# Patient Record
Sex: MALE | Race: WHITE | NOT HISPANIC OR LATINO | Employment: OTHER | URBAN - METROPOLITAN AREA
[De-identification: names, ages, dates, MRNs, and addresses within clinical notes are randomized per-mention and may not be internally consistent; named-entity substitution may affect disease eponyms.]

---

## 2017-01-16 DIAGNOSIS — D69.3 IDIOPATHIC THROMBOCYTOPENIC PURPURA (H): Primary | ICD-10-CM

## 2017-01-16 LAB
BASOPHILS # BLD AUTO: 0 10E9/L (ref 0–0.2)
BASOPHILS NFR BLD AUTO: 0.1 %
DIFFERENTIAL METHOD BLD: ABNORMAL
EOSINOPHIL # BLD AUTO: 0 10E9/L (ref 0–0.7)
EOSINOPHIL NFR BLD AUTO: 0.1 %
ERYTHROCYTE [DISTWIDTH] IN BLOOD BY AUTOMATED COUNT: 12.6 % (ref 10–15)
HCT VFR BLD AUTO: 39.2 % (ref 40–53)
HGB BLD-MCNC: 13.1 G/DL (ref 13.3–17.7)
LYMPHOCYTES # BLD AUTO: 0.9 10E9/L (ref 0.8–5.3)
LYMPHOCYTES NFR BLD AUTO: 9.4 %
MCH RBC QN AUTO: 30.4 PG (ref 26.5–33)
MCHC RBC AUTO-ENTMCNC: 33.4 G/DL (ref 31.5–36.5)
MCV RBC AUTO: 91 FL (ref 78–100)
MONOCYTES # BLD AUTO: 0.7 10E9/L (ref 0–1.3)
MONOCYTES NFR BLD AUTO: 7.6 %
NEUTROPHILS # BLD AUTO: 8 10E9/L (ref 1.6–8.3)
NEUTROPHILS NFR BLD AUTO: 82.8 %
PLATELET # BLD AUTO: 66 10E9/L (ref 150–450)
RBC # BLD AUTO: 4.31 10E12/L (ref 4.4–5.9)
WBC # BLD AUTO: 9.7 10E9/L (ref 4–11)

## 2017-01-16 PROCEDURE — 36415 COLL VENOUS BLD VENIPUNCTURE: CPT | Performed by: FAMILY MEDICINE

## 2017-01-16 PROCEDURE — 85025 COMPLETE CBC W/AUTO DIFF WBC: CPT | Performed by: FAMILY MEDICINE

## 2017-01-24 DIAGNOSIS — D69.3 IDIOPATHIC THROMBOCYTOPENIC PURPURA (H): Primary | ICD-10-CM

## 2017-01-24 DIAGNOSIS — D69.3 IDIOPATHIC THROMBOCYTOPENIC PURPURA (H): ICD-10-CM

## 2017-01-24 DIAGNOSIS — I10 BENIGN ESSENTIAL HYPERTENSION: Primary | ICD-10-CM

## 2017-01-24 LAB
BASOPHILS # BLD AUTO: 0 10E9/L (ref 0–0.2)
BASOPHILS NFR BLD AUTO: 0.1 %
DIFFERENTIAL METHOD BLD: ABNORMAL
EOSINOPHIL # BLD AUTO: 0 10E9/L (ref 0–0.7)
EOSINOPHIL NFR BLD AUTO: 0.1 %
ERYTHROCYTE [DISTWIDTH] IN BLOOD BY AUTOMATED COUNT: 13.3 % (ref 10–15)
HCT VFR BLD AUTO: 40 % (ref 40–53)
HGB BLD-MCNC: 13.1 G/DL (ref 13.3–17.7)
IMM GRANULOCYTES # BLD: 0.2 10E9/L (ref 0–0.4)
IMM GRANULOCYTES NFR BLD: 1.7 %
LYMPHOCYTES # BLD AUTO: 1.2 10E9/L (ref 0.8–5.3)
LYMPHOCYTES NFR BLD AUTO: 9 %
MCH RBC QN AUTO: 29.1 PG (ref 26.5–33)
MCHC RBC AUTO-ENTMCNC: 32.8 G/DL (ref 31.5–36.5)
MCV RBC AUTO: 89 FL (ref 78–100)
MONOCYTES # BLD AUTO: 1.1 10E9/L (ref 0–1.3)
MONOCYTES NFR BLD AUTO: 8.3 %
NEUTROPHILS # BLD AUTO: 10.4 10E9/L (ref 1.6–8.3)
NEUTROPHILS NFR BLD AUTO: 80.8 %
PLATELET # BLD AUTO: 87 10E9/L (ref 150–450)
RBC # BLD AUTO: 4.5 10E12/L (ref 4.4–5.9)
WBC # BLD AUTO: 12.9 10E9/L (ref 4–11)

## 2017-01-24 PROCEDURE — 85025 COMPLETE CBC W/AUTO DIFF WBC: CPT | Performed by: INTERNAL MEDICINE

## 2017-01-24 PROCEDURE — 36415 COLL VENOUS BLD VENIPUNCTURE: CPT | Performed by: INTERNAL MEDICINE

## 2017-01-25 RX ORDER — AMLODIPINE BESYLATE 5 MG/1
TABLET ORAL
Qty: 90 TABLET | Refills: 3 | Status: SHIPPED | OUTPATIENT
Start: 2017-01-25 | End: 2018-05-20

## 2017-01-25 RX ORDER — CLONIDINE HYDROCHLORIDE 0.3 MG/1
TABLET ORAL
Qty: 180 TABLET | Refills: 3 | Status: SHIPPED | OUTPATIENT
Start: 2017-01-25 | End: 2018-05-18

## 2017-01-25 NOTE — TELEPHONE ENCOUNTER
CLONIDINE 0.3MG   Last Written Prescription Date: 7/30/15  Last Fill Quantity: 180, # refills: 1  Last Office Visit with [facility], [facility] or [facility] prescribing provider: 12/6/16       POTASSIUM   Date Value Ref Range Status   12/06/2016 3.7 3.4 - 5.3 mmol/L Final     CREATININE   Date Value Ref Range Status   12/06/2016 0.96 0.66 - 1.25 mg/dL Final     BP Readings from Last 3 Encounters:   12/06/16 122/78   11/01/16 122/74   08/04/16 118/80         AMLODIPINE 5MG    Last Written Prescription Date: 5/9/16  Last Fill Quantity: 90, # refills: 1    Last Office Visit with [facility], [facility] or [facility] prescribing provider:  12/6/16   Future Office Visit:        BP Readings from Last 3 Encounters:   12/06/16 122/78   11/01/16 122/74   08/04/16 118/80

## 2017-02-13 DIAGNOSIS — M10.9 GOUT INVOLVING TOE, UNSPECIFIED CAUSE, UNSPECIFIED CHRONICITY, UNSPECIFIED LATERALITY: ICD-10-CM

## 2017-02-14 RX ORDER — ALLOPURINOL 300 MG/1
TABLET ORAL
Qty: 90 TABLET | Refills: 3 | Status: SHIPPED | OUTPATIENT
Start: 2017-02-14 | End: 2018-05-21

## 2017-02-14 NOTE — TELEPHONE ENCOUNTER
allopurinol (ZYLOPRIM) 300 MG        Last Written Prescription Date: 6/2/16  Last Fill Quantity: 90, # refills: 1  Last Office Visit with [facility] or [facility] prescribing provider:  12/6/16        Uric Acid   Date Value Ref Range Status   12/06/2016 3.4 (L) 3.5 - 8.5 mg/dL Final   ]  Creatinine   Date Value Ref Range Status   12/06/2016 0.96 0.66 - 1.25 mg/dL Final   ]  Lab Results   Component Value Date    WBC 12.9 01/24/2017     Lab Results   Component Value Date    RBC 4.50 01/24/2017     Lab Results   Component Value Date    HGB 13.1 01/24/2017     Lab Results   Component Value Date    HCT 40.0 01/24/2017     No components found for: MCT  Lab Results   Component Value Date    MCV 89 01/24/2017     Lab Results   Component Value Date    MCH 29.1 01/24/2017     Lab Results   Component Value Date    MCHC 32.8 01/24/2017     Lab Results   Component Value Date    RDW 13.3 01/24/2017     Lab Results   Component Value Date    PLT 87 01/24/2017     Lab Results   Component Value Date    AST 58 12/06/2016     Lab Results   Component Value Date    ALT 86 12/06/2016

## 2017-02-14 NOTE — TELEPHONE ENCOUNTER
Prescription approved per [facility] Refill Protocol.  Leyla Thompson RN- Triage [employer]

## 2017-03-27 DIAGNOSIS — F10.10 ALCOHOL ABUSE: ICD-10-CM

## 2017-03-28 NOTE — TELEPHONE ENCOUNTER
folic acid (FOLVITE)       Last Written Prescription Date: 2/19/16  Last Fill Quantity: 90,  # refills: 3   Last Office Visit with [facility], [facility] or [facility] prescribing provider: 12/6/16

## 2017-03-29 RX ORDER — FOLIC ACID 1 MG/1
TABLET ORAL
Qty: 90 TABLET | Refills: 2 | Status: SHIPPED | OUTPATIENT
Start: 2017-03-29 | End: 2018-02-24

## 2017-04-13 ENCOUNTER — TRANSFERRED RECORDS (OUTPATIENT)
Dept: HEALTH INFORMATION MANAGEMENT | Facility: CLINIC | Age: 72
End: 2017-04-13

## 2017-04-18 ENCOUNTER — HOSPITAL ENCOUNTER (INPATIENT)
Facility: CLINIC | Age: 72
LOS: 2 days | Discharge: HOME | DRG: 176 | End: 2017-04-20
Attending: PHYSICIAN ASSISTANT | Admitting: FAMILY MEDICINE
Payer: COMMERCIAL

## 2017-04-18 ENCOUNTER — APPOINTMENT (OUTPATIENT)
Dept: CT IMAGING | Facility: CLINIC | Age: 72
DRG: 176 | End: 2017-04-18
Attending: PHYSICIAN ASSISTANT
Payer: COMMERCIAL

## 2017-04-18 DIAGNOSIS — D69.3 IDIOPATHIC THROMBOCYTOPENIC PURPURA (H): Primary | ICD-10-CM

## 2017-04-18 DIAGNOSIS — I26.99 PULMONARY EMBOLISM ON RIGHT (H): ICD-10-CM

## 2017-04-18 LAB
ALBUMIN SERPL-MCNC: 3 G/DL (ref 3.4–5)
ALBUMIN UR-MCNC: NEGATIVE MG/DL
ALP SERPL-CCNC: 59 U/L (ref 40–150)
ALT SERPL W P-5'-P-CCNC: 33 U/L (ref 0–70)
ANION GAP SERPL CALCULATED.3IONS-SCNC: 10 MMOL/L (ref 3–14)
APPEARANCE UR: CLEAR
APTT PPP: 28 SEC (ref 22–37)
AST SERPL W P-5'-P-CCNC: 26 U/L (ref 0–45)
BASOPHILS # BLD AUTO: 0.1 10E9/L (ref 0–0.2)
BASOPHILS NFR BLD AUTO: 0.8 %
BILIRUB SERPL-MCNC: 0.5 MG/DL (ref 0.2–1.3)
BILIRUB UR QL STRIP: NEGATIVE
BUN SERPL-MCNC: 13 MG/DL (ref 7–30)
CALCIUM SERPL-MCNC: 9.5 MG/DL (ref 8.5–10.1)
CHLORIDE SERPL-SCNC: 109 MMOL/L (ref 94–109)
CO2 SERPL-SCNC: 25 MMOL/L (ref 20–32)
COLOR UR AUTO: YELLOW
CREAT SERPL-MCNC: 0.75 MG/DL (ref 0.66–1.25)
DIFFERENTIAL METHOD BLD: ABNORMAL
EOSINOPHIL # BLD AUTO: 0.2 10E9/L (ref 0–0.7)
EOSINOPHIL NFR BLD AUTO: 1.8 %
ERYTHROCYTE [DISTWIDTH] IN BLOOD BY AUTOMATED COUNT: 14.4 % (ref 10–15)
GFR SERPL CREATININE-BSD FRML MDRD: ABNORMAL ML/MIN/1.7M2
GLUCOSE SERPL-MCNC: 105 MG/DL (ref 70–99)
GLUCOSE UR STRIP-MCNC: NEGATIVE MG/DL
HCT VFR BLD AUTO: 40.7 % (ref 40–53)
HGB BLD-MCNC: 13.3 G/DL (ref 13.3–17.7)
HGB UR QL STRIP: NEGATIVE
IMM GRANULOCYTES # BLD: 0.5 10E9/L (ref 0–0.4)
IMM GRANULOCYTES NFR BLD: 5 %
INR PPP: 1.02 (ref 0.86–1.14)
KETONES UR STRIP-MCNC: 10 MG/DL
LEUKOCYTE ESTERASE UR QL STRIP: NEGATIVE
LYMPHOCYTES # BLD AUTO: 1.2 10E9/L (ref 0.8–5.3)
LYMPHOCYTES NFR BLD AUTO: 12.6 %
MCH RBC QN AUTO: 29 PG (ref 26.5–33)
MCHC RBC AUTO-ENTMCNC: 32.7 G/DL (ref 31.5–36.5)
MCV RBC AUTO: 89 FL (ref 78–100)
MONOCYTES # BLD AUTO: 0.7 10E9/L (ref 0–1.3)
MONOCYTES NFR BLD AUTO: 7.8 %
NEUTROPHILS # BLD AUTO: 6.6 10E9/L (ref 1.6–8.3)
NEUTROPHILS NFR BLD AUTO: 72 %
NITRATE UR QL: NEGATIVE
PH UR STRIP: 5.5 PH (ref 5–7)
PLATELET # BLD AUTO: 94 10E9/L (ref 150–450)
POTASSIUM SERPL-SCNC: 4.2 MMOL/L (ref 3.4–5.3)
PROT SERPL-MCNC: 7.3 G/DL (ref 6.8–8.8)
RBC # BLD AUTO: 4.58 10E12/L (ref 4.4–5.9)
SODIUM SERPL-SCNC: 144 MMOL/L (ref 133–144)
SP GR UR STRIP: 1.01 (ref 1–1.03)
URN SPEC COLLECT METH UR: ABNORMAL
UROBILINOGEN UR STRIP-MCNC: NORMAL MG/DL (ref 0–2)
WBC # BLD AUTO: 9.1 10E9/L (ref 4–11)

## 2017-04-18 PROCEDURE — 96374 THER/PROPH/DIAG INJ IV PUSH: CPT

## 2017-04-18 PROCEDURE — 81003 URINALYSIS AUTO W/O SCOPE: CPT | Performed by: PHYSICIAN ASSISTANT

## 2017-04-18 PROCEDURE — 85025 COMPLETE CBC W/AUTO DIFF WBC: CPT | Performed by: PHYSICIAN ASSISTANT

## 2017-04-18 PROCEDURE — 96376 TX/PRO/DX INJ SAME DRUG ADON: CPT

## 2017-04-18 PROCEDURE — 96372 THER/PROPH/DIAG INJ SC/IM: CPT

## 2017-04-18 PROCEDURE — 99285 EMERGENCY DEPT VISIT HI MDM: CPT | Mod: 25

## 2017-04-18 PROCEDURE — 25000125 ZZHC RX 250: Performed by: RADIOLOGY

## 2017-04-18 PROCEDURE — 85730 THROMBOPLASTIN TIME PARTIAL: CPT | Performed by: PHYSICIAN ASSISTANT

## 2017-04-18 PROCEDURE — 25000128 H RX IP 250 OP 636: Performed by: FAMILY MEDICINE

## 2017-04-18 PROCEDURE — 99284 EMERGENCY DEPT VISIT MOD MDM: CPT | Mod: 25 | Performed by: EMERGENCY MEDICINE

## 2017-04-18 PROCEDURE — 80053 COMPREHEN METABOLIC PANEL: CPT | Performed by: PHYSICIAN ASSISTANT

## 2017-04-18 PROCEDURE — 25000132 ZZH RX MED GY IP 250 OP 250 PS 637: Performed by: FAMILY MEDICINE

## 2017-04-18 PROCEDURE — 25500064 ZZH RX 255 OP 636: Performed by: RADIOLOGY

## 2017-04-18 PROCEDURE — 71260 CT THORAX DX C+: CPT

## 2017-04-18 PROCEDURE — 99223 1ST HOSP IP/OBS HIGH 75: CPT | Mod: AI | Performed by: FAMILY MEDICINE

## 2017-04-18 PROCEDURE — 12000007 ZZH R&B INTERMEDIATE

## 2017-04-18 PROCEDURE — 74177 CT ABD & PELVIS W/CONTRAST: CPT

## 2017-04-18 PROCEDURE — 25000125 ZZHC RX 250: Performed by: FAMILY MEDICINE

## 2017-04-18 PROCEDURE — 85610 PROTHROMBIN TIME: CPT | Performed by: PHYSICIAN ASSISTANT

## 2017-04-18 PROCEDURE — 25000128 H RX IP 250 OP 636: Performed by: PHYSICIAN ASSISTANT

## 2017-04-18 RX ORDER — IOPAMIDOL 755 MG/ML
81 INJECTION, SOLUTION INTRAVASCULAR ONCE
Status: COMPLETED | OUTPATIENT
Start: 2017-04-18 | End: 2017-04-18

## 2017-04-18 RX ORDER — PROCHLORPERAZINE 25 MG
12.5 SUPPOSITORY, RECTAL RECTAL EVERY 12 HOURS PRN
Status: DISCONTINUED | OUTPATIENT
Start: 2017-04-18 | End: 2017-04-20 | Stop reason: HOSPADM

## 2017-04-18 RX ORDER — ONDANSETRON 2 MG/ML
4 INJECTION INTRAMUSCULAR; INTRAVENOUS EVERY 6 HOURS PRN
Status: DISCONTINUED | OUTPATIENT
Start: 2017-04-18 | End: 2017-04-20 | Stop reason: HOSPADM

## 2017-04-18 RX ORDER — ONDANSETRON 4 MG/1
4 TABLET, ORALLY DISINTEGRATING ORAL EVERY 6 HOURS PRN
Status: DISCONTINUED | OUTPATIENT
Start: 2017-04-18 | End: 2017-04-18

## 2017-04-18 RX ORDER — NALOXONE HYDROCHLORIDE 0.4 MG/ML
.1-.4 INJECTION, SOLUTION INTRAMUSCULAR; INTRAVENOUS; SUBCUTANEOUS
Status: DISCONTINUED | OUTPATIENT
Start: 2017-04-18 | End: 2017-04-20 | Stop reason: HOSPADM

## 2017-04-18 RX ORDER — ONDANSETRON 4 MG/1
4 TABLET, ORALLY DISINTEGRATING ORAL EVERY 6 HOURS PRN
Status: DISCONTINUED | OUTPATIENT
Start: 2017-04-18 | End: 2017-04-20 | Stop reason: HOSPADM

## 2017-04-18 RX ORDER — LISINOPRIL 40 MG/1
40 TABLET ORAL DAILY
Status: DISCONTINUED | OUTPATIENT
Start: 2017-04-18 | End: 2017-04-20 | Stop reason: HOSPADM

## 2017-04-18 RX ORDER — METOCLOPRAMIDE 5 MG/1
5 TABLET ORAL EVERY 6 HOURS PRN
Status: DISCONTINUED | OUTPATIENT
Start: 2017-04-18 | End: 2017-04-20 | Stop reason: HOSPADM

## 2017-04-18 RX ORDER — HYDROMORPHONE HYDROCHLORIDE 1 MG/ML
.3-.5 INJECTION, SOLUTION INTRAMUSCULAR; INTRAVENOUS; SUBCUTANEOUS
Status: DISCONTINUED | OUTPATIENT
Start: 2017-04-18 | End: 2017-04-20 | Stop reason: HOSPADM

## 2017-04-18 RX ORDER — GABAPENTIN 300 MG/1
300 CAPSULE ORAL 3 TIMES DAILY
Status: DISCONTINUED | OUTPATIENT
Start: 2017-04-18 | End: 2017-04-20 | Stop reason: HOSPADM

## 2017-04-18 RX ORDER — OXYCODONE AND ACETAMINOPHEN 5; 325 MG/1; MG/1
1-2 TABLET ORAL EVERY 4 HOURS PRN
Status: DISCONTINUED | OUTPATIENT
Start: 2017-04-18 | End: 2017-04-20 | Stop reason: HOSPADM

## 2017-04-18 RX ORDER — NALOXONE HYDROCHLORIDE 0.4 MG/ML
.1-.4 INJECTION, SOLUTION INTRAMUSCULAR; INTRAVENOUS; SUBCUTANEOUS
Status: DISCONTINUED | OUTPATIENT
Start: 2017-04-18 | End: 2017-04-18

## 2017-04-18 RX ORDER — PREDNISONE 20 MG/1
20 TABLET ORAL DAILY
Status: DISCONTINUED | OUTPATIENT
Start: 2017-04-18 | End: 2017-04-20 | Stop reason: HOSPADM

## 2017-04-18 RX ORDER — AMLODIPINE BESYLATE 5 MG/1
5 TABLET ORAL DAILY
Status: DISCONTINUED | OUTPATIENT
Start: 2017-04-18 | End: 2017-04-20 | Stop reason: HOSPADM

## 2017-04-18 RX ORDER — PROCHLORPERAZINE MALEATE 5 MG
5 TABLET ORAL EVERY 6 HOURS PRN
Status: DISCONTINUED | OUTPATIENT
Start: 2017-04-18 | End: 2017-04-20 | Stop reason: HOSPADM

## 2017-04-18 RX ORDER — ACETAMINOPHEN 325 MG/1
650 TABLET ORAL EVERY 4 HOURS PRN
Status: DISCONTINUED | OUTPATIENT
Start: 2017-04-18 | End: 2017-04-20 | Stop reason: HOSPADM

## 2017-04-18 RX ORDER — METOCLOPRAMIDE HYDROCHLORIDE 5 MG/ML
5 INJECTION INTRAMUSCULAR; INTRAVENOUS EVERY 6 HOURS PRN
Status: DISCONTINUED | OUTPATIENT
Start: 2017-04-18 | End: 2017-04-20 | Stop reason: HOSPADM

## 2017-04-18 RX ORDER — ONDANSETRON 2 MG/ML
4 INJECTION INTRAMUSCULAR; INTRAVENOUS EVERY 6 HOURS PRN
Status: DISCONTINUED | OUTPATIENT
Start: 2017-04-18 | End: 2017-04-18

## 2017-04-18 RX ORDER — SPIRONOLACTONE 25 MG/1
25 TABLET ORAL DAILY
Status: DISCONTINUED | OUTPATIENT
Start: 2017-04-18 | End: 2017-04-20 | Stop reason: HOSPADM

## 2017-04-18 RX ORDER — ALLOPURINOL 300 MG/1
300 TABLET ORAL DAILY
Status: DISCONTINUED | OUTPATIENT
Start: 2017-04-18 | End: 2017-04-20 | Stop reason: HOSPADM

## 2017-04-18 RX ORDER — HYDROMORPHONE HYDROCHLORIDE 1 MG/ML
0.5 INJECTION, SOLUTION INTRAMUSCULAR; INTRAVENOUS; SUBCUTANEOUS
Status: COMPLETED | OUTPATIENT
Start: 2017-04-18 | End: 2017-04-18

## 2017-04-18 RX ADMIN — ENOXAPARIN SODIUM 50 MG: 60 INJECTION SUBCUTANEOUS at 18:11

## 2017-04-18 RX ADMIN — HYDROMORPHONE HYDROCHLORIDE 0.5 MG: 10 INJECTION, SOLUTION INTRAMUSCULAR; INTRAVENOUS; SUBCUTANEOUS at 11:42

## 2017-04-18 RX ADMIN — GABAPENTIN 300 MG: 300 CAPSULE ORAL at 19:59

## 2017-04-18 RX ADMIN — ENOXAPARIN SODIUM 40 MG: 40 INJECTION SUBCUTANEOUS at 15:32

## 2017-04-18 RX ADMIN — HYDROMORPHONE HYDROCHLORIDE 0.5 MG: 10 INJECTION, SOLUTION INTRAMUSCULAR; INTRAVENOUS; SUBCUTANEOUS at 14:34

## 2017-04-18 RX ADMIN — PREDNISONE 20 MG: 20 TABLET ORAL at 18:08

## 2017-04-18 RX ADMIN — HYDROMORPHONE HYDROCHLORIDE 0.5 MG: 10 INJECTION, SOLUTION INTRAMUSCULAR; INTRAVENOUS; SUBCUTANEOUS at 15:29

## 2017-04-18 RX ADMIN — SODIUM CHLORIDE 104 ML: 9 INJECTION, SOLUTION INTRAVENOUS at 12:45

## 2017-04-18 RX ADMIN — IOPAMIDOL 81 ML: 755 INJECTION, SOLUTION INTRAVENOUS at 12:45

## 2017-04-18 ASSESSMENT — ENCOUNTER SYMPTOMS
APPETITE CHANGE: 0
CHILLS: 0
COUGH: 0
COLOR CHANGE: 0
DIZZINESS: 0
HEMATURIA: 0
UNEXPECTED WEIGHT CHANGE: 0
SHORTNESS OF BREATH: 1
NAUSEA: 0
DIARRHEA: 0
WHEEZING: 0
VOMITING: 0
LIGHT-HEADEDNESS: 0
FEVER: 0
DYSURIA: 0
HEADACHES: 0
FREQUENCY: 0
ABDOMINAL PAIN: 1
ACTIVITY CHANGE: 0
FATIGUE: 1

## 2017-04-18 NOTE — LETTER
Raymon Ace MRN# [id number]   YOB: 1945 Age: 71 year old     Date of Admission:  ***  Date of Discharge:  {DISCHARGE DATE:119492}  Admitting Physician:  Everardo Mena MD  Discharging Physician: { :[id number]} (Contact: ***)  Discharging Service:  { :8821970}  Hospitalization Status: { :8289207}     Primary Care Clinic:  {[facility]:0153540}  Primary Care Provider: Rosanne Cherry     {   Salutation            :[id number]}            You have been identified as the Primary Care Provider for Raymon Ace, who was recently admitted to the [facility]  Thank you for the referral to our hospital.  It is our goal to provide the highest quality of care for our patients, including planning for seamless continuity of care by providing you with timely, accurate and concise information.  After reviewing the following combined discharge summary and final progress note, please contact us if you have any remaining questions.  The Discharging Physician will be the best informed, with their contact information listed above.  If unable to reach them, or if you have received this letter in error, please call [phone] and someone will try to help you.

## 2017-04-18 NOTE — ED NOTES
Pt comes in with spouse for concerns of right sided flank pain, with radiation into RUQ area. Hx of ETOh abuse, thrombocytopenia, and known hx of PE. Pain worse with deep breath. Pain comes and goes in waves. Tylenol taken at home without relief. Numberous blood draws over the past week. Pt is alert, oriented in NAD.

## 2017-04-18 NOTE — IP AVS SNAPSHOT
[facility]    [address] [address]    Phone:  [phone]    Fax:  [phone]                                       After Visit Summary   4/18/2017    Raymon Ace    MRN: [id number]           After Visit Summary Signature Page     I have received my discharge instructions, and my questions have been answered. I have discussed any challenges I see with this plan with the nurse or doctor.    ..........................................................................................................................................  Patient/Patient Representative Signature      ..........................................................................................................................................  Patient Representative Print Name and Relationship to Patient    ..................................................               ................................................  Date                                            Time    ..........................................................................................................................................  Reviewed by Signature/Title    ...................................................              ..............................................  Date                                                            Time

## 2017-04-18 NOTE — PROGRESS NOTES
[facility] [facility] ADMISSION NOTE    Patient admitted to room 2314 at approximately 1600 via wheel chair from emergency room. Patient was accompanied by spouse.     Verbal SBAR report received from Milka DIAZ prior to patient arrival.     Patient ambulated to bed independently. Patient alert and oriented X 3. Pain is controlled with current analgesics.  Medication(s) being used: narcotic analgesics including Dilaudid from the ED. 0-10 Pain Scale: 8. Admission vital signs: Blood pressure 122/71, temperature 98.1  F (36.7  C), temperature source Oral, resp. rate (!) 6, weight 86.2 kg (190 lb), SpO2 95 %. Patient was oriented to plan of care, call light, bed controls, tv, telephone, bathroom and visiting hours.     The following safety risks were identified during admission: falls. Yellow risk band applied: YES.     Mina Vásquez

## 2017-04-18 NOTE — IP AVS SNAPSHOT
MRN:[id number]                      After Visit Summary   4/18/2017    Raymon Ace    MRN: [id number]           Thank you!     Thank you for choosing [facility] for your care. Our goal is always to provide you with excellent care. Hearing back from our patients is one way we can continue to improve our services. Please take a few minutes to complete the written survey that you may receive in the mail after you visit with us. Thank you!        Patient Information     Date Of Birth          1945        Designated Caregiver       Most Recent Value    Caregiver    Will someone help with your care after discharge? (P) no      About your hospital stay     You were admitted on:  April 18, 2017 You last received care in the:  [facility]    You were discharged on:  April 20, 2017       Who to Call     For medical emergencies, please call 911.  For non-urgent questions about your medical care, please call your primary care provider or clinic, [phone]          Attending Provider     Provider Specialty    Yari Medley PA-C Physician Assistant    Everardo Mena MD Family Practice       Primary Care Provider Office Phone # Fax #    Rosanne Kimberly Cherry [phone]       [facility] [address]  [facility] [address]        Further instructions from your care team       For the pulmonary embolus:   -lovenox 90 mg 2 times/day until seen by the Hematology clotting team (Dr Novoa) at [facility].     For the ITP:   -prednisone 20 mg daily until adjusted by Hematology at [facility]     For the pain  -prednisone as above plus Tylenol is safest.      Pending Results     No orders found from 4/16/2017 to 4/19/2017.            Statement of Approval     Ordered          04/20/17 1127  I have reviewed and agree with all the recommendations and orders detailed in this document.  EFFECTIVE NOW     Approved and electronically signed by:   "Everardo Mena MD             Admission Information     Date & Time Provider Department Dept. Phone    4/18/2017 Everardo Mena MD [facility] [phone]      Your Vitals Were     Blood Pressure Pulse Temperature Respirations Height Weight    104/59 56 98.2  F (36.8  C) (Oral) 18 1.702 m (5' 7\") 85.5 kg (188 lb 7.9 oz)    Pulse Oximetry BMI (Body Mass Index)                97% 29.52 kg/m2          [employer] Information     [employer] gives you secure access to your electronic health record. If you see a primary care provider, you can also send messages to your care team and make appointments. If you have questions, please call your primary care clinic.  If you do not have a primary care provider, please call [phone] and they will assist you.        Care EveryWhere ID     This is your Care EveryWhere ID. This could be used by other organizations to access your [facility] medical records  [id number]           Review of your medicines      START taking        Dose / Directions    enoxaparin 100 MG/ML injection   Commonly known as:  LOVENOX   Used for:  Pulmonary embolism on right (H)        Dose:  90 mg   Inject 0.9 mLs (90 mg) Subcutaneous every 12 hours   Quantity:  30 Syringe   Refills:  1       predniSONE 20 MG tablet   Commonly known as:  DELTASONE   Used for:  Idiopathic thrombocytopenic purpura (H)        Dose:  20 mg   Take 1 tablet (20 mg) by mouth daily   Quantity:  30 tablet   Refills:  0         CONTINUE these medicines which have NOT CHANGED        Dose / Directions    allopurinol 300 MG tablet   Commonly known as:  ZYLOPRIM   Used for:  Gout involving toe, unspecified cause, unspecified chronicity, unspecified laterality        TAKE ONE TABLET BY MOUTH ONCE DAILY *NEED  TO  BE  SEEN  FOR  MORE  REFILLS   Quantity:  90 tablet   Refills:  3       amLODIPine 5 MG tablet   Commonly known as:  NORVASC   Used for:  Benign essential hypertension        TAKE ONE TABLET BY MOUTH ONCE " DAILY   Quantity:  90 tablet   Refills:  3       cloNIDine 0.3 MG tablet   Commonly known as:  CATAPRES   Used for:  Benign essential hypertension        TAKE ONE TABLET BY MOUTH TWICE DAILY. NEEDS TO BE SEEN FOR MORE.   Quantity:  180 tablet   Refills:  3       folic acid 1 MG tablet   Commonly known as:  FOLVITE   Used for:  Alcohol abuse        TAKE ONE TABLET BY MOUTH ONCE DAILY   Quantity:  90 tablet   Refills:  2       gabapentin 300 MG capsule   Commonly known as:  NEURONTIN   Used for:  Chronic pain        TAKE ONE CAPSULE BY MOUTH THREE TIMES DAILY   Quantity:  270 capsule   Refills:  0       lisinopril 40 MG tablet   Commonly known as:  PRINIVIL/ZESTRIL   Used for:  Essential hypertension, hypertension with unspecified goal        Dose:  40 mg   Take 1 tablet (40 mg) by mouth daily   Quantity:  90 tablet   Refills:  1       omeprazole 40 MG capsule   Commonly known as:  priLOSEC   Used for:  Esophageal reflux        TAKE ONE CAPSULE BY MOUTH ONCE DAILY   Quantity:  90 capsule   Refills:  3       spironolactone 25 MG tablet   Commonly known as:  ALDACTONE   Used for:  Benign essential hypertension        TAKE ONE TABLET BY MOUTH ONCE DAILY   Quantity:  90 tablet   Refills:  3       VIAGRA 100 MG cap/tab   Used for:  ED (erectile dysfunction)   Generic drug:  sildenafil        TAKE ONE TABLET BY MOUTH ONCE DAILY AS NEEDED FOR ERECTILE DYSFUNCTION   Quantity:  25 tablet   Refills:  3         STOP taking     aspirin 81 MG tablet                Where to get your medicines      Some of these will need a paper prescription and others can be bought over the counter. Ask your nurse if you have questions.     Bring a paper prescription for each of these medications     enoxaparin 100 MG/ML injection    predniSONE 20 MG tablet                Protect others around you: Learn how to safely use, store and throw away your medicines at www.disposemymeds.org.             Medication List: This is a list of all your  medications and when to take them. Check marks below indicate your daily home schedule. Keep this list as a reference.      Medications           Morning Afternoon Evening Bedtime As Needed    allopurinol 300 MG tablet   Commonly known as:  ZYLOPRIM   TAKE ONE TABLET BY MOUTH ONCE DAILY *NEED  TO  BE  SEEN  FOR  MORE  REFILLS   Last time this was given:  300 mg on 4/20/2017  8:45 AM            Took this morning                       amLODIPine 5 MG tablet   Commonly known as:  NORVASC   TAKE ONE TABLET BY MOUTH ONCE DAILY   Last time this was given:  5 mg on 4/20/2017  8:45 AM            Took this morning                       cloNIDine 0.3 MG tablet   Commonly known as:  CATAPRES   TAKE ONE TABLET BY MOUTH TWICE DAILY. NEEDS TO BE SEEN FOR MORE.   Last time this was given:  0.3 mg on 4/19/2017  7:49 PM            resume           resume               enoxaparin 100 MG/ML injection   Commonly known as:  LOVENOX   Inject 0.9 mLs (90 mg) Subcutaneous every 12 hours   Last time this was given:  90 mg on 4/20/2017  6:33 AM                                folic acid 1 MG tablet   Commonly known as:  FOLVITE   TAKE ONE TABLET BY MOUTH ONCE DAILY            Took this morning                       gabapentin 300 MG capsule   Commonly known as:  NEURONTIN   TAKE ONE CAPSULE BY MOUTH THREE TIMES DAILY   Last time this was given:  300 mg on 4/20/2017  1:28 PM            Took this morning       resume           resume           lisinopril 40 MG tablet   Commonly known as:  PRINIVIL/ZESTRIL   Take 1 tablet (40 mg) by mouth daily   Last time this was given:  40 mg on 4/19/2017  8:41 AM            resume                       omeprazole 40 MG capsule   Commonly known as:  priLOSEC   TAKE ONE CAPSULE BY MOUTH ONCE DAILY   Last time this was given:  40 mg on 4/20/2017  8:45 AM            Took this morning                       predniSONE 20 MG tablet   Commonly known as:  DELTASONE   Take 1 tablet (20 mg) by mouth daily   Last time  this was given:  20 mg on 4/20/2017  9:45 AM                                spironolactone 25 MG tablet   Commonly known as:  ALDACTONE   TAKE ONE TABLET BY MOUTH ONCE DAILY   Last time this was given:  25 mg on 4/19/2017  8:41 AM            resume                       VIAGRA 100 MG cap/tab   TAKE ONE TABLET BY MOUTH ONCE DAILY AS NEEDED FOR ERECTILE DYSFUNCTION   Generic drug:  sildenafil                                             More Information        Enoxaparin Sodium Solution for injection  What is this medicine?  ENOXAPARIN (ee nox a PA rin) is used after knee, hip, or abdominal surgeries to prevent blood clotting. It is also used to treat existing blood clots in the lungs or in the veins.  This medicine may be used for other purposes; ask your health care provider or pharmacist if you have questions.  What should I tell my health care provider before I take this medicine?  They need to know if you have any of these conditions:    bleeding disorders, hemorrhage, or hemophilia    infection of the heart or heart valves    kidney or liver disease    previous stroke    prosthetic heart valve    recent surgery or delivery of a baby    ulcer in the stomach or intestine, diverticulitis, or other bowel disease    an unusual or allergic reaction to enoxaparin, heparin, pork or pork products, other medicines, foods, dyes, or preservatives    pregnant or trying to get pregnant    breast-feeding  How should I use this medicine?  This medicine is for injection under the skin. It is usually given by a health-care professional. You or a family member may be trained on how to give the injections. If you are to give yourself injections, make sure you understand how to use the syringe, measure the dose if necessary, and give the injection. To avoid bruising, do not rub the site where this medicine has been injected. Do not take your medicine more often than directed. Do not stop taking except on the advice of your doctor or  health care professional.  Make sure you receive a puncture-resistant container to dispose of the needles and syringes once you have finished with them. Do not reuse these items. Return the container to your doctor or health care professional for proper disposal.  Talk to your pediatrician regarding the use of this medicine in children. Special care may be needed.  Overdosage: If you think you have taken too much of this medicine contact a poison control center or emergency room at once.  NOTE: This medicine is only for you. Do not share this medicine with others.  What if I miss a dose?  If you miss a dose, take it as soon as you can. If it is almost time for your next dose, take only that dose. Do not take double or extra doses.  What may interact with this medicine?    aspirin and aspirin-like medicines    certain medicines that treat or prevent blood clots    dipyridamole    NSAIDs, medicines for pain and inflammation, like ibuprofen or naproxen  This list may not describe all possible interactions. Give your health care provider a list of all the medicines, herbs, non-prescription drugs, or dietary supplements you use. Also tell them if you smoke, drink alcohol, or use illegal drugs. Some items may interact with your medicine.  What should I watch for while using this medicine?  Visit your doctor or health care professional for regular checks on your progress. Your condition will be monitored carefully while you are receiving this medicine.  Notify your doctor or health care professional and seek emergency treatment if you develop breathing problems; changes in vision; chest pain; severe, sudden headache; pain, swelling, warmth in the leg; trouble speaking; sudden numbness or weakness of the face, arm, or leg. These can be signs that your condition has gotten worse.  If you are going to have surgery, tell your doctor or health care professional that you are taking this medicine.  Do not stop taking this  medicine without first talking to your doctor. Be sure to refill your prescription before you run out of medicine.  Avoid sports and activities that might cause injury while you are using this medicine. Severe falls or injuries can cause unseen bleeding. Be careful when using sharp tools or knives. Consider using an electric razor. Take special care brushing or flossing your teeth. Report any injuries, bruising, or red spots on the skin to your doctor or health care professional.  What side effects may I notice from receiving this medicine?  Side effects that you should report to your doctor or health care professional as soon as possible:    allergic reactions like skin rash, itching or hives, swelling of the face, lips, or tongue    feeling faint or lightheaded, falls    signs and symptoms of bleeding such as bloody or black, tarry stools; red or dark-brown urine; spitting up blood or brown material that looks like coffee grounds; red spots on the skin; unusual bruising or bleeding from the eye, gums, or nose  Side effects that usually do not require medical attention (report to your doctor or health care professional if they continue or are bothersome):    pain, redness, or irritation at site where injected  This list may not describe all possible side effects. Call your doctor for medical advice about side effects. You may report side effects to FDA at 2-202-FDA-8979.  Where should I keep my medicine?  Keep out of the reach of children.  Store at room temperature between 15 and 30 degrees C (59 and 86 degrees F). Do not freeze. If your injections have been specially prepared, you may need to store them in the refrigerator. Ask your pharmacist. Throw away any unused medicine after the expiration date.  NOTE:This sheet is a summary. It may not cover all possible information. If you have questions about this medicine, talk to your doctor, pharmacist, or health care provider. Copyright  2016 [employer]  Standard                Pulmonary Embolism (PE)  A pulmonary embolus is most often from a blood clot (thrombus) in a deep vein of the leg. This is called deep vein thrombosis (DVT). Part of the clot may break off and travel to the lungs. This is called a pulmonary embolism. This can cut off the flow of blood in the lungs.  A blood clot in the lungs is a medical emergency and may cause death.   Health care providers use the term venous thromboembolism (VTE) to describe these 2 conditions: deep vein thrombosis and pulmonary embolism. They use the term VTE because the 2 conditions are very closely related. And, because their prevention and treatment are also closely related.      A pulmonary embolism occurs when a blood clot forms in a vein and travels to the lungs.   How is pulmonary embolism diagnosed?  Your health care provider examines you and asks about your symptoms and health history. You may also have one or more of the following:    Blood tests to check for blood clotting or other problems.    Imaging tests to look for clots in the veins or lung.    Electrocardiography (ECG or EKG) to test how well the heart is working.  How is pulmonary embolism treated?    Blood-thinning medicines (anticoagulants). These medicines thin the blood. They may be given as a pill, as an injection, or through a tube into a vein (intravenous or IV). Blood thinners help prevent more blood clots from forming. They also help to prevent an existing clot from getting larger.    Thrombolysis. Thrombolytic medicines are used to quickly dissolve a blood clot. A long, narrow tube (catheter) is used to deliver medicine directly to the clot. Thrombolytic medicines increase the risk of bleeding so they are used very carefully.    Inferior vena cava (IVC) filter surgery. The vena cava is the body s largest vein. It carries blood from the body to the heart. A small filter traps blood clots in the lower body and prevents them from traveling to the  lungs. The filter is inserted into the vein through a catheter. The filter may be used if blood thinners cannot be taken or if they don't work.     Pulmonary embolectomy. This is a procedure to remove a blood clot in the lungs. It may be done with surgery or with a catheter inserted in the body. It may be done when other treatments aren't safe or don't work.  What are the long-term concerns?  With treatment, blood clots are usually dissolved or removed. Some treatments can even help prevent future clots. But having a PE can put you at risk for another life-threatening blood clot. So, you will likely need to take anticoagulants to help keep blood clots from forming again. You may need to take this medicine for months or years.  You may also need to make lifestyle changes. This may include getting more active and eating healthier. You may need to wear elastic (compression) stockings and and take breaks on long trips.  Get emergency help  Call 911 or get emergency help if you have symptoms of a blood clot that has traveled to the lungs. The symptoms include:    Chest pain    Trouble breathing    Coughing (may cough up blood)    Fainting    Fast heartbeat    Sweating  Call your health care provider if you have swelling or pain in your leg, arm, or other area. These are symptoms of a blood clot.  You may have bleeding if you take medicine to help prevent blood clots. Call 911 if you have heavy or uncontrolled bleeding. Call your health care provider if you have signs or symptoms of bleeding. For example, blood in the urine, bleeding with bowel movements, or bleeding from the nose, gums, a cut, or vagina.    8804-3467 [employer]. [address] [place] [address], [place] [address]. All rights reserved. This information is not intended as a substitute for professional medical care. Always follow your healthcare professional's instructions.                Patient Education    Allopurinol Oral tablet    Allopurinol  Sodium Solution for injection  Allopurinol Oral tablet  What is this medicine?  ALLOPURINOL (al oh PURE i nole) reduces the amount of uric acid the body makes. It is used to treat the symptoms of gout. It is also used to treat or prevent high uric acid levels that occur as a result of certain types of chemotherapy. This medicine may also help patients who frequently have kidney stones.  This medicine may be used for other purposes; ask your health care provider or pharmacist if you have questions.  What should I tell my health care provider before I take this medicine?  They need to know if you have any of these conditions:    kidney or liver disease    an unusual or allergic reaction to allopurinol, other medicines, foods, dyes, or preservatives    pregnant or trying to get pregnant    breast feeding  How should I use this medicine?  Take this medicine by mouth with a glass of water. Follow the directions on the prescription label. If this medicine upsets your stomach, take it with food or milk. Take your doses at regular intervals. Do not take your medicine more often than directed.  Talk to your pediatrician regarding the use of this medicine in children. Special care may be needed. While this drug may be prescribed for children as young as 6 years for selected conditions, precautions do apply.  Overdosage: If you think you have taken too much of this medicine contact a poison control center or emergency room at once.  NOTE: This medicine is only for you. Do not share this medicine with others.  What if I miss a dose?  If you miss a dose, take it as soon as you can. If it is almost time for your next dose, take only that dose. Do not take double or extra doses.  What may interact with this medicine?  Do not take this medicine with the following medication:    didanosine, ddI  This medicine may also interact with the following medications:    amoxicillin or ampicillin    azathioprine    certain medicines used to  treat gout    certain types of diuretics    chlorpropamide    cyclosporine    dicumarol    mercaptopurine    tolbutamide    warfarin  This list may not describe all possible interactions. Give your health care provider a list of all the medicines, herbs, non-prescription drugs, or dietary supplements you use. Also tell them if you smoke, drink alcohol, or use illegal drugs. Some items may interact with your medicine.  What should I watch for while using this medicine?  Visit your doctor or health care professional for regular checks on your progress. If you are taking this medicine to treat gout, you may not have less frequent attacks at first. Keep taking your medicine regularly and the attacks should get better within 2 to 6 weeks. Drink plenty of water (10 to 12 full glasses a day) while you are taking this medicine. This will help to reduce stomach upset and reduce the risk of getting gout or kidney stones.  Call your doctor or health care professional at once if you get a skin rash together with chills, fever, sore throat, or nausea and vomiting, if you have blood in your urine, or difficulty passing urine.  Do not take vitamin C without asking your doctor or health care professional. Too much vitamin C can increase the chance of getting kidney stones.  You may get drowsy or dizzy. Do not drive, use machinery, or do anything that needs mental alertness until you know how this drug affects you. Do not stand or sit up quickly, especially if you are an older patient. This reduces the risk of dizzy or fainting spells. Alcohol can make you more drowsy and dizzy. Alcohol can also increase the chance of stomach problems and increase the amount of uric acid in your blood. Avoid alcoholic drinks.  What side effects may I notice from receiving this medicine?  Side effects that you should report to your doctor or health care professional as soon as possible:    allergic reactions like skin rash, itching or hives, swelling  of the face, lips, or tongue    breathing problems    muscle aches or pains    redness, blistering, peeling or loosening of the skin, including inside the mouth  Side effects that usually do not require medical attention (report to your doctor or health care professional if they continue or are bothersome):    changes in taste    diarrhea    indigestion    stomach pain or cramps  This list may not describe all possible side effects. Call your doctor for medical advice about side effects. You may report side effects to FDA at 5-061-ZKV-8946.  Where should I keep my medicine?  Keep out of the reach of children.  Store at room temperature between 15 and 25 degrees C (59 and 77 degrees F). Protect from light and moisture. Throw away any unused medicine after the expiration date.  NOTE:This sheet is a summary. It may not cover all possible information. If you have questions about this medicine, talk to your doctor, pharmacist, or health care provider. Copyright  2016 Gold Standard                Amlodipine Besylate Oral tablet  What is this medicine?  AMLODIPINE (am ANGELINA di peen) is a calcium-channel blocker. It affects the amount of calcium found in your heart and muscle cells. This relaxes your blood vessels, which can reduce the amount of work the heart has to do. This medicine is used to lower high blood pressure. It is also used to prevent chest pain.  This medicine may be used for other purposes; ask your health care provider or pharmacist if you have questions.  What should I tell my health care provider before I take this medicine?  They need to know if you have any of these conditions:    heart problems like heart failure or aortic stenosis    liver disease    an unusual or allergic reaction to amlodipine, other medicines, foods, dyes, or preservatives    pregnant or trying to get pregnant    breast-feeding  How should I use this medicine?  Take this medicine by mouth with a glass of water. Follow the directions  on the prescription label. Take your medicine at regular intervals. Do not take more medicine than directed.  Talk to your pediatrician regarding the use of this medicine in children. Special care may be needed. This medicine has been used in children as young as 6.  Persons over 65 years old may have a stronger reaction to this medicine and need smaller doses.  Overdosage: If you think you have taken too much of this medicine contact a poison control center or emergency room at once.  NOTE: This medicine is only for you. Do not share this medicine with others.  What if I miss a dose?  If you miss a dose, take it as soon as you can. If it is almost time for your next dose, take only that dose. Do not take double or extra doses.  What may interact with this medicine?    herbal or dietary supplements    local or general anesthetics    medicines for high blood pressure    medicines for prostate problems    rifampin  This list may not describe all possible interactions. Give your health care provider a list of all the medicines, herbs, non-prescription drugs, or dietary supplements you use. Also tell them if you smoke, drink alcohol, or use illegal drugs. Some items may interact with your medicine.  What should I watch for while using this medicine?  Visit your doctor or health care professional for regular check ups. Check your blood pressure and pulse rate regularly. Ask your health care professional what your blood pressure and pulse rate should be, and when you should contact him or her.  This medicine may make you feel confused, dizzy or lightheaded. Do not drive, use machinery, or do anything that needs mental alertness until you know how this medicine affects you. To reduce the risk of dizzy or fainting spells, do not sit or stand up quickly, especially if you are an older patient. Avoid alcoholic drinks; they can make you more dizzy.  Do not suddenly stop taking amlodipine. Ask your doctor or health care  professional how you can gradually reduce the dose.  What side effects may I notice from receiving this medicine?  Side effects that you should report to your doctor or health care professional as soon as possible:    allergic reactions like skin rash, itching or hives, swelling of the face, lips, or tongue    breathing problems    changes in vision or hearing    chest pain    fast, irregular heartbeat    swelling of legs or ankles  Side effects that usually do not require medical attention (report to your doctor or health care professional if they continue or are bothersome):    dry mouth    facial flushing    nausea, vomiting    stomach gas, pain    tired, weak    trouble sleeping  This list may not describe all possible side effects. Call your doctor for medical advice about side effects. You may report side effects to FDA at 0-150-FDA-3993.  Where should I keep my medicine?  Keep out of the reach of children.  Store at room temperature between 59 and 86 degrees F (15 and 30 degrees C). Protect from light. Keep container tightly closed. Throw away any unused medicine after the expiration date.  NOTE:This sheet is a summary. It may not cover all possible information. If you have questions about this medicine, talk to your doctor, pharmacist, or health care provider. Copyright  2016 [employer]                Patient Education    Clonidine Hydrochloride Oral tablet    Clonidine Hydrochloride Oral tablet, extended-release [ADHD]    Clonidine Hydrochloride Oral tablet, extended-release [ADHD], Clonidine Hydrochloride Oral tablet, extended-release [ADHD]    Clonidine Hydrochloride Solution for injection    Clonidine Oral tablet, extended-release    Clonidine Transdermal patch - weekly  Clonidine Hydrochloride Oral tablet  What is this medicine?  CLONIDINE (KLOE ni kerrie) is used to treat high blood pressure.  This medicine may be used for other purposes; ask your health care provider or pharmacist if you have  questions.  What should I tell my health care provider before I take this medicine?  They need to know if you have any of these conditions:    kidney disease    an unusual or allergic reaction to clonidine, other medicines, foods, dyes, or preservatives    pregnant or trying to get pregnant    breast-feeding  How should I use this medicine?  Take this medicine by mouth with a glass of water. Follow the directions on the prescription label. Take your doses at regular intervals. Do not take your medicine more often than directed. Do not suddenly stop taking this medicine. You must gradually reduce the dose or you may get a dangerous increase in blood pressure. Ask your doctor or health care professional for advice.  Talk to your pediatrician regarding the use of this medicine in children. Special care may be needed.  Overdosage: If you think you have taken too much of this medicine contact a poison control center or emergency room at once.  NOTE: This medicine is only for you. Do not share this medicine with others.  What if I miss a dose?  If you miss a dose, take it as soon as you can. If it is almost time for your next dose, take only that dose. Do not take double or extra doses.  What may interact with this medicine?  Do not take this medicine with any of the following medications:    MAOIs like Carbex, Eldepryl, Marplan, Nardil, and Parnate  This medicine may also interact with the following medications:    barbiturate medicines for inducing sleep or treating seizures like phenobarbital    certain medicines for blood pressure, heart disease, irregular heart beat    certain medicines for depression, anxiety, or psychotic disturbances    prescription pain medicines  This list may not describe all possible interactions. Give your health care provider a list of all the medicines, herbs, non-prescription drugs, or dietary supplements you use. Also tell them if you smoke, drink alcohol, or use illegal drugs. Some items  may interact with your medicine.  What should I watch for while using this medicine?  Visit your doctor or health care professional for regular checks on your progress. Check your heart rate and blood pressure regularly while you are taking this medicine. Ask your doctor or health care professional what your heart rate should be and when you should contact him or her.  You may get drowsy or dizzy. Do not drive, use machinery, or do anything that needs mental alertness until you know how this medicine affects you. To avoid dizzy or fainting spells, do not stand or sit up quickly, especially if you are an older person. Alcohol can make you more drowsy and dizzy. Avoid alcoholic drinks.  Your mouth may get dry. Chewing sugarless gum or sucking hard candy, and drinking plenty of water will help.  Do not treat yourself for coughs, colds, or pain while you are taking this medicine without asking your doctor or health care professional for advice. Some ingredients may increase your blood pressure.  If you are going to have surgery tell your doctor or health care professional that you are taking this medicine.  What side effects may I notice from receiving this medicine?  Side effects that you should report to your doctor or health care professional as soon as possible:    allergic reactions like skin rash, itching or hives, swelling of the face, lips, or tongue    anxiety, nervousness    chest pain    depression    fast, irregular heartbeat    swelling of feet or legs    unusually weak or tired  Side effects that usually do not require medical attention (report to your doctor or health care professional if they continue or are bothersome):    change in sex drive or performance    constipation    headache  This list may not describe all possible side effects. Call your doctor for medical advice about side effects. You may report side effects to FDA at 2-170-FDA-1545.  Where should I keep my medicine?  Keep out of the reach  of children.  Store at room temperature between 15 and 30 degrees C (59 and 86 degrees F). Protect from light. Keep container tightly closed. Throw away any unused medicine after the expiration date.  NOTE:This sheet is a summary. It may not cover all possible information. If you have questions about this medicine, talk to your doctor, pharmacist, or health care provider. Copyright  2016 [employer]                Patient Education    Gabapentin enacarbil Oral tablet, extended-release    Gabapentin Oral capsule    Gabapentin Oral solution    Gabapentin Oral tablet    Gabapentin Oral tablet, extended-release    Gabapentin Oral tablet, extended-release, Gabapentin Oral tablet, extended-release  Gabapentin Oral tablet  What is this medicine?  GABAPENTIN (GA ba pen tin) is used to control partial seizures in adults with epilepsy. It is also used to treat certain types of nerve pain.  This medicine may be used for other purposes; ask your health care provider or pharmacist if you have questions.  What should I tell my health care provider before I take this medicine?  They need to know if you have any of these conditions:    kidney disease    suicidal thoughts, plans, or attempt; a previous suicide attempt by you or a family member    an unusual or allergic reaction to gabapentin, other medicines, foods, dyes, or preservatives    pregnant or trying to get pregnant    breast-feeding  How should I use this medicine?  Take this medicine by mouth with a glass of water. Follow the directions on the prescription label. You can take it with or without food. If it upsets your stomach, take it with food.Take your medicine at regular intervals. Do not take it more often than directed. Do not stop taking except on your doctor's advice.  If you are directed to break the 600 or 800 mg tablets in half as part of your dose, the extra half tablet should be used for the next dose. If you have not used the extra half tablet within 28  days, it should be thrown away.  A special MedGuide will be given to you by the pharmacist with each prescription and refill. Be sure to read this information carefully each time.  Talk to your pediatrician regarding the use of this medicine in children. Special care may be needed.  Overdosage: If you think you have taken too much of this medicine contact a poison control center or emergency room at once.  NOTE: This medicine is only for you. Do not share this medicine with others.  What if I miss a dose?  If you miss a dose, take it as soon as you can. If it is almost time for your next dose, take only that dose. Do not take double or extra doses.  What may interact with this medicine?  Do not take this medicine with any of the following medications:    other gabapentin products  This medicine may also interact with the following medications:    alcohol    antacids    antihistamines for allergy, cough and cold    certain medicines for anxiety or sleep    certain medicines for depression or psychotic disturbances    homatropine; hydrocodone    naproxen    narcotic medicines (opiates) for pain    phenothiazines like chlorpromazine, mesoridazine, prochlorperazine, thioridazine  This list may not describe all possible interactions. Give your health care provider a list of all the medicines, herbs, non-prescription drugs, or dietary supplements you use. Also tell them if you smoke, drink alcohol, or use illegal drugs. Some items may interact with your medicine.  What should I watch for while using this medicine?  Visit your doctor or health care professional for regular checks on your progress. You may want to keep a record at home of how you feel your condition is responding to treatment. You may want to share this information with your doctor or health care professional at each visit. You should contact your doctor or health care professional if your seizures get worse or if you have any new types of seizures. Do not  stop taking this medicine or any of your seizure medicines unless instructed by your doctor or health care professional. Stopping your medicine suddenly can increase your seizures or their severity.  Wear a medical identification bracelet or chain if you are taking this medicine for seizures, and carry a card that lists all your medications.  You may get drowsy, dizzy, or have blurred vision. Do not drive, use machinery, or do anything that needs mental alertness until you know how this medicine affects you. To reduce dizzy or fainting spells, do not sit or stand up quickly, especially if you are an older patient. Alcohol can increase drowsiness and dizziness. Avoid alcoholic drinks.  Your mouth may get dry. Chewing sugarless gum or sucking hard candy, and drinking plenty of water will help.  The use of this medicine may increase the chance of suicidal thoughts or actions. Pay special attention to how you are responding while on this medicine. Any worsening of mood, or thoughts of suicide or dying should be reported to your health care professional right away.  Women who become pregnant while using this medicine may enroll in the [place] Antiepileptic Drug Pregnancy Registry by calling 1-296.899.1044. This registry collects information about the safety of antiepileptic drug use during pregnancy.  What side effects may I notice from receiving this medicine?  Side effects that you should report to your doctor or health care professional as soon as possible:    allergic reactions like skin rash, itching or hives, swelling of the face, lips, or tongue    worsening of mood, thoughts or actions of suicide or dying  Side effects that usually do not require medical attention (report to your doctor or health care professional if they continue or are bothersome):    constipation    difficulty walking or controlling muscle movements    dizziness    nausea    slurred speech    tiredness    tremors    weight gain  This  list may not describe all possible side effects. Call your doctor for medical advice about side effects. You may report side effects to FDA at 8-735-XVO-9402.  Where should I keep my medicine?  Keep out of reach of children.  Store at room temperature between 15 and 30 degrees C (59 and 86 degrees F). Throw away any unused medicine after the expiration date.  NOTE:This sheet is a summary. It may not cover all possible information. If you have questions about this medicine, talk to your doctor, pharmacist, or health care provider. Copyright  2016 [employer]                Patient Education    Omeprazole Gastro-resistant tablet    Omeprazole Magnesium Oral suspension    Omeprazole Oral capsule, gastro-resistant sprinkles  Omeprazole Gastro-resistant tablet  What is this medicine?  OMEPRAZOLE (oh ME pray zol) prevents the production of acid in the stomach. It is used to treat the symptoms of heartburn. You can buy this medicine without a prescription. This product is not for long-term use, unless otherwise directed by your doctor or health care professional.  This medicine may be used for other purposes; ask your health care provider or pharmacist if you have questions.  What should I tell my health care provider before I take this medicine?  They need to know if you have any of these conditions:    black or bloody stools    chest pain    difficulty swallowing    have had heartburn for over 3 months    have heartburn with dizziness, lightheadedness or sweating    liver disease    stomach pain    unexplained weight loss    vomiting with blood    wheezing    an unusual or allergic reaction to omeprazole, other medicines, foods, dyes, or preservatives    pregnant or trying to get pregnant    breast-feeding  How should I use this medicine?  Take this medicine by mouth. Follow the directions on the product label. If you are taking this medicine without a prescription, take one tablet every day. Do not use for longer than  14 days or repeat a course of treatment more often than every 4 months unless directed by a doctor or healthcare professional. Take your dose at regular intervals every 24 hours. Swallow the tablet whole with a drink of water. Do not crush, break or chew. This medicine works best if taken on an empty stomach 30 minutes before breakfast. If you are using this medicine with the prescription of your doctor or healthcare professional, follow the directions you were given. Do not take your medicine more often than directed.  Talk to your pediatrician regarding the use of this medicine in children. Special care may be needed.  Overdosage: If you think you have taken too much of this medicine contact a poison control center or emergency room at once.  NOTE: This medicine is only for you. Do not share this medicine with others.  What if I miss a dose?  If you miss a dose, take it as soon as you can. If it is almost time for your next dose, take only that dose. Do not take double or extra doses.  What may interact with this medicine?  Do not take this medicine with any of the following medications:    atazanavir    clopidogrel    nelfinavir  This medicine may also interact with the following medications:    ampicillin    certain medicines for anxiety or sleep    certain medicines that treat or prevent blood clots like warfarin    cyclosporine    diazepam    digoxin    disulfiram    iron salts    methotrexate    mycophenolate mofetil    phenytoin    prescription medicine for fungal or yeast infection like itraconazole, ketoconazole, voriconazole    saquinavir    tacrolimus  This list may not describe all possible interactions. Give your health care provider a list of all the medicines, herbs, non-prescription drugs, or dietary supplements you use. Also tell them if you smoke, drink alcohol, or use illegal drugs. Some items may interact with your medicine.  What should I watch for while using this medicine?  It can take  several days before your heartburn gets better. Check with your doctor or health care professional if your condition does not start to get better, or if it gets worse.  Do not treat diarrhea with over the counter products. Contact your doctor if you have diarrhea that lasts more than 2 days or if it is severe and watery.  Do not treat yourself for heartburn with this medicine for more than 14 days in a row. You should only use this medicine for a 2-week treatment period once every 4 months. If your symptoms return shortly after your therapy is complete, or within the 4 month time frame, call your doctor or health care professional.  What side effects may I notice from receiving this medicine?  Side effects that you should report to your doctor or health care professional as soon as possible:    allergic reactions like skin rash, itching or hives, swelling of the face, lips, or tongue    bone, muscle or joint pain    breathing problems    chest pain or chest tightness    dark yellow or brown urine    diarrhea    dizziness    fast, irregular heartbeat    feeling faint or lightheaded    fever or sore throat    muscle spasm    palpitations    redness, blistering, peeling or loosening of the skin, including inside the mouth    seizures    tremors    unusual bleeding or bruising    unusually weak or tired    yellowing of the eyes or skin  Side effects that usually do not require medical attention (Report these to your doctor or health care professional if they continue or are bothersome.):    constipation    dry mouth    headache    loose stools    nausea  This list may not describe all possible side effects. Call your doctor for medical advice about side effects. You may report side effects to FDA at 8-576-FDA-9101.  Where should I keep my medicine?  Keep out of the reach of children.  Store at room temperature between 20 and 25 degrees C (68 and 77 degrees F). Protect from light and moisture. Throw away any unused  medicine after the expiration date.  NOTE:This sheet is a summary. It may not cover all possible information. If you have questions about this medicine, talk to your doctor, pharmacist, or health care provider. Copyright  2016 [employer]                Patient Education    Prednisone Gastro-resistant tablet    Prednisone Oral solution    Prednisone Oral tablet  Prednisone Oral tablet  What is this medicine?  PREDNISONE (PRED ni sone) is a corticosteroid. It is commonly used to treat inflammation of the skin, joints, lungs, and other organs. Common conditions treated include asthma, allergies, and arthritis. It is also used for other conditions, such as blood disorders and diseases of the adrenal glands.  This medicine may be used for other purposes; ask your health care provider or pharmacist if you have questions.  What should I tell my health care provider before I take this medicine?  They need to know if you have any of these conditions:    Cushing's syndrome    diabetes    glaucoma    heart disease    high blood pressure    infection (especially a virus infection such as chickenpox, cold sores, or herpes)    kidney disease    liver disease    mental illness    myasthenia gravis    osteoporosis    seizures    stomach or intestine problems    thyroid disease    an unusual or allergic reaction to lactose, prednisone, other medicines, foods, dyes, or preservatives    pregnant or trying to get pregnant    breast-feeding  How should I use this medicine?  Take this medicine by mouth with a glass of water. Follow the directions on the prescription label. Take this medicine with food. If you are taking this medicine once a day, take it in the morning. Do not take more medicine than you are told to take. Do not suddenly stop taking your medicine because you may develop a severe reaction. Your doctor will tell you how much medicine to take. If your doctor wants you to stop the medicine, the dose may be slowly lowered  over time to avoid any side effects.  Talk to your pediatrician regarding the use of this medicine in children. Special care may be needed.  Overdosage: If you think you have taken too much of this medicine contact a poison control center or emergency room at once.  NOTE: This medicine is only for you. Do not share this medicine with others.  What if I miss a dose?  If you miss a dose, take it as soon as you can. If it is almost time for your next dose, talk to your doctor or health care professional. You may need to miss a dose or take an extra dose. Do not take double or extra doses without advice.  What may interact with this medicine?  Do not take this medicine with any of the following medications:    metyrapone    mifepristone  This medicine may also interact with the following medications:    aminoglutethimide    amphotericin B    aspirin and aspirin-like medicines    barbiturates    certain medicines for diabetes, like glipizide or glyburide    cholestyramine    cholinesterase inhibitors    cyclosporine    digoxin    diuretics    ephedrine    female hormones, like estrogens and birth control pills    isoniazid    ketoconazole    NSAIDS, medicines for pain and inflammation, like ibuprofen or naproxen    phenytoin    rifampin    toxoids    vaccines    warfarin  This list may not describe all possible interactions. Give your health care provider a list of all the medicines, herbs, non-prescription drugs, or dietary supplements you use. Also tell them if you smoke, drink alcohol, or use illegal drugs. Some items may interact with your medicine.  What should I watch for while using this medicine?  Visit your doctor or health care professional for regular checks on your progress. If you are taking this medicine over a prolonged period, carry an identification card with your name and address, the type and dose of your medicine, and your doctor's name and address.  This medicine may increase your risk of getting an  infection. Tell your doctor or health care professional if you are around anyone with measles or chickenpox, or if you develop sores or blisters that do not heal properly.  If you are going to have surgery, tell your doctor or health care professional that you have taken this medicine within the last twelve months.  Ask your doctor or health care professional about your diet. You may need to lower the amount of salt you eat.  This medicine may affect blood sugar levels. If you have diabetes, check with your doctor or health care professional before you change your diet or the dose of your diabetic medicine.  What side effects may I notice from receiving this medicine?  Side effects that you should report to your doctor or health care professional as soon as possible:    allergic reactions like skin rash, itching or hives, swelling of the face, lips, or tongue    changes in emotions or moods    changes in vision    depressed mood    eye pain    fever or chills, cough, sore throat, pain or difficulty passing urine    increased thirst    swelling of ankles, feet  Side effects that usually do not require medical attention (report to your doctor or health care professional if they continue or are bothersome):    confusion, excitement, restlessness    headache    nausea, vomiting    skin problems, acne, thin and shiny skin    trouble sleeping    weight gain  This list may not describe all possible side effects. Call your doctor for medical advice about side effects. You may report side effects to FDA at 8-393-FDA-1763.  Where should I keep my medicine?  Keep out of the reach of children.  Store at room temperature between 15 and 30 degrees C (59 and 86 degrees F). Protect from light. Keep container tightly closed. Throw away any unused medicine after the expiration date.  NOTE:This sheet is a summary. It may not cover all possible information. If you have questions about this medicine, talk to your doctor, pharmacist, or  health care provider. Copyright  2016 Gold Standard                Spironolactone Oral tablet  What is this medicine?  SPIRONOLACTONE (gaetano on oh LAK tone) is a diuretic. It helps you make more urine and to lose excess water from your body. This medicine is used to treat high blood pressure, and edema or swelling from heart, kidney, or liver disease. It is also used to treat patients who make too much aldosterone or have low potassium.  This medicine may be used for other purposes; ask your health care provider or pharmacist if you have questions.  What should I tell my health care provider before I take this medicine?  They need to know if you have any of these conditions:    high blood level of potassium    kidney disease or trouble making urine    liver disease    an unusual or allergic reaction to spironolactone, other medicines, foods, dyes, or preservatives    pregnant or trying to get pregnant    breast-feeding  How should I use this medicine?  Take this medicine by mouth with a drink of water. Follow the directions on your prescription label. You can take it with or without food. If it upsets your stomach, take it with food. Do not take your medicine more often than directed. Remember that you will need to pass more urine after taking this medicine. Do not take your doses at a time of day that will cause you problems. Do not take at bedtime.  Talk to your pediatrician regarding the use of this medicine in children. While this drug may be prescribed for selected conditions, precautions do apply.  Overdosage: If you think you have taken too much of this medicine contact a poison control center or emergency room at once.  NOTE: This medicine is only for you. Do not share this medicine with others.  What if I miss a dose?  If you miss a dose, take it as soon as you can. If it is almost time for your next dose, take only that dose. Do not take double or extra doses.  What may interact with this medicine?  Do not  take this medicine with any of the following medications:    eplerenone  This medicine may also interact with the following medications:    corticosteroids    digoxin    lithium    medicines for high blood pressure like ACE inhibitors    skeletal muscle relaxants like tubocurarine    NSAIDs, medicines for pain and inflammation, like ibuprofen or naproxen    potassium products like salt substitute or supplements    pressor amines like norepinephrine    some diuretics  This list may not describe all possible interactions. Give your health care provider a list of all the medicines, herbs, non-prescription drugs, or dietary supplements you use. Also tell them if you smoke, drink alcohol, or use illegal drugs. Some items may interact with your medicine.  What should I watch for while using this medicine?  Visit your doctor or health care professional for regular checks on your progress. Check your blood pressure as directed. Ask your doctor what your blood pressure should be, and when you should contact them.  You may need to be on a special diet while taking this medicine. Ask your doctor. Also, ask how many glasses of fluid you need to drink a day. You must not get dehydrated.  This medicine may make you feel confused, dizzy or lightheaded. Drinking alcohol and taking some medicines can make this worse. Do not drive, use machinery, or do anything that needs mental alertness until you know how this medicine affects you. Do not sit or stand up quickly.  What side effects may I notice from receiving this medicine?  Side effects that you should report to your doctor or health care professional as soon as possible:    allergic reactions such as skin rash or itching, hives, swelling of the lips, mouth, tongue, or throat    black or tarry stools    fast, irregular heartbeat    fever    muscle pain, cramps    numbness, tingling in hands or feet    trouble breathing    trouble passing urine    unusual bleeding    unusually weak  or tired  Side effects that usually do not require medical attention (report to your doctor or health care professional if they continue or are bothersome):    change in voice or hair growth    confusion    dizzy, drowsy    dry mouth, increased thirst    enlarged or tender breasts    headache    irregular menstrual periods    sexual difficulty, unable to have an erection    stomach upset  This list may not describe all possible side effects. Call your doctor for medical advice about side effects. You may report side effects to FDA at 8-585-YQQ-0144.  Where should I keep my medicine?  Keep out of the reach of children.  Store below 25 degrees C (77 degrees F). Throw away any unused medicine after the expiration date.  NOTE:This sheet is a summary. It may not cover all possible information. If you have questions about this medicine, talk to your doctor, pharmacist, or health care provider. Copyright  2016 Gold Standard

## 2017-04-18 NOTE — ED PROVIDER NOTES
History     Chief Complaint   Patient presents with     Back Pain     left mid back pain x 1 week     HPI     Raymon Ace is a 71 year old with past medical history significant for idiopathic thrombocytic purpura, atherosclerosis, hypertension, hypercholesterolemia, GERD, sleep apnea, RLS, polyneuropathy, fatty liver, lumbar spinal stenosis with prior history of back surgeries in 1992, 1996 and 2013 who presents to the ER with concerns over right-sided thoracic back pain which has been present for the last week. Patient states he developed sudden onset of right-sided back pain for his CVA area last Tuesday the pain then began to radiate forward to his right upper quadrant of the abdomen. He describes the pain as sharp. He states that it is exacerbated by coughing and deep breathing, changes in movement. He also complains of shortness of breath associated with it. He has not had any recent fevers, chills, myalgias, nasal congestion, sore throat, wheezing, vomiting, diarrhea or abdominal pain. He also tonight denies any dysuria, hematuria, increased frequency or urgency. He was evaluated at an outside facility in [place] where he was at onset of pain and admitted for 2 days. He was discharged home with diagnosis of thrombocytopenia, chest pain, chest congestion, PE and elevated lipase. Patient states that he was given 60 mg of prednisone daily during his hospitalization to bring up his platelet counts. He states that while he was on prednisone and his pain had improved however recurred after discharge. He was not started on any other new medications other than an 81 mg aspirin. He has been attempting to treat his pain with tylenol #3 from an old prescription, one every four hours daily without improvement of his pain. Patient brings in his discharge paperwork which states there was placement of IVC filter however patient states that he declined procedure as he was told here he had a clot in his lungs and he was  "concerned over risk of bleeding given his thrombocytopenia. Patient does admit to daily alcohol use however states he has not had anything to eat or drink since 12 am this morning. He is unable to quantify amount of alcohol drank however wife who presented in today states that is a significant amount and patient does admit to \"too much\"    I have reviewed the Medications, Allergies, Past Medical and Surgical History, and Social History in the Epic system.    Review of Systems   Constitutional: Positive for fatigue (chronic). Negative for activity change, appetite change, chills, fever and unexpected weight change.        Night sweats   Respiratory: Positive for shortness of breath. Negative for cough and wheezing.    Gastrointestinal: Positive for abdominal pain (right upper quandrant). Negative for diarrhea, nausea and vomiting.   Genitourinary: Negative for dysuria, frequency, genital sores and hematuria.   Skin: Negative for color change and rash.   Neurological: Negative for dizziness, light-headedness and headaches.     Physical Exam   /77  Temp 98.1  F (36.7  C) (Oral)  Resp 16  Wt 86.2 kg (190 lb)  SpO2 96%  BMI 30.67 kg/m2  Physical Exam   Constitutional: He appears well-developed and well-nourished. Distressed: mild patient appears in pain.   HENT:   Head: Normocephalic and atraumatic.   Eyes: Conjunctivae and EOM are normal. Pupils are equal, round, and reactive to light. Right eye exhibits no discharge. Left eye exhibits no discharge.   Neck: Normal range of motion.   Cardiovascular: Normal rate, regular rhythm and normal heart sounds.  Exam reveals no gallop and no friction rub.    No murmur heard.  Pulmonary/Chest: Breath sounds normal. No respiratory distress. He has no wheezes. He has no rales.   Abdominal: Soft. Bowel sounds are normal. He exhibits no distension. There is no tenderness. There is no rebound and no guarding.   Skin: Skin is warm and dry. No rash noted. No erythema.     ED " Course     ED Course     Procedures          Critical Care time:  none            Labs Ordered and Resulted from Time of ED Arrival Up to the Time of Departure from the ED   COMPREHENSIVE METABOLIC PANEL - Abnormal; Notable for the following:        Result Value    Glucose 105 (*)     Albumin 3.0 (*)     All other components within normal limits   CBC WITH PLATELETS DIFFERENTIAL - Abnormal; Notable for the following:     Platelet Count 94 (*)     Abs Immature Granulocytes 0.5 (*)     All other components within normal limits   UA MACROSCOPIC WITH REFLEX TO MICRO AND CULTURE - Abnormal; Notable for the following:     Ketones Urine 10 (*)     All other components within normal limits   INR   PARTIAL THROMBOPLASTIN TIME       Results for orders placed or performed during the hospital encounter of 04/18/17   CT Chest/Abdomen/Pelvis w Contrast    Narrative    CT CHEST/ABDOMEN/PELVIS WITH CONTRAST April 18, 2017 12:54 PM    HISTORY: Right-sided CVA pain, shortness of breath, prior pulmonary  embolus diagnosed at outside facility in [place], records not available  at this time.    TECHNIQUE: Images from thoracic inlet to pubic symphysis 81 mL Isovue  370 IV contrast. Radiation dose for this scan was reduced using  automated exposure control, adjustment of the mA and/or kV according  to patient size, or iterative reconstruction technique.    COMPARISON: 10/23/2012 CT chest, abdomen and pelvis.    FINDINGS:   Chest: Exam is positive for right lower lobe pulmonary embolus  extending to the distal right main pulmonary artery.     The heart is shifted into the left hemithorax. There is opacity at the  posterior right lower lobe which is concerning for pulmonary infarct.  Pneumonia cannot be excluded. Clinical correlation. Small bilateral  pleural effusions. Left lung is grossly clear. No thoracic aortic  dissection, the thoracic aorta is calcified and tortuous. No  adenopathy in the chest.    Abdomen and Pelvis: Small cyst in the  left lobe of the liver.  Normal-appearing gallbladder, spleen, pancreas, adrenal glands. 1 cm  cyst anterior mid right kidney. There is smaller cyst at the inferior  right kidney and subcentimeter low dense bilateral kidney lesions too  small to characterize.    There is streak artifact from postop changes in the lumbar spine.  Calcified nonaneurysmal abdominal aorta and iliacs. No periaortic or  pelvic adenopathy. No free fluid. Diverticulosis of the descending and  sigmoid colon. No acute bowel abnormality. Patient is status post  appendectomy. There is slightly thickened appearance of the cecum  although this could be due to under distention. Cystic changes right  glenoid fossa probably degenerative change. No aggressive appearing  bone lesions.       Impression    IMPRESSION:   1. Moderate large right lower lobe pulmonary embolus.  2. Right lower lobe pulmonary opacity concerning for pulmonary  infarct. Cannot exclude pneumonia which is considered less likely.  Clinical correlation.    3. No acute abnormality identified in the abdomen and pelvis. Question  mild bowel wall thickening versus underdistention involving the cecum.  Uncomplicated colonic diverticulosis.    Critical Result: [Right lower lobe pulmonary embolus]    Finding was identified on 4/18/2017 12:57 PM.     Yari Medley was contacted by me on 4/18/2017 12:59 PM and verbalized  understanding of the critical result.      ER provider DR. Gautam Grant spoke with oncologist who recommended initiating Lovenox at a dose of 0.5 mg/kg BID due to patient's thrombocytopenia     2:44 PM  Spoke with hospitalist on-call Dr. Mena regarding patient who agreed that patient is candidate for admission and agreed to accept transfer of care.      Assessments & Plan (with Medical Decision Making)     I have reviewed the nursing notes.    I have reviewed the findings, diagnosis, plan and need for follow up with the patient.  New Prescriptions    No medications on file      Final diagnoses:   Pulmonary embolism on right (H)     71-year-old male with past medical history significant for idiopathic thrombocytic purpura, atherosclerosis, hypertension, hypercholesterolemia, GERD, sleep apnea, RLS, polyneuropathy, fatty liver, lumbar spinal stenosis with prior history of back surgeries in 1992, 1996 and 2013 with recent discharge from hospital in [place] with diagnosis of PE presents to the ER with concerns over right-sided thoracic back pain which has been present for the last week.  Patient had stable vital signs upon arrival.  He was not significantly to catheter tachycardic.  Physical exam findings were significant for pain in his lateral right thoracic region.  As part of evaluation he did have CBC which showed platelet count at 94,000 which is  Consistent his baseline. CMP, urinalysis PTT, INR were non-concerning.  As records from patient's recent hospitalization were not initially available I did elect to repeat CT of his chest today which did show moderate large right lower lobe pulmonary embolism with right lower lobe opacity concerning for pulmonary infarct per radiologist report.  I discussed case with ER physician Dr. Gautam Grant who contacted oncology who recommended partially anticoagulating patient by initiating patient on Lovenox 0.5 mg/kg BID due to his platelet levels.  I discussed patient with pulmonology who stated that there was not an indication for patient to be transferred to their service at this time.  I contacted hospitalist Dr. Mena who agreed to admit patient for initiating of anticoagulation and close follow up as patient is at increased risk of bleeding due to his platelet  level.  As stated above patient was discussed with ER Physician Dr. Sanjay Grant who stated agreement with above outlined work up assessment and plan.      4/18/2017   [facility] EMERGENCY DEPARTMENT     Yari Medley PA-C  04/18/17 1525       Yari Medley PA-C  04/18/17  1526       Sanjay Grant,   04/18/17 1815

## 2017-04-19 ENCOUNTER — APPOINTMENT (OUTPATIENT)
Dept: CARDIOLOGY | Facility: CLINIC | Age: 72
DRG: 176 | End: 2017-04-19
Attending: PHYSICIAN ASSISTANT
Payer: COMMERCIAL

## 2017-04-19 LAB
ANION GAP SERPL CALCULATED.3IONS-SCNC: 6 MMOL/L (ref 3–14)
BASOPHILS # BLD AUTO: 0 10E9/L (ref 0–0.2)
BASOPHILS NFR BLD AUTO: 0.3 %
BUN SERPL-MCNC: 11 MG/DL (ref 7–30)
CALCIUM SERPL-MCNC: 9.3 MG/DL (ref 8.5–10.1)
CHLORIDE SERPL-SCNC: 104 MMOL/L (ref 94–109)
CO2 SERPL-SCNC: 28 MMOL/L (ref 20–32)
CREAT SERPL-MCNC: 0.84 MG/DL (ref 0.66–1.25)
DIFFERENTIAL METHOD BLD: ABNORMAL
EOSINOPHIL # BLD AUTO: 0 10E9/L (ref 0–0.7)
EOSINOPHIL NFR BLD AUTO: 0.3 %
ERYTHROCYTE [DISTWIDTH] IN BLOOD BY AUTOMATED COUNT: 13.9 % (ref 10–15)
GFR SERPL CREATININE-BSD FRML MDRD: 89 ML/MIN/1.7M2
GLUCOSE SERPL-MCNC: 135 MG/DL (ref 70–99)
HCT VFR BLD AUTO: 37.4 % (ref 40–53)
HGB BLD-MCNC: 12.4 G/DL (ref 13.3–17.7)
IMM GRANULOCYTES # BLD: 0.1 10E9/L (ref 0–0.4)
IMM GRANULOCYTES NFR BLD: 1.5 %
LYMPHOCYTES # BLD AUTO: 0.6 10E9/L (ref 0.8–5.3)
LYMPHOCYTES NFR BLD AUTO: 8.9 %
MCH RBC QN AUTO: 29.6 PG (ref 26.5–33)
MCHC RBC AUTO-ENTMCNC: 33.2 G/DL (ref 31.5–36.5)
MCV RBC AUTO: 89 FL (ref 78–100)
MONOCYTES # BLD AUTO: 0.4 10E9/L (ref 0–1.3)
MONOCYTES NFR BLD AUTO: 6 %
NEUTROPHILS # BLD AUTO: 5.4 10E9/L (ref 1.6–8.3)
NEUTROPHILS NFR BLD AUTO: 83 %
PLATELET # BLD AUTO: 82 10E9/L (ref 150–450)
POTASSIUM SERPL-SCNC: 4.6 MMOL/L (ref 3.4–5.3)
RBC # BLD AUTO: 4.19 10E12/L (ref 4.4–5.9)
SODIUM SERPL-SCNC: 138 MMOL/L (ref 133–144)
WBC # BLD AUTO: 6.5 10E9/L (ref 4–11)

## 2017-04-19 PROCEDURE — 25000132 ZZH RX MED GY IP 250 OP 250 PS 637: Performed by: FAMILY MEDICINE

## 2017-04-19 PROCEDURE — 12000007 ZZH R&B INTERMEDIATE

## 2017-04-19 PROCEDURE — 25000128 H RX IP 250 OP 636: Performed by: FAMILY MEDICINE

## 2017-04-19 PROCEDURE — 85025 COMPLETE CBC W/AUTO DIFF WBC: CPT | Performed by: PHYSICIAN ASSISTANT

## 2017-04-19 PROCEDURE — 99233 SBSQ HOSP IP/OBS HIGH 50: CPT | Performed by: FAMILY MEDICINE

## 2017-04-19 PROCEDURE — 25500064 ZZH RX 255 OP 636: Performed by: FAMILY MEDICINE

## 2017-04-19 PROCEDURE — 80048 BASIC METABOLIC PNL TOTAL CA: CPT | Performed by: PHYSICIAN ASSISTANT

## 2017-04-19 PROCEDURE — 93306 TTE W/DOPPLER COMPLETE: CPT | Mod: 26 | Performed by: INTERNAL MEDICINE

## 2017-04-19 PROCEDURE — 36415 COLL VENOUS BLD VENIPUNCTURE: CPT | Performed by: PHYSICIAN ASSISTANT

## 2017-04-19 PROCEDURE — 40000264 ECHO COMPLETE WITH LUMASON

## 2017-04-19 PROCEDURE — 25000125 ZZHC RX 250: Performed by: FAMILY MEDICINE

## 2017-04-19 RX ADMIN — PREDNISONE 20 MG: 20 TABLET ORAL at 08:41

## 2017-04-19 RX ADMIN — SPIRONOLACTONE 25 MG: 25 TABLET ORAL at 08:41

## 2017-04-19 RX ADMIN — LISINOPRIL 40 MG: 40 TABLET ORAL at 08:41

## 2017-04-19 RX ADMIN — ALLOPURINOL 300 MG: 300 TABLET ORAL at 08:41

## 2017-04-19 RX ADMIN — CLONIDINE HYDROCHLORIDE 0.3 MG: 0.1 TABLET ORAL at 08:40

## 2017-04-19 RX ADMIN — GABAPENTIN 300 MG: 300 CAPSULE ORAL at 19:49

## 2017-04-19 RX ADMIN — AMLODIPINE BESYLATE 5 MG: 5 TABLET ORAL at 08:41

## 2017-04-19 RX ADMIN — GABAPENTIN 300 MG: 300 CAPSULE ORAL at 13:53

## 2017-04-19 RX ADMIN — CLONIDINE HYDROCHLORIDE 0.3 MG: 0.1 TABLET ORAL at 19:49

## 2017-04-19 RX ADMIN — GABAPENTIN 300 MG: 300 CAPSULE ORAL at 08:41

## 2017-04-19 RX ADMIN — ENOXAPARIN SODIUM 90 MG: 100 INJECTION SUBCUTANEOUS at 18:15

## 2017-04-19 RX ADMIN — OMEPRAZOLE 40 MG: 20 CAPSULE, DELAYED RELEASE ORAL at 08:40

## 2017-04-19 RX ADMIN — ENOXAPARIN SODIUM 90 MG: 100 INJECTION SUBCUTANEOUS at 05:22

## 2017-04-19 RX ADMIN — SULFUR HEXAFLUORIDE 5 ML: KIT at 11:01

## 2017-04-19 NOTE — H&P
CHIEF COMPLAINT:  Right-sided chest pain.      HISTORY OF PRESENT ILLNESS:  Raymon Ace presents with about a 10-day history of right-sided chest pain that has been very pleuritic, severe at times, can be very sharp and appears to be clearly worse when he takes a breath.  Sometimes, he can notice even when he does not.      He was hospitalized for 2 days for this same pain in [place] where they discovered a PE in the right lung.  This was in secondary and subsegmental branches of the right lower lobe artery.  In [place], they consulted Hematology, who recommended no anticoagulation because platelet count was 32 from his underlying ITP.  Instead, they put him on 81 mg of aspirin.  They did give him 60 mg of prednisone for 3 days in a row and then no taper to try to get his platelet count up prior to his car trip back up here.  His platelets were up to 60 when he left and are 94 today.  This is 3 or 4 days after stopping the prednisone.      He presents today with worsening pain, some mild shortness of breath, but more from not being able to take a deep breath.      He gives no triggers for this clot.  He has not been immobilized.  No surgeries.      PAST MEDICAL HISTORY:   1.  ITP.  It has been treated with intermittent doses of steroids.  General platelet count tends to run 35-45.   2.  Sleep apnea.   3.  Restless leg syndrome.   4.  Chronic low back pain, status post laminectomy.   5.  GERD.   6.  Fatty liver.   7.  Alcohol abuse, drinks 6 drinks per day.      MEDICATIONS PRIOR TO ADMISSION:   1.  Allopurinol 300 mg daily.   2.  Catapres 0.3 mg b.i.d.   3.  Amlodipine 5 mg daily.   4.  Prilosec 40 mg daily.   5.  Aldactone 25 mg daily.   6.  Aspirin 81 mg daily for just the last week.   7.  Lisinopril 40 mg daily.   8.  Gabapentin 300 mg t.i.d. for spinal stenosis-related neurogenic pain.   9.  Viagra 100 mg p.r.n.      ALLERGIES:  Benicar caused diarrhea, animal dander, dust mites.      FAMILY HISTORY:  Significant  for coronary artery disease in a sister, hypertension, hyperlipidemia and diabetes.      SOCIAL HISTORY:  Lives with his wife.  He spends his [place] in [place].  Drinks 6 whiskey drinks per night on average.  He is a nonsmoker.  He quit after a [id number] history.      REVIEW OF SYSTEMS:  A 10-point review other than the above is negative.      PHYSICAL EXAMINATION:   GENERAL:  He is awake, alert, oriented x3, no apparent distress.  He is afebrile.   VITAL SIGNS:  Pulse is 92, respirations 16, blood pressure 136/80, O2 sats 98% on room air.   HEENT:  Eyes show pupils equal and reactive, EOMs intact.  TMs normal.  Nasal mucosa is normal.  Throat is normal.   NECK:  Supple, without masses, nodes or thyromegaly.   CHEST:  Reveals some crackles and actually pleural friction rub heard in the right side of the lower lung.  Nothing on the left.   CARDIOVASCULAR:  Regular rate and rhythm without murmur that I can hear.  No JVD, HJR and no edema.  Pulses are brisk and equal.   ABDOMEN:  Soft, nontender, without HSM or masses.   GENITOURINARY:  Normal male.   RECTAL:  Deferred.   EXTREMITIES:  Grossly normal.   SKIN:  Without any evidence of petechiae or hematomas.   NEUROLOGIC:  Shows good strength, sensation, rapid alternating movements in the upper extremities.  Good strength, sensation and DTRs in lower extremities.      LABORATORY DATA:  White count is 9.1, hemoglobin 13.3, platelet count right now is 94.  Chemistries within normal limits.  UA is negative.  INR normal, PTT normal.      CT scan of the chest shows moderate right lower lobe pulmonary embolism extending to the distal right main pulmonary artery.  There is also right lower lobe pulmonary opacity concerning for pulmonary infarct.      ASSESSMENT:    Pulmonary embolism with infarct.  Initial discussions with Hematology recommended half-dose Lovenox.  [facility], however, [facility] and [facility].  That  consultant recommended full-dose anticoagulation.  Lovenox for now since we can reverse it faster than anything else if his platelets start to drop or if he starts to bleed.  Will follow up with Dr. Waylon Novoa within a week of discharge.  We will try to send an in-box message to Dr. Novoa to make sure that happens.      Idiopathic thrombocytopenic purpura.  Recommendation from Dr. Novoa was to try to keep the platelets up with low-dose prednisone initially.  Will start with 20 mg.  We do not want the platelets to run high, but would like to keep them above 50 if we can.  Even 30-50 is relatively safe with full anticoagulation, but would need to reconsider if he starts dropping below 30.      Pulmonary infarct.  He is having significant pain with this.  Will use oxycodone and Tylenol.  Also, the 20 mg of prednisone should help with the inflammatory pain.      Spinal stenosis with neuropathy.  He is on Neurontin.  Will continue.      Hypertension.  Will continue his current antihypertensives, amlodipine, spironolactone, clonidine, lisinopril.     Alcohol abuse:   Drinks 6 drinks daily.  Was off for 3 days last week and no withdrawal symptoms. Follow for now.  No CIWA at this time. Advised he needs to quit.  Alcohol also knocks down his platelets.       CODE STATUS:  FULL.      PROPHYLAXIS:  He is on full-dose anticoagulation.      DISPOSITION:  He needs inpatient care.  I expect 2-3 days.         CHRISSIE DIAZ MD             D: 2017 17:09   T: 2017 19:08   MT: [id number]      Name:     OSMIN ROBERTSON   MRN:      [id number]        Account:      [id number]   :      1945           Admitted:     [id number]      Document: [id number]

## 2017-04-19 NOTE — PROGRESS NOTES
"SUBJECTIVE:   Pleuritic pain much better  No shortness of breath   No cough       ROS:4 point ROS including Respiratory, CV, GI and , other than that noted in the HPI,  is negative     OBJECTIVE:   /72 (BP Location: Left arm)  Pulse 66  Temp 98.4  F (36.9  C) (Oral)  Resp 18  Ht 1.702 m (5' 7\")  Wt 85.5 kg (188 lb 7.9 oz)  SpO2 97%  BMI 29.52 kg/m2    GENERAL APPEARANCE:  Alert, NAD      RESP:pleural friction rub right base.      CV: regular rate and rhythm,  No  murmur , edema: none       Abdomen: soft, nontender, no liver or spleen enlargement, no masses, BSs normal   Skin: no cyanosis, pallor, or jaundice     CMP  Recent Labs  Lab 04/19/17  0615 04/18/17  1125    144   POTASSIUM 4.6 4.2   CHLORIDE 104 109   CO2 28 25   ANIONGAP 6 10   * 105*   BUN 11 13   CR 0.84 0.75   GFRESTIMATED 89 >90Non  GFR Calc   GFRESTBLACK >90African American GFR Calc >90African American GFR Calc   PHILIPPE 9.3 9.5   PROTTOTAL  --  7.3   ALBUMIN  --  3.0*   BILITOTAL  --  0.5   ALKPHOS  --  59   AST  --  26   ALT  --  33     CBC  Recent Labs  Lab 04/19/17  0615 04/18/17  1125   WBC 6.5 9.1   RBC 4.19* 4.58   HGB 12.4* 13.3   HCT 37.4* 40.7   MCV 89 89   MCH 29.6 29.0   MCHC 33.2 32.7   RDW 13.9 14.4   PLT 82* 94*     INR  Recent Labs  Lab 04/18/17  1125   INR 1.02     Arterial BloodGas  No lab results found in last 7 days.   Venous Blood Gas  No lab results found in last 7 days.    Medications     allopurinol  300 mg Oral Daily     amLODIPine  5 mg Oral Daily     cloNIDine  0.3 mg Oral BID     gabapentin  300 mg Oral TID     lisinopril  40 mg Oral Daily     omeprazole  40 mg Oral QAM     spironolactone  25 mg Oral Daily     predniSONE  20 mg Oral Daily     enoxaparin  90 mg Subcutaneous Q12H     No intake or output data in the 24 hours ending 04/19/17 2199      ASSESSMENT: PLAN:   Pulmonary embolism with infarct. Initial discussions with Hematology recommended half-dose Lovenox. I, however, went on " and called Hematology at the [place], the venous thromboembolism division. That consultant recommended full-dose anticoagulation. Lovenox for now since we can reverse it faster than anything else if his platelets start to drop or if he starts to bleed. Will follow up with Dr. Waylon Novoa within a week of discharge. We will try to send an in-box message to Dr. Novoa to make sure that happens.   -on lovenox full dose.       Idiopathic thrombocytopenic purpura. Recommendation from Dr. Novoa was to try to keep the platelets up with low-dose prednisone initially. Will start with 20 mg. We do not want the platelets to run high, but would like to keep them above 50 if we can. Even 30-50 is relatively safe with full anticoagulation, but would need to reconsider if he starts dropping below 30.   -platelets good at 82.  Will continue the 20 mg prednisone.        Pulmonary infarct. He is having significant pain with this. Will use oxycodone and Tylenol. Also, the 20 mg of prednisone should help with the inflammatory pain.   -improvement in pain with steroids.        Spinal stenosis with neuropathy. He is on Neurontin. Will continue.       Hypertension. Will continue his current antihypertensives, amlodipine, spironolactone, clonidine, lisinopril.      Alcohol abuse:   Drinks 6 drinks daily. Was off for 3 days last week and no withdrawal symptoms. Follow for now. No CIWA at this time. Advised he needs to quit. Alcohol also knocks down his platelets.       CODE STATUS: FULL.       PROPHYLAXIS: He is on full-dose anticoagulation.       DISPOSITION: He needs inpatient care.likely home in AM if platelets stable

## 2017-04-19 NOTE — PLAN OF CARE
Problem: Acute Respiratory Distress Syndrome (Adult)  Goal: Signs and Symptoms of Listed Potential Problems Will be Absent or Manageable (Acute Respiratory Distress Syndrome)  Signs and symptoms of listed potential problems will be absent or manageable by discharge/transition of care (reference Acute Respiratory Distress Syndrome (Adult) CPG).   Outcome: No Change  Patient remains the same respiratory   Continues with numbness & tingling bilat. LE (from prev. Surgeries)  Denies pain, cane from Physical [place] dept now in patient's room  CIWA protocol started per nursing D/T ETOH abuse

## 2017-04-19 NOTE — PLAN OF CARE
Problem: Goal Outcome Summary  Goal: Goal Outcome Summary  Outcome: Improving  Pain has improved today, pt declines need for prn percocet.  Also reports SOB is better, O2 sat has been high 90's on room air.  Pt is receiving lovenox for a PE.  Also had an ECHO completed today.  Ambulating in room with SBA.  Pt is alert and oriented and makes needs known, will continue to monitor.

## 2017-04-20 VITALS
DIASTOLIC BLOOD PRESSURE: 59 MMHG | OXYGEN SATURATION: 97 % | SYSTOLIC BLOOD PRESSURE: 104 MMHG | BODY MASS INDEX: 29.58 KG/M2 | HEIGHT: 67 IN | HEART RATE: 56 BPM | RESPIRATION RATE: 18 BRPM | WEIGHT: 188.49 LBS | TEMPERATURE: 98.2 F

## 2017-04-20 LAB
ALBUMIN SERPL-MCNC: 2.7 G/DL (ref 3.4–5)
ALP SERPL-CCNC: 52 U/L (ref 40–150)
ALT SERPL W P-5'-P-CCNC: 26 U/L (ref 0–70)
ANION GAP SERPL CALCULATED.3IONS-SCNC: 10 MMOL/L (ref 3–14)
AST SERPL W P-5'-P-CCNC: 16 U/L (ref 0–45)
BILIRUB SERPL-MCNC: 0.3 MG/DL (ref 0.2–1.3)
BUN SERPL-MCNC: 15 MG/DL (ref 7–30)
CALCIUM SERPL-MCNC: 9.5 MG/DL (ref 8.5–10.1)
CHLORIDE SERPL-SCNC: 103 MMOL/L (ref 94–109)
CO2 SERPL-SCNC: 27 MMOL/L (ref 20–32)
CREAT SERPL-MCNC: 0.78 MG/DL (ref 0.66–1.25)
ERYTHROCYTE [DISTWIDTH] IN BLOOD BY AUTOMATED COUNT: 13.7 % (ref 10–15)
GFR SERPL CREATININE-BSD FRML MDRD: ABNORMAL ML/MIN/1.7M2
GLUCOSE SERPL-MCNC: 115 MG/DL (ref 70–99)
HCT VFR BLD AUTO: 36.8 % (ref 40–53)
HGB BLD-MCNC: 12.1 G/DL (ref 13.3–17.7)
MCH RBC QN AUTO: 29 PG (ref 26.5–33)
MCHC RBC AUTO-ENTMCNC: 32.9 G/DL (ref 31.5–36.5)
MCV RBC AUTO: 88 FL (ref 78–100)
PLATELET # BLD AUTO: 80 10E9/L (ref 150–450)
POTASSIUM SERPL-SCNC: 3.9 MMOL/L (ref 3.4–5.3)
PROT SERPL-MCNC: 6.6 G/DL (ref 6.8–8.8)
RBC # BLD AUTO: 4.17 10E12/L (ref 4.4–5.9)
SODIUM SERPL-SCNC: 140 MMOL/L (ref 133–144)
WBC # BLD AUTO: 7.9 10E9/L (ref 4–11)

## 2017-04-20 PROCEDURE — 99239 HOSP IP/OBS DSCHRG MGMT >30: CPT | Performed by: FAMILY MEDICINE

## 2017-04-20 PROCEDURE — 25000132 ZZH RX MED GY IP 250 OP 250 PS 637: Performed by: FAMILY MEDICINE

## 2017-04-20 PROCEDURE — 25000128 H RX IP 250 OP 636: Performed by: FAMILY MEDICINE

## 2017-04-20 PROCEDURE — 36415 COLL VENOUS BLD VENIPUNCTURE: CPT | Performed by: FAMILY MEDICINE

## 2017-04-20 PROCEDURE — 85027 COMPLETE CBC AUTOMATED: CPT | Performed by: FAMILY MEDICINE

## 2017-04-20 PROCEDURE — 25000125 ZZHC RX 250: Performed by: FAMILY MEDICINE

## 2017-04-20 PROCEDURE — 80053 COMPREHEN METABOLIC PANEL: CPT | Performed by: FAMILY MEDICINE

## 2017-04-20 RX ORDER — PREDNISONE 20 MG/1
20 TABLET ORAL DAILY
Qty: 30 TABLET | Refills: 0 | Status: SHIPPED | OUTPATIENT
Start: 2017-04-20 | End: 2017-05-10

## 2017-04-20 RX ADMIN — GABAPENTIN 300 MG: 300 CAPSULE ORAL at 08:45

## 2017-04-20 RX ADMIN — ALLOPURINOL 300 MG: 300 TABLET ORAL at 08:45

## 2017-04-20 RX ADMIN — PREDNISONE 20 MG: 20 TABLET ORAL at 09:45

## 2017-04-20 RX ADMIN — GABAPENTIN 300 MG: 300 CAPSULE ORAL at 13:28

## 2017-04-20 RX ADMIN — ENOXAPARIN SODIUM 90 MG: 100 INJECTION SUBCUTANEOUS at 06:33

## 2017-04-20 RX ADMIN — OMEPRAZOLE 40 MG: 20 CAPSULE, DELAYED RELEASE ORAL at 08:45

## 2017-04-20 RX ADMIN — AMLODIPINE BESYLATE 5 MG: 5 TABLET ORAL at 08:45

## 2017-04-20 NOTE — PLAN OF CARE
"Problem: Goal Outcome Summary  Goal: Goal Outcome Summary  Outcome: Improving  Pt has been up independently in room with cane. Denies dizziness or lightheaded. C/o minimal pain on right side where embolism is located. Denies chest pain. Right lower pleural rub noted on auscultation. Pt was given Lovenox teaching and demonstration tonight- pt was encouraged to do it on his own as well as in the morning. Plan is for discharge home tomorrow if PLTS are stable and will use Lovenox twice daily. /58 (BP Location: Left arm)  Pulse 66  Temp 98.5  F (36.9  C) (Oral)  Resp 18  Ht 1.702 m (5' 7\")  Wt 85.5 kg (188 lb 7.9 oz)  SpO2 98%  BMI 29.52 kg/m2  Cristin Mcneil RN BSN          "

## 2017-04-20 NOTE — PROGRESS NOTES
[facility] DISCHARGE NOTE    Patient discharged to home at 2:28 PM via wheel chair. Accompanied by spouse and staff. Discharge instructions reviewed with patient and spouse, opportunity offered to ask questions. Prescriptions sent to patients preferred pharmacy. All belongings sent with patient.    Elsa Dubois

## 2017-04-20 NOTE — DISCHARGE SUMMARY
[facility] Hospitalist Discharge Summary    Raymon Ace MRN# [id number]   Age: 71 year old YOB: 1945     Date of Admission:  4/18/2017  Date of Discharge::  4/20/2017  2:28 PM  Admitting Physician:  Everardo Mena MD  Discharge Physician:  Everardo Mena MD  Primary Physician: Rosanne Cherry       Home clinic:                Discharge Diagnosis:   Principle diagnosis: pulmonary embolism with infarct.     Secondary diagnoses:  ITP with severe thrombocytopenia  RLS  GERD  Alcohol abuse   Fatty liver.         Discharge Instructions:   For the pulmonary embolus:   -lovenox 90 mg 2 times/day until seen by the Hematology clotting team (Dr Novoa) at [facility].     For the ITP:   -prednisone 20 mg daily until adjusted by Hematology at [facility]     For the pain  -prednisone as above plus Tylenol is safest.      Follow up with primary care provider in 7 days        Procedures:       Results for orders placed or performed during the hospital encounter of 04/18/17   CT Chest/Abdomen/Pelvis w Contrast    Narrative    CT CHEST/ABDOMEN/PELVIS WITH CONTRAST April 18, 2017 12:54 PM    HISTORY: Right-sided CVA pain, shortness of breath, prior pulmonary  embolus diagnosed at outside facility in [place], records not available  at this time.    TECHNIQUE: Images from thoracic inlet to pubic symphysis 81 mL Isovue  370 IV contrast. Radiation dose for this scan was reduced using  automated exposure control, adjustment of the mA and/or kV according  to patient size, or iterative reconstruction technique.    COMPARISON: 10/23/2012 CT chest, abdomen and pelvis.    FINDINGS:   Chest: Exam is positive for right lower lobe pulmonary embolus  extending to the distal right main pulmonary artery.     The heart is shifted into the left hemithorax. There is opacity at the  posterior right lower lobe which is concerning for pulmonary infarct.  Pneumonia cannot be excluded. Clinical correlation. Small  bilateral  pleural effusions. Left lung is grossly clear. No thoracic aortic  dissection, the thoracic aorta is calcified and tortuous. No  adenopathy in the chest.    Abdomen and Pelvis: Small cyst in the left lobe of the liver.  Normal-appearing gallbladder, spleen, pancreas, adrenal glands. 1 cm  cyst anterior mid right kidney. There is smaller cyst at the inferior  right kidney and subcentimeter low dense bilateral kidney lesions too  small to characterize.    There is streak artifact from postop changes in the lumbar spine.  Calcified nonaneurysmal abdominal aorta and iliacs. No periaortic or  pelvic adenopathy. No free fluid. Diverticulosis of the descending and  sigmoid colon. No acute bowel abnormality. Patient is status post  appendectomy. There is slightly thickened appearance of the cecum  although this could be due to under distention. Cystic changes right  glenoid fossa probably degenerative change. No aggressive appearing  bone lesions.       Impression    IMPRESSION:   1. Moderate large right lower lobe pulmonary embolus.  2. Right lower lobe pulmonary opacity concerning for pulmonary  infarct. Cannot exclude pneumonia which is considered less likely.  Clinical correlation.    3. No acute abnormality identified in the abdomen and pelvis. Question  mild bowel wall thickening versus underdistention involving the cecum.  Uncomplicated colonic diverticulosis.    Critical Result: [Right lower lobe pulmonary embolus]    Finding was identified on 4/18/2017 12:57 PM.     Yari Medley was contacted by me on 4/18/2017 12:59 PM and verbalized  understanding of the critical result.     ROBIN KLINE MD                    Allergies:      Allergies   Allergen Reactions     Benicar [Olmesartan] Diarrhea     Onset 1-14   On benicar since 2012     Dust Mites      Peanuts [Nuts] Other (See Comments)     Patient hasn't had reaction and eats peanuts all the time - but blood test said he's allergic                   Discharge Medications:     Current Discharge Medication List      START taking these medications    Details   enoxaparin (LOVENOX) 100 MG/ML injection Inject 0.9 mLs (90 mg) Subcutaneous every 12 hours  Qty: 30 Syringe, Refills: 1    Associated Diagnoses: Pulmonary embolism on right (H)      predniSONE (DELTASONE) 20 MG tablet Take 1 tablet (20 mg) by mouth daily  Qty: 30 tablet, Refills: 0    Associated Diagnoses: Idiopathic thrombocytopenic purpura (H)         CONTINUE these medications which have NOT CHANGED    Details   folic acid (FOLVITE) 1 MG tablet TAKE ONE TABLET BY MOUTH ONCE DAILY  Qty: 90 tablet, Refills: 2    Associated Diagnoses: Alcohol abuse      allopurinol (ZYLOPRIM) 300 MG tablet TAKE ONE TABLET BY MOUTH ONCE DAILY *NEED  TO  BE  SEEN  FOR  MORE  REFILLS  Qty: 90 tablet, Refills: 3    Associated Diagnoses: Gout involving toe, unspecified cause, unspecified chronicity, unspecified laterality      cloNIDine (CATAPRES) 0.3 MG tablet TAKE ONE TABLET BY MOUTH TWICE DAILY. NEEDS TO BE SEEN FOR MORE.  Qty: 180 tablet, Refills: 3    Associated Diagnoses: Benign essential hypertension      amLODIPine (NORVASC) 5 MG tablet TAKE ONE TABLET BY MOUTH ONCE DAILY  Qty: 90 tablet, Refills: 3    Associated Diagnoses: Benign essential hypertension      omeprazole (PRILOSEC) 40 MG capsule TAKE ONE CAPSULE BY MOUTH ONCE DAILY  Qty: 90 capsule, Refills: 3    Associated Diagnoses: Esophageal reflux      spironolactone (ALDACTONE) 25 MG tablet TAKE ONE TABLET BY MOUTH ONCE DAILY  Qty: 90 tablet, Refills: 3    Associated Diagnoses: Benign essential hypertension      lisinopril (PRINIVIL,ZESTRIL) 40 MG tablet Take 1 tablet (40 mg) by mouth daily  Qty: 90 tablet, Refills: 1    Associated Diagnoses: Essential hypertension, hypertension with unspecified goal      gabapentin (NEURONTIN) 300 MG capsule TAKE ONE CAPSULE BY MOUTH THREE TIMES DAILY  Qty: 270 capsule, Refills: 0    Associated Diagnoses: Chronic pain      VIAGRA  100 MG tablet TAKE ONE TABLET BY MOUTH ONCE DAILY AS NEEDED FOR ERECTILE DYSFUNCTION  Qty: 25 tablet, Refills: 3    Associated Diagnoses: ED (erectile dysfunction)         STOP taking these medications       aspirin 81 MG tablet Comments:   Reason for Stopping:                     Consultations:   Telephone with Hematology           Brief History of Presenting Illness:   Raymon Ace presents with about a 10-day history of right-sided chest pain that has been very pleuritic, severe at times, can be very sharp and appears to be clearly worse when he takes a breath. Sometimes, he can notice even when he does not.       He was hospitalized for 2 days for this same pain in [place] where they discovered a PE in the right lung. This was in secondary and subsegmental branches of the right lower lobe artery. In [place], they consulted Hematology, who recommended no anticoagulation because platelet count was 32 from his underlying ITP. Instead, they put him on 81 mg of aspirin. They did give him 60 mg of prednisone for 3 days in a row and then no taper to try to get his platelet count up prior to his car trip back up here. His platelets were up to 60 when he left and are 94 today. This is 3 or 4 days after stopping the prednisone.       He presents today with worsening pain, some mild shortness of breath, but more from not being able to take a deep breath.       He gives no triggers for this clot. He has not been immobilized. No surgeries.               Hospital Course:   Pulmonary embolism with infarct. Initial discussions with Hematology recommended half-dose Lovenox. I, however, went on and called Hematology at the [place], the venous thromboembolism division. That consultant recommended full-dose anticoagulation. Lovenox for now since we can reverse it faster than anything else if his platelets start to drop or if he starts to bleed. Will follow up with Dr. Waylon Novoa within a week of discharge. We will try to send an  "in-box message to Dr. Novoa to make sure that happens.   -on lovenox full dose.       Idiopathic thrombocytopenic purpura. Recommendation from Dr. Novoa was to try to keep the platelets up with low-dose prednisone initially. Will start with 20 mg. We do not want the platelets to run high, but would like to keep them above 50 if we can. Even 30-50 is relatively safe with full anticoagulation, but would need to reconsider if he starts dropping below 30.   -platelets good at 82. Will continue the 20 mg prednisone.       Pulmonary infarct. He is having significant pain with this. Will use oxycodone and Tylenol. Also, the 20 mg of prednisone should help with the inflammatory pain.   -improvement in pain with steroids.       Spinal stenosis with neuropathy. He is on Neurontin. Will continue.       Hypertension. Will continue his current antihypertensives, amlodipine, spironolactone, clonidine, lisinopril.       Alcohol abuse:   Drinks 6 drinks daily. Was off for 3 days last week and no withdrawal symptoms. Follow for now. No CIWA at this time. Advised he needs to quit. Alcohol also knocks down his platelets.       CODE STATUS: FULL.       PROPHYLAXIS: He is on full-dose anticoagulation.               Discharge Exam:   OBJECTIVE:   /59  Pulse 56  Temp 98.2  F (36.8  C) (Oral)  Resp 18  Ht 1.702 m (5' 7\")  Wt 85.5 kg (188 lb 7.9 oz)  SpO2 97%  BMI 29.52 kg/m2    GENERAL APPEARANCE:  Alert, NAD, Ox3     RESP:clear      CV: regular rates and rhythm,no murmur, no click or rub - no edema     Abdomen: soft, nontender, no liver or spleen enlargement, no masses, BSs normal   Skin: no cyanosis, pallor, or jaundice            Pending Tests at Discharge:     Unresulted Labs Ordered in the Past 30 Days of this Admission     No orders found from 2/17/2017 to 4/19/2017.                   Discharge Disposition:   Discharged to home      Attestation:  Amount of time performed on this discharge : 45  minutes.    Everardo WALTON" Rajesh PUENTE

## 2017-04-20 NOTE — DISCHARGE INSTRUCTIONS
For the pulmonary embolus:   -lovenox 90 mg 2 times/day until seen by the Hematology clotting team (Dr Novoa) at [facility].     For the ITP:   -prednisone 20 mg daily until adjusted by Hematology at [facility]     For the pain  -prednisone as above plus Tylenol is safest.      Hematology [facility]  Dr Novoa # [phone]

## 2017-04-20 NOTE — PLAN OF CARE
Problem: Goal Outcome Summary  Goal: Goal Outcome Summary  Outcome: Improving  Pt denies an shortness of air or chest discomfort.  Pt remains steady on feet as tolerated in room.  Pt reports he gave his Lovenox with RN yesterday and felt he did well with it.  Lovenox is ordered this am, will have pt administer this dose to assess.

## 2017-04-20 NOTE — PROGRESS NOTES
Pt able to demostrate own injection this am.  Pt asked questions and were answered by this writer.

## 2017-04-21 ENCOUNTER — TELEPHONE (OUTPATIENT)
Dept: FAMILY MEDICINE | Facility: CLINIC | Age: 72
End: 2017-04-21

## 2017-04-21 NOTE — TELEPHONE ENCOUNTER
Patient returned call and states that he is seeing a Dr at the  and will schedule an appointment with his primary care provider afterwards doesn't feel the need at this time.

## 2017-04-23 DIAGNOSIS — I10 ESSENTIAL HYPERTENSION: ICD-10-CM

## 2017-04-24 NOTE — TELEPHONE ENCOUNTER
lisinopril (PRINIVIL,ZESTRIL) 40 MG tablet      Last Written Prescription Date: 6/13/16  Last Fill Quantity: 90, # refills: 1  Last Office Visit with G, P or [facility] prescribing provider: 12/6/16       Potassium   Date Value Ref Range Status   04/20/2017 3.9 3.4 - 5.3 mmol/L Final     Creatinine   Date Value Ref Range Status   04/20/2017 0.78 0.66 - 1.25 mg/dL Final     BP Readings from Last 3 Encounters:   04/20/17 104/59   12/06/16 122/78   11/01/16 122/74

## 2017-04-25 ENCOUNTER — OFFICE VISIT (OUTPATIENT)
Dept: HEMATOLOGY | Facility: CLINIC | Age: 72
End: 2017-04-25
Attending: INTERNAL MEDICINE
Payer: COMMERCIAL

## 2017-04-25 VITALS
SYSTOLIC BLOOD PRESSURE: 146 MMHG | HEART RATE: 68 BPM | BODY MASS INDEX: 29.66 KG/M2 | TEMPERATURE: 98.3 F | DIASTOLIC BLOOD PRESSURE: 84 MMHG | WEIGHT: 189 LBS | HEIGHT: 67 IN

## 2017-04-25 DIAGNOSIS — D64.9 ANEMIA, UNSPECIFIED TYPE: ICD-10-CM

## 2017-04-25 DIAGNOSIS — D69.3 IDIOPATHIC THROMBOCYTOPENIC PURPURA (H): ICD-10-CM

## 2017-04-25 DIAGNOSIS — D69.3 IDIOPATHIC THROMBOCYTOPENIC PURPURA (H): Primary | ICD-10-CM

## 2017-04-25 LAB
BASOPHILS # BLD AUTO: 0.1 10E9/L (ref 0–0.2)
BASOPHILS NFR BLD AUTO: 0.7 %
DIFFERENTIAL METHOD BLD: ABNORMAL
EOSINOPHIL # BLD AUTO: 0 10E9/L (ref 0–0.7)
EOSINOPHIL NFR BLD AUTO: 0.4 %
ERYTHROCYTE [DISTWIDTH] IN BLOOD BY AUTOMATED COUNT: 14.7 % (ref 10–15)
FERRITIN SERPL-MCNC: 186 NG/ML (ref 26–388)
HCT VFR BLD AUTO: 40.8 % (ref 40–53)
HGB BLD-MCNC: 13.3 G/DL (ref 13.3–17.7)
IMM GRANULOCYTES # BLD: 0.2 10E9/L (ref 0–0.4)
IMM GRANULOCYTES NFR BLD: 1.8 %
IRON SATN MFR SERPL: 18 % (ref 15–46)
IRON SERPL-MCNC: 70 UG/DL (ref 35–180)
LYMPHOCYTES # BLD AUTO: 1.9 10E9/L (ref 0.8–5.3)
LYMPHOCYTES NFR BLD AUTO: 22.2 %
MCH RBC QN AUTO: 30.1 PG (ref 26.5–33)
MCHC RBC AUTO-ENTMCNC: 32.6 G/DL (ref 31.5–36.5)
MCV RBC AUTO: 92 FL (ref 78–100)
MONOCYTES # BLD AUTO: 0.8 10E9/L (ref 0–1.3)
MONOCYTES NFR BLD AUTO: 9.4 %
NEUTROPHILS # BLD AUTO: 5.6 10E9/L (ref 1.6–8.3)
NEUTROPHILS NFR BLD AUTO: 65.5 %
NRBC # BLD AUTO: 0 10*3/UL
NRBC BLD AUTO-RTO: 0 /100
PLATELET # BLD AUTO: 89 10E9/L (ref 150–450)
RBC # BLD AUTO: 4.42 10E12/L (ref 4.4–5.9)
TIBC SERPL-MCNC: 401 UG/DL (ref 240–430)
TSH SERPL DL<=0.005 MIU/L-ACNC: 1.76 MU/L (ref 0.4–4)
VIT B12 SERPL-MCNC: 358 PG/ML (ref 193–986)
WBC # BLD AUTO: 8.6 10E9/L (ref 4–11)

## 2017-04-25 PROCEDURE — 83550 IRON BINDING TEST: CPT | Performed by: INTERNAL MEDICINE

## 2017-04-25 PROCEDURE — 85025 COMPLETE CBC W/AUTO DIFF WBC: CPT | Performed by: INTERNAL MEDICINE

## 2017-04-25 PROCEDURE — 36415 COLL VENOUS BLD VENIPUNCTURE: CPT | Performed by: INTERNAL MEDICINE

## 2017-04-25 PROCEDURE — 82728 ASSAY OF FERRITIN: CPT | Performed by: INTERNAL MEDICINE

## 2017-04-25 PROCEDURE — 99205 OFFICE O/P NEW HI 60 MIN: CPT | Mod: GC | Performed by: INTERNAL MEDICINE

## 2017-04-25 PROCEDURE — 82607 VITAMIN B-12: CPT | Performed by: INTERNAL MEDICINE

## 2017-04-25 PROCEDURE — 84443 ASSAY THYROID STIM HORMONE: CPT | Performed by: INTERNAL MEDICINE

## 2017-04-25 PROCEDURE — 83540 ASSAY OF IRON: CPT | Performed by: INTERNAL MEDICINE

## 2017-04-25 PROCEDURE — 99212 OFFICE O/P EST SF 10 MIN: CPT

## 2017-04-25 ASSESSMENT — PAIN SCALES - GENERAL: PAINLEVEL: NO PAIN (0)

## 2017-04-25 NOTE — LETTER
4/25/2017       RE: Raymon Ace  [address]  \[facility] [place] [facility]\"" [id number]     Dear Colleague,    Thank you for referring your patient, Raymon Ace, to the [facility] at [facility]. Please see a copy of my visit note below.    [facility] Initial Consultation    Raymon Ace MRN# [id number]   Age: 71 year old YOB: 1945     Date of Service: April 25, 2017  Referring provider: Dr Mena, PCP Dr. Cherry, [facility]            Reason for Consult:   PE, ITP         History of Present Illness:   History obtained from chart review and confirmed with patient.    Raymon Ace is a 67-year-old gentleman from [place] with PMH significant for KARL, RLS, gout, HTN, HLD, chronic back pain with spinal surgeries, alcohol abuse, fatty liver, previously followed by Hematology Dr. Ariza for ITP (last seen 2013), now referred for acute pulmonary embolism.    Raymon was admitted to hospital here 4/18-20 for worsening chest pain with recent 10-day history of right-sided chest pain, pleuritic. He was hospitalized for 2 days for this same pain in [place] 4/11 where they discovered a PE in the right lung. This was in secondary and subsegmental branches of the right lower lobe arteries. Reports that US of bilateral legs were negative. In [place], they consulted Hematology, who recommended no anticoagulation because platelet count was 32 from his underlying ITP. He reports they took him off of his 81 mg of aspirin given low platelets. They did give him 60 mg of prednisone for 3 days without taper to try to get his platelet count up prior to his car trip back up here. His platelets were up to 60 when he left and are plts 94 on admission here (3 or 4 days after stopping the prednisone). He had no triggers for this clot, has not been immobilized. No surgeries. No recent travel prior to PE as had been in [place] for winter. Regarding age-dependent  malignancy screening with colonoscopy last 9/2015 with polyp, PSA normal 2014. He has no family history of bleeding or clotting disorders.  CT CAP here 4/18/17 showed moderate large right lower lobe pulmonary embolus, right lower lobe pulmonary opacity concerning for pulmonary infarct. He was started on full-dose anticoagulation with Lovenox (90 BID) given plts improved on steroids (goal keep them above 50k on anticoagulation). Plts 80k on discharge 4/20/17. H/o heavy ETOH use, 6 drinks daily with evidence of fatty liver and mild splenomegaly on CT in 2012. Today he denies any further chest pain since hospital discharge. He is taking naproxen daily for pain which he takes chronically for back pain. ROS chronic low back pain and neuropathy down both legs.     Regarding the ITP, he was diagnosed with ITP in 2012 after her presented with plts as low at 32,000 from CBC of 09/26/2012. White count 4.2, hemoglobin 14.6, MCV 98 (higher end of normal) and the differential was reviewed and normal. He had extesnive evaluation at the time to investigate the cause of his thrombocytopenia including blood smear, SPEP/MIESHA, TSH, H. pylori profile, B12, folate, hep B, hep C and HIV negative. He does have a significant alcohol history with CT 2012 showing mild splenomegaly. He also had BMBx in 2012 which was unremarkable except for age-related loss of Y-chromosome per cytogenetics. He reportedly responded to Decadron and platelets went from 48 to 116 which goes in favor of diagnosis of ITP. However for his spinal surgeries he did not respond as well with 2nd course of Decadron. He also received IVIG pre-op but developed chest pain with IVIG and needed to be admitted to the hospital. Cardiac workup was negative. Lastly, he had Promacta 50 daily in 2013 with good response and no side effects per patient. He stopped taking Promacta about 3 weeks after spinal surgery. He last saw a hematologist in jan 2017 and was put on prednisone for  spinal injection. His plts live between 30-50k in last couple years. Today, he denies any bleeding but does have easy bruising.              Review of Systems:   10-point ROS negative except as per HPI.         Past Medical History:     Past Medical History:   Diagnosis Date     Heart murmur     heart is positioned farther on left side then typical     Hypertension      ITP (idiopathic thrombocytopenic purpura)      Obstructive sleep apnea     does not use CPAP  machine          Past Surgical History:     Past Surgical History:   Procedure Laterality Date     APPENDECTOMY  1956     BACK SURGERY  1992, 1996    trimmed L4-L5, Fusion L4-L5     BONE MARROW BIOPSY, BONE SPECIMEN, NEEDLE/TROCAR  10/24/2012    Procedure: BIOPSY BONE MARROW;  BONE MARROW BIOPSY WITH ASPIRATE (AREVALO ORDERING);  Surgeon: Terri Hickey MD;  Location:  GI     FACIAL RECONSTRUCTION SURGERY  1965    jaw fracture     HC TOOTH EXTRACTION W/FORCEP  1990s          Social History:     Social History     Social History     Marital status:      Spouse name: N/A     Number of children: N/A     Years of education: N/A     Occupational History     Not on file.     Social History Main Topics     Smoking status: Former Smoker     Years: 28.00     Quit date: 9/26/1982     Smokeless tobacco: Never Used     Alcohol use 0.0 oz/week     0 Standard drinks or equivalent per week      Comment: 4-5 drinks/day     Drug use: No     Sexual activity: No     Other Topics Concern     Parent/Sibling W/ Cabg, Mi Or Angioplasty Before 65f 55m? Yes     Social History Narrative          Family History:   Father with pancreatic cancer in 60s. Mother and sister with CHF.   No FH of bleeding or clotting disorders.  Family History   Problem Relation Age of Onset     Unknown/Adopted Mother      Unknown/Adopted Father      HEART DISEASE Sister      DIABETES No family hx of      Coronary Artery Disease No family hx of      Hypertension No family hx of       Hyperlipidemia No family hx of      CEREBROVASCULAR DISEASE No family hx of      Breast Cancer No family hx of      Colon Cancer No family hx of      Prostate Cancer No family hx of      Other Cancer No family hx of      Depression No family hx of      Anxiety Disorder No family hx of      MENTAL ILLNESS No family hx of      Substance Abuse No family hx of      Anesthesia Reaction No family hx of      Asthma No family hx of      OSTEOPOROSIS No family hx of      Genetic Disorder No family hx of      Thyroid Disease No family hx of      Obesity No family hx of           Allergies:     Allergies   Allergen Reactions     Benicar [Olmesartan] Diarrhea     Onset 1-14   On benicar since 2012     Dust Mites      Peanuts [Nuts] Other (See Comments)     Patient hasn't had reaction and eats peanuts all the time - but blood test said he's allergic          Medications:       Current Outpatient Prescriptions:      enoxaparin (LOVENOX) 100 MG/ML injection, Inject 0.9 mLs (90 mg) Subcutaneous every 12 hours, Disp: 30 Syringe, Rfl: 1     predniSONE (DELTASONE) 20 MG tablet, Take 1 tablet (20 mg) by mouth daily, Disp: 30 tablet, Rfl: 0     folic acid (FOLVITE) 1 MG tablet, TAKE ONE TABLET BY MOUTH ONCE DAILY, Disp: 90 tablet, Rfl: 2     allopurinol (ZYLOPRIM) 300 MG tablet, TAKE ONE TABLET BY MOUTH ONCE DAILY *NEED  TO  BE  SEEN  FOR  MORE  REFILLS, Disp: 90 tablet, Rfl: 3     cloNIDine (CATAPRES) 0.3 MG tablet, TAKE ONE TABLET BY MOUTH TWICE DAILY. NEEDS TO BE SEEN FOR MORE., Disp: 180 tablet, Rfl: 3     amLODIPine (NORVASC) 5 MG tablet, TAKE ONE TABLET BY MOUTH ONCE DAILY, Disp: 90 tablet, Rfl: 3     omeprazole (PRILOSEC) 40 MG capsule, TAKE ONE CAPSULE BY MOUTH ONCE DAILY, Disp: 90 capsule, Rfl: 3     spironolactone (ALDACTONE) 25 MG tablet, TAKE ONE TABLET BY MOUTH ONCE DAILY, Disp: 90 tablet, Rfl: 3     lisinopril (PRINIVIL,ZESTRIL) 40 MG tablet, Take 1 tablet (40 mg) by mouth daily, Disp: 90 tablet, Rfl: 1     gabapentin  "(NEURONTIN) 300 MG capsule, TAKE ONE CAPSULE BY MOUTH THREE TIMES DAILY, Disp: 270 capsule, Rfl: 0     VIAGRA 100 MG tablet, TAKE ONE TABLET BY MOUTH ONCE DAILY AS NEEDED FOR ERECTILE DYSFUNCTION, Disp: 25 tablet, Rfl: 3         Physical Exam:   /84  Pulse 68  Temp 98.3  F (36.8  C) (Oral)  Ht 1.689 m (5' 6.5\")  Wt 85.7 kg (189 lb)  BMI 30.05 kg/m2  General: Pleasant. Appears well, in no acute distress.  Heme/Lymph: No overt bleeding. No cervical or clavicular adenopathy.  Skin: No concerning lesions, rash, jaundice, cyanosis, erythema, or ecchymoses on exposed surfaces.  HEENT: NCAT. PERRL, anicteric sclera. Oral mucosa pink and moist with no lesions or thrush.  Neck: Neck supple. No jugular venous distention.  Respiratory: Non-labored breathing, good air exchange, lungs clear to auscultation bilaterally.  Cardiovascular: Regular rate and rhythm. No murmur or rub.   Gastrointestinal: Normoactive bowel sounds. Abdomen soft, non-distended, and non-tender. No palpable masses or organomegaly. Ecchymoses on abdomen from Lovenox injections.   Musculoskeletal: 2+ bilateral radial and pedal pulses. No extremity edema. Extremities normal, no gross deformities noted, non-tender to palpation. 5/5 proximal and distal strength.  Neurologic: A&O x 3, CNs 2-12 grossly intact, speech normal, gait normal, sensation to light touch grossly WNL. Grossly non-focal.   Psychiatric: Mentation and affect appear normal.           Data:     Lab Results   Component Value Date    WBC 7.9 04/20/2017     Lab Results   Component Value Date    RBC 4.17 04/20/2017     Lab Results   Component Value Date    HGB 12.1 04/20/2017     Lab Results   Component Value Date    HCT 36.8 04/20/2017     No components found for: MCT  Lab Results   Component Value Date    MCV 88 04/20/2017     Lab Results   Component Value Date    MCH 29.0 04/20/2017     Lab Results   Component Value Date    MCHC 32.9 04/20/2017     Lab Results   Component Value Date    " RDW 13.7 04/20/2017     Lab Results   Component Value Date    PLT 80 04/20/2017 4/18/17: INR 1.02,  PTT 28    10/22/12: Hep B, HIV negative , SPEP negative  9/11/2014: PSA normal  12/2016: Hep C negative      Bone Marrow Biopsy 10/24/12  FINAL DIAGNOSIS:   Peripheral blood demonstrating thrombocytopenia without morphological features of myelodysplasia or malignancy. Focally hypercellular bone marrow with megakaryocyte clustering (see comment); iron stores reduced.   10/24 cytogenetics-INTERPRETATION: Six (30%) of the metaphase cells analyzed had loss of the Y chromosome as the sole abnormality; no abnormality was detected among the remaining 14 (70%) metaphases. Loss of the Y chromosome, as seen in this case, has been reported as an acquired clonal abnormality associated with primarily myeloid disorders, and also as a clinically insignificant finding associated with the normal aging process in adult males. When presenting as a clonal abnormality, loss of Y is typically seen in addition to other cytogenetic abnormalities. Given this patient's age, the reported absence of morphologic evidence of a malignant disorder and the absence of other chromosomal abnormalities, it is most likely that the loss of Y represents an age-related finding in this case.   Flow Cytometry-Normal     Colonoscopy 5/2010- mild inflammation, polyp removal,external hemorrhoids,diverticulosis.    Colonoscopy 9/2015- sessile polyp, removed.  EGD 12/2016 - gastric polyp    10/23/12 CT CAP  IMPRESSION:   1. Mild splenomegaly. 2. Mild fatty infiltration of the liver.3. Low dense lesions too small to characterize in the liver and the kidneys. 4. Coronary and aortic atherosclerosis. 5. Extensive diverticulosis without evidence of diverticulitis.     04/18/17 CT CAP  IMPRESSION:   1. Moderate large right lower lobe pulmonary embolus.  2. Right lower lobe pulmonary opacity concerning for pulmonary  infarct. Cannot exclude pneumonia which is  considered less likely.  Clinical correlation.   3. No acute abnormality identified in the abdomen and pelvis. Question  mild bowel wall thickening versus underdistention involving the cecum.  Uncomplicated colonic diverticulosis.     4/19/17   Interpretation Summary     Technically difficult study, but limited views suggest normal RV size and  function.  PA pressures could not be calculated from this study.           Assessment and Plan:   #Uprovoked PE 4/11/2017  #ITP diagnosed 2012  #ETOH abuse  #Normocytic anemia  Raymon Ace is a 71 year old man KARL, RLS, gout, HTN, HLD, chronic back pain with spinal surgeries, alcohol abuse, fatty liver, previously followed by Hematology Dr. Ariza for ITP (last seen 2013), now referred for acute pulmonary embolism.  PE appears unprovoked which would require lifelong anticoagulation. Initially not treated for PE in [place] given plt 30k with subsequently worsening chest symptoms. He now feels completely better after starting therapeutic Lovenox. Anticoagulation is complicated by his ITP, baseline platelet count 30-50s while off therapy. His ITP has responded to steroids and Promacta in past. Platelets now 80-90k after prednisone burst and now 20 mg daily. Will restart Promacta as steroid sparing agent. Plan to do full-dose anticoagulation as long as platelet count >50,000. We reviewed options of Lovenox, warfarin or NOACs (Xarelto). He would prefer Xarelto.     Plan:  -CBC, ferritin, B12, TSH today  -continue daily folic acid  -monitor CBC weekly ([facility])  -continue prednisone 20 mg daily; plan rapid taper (10 mg x 3 days, 5 mg x 3 days, then stop) when on promacta x 1 week  -start Promacta 50 daily   -advise continued ETOH cessation  -send copy of note to PCP Dr. Cherry and spine surgery Dr. Didier Singh ([facility])   -ideally avoid elective spinal surgery for at least 6 weeks; RTC after appointment with Dr. Singh to discuss anticoagulation plan for surgery    Patient  seen and discussed with Dr. Novoa.  Maria R Mccoy MD  Heme Onc fellow        [facility] Initial Consultation    Raymon Ace MRN# [id number]   Age: 71 year old YOB: 1945     Date of Service: April 25, 2017  Referring provider: Dr Mena, PCP Dr. Cherry, [facility]            Reason for Consult:   PE, ITP         History of Present Illness:   History obtained from chart review and confirmed with patient.    Raymon Ace is a 67-year-old gentleman from [place] with PMH significant for KARL, RLS, gout, HTN, HLD, chronic back pain with spinal surgeries, alcohol abuse, fatty liver, previously followed by Hematology Dr. Ariza for ITP (last seen 2013), now referred for acute pulmonary embolism.    Raymon was admitted to hospital here 4/18-20 for worsening chest pain with recent 10-day history of right-sided chest pain, pleuritic. He was hospitalized for 2 days for this same pain in [place] 4/11 where they discovered a PE in the right lung. This was in secondary and subsegmental branches of the right lower lobe arteries. Reports that US of bilateral legs were negative. In [place], they consulted Hematology, who recommended no anticoagulation because platelet count was 32 from his underlying ITP. He reports they took him off of his 81 mg of aspirin given low platelets. They did give him 60 mg of prednisone for 3 days without taper to try to get his platelet count up prior to his car trip back up here. His platelets were up to 60 when he left and are plts 94 on admission here (3 or 4 days after stopping the prednisone). He had no triggers for this clot, has not been immobilized. No surgeries. No recent travel prior to PE as had been in [place] for winter. Regarding age-dependent malignancy screening with colonoscopy last 9/2015 with polyp, PSA normal 2014. He has no family history of bleeding or clotting disorders.  CT CAP here 4/18/17 showed moderate large right lower lobe pulmonary  embolus, right lower lobe pulmonary opacity concerning for pulmonary infarct. He was started on full-dose anticoagulation with Lovenox (90 BID) given plts improved on steroids (goal keep them above 50k on anticoagulation). Plts 80k on discharge 4/20/17. H/o heavy ETOH use, 6 drinks daily with evidence of fatty liver and mild splenomegaly on CT in 2012. Today he denies any further chest pain since hospital discharge. He is taking naproxen daily for pain which he takes chronically for back pain. ROS chronic low back pain and neuropathy down both legs.     Regarding the ITP, he was diagnosed with ITP in 2012 after her presented with plts as low at 32,000 from CBC of 09/26/2012. White count 4.2, hemoglobin 14.6, MCV 98 (higher end of normal) and the differential was reviewed and normal. He had extesnive evaluation at the time to investigate the cause of his thrombocytopenia including blood smear, SPEP/MIESHA, TSH, H. pylori profile, B12, folate, hep B, hep C and HIV negative. He does have a significant alcohol history with CT 2012 showing mild splenomegaly. He also had BMBx in 2012 which was unremarkable except for age-related loss of Y-chromosome per cytogenetics. He reportedly responded to Decadron and platelets went from 48 to 116 which goes in favor of diagnosis of ITP. However for his spinal surgeries he did not respond as well with 2nd course of Decadron. He also received IVIG pre-op but developed chest pain with IVIG and needed to be admitted to the hospital. Cardiac workup was negative. Lastly, he had Promacta 50 daily in 2013 with good response and no side effects per patient. He stopped taking Promacta about 3 weeks after spinal surgery. He last saw a hematologist in jan 2017 and was put on prednisone for spinal injection. His plts live between 30-50k in last couple years. Today, he denies any bleeding but does have easy bruising.              Review of Systems:   10-point ROS negative except as per HPI.          Past Medical History:     Past Medical History:   Diagnosis Date     Heart murmur     heart is positioned farther on left side then typical     Hypertension      ITP (idiopathic thrombocytopenic purpura)      Obstructive sleep apnea     does not use CPAP  machine          Past Surgical History:     Past Surgical History:   Procedure Laterality Date     APPENDECTOMY  1956     BACK SURGERY  1992, 1996    trimmed L4-L5, Fusion L4-L5     BONE MARROW BIOPSY, BONE SPECIMEN, NEEDLE/TROCAR  10/24/2012    Procedure: BIOPSY BONE MARROW;  BONE MARROW BIOPSY WITH ASPIRATE (AREVALO ORDERING);  Surgeon: Terri Hickey MD;  Location:  GI     FACIAL RECONSTRUCTION SURGERY  1965    jaw fracture     HC TOOTH EXTRACTION W/FORCEP  1990s          Social History:     Social History     Social History     Marital status:      Spouse name: N/A     Number of children: N/A     Years of education: N/A     Occupational History     Not on file.     Social History Main Topics     Smoking status: Former Smoker     Years: 28.00     Quit date: 9/26/1982     Smokeless tobacco: Never Used     Alcohol use 0.0 oz/week     0 Standard drinks or equivalent per week      Comment: 4-5 drinks/day     Drug use: No     Sexual activity: No     Other Topics Concern     Parent/Sibling W/ Cabg, Mi Or Angioplasty Before 65f 55m? Yes     Social History Narrative          Family History:   Father with pancreatic cancer in 60s. Mother and sister with CHF.   No FH of bleeding or clotting disorders.  Family History   Problem Relation Age of Onset     Unknown/Adopted Mother      Unknown/Adopted Father      HEART DISEASE Sister      DIABETES No family hx of      Coronary Artery Disease No family hx of      Hypertension No family hx of      Hyperlipidemia No family hx of      CEREBROVASCULAR DISEASE No family hx of      Breast Cancer No family hx of      Colon Cancer No family hx of      Prostate Cancer No family hx of      Other Cancer No family hx of       Depression No family hx of      Anxiety Disorder No family hx of      MENTAL ILLNESS No family hx of      Substance Abuse No family hx of      Anesthesia Reaction No family hx of      Asthma No family hx of      OSTEOPOROSIS No family hx of      Genetic Disorder No family hx of      Thyroid Disease No family hx of      Obesity No family hx of           Allergies:     Allergies   Allergen Reactions     Benicar [Olmesartan] Diarrhea     Onset 1-14   On benicar since 2012     Dust Mites      Peanuts [Nuts] Other (See Comments)     Patient hasn't had reaction and eats peanuts all the time - but blood test said he's allergic          Medications:       Current Outpatient Prescriptions:      enoxaparin (LOVENOX) 100 MG/ML injection, Inject 0.9 mLs (90 mg) Subcutaneous every 12 hours, Disp: 30 Syringe, Rfl: 1     predniSONE (DELTASONE) 20 MG tablet, Take 1 tablet (20 mg) by mouth daily, Disp: 30 tablet, Rfl: 0     folic acid (FOLVITE) 1 MG tablet, TAKE ONE TABLET BY MOUTH ONCE DAILY, Disp: 90 tablet, Rfl: 2     allopurinol (ZYLOPRIM) 300 MG tablet, TAKE ONE TABLET BY MOUTH ONCE DAILY *NEED  TO  BE  SEEN  FOR  MORE  REFILLS, Disp: 90 tablet, Rfl: 3     cloNIDine (CATAPRES) 0.3 MG tablet, TAKE ONE TABLET BY MOUTH TWICE DAILY. NEEDS TO BE SEEN FOR MORE., Disp: 180 tablet, Rfl: 3     amLODIPine (NORVASC) 5 MG tablet, TAKE ONE TABLET BY MOUTH ONCE DAILY, Disp: 90 tablet, Rfl: 3     omeprazole (PRILOSEC) 40 MG capsule, TAKE ONE CAPSULE BY MOUTH ONCE DAILY, Disp: 90 capsule, Rfl: 3     spironolactone (ALDACTONE) 25 MG tablet, TAKE ONE TABLET BY MOUTH ONCE DAILY, Disp: 90 tablet, Rfl: 3     lisinopril (PRINIVIL,ZESTRIL) 40 MG tablet, Take 1 tablet (40 mg) by mouth daily, Disp: 90 tablet, Rfl: 1     gabapentin (NEURONTIN) 300 MG capsule, TAKE ONE CAPSULE BY MOUTH THREE TIMES DAILY, Disp: 270 capsule, Rfl: 0     VIAGRA 100 MG tablet, TAKE ONE TABLET BY MOUTH ONCE DAILY AS NEEDED FOR ERECTILE DYSFUNCTION, Disp: 25 tablet, Rfl: 3       "   Physical Exam:   /84  Pulse 68  Temp 98.3  F (36.8  C) (Oral)  Ht 1.689 m (5' 6.5\")  Wt 85.7 kg (189 lb)  BMI 30.05 kg/m2  General: Pleasant. Appears well, in no acute distress.  Heme/Lymph: No overt bleeding. No cervical or clavicular adenopathy.  Skin: No concerning lesions, rash, jaundice, cyanosis, erythema, or ecchymoses on exposed surfaces.  HEENT: NCAT. PERRL, anicteric sclera. Oral mucosa pink and moist with no lesions or thrush.  Neck: Neck supple. No jugular venous distention.  Respiratory: Non-labored breathing, good air exchange, lungs clear to auscultation bilaterally.  Cardiovascular: Regular rate and rhythm. No murmur or rub.   Gastrointestinal: Normoactive bowel sounds. Abdomen soft, non-distended, and non-tender. No palpable masses or organomegaly. Ecchymoses on abdomen from Lovenox injections.   Musculoskeletal: 2+ bilateral radial and pedal pulses. No extremity edema. Extremities normal, no gross deformities noted, non-tender to palpation. 5/5 proximal and distal strength.  Neurologic: A&O x 3, CNs 2-12 grossly intact, speech normal, gait normal, sensation to light touch grossly WNL. Grossly non-focal.   Psychiatric: Mentation and affect appear normal.           Data:     Lab Results   Component Value Date    WBC 7.9 04/20/2017     Lab Results   Component Value Date    RBC 4.17 04/20/2017     Lab Results   Component Value Date    HGB 12.1 04/20/2017     Lab Results   Component Value Date    HCT 36.8 04/20/2017     No components found for: MCT  Lab Results   Component Value Date    MCV 88 04/20/2017     Lab Results   Component Value Date    MCH 29.0 04/20/2017     Lab Results   Component Value Date    MCHC 32.9 04/20/2017     Lab Results   Component Value Date    RDW 13.7 04/20/2017     Lab Results   Component Value Date    PLT 80 04/20/2017 4/18/17: INR 1.02,  PTT 28    10/22/12: Hep B, HIV negative , SPEP negative  9/11/2014: PSA normal  12/2016: Hep C negative      Bone Marrow " Biopsy 10/24/12  FINAL DIAGNOSIS:   Peripheral blood demonstrating thrombocytopenia without morphological features of myelodysplasia or malignancy. Focally hypercellular bone marrow with megakaryocyte clustering (see comment); iron stores reduced.   10/24 cytogenetics-INTERPRETATION: Six (30%) of the metaphase cells analyzed had loss of the Y chromosome as the sole abnormality; no abnormality was detected among the remaining 14 (70%) metaphases. Loss of the Y chromosome, as seen in this case, has been reported as an acquired clonal abnormality associated with primarily myeloid disorders, and also as a clinically insignificant finding associated with the normal aging process in adult males. When presenting as a clonal abnormality, loss of Y is typically seen in addition to other cytogenetic abnormalities. Given this patient's age, the reported absence of morphologic evidence of a malignant disorder and the absence of other chromosomal abnormalities, it is most likely that the loss of Y represents an age-related finding in this case.   Flow Cytometry-Normal     Colonoscopy 5/2010- mild inflammation, polyp removal,external hemorrhoids,diverticulosis.    Colonoscopy 9/2015- sessile polyp, removed.  EGD 12/2016 - gastric polyp    10/23/12 CT CAP  IMPRESSION:   1. Mild splenomegaly. 2. Mild fatty infiltration of the liver.3. Low dense lesions too small to characterize in the liver and the kidneys. 4. Coronary and aortic atherosclerosis. 5. Extensive diverticulosis without evidence of diverticulitis.     04/18/17 CT CAP  IMPRESSION:   1. Moderate large right lower lobe pulmonary embolus.  2. Right lower lobe pulmonary opacity concerning for pulmonary  infarct. Cannot exclude pneumonia which is considered less likely.  Clinical correlation.   3. No acute abnormality identified in the abdomen and pelvis. Question  mild bowel wall thickening versus underdistention involving the cecum.  Uncomplicated colonic  diverticulosis.     4/19/17   Interpretation Summary     Technically difficult study, but limited views suggest normal RV size and  function.  PA pressures could not be calculated from this study.           Assessment and Plan:   #Uprovoked PE 4/2017  #ITP diagnosed 2012  #ETOH abuse  #Normocytic anemia     Raymon Ace is a 71 year old    Plan:  -CBC, ferritin, B12, TSH today  -continue daily folic acid  -monitor CBC weekly   -continue prednisone 20 mg daily; plan taper ------  -start Promacta 50 daily   -advise ETOH cessation  -cc his PCP Dr. Cherry on our note      Patient seen and discussed with Dr. Novoa.  Maria R Mccoy MD  Heme Onc fellow        Again, thank you for allowing me to participate in the care of your patient.      Sincerely,    Waylon Novoa MD

## 2017-04-25 NOTE — PROGRESS NOTES
[facility]  Outpatient Clinic Visit      Raymon Ace MRN# [id number]   Age: 71 year old YOB: 1945     Date of Service: April 25, 2017  Referring provider: Dr Mena, PCP Dr. Cherry, [facility]            Reason for Consult:   PE, ITP         History of Present Illness:   History obtained from chart review and confirmed with patient.    Raymon Ace is a 67-year-old gentleman from [place], [place] with PMH significant for KARL, RLS, gout, HTN, HLD, chronic back pain with spinal surgeries, h/o alcohol abuse, fatty liver, previously followed by Hematology Dr. Ariza for ITP (last seen 2013), now referred for acute pulmonary embolism.    Raymon was admitted to hospital at [facility] 4/18-20 for worsening chest pain with recent 10-day history of right-sided chest pain, pleuritic. He was hospitalized for 2 days for this same pain in [place] 4/11 where they discovered a PE in the right lung. This was in secondary and subsegmental branches of the right lower lobe arteries. Reports that US of bilateral legs were negative. In [place], they consulted Hematology, who recommended no anticoagulation because platelet count was 32 from his underlying ITP. He reports they took him off of his 81 mg of aspirin given low platelets. They did give him 60 mg of prednisone for 3 days without taper to try to get his platelet count up prior to his car trip back up here. His platelets were up to 60 when he left and are plts 94 on admission here (3 or 4 days after stopping the prednisone). He had no triggers for this clot, has not been immobilized. No surgeries. No recent travel prior to PE as had been in [place] for winter. Regarding age-dependent malignancy screening with colonoscopy last 9/2015 with polyp, PSA normal 2014. He has no family history of bleeding or clotting disorders.  CT CAP here 4/18/17 showed moderate large right lower lobe pulmonary embolus, right lower lobe pulmonary opacity  concerning for pulmonary infarct. He was started on full-dose anticoagulation with Lovenox (90 BID) given plts improved on steroids (goal keep them above 50k on anticoagulation). Plts 80k on discharge 4/20/17. H/o heavy ETOH use, 6 drinks daily with evidence of fatty liver and mild splenomegaly on CT in 2012. Today he denies any further chest pain since hospital discharge. He is taking naproxen daily for pain which he takes chronically for back pain. ROS chronic low back pain and neuropathy down both legs.     Regarding the ITP, he was diagnosed with ITP in 2012 after her presented with plts as low at 32,000 from CBC of 09/26/2012. White count 4.2, hemoglobin 14.6, MCV 98 (higher end of normal) and the differential was reviewed and normal. He had extesnive evaluation at the time to investigate the cause of his thrombocytopenia including blood smear, SPEP/MIESHA, TSH, H. pylori profile, B12, folate, hep B, hep C and HIV negative. He does have a significant alcohol history with CT 2012 showing mild splenomegaly. He also had BMBx in 2012 which was unremarkable except for age-related loss of Y-chromosome per cytogenetics. He reportedly responded to Decadron and platelets went from 48 to 116 which goes in favor of diagnosis of ITP. However for his spinal surgeries he did not respond as well with 2nd course of Decadron. He also received IVIG pre-op but developed chest pain with IVIG and needed to be admitted to the hospital. Cardiac workup was negative. Lastly, he had Promacta 50 daily in 2013 with good response and no side effects per patient. He stopped taking Promacta about 3 weeks after spinal surgery.  His plts live between 30-50k in last couple years. Today, he denies any bleeding but does have easy bruising.              Review of Systems:   10-point ROS negative except as per HPI.         Past Medical History:     Past Medical History:   Diagnosis Date     Heart murmur     heart is positioned farther on left side  then typical     Hypertension      ITP (idiopathic thrombocytopenic purpura)      Obstructive sleep apnea     does not use CPAP  machine          Past Surgical History:     Past Surgical History:   Procedure Laterality Date     APPENDECTOMY  1956     BACK SURGERY  1992, 1996    trimmed L4-L5, Fusion L4-L5     BONE MARROW BIOPSY, BONE SPECIMEN, NEEDLE/TROCAR  10/24/2012    Procedure: BIOPSY BONE MARROW;  BONE MARROW BIOPSY WITH ASPIRATE (AREVALO ORDERING);  Surgeon: Terri Hickey MD;  Location:  GI     FACIAL RECONSTRUCTION SURGERY  1965    jaw fracture     HC TOOTH EXTRACTION W/FORCEP  1990s          Social History:     Social History     Social History     Marital status:      Spouse name: N/A     Number of children: N/A     Years of education: N/A     Occupational History     Not on file.     Social History Main Topics     Smoking status: Former Smoker     Years: 28.00     Quit date: 9/26/1982     Smokeless tobacco: Never Used     Alcohol use 0.0 oz/week     0 Standard drinks or equivalent per week      Comment: 4-5 drinks/day     Drug use: No     Sexual activity: No     Other Topics Concern     Parent/Sibling W/ Cabg, Mi Or Angioplasty Before 65f 55m? Yes     Social History Narrative          Family History:   Father with pancreatic cancer in 60s. Mother and sister with CHF.   No FH of bleeding or clotting disorders.  Family History   Problem Relation Age of Onset     Unknown/Adopted Mother      Unknown/Adopted Father      HEART DISEASE Sister      DIABETES No family hx of      Coronary Artery Disease No family hx of      Hypertension No family hx of      Hyperlipidemia No family hx of      CEREBROVASCULAR DISEASE No family hx of      Breast Cancer No family hx of      Colon Cancer No family hx of      Prostate Cancer No family hx of      Other Cancer No family hx of      Depression No family hx of      Anxiety Disorder No family hx of      MENTAL ILLNESS No family hx of      Substance Abuse No  "family hx of      Anesthesia Reaction No family hx of      Asthma No family hx of      OSTEOPOROSIS No family hx of      Genetic Disorder No family hx of      Thyroid Disease No family hx of      Obesity No family hx of           Allergies:     Allergies   Allergen Reactions     Benicar [Olmesartan] Diarrhea     Onset 1-14   On benicar since 2012     Dust Mites      Peanuts [Nuts] Other (See Comments)     Patient hasn't had reaction and eats peanuts all the time - but blood test said he's allergic          Medications:       Current Outpatient Prescriptions:      enoxaparin (LOVENOX) 100 MG/ML injection, Inject 0.9 mLs (90 mg) Subcutaneous every 12 hours, Disp: 30 Syringe, Rfl: 1     predniSONE (DELTASONE) 20 MG tablet, Take 1 tablet (20 mg) by mouth daily, Disp: 30 tablet, Rfl: 0     folic acid (FOLVITE) 1 MG tablet, TAKE ONE TABLET BY MOUTH ONCE DAILY, Disp: 90 tablet, Rfl: 2     allopurinol (ZYLOPRIM) 300 MG tablet, TAKE ONE TABLET BY MOUTH ONCE DAILY *NEED  TO  BE  SEEN  FOR  MORE  REFILLS, Disp: 90 tablet, Rfl: 3     cloNIDine (CATAPRES) 0.3 MG tablet, TAKE ONE TABLET BY MOUTH TWICE DAILY. NEEDS TO BE SEEN FOR MORE., Disp: 180 tablet, Rfl: 3     amLODIPine (NORVASC) 5 MG tablet, TAKE ONE TABLET BY MOUTH ONCE DAILY, Disp: 90 tablet, Rfl: 3     omeprazole (PRILOSEC) 40 MG capsule, TAKE ONE CAPSULE BY MOUTH ONCE DAILY, Disp: 90 capsule, Rfl: 3     spironolactone (ALDACTONE) 25 MG tablet, TAKE ONE TABLET BY MOUTH ONCE DAILY, Disp: 90 tablet, Rfl: 3     lisinopril (PRINIVIL,ZESTRIL) 40 MG tablet, Take 1 tablet (40 mg) by mouth daily, Disp: 90 tablet, Rfl: 1     gabapentin (NEURONTIN) 300 MG capsule, TAKE ONE CAPSULE BY MOUTH THREE TIMES DAILY, Disp: 270 capsule, Rfl: 0     VIAGRA 100 MG tablet, TAKE ONE TABLET BY MOUTH ONCE DAILY AS NEEDED FOR ERECTILE DYSFUNCTION, Disp: 25 tablet, Rfl: 3         Physical Exam:   /84  Pulse 68  Temp 98.3  F (36.8  C) (Oral)  Ht 1.689 m (5' 6.5\")  Wt 85.7 kg (189 lb)  BMI " 30.05 kg/m2  General: Pleasant. Appears well, in no acute distress.  Heme/Lymph: No overt bleeding. No cervical or clavicular adenopathy.  Skin: No concerning lesions, rash, jaundice, cyanosis, erythema, or ecchymoses on exposed surfaces.  HEENT: NCAT. PERRL, anicteric sclera. Oral mucosa pink and moist with no lesions or thrush.  Neck: Neck supple. No jugular venous distention.  Respiratory: Non-labored breathing, good air exchange, lungs clear to auscultation bilaterally.  Cardiovascular: Regular rate and rhythm. No murmur or rub.   Gastrointestinal: Normoactive bowel sounds. Abdomen soft, non-distended, and non-tender. No palpable masses or organomegaly. Ecchymoses on abdomen from Lovenox injections.   Musculoskeletal: 2+ bilateral radial and pedal pulses. No extremity edema. Extremities normal, no gross deformities noted, non-tender to palpation. 5/5 proximal and distal strength.  Neurologic: A&O x 3, CNs 2-12 grossly intact, speech normal, gait normal, sensation to light touch grossly WNL. Grossly non-focal.   Psychiatric: Mentation and affect appear normal.           Data:     Lab Results   Component Value Date    WBC 7.9 04/20/2017     Lab Results   Component Value Date    RBC 4.17 04/20/2017     Lab Results   Component Value Date    HGB 12.1 04/20/2017     Lab Results   Component Value Date    HCT 36.8 04/20/2017     No components found for: MCT  Lab Results   Component Value Date    MCV 88 04/20/2017     Lab Results   Component Value Date    MCH 29.0 04/20/2017     Lab Results   Component Value Date    MCHC 32.9 04/20/2017     Lab Results   Component Value Date    RDW 13.7 04/20/2017     Lab Results   Component Value Date    PLT 80 04/20/2017 4/18/17: INR 1.02,  PTT 28    10/22/12: Hep B, HIV negative , SPEP negative  9/11/2014: PSA normal  12/2016: Hep C negative      Bone Marrow Biopsy 10/24/12  FINAL DIAGNOSIS:   Peripheral blood demonstrating thrombocytopenia without morphological features of  myelodysplasia or malignancy. Focally hypercellular bone marrow with megakaryocyte clustering (see comment); iron stores reduced.   10/24 cytogenetics-INTERPRETATION: Six (30%) of the metaphase cells analyzed had loss of the Y chromosome as the sole abnormality; no abnormality was detected among the remaining 14 (70%) metaphases. Loss of the Y chromosome, as seen in this case, has been reported as an acquired clonal abnormality associated with primarily myeloid disorders, and also as a clinically insignificant finding associated with the normal aging process in adult males. When presenting as a clonal abnormality, loss of Y is typically seen in addition to other cytogenetic abnormalities. Given this patient's age, the reported absence of morphologic evidence of a malignant disorder and the absence of other chromosomal abnormalities, it is most likely that the loss of Y represents an age-related finding in this case.   Flow Cytometry-Normal     Colonoscopy 5/2010- mild inflammation, polyp removal,external hemorrhoids,diverticulosis.    Colonoscopy 9/2015- sessile polyp, removed.  EGD 12/2016 - gastric polyp    10/23/12 CT CAP  IMPRESSION:   1. Mild splenomegaly. 2. Mild fatty infiltration of the liver.3. Low dense lesions too small to characterize in the liver and the kidneys. 4. Coronary and aortic atherosclerosis. 5. Extensive diverticulosis without evidence of diverticulitis.     04/18/17 CT CAP  IMPRESSION:   1. Moderate large right lower lobe pulmonary embolus.  2. Right lower lobe pulmonary opacity concerning for pulmonary  infarct. Cannot exclude pneumonia which is considered less likely.  Clinical correlation.   3. No acute abnormality identified in the abdomen and pelvis. Question  mild bowel wall thickening versus underdistention involving the cecum.  Uncomplicated colonic diverticulosis.     4/19/17   Interpretation Summary     Technically difficult study, but limited views suggest normal RV size  and  function.  PA pressures could not be calculated from this study.           Assessment and Plan:   1.  Uprovoked PE 4/11/2017  2.  ITP diagnosed 2012    Raymon Ace is a 71 year old man KARL, RLS, gout, HTN, HLD, chronic back pain with spinal surgeries, h/o alcohol abuse, fatty liver, previously followed by Hematology Dr. Ariza for ITP (last seen 2013), now referred for acute pulmonary embolism.  PE appears unprovoked which would require lifelong anticoagulation. Initially not treated for PE in [place] given plt 30k with subsequently worsening chest symptoms. He now feels completely better after starting therapeutic Lovenox. Anticoagulation is complicated by his ITP, baseline platelet count 30-50s while off therapy. His ITP has responded to steroids and Promacta in past. Platelets now 80-90k on pred 20 mg daily. Will restart Promacta as steroid sparing agent and taper off pred once Promacta on for 1 week. Plan to do full-dose anticoagulation as long as platelet count >50,000k. We reviewed options of Lovenox, warfarin or NOACs (Xarelto). He would prefer Xarelto given ease of dosing, understands lack of monitoring or reversal agent.   Asked about elective spinal procedure with Dr. Singh. Ideally would defer for at least 6 weeks from PE. Patient is quite symptomatic from back and does not feel he could wait the preferred 3 months.      Plan:  -CBC, ferritin, B12, TSH today  -continue daily folic acid  -monitor CBC weekly ([facility])  -continue prednisone 20 mg daily; plan rapid taper (10 mg x 3 days, 5 mg x 3 days, then stop) when on promacta x 1 week  -start Promacta 50 daily   -advise continued ETOH cessation  -send copy of note to PCP Dr. Cherry and spine surgery Dr. Didier Singh ([facility])   -ideally avoid elective spinal surgery for at least 6 weeks; RTC after appointment with Dr. Singh to discuss anticoagulation plan for surgery    Patient seen and discussed with Dr. Novoa.    Maria R Mccoy MD  Heme Onc  fellow        HEMATOLOGY STAFF:  Seen with fellow, whose note reflects our joint evaluation, assessment, and plan.  H/o chronic ITP, with stable platelet count in the 30-50,000 range on no therapy.  Steroid responsive.  New unprovoked PE in early April 2017, for which he should be treated with long term anticoagulation (assuming we can maintain a safe platelet count).  Previously responded well to Promacta, which we will resume now.  Once on Promacta for 1 week, will taper off steroids.  Currently on enoxaparin for PE, will transition to rivaroxaban in 1 week.  We will arrange for weekly CBCs at [facility], and see him back in Hematology Clinic in 1 month (sooner if needed).      Waylon Novoa MD  Associate Professor of Medicine  Division of Hematology, Oncology, and Transplantation  Director, [facility]

## 2017-04-25 NOTE — MR AVS SNAPSHOT
After Visit Summary   4/25/2017    Raymon Ace    MRN: [id number]           Patient Information     Date Of Birth          1945        Visit Information        Provider Department      4/25/2017 3:30 PM Waylon Nevarez MD [facility]        Today's Diagnoses     Idiopathic thrombocytopenic purpura (H)    -  1    Anemia, unspecified type          Care Instructions    Labs today to check your platelets.  Orders have been placed.  The lab located on first floor of this building.     For your ITP:  *STay on your prednisone for now.  *Prescription is being placed for the promacta.  *One the Promacta is approved and in hand, Ashely Prado RN will work with you to get off the prednisone and go on the promacta.  *WE will want weekly platelet counts for now.  Dr. Nevarez has placed those orders and you can go to your local [facility].     For you Blood Clot:  *STay on the Lovenox 100 mg twice daily.  *Once your platelets are > 50 and stable, we will switch you over to the Xarelto.    For your back:  *get an appt with Dr. Singh and let him know you are working with our team.  *We will send a copy of this dictation to him.  *Call Rosie to get on Dr. Nevarez's schedule 1-2 weeks after your visit with Samantha.     Call the [facility]  at [phone]   -If surgeries or procedures are planned.    -If off anticoagulation, please call during high risk times (long-distance travel, broken      bones or trauma, immobilization, surgery, pregnancy, or taking estrogen).   -Any new symptoms of DVT (deep vein thrombosis) or PE (pulmonary embolism)    -pain     -swelling     -redness    -warmth    -shortness of breath    -chest pain    -coughing up blood      Your nurse clinician is Rosie Jalloh RN at phone number  [phone].  If she is unavailable and you have immediate concerns, please call [phone] and ask for a nurse.     Rosie Jalloh RN -  "Nurse Clinician - [facility] - [phone]              Follow-ups after your visit        Future tests that were ordered for you today     Open Future Orders        Priority Expected Expires Ordered    TSH with free T4 reflex Routine  4/25/2018 4/25/2017    Ferritin Routine  4/25/2018 4/25/2017    Iron and iron binding capacity Routine  4/25/2018 4/25/2017    *CBC with platelets differential Routine  4/25/2018 4/25/2017            Who to contact     If you have questions or need follow up information about today's clinic visit or your schedule please contact [facility] directly at [phone].  Normal or non-critical lab and imaging results will be communicated to you by [employer], letter or phone within 4 business days after the clinic has received the results. If you do not hear from us within 7 days, please contact the clinic through [email or web address] or phone. If you have a critical or abnormal lab result, we will notify you by phone as soon as possible.  Submit refill requests through [employer] or call your pharmacy and they will forward the refill request to us. Please allow 3 business days for your refill to be completed.          Additional Information About Your Visit        [employer] Information     [employer] gives you secure access to your electronic health record. If you see a primary care provider, you can also send messages to your care team and make appointments. If you have questions, please call your primary care clinic.  If you do not have a primary care provider, please call [phone] and they will assist you.        Care EveryWhere ID     This is your Care EveryWhere ID. This could be used by other organizations to access your [facility] medical records  [id number]        Your Vitals Were     Pulse Temperature Height BMI (Body Mass Index)          68 98.3  F (36.8  C) (Oral) 1.689 m (5' 6.5\") 30.05 kg/m2         Blood Pressure from Last 3 Encounters: "   04/25/17 146/84   04/20/17 104/59   12/06/16 122/78    Weight from Last 3 Encounters:   04/25/17 85.7 kg (189 lb)   04/18/17 85.5 kg (188 lb 7.9 oz)   12/06/16 87.1 kg (192 lb)              We Performed the Following     Vitamin B12        Primary Care Provider Office Phone # Fax #    Rosanne Cherry [phone]       [facility] [address]  [facility] [address]        Thank you!     Thank you for choosing [facility]  for your care. Our goal is always to provide you with excellent care. Hearing back from our patients is one way we can continue to improve our services. Please take a few minutes to complete the written survey that you may receive in the mail after your visit with us. Thank you!             Your Updated Medication List - Protect others around you: Learn how to safely use, store and throw away your medicines at www.disposemymeds.org.          This list is accurate as of: 4/25/17  4:46 PM.  Always use your most recent med list.                   Brand Name Dispense Instructions for use    allopurinol 300 MG tablet    ZYLOPRIM    90 tablet    TAKE ONE TABLET BY MOUTH ONCE DAILY *NEED  TO  BE  SEEN  FOR  MORE  REFILLS       amLODIPine 5 MG tablet    NORVASC    90 tablet    TAKE ONE TABLET BY MOUTH ONCE DAILY       cloNIDine 0.3 MG tablet    CATAPRES    180 tablet    TAKE ONE TABLET BY MOUTH TWICE DAILY. NEEDS TO BE SEEN FOR MORE.       enoxaparin 100 MG/ML injection    LOVENOX    30 Syringe    Inject 0.9 mLs (90 mg) Subcutaneous every 12 hours       folic acid 1 MG tablet    FOLVITE    90 tablet    TAKE ONE TABLET BY MOUTH ONCE DAILY       gabapentin 300 MG capsule    NEURONTIN    270 capsule    TAKE ONE CAPSULE BY MOUTH THREE TIMES DAILY       lisinopril 40 MG tablet    PRINIVIL/ZESTRIL    90 tablet    Take 1 tablet (40 mg) by mouth daily       omeprazole 40 MG capsule    priLOSEC    90 capsule    TAKE ONE CAPSULE BY MOUTH ONCE DAILY        predniSONE 20 MG tablet    DELTASONE    30 tablet    Take 1 tablet (20 mg) by mouth daily       spironolactone 25 MG tablet    ALDACTONE    90 tablet    TAKE ONE TABLET BY MOUTH ONCE DAILY       VIAGRA 100 MG cap/tab   Generic drug:  sildenafil     25 tablet    TAKE ONE TABLET BY MOUTH ONCE DAILY AS NEEDED FOR ERECTILE DYSFUNCTION

## 2017-04-25 NOTE — NURSING NOTE
"Chief Complaint   Patient presents with     New Patient     HX of PE       Initial /84  Pulse 68  Temp 98.3  F (36.8  C) (Oral)  Ht 1.689 m (5' 6.5\")  Wt 85.7 kg (189 lb)  BMI 30.05 kg/m2 Estimated body mass index is 30.05 kg/(m^2) as calculated from the following:    Height as of this encounter: 1.689 m (5' 6.5\").    Weight as of this encounter: 85.7 kg (189 lb).  Medication Reconciliation: complete  "

## 2017-04-25 NOTE — PATIENT INSTRUCTIONS
Labs today to check your platelets.  Orders have been placed.  The lab located on first floor of this building.     For your ITP:  *STay on your prednisone for now.  *Prescription is being placed for the promacta.  *One the Promacta is approved and in hand, Ashely Prado RN will work with you to get off the prednisone and go on the promacta.  *WE will want weekly platelet counts for now.  Dr. Nevarez has placed those orders and you can go to your local [place] clinic.     For you Blood Clot:  *STay on the Lovenox 100 mg twice daily.  *Once your platelets are > 50 and stable, we will switch you over to the Xarelto.    For your back:  *get an appt with Dr. Singh and let him know you are working with our team.  *We will send a copy of this dictation to him.  *Call Rosie to get on Dr. Nevarez's schedule 1-2 weeks after your visit with Samantha.     Call the [facility]  at [phone]   -If surgeries or procedures are planned.    -If off anticoagulation, please call during high risk times (long-distance travel, broken      bones or trauma, immobilization, surgery, pregnancy, or taking estrogen).   -Any new symptoms of DVT (deep vein thrombosis) or PE (pulmonary embolism)    -pain     -swelling     -redness    -warmth    -shortness of breath    -chest pain    -coughing up blood      Your nurse clinician is Rosie Jalloh RN at phone number  [phone].  If she is unavailable and you have immediate concerns, please call [phone] and ask for a nurse.     Rosie Jalloh RN - Nurse Clinician - [facility] - [phone]

## 2017-04-25 NOTE — LETTER
4/25/2017      RE: Raymon Ace  [address]  [facility] [address]       [facility] Initial Consultation    Raymon Ace MRN# [id number]   Age: 71 year old YOB: 1945     Date of Service: April 25, 2017  Referring provider: Dr Mena, PCP Dr. Cherry, [facility]            Reason for Consult:   PE, ITP         History of Present Illness:   History obtained from chart review and confirmed with patient.    Raymon Ace is a 67-year-old gentleman from [place] with PMH significant for KARL, RLS, gout, HTN, HLD, chronic back pain with spinal surgeries, alcohol abuse, fatty liver, previously followed by Hematology Dr. Ariza for ITP (last seen 2013), now referred for acute pulmonary embolism.    Raymon was admitted to hospital here 4/18-20 for worsening chest pain with recent 10-day history of right-sided chest pain, pleuritic. He was hospitalized for 2 days for this same pain in [place] 4/11 where they discovered a PE in the right lung. This was in secondary and subsegmental branches of the right lower lobe arteries. Reports that US of bilateral legs were negative. In [place], they consulted Hematology, who recommended no anticoagulation because platelet count was 32 from his underlying ITP. He reports they took him off of his 81 mg of aspirin given low platelets. They did give him 60 mg of prednisone for 3 days without taper to try to get his platelet count up prior to his car trip back up here. His platelets were up to 60 when he left and are plts 94 on admission here (3 or 4 days after stopping the prednisone). He had no triggers for this clot, has not been immobilized. No surgeries. No recent travel prior to PE as had been in [place] for winter. Regarding age-dependent malignancy screening with colonoscopy last 9/2015 with polyp, PSA normal 2014. He has no family history of bleeding or clotting disorders.  CT CAP here 4/18/17 showed moderate large right lower lobe pulmonary  embolus, right lower lobe pulmonary opacity concerning for pulmonary infarct. He was started on full-dose anticoagulation with Lovenox (90 BID) given plts improved on steroids (goal keep them above 50k on anticoagulation). Plts 80k on discharge 4/20/17. H/o heavy ETOH use, 6 drinks daily with evidence of fatty liver and mild splenomegaly on CT in 2012. Today he denies any further chest pain since hospital discharge. He is taking naproxen daily for pain which he takes chronically for back pain. ROS chronic low back pain and neuropathy down both legs.     Regarding the ITP, he was diagnosed with ITP in 2012 after her presented with plts as low at 32,000 from CBC of 09/26/2012. White count 4.2, hemoglobin 14.6, MCV 98 (higher end of normal) and the differential was reviewed and normal. He had extesnive evaluation at the time to investigate the cause of his thrombocytopenia including blood smear, SPEP/MIESHA, TSH, H. pylori profile, B12, folate, hep B, hep C and HIV negative. He does have a significant alcohol history with CT 2012 showing mild splenomegaly. He also had BMBx in 2012 which was unremarkable except for age-related loss of Y-chromosome per cytogenetics. He reportedly responded to Decadron and platelets went from 48 to 116 which goes in favor of diagnosis of ITP. However for his spinal surgeries he did not respond as well with 2nd course of Decadron. He also received IVIG pre-op but developed chest pain with IVIG and needed to be admitted to the hospital. Cardiac workup was negative. Lastly, he had Promacta 50 daily in 2013 with good response and no side effects per patient. He stopped taking Promacta about 3 weeks after spinal surgery. He last saw a hematologist in jan 2017 and was put on prednisone for spinal injection. His plts live between 30-50k in last couple years. Today, he denies any bleeding but does have easy bruising.              Review of Systems:   10-point ROS negative except as per HPI.          Past Medical History:     Past Medical History:   Diagnosis Date     Heart murmur     heart is positioned farther on left side then typical     Hypertension      ITP (idiopathic thrombocytopenic purpura)      Obstructive sleep apnea     does not use CPAP  machine          Past Surgical History:     Past Surgical History:   Procedure Laterality Date     APPENDECTOMY  1956     BACK SURGERY  1992, 1996    trimmed L4-L5, Fusion L4-L5     BONE MARROW BIOPSY, BONE SPECIMEN, NEEDLE/TROCAR  10/24/2012    Procedure: BIOPSY BONE MARROW;  BONE MARROW BIOPSY WITH ASPIRATE (AREVALO ORDERING);  Surgeon: Terri Hickey MD;  Location:  GI     FACIAL RECONSTRUCTION SURGERY  1965    jaw fracture     HC TOOTH EXTRACTION W/FORCEP  1990s          Social History:     Social History     Social History     Marital status:      Spouse name: N/A     Number of children: N/A     Years of education: N/A     Occupational History     Not on file.     Social History Main Topics     Smoking status: Former Smoker     Years: 28.00     Quit date: 9/26/1982     Smokeless tobacco: Never Used     Alcohol use 0.0 oz/week     0 Standard drinks or equivalent per week      Comment: 4-5 drinks/day     Drug use: No     Sexual activity: No     Other Topics Concern     Parent/Sibling W/ Cabg, Mi Or Angioplasty Before 65f 55m? Yes     Social History Narrative          Family History:   Father with pancreatic cancer in 60s. Mother and sister with CHF.   No FH of bleeding or clotting disorders.  Family History   Problem Relation Age of Onset     Unknown/Adopted Mother      Unknown/Adopted Father      HEART DISEASE Sister      DIABETES No family hx of      Coronary Artery Disease No family hx of      Hypertension No family hx of      Hyperlipidemia No family hx of      CEREBROVASCULAR DISEASE No family hx of      Breast Cancer No family hx of      Colon Cancer No family hx of      Prostate Cancer No family hx of      Other Cancer No family hx of       Depression No family hx of      Anxiety Disorder No family hx of      MENTAL ILLNESS No family hx of      Substance Abuse No family hx of      Anesthesia Reaction No family hx of      Asthma No family hx of      OSTEOPOROSIS No family hx of      Genetic Disorder No family hx of      Thyroid Disease No family hx of      Obesity No family hx of           Allergies:     Allergies   Allergen Reactions     Benicar [Olmesartan] Diarrhea     Onset 1-14   On benicar since 2012     Dust Mites      Peanuts [Nuts] Other (See Comments)     Patient hasn't had reaction and eats peanuts all the time - but blood test said he's allergic          Medications:       Current Outpatient Prescriptions:      enoxaparin (LOVENOX) 100 MG/ML injection, Inject 0.9 mLs (90 mg) Subcutaneous every 12 hours, Disp: 30 Syringe, Rfl: 1     predniSONE (DELTASONE) 20 MG tablet, Take 1 tablet (20 mg) by mouth daily, Disp: 30 tablet, Rfl: 0     folic acid (FOLVITE) 1 MG tablet, TAKE ONE TABLET BY MOUTH ONCE DAILY, Disp: 90 tablet, Rfl: 2     allopurinol (ZYLOPRIM) 300 MG tablet, TAKE ONE TABLET BY MOUTH ONCE DAILY *NEED  TO  BE  SEEN  FOR  MORE  REFILLS, Disp: 90 tablet, Rfl: 3     cloNIDine (CATAPRES) 0.3 MG tablet, TAKE ONE TABLET BY MOUTH TWICE DAILY. NEEDS TO BE SEEN FOR MORE., Disp: 180 tablet, Rfl: 3     amLODIPine (NORVASC) 5 MG tablet, TAKE ONE TABLET BY MOUTH ONCE DAILY, Disp: 90 tablet, Rfl: 3     omeprazole (PRILOSEC) 40 MG capsule, TAKE ONE CAPSULE BY MOUTH ONCE DAILY, Disp: 90 capsule, Rfl: 3     spironolactone (ALDACTONE) 25 MG tablet, TAKE ONE TABLET BY MOUTH ONCE DAILY, Disp: 90 tablet, Rfl: 3     lisinopril (PRINIVIL,ZESTRIL) 40 MG tablet, Take 1 tablet (40 mg) by mouth daily, Disp: 90 tablet, Rfl: 1     gabapentin (NEURONTIN) 300 MG capsule, TAKE ONE CAPSULE BY MOUTH THREE TIMES DAILY, Disp: 270 capsule, Rfl: 0     VIAGRA 100 MG tablet, TAKE ONE TABLET BY MOUTH ONCE DAILY AS NEEDED FOR ERECTILE DYSFUNCTION, Disp: 25 tablet, Rfl: 3       "   Physical Exam:   /84  Pulse 68  Temp 98.3  F (36.8  C) (Oral)  Ht 1.689 m (5' 6.5\")  Wt 85.7 kg (189 lb)  BMI 30.05 kg/m2  General: Pleasant. Appears well, in no acute distress.  Heme/Lymph: No overt bleeding. No cervical or clavicular adenopathy.  Skin: No concerning lesions, rash, jaundice, cyanosis, erythema, or ecchymoses on exposed surfaces.  HEENT: NCAT. PERRL, anicteric sclera. Oral mucosa pink and moist with no lesions or thrush.  Neck: Neck supple. No jugular venous distention.  Respiratory: Non-labored breathing, good air exchange, lungs clear to auscultation bilaterally.  Cardiovascular: Regular rate and rhythm. No murmur or rub.   Gastrointestinal: Normoactive bowel sounds. Abdomen soft, non-distended, and non-tender. No palpable masses or organomegaly. Ecchymoses on abdomen from Lovenox injections.   Musculoskeletal: 2+ bilateral radial and pedal pulses. No extremity edema. Extremities normal, no gross deformities noted, non-tender to palpation. 5/5 proximal and distal strength.  Neurologic: A&O x 3, CNs 2-12 grossly intact, speech normal, gait normal, sensation to light touch grossly WNL. Grossly non-focal.   Psychiatric: Mentation and affect appear normal.           Data:     Lab Results   Component Value Date    WBC 7.9 04/20/2017     Lab Results   Component Value Date    RBC 4.17 04/20/2017     Lab Results   Component Value Date    HGB 12.1 04/20/2017     Lab Results   Component Value Date    HCT 36.8 04/20/2017     No components found for: MCT  Lab Results   Component Value Date    MCV 88 04/20/2017     Lab Results   Component Value Date    MCH 29.0 04/20/2017     Lab Results   Component Value Date    MCHC 32.9 04/20/2017     Lab Results   Component Value Date    RDW 13.7 04/20/2017     Lab Results   Component Value Date    PLT 80 04/20/2017 4/18/17: INR 1.02,  PTT 28    10/22/12: Hep B, HIV negative , SPEP negative  9/11/2014: PSA normal  12/2016: Hep C negative      Bone Marrow " Biopsy 10/24/12  FINAL DIAGNOSIS:   Peripheral blood demonstrating thrombocytopenia without morphological features of myelodysplasia or malignancy. Focally hypercellular bone marrow with megakaryocyte clustering (see comment); iron stores reduced.   10/24 cytogenetics-INTERPRETATION: Six (30%) of the metaphase cells analyzed had loss of the Y chromosome as the sole abnormality; no abnormality was detected among the remaining 14 (70%) metaphases. Loss of the Y chromosome, as seen in this case, has been reported as an acquired clonal abnormality associated with primarily myeloid disorders, and also as a clinically insignificant finding associated with the normal aging process in adult males. When presenting as a clonal abnormality, loss of Y is typically seen in addition to other cytogenetic abnormalities. Given this patient's age, the reported absence of morphologic evidence of a malignant disorder and the absence of other chromosomal abnormalities, it is most likely that the loss of Y represents an age-related finding in this case.   Flow Cytometry-Normal     Colonoscopy 5/2010- mild inflammation, polyp removal,external hemorrhoids,diverticulosis.    Colonoscopy 9/2015- sessile polyp, removed.  EGD 12/2016 - gastric polyp    10/23/12 CT CAP  IMPRESSION:   1. Mild splenomegaly. 2. Mild fatty infiltration of the liver.3. Low dense lesions too small to characterize in the liver and the kidneys. 4. Coronary and aortic atherosclerosis. 5. Extensive diverticulosis without evidence of diverticulitis.     04/18/17 CT CAP  IMPRESSION:   1. Moderate large right lower lobe pulmonary embolus.  2. Right lower lobe pulmonary opacity concerning for pulmonary  infarct. Cannot exclude pneumonia which is considered less likely.  Clinical correlation.   3. No acute abnormality identified in the abdomen and pelvis. Question  mild bowel wall thickening versus underdistention involving the cecum.  Uncomplicated colonic  diverticulosis.     4/19/17   Interpretation Summary     Technically difficult study, but limited views suggest normal RV size and  function.  PA pressures could not be calculated from this study.           Assessment and Plan:   #Uprovoked PE 4/11/2017  #ITP diagnosed 2012  #ETOH abuse  #Normocytic anemia  Raymon Ace is a 71 year old man KARL, RLS, gout, HTN, HLD, chronic back pain with spinal surgeries, alcohol abuse, fatty liver, previously followed by Hematology Dr. Ariza for ITP (last seen 2013), now referred for acute pulmonary embolism.  PE appears unprovoked which would require lifelong anticoagulation. Initially not treated for PE in [place] given plt 30k with subsequently worsening chest symptoms. He now feels completely better after starting therapeutic Lovenox. Anticoagulation is complicated by his ITP, baseline platelet count 30-50s while off therapy. His ITP has responded to steroids and Promacta in past. Platelets now 80-90k after prednisone burst and now 20 mg daily. Will restart Promacta as steroid sparing agent. Plan to do full-dose anticoagulation as long as platelet count >50,000. We reviewed options of Lovenox, warfarin or NOACs (Xarelto). He would prefer Xarelto.     Plan:  -CBC, ferritin, B12, TSH today  -continue daily folic acid  -monitor CBC weekly ([facility])  -continue prednisone 20 mg daily; plan rapid taper (10 mg x 3 days, 5 mg x 3 days, then stop) when on promacta x 1 week  -start Promacta 50 daily   -advise continued ETOH cessation  -send copy of note to PCP Dr. Cherry and spine surgery Dr. Didier Singh ([facility])   -ideally avoid elective spinal surgery for at least 6 weeks; RTC after appointment with Dr. Singh to discuss anticoagulation plan for surgery    Patient seen and discussed with Dr. Novoa.  Maria R Mccoy MD  Heme Onc fellow        [facility] Initial Consultation    Raymon Ace MRN# [id number]   Age: 71 year old YOB: 1945      Date of Service: April 25, 2017  Referring provider: Dr Mena, PCP Dr. Cherry, [facility]            Reason for Consult:   PE, ITP         History of Present Illness:   History obtained from chart review and confirmed with patient.    Raymon Ace is a 67-year-old gentleman from [place] with PMH significant for KARL, RLS, gout, HTN, HLD, chronic back pain with spinal surgeries, alcohol abuse, fatty liver, previously followed by Hematology Dr. Ariza for ITP (last seen 2013), now referred for acute pulmonary embolism.    Raymon was admitted to hospital here 4/18-20 for worsening chest pain with recent 10-day history of right-sided chest pain, pleuritic. He was hospitalized for 2 days for this same pain in [place] 4/11 where they discovered a PE in the right lung. This was in secondary and subsegmental branches of the right lower lobe arteries. Reports that US of bilateral legs were negative. In [place], they consulted Hematology, who recommended no anticoagulation because platelet count was 32 from his underlying ITP. He reports they took him off of his 81 mg of aspirin given low platelets. They did give him 60 mg of prednisone for 3 days without taper to try to get his platelet count up prior to his car trip back up here. His platelets were up to 60 when he left and are plts 94 on admission here (3 or 4 days after stopping the prednisone). He had no triggers for this clot, has not been immobilized. No surgeries. No recent travel prior to PE as had been in [place] for winter. Regarding age-dependent malignancy screening with colonoscopy last 9/2015 with polyp, PSA normal 2014. He has no family history of bleeding or clotting disorders.  CT CAP here 4/18/17 showed moderate large right lower lobe pulmonary embolus, right lower lobe pulmonary opacity concerning for pulmonary infarct. He was started on full-dose anticoagulation with Lovenox (90 BID) given plts improved on steroids (goal keep them above 50k on  anticoagulation). Plts 80k on discharge 4/20/17. H/o heavy ETOH use, 6 drinks daily with evidence of fatty liver and mild splenomegaly on CT in 2012. Today he denies any further chest pain since hospital discharge. He is taking naproxen daily for pain which he takes chronically for back pain. ROS chronic low back pain and neuropathy down both legs.     Regarding the ITP, he was diagnosed with ITP in 2012 after her presented with plts as low at 32,000 from CBC of 09/26/2012. White count 4.2, hemoglobin 14.6, MCV 98 (higher end of normal) and the differential was reviewed and normal. He had extesnive evaluation at the time to investigate the cause of his thrombocytopenia including blood smear, SPEP/MIESHA, TSH, H. pylori profile, B12, folate, hep B, hep C and HIV negative. He does have a significant alcohol history with CT 2012 showing mild splenomegaly. He also had BMBx in 2012 which was unremarkable except for age-related loss of Y-chromosome per cytogenetics. He reportedly responded to Decadron and platelets went from 48 to 116 which goes in favor of diagnosis of ITP. However for his spinal surgeries he did not respond as well with 2nd course of Decadron. He also received IVIG pre-op but developed chest pain with IVIG and needed to be admitted to the hospital. Cardiac workup was negative. Lastly, he had Promacta 50 daily in 2013 with good response and no side effects per patient. He stopped taking Promacta about 3 weeks after spinal surgery. He last saw a hematologist in jan 2017 and was put on prednisone for spinal injection. His plts live between 30-50k in last couple years. Today, he denies any bleeding but does have easy bruising.              Review of Systems:   10-point ROS negative except as per HPI.         Past Medical History:     Past Medical History:   Diagnosis Date     Heart murmur     heart is positioned farther on left side then typical     Hypertension      ITP (idiopathic thrombocytopenic purpura)       Obstructive sleep apnea     does not use CPAP  machine          Past Surgical History:     Past Surgical History:   Procedure Laterality Date     APPENDECTOMY  1956     BACK SURGERY  1992, 1996    trimmed L4-L5, Fusion L4-L5     BONE MARROW BIOPSY, BONE SPECIMEN, NEEDLE/TROCAR  10/24/2012    Procedure: BIOPSY BONE MARROW;  BONE MARROW BIOPSY WITH ASPIRATE (AREVALO ORDERING);  Surgeon: Terri Hickey MD;  Location:  GI     FACIAL RECONSTRUCTION SURGERY  1965    jaw fracture     HC TOOTH EXTRACTION W/FORCEP  1990s          Social History:     Social History     Social History     Marital status:      Spouse name: N/A     Number of children: N/A     Years of education: N/A     Occupational History     Not on file.     Social History Main Topics     Smoking status: Former Smoker     Years: 28.00     Quit date: 9/26/1982     Smokeless tobacco: Never Used     Alcohol use 0.0 oz/week     0 Standard drinks or equivalent per week      Comment: 4-5 drinks/day     Drug use: No     Sexual activity: No     Other Topics Concern     Parent/Sibling W/ Cabg, Mi Or Angioplasty Before 65f 55m? Yes     Social History Narrative          Family History:   Father with pancreatic cancer in 60s. Mother and sister with CHF.   No FH of bleeding or clotting disorders.  Family History   Problem Relation Age of Onset     Unknown/Adopted Mother      Unknown/Adopted Father      HEART DISEASE Sister      DIABETES No family hx of      Coronary Artery Disease No family hx of      Hypertension No family hx of      Hyperlipidemia No family hx of      CEREBROVASCULAR DISEASE No family hx of      Breast Cancer No family hx of      Colon Cancer No family hx of      Prostate Cancer No family hx of      Other Cancer No family hx of      Depression No family hx of      Anxiety Disorder No family hx of      MENTAL ILLNESS No family hx of      Substance Abuse No family hx of      Anesthesia Reaction No family hx of      Asthma No family hx of   "    OSTEOPOROSIS No family hx of      Genetic Disorder No family hx of      Thyroid Disease No family hx of      Obesity No family hx of           Allergies:     Allergies   Allergen Reactions     Benicar [Olmesartan] Diarrhea     Onset 1-14   On benicar since 2012     Dust Mites      Peanuts [Nuts] Other (See Comments)     Patient hasn't had reaction and eats peanuts all the time - but blood test said he's allergic          Medications:       Current Outpatient Prescriptions:      enoxaparin (LOVENOX) 100 MG/ML injection, Inject 0.9 mLs (90 mg) Subcutaneous every 12 hours, Disp: 30 Syringe, Rfl: 1     predniSONE (DELTASONE) 20 MG tablet, Take 1 tablet (20 mg) by mouth daily, Disp: 30 tablet, Rfl: 0     folic acid (FOLVITE) 1 MG tablet, TAKE ONE TABLET BY MOUTH ONCE DAILY, Disp: 90 tablet, Rfl: 2     allopurinol (ZYLOPRIM) 300 MG tablet, TAKE ONE TABLET BY MOUTH ONCE DAILY *NEED  TO  BE  SEEN  FOR  MORE  REFILLS, Disp: 90 tablet, Rfl: 3     cloNIDine (CATAPRES) 0.3 MG tablet, TAKE ONE TABLET BY MOUTH TWICE DAILY. NEEDS TO BE SEEN FOR MORE., Disp: 180 tablet, Rfl: 3     amLODIPine (NORVASC) 5 MG tablet, TAKE ONE TABLET BY MOUTH ONCE DAILY, Disp: 90 tablet, Rfl: 3     omeprazole (PRILOSEC) 40 MG capsule, TAKE ONE CAPSULE BY MOUTH ONCE DAILY, Disp: 90 capsule, Rfl: 3     spironolactone (ALDACTONE) 25 MG tablet, TAKE ONE TABLET BY MOUTH ONCE DAILY, Disp: 90 tablet, Rfl: 3     lisinopril (PRINIVIL,ZESTRIL) 40 MG tablet, Take 1 tablet (40 mg) by mouth daily, Disp: 90 tablet, Rfl: 1     gabapentin (NEURONTIN) 300 MG capsule, TAKE ONE CAPSULE BY MOUTH THREE TIMES DAILY, Disp: 270 capsule, Rfl: 0     VIAGRA 100 MG tablet, TAKE ONE TABLET BY MOUTH ONCE DAILY AS NEEDED FOR ERECTILE DYSFUNCTION, Disp: 25 tablet, Rfl: 3         Physical Exam:   /84  Pulse 68  Temp 98.3  F (36.8  C) (Oral)  Ht 1.689 m (5' 6.5\")  Wt 85.7 kg (189 lb)  BMI 30.05 kg/m2  General: Pleasant. Appears well, in no acute distress.  Heme/Lymph: No " overt bleeding. No cervical or clavicular adenopathy.  Skin: No concerning lesions, rash, jaundice, cyanosis, erythema, or ecchymoses on exposed surfaces.  HEENT: NCAT. PERRL, anicteric sclera. Oral mucosa pink and moist with no lesions or thrush.  Neck: Neck supple. No jugular venous distention.  Respiratory: Non-labored breathing, good air exchange, lungs clear to auscultation bilaterally.  Cardiovascular: Regular rate and rhythm. No murmur or rub.   Gastrointestinal: Normoactive bowel sounds. Abdomen soft, non-distended, and non-tender. No palpable masses or organomegaly. Ecchymoses on abdomen from Lovenox injections.   Musculoskeletal: 2+ bilateral radial and pedal pulses. No extremity edema. Extremities normal, no gross deformities noted, non-tender to palpation. 5/5 proximal and distal strength.  Neurologic: A&O x 3, CNs 2-12 grossly intact, speech normal, gait normal, sensation to light touch grossly WNL. Grossly non-focal.   Psychiatric: Mentation and affect appear normal.           Data:     Lab Results   Component Value Date    WBC 7.9 04/20/2017     Lab Results   Component Value Date    RBC 4.17 04/20/2017     Lab Results   Component Value Date    HGB 12.1 04/20/2017     Lab Results   Component Value Date    HCT 36.8 04/20/2017     No components found for: MCT  Lab Results   Component Value Date    MCV 88 04/20/2017     Lab Results   Component Value Date    MCH 29.0 04/20/2017     Lab Results   Component Value Date    MCHC 32.9 04/20/2017     Lab Results   Component Value Date    RDW 13.7 04/20/2017     Lab Results   Component Value Date    PLT 80 04/20/2017 4/18/17: INR 1.02,  PTT 28    10/22/12: Hep B, HIV negative , SPEP negative  9/11/2014: PSA normal  12/2016: Hep C negative      Bone Marrow Biopsy 10/24/12  FINAL DIAGNOSIS:   Peripheral blood demonstrating thrombocytopenia without morphological features of myelodysplasia or malignancy. Focally hypercellular bone marrow with megakaryocyte  clustering (see comment); iron stores reduced.   10/24 cytogenetics-INTERPRETATION: Six (30%) of the metaphase cells analyzed had loss of the Y chromosome as the sole abnormality; no abnormality was detected among the remaining 14 (70%) metaphases. Loss of the Y chromosome, as seen in this case, has been reported as an acquired clonal abnormality associated with primarily myeloid disorders, and also as a clinically insignificant finding associated with the normal aging process in adult males. When presenting as a clonal abnormality, loss of Y is typically seen in addition to other cytogenetic abnormalities. Given this patient's age, the reported absence of morphologic evidence of a malignant disorder and the absence of other chromosomal abnormalities, it is most likely that the loss of Y represents an age-related finding in this case.   Flow Cytometry-Normal     Colonoscopy 5/2010- mild inflammation, polyp removal,external hemorrhoids,diverticulosis.    Colonoscopy 9/2015- sessile polyp, removed.  EGD 12/2016 - gastric polyp    10/23/12 CT CAP  IMPRESSION:   1. Mild splenomegaly. 2. Mild fatty infiltration of the liver.3. Low dense lesions too small to characterize in the liver and the kidneys. 4. Coronary and aortic atherosclerosis. 5. Extensive diverticulosis without evidence of diverticulitis.     04/18/17 CT CAP  IMPRESSION:   1. Moderate large right lower lobe pulmonary embolus.  2. Right lower lobe pulmonary opacity concerning for pulmonary  infarct. Cannot exclude pneumonia which is considered less likely.  Clinical correlation.   3. No acute abnormality identified in the abdomen and pelvis. Question  mild bowel wall thickening versus underdistention involving the cecum.  Uncomplicated colonic diverticulosis.     4/19/17   Interpretation Summary     Technically difficult study, but limited views suggest normal RV size and  function.  PA pressures could not be calculated from this study.           Assessment  and Plan:   #Uprovoked PE 4/2017  #ITP diagnosed 2012  #ETOH abuse  #Normocytic anemia     Raymon Ace is a 71 year old    Plan:  -CBC, ferritin, B12, TSH today  -continue daily folic acid  -monitor CBC weekly   -continue prednisone 20 mg daily; plan taper ------  -start Promacta 50 daily   -advise ETOH cessation  -cc his PCP Dr. Cherry on our note      Patient seen and discussed with Dr. Novoa.  Maria R Mccoy MD  [facility]eme Onc fellow      Waylon Novoa MD

## 2017-04-26 RX ORDER — LISINOPRIL 40 MG/1
TABLET ORAL
Qty: 90 TABLET | Refills: 2 | Status: SHIPPED | OUTPATIENT
Start: 2017-04-26 | End: 2018-03-28

## 2017-04-26 NOTE — NURSING NOTE
Teaching Flowsheet   Relevant Diagnosis: ITP and PE  Teaching Topic: intro to the [facility]     Person(s) involved in teaching:   Patient and Wife     Motivation Level:  Asks Questions: Yes    Eager to Learn: Yes  Cooperative: Yes  Receptive (willing/able to accept information): Yes     Patient and Family demonstrates understanding of the following:  Reason for the appointment, diagnosis and treatment plan: Yes  Knowledge of proper use of medications and conditions for which they are ordered (with special attention to potential side effects or drug interactions): Yes  Which situations necessitate calling provider and whom to contact: Yes      Proper use and care of   (medical equip, care aids, etc.): NA  Nutritional needs and diet plan: NA  Pain management techniques: NA  Wound Care: NA  How and/when to access community resources: Yes    Educated patient about the signs, symptoms of and risk factors for venous thrombosis (VTE) and provided an educational  book emerson, which reviews these facts. And includes the following web links  for further information:  www.stoptheclot.org, www.clotconnect.org.    Patient educated about benefits and potential complications of anticoagulation.       Discussed the differences between arterial and venous thrombosis with the patient and the divergent medications used for each.    Explained that Ashely Talley RN from the [facility] team josell be working with them to handle the ITP issues.  I provided the nurse triage number for that clinic.    Patient verbalized understanding of the above information.  Reviewed and discussed the patient instructions point by point and patient verbalized understanding.They deny further questions at this time.    Copy of the After Visit Summary (AVS) given to patient for future reference. Patient given the contact card for the [facility] and has the appropriate numbers to call with any  questions.    Rosie Jalloh RN - Nurse Clinician - [facility] - [phone]

## 2017-04-27 DIAGNOSIS — D69.3 IDIOPATHIC THROMBOCYTOPENIC PURPURA (H): Primary | ICD-10-CM

## 2017-04-28 ENCOUNTER — CARE COORDINATION (OUTPATIENT)
Dept: ONCOLOGY | Facility: CLINIC | Age: 72
End: 2017-04-28

## 2017-04-28 NOTE — PROGRESS NOTES
The patient was called.  I introduced myself as the RNCC working with Dr. Novoa.  I told him that Dr. Novoa had asked me to call him to review his upcoming plan of care.    - We discussed that Dr. Novoa had sent a prescription for Promacta to treat his ITP to our clinic pharmacy.  The pharmacy said that the Promacta needs a PA, and they are processing this.  The pharmacy will call the patient and arrange delivery of the medication when the PA has been completed.  - We discussed that he should continue to take Prednisone 20mg daily.  - We discussed that he should continue to take Lovenox as prescribed.  He asked when he can stop the Lovenox.  He was told that Dr. Novoa will direct that decision.  - We discussed that Dr. Novoa would like him to have a CBC checked weekly.  The patient said that he has an appointment at [facility] for the lab appointment on May 2.  - He was told that we will schedule a follow-up with Dr. Novoa in a month.    The patient was given the clinic nurse line phone number to call for any further questions or concerns.

## 2017-05-02 ENCOUNTER — TELEPHONE (OUTPATIENT)
Dept: ONCOLOGY | Facility: CLINIC | Age: 72
End: 2017-05-02

## 2017-05-02 DIAGNOSIS — D69.3 IDIOPATHIC THROMBOCYTOPENIC PURPURA (H): ICD-10-CM

## 2017-05-02 LAB
ALP SERPL-CCNC: 44 U/L (ref 40–150)
ALT SERPL W P-5'-P-CCNC: 51 U/L (ref 0–70)
AST SERPL W P-5'-P-CCNC: 23 U/L (ref 0–45)
BASOPHILS # BLD AUTO: 0 10E9/L (ref 0–0.2)
BASOPHILS NFR BLD AUTO: 0.2 %
BILIRUB SERPL-MCNC: 0.6 MG/DL (ref 0.2–1.3)
DIFFERENTIAL METHOD BLD: ABNORMAL
EOSINOPHIL # BLD AUTO: 0 10E9/L (ref 0–0.7)
EOSINOPHIL NFR BLD AUTO: 0.5 %
ERYTHROCYTE [DISTWIDTH] IN BLOOD BY AUTOMATED COUNT: 14.3 % (ref 10–15)
HCT VFR BLD AUTO: 39.6 % (ref 40–53)
HGB BLD-MCNC: 13.2 G/DL (ref 13.3–17.7)
LYMPHOCYTES # BLD AUTO: 2.4 10E9/L (ref 0.8–5.3)
LYMPHOCYTES NFR BLD AUTO: 28.2 %
MCH RBC QN AUTO: 30 PG (ref 26.5–33)
MCHC RBC AUTO-ENTMCNC: 33.3 G/DL (ref 31.5–36.5)
MCV RBC AUTO: 90 FL (ref 78–100)
MONOCYTES # BLD AUTO: 0.6 10E9/L (ref 0–1.3)
MONOCYTES NFR BLD AUTO: 7.4 %
NEUTROPHILS # BLD AUTO: 5.5 10E9/L (ref 1.6–8.3)
NEUTROPHILS NFR BLD AUTO: 63.7 %
PLATELET # BLD AUTO: 104 10E9/L (ref 150–450)
RBC # BLD AUTO: 4.4 10E12/L (ref 4.4–5.9)
WBC # BLD AUTO: 8.7 10E9/L (ref 4–11)

## 2017-05-02 PROCEDURE — 84075 ASSAY ALKALINE PHOSPHATASE: CPT | Performed by: INTERNAL MEDICINE

## 2017-05-02 PROCEDURE — 84450 TRANSFERASE (AST) (SGOT): CPT | Performed by: INTERNAL MEDICINE

## 2017-05-02 PROCEDURE — 82247 BILIRUBIN TOTAL: CPT | Performed by: INTERNAL MEDICINE

## 2017-05-02 PROCEDURE — 36415 COLL VENOUS BLD VENIPUNCTURE: CPT | Performed by: INTERNAL MEDICINE

## 2017-05-02 PROCEDURE — 84460 ALANINE AMINO (ALT) (SGPT): CPT | Performed by: INTERNAL MEDICINE

## 2017-05-02 PROCEDURE — 85025 COMPLETE CBC W/AUTO DIFF WBC: CPT | Performed by: INTERNAL MEDICINE

## 2017-05-03 ENCOUNTER — CARE COORDINATION (OUTPATIENT)
Dept: ONCOLOGY | Facility: CLINIC | Age: 72
End: 2017-05-03

## 2017-05-03 NOTE — PROGRESS NOTES
PA approved for Promacta 50mg through 12/31/17 per [facility].  The pharmacy was notified and will contact the patient to arrange pick-up or delivery.    The patient had a CBC on 5/2/17, platelets 104.  The patient was called directly.  He was told that Dr. Novoa would like him to start the Promacta 50mg daily as soon as it arrives from the pharmacy, continue Prednisone 20mg daily, and to repeat a CBC on 5/9/17.

## 2017-05-09 DIAGNOSIS — D69.3 IDIOPATHIC THROMBOCYTOPENIC PURPURA (H): ICD-10-CM

## 2017-05-09 LAB
ALP SERPL-CCNC: 41 U/L (ref 40–150)
ALT SERPL W P-5'-P-CCNC: 34 U/L (ref 0–70)
AST SERPL W P-5'-P-CCNC: 21 U/L (ref 0–45)
BASOPHILS # BLD AUTO: 0 10E9/L (ref 0–0.2)
BASOPHILS NFR BLD AUTO: 0.2 %
BILIRUB SERPL-MCNC: 0.5 MG/DL (ref 0.2–1.3)
DIFFERENTIAL METHOD BLD: ABNORMAL
EOSINOPHIL # BLD AUTO: 0 10E9/L (ref 0–0.7)
EOSINOPHIL NFR BLD AUTO: 0.5 %
ERYTHROCYTE [DISTWIDTH] IN BLOOD BY AUTOMATED COUNT: 13.9 % (ref 10–15)
HCT VFR BLD AUTO: 39.2 % (ref 40–53)
HGB BLD-MCNC: 12.8 G/DL (ref 13.3–17.7)
IMM GRANULOCYTES # BLD: 0.1 10E9/L (ref 0–0.4)
IMM GRANULOCYTES NFR BLD: 1.2 %
LYMPHOCYTES # BLD AUTO: 1.9 10E9/L (ref 0.8–5.3)
LYMPHOCYTES NFR BLD AUTO: 23.2 %
MCH RBC QN AUTO: 29.5 PG (ref 26.5–33)
MCHC RBC AUTO-ENTMCNC: 32.7 G/DL (ref 31.5–36.5)
MCV RBC AUTO: 90 FL (ref 78–100)
MONOCYTES # BLD AUTO: 0.7 10E9/L (ref 0–1.3)
MONOCYTES NFR BLD AUTO: 8.4 %
NEUTROPHILS # BLD AUTO: 5.5 10E9/L (ref 1.6–8.3)
NEUTROPHILS NFR BLD AUTO: 66.5 %
PLATELET # BLD AUTO: 80 10E9/L (ref 150–450)
RBC # BLD AUTO: 4.34 10E12/L (ref 4.4–5.9)
WBC # BLD AUTO: 8.2 10E9/L (ref 4–11)

## 2017-05-09 PROCEDURE — 84075 ASSAY ALKALINE PHOSPHATASE: CPT | Performed by: INTERNAL MEDICINE

## 2017-05-09 PROCEDURE — 36415 COLL VENOUS BLD VENIPUNCTURE: CPT | Performed by: INTERNAL MEDICINE

## 2017-05-09 PROCEDURE — 82247 BILIRUBIN TOTAL: CPT | Performed by: INTERNAL MEDICINE

## 2017-05-09 PROCEDURE — 84450 TRANSFERASE (AST) (SGOT): CPT | Performed by: INTERNAL MEDICINE

## 2017-05-09 PROCEDURE — 84460 ALANINE AMINO (ALT) (SGPT): CPT | Performed by: INTERNAL MEDICINE

## 2017-05-09 PROCEDURE — 85025 COMPLETE CBC W/AUTO DIFF WBC: CPT | Performed by: INTERNAL MEDICINE

## 2017-05-10 ENCOUNTER — CARE COORDINATION (OUTPATIENT)
Dept: ONCOLOGY | Facility: CLINIC | Age: 72
End: 2017-05-10

## 2017-05-10 DIAGNOSIS — D69.3 IDIOPATHIC THROMBOCYTOPENIC PURPURA (H): Primary | ICD-10-CM

## 2017-05-10 DIAGNOSIS — I82.409 DEEP VEIN THROMBOSIS (DVT) OF LOWER EXTREMITY, UNSPECIFIED CHRONICITY, UNSPECIFIED LATERALITY, UNSPECIFIED VEIN (H): Primary | ICD-10-CM

## 2017-05-10 RX ORDER — PREDNISONE 5 MG/1
15 TABLET ORAL DAILY
Qty: 60 TABLET | Refills: 1 | Status: SHIPPED | OUTPATIENT
Start: 2017-05-10 | End: 2017-05-17

## 2017-05-10 NOTE — PROGRESS NOTES
The patient was called.  The CBC results from 5/9/17 were reviewed including platelets of 80.  The patient did confirm that he has been on Promacta 50mg daily for 1 week. The patient was told that Dr. Novoa recommends that he:    - taper prednisone to 15mg daily.  - continue on Promacta 50mg daily  - stop Lovenox and switch to Xarelto  - have a CBC rechecked in one week, on 5/16/17.    The patient was told that we will send in a prescription for Prednisone and Xarelto to his local pharmacy - [facility] in [place].

## 2017-05-12 NOTE — TELEPHONE ENCOUNTER
Prior Authorization initiated via [facility] ([place]: [id number]) -   Promacta 50MG tablets  Sent: May 2nd, 2017    Approved

## 2017-05-16 DIAGNOSIS — D69.3 IDIOPATHIC THROMBOCYTOPENIC PURPURA (H): ICD-10-CM

## 2017-05-16 LAB
ALP SERPL-CCNC: 42 U/L (ref 40–150)
ALT SERPL W P-5'-P-CCNC: 37 U/L (ref 0–70)
AST SERPL W P-5'-P-CCNC: 19 U/L (ref 0–45)
BASOPHILS # BLD AUTO: 0 10E9/L (ref 0–0.2)
BASOPHILS NFR BLD AUTO: 0.1 %
BILIRUB SERPL-MCNC: 0.5 MG/DL (ref 0.2–1.3)
DIFFERENTIAL METHOD BLD: ABNORMAL
EOSINOPHIL # BLD AUTO: 0 10E9/L (ref 0–0.7)
EOSINOPHIL NFR BLD AUTO: 0.5 %
ERYTHROCYTE [DISTWIDTH] IN BLOOD BY AUTOMATED COUNT: 13.4 % (ref 10–15)
HCT VFR BLD AUTO: 38.1 % (ref 40–53)
HGB BLD-MCNC: 12.7 G/DL (ref 13.3–17.7)
LYMPHOCYTES # BLD AUTO: 1.6 10E9/L (ref 0.8–5.3)
LYMPHOCYTES NFR BLD AUTO: 20.8 %
MCH RBC QN AUTO: 29.8 PG (ref 26.5–33)
MCHC RBC AUTO-ENTMCNC: 33.3 G/DL (ref 31.5–36.5)
MCV RBC AUTO: 89 FL (ref 78–100)
MONOCYTES # BLD AUTO: 0.7 10E9/L (ref 0–1.3)
MONOCYTES NFR BLD AUTO: 9.5 %
NEUTROPHILS # BLD AUTO: 5.3 10E9/L (ref 1.6–8.3)
NEUTROPHILS NFR BLD AUTO: 69.1 %
PLATELET # BLD AUTO: 65 10E9/L (ref 150–450)
RBC # BLD AUTO: 4.26 10E12/L (ref 4.4–5.9)
WBC # BLD AUTO: 7.7 10E9/L (ref 4–11)

## 2017-05-16 PROCEDURE — 85025 COMPLETE CBC W/AUTO DIFF WBC: CPT | Performed by: INTERNAL MEDICINE

## 2017-05-16 PROCEDURE — 84460 ALANINE AMINO (ALT) (SGPT): CPT | Performed by: INTERNAL MEDICINE

## 2017-05-16 PROCEDURE — 36415 COLL VENOUS BLD VENIPUNCTURE: CPT | Performed by: INTERNAL MEDICINE

## 2017-05-16 PROCEDURE — 84075 ASSAY ALKALINE PHOSPHATASE: CPT | Performed by: INTERNAL MEDICINE

## 2017-05-16 PROCEDURE — 84450 TRANSFERASE (AST) (SGOT): CPT | Performed by: INTERNAL MEDICINE

## 2017-05-16 PROCEDURE — 82247 BILIRUBIN TOTAL: CPT | Performed by: INTERNAL MEDICINE

## 2017-05-17 ENCOUNTER — CARE COORDINATION (OUTPATIENT)
Dept: ONCOLOGY | Facility: CLINIC | Age: 72
End: 2017-05-17

## 2017-05-17 DIAGNOSIS — D69.3 IDIOPATHIC THROMBOCYTOPENIC PURPURA (H): ICD-10-CM

## 2017-05-17 RX ORDER — PREDNISONE 5 MG/1
15 TABLET ORAL DAILY
Qty: 60 TABLET | Refills: 1 | Status: SHIPPED | OUTPATIENT
Start: 2017-05-17 | End: 2017-06-29

## 2017-05-17 NOTE — PROGRESS NOTES
The patient and his wife were called and his CBC results were reviewed including platelets of 65.  Per Dr. Novoa's instructions, he was asked to increase his Promacta dose to 75mg daily, by taking 1.5 pills of the 50mg dose.  They were told that a new prescription for Promacta 75mg pills will be sent into [facility].  The patient was asked to continue to take Prednisone 15mg tablets.   He was asked to have a CBC checked on Monday, 5/22/17.

## 2017-05-22 DIAGNOSIS — D69.3 IDIOPATHIC THROMBOCYTOPENIC PURPURA (H): ICD-10-CM

## 2017-05-22 PROCEDURE — 84075 ASSAY ALKALINE PHOSPHATASE: CPT | Performed by: INTERNAL MEDICINE

## 2017-05-22 PROCEDURE — 85025 COMPLETE CBC W/AUTO DIFF WBC: CPT | Performed by: INTERNAL MEDICINE

## 2017-05-22 PROCEDURE — 82247 BILIRUBIN TOTAL: CPT | Performed by: INTERNAL MEDICINE

## 2017-05-22 PROCEDURE — 84450 TRANSFERASE (AST) (SGOT): CPT | Performed by: INTERNAL MEDICINE

## 2017-05-22 PROCEDURE — 36415 COLL VENOUS BLD VENIPUNCTURE: CPT | Performed by: INTERNAL MEDICINE

## 2017-05-22 PROCEDURE — 84460 ALANINE AMINO (ALT) (SGPT): CPT | Performed by: INTERNAL MEDICINE

## 2017-05-23 ENCOUNTER — CARE COORDINATION (OUTPATIENT)
Dept: ONCOLOGY | Facility: CLINIC | Age: 72
End: 2017-05-23

## 2017-05-23 LAB
ALP SERPL-CCNC: 43 U/L (ref 40–150)
ALT SERPL W P-5'-P-CCNC: 33 U/L (ref 0–70)
AST SERPL W P-5'-P-CCNC: 23 U/L (ref 0–45)
BASOPHILS # BLD AUTO: 0 10E9/L (ref 0–0.2)
BASOPHILS NFR BLD AUTO: 0.5 %
BILIRUB SERPL-MCNC: 0.6 MG/DL (ref 0.2–1.3)
DIFFERENTIAL METHOD BLD: ABNORMAL
EOSINOPHIL # BLD AUTO: 0.1 10E9/L (ref 0–0.7)
EOSINOPHIL NFR BLD AUTO: 1 %
ERYTHROCYTE [DISTWIDTH] IN BLOOD BY AUTOMATED COUNT: 13.7 % (ref 10–15)
HCT VFR BLD AUTO: 40.5 % (ref 40–53)
HGB BLD-MCNC: 13 G/DL (ref 13.3–17.7)
IMM GRANULOCYTES # BLD: 0.1 10E9/L (ref 0–0.4)
IMM GRANULOCYTES NFR BLD: 1.6 %
LYMPHOCYTES # BLD AUTO: 1.6 10E9/L (ref 0.8–5.3)
LYMPHOCYTES NFR BLD AUTO: 20.1 %
MCH RBC QN AUTO: 29.4 PG (ref 26.5–33)
MCHC RBC AUTO-ENTMCNC: 32.1 G/DL (ref 31.5–36.5)
MCV RBC AUTO: 92 FL (ref 78–100)
MONOCYTES # BLD AUTO: 0.8 10E9/L (ref 0–1.3)
MONOCYTES NFR BLD AUTO: 9.7 %
NEUTROPHILS # BLD AUTO: 5.5 10E9/L (ref 1.6–8.3)
NEUTROPHILS NFR BLD AUTO: 67.1 %
PLATELET # BLD AUTO: 54 10E9/L (ref 150–450)
RBC # BLD AUTO: 4.42 10E12/L (ref 4.4–5.9)
WBC # BLD AUTO: 8.1 10E9/L (ref 4–11)

## 2017-05-23 NOTE — PROGRESS NOTES
The patient was called and his lab results from 5/22/17 were reviewed including WBC 8.1, hgb 13.0 and platelets of 54.  The patient was told that Dr. Novoa recommends that he continue on Promacta 75mg daily and Prednisone 15mg daily and check a CBC again in one week, on 5/30/17.  The patient said that he is tolerating Prednisone well.  He denies insomnia or irritability from the Prednisone.  The patient was instructed to go to ER over the weekend if he notes increased bruising or bleeding symptoms.

## 2017-05-30 DIAGNOSIS — D69.3 IDIOPATHIC THROMBOCYTOPENIC PURPURA (H): ICD-10-CM

## 2017-05-30 LAB
BASOPHILS # BLD AUTO: 0 10E9/L (ref 0–0.2)
BASOPHILS NFR BLD AUTO: 0.1 %
DIFFERENTIAL METHOD BLD: ABNORMAL
EOSINOPHIL # BLD AUTO: 0 10E9/L (ref 0–0.7)
EOSINOPHIL NFR BLD AUTO: 0.1 %
ERYTHROCYTE [DISTWIDTH] IN BLOOD BY AUTOMATED COUNT: 13.3 % (ref 10–15)
HCT VFR BLD AUTO: 38.5 % (ref 40–53)
HGB BLD-MCNC: 13 G/DL (ref 13.3–17.7)
LYMPHOCYTES # BLD AUTO: 1.2 10E9/L (ref 0.8–5.3)
LYMPHOCYTES NFR BLD AUTO: 13.6 %
MCH RBC QN AUTO: 29.7 PG (ref 26.5–33)
MCHC RBC AUTO-ENTMCNC: 33.8 G/DL (ref 31.5–36.5)
MCV RBC AUTO: 88 FL (ref 78–100)
MONOCYTES # BLD AUTO: 0.7 10E9/L (ref 0–1.3)
MONOCYTES NFR BLD AUTO: 7.6 %
NEUTROPHILS # BLD AUTO: 6.7 10E9/L (ref 1.6–8.3)
NEUTROPHILS NFR BLD AUTO: 78.6 %
PLATELET # BLD AUTO: 112 10E9/L (ref 150–450)
RBC # BLD AUTO: 4.38 10E12/L (ref 4.4–5.9)
WBC # BLD AUTO: 8.5 10E9/L (ref 4–11)

## 2017-05-30 PROCEDURE — 84075 ASSAY ALKALINE PHOSPHATASE: CPT | Performed by: INTERNAL MEDICINE

## 2017-05-30 PROCEDURE — 84450 TRANSFERASE (AST) (SGOT): CPT | Performed by: INTERNAL MEDICINE

## 2017-05-30 PROCEDURE — 36415 COLL VENOUS BLD VENIPUNCTURE: CPT | Performed by: INTERNAL MEDICINE

## 2017-05-30 PROCEDURE — 85025 COMPLETE CBC W/AUTO DIFF WBC: CPT | Performed by: INTERNAL MEDICINE

## 2017-05-30 PROCEDURE — 82247 BILIRUBIN TOTAL: CPT | Performed by: INTERNAL MEDICINE

## 2017-05-30 PROCEDURE — 84460 ALANINE AMINO (ALT) (SGPT): CPT | Performed by: INTERNAL MEDICINE

## 2017-05-31 ENCOUNTER — CARE COORDINATION (OUTPATIENT)
Dept: ONCOLOGY | Facility: CLINIC | Age: 72
End: 2017-05-31

## 2017-05-31 LAB
ALP SERPL-CCNC: 53 U/L (ref 40–150)
ALT SERPL W P-5'-P-CCNC: 24 U/L (ref 0–70)
AST SERPL W P-5'-P-CCNC: 17 U/L (ref 0–45)
BILIRUB SERPL-MCNC: 0.6 MG/DL (ref 0.2–1.3)

## 2017-05-31 NOTE — PROGRESS NOTES
The patient was called and his CBC result from 5/30/17 was reviewed including platelets of 112.  Per Dr. Novoa's instruction, the patient was asked to continue taking Promacta 75mg daily, to decrease his Prednisone to 10mg daily, and to have labs checked again in 1 week.  The patient repeated the prednisone dose change correctly.

## 2017-06-05 DIAGNOSIS — D69.3 IDIOPATHIC THROMBOCYTOPENIC PURPURA (H): ICD-10-CM

## 2017-06-05 LAB
ALP SERPL-CCNC: 59 U/L (ref 40–150)
ALT SERPL W P-5'-P-CCNC: 26 U/L (ref 0–70)
AST SERPL W P-5'-P-CCNC: 22 U/L (ref 0–45)
BASOPHILS # BLD AUTO: 0 10E9/L (ref 0–0.2)
BASOPHILS NFR BLD AUTO: 0.4 %
BILIRUB SERPL-MCNC: 0.5 MG/DL (ref 0.2–1.3)
DIFFERENTIAL METHOD BLD: ABNORMAL
EOSINOPHIL # BLD AUTO: 0 10E9/L (ref 0–0.7)
EOSINOPHIL NFR BLD AUTO: 0.4 %
ERYTHROCYTE [DISTWIDTH] IN BLOOD BY AUTOMATED COUNT: 13.5 % (ref 10–15)
HCT VFR BLD AUTO: 39.8 % (ref 40–53)
HGB BLD-MCNC: 12.9 G/DL (ref 13.3–17.7)
IMM GRANULOCYTES # BLD: 0.2 10E9/L (ref 0–0.4)
IMM GRANULOCYTES NFR BLD: 1.8 %
LYMPHOCYTES # BLD AUTO: 0.9 10E9/L (ref 0.8–5.3)
LYMPHOCYTES NFR BLD AUTO: 9.3 %
MCH RBC QN AUTO: 28.9 PG (ref 26.5–33)
MCHC RBC AUTO-ENTMCNC: 32.4 G/DL (ref 31.5–36.5)
MCV RBC AUTO: 89 FL (ref 78–100)
MONOCYTES # BLD AUTO: 0.7 10E9/L (ref 0–1.3)
MONOCYTES NFR BLD AUTO: 7.7 %
NEUTROPHILS # BLD AUTO: 7.7 10E9/L (ref 1.6–8.3)
NEUTROPHILS NFR BLD AUTO: 80.4 %
PLATELET # BLD AUTO: 117 10E9/L (ref 150–450)
RBC # BLD AUTO: 4.47 10E12/L (ref 4.4–5.9)
WBC # BLD AUTO: 9.6 10E9/L (ref 4–11)

## 2017-06-05 PROCEDURE — 85025 COMPLETE CBC W/AUTO DIFF WBC: CPT | Performed by: INTERNAL MEDICINE

## 2017-06-05 PROCEDURE — 82247 BILIRUBIN TOTAL: CPT | Performed by: INTERNAL MEDICINE

## 2017-06-05 PROCEDURE — 36415 COLL VENOUS BLD VENIPUNCTURE: CPT | Performed by: INTERNAL MEDICINE

## 2017-06-05 PROCEDURE — 84450 TRANSFERASE (AST) (SGOT): CPT | Performed by: INTERNAL MEDICINE

## 2017-06-05 PROCEDURE — 84460 ALANINE AMINO (ALT) (SGPT): CPT | Performed by: INTERNAL MEDICINE

## 2017-06-05 PROCEDURE — 84075 ASSAY ALKALINE PHOSPHATASE: CPT | Performed by: INTERNAL MEDICINE

## 2017-06-06 ENCOUNTER — CARE COORDINATION (OUTPATIENT)
Dept: ONCOLOGY | Facility: CLINIC | Age: 72
End: 2017-06-06

## 2017-06-06 NOTE — PROGRESS NOTES
Message  Received: Yesterday       Waylon Novoa MD Suzukida, Ashely TRIVEDI, RN                     Raymon Holder's platelets are stable in the low 100,000 range.   OK to decrease prednisone to 5 mg daily.   Continue same Promacta dose.   Recheck labs in 1 week -- if platelets still >100,000, will stop prednisone then.     Waylon.       The patient was called and a VM was left with Dr. Novoa's instructions.  He was asked to call for any further questions.

## 2017-06-12 DIAGNOSIS — D69.3 IDIOPATHIC THROMBOCYTOPENIC PURPURA (H): ICD-10-CM

## 2017-06-12 LAB
ALP SERPL-CCNC: 69 U/L (ref 40–150)
ALT SERPL W P-5'-P-CCNC: 21 U/L (ref 0–70)
AST SERPL W P-5'-P-CCNC: 22 U/L (ref 0–45)
BASOPHILS # BLD AUTO: 0 10E9/L (ref 0–0.2)
BASOPHILS NFR BLD AUTO: 0.2 %
BILIRUB SERPL-MCNC: 0.7 MG/DL (ref 0.2–1.3)
DIFFERENTIAL METHOD BLD: ABNORMAL
EOSINOPHIL # BLD AUTO: 0 10E9/L (ref 0–0.7)
EOSINOPHIL NFR BLD AUTO: 0.3 %
ERYTHROCYTE [DISTWIDTH] IN BLOOD BY AUTOMATED COUNT: 13.8 % (ref 10–15)
HCT VFR BLD AUTO: 38.7 % (ref 40–53)
HGB BLD-MCNC: 12.8 G/DL (ref 13.3–17.7)
LYMPHOCYTES # BLD AUTO: 1.1 10E9/L (ref 0.8–5.3)
LYMPHOCYTES NFR BLD AUTO: 12 %
MCH RBC QN AUTO: 29.5 PG (ref 26.5–33)
MCHC RBC AUTO-ENTMCNC: 33.1 G/DL (ref 31.5–36.5)
MCV RBC AUTO: 89 FL (ref 78–100)
MONOCYTES # BLD AUTO: 0.8 10E9/L (ref 0–1.3)
MONOCYTES NFR BLD AUTO: 8.8 %
NEUTROPHILS # BLD AUTO: 7.3 10E9/L (ref 1.6–8.3)
NEUTROPHILS NFR BLD AUTO: 78.7 %
PLATELET # BLD AUTO: 113 10E9/L (ref 150–450)
RBC # BLD AUTO: 4.34 10E12/L (ref 4.4–5.9)
WBC # BLD AUTO: 9.3 10E9/L (ref 4–11)

## 2017-06-12 PROCEDURE — 85025 COMPLETE CBC W/AUTO DIFF WBC: CPT | Performed by: INTERNAL MEDICINE

## 2017-06-12 PROCEDURE — 84460 ALANINE AMINO (ALT) (SGPT): CPT | Performed by: INTERNAL MEDICINE

## 2017-06-12 PROCEDURE — 84450 TRANSFERASE (AST) (SGOT): CPT | Performed by: INTERNAL MEDICINE

## 2017-06-12 PROCEDURE — 82247 BILIRUBIN TOTAL: CPT | Performed by: INTERNAL MEDICINE

## 2017-06-12 PROCEDURE — 36415 COLL VENOUS BLD VENIPUNCTURE: CPT | Performed by: INTERNAL MEDICINE

## 2017-06-12 PROCEDURE — 84075 ASSAY ALKALINE PHOSPHATASE: CPT | Performed by: INTERNAL MEDICINE

## 2017-06-13 ENCOUNTER — CARE COORDINATION (OUTPATIENT)
Dept: ONCOLOGY | Facility: CLINIC | Age: 72
End: 2017-06-13

## 2017-06-13 DIAGNOSIS — D69.3 IDIOPATHIC THROMBOCYTOPENIC PURPURA (H): Primary | ICD-10-CM

## 2017-06-13 NOTE — PROGRESS NOTES
The patient was called, and was told that his platelets remain stable at 113.  Per Dr. Novoa's recommendations, he was asked to stop his prednisone, continue his Promacta 75mg daily and to have a CBC checked in one week.

## 2017-06-19 DIAGNOSIS — D69.3 IDIOPATHIC THROMBOCYTOPENIC PURPURA (H): ICD-10-CM

## 2017-06-19 LAB
BASOPHILS # BLD AUTO: 0 10E9/L (ref 0–0.2)
BASOPHILS NFR BLD AUTO: 0.4 %
DIFFERENTIAL METHOD BLD: ABNORMAL
EOSINOPHIL # BLD AUTO: 0.1 10E9/L (ref 0–0.7)
EOSINOPHIL NFR BLD AUTO: 1.1 %
ERYTHROCYTE [DISTWIDTH] IN BLOOD BY AUTOMATED COUNT: 13.4 % (ref 10–15)
HCT VFR BLD AUTO: 39.2 % (ref 40–53)
HGB BLD-MCNC: 12.7 G/DL (ref 13.3–17.7)
IMM GRANULOCYTES # BLD: 0 10E9/L (ref 0–0.4)
IMM GRANULOCYTES NFR BLD: 0.7 %
LYMPHOCYTES # BLD AUTO: 0.9 10E9/L (ref 0.8–5.3)
LYMPHOCYTES NFR BLD AUTO: 16.8 %
MCH RBC QN AUTO: 28.8 PG (ref 26.5–33)
MCHC RBC AUTO-ENTMCNC: 32.4 G/DL (ref 31.5–36.5)
MCV RBC AUTO: 89 FL (ref 78–100)
MONOCYTES # BLD AUTO: 0.5 10E9/L (ref 0–1.3)
MONOCYTES NFR BLD AUTO: 8.2 %
NEUTROPHILS # BLD AUTO: 4 10E9/L (ref 1.6–8.3)
NEUTROPHILS NFR BLD AUTO: 72.8 %
PLATELET # BLD AUTO: 77 10E9/L (ref 150–450)
RBC # BLD AUTO: 4.41 10E12/L (ref 4.4–5.9)
WBC # BLD AUTO: 5.5 10E9/L (ref 4–11)

## 2017-06-19 PROCEDURE — 84075 ASSAY ALKALINE PHOSPHATASE: CPT | Performed by: INTERNAL MEDICINE

## 2017-06-19 PROCEDURE — 36415 COLL VENOUS BLD VENIPUNCTURE: CPT | Performed by: INTERNAL MEDICINE

## 2017-06-19 PROCEDURE — 85025 COMPLETE CBC W/AUTO DIFF WBC: CPT | Performed by: INTERNAL MEDICINE

## 2017-06-19 PROCEDURE — 84450 TRANSFERASE (AST) (SGOT): CPT | Performed by: INTERNAL MEDICINE

## 2017-06-19 PROCEDURE — 84460 ALANINE AMINO (ALT) (SGPT): CPT | Performed by: INTERNAL MEDICINE

## 2017-06-19 PROCEDURE — 82247 BILIRUBIN TOTAL: CPT | Performed by: INTERNAL MEDICINE

## 2017-06-20 ENCOUNTER — CARE COORDINATION (OUTPATIENT)
Dept: ONCOLOGY | Facility: CLINIC | Age: 72
End: 2017-06-20

## 2017-06-20 LAB
ALP SERPL-CCNC: 73 U/L (ref 40–150)
ALT SERPL W P-5'-P-CCNC: 22 U/L (ref 0–70)
AST SERPL W P-5'-P-CCNC: 18 U/L (ref 0–45)
BILIRUB SERPL-MCNC: 0.7 MG/DL (ref 0.2–1.3)

## 2017-06-20 NOTE — PROGRESS NOTES
The patient was called and his CBC result from 6/19/17 was reviewed including platelets of 77.  He was told that Dr. Novoa would like him to continue taking Promacta 75mg daily, and to recheck a CBC in 1 week.  The patient asked when he would see Dr. Novoa for follow-up, and if any further testing would be done to evaluate if the PE is gone. He was told that I will check with Dr. Novoa for his follow-up plans.

## 2017-06-26 DIAGNOSIS — D69.3 IDIOPATHIC THROMBOCYTOPENIC PURPURA (H): ICD-10-CM

## 2017-06-26 LAB
BASOPHILS # BLD AUTO: 0 10E9/L (ref 0–0.2)
BASOPHILS NFR BLD AUTO: 0.5 %
DIFFERENTIAL METHOD BLD: ABNORMAL
EOSINOPHIL # BLD AUTO: 0.1 10E9/L (ref 0–0.7)
EOSINOPHIL NFR BLD AUTO: 1.4 %
ERYTHROCYTE [DISTWIDTH] IN BLOOD BY AUTOMATED COUNT: 13.8 % (ref 10–15)
HCT VFR BLD AUTO: 36.7 % (ref 40–53)
HGB BLD-MCNC: 12.2 G/DL (ref 13.3–17.7)
LYMPHOCYTES # BLD AUTO: 1.1 10E9/L (ref 0.8–5.3)
LYMPHOCYTES NFR BLD AUTO: 18.6 %
MCH RBC QN AUTO: 29.1 PG (ref 26.5–33)
MCHC RBC AUTO-ENTMCNC: 33.2 G/DL (ref 31.5–36.5)
MCV RBC AUTO: 88 FL (ref 78–100)
MONOCYTES # BLD AUTO: 0.7 10E9/L (ref 0–1.3)
MONOCYTES NFR BLD AUTO: 11.5 %
NEUTROPHILS # BLD AUTO: 4 10E9/L (ref 1.6–8.3)
NEUTROPHILS NFR BLD AUTO: 68 %
PLATELET # BLD AUTO: 67 10E9/L (ref 150–450)
RBC # BLD AUTO: 4.19 10E12/L (ref 4.4–5.9)
WBC # BLD AUTO: 5.9 10E9/L (ref 4–11)

## 2017-06-26 PROCEDURE — 85025 COMPLETE CBC W/AUTO DIFF WBC: CPT | Performed by: INTERNAL MEDICINE

## 2017-06-26 PROCEDURE — 36415 COLL VENOUS BLD VENIPUNCTURE: CPT | Performed by: INTERNAL MEDICINE

## 2017-06-27 ENCOUNTER — CARE COORDINATION (OUTPATIENT)
Dept: ONCOLOGY | Facility: CLINIC | Age: 72
End: 2017-06-27

## 2017-06-27 NOTE — PROGRESS NOTES
The patient was called and his CBC results from 6/26/17 were reviewed including platelets of 67.  The patient was asked to continue taking Promacta 75mg daily.  He was told that Dr. Novoa would plan on seeing him as scheduled on 6/29/17, but we would not need to draw labs again on 6/29/17.

## 2017-06-29 ENCOUNTER — ONCOLOGY VISIT (OUTPATIENT)
Dept: ONCOLOGY | Facility: CLINIC | Age: 72
End: 2017-06-29
Attending: INTERNAL MEDICINE
Payer: COMMERCIAL

## 2017-06-29 VITALS
BODY MASS INDEX: 30.83 KG/M2 | RESPIRATION RATE: 18 BRPM | WEIGHT: 196.4 LBS | TEMPERATURE: 98 F | DIASTOLIC BLOOD PRESSURE: 61 MMHG | HEIGHT: 67 IN | SYSTOLIC BLOOD PRESSURE: 90 MMHG | OXYGEN SATURATION: 100 % | HEART RATE: 69 BPM

## 2017-06-29 DIAGNOSIS — Z86.711 HISTORY OF PULMONARY EMBOLISM: ICD-10-CM

## 2017-06-29 DIAGNOSIS — D69.3 CHRONIC ITP (IDIOPATHIC THROMBOCYTOPENIC PURPURA) (H): Primary | ICD-10-CM

## 2017-06-29 DIAGNOSIS — Z79.01 CURRENT USE OF LONG TERM ANTICOAGULATION: ICD-10-CM

## 2017-06-29 PROCEDURE — 99212 OFFICE O/P EST SF 10 MIN: CPT | Mod: ZF

## 2017-06-29 PROCEDURE — 99215 OFFICE O/P EST HI 40 MIN: CPT | Mod: ZP | Performed by: INTERNAL MEDICINE

## 2017-06-29 RX ORDER — VALACYCLOVIR HYDROCHLORIDE 1 G/1
TABLET, FILM COATED ORAL
COMMUNITY
Start: 2016-07-10 | End: 2017-08-04

## 2017-06-29 RX ORDER — PREDNISONE 5 MG/1
TABLET ORAL
COMMUNITY
Start: 2017-05-17 | End: 2017-08-04

## 2017-06-29 RX ORDER — PREDNISONE 20 MG/1
TABLET ORAL
COMMUNITY
Start: 2017-04-20 | End: 2017-06-29

## 2017-06-29 ASSESSMENT — PAIN SCALES - GENERAL: PAINLEVEL: WORST PAIN (10)

## 2017-06-29 NOTE — LETTER
6/29/2017       RE: Raymon Ace  [address] [place] [address]     Dear Colleague,    Thank you for referring your patient, Raymon Ace, to the [facility]. Please see a copy of my visit note below.    HEMATOLOGY CLINIC VISIT  Raymon is a 70 yo male who returns for follow up of chronic ITP, and his more recently diagnosed unprovoked pulmonary embolism that occurred in April 2017.  He is in need of repeat spine surgery -- has an appointment with Dr. Didier Singh on July 25, and hopes to have surgery as soon as possible thereafter.  His understanding is that this will be an outpatient procedure.    He was diagnosed with ITP in 2012.  He is known to be steroid responsive, but had a stable platelet count in the 30-50,000 range on no therapy for the last 3-4 years.  He was treated with Promacta in 2013 to increase his platelet count prior to spine surgery, which was effective.  He stopped Promacta about 3 weeks after that surgery, and had not been on any regular treatment for ITP until he presented in April 2017 with an unprovoked PE, for which he should be treated with long term anticoagulation (assuming we can maintain a safe platelet count).       He had already been placed back on steroids, and when we first saw him in late April we restarted Promacta which he is tolerating well.  The dose was increased from 50 to 75 mg in mid May, and his platelet count increased to approximately 115,000.  He was tapered off steroids by mid June.  Subsequently, his platelet count decreased a bit, but has remained stable at approximately 70,000.      He has been anticoagulated with rivaroxaban which he is also tolerating well.  No bleeding issues, aside from occasional mild bruising (not unexpected).  His breathing has returned to baseline.  Physically he is limited only by pain in his back and legs related to the spine issue for which he hopes to have surgery soon.      EXAM:  He looks well.  No  unusual bruises.  No petechiae.  A more deatailed exam was not performed.    LABS:  Most recent CBC is from 2 days ago.  WBC 5.9 with normal differential, Hgb 12.2 (stable), platelets 67,000.  LFTs have been normal.    ASSESSMENT / PLAN:  1.  Chronic ITP -- currently doing well on Promacta, with platelet count stable at ~70,000.  2.  Unprovoked PE April 2017 -- needs long term anticoagulation, currently doing well on rivaroxaban.  3.  Chronic back pain -- in need of surgery    From an ITP perspective, Raymon is doing well.  He is on the max dose of Promacta.  If Dr. Singh needs a higher platelet count for surgery, options will be:  A)  Steroids:  Would suggest pulse dexamethasone (40 mg po daily x 4), starting 1 week prior to surgery.  This generally results in a prompt response that should last for at least several days (sometimes up to 3-4 weeks).  Downsides are the use of steroids which may affect wound healing.  Advantages are high likelihood of response, and avoids prolonged course of steroids.  B)  Platelet transfusion:  Would need to be done immediately prior to surgery.  Cannot predict degree of response in the setting of ITP, and would expect any response to be brief (1-3 days at most).  C)  IVIG:  Would require 2 IV infusions and would need to be done a few days prior to surgery.  Likelihood of response and duration of response would be no better than steroids, and could be less.  Advantage is avoiding steroids.  Disadvantages are very high cost, less predictable response.  Considering these options, I would recommend pulse dexamethasone (assuming Dr. Singh doesn't object to the use of steroids).    From an anticoagulation perspective, Raymon is also doing well.  Given the unprovoked nature of his PE, he needs long term anticoagulation.  For the upcoming surgery, he simply needs to hold rivaroxaban 1 or 2 days prior to surgery and resume taking it the day following surgery.    Will communicate these  recommendations to Dr. Singh prior to Raymon's upcoming appointment.  For additional input, I may be reached by calling the [facility] at [phone].    We will see Raymon back in late Sept / early October.  He will continue to have his platelet count checked weekly until after the surgery.    Total time 40 minutes, all in counseling and coordination of care.    Again, thank you for allowing me to participate in the care of your patient.      Sincerely,    Waylon Novoa MD

## 2017-06-29 NOTE — MR AVS SNAPSHOT
After Visit Summary   6/29/2017    Raymon Ace    MRN: [id number]           Patient Information     Date Of Birth          1945        Visit Information        Provider Department      6/29/2017 6:30 PM Waylon Novoa MD [facility]        Today's Diagnoses     Chronic ITP (idiopathic thrombocytopenic purpura) (H)    -  1    History of pulmonary embolism        Current use of long term anticoagulation           Follow-ups after your visit        Your next 10 appointments already scheduled     Jul 10, 2017 11:15 AM CDT   [facility]    [address] [address]   [phone]           Patient must bring picture ID.  Patient should be prepared to give a urine specimen  Please do not eat 10-12 hours before your appointment if you are coming in fasting for labs on lipids, cholesterol, or glucose (sugar).  Pregnant women should follow their Care Team instructions. Water with medications is okay. Do not drink coffee or other fluids.   If you have concerns about taking  your medications, please ask at office or if scheduling via Eyeview, send a message by clicking on Secure Messaging, Message Your Care Team.            Jul 17, 2017 11:00 AM CDT   [facility]   [facility]    [address] [address]   [phone]           Patient must bring picture ID.  Patient should be prepared to give a urine specimen  Please do not eat 10-12 hours before your appointment if you are coming in fasting for labs on lipids, cholesterol, or glucose (sugar).  Pregnant women should follow their Care Team instructions. Water with medications is okay. Do not drink coffee or other fluids.   If you have concerns about taking  your medications, please ask at office or if scheduling via Eyeview, send a message by clicking on Secure Messaging, Message  Your Care Team.            Jul 24, 2017 11:00 AM CDT   [facility]   [facility]    [address] [address]   [phone]           Patient must bring picture ID.  Patient should be prepared to give a urine specimen  Please do not eat 10-12 hours before your appointment if you are coming in fasting for labs on lipids, cholesterol, or glucose (sugar).  Pregnant women should follow their Care Team instructions. Water with medications is okay. Do not drink coffee or other fluids.   If you have concerns about taking  your medications, please ask at office or if scheduling via Augmenix, send a message by clicking on Secure Messaging, Message Your Care Team.            Jul 31, 2017 11:15 AM CDT   [facility]   [facility]    [address] [address]   [phone]           Patient must bring picture ID.  Patient should be prepared to give a urine specimen  Please do not eat 10-12 hours before your appointment if you are coming in fasting for labs on lipids, cholesterol, or glucose (sugar).  Pregnant women should follow their Care Team instructions. Water with medications is okay. Do not drink coffee or other fluids.   If you have concerns about taking  your medications, please ask at office or if scheduling via Augmenix, send a message by clicking on Secure Messaging, Message Your Care Team.            Sep 28, 2017 12:00 PM CDT   (Arrive by 11:45 AM)   Return Visit with Waylon Novoa MD   [facility] ([facility])    [address] [place] [address] [phone]              Who to contact     If you have questions or need follow up information about today's clinic visit or your schedule please contact [facility] directly at [phone].  Normal or non-critical lab and imaging results will  "be communicated to you by [employer]al [employer], letter or phone within 4 business days after the clinic has received the results. If you do not hear from us within 7 days, please contact the clinic through Fanplayr or phone. If you have a critical or abnormal lab result, we will notify you by phone as soon as possible.  Submit refill requests through Fanplayr or call your pharmacy and they will forward the refill request to us. Please allow 3 business days for your refill to be completed.          Additional Information About Your Visit        Fanplayr Information     Fanplayr gives you secure access to your electronic health record. If you see a primary care provider, you can also send messages to your care team and make appointments. If you have questions, please call your primary care clinic.  If you do not have a primary care provider, please call [phone] and they will assist you.        Care EveryWhere ID     This is your Care EveryWhere ID. This could be used by other organizations to access your [facility] medical records  [id number]        Your Vitals Were     Pulse Temperature Respirations Height Pulse Oximetry BMI (Body Mass Index)    69 98  F (36.7  C) (Oral) 18 1.689 m (5' 6.5\") 100% 31.22 kg/m2       Blood Pressure from Last 3 Encounters:   06/29/17 90/61   04/25/17 146/84   04/20/17 104/59    Weight from Last 3 Encounters:   06/29/17 89.1 kg (196 lb 6.4 oz)   04/25/17 85.7 kg (189 lb)   04/18/17 85.5 kg (188 lb 7.9 oz)              Today, you had the following     No orders found for display         Today's Medication Changes          These changes are accurate as of: 6/29/17 11:59 PM.  If you have any questions, ask your nurse or doctor.               These medicines have changed or have updated prescriptions.        Dose/Directions    predniSONE 5 MG tablet   Commonly known as:  DELTASONE   This may have changed:  Another medication with the same name was removed. Continue taking this medication, and follow the " directions you see here.   Changed by:  Waylon Novoa MD        Refills:  0                Primary Care Provider Office Phone # Fax #    Didier Hari Singh [phone]       [facility] [address]  [place] [facility] [address]        Equal Access to Services     [facility] : Hadii aad ku hadasho Soomaali, waaxda luqadaha, qaybta kaalmada adeegyada, waxay sarabjitin hayaan adeeg lakeishaduglas laellenn . So [facility] [phone].    ATENCIÓN: Si habla español, tiene a wang disposición servicios gratuitos de asistencia lingüística. Betsy al [phone].    We comply with applicable federal civil rights laws and [place] laws. We do not discriminate on the basis of race, color, national origin, age, disability sex, sexual orientation or gender identity.            Thank you!     Thank you for choosing [facility]  for your care. Our goal is always to provide you with excellent care. Hearing back from our patients is one way we can continue to improve our services. Please take a few minutes to complete the written survey that you may receive in the mail after your visit with us. Thank you!             Your Updated Medication List - Protect others around you: Learn how to safely use, store and throw away your medicines at www.disposemymeds.org.          This list is accurate as of: 6/29/17 11:59 PM.  Always use your most recent med list.                   Brand Name Dispense Instructions for use Diagnosis    allopurinol 300 MG tablet    ZYLOPRIM    90 tablet    TAKE ONE TABLET BY MOUTH ONCE DAILY *NEED  TO  BE  SEEN  FOR  MORE  REFILLS    Gout involving toe, unspecified cause, unspecified chronicity, unspecified laterality       amLODIPine 5 MG tablet    NORVASC    90 tablet    TAKE ONE TABLET BY MOUTH ONCE DAILY    Benign essential hypertension       cloNIDine 0.3 MG tablet    CATAPRES    180 tablet    TAKE ONE TABLET BY MOUTH TWICE DAILY. NEEDS TO BE SEEN FOR MORE.     Benign essential hypertension       * eltrombopag 50 MG tablet    PROMACTA    30 tablet    Take 1 tablet (50 mg) by mouth daily Administer on an empty stomach, 1 hour before or 2 hours after a meal.    Idiopathic thrombocytopenic purpura (H)       * eltrombopag 75 MG tablet    PROMACTA    30 tablet    Take 1 tablet (75 mg) by mouth daily Administer on an empty stomach, 1 hour before or 2 hours after a meal.    Idiopathic thrombocytopenic purpura (H)       enoxaparin 100 MG/ML injection    LOVENOX          folic acid 1 MG tablet    FOLVITE    90 tablet    TAKE ONE TABLET BY MOUTH ONCE DAILY    Alcohol abuse       gabapentin 300 MG capsule    NEURONTIN    270 capsule    TAKE ONE CAPSULE BY MOUTH THREE TIMES DAILY    Chronic pain       lisinopril 40 MG tablet    PRINIVIL/ZESTRIL    90 tablet    TAKE ONE TABLET BY MOUTH DAILY    Essential hypertension       omeprazole 40 MG capsule    priLOSEC    90 capsule    TAKE ONE CAPSULE BY MOUTH ONCE DAILY    Esophageal reflux       predniSONE 5 MG tablet    DELTASONE          rivaroxaban ANTICOAGULANT 20 MG Tabs tablet    XARELTO    30 tablet    Take 1 tablet (20 mg) by mouth daily (with dinner)    Deep vein thrombosis (DVT) of lower extremity, unspecified chronicity, unspecified laterality, unspecified vein (H)       spironolactone 25 MG tablet    ALDACTONE    90 tablet    TAKE ONE TABLET BY MOUTH ONCE DAILY    Benign essential hypertension       valACYclovir 1000 mg tablet    VALTREX          VIAGRA 100 MG cap/tab   Generic drug:  sildenafil     25 tablet    TAKE ONE TABLET BY MOUTH ONCE DAILY AS NEEDED FOR ERECTILE DYSFUNCTION    ED (erectile dysfunction)       * Notice:  This list has 2 medication(s) that are the same as other medications prescribed for you. Read the directions carefully, and ask your doctor or other care provider to review them with you.

## 2017-06-29 NOTE — NURSING NOTE
"Oncology Rooming Note    June 29, 2017 5:49 PM   Raymon Ace is a 71 year old male who presents for:    Chief Complaint   Patient presents with     Oncology Clinic Visit     return patien visit for hx of PE     Initial Vitals: BP 90/61  Pulse 69  Temp 98  F (36.7  C) (Oral)  Resp 18  Ht 1.689 m (5' 6.5\")  Wt 89.1 kg (196 lb 6.4 oz)  SpO2 100%  BMI 31.22 kg/m2 Estimated body mass index is 31.22 kg/(m^2) as calculated from the following:    Height as of this encounter: 1.689 m (5' 6.5\").    Weight as of this encounter: 89.1 kg (196 lb 6.4 oz). Body surface area is 2.04 meters squared.  Worst Pain (10) Comment: pelvic down to legs    No LMP for male patient.  Allergies reviewed: Yes  Medications reviewed: Yes    Medications: Medication refills not needed today.  Pharmacy name entered into [employer]:    [facility] - [place], [place] - [address] [facility] - [place], [place] - [address]   [facility] - [place], [place] - [address] [place] [address]    Clinical concerns: pelvic to leg pain - 10/10 dr.r godwin was notified.    5 minutes for nursing intake (face to face time)     Tino Avila CMA              "

## 2017-07-03 DIAGNOSIS — D69.3 IDIOPATHIC THROMBOCYTOPENIC PURPURA (H): ICD-10-CM

## 2017-07-03 LAB
BASOPHILS # BLD AUTO: 0 10E9/L (ref 0–0.2)
BASOPHILS NFR BLD AUTO: 0.5 %
DIFFERENTIAL METHOD BLD: ABNORMAL
EOSINOPHIL # BLD AUTO: 0.1 10E9/L (ref 0–0.7)
EOSINOPHIL NFR BLD AUTO: 1.8 %
ERYTHROCYTE [DISTWIDTH] IN BLOOD BY AUTOMATED COUNT: 13.9 % (ref 10–15)
HCT VFR BLD AUTO: 37.4 % (ref 40–53)
HGB BLD-MCNC: 12.5 G/DL (ref 13.3–17.7)
LYMPHOCYTES # BLD AUTO: 1.4 10E9/L (ref 0.8–5.3)
LYMPHOCYTES NFR BLD AUTO: 18.8 %
MCH RBC QN AUTO: 28.9 PG (ref 26.5–33)
MCHC RBC AUTO-ENTMCNC: 33.4 G/DL (ref 31.5–36.5)
MCV RBC AUTO: 87 FL (ref 78–100)
MONOCYTES # BLD AUTO: 0.7 10E9/L (ref 0–1.3)
MONOCYTES NFR BLD AUTO: 9.6 %
NEUTROPHILS # BLD AUTO: 5.1 10E9/L (ref 1.6–8.3)
NEUTROPHILS NFR BLD AUTO: 69.3 %
PLATELET # BLD AUTO: 69 10E9/L (ref 150–450)
RBC # BLD AUTO: 4.32 10E12/L (ref 4.4–5.9)
WBC # BLD AUTO: 7.4 10E9/L (ref 4–11)

## 2017-07-03 PROCEDURE — 36415 COLL VENOUS BLD VENIPUNCTURE: CPT | Performed by: INTERNAL MEDICINE

## 2017-07-03 PROCEDURE — 85025 COMPLETE CBC W/AUTO DIFF WBC: CPT | Performed by: INTERNAL MEDICINE

## 2017-07-10 DIAGNOSIS — D69.3 IDIOPATHIC THROMBOCYTOPENIC PURPURA (H): ICD-10-CM

## 2017-07-10 LAB
BASOPHILS # BLD AUTO: 0 10E9/L (ref 0–0.2)
BASOPHILS NFR BLD AUTO: 0.4 %
DIFFERENTIAL METHOD BLD: ABNORMAL
EOSINOPHIL # BLD AUTO: 0.1 10E9/L (ref 0–0.7)
EOSINOPHIL NFR BLD AUTO: 1.5 %
ERYTHROCYTE [DISTWIDTH] IN BLOOD BY AUTOMATED COUNT: 13.8 % (ref 10–15)
HCT VFR BLD AUTO: 37.8 % (ref 40–53)
HGB BLD-MCNC: 12.4 G/DL (ref 13.3–17.7)
IMM GRANULOCYTES # BLD: 0.1 10E9/L (ref 0–0.4)
IMM GRANULOCYTES NFR BLD: 0.9 %
LYMPHOCYTES # BLD AUTO: 1.3 10E9/L (ref 0.8–5.3)
LYMPHOCYTES NFR BLD AUTO: 19.3 %
MCH RBC QN AUTO: 28.4 PG (ref 26.5–33)
MCHC RBC AUTO-ENTMCNC: 32.8 G/DL (ref 31.5–36.5)
MCV RBC AUTO: 87 FL (ref 78–100)
MONOCYTES # BLD AUTO: 0.8 10E9/L (ref 0–1.3)
MONOCYTES NFR BLD AUTO: 11.2 %
NEUTROPHILS # BLD AUTO: 4.5 10E9/L (ref 1.6–8.3)
NEUTROPHILS NFR BLD AUTO: 66.7 %
PLATELET # BLD AUTO: 90 10E9/L (ref 150–450)
RBC # BLD AUTO: 4.36 10E12/L (ref 4.4–5.9)
WBC # BLD AUTO: 6.8 10E9/L (ref 4–11)

## 2017-07-10 PROCEDURE — 36415 COLL VENOUS BLD VENIPUNCTURE: CPT | Performed by: INTERNAL MEDICINE

## 2017-07-10 PROCEDURE — 85025 COMPLETE CBC W/AUTO DIFF WBC: CPT | Performed by: INTERNAL MEDICINE

## 2017-07-10 NOTE — PROGRESS NOTES
HEMATOLOGY CLINIC VISIT    Raymon is a 72 yo male who returns for follow up of chronic ITP, and his more recently diagnosed unprovoked pulmonary embolism that occurred in April 2017.  He is in need of repeat spine surgery -- has an appointment with Dr. Didier Singh on July 25, and hopes to have surgery as soon as possible thereafter.  His understanding is that this will be an outpatient procedure.    He was diagnosed with ITP in 2012.  He is known to be steroid responsive, but had a stable platelet count in the 30-50,000 range on no therapy for the last 3-4 years.  He was treated with Promacta in 2013 to increase his platelet count prior to spine surgery, which was effective.  He stopped Promacta about 3 weeks after that surgery, and had not been on any regular treatment for ITP until he presented in April 2017 with an unprovoked PE, for which he should be treated with long term anticoagulation (assuming we can maintain a safe platelet count).       He had already been placed back on steroids, and when we first saw him in late April we restarted Promacta which he is tolerating well.  The dose was increased from 50 to 75 mg in mid May, and his platelet count increased to approximately 115,000.  He was tapered off steroids by mid June.  Subsequently, his platelet count decreased a bit, but has remained stable at approximately 70,000.      He has been anticoagulated with rivaroxaban which he is also tolerating well.  No bleeding issues, aside from occasional mild bruising (not unexpected).  His breathing has returned to baseline.  Physically he is limited only by pain in his back and legs related to the spine issue for which he hopes to have surgery soon.      EXAM:  He looks well.  No unusual bruises.  No petechiae.  A more deatailed exam was not performed.    LABS:  Most recent CBC is from 2 days ago.  WBC 5.9 with normal differential, Hgb 12.2 (stable), platelets 67,000.  LFTs have been normal.      ASSESSMENT /  PLAN:  1.  Chronic ITP -- currently doing well on Promacta, with platelet count stable at ~70,000.  2.  Unprovoked PE April 2017 -- needs long term anticoagulation, currently doing well on rivaroxaban.  3.  Chronic back pain -- in need of surgery    From an ITP perspective, Raymon is doing well.  He is on the max dose of Promacta.  If Dr. Singh needs a higher platelet count for surgery, options will be:  A)  Steroids:  Would suggest pulse dexamethasone (40 mg po daily x 4), starting 1 week prior to surgery.  This generally results in a prompt response that should last for at least several days (sometimes up to 3-4 weeks).  Downsides are the use of steroids which may affect wound healing.  Advantages are high likelihood of response, and avoids prolonged course of steroids.  B)  Platelet transfusion:  Would need to be done immediately prior to surgery.  Cannot predict degree of response in the setting of ITP, and would expect any response to be brief (1-3 days at most).  C)  IVIG:  Would require 2 IV infusions and would need to be done a few days prior to surgery.  Likelihood of response and duration of response would be no better than steroids, and could be less.  Advantage is avoiding steroids.  Disadvantages are very high cost, less predictable response.  Considering these options, I would recommend pulse dexamethasone (assuming Dr. Singh doesn't object to the use of steroids).    From an anticoagulation perspective, Raymon is also doing well.  Given the unprovoked nature of his PE, he needs long term anticoagulation.  For the upcoming surgery, he simply needs to hold rivaroxaban 1 or 2 days prior to surgery and resume taking it the day following surgery.    Will communicate these recommendations to Dr. Singh prior to Raymon's upcoming appointment.  For additional input, I may be reached by calling the [facility] at [phone].    We will see Raymon back in late Sept / early October.   He will continue to have his platelet count checked weekly until after the surgery.      Total time 40 minutes, all in counseling and coordination of care.      Waylon Novoa MD  Associate Professor of Medicine  Division of Hematology, Oncology, and Transplantation  Director, [facility]

## 2017-07-17 ENCOUNTER — OFFICE VISIT (OUTPATIENT)
Dept: FAMILY MEDICINE | Facility: CLINIC | Age: 72
End: 2017-07-17
Payer: COMMERCIAL

## 2017-07-17 VITALS
SYSTOLIC BLOOD PRESSURE: 98 MMHG | DIASTOLIC BLOOD PRESSURE: 60 MMHG | RESPIRATION RATE: 18 BRPM | TEMPERATURE: 98.4 F | HEIGHT: 67 IN | HEART RATE: 68 BPM | BODY MASS INDEX: 30.29 KG/M2 | WEIGHT: 193 LBS

## 2017-07-17 DIAGNOSIS — D69.3 IDIOPATHIC THROMBOCYTOPENIC PURPURA (H): ICD-10-CM

## 2017-07-17 DIAGNOSIS — M19.011 PRIMARY OSTEOARTHRITIS OF RIGHT SHOULDER: Primary | ICD-10-CM

## 2017-07-17 DIAGNOSIS — Z79.01 LONG TERM CURRENT USE OF ANTICOAGULANT THERAPY: ICD-10-CM

## 2017-07-17 DIAGNOSIS — K70.9 ALCOHOLIC LIVER DAMAGE (H): ICD-10-CM

## 2017-07-17 LAB
BASOPHILS # BLD AUTO: 0 10E9/L (ref 0–0.2)
BASOPHILS NFR BLD AUTO: 0.3 %
DIFFERENTIAL METHOD BLD: ABNORMAL
EOSINOPHIL # BLD AUTO: 0.1 10E9/L (ref 0–0.7)
EOSINOPHIL NFR BLD AUTO: 1.2 %
ERYTHROCYTE [DISTWIDTH] IN BLOOD BY AUTOMATED COUNT: 14.4 % (ref 10–15)
HCT VFR BLD AUTO: 36.6 % (ref 40–53)
HGB BLD-MCNC: 12.2 G/DL (ref 13.3–17.7)
IMM GRANULOCYTES # BLD: 0 10E9/L (ref 0–0.4)
IMM GRANULOCYTES NFR BLD: 0.4 %
LYMPHOCYTES # BLD AUTO: 1.3 10E9/L (ref 0.8–5.3)
LYMPHOCYTES NFR BLD AUTO: 19.2 %
MCH RBC QN AUTO: 28.7 PG (ref 26.5–33)
MCHC RBC AUTO-ENTMCNC: 33.3 G/DL (ref 31.5–36.5)
MCV RBC AUTO: 86 FL (ref 78–100)
MONOCYTES # BLD AUTO: 0.6 10E9/L (ref 0–1.3)
MONOCYTES NFR BLD AUTO: 9.5 %
NEUTROPHILS # BLD AUTO: 4.7 10E9/L (ref 1.6–8.3)
NEUTROPHILS NFR BLD AUTO: 69.4 %
PLATELET # BLD AUTO: 66 10E9/L (ref 150–450)
RBC # BLD AUTO: 4.25 10E12/L (ref 4.4–5.9)
WBC # BLD AUTO: 6.8 10E9/L (ref 4–11)

## 2017-07-17 PROCEDURE — 85025 COMPLETE CBC W/AUTO DIFF WBC: CPT | Performed by: INTERNAL MEDICINE

## 2017-07-17 PROCEDURE — 36415 COLL VENOUS BLD VENIPUNCTURE: CPT | Performed by: INTERNAL MEDICINE

## 2017-07-17 PROCEDURE — 99214 OFFICE O/P EST MOD 30 MIN: CPT | Performed by: FAMILY MEDICINE

## 2017-07-17 NOTE — MR AVS SNAPSHOT
After Visit Summary   7/17/2017    Raymon Ace    MRN: [id number]           Patient Information     Date Of Birth          1945        Visit Information        Provider Department      7/17/2017 1:20 PM Andreas Lombardo MD [facility]        Today's Diagnoses     Primary osteoarthritis of right shoulder    -  1    Idiopathic thrombocytopenic purpura (H)          Care Instructions      Understanding Glenohumeral Osteoarthritis    A joint is a place where two bones meet. The glenohumeral joint is the main joint in the shoulder. It is a ball-and-socket joint. It is formed where the head or ball of the upper arm bone fits into the shallow socket on the shoulder blade. The upper arm bone is called the humerus. The socket is called the glenoid. This is how the joint gets its name. When the glenohumeral joint is healthy, it helps you rotate and move your arm freely without any pain.  With glenohumeral osteoarthritis, the parts of the joint wear down. This can cause pain, stiffness, and limited movement. Glenohumeral osteoarthritis is a long-term condition. But treatment can help manage symptoms, increase movement, and improve function.  How glenohumeral osteoarthritis occurs  All joints contain a smooth tissue called cartilage. Cartilage cushions the ends of bones. This helps them glide smoothly against each other. Glenohumeral osteoarthritis occurs when cartilage in the glenohumeral joint begins to break down. The ball and socket bones may then become exposed and rub together. The bones may become rough and pitted and may begin to wear away. This prevents smooth movement of the joint.  Causes of glenohumeral osteoarthritis  In most cases, the condition develops because of damage from a shoulder injury, such as a dislocated or broken shoulder. Normal wear and tear of the joint from aging can also cause osteoarthritis.  Symptoms of glenohumeral osteoarthritis  Symptoms tend to develop  slowly over months to years. Shoulder pain is common. The pain is often worse with activity, and may get better with rest. Over time, the pain may worsen, and may even occur at night.  Stiffness in the shoulder is also common. It may be hard to move or use the arm and shoulder as you normally would. This can make even simple tasks difficult, such as reaching for an item on a shelf or getting dressed.  Treating glenohumeral osteoarthritis  Treatment may include:    Resting the shoulder. This involves limiting certain movements, such as reaching, lifting, pushing, or pulling. These can cause further wear and tear of the joint and make symptoms worse.    Cold or heat packs. Cold packs can help reduce pain and swelling. Heat packs do not help with swelling. But they may help relieve pain and stiffness, especially before physical therapy or activity.    Pain medicines. These help relieve pain and swelling. Medicines may be prescribed or bought over the counter.    Injections of medicine into the joint. These help relieve symptoms for a time.    Physical therapy and exercises.  These help improve strength and range of motion in the joint. This may reduce shoulder stiffness and improve function.    Certain assistive devices. These are tools that can be used to make activities of daily life easier. For instance, a  grasper  can help you reach and grab items.    Alternative treatments. These include options, such as acupuncture or massage. They may help relieve pain and stiffness in some cases.  If other treatments don t do enough to relieve symptoms, you may need surgery. During surgery, the damaged joint is removed. It is then replaced with an artificial joint. This can help relieve symptoms and improve function to near normal.     When to call your healthcare provider  Call your healthcare provider right away if you have any of these:    Fever of 100.4 F (38 C) or higher, or as directed    Symptoms that don t get better  with treatment, or get worse    New symptoms   Date Last Reviewed: 3/10/2016    5629-9144 [employer]. [address] [place] [address], [place] [address]. All rights reserved. This information is not intended as a substitute for professional medical care. Always follow your healthcare professional's instructions.                Follow-ups after your visit        Additional Services     RADIOLOGY REFERRAL       Your provider has referred you to: PREFERRED PROVIDERS: none     Please be aware that coverage of these services is subject to the terms and limitations of your health insurance plan.  Call member services at your health plan with any benefit or coverage questions.      Please bring the following to your appointment:    >>   Any x-rays, CTs or MRIs which have been performed.  Contact the facility where they were done to arrange for  prior to your scheduled appointment.    >>   List of current medications   >>   This referral request   >>   Any documents/labs given to you for this referral    Prior authorization is required for MRI/MRA, CT, Dexa Scans and Worker's Compensation cases.                  Your next 10 appointments already scheduled     Jul 24, 2017 11:00 AM CDT   LAB with PI LAB   [facility] ([address] [address]   [phone]           Patient must bring picture ID.  Patient should be prepared to give a urine specimen  Please do not eat 10-12 hours before your appointment if you are coming in fasting for labs on lipids, cholesterol, or glucose (sugar).  Pregnant women should follow their Care Team instructions. Water with medications is okay. Do not drink coffee or other fluids.   If you have concerns about taking  your medications, please ask at office or if scheduling via [facility], send a message by clicking on Secure Messaging, Message Your Care Team.            Jul 31, 2017 11:15 AM CDT   [facility]    [address] [facility] [address]   [phone]           Patient must bring picture ID.  Patient should be prepared to give a urine specimen  Please do not eat 10-12 hours before your appointment if you are coming in fasting for labs on lipids, cholesterol, or glucose (sugar).  Pregnant women should follow their Care Team instructions. Water with medications is okay. Do not drink coffee or other fluids.   If you have concerns about taking  your medications, please ask at office or if scheduling via [employer], send a message by clicking on Secure Messaging, Message Your Care Team.            Sep 28, 2017 12:00 PM CDT   (Arrive by 11:45 AM)   Return Visit with Waylon Novoa MD   [facility] ([facility])    [address] [address] [phone]              Who to contact     If you have questions or need follow up information about today's clinic visit or your schedule please contact [facility] directly at [phone].  Normal or non-critical lab and imaging results will be communicated to you by [employer], letter or phone within 4 business days after the clinic has received the results. If you do not hear from us within 7 days, please contact the clinic through [employer] or phone. If you have a critical or abnormal lab result, we will notify you by phone as soon as possible.  Submit refill requests through [employer] or call your pharmacy and they will forward the refill request to us. Please allow 3 business days for your refill to be completed.          Additional Information About Your Visit        [employer] Information     [employer] gives you secure access to your electronic health record. If you see a primary care provider, you can also send messages to your care team and make appointments. If you have questions, please call your primary care clinic.  " If you do not have a primary care provider, please call [phone] and they will assist you.        Care EveryWhere ID     This is your Care EveryWhere ID. This could be used by other organizations to access your [facility] medical records  [id number]        Your Vitals Were     Pulse Temperature Respirations Height BMI (Body Mass Index)       68 98.4  F (36.9  C) (Tympanic) 18 5' 6.5\" (1.689 m) 30.68 kg/m2        Blood Pressure from Last 3 Encounters:   07/17/17 98/60   06/29/17 90/61   04/25/17 146/84    Weight from Last 3 Encounters:   07/17/17 193 lb (87.5 kg)   06/29/17 196 lb 6.4 oz (89.1 kg)   04/25/17 189 lb (85.7 kg)              We Performed the Following     RADIOLOGY REFERRAL        Primary Care Provider Office Phone # Fax #    Didier Singh [phone]       [facility] [address]  [facility] [address]        Equal Access to Services     [facility]: Hadii aad ku hadasho Soandria, waaxda luqadaha, qaybta kaalmada puneet, gonzales holguin . So [facility] [phone].    ATENCIÓN: Si habla español, tiene a wang disposición servicios gratuitos de asistencia lingüística. Betsy al [phone].    We comply with applicable federal civil rights laws and [place] laws. We do not discriminate on the basis of race, color, national origin, age, disability sex, sexual orientation or gender identity.            Thank you!     Thank you for choosing [facility]  for your care. Our goal is always to provide you with excellent care. Hearing back from our patients is one way we can continue to improve our services. Please take a few minutes to complete the written survey that you may receive in the mail after your visit with us. Thank you!             Your Updated Medication List - Protect others around you: Learn how to safely use, store and throw away your medicines at www.disposemymeds.org.          This list is accurate as " of: 7/17/17  1:53 PM.  Always use your most recent med list.                   Brand Name Dispense Instructions for use Diagnosis    allopurinol 300 MG tablet    ZYLOPRIM    90 tablet    TAKE ONE TABLET BY MOUTH ONCE DAILY *NEED  TO  BE  SEEN  FOR  MORE  REFILLS    Gout involving toe, unspecified cause, unspecified chronicity, unspecified laterality       amLODIPine 5 MG tablet    NORVASC    90 tablet    TAKE ONE TABLET BY MOUTH ONCE DAILY    Benign essential hypertension       cloNIDine 0.3 MG tablet    CATAPRES    180 tablet    TAKE ONE TABLET BY MOUTH TWICE DAILY. NEEDS TO BE SEEN FOR MORE.    Benign essential hypertension       * eltrombopag 50 MG tablet    PROMACTA    30 tablet    Take 1 tablet (50 mg) by mouth daily Administer on an empty stomach, 1 hour before or 2 hours after a meal.    Idiopathic thrombocytopenic purpura (H)       * eltrombopag 75 MG tablet    PROMACTA    30 tablet    Take 1 tablet (75 mg) by mouth daily Administer on an empty stomach, 1 hour before or 2 hours after a meal.    Idiopathic thrombocytopenic purpura (H)       enoxaparin 100 MG/ML injection    LOVENOX          folic acid 1 MG tablet    FOLVITE    90 tablet    TAKE ONE TABLET BY MOUTH ONCE DAILY    Alcohol abuse       gabapentin 300 MG capsule    NEURONTIN    270 capsule    TAKE ONE CAPSULE BY MOUTH THREE TIMES DAILY    Chronic pain       lisinopril 40 MG tablet    PRINIVIL/ZESTRIL    90 tablet    TAKE ONE TABLET BY MOUTH DAILY    Essential hypertension       omeprazole 40 MG capsule    priLOSEC    90 capsule    TAKE ONE CAPSULE BY MOUTH ONCE DAILY    Esophageal reflux       predniSONE 5 MG tablet    DELTASONE          rivaroxaban ANTICOAGULANT 20 MG Tabs tablet    XARELTO    30 tablet    Take 1 tablet (20 mg) by mouth daily (with dinner)    Deep vein thrombosis (DVT) of lower extremity, unspecified chronicity, unspecified laterality, unspecified vein (H)       spironolactone 25 MG tablet    ALDACTONE    90 tablet    TAKE ONE  TABLET BY MOUTH ONCE DAILY    Benign essential hypertension       valACYclovir 1000 mg tablet    VALTREX          VIAGRA 100 MG cap/tab   Generic drug:  sildenafil     25 tablet    TAKE ONE TABLET BY MOUTH ONCE DAILY AS NEEDED FOR ERECTILE DYSFUNCTION    ED (erectile dysfunction)       * Notice:  This list has 2 medication(s) that are the same as other medications prescribed for you. Read the directions carefully, and ask your doctor or other care provider to review them with you.

## 2017-07-17 NOTE — PATIENT INSTRUCTIONS
Understanding Glenohumeral Osteoarthritis    A joint is a place where two bones meet. The glenohumeral joint is the main joint in the shoulder. It is a ball-and-socket joint. It is formed where the head or ball of the upper arm bone fits into the shallow socket on the shoulder blade. The upper arm bone is called the humerus. The socket is called the glenoid. This is how the joint gets its name. When the glenohumeral joint is healthy, it helps you rotate and move your arm freely without any pain.  With glenohumeral osteoarthritis, the parts of the joint wear down. This can cause pain, stiffness, and limited movement. Glenohumeral osteoarthritis is a long-term condition. But treatment can help manage symptoms, increase movement, and improve function.  How glenohumeral osteoarthritis occurs  All joints contain a smooth tissue called cartilage. Cartilage cushions the ends of bones. This helps them glide smoothly against each other. Glenohumeral osteoarthritis occurs when cartilage in the glenohumeral joint begins to break down. The ball and socket bones may then become exposed and rub together. The bones may become rough and pitted and may begin to wear away. This prevents smooth movement of the joint.  Causes of glenohumeral osteoarthritis  In most cases, the condition develops because of damage from a shoulder injury, such as a dislocated or broken shoulder. Normal wear and tear of the joint from aging can also cause osteoarthritis.  Symptoms of glenohumeral osteoarthritis  Symptoms tend to develop slowly over months to years. Shoulder pain is common. The pain is often worse with activity, and may get better with rest. Over time, the pain may worsen, and may even occur at night.  Stiffness in the shoulder is also common. It may be hard to move or use the arm and shoulder as you normally would. This can make even simple tasks difficult, such as reaching for an item on a shelf or getting dressed.  Treating  glenohumeral osteoarthritis  Treatment may include:    Resting the shoulder. This involves limiting certain movements, such as reaching, lifting, pushing, or pulling. These can cause further wear and tear of the joint and make symptoms worse.    Cold or heat packs. Cold packs can help reduce pain and swelling. Heat packs do not help with swelling. But they may help relieve pain and stiffness, especially before physical therapy or activity.    Pain medicines. These help relieve pain and swelling. Medicines may be prescribed or bought over the counter.    Injections of medicine into the joint. These help relieve symptoms for a time.    Physical therapy and exercises.  These help improve strength and range of motion in the joint. This may reduce shoulder stiffness and improve function.    Certain assistive devices. These are tools that can be used to make activities of daily life easier. For instance, a  grasper  can help you reach and grab items.    Alternative treatments. These include options, such as acupuncture or massage. They may help relieve pain and stiffness in some cases.  If other treatments don t do enough to relieve symptoms, you may need surgery. During surgery, the damaged joint is removed. It is then replaced with an artificial joint. This can help relieve symptoms and improve function to near normal.     When to call your healthcare provider  Call your healthcare provider right away if you have any of these:    Fever of 100.4 F (38 C) or higher, or as directed    Symptoms that don t get better with treatment, or get worse    New symptoms   Date Last Reviewed: 3/10/2016    2543-5226 [employer]. [address] [place] [address], [place] [address]. All rights reserved. This information is not intended as a substitute for professional medical care. Always follow your healthcare professional's instructions.

## 2017-07-17 NOTE — NURSING NOTE
"Chief Complaint   Patient presents with     Musculoskeletal Problem       Initial BP 98/60 (Cuff Size: Adult Regular)  Pulse 68  Temp 98.4  F (36.9  C) (Tympanic)  Resp 18  Ht 5' 6.5\" (1.689 m)  Wt 193 lb (87.5 kg)  BMI 30.68 kg/m2 Estimated body mass index is 30.68 kg/(m^2) as calculated from the following:    Height as of this encounter: 5' 6.5\" (1.689 m).    Weight as of this encounter: 193 lb (87.5 kg).  Medication Reconciliation: incomplete- patient don't know the names of his meds    Health Maintenance that is potentially due pending provider review:  NONE    n/a    "

## 2017-07-17 NOTE — PROGRESS NOTES
SUBJECTIVE:                                                    Raymon Ace is a 71 year old male who presents to clinic today for the following health issues:      Musculoskeletal problem/pain      Duration: quite awhile    Description  Location: Right shoulder    Intensity:  moderate, severe    Accompanying signs and symptoms: radiation of pain to shoulder and weakness of arm    History  Previous similar problem: YES- had injection a few months ago that helped     Previous evaluation:  x-ray- had fluoroscopy guided shoulder injection in October last year, worked well for a few months    Precipitating or alleviating factors:  Trauma or overuse: no   Aggravating factors include: lifting, exercise and overuse    Therapies tried and outcome: rest/inactivity      Had PE in mid-April this year, taking rivaroxaban.     SHOULDER RIGHT THREE OR MORE VIEWS   9/27/2016 11:42 AM      HISTORY:  Pain in right shoulder.         IMPRESSION: Moderate AC arthrosis. Moderate glenohumeral  osteoarthritis. There is a 1.7 cm calcified density at the  anteroinferior aspect of the shoulder. This could represent a loose  body (possibly in the biceps tendon sheath).      Problem list and histories reviewed & adjusted, as indicated.  Additional history: as documented    Patient Active Problem List   Diagnosis     Obstructive sleep apnea     Restless legs syndrome (RLS)     Polyneuropathy in other diseases classified elsewhere (H)     Esophageal reflux     Lumbar spinal stenosis     Steroid-induced hyperglycemia     Fatty liver/ 1-14      Atherosclerosis     ITP (idiopathic thrombocytopenic purpura) since 3-13 back surgery      Alcohol consuption of more than two drinks per day     Preventive measure     Glucose intolerance (impaired glucose tolerance)  HgbA!C = 5.4     Back pain since 1988 s/po surgery 1992,1996 and 3-13/ Dr Singh      Alcoholic liver damage (H)     Diverticulosis of large intestine     Elevated liver enzymes AST       Family history of ischemic heart disease     Family history of diabetes mellitus     Risk for falls     History of colonic polyps     Thrombocytopenia-chronic     Change in bowel habits since 1-14: diarrhea-thinks better off benicar     ED (erectile dysfunction)     Alcohol abuse since teens      Seborrheic dermatitis Lt temple      Colon polyp in 2010 and 9-15 -->  rept in 2020     Benign essential hypertension     Hypercholesterolemia     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, unspecified laterality, sequela     ACP (advance care planning)     Heel spur, left     Personal history of tobacco use, presenting hazards to health: [age] y/.o@1ppd=23 pk yr hx      Non morbid obesity due to excess calories w comorbid HTN      Gout involving toe, unspecified cause, unspecified chronicity, unspecified laterality     Need for prophylactic vaccination against Streptococcus pneumoniae (pneumococcus)     Chronic right dominant  shoulder pain w ltd ROM  since 3-16     Gastroesophageal reflux disease with esophagitis     Pulmonary embolism (H)     Pulmonary emboli (H)     Past Surgical History:   Procedure Laterality Date     APPENDECTOMY  1956     BACK SURGERY  1992, 1996    trimmed L4-L5, Fusion L4-L5     BONE MARROW BIOPSY, BONE SPECIMEN, NEEDLE/TROCAR  10/24/2012    Procedure: BIOPSY BONE MARROW;  BONE MARROW BIOPSY WITH ASPIRATE (AREVALO ORDERING);  Surgeon: Terri Hickey MD;  Location:  GI     FACIAL RECONSTRUCTION SURGERY  1965    jaw fracture     HC TOOTH EXTRACTION W/FORCEP  1990s       Social History   Substance Use Topics     Smoking status: Former Smoker     Years: 28.00     Quit date: 9/26/1982     Smokeless tobacco: Never Used     Alcohol use 0.0 oz/week     0 Standard drinks or equivalent per week      Comment: 4-5 drinks/day     Family History   Problem Relation Age of Onset     Unknown/Adopted Mother      Unknown/Adopted Father      HEART DISEASE Sister      DIABETES No family hx of       Coronary Artery Disease No family hx of      Hypertension No family hx of      Hyperlipidemia No family hx of      CEREBROVASCULAR DISEASE No family hx of      Breast Cancer No family hx of      Colon Cancer No family hx of      Prostate Cancer No family hx of      Other Cancer No family hx of      Depression No family hx of      Anxiety Disorder No family hx of      MENTAL ILLNESS No family hx of      Substance Abuse No family hx of      Anesthesia Reaction No family hx of      Asthma No family hx of      OSTEOPOROSIS No family hx of      Genetic Disorder No family hx of      Thyroid Disease No family hx of      Obesity No family hx of          Current Outpatient Prescriptions   Medication Sig Dispense Refill     enoxaparin (LOVENOX) 100 MG/ML injection        valACYclovir (VALTREX) 1000 mg tablet        predniSONE (DELTASONE) 5 MG tablet        eltrombopag (PROMACTA) 75 MG tablet Take 1 tablet (75 mg) by mouth daily Administer on an empty stomach, 1 hour before or 2 hours after a meal. 30 tablet 5     rivaroxaban ANTICOAGULANT (XARELTO) 20 MG TABS tablet Take 1 tablet (20 mg) by mouth daily (with dinner) 30 tablet 3     eltrombopag (PROMACTA) 50 MG tablet Take 1 tablet (50 mg) by mouth daily Administer on an empty stomach, 1 hour before or 2 hours after a meal. 30 tablet 3     lisinopril (PRINIVIL/ZESTRIL) 40 MG tablet TAKE ONE TABLET BY MOUTH DAILY 90 tablet 2     folic acid (FOLVITE) 1 MG tablet TAKE ONE TABLET BY MOUTH ONCE DAILY 90 tablet 2     allopurinol (ZYLOPRIM) 300 MG tablet TAKE ONE TABLET BY MOUTH ONCE DAILY *NEED  TO  BE  SEEN  FOR  MORE  REFILLS 90 tablet 3     cloNIDine (CATAPRES) 0.3 MG tablet TAKE ONE TABLET BY MOUTH TWICE DAILY. NEEDS TO BE SEEN FOR MORE. 180 tablet 3     amLODIPine (NORVASC) 5 MG tablet TAKE ONE TABLET BY MOUTH ONCE DAILY 90 tablet 3     omeprazole (PRILOSEC) 40 MG capsule TAKE ONE CAPSULE BY MOUTH ONCE DAILY 90 capsule 3     spironolactone (ALDACTONE) 25 MG tablet TAKE ONE  TABLET BY MOUTH ONCE DAILY 90 tablet 3     gabapentin (NEURONTIN) 300 MG capsule TAKE ONE CAPSULE BY MOUTH THREE TIMES DAILY 270 capsule 0     VIAGRA 100 MG tablet TAKE ONE TABLET BY MOUTH ONCE DAILY AS NEEDED FOR ERECTILE DYSFUNCTION 25 tablet 3     Allergies   Allergen Reactions     Benicar [Olmesartan] Diarrhea     Onset 1-14   On benicar since 2012     Recent Labs   Lab Test  06/19/17   1105  06/12/17   1115  06/05/17   1110   04/25/17   1718  04/20/17   0618  04/19/17   0615   05/16/16   1220   01/10/14   1349  10/09/13   1332   12/22/12   0537   12/14/12   1600   10/22/12   1458  09/26/12   1237   A1C   --    --    --    --    --    --    --    --   5.4   --   5.4   --    --    --    --   5.6   --    --    --    LDL   --    --    --    --    --    --    --    --    --    --    --   71   --   140*   --    --    --    --   140*   HDL   --    --    --    --    --    --    --    --    --    --    --   83   --   71   --    --    --    --   55   TRIG   --    --    --    --    --    --    --    --    --    --    --   83   --   54   --    --    --    --   135   ALT  22  21  26   < >   --   26   --    < >  65   < >  70  51   < >   --    < >   --    < >  160*  69   CR   --    --    --    --    --   0.78  0.84   < >   --    --   1.00  0.90   < >   --    < >   --    < >  0.67  1.10   GFRESTIMATED   --    --    --    --    --   >90  Non  GFR Calc    89   < >   --    --   74  84   < >   --    < >   --    < >  >90  67   GFRESTBLACK   --    --    --    --    --   >90   GFR Calc    >90   GFR Calc     < >   --    --   90  >90   < >   --    < >   --    < >  >90  81   POTASSIUM   --    --    --    --    --   3.9  4.6   < >   --    < >  4.2  4.3   < >   --    < >   --    < >  3.8  3.9   TSH   --    --    --    --   1.76   --    --    --    --    --    --    --    --    --    --    --    --   0.83   --     < > = values in this interval not displayed.      BP Readings from Last 3  "Encounters:   07/17/17 98/60   06/29/17 90/61   04/25/17 146/84    Wt Readings from Last 3 Encounters:   07/17/17 193 lb (87.5 kg)   06/29/17 196 lb 6.4 oz (89.1 kg)   04/25/17 189 lb (85.7 kg)                  Labs reviewed in EPIC    Reviewed and updated as needed this visit by clinical staff       Reviewed and updated as needed this visit by Provider         ROS:  Constitutional, HEENT, cardiovascular, pulmonary, gi and gu systems are negative, except as otherwise noted.    OBJECTIVE:     BP 98/60 (Cuff Size: Adult Regular)  Pulse 68  Temp 98.4  F (36.9  C) (Tympanic)  Resp 18  Ht 5' 6.5\" (1.689 m)  Wt 193 lb (87.5 kg)  BMI 30.68 kg/m2  Body mass index is 30.68 kg/(m^2).  GENERAL: alert, no distress and obese  NECK: no adenopathy, no asymmetry, masses, or scars and thyroid normal to palpation  RESP: lungs clear to auscultation - no rales, rhonchi or wheezes  CV: regular rates and rhythm, normal S1 S2, no S3 or S4 and no murmur, click or rub  MS: right shoulder: ROM limited in all direction due to pain, no skin discoloration, swelling or warmth noted, pulses 2+, sensation to touch and pressure intact  NEURO: grossly intact      ASSESSMENT/PLAN:           ICD-10-CM    1. Primary osteoarthritis of right shoulder M19.011 RADIOLOGY REFERRAL     XR Joint Injection Major Right   2. Idiopathic thrombocytopenic purpura (H) D69.3    3. Alcoholic liver damage (H) K70.9    4. Long term current use of anticoagulant therapy Z79.01      Symptoms likely secondary to right shoulder osteoarthritis. Shoulder injection has worked well in the past. Past medical history significant for idiopathic thrombocytopenic purpura, alcohol liver disease, long-term anticoagulation. Would like to have repeat shoulder injection now. Physical therapy was tried without much success in the past. Medications reviewed no changes made. Radiology referral placed. Written information provided as below. All questions answered.        Patient " Instructions       Understanding Glenohumeral Osteoarthritis    A joint is a place where two bones meet. The glenohumeral joint is the main joint in the shoulder. It is a ball-and-socket joint. It is formed where the head or ball of the upper arm bone fits into the shallow socket on the shoulder blade. The upper arm bone is called the humerus. The socket is called the glenoid. This is how the joint gets its name. When the glenohumeral joint is healthy, it helps you rotate and move your arm freely without any pain.  With glenohumeral osteoarthritis, the parts of the joint wear down. This can cause pain, stiffness, and limited movement. Glenohumeral osteoarthritis is a long-term condition. But treatment can help manage symptoms, increase movement, and improve function.  How glenohumeral osteoarthritis occurs  All joints contain a smooth tissue called cartilage. Cartilage cushions the ends of bones. This helps them glide smoothly against each other. Glenohumeral osteoarthritis occurs when cartilage in the glenohumeral joint begins to break down. The ball and socket bones may then become exposed and rub together. The bones may become rough and pitted and may begin to wear away. This prevents smooth movement of the joint.  Causes of glenohumeral osteoarthritis  In most cases, the condition develops because of damage from a shoulder injury, such as a dislocated or broken shoulder. Normal wear and tear of the joint from aging can also cause osteoarthritis.  Symptoms of glenohumeral osteoarthritis  Symptoms tend to develop slowly over months to years. Shoulder pain is common. The pain is often worse with activity, and may get better with rest. Over time, the pain may worsen, and may even occur at night.  Stiffness in the shoulder is also common. It may be hard to move or use the arm and shoulder as you normally would. This can make even simple tasks difficult, such as reaching for an item on a shelf or getting  dressed.  Treating glenohumeral osteoarthritis  Treatment may include:    Resting the shoulder. This involves limiting certain movements, such as reaching, lifting, pushing, or pulling. These can cause further wear and tear of the joint and make symptoms worse.    Cold or heat packs. Cold packs can help reduce pain and swelling. Heat packs do not help with swelling. But they may help relieve pain and stiffness, especially before physical therapy or activity.    Pain medicines. These help relieve pain and swelling. Medicines may be prescribed or bought over the counter.    Injections of medicine into the joint. These help relieve symptoms for a time.    Physical therapy and exercises.  These help improve strength and range of motion in the joint. This may reduce shoulder stiffness and improve function.    Certain assistive devices. These are tools that can be used to make activities of daily life easier. For instance, a  grasper  can help you reach and grab items.    Alternative treatments. These include options, such as acupuncture or massage. They may help relieve pain and stiffness in some cases.  If other treatments don t do enough to relieve symptoms, you may need surgery. During surgery, the damaged joint is removed. It is then replaced with an artificial joint. This can help relieve symptoms and improve function to near normal.     When to call your healthcare provider  Call your healthcare provider right away if you have any of these:    Fever of 100.4 F (38 C) or higher, or as directed    Symptoms that don t get better with treatment, or get worse    New symptoms   Date Last Reviewed: 3/10/2016    [id number] [employer]. [address] [place] [address], [place] [address]. All rights reserved. This information is not intended as a substitute for professional medical care. Always follow your healthcare professional's instructions.            Andreas Lombardo MD  [facility]

## 2017-07-19 ENCOUNTER — CARE COORDINATION (OUTPATIENT)
Dept: ONCOLOGY | Facility: CLINIC | Age: 72
End: 2017-07-19

## 2017-07-19 NOTE — PROGRESS NOTES
The patient was called and his CBC results were reviewed for the past 3 weeks, including the platelet count of 67 on 7/17/17.  He was instructed to continue to take the Promacta 75mg daily and to continue to have a CBC checked weekly.  He said that he is going to see Dr. Singh, his back surgeon, this week and he will bring Dr. Novoa's clinic note with him to the appointment.

## 2017-07-24 DIAGNOSIS — D69.3 IDIOPATHIC THROMBOCYTOPENIC PURPURA (H): ICD-10-CM

## 2017-07-24 PROCEDURE — 36415 COLL VENOUS BLD VENIPUNCTURE: CPT | Performed by: INTERNAL MEDICINE

## 2017-07-24 PROCEDURE — 85025 COMPLETE CBC W/AUTO DIFF WBC: CPT | Performed by: INTERNAL MEDICINE

## 2017-07-25 ENCOUNTER — TRANSFERRED RECORDS (OUTPATIENT)
Dept: HEALTH INFORMATION MANAGEMENT | Facility: CLINIC | Age: 72
End: 2017-07-25

## 2017-07-25 ENCOUNTER — CARE COORDINATION (OUTPATIENT)
Dept: ONCOLOGY | Facility: CLINIC | Age: 72
End: 2017-07-25

## 2017-07-25 LAB
BASOPHILS # BLD AUTO: 0 10E9/L (ref 0–0.2)
BASOPHILS NFR BLD AUTO: 0.6 %
DIFFERENTIAL METHOD BLD: ABNORMAL
EOSINOPHIL # BLD AUTO: 0.1 10E9/L (ref 0–0.7)
EOSINOPHIL NFR BLD AUTO: 1.8 %
ERYTHROCYTE [DISTWIDTH] IN BLOOD BY AUTOMATED COUNT: 14.9 % (ref 10–15)
HCT VFR BLD AUTO: 38.8 % (ref 40–53)
HGB BLD-MCNC: 12.5 G/DL (ref 13.3–17.7)
IMM GRANULOCYTES # BLD: 0 10E9/L (ref 0–0.4)
IMM GRANULOCYTES NFR BLD: 0.6 %
LYMPHOCYTES # BLD AUTO: 1.5 10E9/L (ref 0.8–5.3)
LYMPHOCYTES NFR BLD AUTO: 22.9 %
MCH RBC QN AUTO: 28.2 PG (ref 26.5–33)
MCHC RBC AUTO-ENTMCNC: 32.2 G/DL (ref 31.5–36.5)
MCV RBC AUTO: 87 FL (ref 78–100)
MONOCYTES # BLD AUTO: 0.6 10E9/L (ref 0–1.3)
MONOCYTES NFR BLD AUTO: 8.8 %
NEUTROPHILS # BLD AUTO: 4.4 10E9/L (ref 1.6–8.3)
NEUTROPHILS NFR BLD AUTO: 65.3 %
PLATELET # BLD AUTO: 97 10E9/L (ref 150–450)
RBC # BLD AUTO: 4.44 10E12/L (ref 4.4–5.9)
WBC # BLD AUTO: 6.7 10E9/L (ref 4–11)

## 2017-07-25 NOTE — PROGRESS NOTES
The patient was called and his CBC results from 7/24/17 were reviewed including platelet count of 97.   He was told that Dr. Novoa would like him to continue the Promacta 75mg daily, and have a CBC checked in 2 weeks.    Raymon said that he saw his back surgeon today and is planning on having surgery in 4-5 weeks.  He is having a shoulder injection on 7/27/17 and he said that he was told to hold his Xarelto for 24 hours prior to the injections.  He asked if this was okay and he was told it was okay.

## 2017-07-27 ENCOUNTER — HOSPITAL ENCOUNTER (OUTPATIENT)
Dept: GENERAL RADIOLOGY | Facility: CLINIC | Age: 72
Discharge: HOME | End: 2017-07-27
Attending: FAMILY MEDICINE | Admitting: FAMILY MEDICINE
Payer: COMMERCIAL

## 2017-07-27 DIAGNOSIS — M19.011 PRIMARY OSTEOARTHRITIS OF RIGHT SHOULDER: ICD-10-CM

## 2017-07-27 PROCEDURE — 20610 DRAIN/INJ JOINT/BURSA W/O US: CPT | Mod: RT

## 2017-07-27 PROCEDURE — 27211110 XR JOINT INJECTION MAJOR RIGHT

## 2017-07-27 PROCEDURE — 25500064 ZZH RX 255 OP 636: Performed by: RADIOLOGY

## 2017-07-27 PROCEDURE — S0020 INJECTION, BUPIVICAINE HYDRO: HCPCS | Performed by: RADIOLOGY

## 2017-07-27 PROCEDURE — 25000125 ZZHC RX 250: Performed by: RADIOLOGY

## 2017-07-27 PROCEDURE — 25000128 H RX IP 250 OP 636: Performed by: RADIOLOGY

## 2017-07-27 RX ORDER — IOPAMIDOL 408 MG/ML
10 INJECTION, SOLUTION INTRATHECAL ONCE
Status: COMPLETED | OUTPATIENT
Start: 2017-07-27 | End: 2017-07-27

## 2017-07-27 RX ORDER — LIDOCAINE HYDROCHLORIDE 10 MG/ML
5 INJECTION, SOLUTION EPIDURAL; INFILTRATION; INTRACAUDAL; PERINEURAL ONCE
Status: COMPLETED | OUTPATIENT
Start: 2017-07-27 | End: 2017-07-27

## 2017-07-27 RX ORDER — BUPIVACAINE HYDROCHLORIDE 5 MG/ML
5 INJECTION, SOLUTION PERINEURAL ONCE
Status: COMPLETED | OUTPATIENT
Start: 2017-07-27 | End: 2017-07-27

## 2017-07-27 RX ORDER — TRIAMCINOLONE ACETONIDE 40 MG/ML
40 INJECTION, SUSPENSION INTRA-ARTICULAR; INTRAMUSCULAR ONCE
Status: COMPLETED | OUTPATIENT
Start: 2017-07-27 | End: 2017-07-27

## 2017-07-27 RX ADMIN — LIDOCAINE HYDROCHLORIDE 50 MG: 10 INJECTION, SOLUTION EPIDURAL; INFILTRATION; INTRACAUDAL; PERINEURAL at 09:37

## 2017-07-27 RX ADMIN — BUPIVACAINE HYDROCHLORIDE 4 ML: 5 INJECTION, SOLUTION EPIDURAL; INTRACAUDAL; PERINEURAL at 09:39

## 2017-07-27 RX ADMIN — IOPAMIDOL 1 ML: 408 INJECTION, SOLUTION INTRATHECAL at 09:37

## 2017-07-27 RX ADMIN — TRIAMCINOLONE ACETONIDE 40 MG: 40 INJECTION, SUSPENSION INTRA-ARTICULAR; INTRAMUSCULAR at 09:36

## 2017-07-27 NOTE — PROGRESS NOTES
RADIOLOGY PROCEDURE NOTE  Patient name: Raymon Ace  MRN: [id number]  : 1945    Pre-procedure diagnosis: Pain.  Post-procedure diagnosis: Same    Procedure Date/Time: 2017  9:51 AM  Procedure: Right shoulder intraarticular steroid injection.  Estimated blood loss: None  Specimen(s) collected:  None.  The patient tolerated the procedure well with no immediate complications.    See imaging dictation for procedural details.    Provider name: Faisal Angulo  Assistant(s):None

## 2017-07-31 DIAGNOSIS — D69.3 IDIOPATHIC THROMBOCYTOPENIC PURPURA (H): ICD-10-CM

## 2017-07-31 LAB
BASOPHILS # BLD AUTO: 0 10E9/L (ref 0–0.2)
BASOPHILS NFR BLD AUTO: 0.5 %
DIFFERENTIAL METHOD BLD: ABNORMAL
EOSINOPHIL # BLD AUTO: 0.1 10E9/L (ref 0–0.7)
EOSINOPHIL NFR BLD AUTO: 1 %
ERYTHROCYTE [DISTWIDTH] IN BLOOD BY AUTOMATED COUNT: 15.2 % (ref 10–15)
HCT VFR BLD AUTO: 39.3 % (ref 40–53)
HGB BLD-MCNC: 13.3 G/DL (ref 13.3–17.7)
LYMPHOCYTES # BLD AUTO: 1.4 10E9/L (ref 0.8–5.3)
LYMPHOCYTES NFR BLD AUTO: 17.1 %
MCH RBC QN AUTO: 29 PG (ref 26.5–33)
MCHC RBC AUTO-ENTMCNC: 33.8 G/DL (ref 31.5–36.5)
MCV RBC AUTO: 86 FL (ref 78–100)
MONOCYTES # BLD AUTO: 0.7 10E9/L (ref 0–1.3)
MONOCYTES NFR BLD AUTO: 8.1 %
NEUTROPHILS # BLD AUTO: 6 10E9/L (ref 1.6–8.3)
NEUTROPHILS NFR BLD AUTO: 73.3 %
PLATELET # BLD AUTO: 109 10E9/L (ref 150–450)
RBC # BLD AUTO: 4.59 10E12/L (ref 4.4–5.9)
WBC # BLD AUTO: 8.1 10E9/L (ref 4–11)

## 2017-07-31 PROCEDURE — 85025 COMPLETE CBC W/AUTO DIFF WBC: CPT | Performed by: INTERNAL MEDICINE

## 2017-07-31 PROCEDURE — 36415 COLL VENOUS BLD VENIPUNCTURE: CPT | Performed by: INTERNAL MEDICINE

## 2017-08-04 ENCOUNTER — OFFICE VISIT (OUTPATIENT)
Dept: FAMILY MEDICINE | Facility: CLINIC | Age: 72
End: 2017-08-04
Payer: COMMERCIAL

## 2017-08-04 ENCOUNTER — RADIANT APPOINTMENT (OUTPATIENT)
Dept: GENERAL RADIOLOGY | Facility: CLINIC | Age: 72
End: 2017-08-04
Attending: FAMILY MEDICINE
Payer: COMMERCIAL

## 2017-08-04 VITALS
DIASTOLIC BLOOD PRESSURE: 60 MMHG | TEMPERATURE: 96.9 F | RESPIRATION RATE: 18 BRPM | SYSTOLIC BLOOD PRESSURE: 120 MMHG | HEIGHT: 67 IN | WEIGHT: 190 LBS | BODY MASS INDEX: 29.82 KG/M2 | OXYGEN SATURATION: 98 % | HEART RATE: 64 BPM

## 2017-08-04 DIAGNOSIS — K76.0 FATTY LIVER: ICD-10-CM

## 2017-08-04 DIAGNOSIS — I10 BENIGN ESSENTIAL HYPERTENSION: ICD-10-CM

## 2017-08-04 DIAGNOSIS — Z01.818 PREOP GENERAL PHYSICAL EXAM: Primary | ICD-10-CM

## 2017-08-04 DIAGNOSIS — G89.29 CHRONIC BILATERAL LOW BACK PAIN WITHOUT SCIATICA: ICD-10-CM

## 2017-08-04 DIAGNOSIS — E66.09 NON MORBID OBESITY DUE TO EXCESS CALORIES: ICD-10-CM

## 2017-08-04 DIAGNOSIS — M54.50 CHRONIC BILATERAL LOW BACK PAIN WITHOUT SCIATICA: ICD-10-CM

## 2017-08-04 DIAGNOSIS — M10.9 GOUT INVOLVING TOE, UNSPECIFIED CAUSE, UNSPECIFIED CHRONICITY, UNSPECIFIED LATERALITY: ICD-10-CM

## 2017-08-04 DIAGNOSIS — D69.3 IDIOPATHIC THROMBOCYTOPENIC PURPURA (H): ICD-10-CM

## 2017-08-04 DIAGNOSIS — T38.0X5A STEROID-INDUCED HYPERGLYCEMIA: ICD-10-CM

## 2017-08-04 DIAGNOSIS — R73.02 GLUCOSE INTOLERANCE (IMPAIRED GLUCOSE TOLERANCE): ICD-10-CM

## 2017-08-04 DIAGNOSIS — Z01.818 PREOP GENERAL PHYSICAL EXAM: ICD-10-CM

## 2017-08-04 DIAGNOSIS — R73.9 STEROID-INDUCED HYPERGLYCEMIA: ICD-10-CM

## 2017-08-04 DIAGNOSIS — Z78.9 ALCOHOL CONSUPTION OF MORE THAN TWO DRINKS PER DAY: ICD-10-CM

## 2017-08-04 DIAGNOSIS — G47.33 OBSTRUCTIVE SLEEP APNEA SYNDROME: ICD-10-CM

## 2017-08-04 DIAGNOSIS — I26.99 OTHER ACUTE PULMONARY EMBOLISM WITHOUT ACUTE COR PULMONALE (H): ICD-10-CM

## 2017-08-04 LAB
ANION GAP SERPL CALCULATED.3IONS-SCNC: 6 MMOL/L (ref 3–14)
BASOPHILS # BLD AUTO: 0 10E9/L (ref 0–0.2)
BASOPHILS NFR BLD AUTO: 0.3 %
BUN SERPL-MCNC: 12 MG/DL (ref 7–30)
CALCIUM SERPL-MCNC: 9.2 MG/DL (ref 8.5–10.1)
CHLORIDE SERPL-SCNC: 110 MMOL/L (ref 94–109)
CO2 SERPL-SCNC: 27 MMOL/L (ref 20–32)
CREAT SERPL-MCNC: 1.15 MG/DL (ref 0.66–1.25)
DIFFERENTIAL METHOD BLD: ABNORMAL
EOSINOPHIL # BLD AUTO: 0.1 10E9/L (ref 0–0.7)
EOSINOPHIL NFR BLD AUTO: 0.9 %
ERYTHROCYTE [DISTWIDTH] IN BLOOD BY AUTOMATED COUNT: 15 % (ref 10–15)
GFR SERPL CREATININE-BSD FRML MDRD: 62 ML/MIN/1.7M2
GLUCOSE SERPL-MCNC: 107 MG/DL (ref 70–99)
HCT VFR BLD AUTO: 37.9 % (ref 40–53)
HGB BLD-MCNC: 12.6 G/DL (ref 13.3–17.7)
LYMPHOCYTES # BLD AUTO: 1.1 10E9/L (ref 0.8–5.3)
LYMPHOCYTES NFR BLD AUTO: 15.4 %
MCH RBC QN AUTO: 29.2 PG (ref 26.5–33)
MCHC RBC AUTO-ENTMCNC: 33.2 G/DL (ref 31.5–36.5)
MCV RBC AUTO: 88 FL (ref 78–100)
MONOCYTES # BLD AUTO: 0.6 10E9/L (ref 0–1.3)
MONOCYTES NFR BLD AUTO: 8.3 %
NEUTROPHILS # BLD AUTO: 5.3 10E9/L (ref 1.6–8.3)
NEUTROPHILS NFR BLD AUTO: 75.1 %
PLATELET # BLD AUTO: 91 10E9/L (ref 150–450)
POTASSIUM SERPL-SCNC: 4.6 MMOL/L (ref 3.4–5.3)
RBC # BLD AUTO: 4.31 10E12/L (ref 4.4–5.9)
SODIUM SERPL-SCNC: 143 MMOL/L (ref 133–144)
URATE SERPL-MCNC: 2.7 MG/DL (ref 3.5–7.2)
WBC # BLD AUTO: 7 10E9/L (ref 4–11)

## 2017-08-04 PROCEDURE — 84550 ASSAY OF BLOOD/URIC ACID: CPT | Performed by: FAMILY MEDICINE

## 2017-08-04 PROCEDURE — 36415 COLL VENOUS BLD VENIPUNCTURE: CPT | Performed by: FAMILY MEDICINE

## 2017-08-04 PROCEDURE — 80048 BASIC METABOLIC PNL TOTAL CA: CPT | Performed by: FAMILY MEDICINE

## 2017-08-04 PROCEDURE — 85025 COMPLETE CBC W/AUTO DIFF WBC: CPT | Performed by: FAMILY MEDICINE

## 2017-08-04 PROCEDURE — 71020 XR CHEST 2 VW: CPT

## 2017-08-04 PROCEDURE — 93000 ELECTROCARDIOGRAM COMPLETE: CPT | Performed by: FAMILY MEDICINE

## 2017-08-04 PROCEDURE — 99215 OFFICE O/P EST HI 40 MIN: CPT | Mod: 25 | Performed by: FAMILY MEDICINE

## 2017-08-04 ASSESSMENT — ANXIETY QUESTIONNAIRES
2. NOT BEING ABLE TO STOP OR CONTROL WORRYING: NOT AT ALL
7. FEELING AFRAID AS IF SOMETHING AWFUL MIGHT HAPPEN: NOT AT ALL
IF YOU CHECKED OFF ANY PROBLEMS ON THIS QUESTIONNAIRE, HOW DIFFICULT HAVE THESE PROBLEMS MADE IT FOR YOU TO DO YOUR WORK, TAKE CARE OF THINGS AT HOME, OR GET ALONG WITH OTHER PEOPLE: NOT DIFFICULT AT ALL
1. FEELING NERVOUS, ANXIOUS, OR ON EDGE: NOT AT ALL
6. BECOMING EASILY ANNOYED OR IRRITABLE: NOT AT ALL
GAD7 TOTAL SCORE: 0
5. BEING SO RESTLESS THAT IT IS HARD TO SIT STILL: NOT AT ALL
3. WORRYING TOO MUCH ABOUT DIFFERENT THINGS: NOT AT ALL

## 2017-08-04 ASSESSMENT — PATIENT HEALTH QUESTIONNAIRE - PHQ9: 5. POOR APPETITE OR OVEREATING: NOT AT ALL

## 2017-08-04 NOTE — PATIENT INSTRUCTIONS
Before Your Surgery      Call your surgeon if there is any change in your health. This includes signs of a cold or flu (such as a sore throat, runny nose, cough, rash or fever).    Do not smoke, drink alcohol or take over the counter medicine (unless your surgeon or primary care doctor tells you to) for the 24 hours before and after surgery.    If you take prescribed drugs: Follow your doctor s orders about which medicines to take and which to stop until after surgery.    Eating and drinking prior to surgery: follow the instructions from your surgeon    Take a shower or bath the night before surgery. Use the soap your surgeon gave you to gently clean your skin. If you do not have soap from your surgeon, use your regular soap. Do not shave or scrub the surgery site.  Wear clean pajamas and have clean sheets on your bed.     1. Please stop fish oil,  aspirin, any NSAIDs ( incuding aleve advil, ibuprofen, etc--use tylenol= acetaminophen instead)  vitamin D, and multivitamins for 7 days prior to and 7 days after surgery     2.  Weight Loss Tips  1. Do not eat after 6 hrs before your expected bedtime  2. Have your heaviest meal for breakfast, a slightly lighter meal at lunch and a snack 6 hrs before bed  3. No sugar/calorie drinks except milk ie no fruit juice, pop, alcohol.  4. Drink milk 30min before meals to decrease your hunger. Also it is excellent as part of your last meal of the day snack  5. Drink lots of water  6. Increase fiber in diet: all bran cereal, salads, popcorn etc  7. Have only one small serving of fruit a day about 1/2 cup (as this is high in sugar)  8. EXERCISE is the bottom line. Without it, you will gain weight even on a low calorie diet. Best if done 2-3X a day as can    Being overweight contributes to high blood pressure and high cholesterol, both of which cause heart attacks, strokes and kidney failure, prediabetes and diabetes, arthritis, and liver disease

## 2017-08-04 NOTE — PROGRESS NOTES
[facility]  [address]  [place] [address]  [phone]  Dept: [phone]    PRE-OP EVALUATION:  Today's date: 2017    Raymon Ace (: 1945) presents for pre-operative evaluation assessment as requested by Dr. Colón.  He requires evaluation and anesthesia risk assessment prior to undergoing surgery/procedure for treatment of Back .  Proposed procedure: L3 Repair    Date of Surgery/ Procedure: 17  Time of Surgery/ Procedure: 8am  Hospital/Surgical Facility: [facility]  Fax number for surgical facility: [phone]  Primary Physician: Rosanne Cherry  Type of Anesthesia Anticipated: to be determined    Patient has a Health Care Directive or Living Will:  YES     1. NO - Do you have a history of heart attack, stroke, stent, bypass or surgery on an artery in the head, neck, heart or legs?  2. NO - Do you ever have any pain or discomfort in your chest?  3. NO - Do you have a history of  Heart Failure?  4. YES - Are you troubled by shortness of breath when: walking on the level, up a slight hill or at night?  5. NO - Do you currently have a cold, bronchitis or other respiratory infection?  6. NO - Do you have a cough, shortness of breath or wheezing?  7. YES - Do you sometimes get pains in the calves of your legs when you walk?  8. YES - Do you or anyone in your family have previous history of blood clots?  9. NO - Do you or does anyone in your family have a serious bleeding problem such as prolonged bleeding following surgeries or cuts?  10. NO - Have you ever had problems with anemia or been told to take iron pills?  11. NO - Have you had any abnormal blood loss such as black, tarry or bloody stools, or abnormal vaginal bleeding?  12. NO - Have you ever had a blood transfusion?  13. YES - Have you or any of your relatives ever had problems with anesthesia?  14. NO - Do you have sleep apnea, excessive snoring or  daytime drowsiness?  15. NO - Do you have any prosthetic heart valves?  16. NO - Do you have prosthetic joints?  17. NO - Is there any chance that you may be pregnant?        HPI:                                                      Brief HPI related to upcoming procedure:     Bilat Low Back Pain with Bilat Sciatica      Duration: 1988    Surgery per Dr Singh in 1992,1993,and 3-13     Now with recurrent pain, increasing, for several mos         Specific cause: none    Description:   Location of pain: low back bilateral  Character of pain: sharp, dull ache, stabbing and gnawing  Pain radiation:none  New numbness or weakness in legs, not attributed to pain:  YES    Intensity: Currently 8/10    History:   Pain interferes with job: Not applicable  History of back problems: no prior back problems  Any previous MRI or X-rays: yes  Sees a specialist for back pain:  No  Therapies tried without relief: acetaminophen (Tylenol), chiropractor, cold, heat, muscle relaxants, NSAIDs, opioids, Physical Therapy and surgery    Alleviating factors:   Improved by: 0      Precipitating factors:  Worsened by: Lifting, Bending, Standing, Sitting, Walking and Coughing    Functional and Psychosocial Screen (Reggie STarT Back):      Not performed today          Accompanying Signs & Symptoms:  Risk of Fracture:  None  Risk of Cauda Equina:  None  Risk of Infection:  None  Risk of Cancer:  None  Risk of Ankylosing Spondylitis:  Onset at age <35, male, AND morning back stiffness. no       PULMONARY EMBOLUS     -4-17-17   -on xarelto for anticoagulation for at least 6-12 mo      ALCOHOL ABUSE     -long term-since teens  0-states none since 4-17   -has steatohepatitis  -last 2 mo -normal LFTs       ITP    -treated by Dr Novoa  [phone]  -has been  Treating to raise his platelet count for 1-2 mo   - plat count usually 90-100e9/l---up to 109 on 7-31-17  -note on xarelto for PE since 4-17            See problem list for active medical problems.   Problems all longstanding and stable, except as noted/documented.  See ROS for pertinent symptoms related to these conditions.                                                                                                  .  HYPERTENSION - Patient has longstanding history of mod-severe HTN , currently denies any symptoms referable to elevated blood pressure. Specifically denies chest pain, palpitations, dyspnea, orthopnea, PND or peripheral edema. Blood pressure readings have been in normal range. Current medication regimen is as listed below. Patient denies any side effects of medication.                                                                                                                                                                                          .    MEDICAL HISTORY:                                                    Patient Active Problem List    Diagnosis Date Noted     ITP (idiopathic thrombocytopenic purpura) since 3-13 back surgery       Priority: High     Pulmonary embolism (H) large RLL w infarctio 4-17 04/18/2017     Priority: Medium     Gastroesophageal reflux disease with esophagitis 12/06/2016     Priority: Medium     Chronic right dominant  shoulder pain w ltd ROM  since 3-16 08/09/2016     Priority: Medium     Need for prophylactic vaccination against Streptococcus pneumoniae (pneumococcus) 08/04/2016     Priority: Medium     Gout involving toe, unspecified cause, unspecified chronicity, unspecified laterality 06/02/2016     Priority: Medium     ACP (advance care planning) 05/16/2016     Priority: Medium     Advance Care Planning 5/16/2016: ACP Review of Chart / Resources Provided:  Reviewed chart for advance care plan.  Raymon Ace has no plan or code status on file however states presence of ACP document. Copy requested. Confirmed code status reflects current choices pending receipt of document/advance care plan review.  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 Provider to review       Thrombocytopenia-chronic 07/10/2014     Priority: Medium     Change in bowel habits since 1-14: diarrhea-thinks better off benicar 07/10/2014     Priority: Medium     Family history of ischemic heart disease 01/17/2014     Priority: Medium     Family history of diabetes mellitus 01/17/2014     Priority: Medium     Glucose intolerance (impaired glucose tolerance)  HgbA!C = 5.4 01/10/2014     Priority: Medium     Back pain since 1988 s/po surgery 1992,1996 and 3-13/ Dr Singh  01/10/2014     Priority: Medium     Alcoholic liver damage (H) 01/10/2014     Priority: Medium     Diverticulosis of large intestine 01/10/2014     Priority: Medium     Problem list name updated by automated process. Provider to review       Elevated liver enzymes AST  01/10/2014     Priority: Medium     Hypotension 03/16/2013     Priority: Medium     Lumbar spinal stenosis 12/14/2012     Priority: Medium     Steroid-induced hyperglycemia 12/14/2012     Priority: Medium     Although glucose normal on 1/30/13  atr 86       Fatty liver/ 1-14 US 12/14/2012     Priority: Medium     Atherosclerosis 12/14/2012     Priority: Medium     Alcohol consuption of more than two drinks per day 12/14/2012     Priority: Medium     Obstructive sleep apnea syndrome 09/21/2012     Priority: Medium     Does not tolerate CPAP  Problem list name updated by automated process. Provider to review       Restless legs syndrome (RLS) 09/21/2012     Priority: Medium     Polyneuropathy in other diseases classified elsewhere (H) 09/21/2012     Priority: Medium     Due to back issues       Esophageal reflux 09/21/2012     Priority: Medium     Preventive measure 01/31/2013     Priority: Low     APRIMA DATA BASE UNDER THE 12/14/12 NOTE  Colonoscopy neg in 5/10; PSA 0.31 in 9/12        Past Medical History:   Diagnosis Date     Heart murmur     heart is positioned farther on left side then typical     Hypertension      ITP (idiopathic thrombocytopenic purpura)      Obstructive sleep apnea     does not use CPAP  machine     Past Surgical History:   Procedure Laterality Date     APPENDECTOMY  1956     BACK SURGERY  1992, 1996    trimmed L4-L5, Fusion L4-L5     BONE MARROW BIOPSY, BONE SPECIMEN, NEEDLE/TROCAR  10/24/2012    Procedure: BIOPSY BONE MARROW;  BONE MARROW BIOPSY WITH ASPIRATE (AREVALO ORDERING);  Surgeon: Terri Hickey MD;  Location:  GI     FACIAL RECONSTRUCTION SURGERY  1965    jaw fracture     HC TOOTH EXTRACTION W/FORCEP  1990s     Current Outpatient Prescriptions   Medication Sig Dispense Refill     enoxaparin (LOVENOX) 100 MG/ML injection        eltrombopag (PROMACTA) 75 MG tablet Take 1 tablet (75 mg) by mouth daily Administer on an empty stomach, 1 hour before or 2 hours after a meal. 30 tablet 5     rivaroxaban ANTICOAGULANT (XARELTO) 20 MG TABS tablet Take 1 tablet (20 mg) by mouth daily (with dinner) 30 tablet 3     lisinopril (PRINIVIL/ZESTRIL) 40 MG tablet TAKE ONE TABLET BY MOUTH DAILY 90 tablet 2     folic acid (FOLVITE) 1 MG  tablet TAKE ONE TABLET BY MOUTH ONCE DAILY 90 tablet 2     allopurinol (ZYLOPRIM) 300 MG tablet TAKE ONE TABLET BY MOUTH ONCE DAILY *NEED  TO  BE  SEEN  FOR  MORE  REFILLS 90 tablet 3     cloNIDine (CATAPRES) 0.3 MG tablet TAKE ONE TABLET BY MOUTH TWICE DAILY. NEEDS TO BE SEEN FOR MORE. 180 tablet 3     amLODIPine (NORVASC) 5 MG tablet TAKE ONE TABLET BY MOUTH ONCE DAILY 90 tablet 3     omeprazole (PRILOSEC) 40 MG capsule TAKE ONE CAPSULE BY MOUTH ONCE DAILY 90 capsule 3     spironolactone (ALDACTONE) 25 MG tablet TAKE ONE TABLET BY MOUTH ONCE DAILY 90 tablet 3     gabapentin (NEURONTIN) 300 MG capsule TAKE ONE CAPSULE BY MOUTH THREE TIMES DAILY 270 capsule 0     OTC products: None, except as noted above    Allergies   Allergen Reactions     Benicar [Olmesartan] Diarrhea     Onset 1-14   On benicar since 2012      Latex Allergy: NO    Social History   Substance Use Topics     Smoking status: Former Smoker     Years: 28.00     Quit date: 9/26/1982     Smokeless tobacco: Never Used     Alcohol use 0.0 oz/week     0 Standard drinks or equivalent per week      Comment: 4-5 drinks/day     History   Drug Use No       REVIEW OF SYSTEMS:                                                    C: NEGATIVE for fever, chills, change in weight  I: NEGATIVE for worrisome rashes, moles or lesions  E: NEGATIVE for vision changes or irritation  E/M: NEGATIVE for ear, mouth and throat problems  R: NEGATIVE for significant cough or SOB  B: NEGATIVE for masses, tenderness or discharge  CV: NEGATIVE for chest pain, palpitations or peripheral edema  GI: NEGATIVE for nausea, abdominal pain, heartburn, or change in bowel habits  : NEGATIVE for frequency, dysuria, or hematuria  MUSCULOSKELETAL:POSITIVE  for , back pain and radicular pain bilat  N: NEGATIVE for weakness, dizziness or paresthesias  E: NEGATIVE for temperature intolerance, skin/hair changes  H: NEGATIVE for bleeding problems  P: NEGATIVE for changes in mood or affect    EXAM:     "                                                /60  Pulse 64  Temp 96.9  F (36.1  C) (Tympanic)  Resp 18  Ht 5' 6.5\" (1.689 m)  Wt 190 lb (86.2 kg)  SpO2 98%  BMI 30.21 kg/m2    GENERAL APPEARANCE: healthy, alert and no distress     EYES: EOMI,  PERRL     NECK: no adenopathy, no asymmetry, masses, or scars and thyroid normal to palpation     RESP: lungs clear to auscultation - no rales, rhonchi or wheezes     CV: regular rates and rhythm, normal S1 S2, no S3 or S4 and no murmur, click or rub     ABDOMEN:  soft, nontender, no HSM or masses and bowel sounds normal     MS: extremities normal- no gross deformities noted, no evidence of inflammation in joints, FROM in all extremities.--back is straight and pt is stiff on initially arising and wnl gait with sheron slight initial stiffness      SKIN: no suspicious lesions or rashes     NEURO: Normal strength and tone, sensory exam grossly normal, mentation intact and speech normal     PSYCH: mentation appears normal. and affect normal/bright     LYMPHATICS: No axillary, cervical, or supraclavicular nodes    DIAGNOSTICS:                                                      EKG: appears normal, NSR, normal axis, normal intervals, no acute ST/T changes c/w ischemia, no LVH by voltage criteria, nonspecific ST-T changes, unchanged from previous tracings, Lt axis-ant fascicular block   Chest XRay  Labs Resulted Today:   Results for orders placed or performed in visit on 08/04/17   CBC with platelets differential   Result Value Ref Range    WBC 7.0 4.0 - 11.0 10e9/L    RBC Count 4.31 (L) 4.4 - 5.9 10e12/L    Hemoglobin 12.6 (L) 13.3 - 17.7 g/dL    Hematocrit 37.9 (L) 40.0 - 53.0 %    MCV 88 78 - 100 fl    MCH 29.2 26.5 - 33.0 pg    MCHC 33.2 31.5 - 36.5 g/dL    RDW 15.0 10.0 - 15.0 %    Platelet Count 91 (L) 150 - 450 10e9/L    Diff Method Automated Method     % Neutrophils 75.1 %    % Lymphocytes 15.4 %    % Monocytes 8.3 %    % Eosinophils 0.9 %    % Basophils 0.3 % "    Absolute Neutrophil 5.3 1.6 - 8.3 10e9/L    Absolute Lymphocytes 1.1 0.8 - 5.3 10e9/L    Absolute Monocytes 0.6 0.0 - 1.3 10e9/L    Absolute Eosinophils 0.1 0.0 - 0.7 10e9/L    Absolute Basophils 0.0 0.0 - 0.2 10e9/L   Uric acid   Result Value Ref Range    Uric Acid 2.7 (L) 3.5 - 7.2 mg/dL   Basic metabolic panel   Result Value Ref Range    Sodium 143 133 - 144 mmol/L    Potassium 4.6 3.4 - 5.3 mmol/L    Chloride 110 (H) 94 - 109 mmol/L    Carbon Dioxide 27 20 - 32 mmol/L    Anion Gap 6 3 - 14 mmol/L    Glucose 107 (H) 70 - 99 mg/dL    Urea Nitrogen 12 7 - 30 mg/dL    Creatinine 1.15 0.66 - 1.25 mg/dL    GFR Estimate 62 >60 mL/min/1.7m2    GFR Estimate If Black 75 >60 mL/min/1.7m2    Calcium 9.2 8.5 - 10.1 mg/dL       Recent Labs   Lab Test  07/31/17   1110  07/24/17   1105   04/20/17   0618  04/19/17   0615  04/18/17   1125   05/16/16   1220   01/10/14   1349   12/20/12   1615   HGB  13.3  12.5*   < >  12.1*  12.4*  13.3   < >   --    < >  15.0   < >  15.5   PLT  109*  97*   < >  80*  82*  94*   < >   --    < >  34*   < >  25*   INR   --    --    --    --    --   1.02   --    --    --    --    --   1.12   NA   --    --    --   140  138  144   < >   --    --   142   < >  136   POTASSIUM   --    --    --   3.9  4.6  4.2   < >   --    < >  4.2   < >  3.9   CR   --    --    --   0.78  0.84  0.75   < >   --    --   1.00   < >  0.95   A1C   --    --    --    --    --    --    --   5.4   --   5.4   --    --     < > = values in this interval not displayed.        IMPRESSION:                                                    Reason for surgery/procedure: recurrent LBP with sciatica  Diagnosis/reason for consult: medical review       ICD-10-CM    1. Preop general physical exam Z01.818 CBC with platelets differential     EKG 12-lead complete w/read - Clinics     XR Chest 2 Views   2. Chronic bilateral low back pain without sciatica since 1988 s/po surg 1992, 1996 and 3-13- now w L-3 pain/symptoms  M54.5     G89.29    3.  Other acute pulmonary embolism without acute cor pulmonale (H) 4-17 large RLL on xarelto  I26.99 XR Chest 2 Views   4. ITP (idiopathic thrombocytopenic purpura) since 3-13 back surgery  D69.3    5. Obstructive sleep apnea syndrome G47.33    6. Steroid-induced hyperglycemia R73.9 Basic metabolic panel   7. Fatty liver/ 1-14 US K76.0    8. Alcohol consuption of more than two drinks per day--none since 4-17  Z78.9    9. Glucose intolerance (impaired glucose tolerance)  HgbA!C = 5.4 R73.02 Basic metabolic panel   10. Benign essential hypertension I10 Basic metabolic panel   11. Gout involving toe, unspecified cause, unspecified chronicity, unspecified laterality M10.9 Uric acid   12. Non morbid obesity due to excess calories w comorbid HTN  E66.09            The proposed surgical procedure is considered INTERMEDIATE risk.    REVISED CARDIAC RISK INDEX  The patient has the following serious cardiovascular risks for perioperative complications such as (MI, PE, VFib and 3  AV Block):  No serious cardiac risks  INTERPRETATION: 0 risks: Class I (very low risk - 0.4% complication rate)    The patient has the following additional risks for perioperative complications:  No identified additional risks        RECOMMENDATIONS:                                                      --Consult hospital rounder / IM to assist post-op medical management     --spoke with Dr Novoa, hematologist: has been working on increasing the pt's platelets with his ITP  Pt can stop the xarelto the am prior to surgery and restart when Dr Singh feels bleeding risk is low enough     --Dr Singh-surgeon who has done his prior 3 backsurgeries and is to do this one on L-3 he feels 2 days off the xarelto will be adequate for such a minimal procedure       --Patient is to take all scheduled medications on the day of surgery EXCEPT for modifications listed below.    8-10-17  Spoke with pt / phone:  Dr singh wants him off it for 2 d post op      Pt will take  the xarelto at 9-10 pm on 8-12-17     He will not restart it until ythe pm of 8-15-17  ie 1 Spent>15min with pt , and more than 50% of the time was spent in education and counseling days after the surgery  Of am 8-14-17     No pills in am of surgery  Except the BP meds: clonidine , lisinopril , aldactone, norvasc  & the promacta for the platelets     APPROVAL GIVEN to proceed with proposed procedure, without further diagnostic evaluation       Weight management plan: Discussed healthy diet and exercise guidelines and patient will follow up in 3 months in clinic to re-evaluate.    Patient Instructions     Before Your Surgery      Call your surgeon if there is any change in your health. This includes signs of a cold or flu (such as a sore throat, runny nose, cough, rash or fever).    Do not smoke, drink alcohol or take over the counter medicine (unless your surgeon or primary care doctor tells you to) for the 24 hours before and after surgery.    If you take prescribed drugs: Follow your doctor s orders about which medicines to take and which to stop until after surgery.    Eating and drinking prior to surgery: follow the instructions from your surgeon    Take a shower or bath the night before surgery. Use the soap your surgeon gave you to gently clean your skin. If you do not have soap from your surgeon, use your regular soap. Do not shave or scrub the surgery site.  Wear clean pajamas and have clean sheets on your bed.     1. Please stop fish oil,  aspirin, any NSAIDs ( incuding aleve advil, ibuprofen, etc--use tylenol= acetaminophen instead)  vitamin D, and multivitamins for 7 days prior to and 7 days after surgery     2.  Weight Loss Tips  1. Do not eat after 6 hrs before your expected bedtime  2. Have your heaviest meal for breakfast, a slightly lighter meal at lunch and a snack 6 hrs before bed  3. No sugar/calorie drinks except milk ie no fruit juice, pop, alcohol.  4. Drink milk 30min before meals to decrease  your hunger. Also it is excellent as part of your last meal of the day snack  5. Drink lots of water  6. Increase fiber in diet: all bran cereal, salads, popcorn etc  7. Have only one small serving of fruit a day about 1/2 cup (as this is high in sugar)  8. EXERCISE is the bottom line. Without it, you will gain weight even on a low calorie diet. Best if done 2-3X a day as can    Being overweight contributes to high blood pressure and high cholesterol, both of which cause heart attacks, strokes and kidney failure, prediabetes and diabetes, arthritis, and liver disease           Signed Electronically by: Rosanne Cherry MD    Copy of this evaluation report is provided to requesting physician.    [facility]

## 2017-08-04 NOTE — MR AVS SNAPSHOT
After Visit Summary   8/4/2017    Raymon Ace    MRN: [id number]           Patient Information     Date Of Birth          1945        Visit Information        Provider Department      8/4/2017 10:40 AM Rosanne Cherry MD [facility]        Today's Diagnoses     Preop general physical exam    -  1    Chronic bilateral low back pain without sciatica since 1988 s/po surg 1992, 1996 and 3-13         Other acute pulmonary embolism without acute cor pulmonale (H) 4-17 large RLL on xarelto         ITP (idiopathic thrombocytopenic purpura) since 3-13 back surgery         Obstructive sleep apnea syndrome        Steroid-induced hyperglycemia        Fatty liver/ 1-14         Alcohol consuption of more than two drinks per day        Glucose intolerance (impaired glucose tolerance)  HgbA!C = 5.4        Benign essential hypertension        Gout involving toe, unspecified cause, unspecified chronicity, unspecified laterality        Non morbid obesity due to excess calories w comorbid HTN           Care Instructions      Before Your Surgery      Call your surgeon if there is any change in your health. This includes signs of a cold or flu (such as a sore throat, runny nose, cough, rash or fever).    Do not smoke, drink alcohol or take over the counter medicine (unless your surgeon or primary care doctor tells you to) for the 24 hours before and after surgery.    If you take prescribed drugs: Follow your doctor s orders about which medicines to take and which to stop until after surgery.    Eating and drinking prior to surgery: follow the instructions from your surgeon    Take a shower or bath the night before surgery. Use the soap your surgeon gave you to gently clean your skin. If you do not have soap from your surgeon, use your regular soap. Do not shave or scrub the surgery site.  Wear clean pajamas and have clean sheets on your bed.     1. Please stop fish oil,   aspirin, any NSAIDs ( incuding aleve advil, ibuprofen, etc--use tylenol= acetaminophen instead)  vitamin D, and multivitamins for 7 days prior to and 7 days after surgery     2.  Weight Loss Tips  1. Do not eat after 6 hrs before your expected bedtime  2. Have your heaviest meal for breakfast, a slightly lighter meal at lunch and a snack 6 hrs before bed  3. No sugar/calorie drinks except milk ie no fruit juice, pop, alcohol.  4. Drink milk 30min before meals to decrease your hunger. Also it is excellent as part of your last meal of the day snack  5. Drink lots of water  6. Increase fiber in diet: all bran cereal, salads, popcorn etc  7. Have only one small serving of fruit a day about 1/2 cup (as this is high in sugar)  8. EXERCISE is the bottom line. Without it, you will gain weight even on a low calorie diet. Best if done 2-3X a day as can    Being overweight contributes to high blood pressure and high cholesterol, both of which cause heart attacks, strokes and kidney failure, prediabetes and diabetes, arthritis, and liver disease             Follow-ups after your visit        Follow-up notes from your care team     Return in about 3 months (around 11/4/2017) for Physical Exam.      Your next 10 appointments already scheduled     Aug 14, 2017 11:00 AM CDT   LAB with PI LAB   [facility] ([facility])    [address] [place] [id number]   [phone]           Patient must bring picture ID. Patient should be prepared to give a urine specimen  Please do not eat 10-12 hours before your appointment if you are coming in fasting for labs on lipids, cholesterol, or glucose (sugar). Pregnant women should follow their Care Team instructions. Water with medications is okay. Do not drink coffee or other fluids. If you have concerns about taking  your medications, please ask at office or if scheduling via [employer], send a message by clicking on Secure Messaging, Message Your Care  Team.            Aug 21, 2017 11:00 AM CDT   LAB with PI LAB   [facility]    [address] [place] [id number] [phone]           Patient must bring picture ID. Patient should be prepared to give a urine specimen  Please do not eat 10-12 hours before your appointment if you are coming in fasting for labs on lipids, cholesterol, or glucose (sugar). Pregnant women should follow their Care Team instructions. Water with medications is okay. Do not drink coffee or other fluids. If you have concerns about taking  your medications, please ask at office or if scheduling via [employer], send a message by clicking on Secure Messaging, Message Your Care Team.            Aug 28, 2017 11:00 AM CDT   LAB with PI LAB   [facility]    [address] [place] [id number] [phone]           Patient must bring picture ID. Patient should be prepared to give a urine specimen  Please do not eat 10-12 hours before your appointment if you are coming in fasting for labs on lipids, cholesterol, or glucose (sugar). Pregnant women should follow their Care Team instructions. Water with medications is okay. Do not drink coffee or other fluids. If you have concerns about taking  your medications, please ask at office or if scheduling via [employer], send a message by clicking on Secure Messaging, Message Your Care Team.            Sep 28, 2017 12:00 PM CDT   (Arrive by 11:45 AM)   Return Visit with Waylon Novoa MD   [facility] ([facility])    [address]  [address] [facility] [address] [phone]              Who to contact     If you have questions or need follow up information about today's clinic visit or your schedule please contact [facility] directly at [phone].  Normal or non-critical lab and imaging results will be  "communicated to you by [employer], letter or phone within 4 business days after the clinic has received the results. If you do not hear from us within 7 days, please contact the clinic through sourceasy or phone. If you have a critical or abnormal lab result, we will notify you by phone as soon as possible.  Submit refill requests through [employer] or call your pharmacy and they will forward the refill request to us. Please allow 3 business days for your refill to be completed.          Additional Information About Your Visit        sourceasy Information     sourceasy gives you secure access to your electronic health record. If you see a primary care provider, you can also send messages to your care team and make appointments. If you have questions, please call your primary care clinic.  If you do not have a primary care provider, please call [phone] and they will assist you.        Care EveryWhere ID     This is your Care EveryWhere ID. This could be used by other organizations to access your [facility] medical records  [id number]        Your Vitals Were     Pulse Temperature Respirations Height Pulse Oximetry BMI (Body Mass Index)    64 96.9  F (36.1  C) (Tympanic) 18 5' 6.5\" (1.689 m) 98% 30.21 kg/m2       Blood Pressure from Last 3 Encounters:   08/04/17 120/60   07/17/17 98/60   06/29/17 90/61    Weight from Last 3 Encounters:   08/04/17 190 lb (86.2 kg)   07/17/17 193 lb (87.5 kg)   06/29/17 196 lb 6.4 oz (89.1 kg)              We Performed the Following     Basic metabolic panel     CBC with platelets differential     EKG 12-lead complete w/read - Clinics     Uric acid        Primary Care Provider Office Phone # Fax #    Rosanne Cherry [phone]       [facility] [address]  [facility] [address]        Equal Access to Services     NENA JAVIER AH: Hadii chato ku hadasho Soomaali, waaxda luqadaha, qaybta kaalmada adeegyaale, gonzales go. " So [facility] [phone].    ATENCIÓN: Si elham manjarrez, tiene a wang disposición servicios gratuitos de asistencia lingüística. Betsy frost [phone].    We comply with applicable federal civil rights laws and [place] laws. We do not discriminate on the basis of race, color, national origin, age, disability sex, sexual orientation or gender identity.            Thank you!     Thank you for choosing [facility]  for your care. Our goal is always to provide you with excellent care. Hearing back from our patients is one way we can continue to improve our services. Please take a few minutes to complete the written survey that you may receive in the mail after your visit with us. Thank you!             Your Updated Medication List - Protect others around you: Learn how to safely use, store and throw away your medicines at www.disposemymeds.org.          This list is accurate as of: 8/4/17  2:09 PM.  Always use your most recent med list.                   Brand Name Dispense Instructions for use Diagnosis    allopurinol 300 MG tablet    ZYLOPRIM    90 tablet    TAKE ONE TABLET BY MOUTH ONCE DAILY *NEED  TO  BE  SEEN  FOR  MORE  REFILLS    Gout involving toe, unspecified cause, unspecified chronicity, unspecified laterality       amLODIPine 5 MG tablet    NORVASC    90 tablet    TAKE ONE TABLET BY MOUTH ONCE DAILY    Benign essential hypertension       cloNIDine 0.3 MG tablet    CATAPRES    180 tablet    TAKE ONE TABLET BY MOUTH TWICE DAILY. NEEDS TO BE SEEN FOR MORE.    Benign essential hypertension       eltrombopag 75 MG tablet    PROMACTA    30 tablet    Take 1 tablet (75 mg) by mouth daily Administer on an empty stomach, 1 hour before or 2 hours after a meal.    Idiopathic thrombocytopenic purpura (H)       enoxaparin 100 MG/ML injection    LOVENOX          folic acid 1 MG tablet    FOLVITE    90 tablet    TAKE ONE TABLET BY MOUTH ONCE DAILY    Alcohol abuse       gabapentin 300 MG capsule     NEURONTIN    270 capsule    TAKE ONE CAPSULE BY MOUTH THREE TIMES DAILY    Chronic pain       lisinopril 40 MG tablet    PRINIVIL/ZESTRIL    90 tablet    TAKE ONE TABLET BY MOUTH DAILY    Essential hypertension       omeprazole 40 MG capsule    priLOSEC    90 capsule    TAKE ONE CAPSULE BY MOUTH ONCE DAILY    Esophageal reflux       rivaroxaban ANTICOAGULANT 20 MG Tabs tablet    XARELTO    30 tablet    Take 1 tablet (20 mg) by mouth daily (with dinner)    Deep vein thrombosis (DVT) of lower extremity, unspecified chronicity, unspecified laterality, unspecified vein (H)       spironolactone 25 MG tablet    ALDACTONE    90 tablet    TAKE ONE TABLET BY MOUTH ONCE DAILY    Benign essential hypertension

## 2017-08-04 NOTE — LETTER
Raymon Ace  [address] [facility] [facility] [address]        August 10, 2017          Dear ,    We are writing to inform you of your test results.    All of your lab results are normal, except as noted below.     The following are explanations of some of our lab tests     THIS DOES NOT MEAN THAT YOU HAD ALL OF THESE DONE     Please continue on the same medications unless a change is noted above     These are some general explanations for tests:     Hgb is the blood iron level   WBC means White Blood Cells   Platelets are small blood cells that help with forming the blood clots along with other blood factors.   Electrolytes are Sodium, Potassium, Calcium, Magnesium, Phosphorus.   Liver tests are: AST, ALT, Bilirubin, Alkaline Phosphatase.   Kidney tests are Creatinine, GFR.   HDL Cholesterol - is the good cholesterol and it is good to have it high.   LDL cholesterol is the bad cholesterol and it is good to have it low.   It is recommended to have LDL less than 130 for people with hypertension and to have it less than 100 for people with heart disease, diabetes and chronic kidney disease.   Triglycerides are another type of lipid and can also cause heart disease so should be kept low.   Thyroid tests are TSH, T4, T3   Glucose is sugar###cobntinues a little high  Advise : stay off alcohol which is high sugar   The only way known to prevent diabetes or keep it from getting worse is exercise, 20-40 minutes 3 times a day around the time of meals as your insulin is wearing out  You need to get rid of the sugar using your muscles     Gout : uric acid  Continues to be low   A1c is a test that gives us an idea about how well was controlled the diabetes for the last 3 months.   PSA stands for Prostate Specific Antigen and it can be elevated with prostate cancer or prostate inflammation.     Resulted Orders   CBC with platelets differential   Result Value Ref Range    WBC 7.0 4.0 - 11.0 10e9/L    RBC Count 4.31  (L) 4.4 - 5.9 10e12/L    Hemoglobin 12.6 (L) 13.3 - 17.7 g/dL      Comment:      Results confirmed by repeat test    Hematocrit 37.9 (L) 40.0 - 53.0 %    MCV 88 78 - 100 fl    MCH 29.2 26.5 - 33.0 pg    MCHC 33.2 31.5 - 36.5 g/dL    RDW 15.0 10.0 - 15.0 %    Platelet Count 91 (L) 150 - 450 10e9/L      Comment:      Results confirmed by repeat test   Verified by smear review      Diff Method Automated Method     % Neutrophils 75.1 %    % Lymphocytes 15.4 %    % Monocytes 8.3 %    % Eosinophils 0.9 %    % Basophils 0.3 %    Absolute Neutrophil 5.3 1.6 - 8.3 10e9/L    Absolute Lymphocytes 1.1 0.8 - 5.3 10e9/L    Absolute Monocytes 0.6 0.0 - 1.3 10e9/L    Absolute Eosinophils 0.1 0.0 - 0.7 10e9/L    Absolute Basophils 0.0 0.0 - 0.2 10e9/L   Uric acid   Result Value Ref Range    Uric Acid 2.7 (L) 3.5 - 7.2 mg/dL   Basic metabolic panel   Result Value Ref Range    Sodium 143 133 - 144 mmol/L    Potassium 4.6 3.4 - 5.3 mmol/L    Chloride 110 (H) 94 - 109 mmol/L    Carbon Dioxide 27 20 - 32 mmol/L    Anion Gap 6 3 - 14 mmol/L    Glucose 107 (H) 70 - 99 mg/dL      Comment:      Fasting specimen    Urea Nitrogen 12 7 - 30 mg/dL    Creatinine 1.15 0.66 - 1.25 mg/dL    GFR Estimate 62 >60 mL/min/1.7m2      Comment:      Non  GFR Calc    GFR Estimate If Black 75 >60 mL/min/1.7m2      Comment:       GFR Calc    Calcium 9.2 8.5 - 10.1 mg/dL       If you have any questions or concerns, please call the clinic at the number listed above.       Sincerely,        Rosanne Cherry MD

## 2017-08-04 NOTE — NURSING NOTE
"Chief Complaint   Patient presents with     Pre-Op Exam     /60  Pulse 64  Temp 96.9  F (36.1  C) (Tympanic)  Resp 18  Ht 5' 6.5\" (1.689 m)  Wt 190 lb (86.2 kg)  SpO2 98%  BMI 30.21 kg/m2 Estimated body mass index is 30.21 kg/(m^2) as calculated from the following:    Height as of this encounter: 5' 6.5\" (1.689 m).    Weight as of this encounter: 190 lb (86.2 kg).  BP completed using cuff size: regular   Laura Kelly CMA    Health Maintenance Due   Topic Date Due     MEDICARE ANNUAL WELLNESS VISIT  08/19/1963     VERITO QUESTIONNAIRE 1 YEAR  05/16/2017     Health Maintenance reviewed at today's visit patient asked to schedule/complete:   Routine Health Visit: Patient agrees to schedule    "

## 2017-08-05 ASSESSMENT — ANXIETY QUESTIONNAIRES: GAD7 TOTAL SCORE: 0

## 2017-08-08 ENCOUNTER — TELEPHONE (OUTPATIENT)
Dept: FAMILY MEDICINE | Facility: CLINIC | Age: 72
End: 2017-08-08

## 2017-08-08 NOTE — TELEPHONE ENCOUNTER
DR Singh states:    1) 105, m platelet count is good for him for this surgery     2) states that the pt only needs to be off xarelto for 2 days as this is a minimalist procedure on L-3

## 2017-08-10 ENCOUNTER — CARE COORDINATION (OUTPATIENT)
Dept: ONCOLOGY | Facility: CLINIC | Age: 72
End: 2017-08-10

## 2017-08-10 NOTE — PROGRESS NOTES
The patient was called and his CBC result from 8/4/17 was reviewed including platelets of 91.  He was instructed to continue his Promacta 75mg daily.  He was asked to have a CBC rechecked on 8/21/17.  He is scheduled for back surgery on 8/14/17.  He wanted to double-check with Dr. Novoa that it was okay to hold his anti-coagulant prior to the surgery.  Dr. Novoa was updated, and did confirm that the patient should hold his Xarelto the AM prior to the surgery, and resume it when the surgeon lets him know that it can be restarted.    The patient was called and given the instructions to hold his Xarelto the AM and PM dose on 8/13/17, and hold the AM dose on 8/14/17.  He was told to resume the Xarelto when the surgeon tells him to.  The patient was instructed to continue his Promacta daily through his surgery, taking his normal dose on Sunday, 8/13/17, and taking a dose on 8/14/17 when he is able to take oral intake after his surgery.

## 2017-08-10 NOTE — PROGRESS NOTES
Please see attached lab results  All of your lab results are normal, except as noted below.    The following are explanations of some of our lab tests    THIS DOES NOT MEAN THAT YOU HAD ALL OF THESE DONE    Please continue on the same medications unless a change is noted above    These are some general explanations for tests:    Hgb is the blood iron level  WBC means White Blood Cells  Platelets are small blood cells that help with forming the blood clots along with other blood factors.  Electrolytes are Sodium, Potassium, Calcium, Magnesium, Phosphorus.  Liver tests are: AST, ALT, Bilirubin, Alkaline Phosphatase.  Kidney tests are Creatinine, GFR.  HDL Cholesterol - is the good cholesterol and it is good to have it high.  LDL cholesterol is the bad cholesterol and it is good to have it low.  It is recommended to have LDL less than 130 for people with hypertension and to have it less than 100 for people with heart disease, diabetes and chronic kidney disease.  Triglycerides are another type of lipid and can also cause heart disease so should be kept low.   Thyroid tests are TSH, T4, T3  Glucose is sugar###cobntinues a little high  Advise : stay off alcohol which is high sugar   The only way known to prevent diabetes or keep it from getting worse is exercise, 20-40 minutes 3 times a day around the time of meals as your insulin is wearing out  You need to get rid of the sugar using your muscles     Gout : uric acid  Continues to be low  A1c is a test that gives us an idea about how well was controlled the diabetes for the last 3 months.   PSA stands for Prostate Specific Antigen and it can be elevated with prostate cancer or prostate inflammation.

## 2017-08-14 ENCOUNTER — TRANSFERRED RECORDS (OUTPATIENT)
Dept: HEALTH INFORMATION MANAGEMENT | Facility: CLINIC | Age: 72
End: 2017-08-14

## 2017-08-21 DIAGNOSIS — D69.3 IDIOPATHIC THROMBOCYTOPENIC PURPURA (H): ICD-10-CM

## 2017-08-21 LAB
BASOPHILS # BLD AUTO: 0 10E9/L (ref 0–0.2)
BASOPHILS NFR BLD AUTO: 0.3 %
DIFFERENTIAL METHOD BLD: ABNORMAL
EOSINOPHIL # BLD AUTO: 0.1 10E9/L (ref 0–0.7)
EOSINOPHIL NFR BLD AUTO: 1.2 %
ERYTHROCYTE [DISTWIDTH] IN BLOOD BY AUTOMATED COUNT: 15 % (ref 10–15)
HCT VFR BLD AUTO: 35.7 % (ref 40–53)
HGB BLD-MCNC: 11.5 G/DL (ref 13.3–17.7)
IMM GRANULOCYTES # BLD: 0.2 10E9/L (ref 0–0.4)
IMM GRANULOCYTES NFR BLD: 1.9 %
LYMPHOCYTES # BLD AUTO: 1.6 10E9/L (ref 0.8–5.3)
LYMPHOCYTES NFR BLD AUTO: 18.3 %
MCH RBC QN AUTO: 27.8 PG (ref 26.5–33)
MCHC RBC AUTO-ENTMCNC: 32.2 G/DL (ref 31.5–36.5)
MCV RBC AUTO: 86 FL (ref 78–100)
MONOCYTES # BLD AUTO: 0.7 10E9/L (ref 0–1.3)
MONOCYTES NFR BLD AUTO: 7.8 %
NEUTROPHILS # BLD AUTO: 6.1 10E9/L (ref 1.6–8.3)
NEUTROPHILS NFR BLD AUTO: 70.5 %
PLATELET # BLD AUTO: 173 10E9/L (ref 150–450)
RBC # BLD AUTO: 4.13 10E12/L (ref 4.4–5.9)
WBC # BLD AUTO: 8.6 10E9/L (ref 4–11)

## 2017-08-21 PROCEDURE — 36415 COLL VENOUS BLD VENIPUNCTURE: CPT | Performed by: INTERNAL MEDICINE

## 2017-08-21 PROCEDURE — 85025 COMPLETE CBC W/AUTO DIFF WBC: CPT | Performed by: INTERNAL MEDICINE

## 2017-08-22 ENCOUNTER — CARE COORDINATION (OUTPATIENT)
Dept: ONCOLOGY | Facility: CLINIC | Age: 72
End: 2017-08-22

## 2017-08-22 ENCOUNTER — TELEPHONE (OUTPATIENT)
Dept: FAMILY MEDICINE | Facility: CLINIC | Age: 72
End: 2017-08-22

## 2017-08-22 DIAGNOSIS — M54.50 CHRONIC BILATERAL LOW BACK PAIN WITHOUT SCIATICA: ICD-10-CM

## 2017-08-22 DIAGNOSIS — G89.29 CHRONIC BILATERAL LOW BACK PAIN WITHOUT SCIATICA: ICD-10-CM

## 2017-08-22 RX ORDER — OXYCODONE HYDROCHLORIDE 5 MG/1
5 TABLET ORAL EVERY 4 HOURS PRN
Qty: 18 TABLET | Refills: 0 | Status: CANCELLED | OUTPATIENT
Start: 2017-08-22

## 2017-08-22 NOTE — TELEPHONE ENCOUNTER
Cannot fax or send oxycodone      Would he like norco???    Otherwise Needs to be seen for more

## 2017-08-22 NOTE — TELEPHONE ENCOUNTER
"Back surgery with Dr Singh at [facility], released 8-14-17.  Given #40 at time of discharge, has #8 left.  Asking for refill. \"'pt lives in [place] and because of distance he did not plan to f/u for post op visit here at [facility].\"    Controlled Substance Refill Request for Oxycodone  Problem List Complete:  No     PROVIDER TO CONSIDER COMPLETION OF PROBLEM LIST AND OVERVIEW/CONTROLLED SUBSTANCE AGREEMENT    Last Written Prescription Date:  8-14-17 per patient from Dr Singh  Last Fill Quantity: 40 per patient ,   # refills: 0    [facility] [place] [facility]: 8-4-17 for pre op exam    Future Office visit:     Controlled substance agreement on file: No.     Processing:  Patient will  in clinic     checked in past 6 months?  Yes 8-22-17 with only Dr Singh Oxytam listed  "

## 2017-08-22 NOTE — PROGRESS NOTES
The patient was called and the results of his CBC from 8/21/17 were reviewed including platelets of 173.  He was asked to have a CBC rechecked on 9/6/17 and to continue on Promacta 75mg daily.  We also reviewed his follow-up appointment with Dr. Novoa on 9/28/17.

## 2017-08-23 NOTE — TELEPHONE ENCOUNTER
If this patient is literally 1 week out from back surgery, his pain management still falls well within the realm and responsibility of his spine surgery team. He should be advised to discuss with them if his pain is not appropriately controlled. He will need to notify them of his pain as if his pain is not adequately controlled, this information is important to them in their post operative care.   Please inform him of this message and notify him that he will need to pickup either oxy or norco from his surgeon's office. Neither can be faxed.   Nicole Joy Siegler, PA-C

## 2017-08-23 NOTE — TELEPHONE ENCOUNTER
Called patient to let him know that he needs to contact the surgeon's office about more pain medication.

## 2017-08-23 NOTE — TELEPHONE ENCOUNTER
We cannot fax norco either. The patient will have to  either a script for oxycodone or norco.  Please advise if this patient can get refill for oxycodone.

## 2017-08-29 DIAGNOSIS — D69.3 IDIOPATHIC THROMBOCYTOPENIC PURPURA (H): ICD-10-CM

## 2017-08-29 LAB
BASOPHILS # BLD AUTO: 0 10E9/L (ref 0–0.2)
BASOPHILS NFR BLD AUTO: 0.4 %
DIFFERENTIAL METHOD BLD: ABNORMAL
EOSINOPHIL # BLD AUTO: 0.1 10E9/L (ref 0–0.7)
EOSINOPHIL NFR BLD AUTO: 0.7 %
ERYTHROCYTE [DISTWIDTH] IN BLOOD BY AUTOMATED COUNT: 15 % (ref 10–15)
HCT VFR BLD AUTO: 37.7 % (ref 40–53)
HGB BLD-MCNC: 12.3 G/DL (ref 13.3–17.7)
IMM GRANULOCYTES # BLD: 0.2 10E9/L (ref 0–0.4)
IMM GRANULOCYTES NFR BLD: 1.7 %
LYMPHOCYTES # BLD AUTO: 1.8 10E9/L (ref 0.8–5.3)
LYMPHOCYTES NFR BLD AUTO: 18.2 %
MCH RBC QN AUTO: 28.2 PG (ref 26.5–33)
MCHC RBC AUTO-ENTMCNC: 32.6 G/DL (ref 31.5–36.5)
MCV RBC AUTO: 87 FL (ref 78–100)
MONOCYTES # BLD AUTO: 0.8 10E9/L (ref 0–1.3)
MONOCYTES NFR BLD AUTO: 7.4 %
NEUTROPHILS # BLD AUTO: 7.2 10E9/L (ref 1.6–8.3)
NEUTROPHILS NFR BLD AUTO: 71.6 %
PLATELET # BLD AUTO: 208 10E9/L (ref 150–450)
RBC # BLD AUTO: 4.36 10E12/L (ref 4.4–5.9)
WBC # BLD AUTO: 10.1 10E9/L (ref 4–11)

## 2017-08-29 PROCEDURE — 36415 COLL VENOUS BLD VENIPUNCTURE: CPT | Performed by: INTERNAL MEDICINE

## 2017-08-29 PROCEDURE — 85025 COMPLETE CBC W/AUTO DIFF WBC: CPT | Performed by: INTERNAL MEDICINE

## 2017-09-05 DIAGNOSIS — D69.3 IDIOPATHIC THROMBOCYTOPENIC PURPURA (H): ICD-10-CM

## 2017-09-05 PROCEDURE — 85025 COMPLETE CBC W/AUTO DIFF WBC: CPT | Performed by: INTERNAL MEDICINE

## 2017-09-05 PROCEDURE — 36415 COLL VENOUS BLD VENIPUNCTURE: CPT | Performed by: INTERNAL MEDICINE

## 2017-09-06 ENCOUNTER — CARE COORDINATION (OUTPATIENT)
Dept: ONCOLOGY | Facility: CLINIC | Age: 72
End: 2017-09-06

## 2017-09-06 LAB
BASOPHILS # BLD AUTO: 0 10E9/L (ref 0–0.2)
BASOPHILS NFR BLD AUTO: 0.5 %
DIFFERENTIAL METHOD BLD: ABNORMAL
EOSINOPHIL # BLD AUTO: 0.1 10E9/L (ref 0–0.7)
EOSINOPHIL NFR BLD AUTO: 0.8 %
ERYTHROCYTE [DISTWIDTH] IN BLOOD BY AUTOMATED COUNT: 15.1 % (ref 10–15)
HCT VFR BLD AUTO: 38.1 % (ref 40–53)
HGB BLD-MCNC: 12.6 G/DL (ref 13.3–17.7)
IMM GRANULOCYTES # BLD: 0.1 10E9/L (ref 0–0.4)
IMM GRANULOCYTES NFR BLD: 1.3 %
LYMPHOCYTES # BLD AUTO: 1.6 10E9/L (ref 0.8–5.3)
LYMPHOCYTES NFR BLD AUTO: 18.9 %
MCH RBC QN AUTO: 28.4 PG (ref 26.5–33)
MCHC RBC AUTO-ENTMCNC: 33.1 G/DL (ref 31.5–36.5)
MCV RBC AUTO: 86 FL (ref 78–100)
MONOCYTES # BLD AUTO: 0.7 10E9/L (ref 0–1.3)
MONOCYTES NFR BLD AUTO: 7.9 %
NEUTROPHILS # BLD AUTO: 5.9 10E9/L (ref 1.6–8.3)
NEUTROPHILS NFR BLD AUTO: 70.6 %
PLATELET # BLD AUTO: 135 10E9/L (ref 150–450)
RBC # BLD AUTO: 4.44 10E12/L (ref 4.4–5.9)
WBC # BLD AUTO: 8.3 10E9/L (ref 4–11)

## 2017-09-06 NOTE — PROGRESS NOTES
The patient was called and his CBC results from 9/5/17 were reviewed including platelets of 135.  He was asked to continue taking the Promacta 75mg daily and have a CBC checked again in 2 weeks.  His follow-up appointment with Dr. Novoa on 9/28/17 was reviewed.

## 2017-09-12 ENCOUNTER — TRANSFERRED RECORDS (OUTPATIENT)
Dept: HEALTH INFORMATION MANAGEMENT | Facility: CLINIC | Age: 72
End: 2017-09-12

## 2017-09-12 DIAGNOSIS — D69.3 IDIOPATHIC THROMBOCYTOPENIC PURPURA (H): ICD-10-CM

## 2017-09-12 LAB
BASOPHILS # BLD AUTO: 0 10E9/L (ref 0–0.2)
BASOPHILS NFR BLD AUTO: 0.4 %
DIFFERENTIAL METHOD BLD: ABNORMAL
EOSINOPHIL # BLD AUTO: 0.1 10E9/L (ref 0–0.7)
EOSINOPHIL NFR BLD AUTO: 0.7 %
ERYTHROCYTE [DISTWIDTH] IN BLOOD BY AUTOMATED COUNT: 15.6 % (ref 10–15)
HCT VFR BLD AUTO: 36.5 % (ref 40–53)
HGB BLD-MCNC: 12.4 G/DL (ref 13.3–17.7)
LYMPHOCYTES # BLD AUTO: 1.3 10E9/L (ref 0.8–5.3)
LYMPHOCYTES NFR BLD AUTO: 15.4 %
MCH RBC QN AUTO: 29 PG (ref 26.5–33)
MCHC RBC AUTO-ENTMCNC: 34 G/DL (ref 31.5–36.5)
MCV RBC AUTO: 85 FL (ref 78–100)
MONOCYTES # BLD AUTO: 0.6 10E9/L (ref 0–1.3)
MONOCYTES NFR BLD AUTO: 7.7 %
NEUTROPHILS # BLD AUTO: 6.3 10E9/L (ref 1.6–8.3)
NEUTROPHILS NFR BLD AUTO: 75.8 %
PLATELET # BLD AUTO: 100 10E9/L (ref 150–450)
RBC # BLD AUTO: 4.28 10E12/L (ref 4.4–5.9)
WBC # BLD AUTO: 8.3 10E9/L (ref 4–11)

## 2017-09-12 PROCEDURE — 85025 COMPLETE CBC W/AUTO DIFF WBC: CPT | Performed by: INTERNAL MEDICINE

## 2017-09-12 PROCEDURE — 36415 COLL VENOUS BLD VENIPUNCTURE: CPT | Performed by: INTERNAL MEDICINE

## 2017-09-18 DIAGNOSIS — D69.3 IDIOPATHIC THROMBOCYTOPENIC PURPURA (H): ICD-10-CM

## 2017-09-18 LAB
BASOPHILS # BLD AUTO: 0 10E9/L (ref 0–0.2)
BASOPHILS NFR BLD AUTO: 0.3 %
DIFFERENTIAL METHOD BLD: ABNORMAL
EOSINOPHIL # BLD AUTO: 0.1 10E9/L (ref 0–0.7)
EOSINOPHIL NFR BLD AUTO: 0.7 %
ERYTHROCYTE [DISTWIDTH] IN BLOOD BY AUTOMATED COUNT: 15.7 % (ref 10–15)
HCT VFR BLD AUTO: 37 % (ref 40–53)
HGB BLD-MCNC: 12.4 G/DL (ref 13.3–17.7)
LYMPHOCYTES # BLD AUTO: 1.4 10E9/L (ref 0.8–5.3)
LYMPHOCYTES NFR BLD AUTO: 18.3 %
MCH RBC QN AUTO: 28.7 PG (ref 26.5–33)
MCHC RBC AUTO-ENTMCNC: 33.5 G/DL (ref 31.5–36.5)
MCV RBC AUTO: 86 FL (ref 78–100)
MONOCYTES # BLD AUTO: 0.5 10E9/L (ref 0–1.3)
MONOCYTES NFR BLD AUTO: 7.1 %
NEUTROPHILS # BLD AUTO: 5.5 10E9/L (ref 1.6–8.3)
NEUTROPHILS NFR BLD AUTO: 73.6 %
PLATELET # BLD AUTO: 106 10E9/L (ref 150–450)
RBC # BLD AUTO: 4.32 10E12/L (ref 4.4–5.9)
WBC # BLD AUTO: 7.4 10E9/L (ref 4–11)

## 2017-09-18 PROCEDURE — 36415 COLL VENOUS BLD VENIPUNCTURE: CPT | Performed by: INTERNAL MEDICINE

## 2017-09-18 PROCEDURE — 85025 COMPLETE CBC W/AUTO DIFF WBC: CPT | Performed by: INTERNAL MEDICINE

## 2017-09-19 ENCOUNTER — CARE COORDINATION (OUTPATIENT)
Dept: ONCOLOGY | Facility: CLINIC | Age: 72
End: 2017-09-19

## 2017-09-19 DIAGNOSIS — D69.3 IDIOPATHIC THROMBOCYTOPENIC PURPURA (H): Primary | ICD-10-CM

## 2017-09-19 NOTE — PROGRESS NOTES
The patient was called and his CBC results were reviewed including platelets of 106. The patient was asked to follow-up as scheduled with Dr. Novoa on 9/298/17 with a lab appointment prior to the appointment.

## 2017-09-25 DIAGNOSIS — I82.409 DEEP VEIN THROMBOSIS (DVT) OF LOWER EXTREMITY, UNSPECIFIED CHRONICITY, UNSPECIFIED LATERALITY, UNSPECIFIED VEIN (H): ICD-10-CM

## 2017-09-25 DIAGNOSIS — I10 BENIGN ESSENTIAL HYPERTENSION: ICD-10-CM

## 2017-09-25 RX ORDER — RIVAROXABAN 20 MG/1
TABLET, FILM COATED ORAL
Qty: 30 TABLET | Refills: 3 | Status: SHIPPED | OUTPATIENT
Start: 2017-09-25 | End: 2018-02-25

## 2017-09-25 RX ORDER — SPIRONOLACTONE 25 MG/1
TABLET ORAL
Qty: 90 TABLET | Refills: 2 | Status: SHIPPED | OUTPATIENT
Start: 2017-09-25 | End: 2018-09-02

## 2017-09-25 NOTE — TELEPHONE ENCOUNTER
Spironolactone 25 mg  Last Written Prescription Date: 8/23/16  Last Fill Quantity: 90, # refills: 3  Last Office Visit with [email or web address] or [facility] prescribing provider: 8/4/17       Potassium   Date Value Ref Range Status   08/04/2017 4.6 3.4 - 5.3 mmol/L Final     Creatinine   Date Value Ref Range Status   08/04/2017 1.15 0.66 - 1.25 mg/dL Final     BP Readings from Last 3 Encounters:   08/04/17 120/60   07/17/17 98/60   06/29/17 90/61

## 2017-09-28 ENCOUNTER — ONCOLOGY VISIT (OUTPATIENT)
Dept: ONCOLOGY | Facility: CLINIC | Age: 72
End: 2017-09-28
Attending: INTERNAL MEDICINE
Payer: COMMERCIAL

## 2017-09-28 ENCOUNTER — APPOINTMENT (OUTPATIENT)
Dept: LAB | Facility: CLINIC | Age: 72
End: 2017-09-28
Attending: INTERNAL MEDICINE
Payer: COMMERCIAL

## 2017-09-28 VITALS
DIASTOLIC BLOOD PRESSURE: 70 MMHG | SYSTOLIC BLOOD PRESSURE: 116 MMHG | WEIGHT: 189.4 LBS | TEMPERATURE: 98 F | RESPIRATION RATE: 18 BRPM | OXYGEN SATURATION: 98 % | BODY MASS INDEX: 30.11 KG/M2 | HEART RATE: 59 BPM

## 2017-09-28 DIAGNOSIS — Z79.01 LONG TERM CURRENT USE OF ANTICOAGULANT THERAPY: ICD-10-CM

## 2017-09-28 DIAGNOSIS — Z86.711 HISTORY OF PULMONARY EMBOLISM: Primary | ICD-10-CM

## 2017-09-28 DIAGNOSIS — D69.3 IDIOPATHIC THROMBOCYTOPENIC PURPURA (H): ICD-10-CM

## 2017-09-28 LAB
ALBUMIN SERPL-MCNC: 3.6 G/DL (ref 3.4–5)
ALP SERPL-CCNC: 82 U/L (ref 40–150)
ALT SERPL W P-5'-P-CCNC: 25 U/L (ref 0–70)
ANION GAP SERPL CALCULATED.3IONS-SCNC: 6 MMOL/L (ref 3–14)
AST SERPL W P-5'-P-CCNC: 20 U/L (ref 0–45)
BASOPHILS # BLD AUTO: 0.1 10E9/L (ref 0–0.2)
BASOPHILS NFR BLD AUTO: 0.8 %
BILIRUB SERPL-MCNC: 1.2 MG/DL (ref 0.2–1.3)
BUN SERPL-MCNC: 10 MG/DL (ref 7–30)
CALCIUM SERPL-MCNC: 9.1 MG/DL (ref 8.5–10.1)
CHLORIDE SERPL-SCNC: 106 MMOL/L (ref 94–109)
CO2 SERPL-SCNC: 26 MMOL/L (ref 20–32)
CREAT SERPL-MCNC: 0.99 MG/DL (ref 0.66–1.25)
DIFFERENTIAL METHOD BLD: ABNORMAL
EOSINOPHIL # BLD AUTO: 0 10E9/L (ref 0–0.7)
EOSINOPHIL NFR BLD AUTO: 0.7 %
ERYTHROCYTE [DISTWIDTH] IN BLOOD BY AUTOMATED COUNT: 15.7 % (ref 10–15)
GFR SERPL CREATININE-BSD FRML MDRD: 74 ML/MIN/1.7M2
GLUCOSE SERPL-MCNC: 105 MG/DL (ref 70–99)
HCT VFR BLD AUTO: 35 % (ref 40–53)
HGB BLD-MCNC: 11.5 G/DL (ref 13.3–17.7)
IMM GRANULOCYTES # BLD: 0.1 10E9/L (ref 0–0.4)
IMM GRANULOCYTES NFR BLD: 1.1 %
LYMPHOCYTES # BLD AUTO: 1 10E9/L (ref 0.8–5.3)
LYMPHOCYTES NFR BLD AUTO: 15.8 %
MCH RBC QN AUTO: 28.6 PG (ref 26.5–33)
MCHC RBC AUTO-ENTMCNC: 32.9 G/DL (ref 31.5–36.5)
MCV RBC AUTO: 87 FL (ref 78–100)
MONOCYTES # BLD AUTO: 0.5 10E9/L (ref 0–1.3)
MONOCYTES NFR BLD AUTO: 7.5 %
NEUTROPHILS # BLD AUTO: 4.5 10E9/L (ref 1.6–8.3)
NEUTROPHILS NFR BLD AUTO: 74.1 %
NRBC # BLD AUTO: 0 10*3/UL
NRBC BLD AUTO-RTO: 0 /100
PLATELET # BLD AUTO: 91 10E9/L (ref 150–450)
POTASSIUM SERPL-SCNC: 4 MMOL/L (ref 3.4–5.3)
PROT SERPL-MCNC: 7.2 G/DL (ref 6.8–8.8)
RBC # BLD AUTO: 4.02 10E12/L (ref 4.4–5.9)
SODIUM SERPL-SCNC: 138 MMOL/L (ref 133–144)
WBC # BLD AUTO: 6.1 10E9/L (ref 4–11)

## 2017-09-28 PROCEDURE — 99212 OFFICE O/P EST SF 10 MIN: CPT | Mod: ZF

## 2017-09-28 PROCEDURE — 99215 OFFICE O/P EST HI 40 MIN: CPT | Mod: ZP | Performed by: INTERNAL MEDICINE

## 2017-09-28 PROCEDURE — 85025 COMPLETE CBC W/AUTO DIFF WBC: CPT | Performed by: INTERNAL MEDICINE

## 2017-09-28 PROCEDURE — 36415 COLL VENOUS BLD VENIPUNCTURE: CPT

## 2017-09-28 PROCEDURE — 80053 COMPREHEN METABOLIC PANEL: CPT | Performed by: INTERNAL MEDICINE

## 2017-09-28 ASSESSMENT — PAIN SCALES - GENERAL: PAINLEVEL: MODERATE PAIN (5)

## 2017-09-28 NOTE — PROGRESS NOTES
HEMATOLOGY CLINIC VISIT    Raymon Ace is a 72-year-old male who returns today for followup of chronic ITP and a history of unprovoked pulmonary embolism.  He was diagnosed with ITP in 2012.  He is known to be steroid-responsive but had a stable platelet count in the 30,000 to 50,000 range on no therapy for several years.  He was treated with Promacta back in 2013 to increase his platelet count prior to spine surgery which was effective.  He subsequently stopped Promacta and returned to his previous baseline platelet count, where things remained stable without therapy.  He presented in 04/2017 with an unprovoked pulmonary embolism and we started following him thereafter.  Because of the need for anticoagulation, we reinitiated therapy with Promacta.  He is currently on 75 mg daily and has been taking this since 05/2017.  He tolerates it well.  His platelet count has been stable in the 70,000 to 100,000 range over the last several months.       He is anticoagulated with rivaroxaban, which he is also tolerating well.  He has had no bleeding issues aside from some occasional mild bruising.  His breathing has returned to baseline, but he still has episodes of pleuritic chest pain in the right lower lobe area which he finds alarming.  He is asking today if we can repeat a CT scan to see if his blood clot is gone.       In mid August he underwent spine surgery.  That was uncomplicated from a bleeding and clotting perspective.  He and his wife plan to travel to [place] again from late October to early December.  They will return to [place] in late January and return again in the spring.       PHYSICAL EXAMINATION:  He looks well.  He are no obvious bruises or petechiae.  I did not perform a more detailed exam today.       LABORATORY DATA:  Serum electrolytes are normal.  Creatinine is 0.99.  Liver function tests are normal.  CBC shows a white count of 6.1 with a hemoglobin of 11.5 and a platelet count of 91,000.  Leukocyte  differential is normal.         ASSESSMENT AND PLAN:   1.  Chronic ITP.  Raymon is doing quite well on Promacta with a stable platelet count in the 100,000 range.  We will make no change in that part of his care plan today.  Given his need for long-term anticoagulation, we are aiming to keep his platelet count consistently above 50,000.  He would like to continue checking this frequently.  He will make arrangements to have his platelets checked when he is in [place] with the results faxed back to us.   2.  Unprovoked pulmonary embolism in 04/2017.  He is tolerating anticoagulation with rivaroxaban without difficulty.  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  We will repeat a CT scan, but I explained to him that we expect the clot will be gone.  More likely, we may see some scarring related to the previous pulmonary infarct which is likely the cause of his episodes of pleurisy.  We will set that up at his convenience in the next week or so, and will contact him with those results.  Because of the unprovoked nature of his pulmonary embolism, he will require long-term anticoagulation.       Total time spent today was 40 minutes, all of which was in counseling and coordination of care.       ADDENDUM:    CT scan shows resolution of the pulmonary embolism.      Waylon Novoa MD  Associate Professor of Medicine  Division of Hematology, Oncology, and Transplantation  Director, [facility]

## 2017-09-28 NOTE — LETTER
9/28/2017       RE: Raymon Ace  [address] [facility] [address]     Dear Colleague,    Thank you for referring your patient, Raymon Ace, to the [facility]. Please see a copy of my visit note below.      HEMATOLOGY CLINIC VISIT    Raymon Ace is a 72-year-old male who returns today for followup of chronic ITP and a history of unprovoked pulmonary embolism.  He was diagnosed with ITP in 2012.  He is known to be steroid-responsive but had a stable platelet count in the 30,000 to 50,000 range on no therapy for several years.  He was treated with Promacta back in 2013 to increase his platelet count prior to spine surgery which was effective.  He subsequently stopped Promacta and returned to his previous baseline platelet count, where things remained stable without therapy.  He presented in 04/2017 with an unprovoked pulmonary embolism and we started following him thereafter.  Because of the need for anticoagulation, we reinitiated therapy with Promacta.  He is currently on 75 mg daily and has been taking this since 05/2017.  He tolerates it well.  His platelet count has been stable in the 70,000 to 100,000 range over the last several months.       He is anticoagulated with rivaroxaban, which he is also tolerating well.  He has had no bleeding issues aside from some occasional mild bruising.  His breathing has returned to baseline, but he still has episodes of pleuritic chest pain in the right lower lobe area which he finds alarming.  He is asking today if we can repeat a CT scan to see if his blood clot is gone.       In mid August he underwent spine surgery.  That was uncomplicated from a bleeding and clotting perspective.  He and his wife plan to travel to [place] again from late October to early December.  They will return to [place] in late January and return again in the spring.       PHYSICAL EXAMINATION:  He looks well.  He are no obvious bruises or petechiae.  I did not perform a  more detailed exam today.       LABORATORY DATA:  Serum electrolytes are normal.  Creatinine is 0.99.  Liver function tests are normal.  CBC shows a white count of 6.1 with a hemoglobin of 11.5 and a platelet count of 91,000.  Leukocyte differential is normal.         ASSESSMENT AND PLAN:   1.  Chronic ITP.  Raymon is doing quite well on Promacta with a stable platelet count in the 100,000 range.  We will make no change in that part of his care plan today.  Given his need for long-term anticoagulation, we are aiming to keep his platelet count consistently above 50,000.  He would like to continue checking this frequently.  He will make arrangements to have his platelets checked when he is in [place] with the results faxed back to us.   2.  Unprovoked pulmonary embolism in 04/2017.  He is tolerating anticoagulation with rivaroxaban without difficulty.  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  We will repeat a CT scan, but I explained to him that we expect the clot will be gone.  More likely, we may see some scarring related to the previous pulmonary infarct which is likely the cause of his episodes of pleurisy.  We will set that up at his convenience in the next week or so, and will contact him with those results.  Because of the unprovoked nature of his pulmonary embolism, he will require long-term anticoagulation.       Total time spent today was 40 minutes, all of which was in counseling and coordination of care.       ADDENDUM:    CT scan shows resolution of the pulmonary embolism.      Waylon Novoa MD  Associate Professor of Medicine  Division of Hematology, Oncology, and Transplantation  Director, [facility]

## 2017-09-28 NOTE — MR AVS SNAPSHOT
After Visit Summary   9/28/2017    Raymon Ace    MRN: [id number]           Patient Information     Date Of Birth          1945        Visit Information        Provider Department      9/28/2017 12:00 PM Waylon Novoa MD [facility]        Today's Diagnoses     History of pulmonary embolism    -  1    ITP (idiopathic thrombocytopenic purpura) since 3-13 back surgery         Long term current use of anticoagulant therapy           Follow-ups after your visit        Your next 10 appointments already scheduled     Sep 29, 2017 10:30 AM CDT   XR JOINT INJECTION ASPIRATION MAJOR RT with WYXR3, WY RAD   [facility] ([facility])    [address] [facility] [address]   [phone]           Stop drinking 1 hour before the exam.  You may take your medicines as usual, except for blood thinners (Coumadin, Plavix, Ticlid, Persantine, Aggrenox, Pletal, Effient, Brilliant). Talk to your doctor if you take these.  Tell your doctor if:   You have ever had an allergic reaction to X-ray dye (contrast fluid).   There is a chance you may be pregnant.  Please bring a list of your current medicines to your exam. Include vitamins, minerals and over-the-counter medicines.  Please call the Imaging Department at your exam site with any questions.            Oct 03, 2017 11:00 AM CDT   CT CHEST PULMONARY EMBOLISM W CONTRAST with WYCT1   [facility] ([facility])    [address] [facility] [address]   [phone]           Please allow two hours for this test.  Follow the instructions below:   The day before your exam, drink extra fluids at least six 8-ounce glasses (unless your doctor wants you to restrict your fluids).   No caffeine and no smoking the day of the test.   Do not eat or drink for 3 hours before your exam. You may take your morning medicines with small sips of water.   You may have or need a blood test  (creatinine test) within 30 days of your exam. Go to your clinic or Diagnostic Imaging Department for this test.   If you take Viagra, Levitra or Cialis, stop taking it for 48 hours before your test.   If you are diabetic and take oral hypoglycemics, do not take them on the day of your test. Also, wait 48 hours before re-starting metformin (Avandamet, Glucophage, Glucovance, Metaglip).   Do not take diuretics on day of the test. This includes Furosemide (Lasix), Torsemide, Bumetanide (Bumex), Metolazone (Zaroxolyn) and Hydrochlorothiazide.   Do not take NSAIDS on the day of the test. This includes ibuprofen (Advil or Motrin), Naproxen and Indomethacin.  Bring any scans or X-rays taken at other hospitals, if similar tests were done. Also bring a list of your medicines, including vitamins, minerals and over-the-counter drugs. It is safest to leave personal items at home.  Be sure to tell your doctor:   If you have any allergies, including any reaction to contrast.   If there s any chance you are pregnant.   If you are breastfeeding.   If you have any special needs.  Please wear loose clothing, such as a sweat suit or jogging clothes. Avoid snaps, zippers and other metal. We may ask you to undress and put on a hospital gown.  If you have any questions, please call the Imaging Department where you will have your exam.            Oct 09, 2017 11:15 AM CDT   LAB with PI LAB   [facility] ([facility])    [address] [place] [address]   [phone]           Patient must bring picture ID. Patient should be prepared to give a urine specimen  Please do not eat 10-12 hours before your appointment if you are coming in fasting for labs on lipids, cholesterol, or glucose (sugar). Pregnant women should follow their Care Team instructions. Water with medications is okay. Do not drink coffee or other fluids. If you have concerns about taking  your medications, please ask at office or  if scheduling via UrGift, send a message by clicking on Secure Messaging, Message Your Care Team.            Oct 16, 2017 11:15 AM CDT   LAB with PI LAB   [facility] ([facility])    [address] [facility] [id number] [phone]           Patient must bring picture ID. Patient should be prepared to give a urine specimen  Please do not eat 10-12 hours before your appointment if you are coming in fasting for labs on lipids, cholesterol, or glucose (sugar). Pregnant women should follow their Care Team instructions. Water with medications is okay. Do not drink coffee or other fluids. If you have concerns about taking  your medications, please ask at office or if scheduling via UrGift, send a message by clicking on Secure Messaging, Message Your Care Team.            Oct 23, 2017 11:00 AM CDT   LAB with PI LAB   [facility] ([facility]    [address] [facility] [id number] [phone]           Patient must bring picture ID. Patient should be prepared to give a urine specimen  Please do not eat 10-12 hours before your appointment if you are coming in fasting for labs on lipids, cholesterol, or glucose (sugar). Pregnant women should follow their Care Team instructions. Water with medications is okay. Do not drink coffee or other fluids. If you have concerns about taking  your medications, please ask at office or if scheduling via UrGift, send a message by clicking on Secure Messaging, Message Your Care Team.            Oct 30, 2017 11:00 AM CDT   LAB with PI LAB   [facility] ([facility])    [address] [facility] [id number] [phone]           Patient must bring picture ID. Patient should be prepared to give a urine specimen  Please do not eat 10-12 hours before your appointment if you are coming in fasting for labs on lipids, cholesterol, or glucose (sugar). Pregnant women should  follow their Care Team instructions. Water with medications is okay. Do not drink coffee or other fluids. If you have concerns about taking  your medications, please ask at office or if scheduling via Screenie, send a message by clicking on Secure Messaging, Message Your Care Team.            Jan 18, 2018 11:30 AM CST   [facility] Lab Draw with  MASONIC LAB DRAW   [facility] ([facility])    [address] [id number] [phone]            Jan 18, 2018 12:00 PM CST   (Arrive by 11:45 AM)   Return Visit with Waylon Novoa MD   [facility] ([facility])    [address] [place] [id number] [phone]              Future tests that were ordered for you today     Open Future Orders        Priority Expected Expires Ordered    CT Chest Pulmonary Embolism w Contrast Routine  4/26/2018 9/28/2017    Comprehensive metabolic panel Routine 1/28/2018 2/28/2018 9/28/2017    *CBC with platelets differential Routine 1/28/2018 2/28/2018 9/28/2017            Who to contact     If you have questions or need follow up information about today's clinic visit or your schedule please contact [facility] directly at [phone].  Normal or non-critical lab and imaging results will be communicated to you by [facility], letter or phone within 4 business days after the clinic has received the results. If you do not hear from us within 7 days, please contact the clinic through MyChart or phone. If you have a critical or abnormal lab result, we will notify you by phone as soon as possible.  Submit refill requests through Screenie or call your pharmacy and they will forward the refill request to us. Please allow 3 business days for your refill to be completed.          Additional Information About Your Visit        Screenie Information     Screenie gives you secure access to your  electronic health record. If you see a primary care provider, you can also send messages to your care team and make appointments. If you have questions, please call your primary care clinic.  If you do not have a primary care provider, please call [phone] and they will assist you.        Care EveryWhere ID     This is your Care EveryWhere ID. This could be used by other organizations to access your [place] medical records  [id number]        Your Vitals Were     Pulse Temperature Respirations Pulse Oximetry BMI (Body Mass Index)       59 98  F (36.7  C) (Oral) 18 98% 30.11 kg/m2        Blood Pressure from Last 3 Encounters:   09/28/17 116/70   08/04/17 120/60   07/17/17 98/60    Weight from Last 3 Encounters:   09/28/17 85.9 kg (189 lb 6.4 oz)   08/04/17 86.2 kg (190 lb)   07/17/17 87.5 kg (193 lb)              We Performed the Following     *CBC with platelets differential     Comprehensive metabolic panel        Primary Care Provider Office Phone # Fax #    Rosanne Kimberly Cherry [phone]       [address] [facility] [address]        Equal Access to Services     WHIT JAVIER : Hadii chato foleyo Soandria, waaxda luqadaha, qaybta kaalmada adeegyada, gonzales go. So [facility] [phone].    ATENCIÓN: Si habla español, tiene a wang disposición servicios gratuitos de asistencia lingüística. [facility] [phone].    We comply with applicable federal civil rights laws and [place] laws. We do not discriminate on the basis of race, color, national origin, age, disability sex, sexual orientation or gender identity.            Thank you!     Thank you for choosing [facility]  for your care. Our goal is always to provide you with excellent care. Hearing back from our patients is one way we can continue to improve our services. Please take a few minutes to complete the written survey that you may receive in the mail after your visit with  us. Thank you!             Your Updated Medication List - Protect others around you: Learn how to safely use, store and throw away your medicines at www.disposemymeds.org.          This list is accurate as of: 9/28/17  1:13 PM.  Always use your most recent med list.                   Brand Name Dispense Instructions for use Diagnosis    allopurinol 300 MG tablet    ZYLOPRIM    90 tablet    TAKE ONE TABLET BY MOUTH ONCE DAILY *NEED  TO  BE  SEEN  FOR  MORE  REFILLS    Gout involving toe, unspecified cause, unspecified chronicity, unspecified laterality       amLODIPine 5 MG tablet    NORVASC    90 tablet    TAKE ONE TABLET BY MOUTH ONCE DAILY    Benign essential hypertension       cloNIDine 0.3 MG tablet    CATAPRES    180 tablet    TAKE ONE TABLET BY MOUTH TWICE DAILY. NEEDS TO BE SEEN FOR MORE.    Benign essential hypertension       eltrombopag 75 MG tablet    PROMACTA    30 tablet    Take 1 tablet (75 mg) by mouth daily Administer on an empty stomach, 1 hour before or 2 hours after a meal.    Idiopathic thrombocytopenic purpura (H)       folic acid 1 MG tablet    FOLVITE    90 tablet    TAKE ONE TABLET BY MOUTH ONCE DAILY    Alcohol abuse       gabapentin 300 MG capsule    NEURONTIN    270 capsule    TAKE ONE CAPSULE BY MOUTH THREE TIMES DAILY    Chronic pain       lisinopril 40 MG tablet    PRINIVIL/ZESTRIL    90 tablet    TAKE ONE TABLET BY MOUTH DAILY    Essential hypertension       omeprazole 40 MG capsule    priLOSEC    90 capsule    TAKE ONE CAPSULE BY MOUTH ONCE DAILY    Esophageal reflux       spironolactone 25 MG tablet    ALDACTONE    90 tablet    TAKE ONE TABLET BY MOUTH ONCE DAILY    Benign essential hypertension       XARELTO 20 MG Tabs tablet   Generic drug:  rivaroxaban ANTICOAGULANT     30 tablet    TAKE ONE TABLET BY MOUTH ONCE DAILY WITH DINNER    Deep vein thrombosis (DVT) of lower extremity, unspecified chronicity, unspecified laterality, unspecified vein (H)

## 2017-09-28 NOTE — NURSING NOTE
"Oncology Rooming Note    September 28, 2017 11:47 AM   Raymon Ace is a 72 year old male who presents for:    Chief Complaint   Patient presents with     Blood Draw     Oncology Clinic Visit     Hx of PE, F/U Visit.     Initial Vitals: /70  Pulse 59  Temp 98  F (36.7  C) (Oral)  Resp 18  Wt 85.9 kg (189 lb 6.4 oz)  SpO2 98%  BMI 30.11 kg/m2 Estimated body mass index is 30.11 kg/(m^2) as calculated from the following:    Height as of 8/4/17: 1.689 m (5' 6.5\").    Weight as of this encounter: 85.9 kg (189 lb 6.4 oz). Body surface area is 2.01 meters squared.  Moderate Pain (5) Comment: back and legs   No LMP for male patient.  Allergies reviewed: Yes  Medications reviewed: Yes    Medications: Medication refills not needed today.  Pharmacy name entered into Sedicii:    [facility] - [place], [place] - [address] [facility] - [place], [place] - [address]  [facility] - [place], [place] - [address] [facility] - [place], [place] - [address]     Clinical concerns: Pt unable to confirm medications. He stated he does not know exactly what he is taking. Dr Novoa. was NOT notified.    7 minutes for nursing intake (face to face time)     Cammie Toribio LPN              "

## 2017-09-28 NOTE — NURSING NOTE
Chief Complaint   Patient presents with     Blood Draw       Neris Hansen CMA September 28, 2017 11:33 AM  Labs and vitals done see flow sheets.

## 2017-09-29 ENCOUNTER — HOSPITAL ENCOUNTER (OUTPATIENT)
Dept: GENERAL RADIOLOGY | Facility: CLINIC | Age: 72
Discharge: HOME | End: 2017-09-29
Attending: SPECIALIST | Admitting: SPECIALIST
Payer: COMMERCIAL

## 2017-09-29 DIAGNOSIS — M70.61 TROCHANTERIC BURSITIS OF RIGHT HIP: ICD-10-CM

## 2017-09-29 PROCEDURE — S0020 INJECTION, BUPIVICAINE HYDRO: HCPCS | Performed by: RADIOLOGY

## 2017-09-29 PROCEDURE — 25000128 H RX IP 250 OP 636: Performed by: RADIOLOGY

## 2017-09-29 PROCEDURE — 20610 DRAIN/INJ JOINT/BURSA W/O US: CPT | Mod: RT

## 2017-09-29 PROCEDURE — 25000125 ZZHC RX 250: Performed by: RADIOLOGY

## 2017-09-29 RX ORDER — TRIAMCINOLONE ACETONIDE 40 MG/ML
40 INJECTION, SUSPENSION INTRA-ARTICULAR; INTRAMUSCULAR ONCE
Status: COMPLETED | OUTPATIENT
Start: 2017-09-29 | End: 2017-09-29

## 2017-09-29 RX ORDER — LIDOCAINE HYDROCHLORIDE 10 MG/ML
5 INJECTION, SOLUTION EPIDURAL; INFILTRATION; INTRACAUDAL; PERINEURAL ONCE
Status: COMPLETED | OUTPATIENT
Start: 2017-09-29 | End: 2017-09-29

## 2017-09-29 RX ORDER — BUPIVACAINE HYDROCHLORIDE 5 MG/ML
4 INJECTION, SOLUTION PERINEURAL ONCE
Status: COMPLETED | OUTPATIENT
Start: 2017-09-29 | End: 2017-09-29

## 2017-09-29 RX ORDER — IOPAMIDOL 408 MG/ML
1 INJECTION, SOLUTION INTRATHECAL ONCE
Status: DISCONTINUED | OUTPATIENT
Start: 2017-09-29 | End: 2017-09-29 | Stop reason: CLARIF

## 2017-09-29 RX ADMIN — TRIAMCINOLONE ACETONIDE 40 MG: 40 INJECTION, SUSPENSION INTRA-ARTICULAR; INTRAMUSCULAR at 11:08

## 2017-09-29 RX ADMIN — BUPIVACAINE HYDROCHLORIDE 1 ML: 5 INJECTION, SOLUTION EPIDURAL; INTRACAUDAL at 11:08

## 2017-09-29 RX ADMIN — LIDOCAINE HYDROCHLORIDE 50 MG: 10 INJECTION, SOLUTION EPIDURAL; INFILTRATION; INTRACAUDAL; PERINEURAL at 11:08

## 2017-09-29 NOTE — PROGRESS NOTES
RADIOLOGY PROCEDURE NOTE  Patient name: Raymon Ace  MRN: [id number]  : 1945    Pre-procedure diagnosis: Pain.  Post-procedure diagnosis: Same    Procedure Date/Time: 2017  11:17 AM  Procedure: Right greater trochanteric bursa steroid injection.  Estimated blood loss: None  Specimen(s) collected:  None.  The patient tolerated the procedure well with no immediate complications.    See imaging dictation for procedural details.    Provider name: Faisal Angulo  Assistant(s):None

## 2017-10-02 RX ORDER — IOPAMIDOL 755 MG/ML
81 INJECTION, SOLUTION INTRAVASCULAR ONCE
Status: COMPLETED | OUTPATIENT
Start: 2017-10-03 | End: 2017-10-03

## 2017-10-03 ENCOUNTER — HOSPITAL ENCOUNTER (OUTPATIENT)
Dept: CT IMAGING | Facility: CLINIC | Age: 72
Discharge: HOME | End: 2017-10-03
Attending: INTERNAL MEDICINE | Admitting: INTERNAL MEDICINE
Payer: COMMERCIAL

## 2017-10-03 DIAGNOSIS — Z79.01 LONG TERM CURRENT USE OF ANTICOAGULANT THERAPY: ICD-10-CM

## 2017-10-03 DIAGNOSIS — D69.3 IDIOPATHIC THROMBOCYTOPENIC PURPURA (H): ICD-10-CM

## 2017-10-03 DIAGNOSIS — Z86.711 HISTORY OF PULMONARY EMBOLISM: ICD-10-CM

## 2017-10-03 PROCEDURE — 71260 CT THORAX DX C+: CPT

## 2017-10-03 PROCEDURE — 25000125 ZZHC RX 250: Performed by: RADIOLOGY

## 2017-10-03 PROCEDURE — 25000128 H RX IP 250 OP 636: Performed by: RADIOLOGY

## 2017-10-03 RX ADMIN — IOPAMIDOL 81 ML: 755 INJECTION, SOLUTION INTRAVENOUS at 11:16

## 2017-10-03 RX ADMIN — SODIUM CHLORIDE 104 ML: 9 INJECTION, SOLUTION INTRAVENOUS at 11:16

## 2017-10-09 DIAGNOSIS — D69.3 IDIOPATHIC THROMBOCYTOPENIC PURPURA (H): ICD-10-CM

## 2017-10-09 LAB
BASOPHILS # BLD AUTO: 0 10E9/L (ref 0–0.2)
BASOPHILS NFR BLD AUTO: 0.3 %
DIFFERENTIAL METHOD BLD: ABNORMAL
EOSINOPHIL # BLD AUTO: 0 10E9/L (ref 0–0.7)
EOSINOPHIL NFR BLD AUTO: 0.3 %
ERYTHROCYTE [DISTWIDTH] IN BLOOD BY AUTOMATED COUNT: 15.7 % (ref 10–15)
HCT VFR BLD AUTO: 36.3 % (ref 40–53)
HGB BLD-MCNC: 12 G/DL (ref 13.3–17.7)
LYMPHOCYTES # BLD AUTO: 1.4 10E9/L (ref 0.8–5.3)
LYMPHOCYTES NFR BLD AUTO: 16.2 %
MCH RBC QN AUTO: 28.2 PG (ref 26.5–33)
MCHC RBC AUTO-ENTMCNC: 33.1 G/DL (ref 31.5–36.5)
MCV RBC AUTO: 85 FL (ref 78–100)
MONOCYTES # BLD AUTO: 0.8 10E9/L (ref 0–1.3)
MONOCYTES NFR BLD AUTO: 8.8 %
NEUTROPHILS # BLD AUTO: 6.6 10E9/L (ref 1.6–8.3)
NEUTROPHILS NFR BLD AUTO: 74.4 %
PLATELET # BLD AUTO: 101 10E9/L (ref 150–450)
RBC # BLD AUTO: 4.26 10E12/L (ref 4.4–5.9)
WBC # BLD AUTO: 8.9 10E9/L (ref 4–11)

## 2017-10-09 PROCEDURE — 85025 COMPLETE CBC W/AUTO DIFF WBC: CPT | Performed by: INTERNAL MEDICINE

## 2017-10-09 PROCEDURE — 36415 COLL VENOUS BLD VENIPUNCTURE: CPT | Performed by: INTERNAL MEDICINE

## 2017-10-11 ENCOUNTER — CARE COORDINATION (OUTPATIENT)
Dept: ONCOLOGY | Facility: CLINIC | Age: 72
End: 2017-10-11

## 2017-10-11 NOTE — PROGRESS NOTES
The patient was called.  He was told that Dr. Novoa had reviewed his CT scan.  He was told that Dr. Novoa said that his pulmonary embolism has resolved.  He was told that Dr. Novoa recommends that he continues on his anticoagulant because his PE was unprovoked, and the anticoagulant will help to prevent another clot from forming.

## 2017-11-13 DIAGNOSIS — K21.9 ESOPHAGEAL REFLUX: ICD-10-CM

## 2017-11-14 RX ORDER — OMEPRAZOLE 40 MG/1
CAPSULE, DELAYED RELEASE ORAL
Qty: 90 CAPSULE | Refills: 2 | Status: SHIPPED | OUTPATIENT
Start: 2017-11-14 | End: 2018-05-18

## 2017-11-14 NOTE — TELEPHONE ENCOUNTER
LOV 8/4/17    Requested Prescriptions   Pending Prescriptions Disp Refills     omeprazole (PRILOSEC) 40 MG capsule [Pharmacy Med Name: OMEPRAZOLE DR 40MG  CAP] 90 capsule 3     Sig: TAKE ONE CAPSULE BY MOUTH ONCE DAILY    PPI Protocol Passed    11/13/2017 12:51 PM       Passed - Not on Clopidogrel (unless Pantoprazole ordered)       Passed - No diagnosis of osteoporosis on record       Passed - Recent or future visit with authorizing provider's specialty    Patient had office visit in the last year or has a visit in the next 30 days with authorizing provider.  See chart review.              Passed - Patient is age 18 or older

## 2017-11-22 ENCOUNTER — TELEPHONE (OUTPATIENT)
Dept: ONCOLOGY | Facility: CLINIC | Age: 72
End: 2017-11-22

## 2017-11-22 DIAGNOSIS — D69.3 IDIOPATHIC THROMBOCYTOPENIC PURPURA (H): ICD-10-CM

## 2017-11-22 NOTE — TELEPHONE ENCOUNTER
I spoke to rep at [facility] about pt's Promacta refill. They stated that they did not need anything from us at this time as their pharm team is processing refill request. I provided them with Dr Novoa' contact at [facility] for this patient.

## 2017-11-22 NOTE — TELEPHONE ENCOUNTER
----- Message from Rosie Jalloh RN sent at 11/22/2017  2:19 PM CST -----  Regarding: he needs a refill on his promacta.   Our office does not refill the PRomacta.    Rosie Jalloh RN - Nurse Clinician - [place] [facility] - [phone]    ----- Message -----     From: Fe Fernando     Sent: 11/22/2017  11:41 AM       To: Rosie Jalloh RN    Please call Elias at [facility], pharm regarding Raymon, at [phone]. They need a refill.    Thanks!

## 2017-12-11 DIAGNOSIS — D69.3 IDIOPATHIC THROMBOCYTOPENIC PURPURA (H): ICD-10-CM

## 2017-12-11 PROCEDURE — 36415 COLL VENOUS BLD VENIPUNCTURE: CPT | Performed by: INTERNAL MEDICINE

## 2017-12-11 PROCEDURE — 85025 COMPLETE CBC W/AUTO DIFF WBC: CPT | Performed by: INTERNAL MEDICINE

## 2017-12-12 ENCOUNTER — CARE COORDINATION (OUTPATIENT)
Dept: ONCOLOGY | Facility: CLINIC | Age: 72
End: 2017-12-12

## 2017-12-12 LAB
BASOPHILS # BLD AUTO: 0 10E9/L (ref 0–0.2)
BASOPHILS NFR BLD AUTO: 0.3 %
DIFFERENTIAL METHOD BLD: ABNORMAL
EOSINOPHIL # BLD AUTO: 0.1 10E9/L (ref 0–0.7)
EOSINOPHIL NFR BLD AUTO: 1.6 %
ERYTHROCYTE [DISTWIDTH] IN BLOOD BY AUTOMATED COUNT: 16.6 % (ref 10–15)
HCT VFR BLD AUTO: 39.1 % (ref 40–53)
HGB BLD-MCNC: 12.3 G/DL (ref 13.3–17.7)
IMM GRANULOCYTES # BLD: 0.1 10E9/L (ref 0–0.4)
IMM GRANULOCYTES NFR BLD: 1.2 %
LYMPHOCYTES # BLD AUTO: 0.9 10E9/L (ref 0.8–5.3)
LYMPHOCYTES NFR BLD AUTO: 16.1 %
MCH RBC QN AUTO: 25.9 PG (ref 26.5–33)
MCHC RBC AUTO-ENTMCNC: 31.5 G/DL (ref 31.5–36.5)
MCV RBC AUTO: 83 FL (ref 78–100)
MONOCYTES # BLD AUTO: 0.6 10E9/L (ref 0–1.3)
MONOCYTES NFR BLD AUTO: 10.4 %
NEUTROPHILS # BLD AUTO: 4.1 10E9/L (ref 1.6–8.3)
NEUTROPHILS NFR BLD AUTO: 70.4 %
PLATELET # BLD AUTO: 94 10E9/L (ref 150–450)
RBC # BLD AUTO: 4.74 10E12/L (ref 4.4–5.9)
WBC # BLD AUTO: 5.8 10E9/L (ref 4–11)

## 2017-12-12 NOTE — PROGRESS NOTES
The patient was called and his CBC results were reviewed including platelets of 94.  He was told that the results were stable.  He would like to get a CBC again in one week.  He will continue on Promacta 75mg daily.

## 2017-12-15 ENCOUNTER — OFFICE VISIT (OUTPATIENT)
Dept: FAMILY MEDICINE | Facility: CLINIC | Age: 72
End: 2017-12-15
Payer: COMMERCIAL

## 2017-12-15 VITALS
BODY MASS INDEX: 29.03 KG/M2 | OXYGEN SATURATION: 96 % | DIASTOLIC BLOOD PRESSURE: 80 MMHG | HEART RATE: 105 BPM | RESPIRATION RATE: 18 BRPM | SYSTOLIC BLOOD PRESSURE: 130 MMHG | TEMPERATURE: 97.4 F | HEIGHT: 67 IN | WEIGHT: 185 LBS

## 2017-12-15 DIAGNOSIS — Z53.9 ERRONEOUS ENCOUNTER--DISREGARD: Primary | ICD-10-CM

## 2017-12-15 ASSESSMENT — PATIENT HEALTH QUESTIONNAIRE - PHQ9: SUM OF ALL RESPONSES TO PHQ QUESTIONS 1-9: 0

## 2017-12-15 NOTE — PATIENT INSTRUCTIONS
1. You need a physical!!    2.  Weight Loss Tips  1. Do not eat after 6 hrs before your expected bedtime  2. Have your heaviest meal for breakfast, a slightly lighter meal at lunch and a snack 6 hrs before bed  3. No sugar/calorie drinks except milk ie no fruit juice, pop, alcohol.  4. Drink milk 30min before meals to decrease your hunger. Also it is excellent as part of your last meal of the day snack  5. Drink lots of water  6. Increase fiber in diet: all bran cereal, salads, popcorn etc  7. Have only one small serving of fruit a day about 1/2 cup (as this is high in sugar)  8. EXERCISE is the bottom line. Without it, you will gain weight even on a low calorie diet. Best if done 2-3X a day as can    Being overweight contributes to high blood pressure and high cholesterol, both of which cause heart attacks, strokes and kidney failure, prediabetes and diabetes, arthritis, and liver disease

## 2017-12-15 NOTE — NURSING NOTE
"Chief Complaint   Patient presents with     Recheck Medication     /80  Pulse 105  Temp 97.4  F (36.3  C) (Tympanic)  Resp 18  Ht 5' 6.5\" (1.689 m)  Wt 185 lb (83.9 kg)  SpO2 96%  BMI 29.41 kg/m2 Estimated body mass index is 29.41 kg/(m^2) as calculated from the following:    Height as of this encounter: 5' 6.5\" (1.689 m).    Weight as of this encounter: 185 lb (83.9 kg).  BP completed using cuff size: regular   Laura Kelly CMA    Health Maintenance Due   Topic Date Due     MEDICARE ANNUAL WELLNESS VISIT  08/19/1963     INFLUENZA VACCINE (SYSTEM ASSIGNED)  09/01/2017     PHQ-9 Q1YR  12/06/2017     Health Maintenance reviewed at today's visit patient asked to schedule/complete:   Routine Health Visit: Patient agrees to schedule  Depression:  Patient agrees to schedule  Immunizations:  Patient agrees to schedule    "

## 2017-12-15 NOTE — MR AVS SNAPSHOT
After Visit Summary   12/15/2017    Raymon Ace    MRN: [id number]           Patient Information     Date Of Birth          1945        Visit Information        Provider Department      12/15/2017 2:00 PM Rosanne Cherry MD [facility]        Today's Diagnoses     ERRONEOUS ENCOUNTER--DISREGARD    -  1      Care Instructions    1. You need a physical!!    2.  Weight Loss Tips  1. Do not eat after 6 hrs before your expected bedtime  2. Have your heaviest meal for breakfast, a slightly lighter meal at lunch and a snack 6 hrs before bed  3. No sugar/calorie drinks except milk ie no fruit juice, pop, alcohol.  4. Drink milk 30min before meals to decrease your hunger. Also it is excellent as part of your last meal of the day snack  5. Drink lots of water  6. Increase fiber in diet: all bran cereal, salads, popcorn etc  7. Have only one small serving of fruit a day about 1/2 cup (as this is high in sugar)  8. EXERCISE is the bottom line. Without it, you will gain weight even on a low calorie diet. Best if done 2-3X a day as can    Being overweight contributes to high blood pressure and high cholesterol, both of which cause heart attacks, strokes and kidney failure, prediabetes and diabetes, arthritis, and liver disease               Follow-ups after your visit        Follow-up notes from your care team     Return in about 3 months (around 3/15/2018) for Physical Exam, Routine Visit.      Your next 10 appointments already scheduled     Jan 18, 2018 11:30 AM CST   [facility] Draw with  [facility]   [facility] ([facility])    [address] [place] [address]  [address] [place] [id number]   [phone]            Jan 18, 2018 12:00 PM CST   (Arrive by 11:45 AM)   Return Visit with Waylon Novoa MD   [facility] ([facility])    [address] [place] [address]  [address]  [facility] [address]   [phone]            Jan 22, 2018 11:00 AM CST   LAB with [facility]   [facility]    [address] [facility] [id number] [phone]           Please do not eat 10-12 hours before your appointment if you are coming in fasting for labs on lipids, cholesterol, or glucose (sugar). This does not apply to pregnant women. Water, hot tea and black coffee (with nothing added) are okay. Do not drink other fluids, diet soda or chew gum.            Jan 29, 2018 11:00 AM CST   LAB with [facility] LAB   [facility]    [address] [facility] [address] [facility] [id number] [phone]           Please do not eat 10-12 hours before your appointment if you are coming in fasting for labs on lipids, cholesterol, or glucose (sugar). This does not apply to pregnant women. Water, hot tea and black coffee (with nothing added) are okay. Do not drink other fluids, diet soda or chew gum.              Who to contact     If you have questions or need follow up information about today's clinic visit or your schedule please contact [facility] directly at [phone].  Normal or non-critical lab and imaging results will be communicated to you by [employer], letter or phone within 4 business days after the clinic has received the results. If you do not hear from us within 7 days, please contact the clinic through [employer] or phone. If you have a critical or abnormal lab result, we will notify you by phone as soon as possible.  Submit refill requests through [employer] or call your pharmacy and they will forward the refill request to us. Please allow 3 business days for your refill to be completed.          Additional Information About Your Visit        [employer] Information     [employer] gives you secure access to your electronic health record. If you see a primary care provider, you can also send messages to  "your care team and make appointments. If you have questions, please call your primary care clinic.  If you do not have a primary care provider, please call [phone] and they will assist you.        Care EveryWhere ID     This is your Care EveryWhere ID. This could be used by other organizations to access your [facility] medical records  [id number]        Your Vitals Were     Pulse Temperature Respirations Height Pulse Oximetry BMI (Body Mass Index)    105 97.4  F (36.3  C) (Tympanic) 18 5' 6.5\" (1.689 m) 96% 29.41 kg/m2       Blood Pressure from Last 3 Encounters:   01/04/18 132/86   12/15/17 130/80   09/28/17 116/70    Weight from Last 3 Encounters:   01/04/18 190 lb (86.2 kg)   12/15/17 185 lb (83.9 kg)   09/28/17 189 lb 6.4 oz (85.9 kg)              Today, you had the following     No orders found for display       Primary Care Provider Office Phone # Fax #    Rosanne Cherry [phone]       [address] [facility] [address]        Equal Access to Services     WHIT JAVIER : Hadii chato anderson hadasho Soomaali, waaxda luqadaha, qaybta kaalmada adeegyada, gonzales go. So [facility] [phone].    ATENCIÓN: Si habla español, tiene a wang disposición servicios gratuitos de asistencia lingüística. Llame al [phone].    We comply with applicable federal civil rights laws and [place] laws. We do not discriminate on the basis of race, color, national origin, age, disability, sex, sexual orientation, or gender identity.            Thank you!     Thank you for choosing [facility]  for your care. Our goal is always to provide you with excellent care. Hearing back from our patients is one way we can continue to improve our services. Please take a few minutes to complete the written survey that you may receive in the mail after your visit with us. Thank you!             Your Updated Medication List - Protect others around you: " Learn how to safely use, store and throw away your medicines at www.disposemymeds.org.          This list is accurate as of: 12/15/17 11:59 PM.  Always use your most recent med list.                   Brand Name Dispense Instructions for use Diagnosis    allopurinol 300 MG tablet    ZYLOPRIM    90 tablet    TAKE ONE TABLET BY MOUTH ONCE DAILY *NEED  TO  BE  SEEN  FOR  MORE  REFILLS    Gout involving toe, unspecified cause, unspecified chronicity, unspecified laterality       amLODIPine 5 MG tablet    NORVASC    90 tablet    TAKE ONE TABLET BY MOUTH ONCE DAILY    Benign essential hypertension       cloNIDine 0.3 MG tablet    CATAPRES    180 tablet    TAKE ONE TABLET BY MOUTH TWICE DAILY. NEEDS TO BE SEEN FOR MORE.    Benign essential hypertension       eltrombopag 75 MG tablet    PROMACTA    30 tablet    Take 1 tablet (75 mg) by mouth daily Administer on an empty stomach, 1 hour before or 2 hours after a meal.    Idiopathic thrombocytopenic purpura (H)       folic acid 1 MG tablet    FOLVITE    90 tablet    TAKE ONE TABLET BY MOUTH ONCE DAILY    Alcohol abuse       gabapentin 300 MG capsule    NEURONTIN    270 capsule    TAKE ONE CAPSULE BY MOUTH THREE TIMES DAILY    Chronic pain       lisinopril 40 MG tablet    PRINIVIL/ZESTRIL    90 tablet    TAKE ONE TABLET BY MOUTH DAILY    Essential hypertension       omeprazole 40 MG capsule    priLOSEC    90 capsule    TAKE ONE CAPSULE BY MOUTH ONCE DAILY    Esophageal reflux       spironolactone 25 MG tablet    ALDACTONE    90 tablet    TAKE ONE TABLET BY MOUTH ONCE DAILY    Benign essential hypertension       XARELTO 20 MG Tabs tablet   Generic drug:  rivaroxaban ANTICOAGULANT     30 tablet    TAKE ONE TABLET BY MOUTH ONCE DAILY WITH DINNER    Deep vein thrombosis (DVT) of lower extremity, unspecified chronicity, unspecified laterality, unspecified vein (H)

## 2017-12-15 NOTE — PROGRESS NOTES
SUBJECTIVE:   Raymon Ace is a 72 year old male who presents to clinic today for the following health issues:    Hypertension Follow-up      Outpatient blood pressures are being checked at home and store.  Results are within normal range according to patient. Here < 140/80    Low Salt Diet: no added salt    Meds: norvasc, aldactone, lisinopril      ELEVATED LFTS with HX OF ONGOING ALCOHOL ABUSE AND FATTY LIVER FROM OBESITY       Duration: yrs --stable     Description (location/character/radiation): above     Intensity:  moderate    Accompanying signs and symptoms: 0    History (similar episodes/previous evaluation): None    Precipitating or alleviating factors: alcohol    Therapies tried and outcome: None     NONMORBID OBESITY    -little exercise   -ongoing alcohol intake q d   -has HTN and glu intol    Glucose Intolerance Follow-up      Patient is checking blood sugars: not at all    HgbA1C=  5.4 last yr     Diabetic concerns: None     Symptoms of hypoglycemia (low blood sugar): none     Paresthesias (numbness or burning in feet) or sores: No     Date of last diabetic eye exam: 2015    conts to drink alcohol q d      Hypertension Follow-up      Outpatient blood pressures are not being checked.   Here < 140/80    Low Salt Diet: not monitoring salt     Rash      Duration: x 2-3 months    Description  Location: Both Lower Arms  Itching: no    Intensity:  severe    Accompanying signs and symptoms: Inflammation    History (similar episodes/previous evaluation): None    Precipitating or alleviating factors:  New exposures:  None  Recent travel: no      Therapies tried and outcome: none      Amount of exercise or physical activity: 6-7 days/week for an average of 15-30 minutes    Problems taking medications regularly: No    Medication side effects: none    Diet: low salt    {additional problems for provider to add:725422}    Problem list and histories reviewed & adjusted, as indicated.  Additional history: as  documented    {HIST REVIEW/ LINKS 2:241433}    Reviewed and updated as needed this [facility]  Meds  Problems       Reviewed and updated as needed this visit by Provider         {PROVIDER CHARTING PREFERENCE:082158}

## 2017-12-18 DIAGNOSIS — D69.3 IDIOPATHIC THROMBOCYTOPENIC PURPURA (H): ICD-10-CM

## 2017-12-18 LAB
BASOPHILS # BLD AUTO: 0 10E9/L (ref 0–0.2)
BASOPHILS NFR BLD AUTO: 0.4 %
DIFFERENTIAL METHOD BLD: ABNORMAL
EOSINOPHIL # BLD AUTO: 0 10E9/L (ref 0–0.7)
EOSINOPHIL NFR BLD AUTO: 0.4 %
ERYTHROCYTE [DISTWIDTH] IN BLOOD BY AUTOMATED COUNT: 16.8 % (ref 10–15)
HCT VFR BLD AUTO: 38.5 % (ref 40–53)
HGB BLD-MCNC: 12.3 G/DL (ref 13.3–17.7)
IMM GRANULOCYTES # BLD: 0.2 10E9/L (ref 0–0.4)
IMM GRANULOCYTES NFR BLD: 2.1 %
LYMPHOCYTES # BLD AUTO: 1 10E9/L (ref 0.8–5.3)
LYMPHOCYTES NFR BLD AUTO: 9.9 %
MCH RBC QN AUTO: 25.7 PG (ref 26.5–33)
MCHC RBC AUTO-ENTMCNC: 31.9 G/DL (ref 31.5–36.5)
MCV RBC AUTO: 80 FL (ref 78–100)
MONOCYTES # BLD AUTO: 1 10E9/L (ref 0–1.3)
MONOCYTES NFR BLD AUTO: 9.4 %
NEUTROPHILS # BLD AUTO: 7.9 10E9/L (ref 1.6–8.3)
NEUTROPHILS NFR BLD AUTO: 77.8 %
PLATELET # BLD AUTO: 143 10E9/L (ref 150–450)
RBC # BLD AUTO: 4.79 10E12/L (ref 4.4–5.9)
WBC # BLD AUTO: 10.1 10E9/L (ref 4–11)

## 2017-12-18 PROCEDURE — 85025 COMPLETE CBC W/AUTO DIFF WBC: CPT | Performed by: INTERNAL MEDICINE

## 2017-12-18 PROCEDURE — 36415 COLL VENOUS BLD VENIPUNCTURE: CPT | Performed by: INTERNAL MEDICINE

## 2017-12-19 ENCOUNTER — CARE COORDINATION (OUTPATIENT)
Dept: ONCOLOGY | Facility: CLINIC | Age: 72
End: 2017-12-19

## 2017-12-19 NOTE — PROGRESS NOTES
The patient was called and his CBC result from 12/18/17 was reviewed including platelets of 143.  He was told that Dr. Novoa would like him to continue on Promacta 75mg daily, and have a CBC rechecked in 2 weeks.  The patient's follow-up appointment with Dr. Novoa on 1/18/18 was also reviewed.

## 2017-12-26 DIAGNOSIS — D69.3 IDIOPATHIC THROMBOCYTOPENIC PURPURA (H): ICD-10-CM

## 2017-12-26 LAB
BASOPHILS # BLD AUTO: 0.1 10E9/L (ref 0–0.2)
BASOPHILS NFR BLD AUTO: 0.6 %
DIFFERENTIAL METHOD BLD: ABNORMAL
EOSINOPHIL # BLD AUTO: 0.1 10E9/L (ref 0–0.7)
EOSINOPHIL NFR BLD AUTO: 1 %
ERYTHROCYTE [DISTWIDTH] IN BLOOD BY AUTOMATED COUNT: 18 % (ref 10–15)
HCT VFR BLD AUTO: 37.3 % (ref 40–53)
HGB BLD-MCNC: 11.6 G/DL (ref 13.3–17.7)
IMM GRANULOCYTES # BLD: 0.1 10E9/L (ref 0–0.4)
IMM GRANULOCYTES NFR BLD: 1.6 %
LYMPHOCYTES # BLD AUTO: 1.5 10E9/L (ref 0.8–5.3)
LYMPHOCYTES NFR BLD AUTO: 16.6 %
MCH RBC QN AUTO: 25.5 PG (ref 26.5–33)
MCHC RBC AUTO-ENTMCNC: 31.1 G/DL (ref 31.5–36.5)
MCV RBC AUTO: 82 FL (ref 78–100)
MONOCYTES # BLD AUTO: 0.9 10E9/L (ref 0–1.3)
MONOCYTES NFR BLD AUTO: 9.7 %
NEUTROPHILS # BLD AUTO: 6.2 10E9/L (ref 1.6–8.3)
NEUTROPHILS NFR BLD AUTO: 70.5 %
PLATELET # BLD AUTO: 150 10E9/L (ref 150–450)
RBC # BLD AUTO: 4.55 10E12/L (ref 4.4–5.9)
WBC # BLD AUTO: 8.8 10E9/L (ref 4–11)

## 2017-12-26 PROCEDURE — 85025 COMPLETE CBC W/AUTO DIFF WBC: CPT | Performed by: INTERNAL MEDICINE

## 2017-12-26 PROCEDURE — 36415 COLL VENOUS BLD VENIPUNCTURE: CPT | Performed by: INTERNAL MEDICINE

## 2018-01-03 ENCOUNTER — CARE COORDINATION (OUTPATIENT)
Dept: ONCOLOGY | Facility: CLINIC | Age: 73
End: 2018-01-03

## 2018-01-03 NOTE — PROGRESS NOTES
The patient was called and a VM message was left.  He was told that his platelets were stable at 150, and that Dr. Novoa recommends that he continue taking Promacta 75mg daily, and check the CBC at his next appointment with Dr. Novoa on 1/18/18.  He was asked to call for any questions.

## 2018-01-04 ENCOUNTER — OFFICE VISIT (OUTPATIENT)
Dept: FAMILY MEDICINE | Facility: CLINIC | Age: 73
End: 2018-01-04
Payer: COMMERCIAL

## 2018-01-04 VITALS
RESPIRATION RATE: 14 BRPM | TEMPERATURE: 97.1 F | DIASTOLIC BLOOD PRESSURE: 86 MMHG | BODY MASS INDEX: 29.82 KG/M2 | HEIGHT: 67 IN | HEART RATE: 115 BPM | WEIGHT: 190 LBS | SYSTOLIC BLOOD PRESSURE: 132 MMHG | OXYGEN SATURATION: 97 %

## 2018-01-04 DIAGNOSIS — E78.01 FAMILIAL HYPERCHOLESTEROLEMIA: ICD-10-CM

## 2018-01-04 DIAGNOSIS — F10.10 ALCOHOL ABUSE: ICD-10-CM

## 2018-01-04 DIAGNOSIS — R73.02 GLUCOSE INTOLERANCE (IMPAIRED GLUCOSE TOLERANCE): ICD-10-CM

## 2018-01-04 DIAGNOSIS — I10 BENIGN ESSENTIAL HYPERTENSION: ICD-10-CM

## 2018-01-04 DIAGNOSIS — D69.3 IDIOPATHIC THROMBOCYTOPENIC PURPURA (H): Primary | ICD-10-CM

## 2018-01-04 DIAGNOSIS — K70.9 ALCOHOLIC LIVER DAMAGE (H): ICD-10-CM

## 2018-01-04 DIAGNOSIS — Z78.9 ALCOHOL CONSUPTION OF MORE THAN TWO DRINKS PER DAY: ICD-10-CM

## 2018-01-04 DIAGNOSIS — R74.8 ELEVATED LIVER ENZYMES: ICD-10-CM

## 2018-01-04 DIAGNOSIS — K76.0 FATTY LIVER: ICD-10-CM

## 2018-01-04 LAB
BASOPHILS # BLD AUTO: 0 10E9/L (ref 0–0.2)
BASOPHILS NFR BLD AUTO: 0.3 %
DIFFERENTIAL METHOD BLD: ABNORMAL
EOSINOPHIL # BLD AUTO: 0.1 10E9/L (ref 0–0.7)
EOSINOPHIL NFR BLD AUTO: 0.6 %
ERYTHROCYTE [DISTWIDTH] IN BLOOD BY AUTOMATED COUNT: 18 % (ref 10–15)
HCT VFR BLD AUTO: 37.6 % (ref 40–53)
HGB BLD-MCNC: 11.9 G/DL (ref 13.3–17.7)
LYMPHOCYTES # BLD AUTO: 1 10E9/L (ref 0.8–5.3)
LYMPHOCYTES NFR BLD AUTO: 12.6 %
MCH RBC QN AUTO: 26 PG (ref 26.5–33)
MCHC RBC AUTO-ENTMCNC: 31.6 G/DL (ref 31.5–36.5)
MCV RBC AUTO: 82 FL (ref 78–100)
MONOCYTES # BLD AUTO: 0.7 10E9/L (ref 0–1.3)
MONOCYTES NFR BLD AUTO: 9.4 %
NEUTROPHILS # BLD AUTO: 6 10E9/L (ref 1.6–8.3)
NEUTROPHILS NFR BLD AUTO: 77.1 %
PLATELET # BLD AUTO: 134 10E9/L (ref 150–450)
RBC # BLD AUTO: 4.58 10E12/L (ref 4.4–5.9)
WBC # BLD AUTO: 7.8 10E9/L (ref 4–11)

## 2018-01-04 PROCEDURE — 36415 COLL VENOUS BLD VENIPUNCTURE: CPT | Performed by: FAMILY MEDICINE

## 2018-01-04 PROCEDURE — 84460 ALANINE AMINO (ALT) (SGPT): CPT | Performed by: FAMILY MEDICINE

## 2018-01-04 PROCEDURE — 80048 BASIC METABOLIC PNL TOTAL CA: CPT | Performed by: FAMILY MEDICINE

## 2018-01-04 PROCEDURE — 85025 COMPLETE CBC W/AUTO DIFF WBC: CPT | Performed by: FAMILY MEDICINE

## 2018-01-04 PROCEDURE — 99214 OFFICE O/P EST MOD 30 MIN: CPT | Performed by: FAMILY MEDICINE

## 2018-01-04 PROCEDURE — 80061 LIPID PANEL: CPT | Performed by: FAMILY MEDICINE

## 2018-01-04 PROCEDURE — 84450 TRANSFERASE (AST) (SGOT): CPT | Performed by: FAMILY MEDICINE

## 2018-01-04 NOTE — PROGRESS NOTES
SUBJECTIVE:   Raymon Ace is a 72 year old male who presents to clinic today for the following health issues:    Hypertension Follow-up      Outpatient blood pressures are being checked at home and store.  Results are within normal range according to patient. Here < 140/80    Low Salt Diet: no added salt    Meds: norvasc, aldactone, lisinopril      ELEVATED LFTS with HX OF ONGOING ALCOHOL ABUSE AND FATTY LIVER FROM OBESITY       Duration: yrs --stable  & wnl    Was off alcohol for 6 mo till 12-17    Description (location/character/radiation): above     Intensity:  moderate    Accompanying signs and symptoms: 0    History (similar episodes/previous evaluation): None    Precipitating or alleviating factors: alcohol    Therapies tried and outcome: None     NONMORBID OBESITY      Little exercise     Ongoing alcohol intake q d     Has HTN and glu intol    Glucose Intolerance Follow-up      Patient is checking blood sugars: not at all    HgbA1C=  5.4 last yr     Diabetic concerns: None     Symptoms of hypoglycemia (low blood sugar): none     Paresthesias (numbness or burning in feet) or sores: No     Date of last diabetic eye exam: 2015    conts to drink alcohol q d           ITTP    - chronic idiopathic   -needed plat boost for surg and to get anticoag for PE in 4-17      NONMORBID OBESITY    -BMI= 30.3  -inactive   -q d alcohol         Amount of exercise or physical activity: 6-7 days/week for an average of 15-30 minutes    Problems taking medications regularly: No    Medication side effects: none    Diet: low salt        Problem list and histories reviewed & adjusted, as indicated.  Additional history: as documented    Labs reviewed in EPIC    Reviewed and updated as needed this visit by clinical staffAllergies  Meds  Problems       Reviewed and updated as needed this visit by Provider         ROS:  C: NEGATIVE for fever, chills, change in weight  I: NEGATIVE for worrisome rashes, moles or lesions  E: NEGATIVE  "for vision changes or irritation  E/M: NEGATIVE for ear, mouth and throat problems  R: NEGATIVE for significant cough or SOB  B: NEGATIVE for masses, tenderness or discharge  CV: NEGATIVE for chest pain, palpitations or peripheral edema  GI: NEGATIVE for nausea, abdominal pain, heartburn, or change in bowel habits  : NEGATIVE for frequency, dysuria, or hematuria  M: NEGATIVE for significant arthralgias or myalgia  N: NEGATIVE for weakness, dizziness or paresthesias  E: NEGATIVE for temperature intolerance, skin/hair changes  H: NEGATIVE for bleeding problems  P: NEGATIVE for changes in mood or affect    OBJECTIVE:     /86  Pulse 115  Temp 97.1  F (36.2  C) (Tympanic)  Resp 14  Ht 5' 6.5\" (1.689 m)  Wt 190 lb (86.2 kg)  SpO2 97%  BMI 30.21 kg/m2  Body mass index is 30.21 kg/(m^2).  GENERAL: healthy, alert, no distress and obese  EYES: Eyes grossly normal to inspection, PERRL and conjunctivae and sclerae normal  RESP: lungs clear to auscultation - no rales, rhonchi or wheezes  CV: regular rate and rhythm, normal S1 S2, no S3 or S4, no murmur, click or rub, no peripheral edema and peripheral pulses strong  MS: no gross musculoskeletal defects noted, no edema  SKIN: no suspicious lesions or rashes  NEURO: Normal strength and tone, mentation intact and speech normal  PSYCH: mentation appears normal, affect normal/bright    Diagnostic Test Results:  Results for orders placed or performed in visit on 01/04/18   CBC with platelets differential   Result Value Ref Range    WBC 7.8 4.0 - 11.0 10e9/L    RBC Count 4.58 4.4 - 5.9 10e12/L    Hemoglobin 11.9 (L) 13.3 - 17.7 g/dL    Hematocrit 37.6 (L) 40.0 - 53.0 %    MCV 82 78 - 100 fl    MCH 26.0 (L) 26.5 - 33.0 pg    MCHC 31.6 31.5 - 36.5 g/dL    RDW 18.0 (H) 10.0 - 15.0 %    Platelet Count 134 (L) 150 - 450 10e9/L    Diff Method Automated Method     % Neutrophils 77.1 %    % Lymphocytes 12.6 %    % Monocytes 9.4 %    % Eosinophils 0.6 %    % Basophils 0.3 %    " Absolute Neutrophil 6.0 1.6 - 8.3 10e9/L    Absolute Lymphocytes 1.0 0.8 - 5.3 10e9/L    Absolute Monocytes 0.7 0.0 - 1.3 10e9/L    Absolute Eosinophils 0.1 0.0 - 0.7 10e9/L    Absolute Basophils 0.0 0.0 - 0.2 10e9/L       ASSESSMENT/PLAN:               ICD-10-CM    1. ITP (idiopathic thrombocytopenic purpura) since 3-13 back surgery  D69.3 CBC with platelets differential   2. Fatty liver/ 1-14 US K76.0 ALT     AST   3. Alcohol consuption of more than two drinks per day Z78.9 ALT     AST   4. Alcohol abuse since teens  F10.10    5. Elevated liver enzymes AST  R74.8 ALT     AST   6. Alcoholic liver damage (H) K70.9 ALT     AST   7. Glucose intolerance (impaired glucose tolerance)  HgbA!C = 5.4 R73.02 Basic metabolic panel   8. Benign essential hypertension I10 Basic metabolic panel   9. Familial hypercholesterolemia E78.01 Lipid panel reflex to direct LDL Fasting       Patient Instructions   1. You need a medicare physical     2.  Weight Loss Tips  1. Do not eat after 6 hrs before your expected bedtime  2. Have your heaviest meal for breakfast, a slightly lighter meal at lunch and a snack 6 hrs before bed  3. No sugar/calorie drinks except milk ie no fruit juice, pop, alcohol.  4. Drink milk 30min before meals to decrease your hunger. Also it is excellent as part of your last meal of the day snack  5. Drink lots of water  6. Increase fiber in diet: all bran cereal, salads, popcorn etc  7. Have only one small serving of fruit a day about 1/2 cup (as this is high in sugar)  8. EXERCISE is the bottom line. Without it, you will gain weight even on a low calorie diet. Best if done 2-3X a day as can    Being overweight contributes to high blood pressure and high cholesterol, both of which cause heart attacks, strokes and kidney failure, prediabetes and diabetes, arthritis, and liver disease       conselled to quit the alcohol , but he states he likes it : whiskey sours   Told pt it contributes to the glu intol and other  risks     Rosanne Cherry MD  [facility]    Weight management plan: Discussed healthy diet and exercise guidelines and patient will follow up in 1 month in clinic to re-evaluate.    Rosanne Cherry MD  \

## 2018-01-04 NOTE — NURSING NOTE
"Chief Complaint   Patient presents with     RECHECK     /86  Pulse 115  Temp 97.1  F (36.2  C) (Tympanic)  Resp 14  Ht 5' 6.5\" (1.689 m)  Wt 190 lb (86.2 kg)  SpO2 97%  BMI 30.21 kg/m2 Estimated body mass index is 30.21 kg/(m^2) as calculated from the following:    Height as of this encounter: 5' 6.5\" (1.689 m).    Weight as of this encounter: 190 lb (86.2 kg).  BP completed using cuff size: regular   Laura Kelly CMA    Health Maintenance Due   Topic Date Due     MEDICARE ANNUAL WELLNESS VISIT  08/19/1963     LIPID MONITORING Q1 YEAR  10/09/2014     INFLUENZA VACCINE (SYSTEM ASSIGNED)  09/01/2017     Health Maintenance reviewed at today's visit patient asked to schedule/complete:   Routine Health Visit: Patient agrees to schedule  Immunizations:  Patient agrees to schedule    "

## 2018-01-04 NOTE — PATIENT INSTRUCTIONS
1. You need a medicare physical     2.  Weight Loss Tips  1. Do not eat after 6 hrs before your expected bedtime  2. Have your heaviest meal for breakfast, a slightly lighter meal at lunch and a snack 6 hrs before bed  3. No sugar/calorie drinks except milk ie no fruit juice, pop, alcohol.  4. Drink milk 30min before meals to decrease your hunger. Also it is excellent as part of your last meal of the day snack  5. Drink lots of water  6. Increase fiber in diet: all bran cereal, salads, popcorn etc  7. Have only one small serving of fruit a day about 1/2 cup (as this is high in sugar)  8. EXERCISE is the bottom line. Without it, you will gain weight even on a low calorie diet. Best if done 2-3X a day as can    Being overweight contributes to high blood pressure and high cholesterol, both of which cause heart attacks, strokes and kidney failure, prediabetes and diabetes, arthritis, and liver disease

## 2018-01-04 NOTE — MR AVS SNAPSHOT
After Visit Summary   1/4/2018    Raymon Ace    MRN: [id number]           Patient Information     Date Of Birth          1945        Visit Information        Provider Department      1/4/2018 11:40 AM Rosanne Cherry MD [facility]        Today's Diagnoses     ITP (idiopathic thrombocytopenic purpura) since 3-13 back surgery     -  1    Fatty liver/ 1-14 [place]        Alcohol consuption of more than two drinks per day        Glucose intolerance (impaired glucose tolerance)  HgbA!C = 5.4        Alcoholic liver damage (H)        Elevated liver enzymes AST         Alcohol abuse since teens         Benign essential hypertension        Familial hypercholesterolemia          Care Instructions    1. You need a medicare physical     2.  Weight Loss Tips  1. Do not eat after 6 hrs before your expected bedtime  2. Have your heaviest meal for breakfast, a slightly lighter meal at lunch and a snack 6 hrs before bed  3. No sugar/calorie drinks except milk ie no fruit juice, pop, alcohol.  4. Drink milk 30min before meals to decrease your hunger. Also it is excellent as part of your last meal of the day snack  5. Drink lots of water  6. Increase fiber in diet: all bran cereal, salads, popcorn etc  7. Have only one small serving of fruit a day about 1/2 cup (as this is high in sugar)  8. EXERCISE is the bottom line. Without it, you will gain weight even on a low calorie diet. Best if done 2-3X a day as can    Being overweight contributes to high blood pressure and high cholesterol, both of which cause heart attacks, strokes and kidney failure, prediabetes and diabetes, arthritis, and liver disease               Follow-ups after your visit        Follow-up notes from your care team     Return in about 6 months (around 7/4/2018) for Physical Exam.      Your next 10 appointments already scheduled     Jan 08, 2018 11:00 AM CST   LAB with PI LAB   [facility]  ([facility])    [address] [address]   [phone]           Please do not eat 10-12 hours before your appointment if you are coming in fasting for labs on lipids, cholesterol, or glucose (sugar). This does not apply to pregnant women. Water, hot tea and black coffee (with nothing added) are okay. Do not drink other fluids, diet soda or chew gum.            Jani 15, 2018 11:00 AM CST   LAB with PI LAB   [facility]    [address] [address]   [phone]           Please do not eat 10-12 hours before your appointment if you are coming in fasting for labs on lipids, cholesterol, or glucose (sugar). This does not apply to pregnant women. Water, hot tea and black coffee (with nothing added) are okay. Do not drink other fluids, diet soda or chew gum.            Jan 18, 2018 11:30 AM CST   [facility] Lab Draw with  M[facility] LAB DRAW   [facility] ([facility])    [address] [place] [address]  [address] [place] [address]   [phone]            Jan 18, 2018 12:00 PM CST   (Arrive by 11:45 AM)   Return Visit with Waylon Novoa MD   [facility] ([facility])    [address] [place] [address]  [address] [place] [address]   [phone]            Jan 22, 2018 11:00 AM CST   LAB with PI LAB   [facility]    [address] [address]   [phone]           Please do not eat 10-12 hours before your appointment if you are coming in fasting for labs on lipids, cholesterol, or glucose (sugar). This does not apply to pregnant women. Water, hot tea and black coffee (with nothing added) are okay. Do not drink other fluids, diet soda or chew gum.            Jan 29, 2018 11:00 AM CST   LAB with PI LAB   [facility]    [address]  "[facility] [address]   [phone]           Please do not eat 10-12 hours before your appointment if you are coming in fasting for labs on lipids, cholesterol, or glucose (sugar). This does not apply to pregnant women. Water, hot tea and black coffee (with nothing added) are okay. Do not drink other fluids, diet soda or chew gum.              Who to contact     If you have questions or need follow up information about today's clinic visit or your schedule please contact [facility] directly at [phone].  Normal or non-critical lab and imaging results will be communicated to you by [employer], letter or phone within 4 business days after the clinic has received the results. If you do not hear from us within 7 days, please contact the clinic through [employer] or phone. If you have a critical or abnormal lab result, we will notify you by phone as soon as possible.  Submit refill requests through [employer] or call your pharmacy and they will forward the refill request to us. Please allow 3 business days for your refill to be completed.          Additional Information About Your Visit        MyChart Information     [employer] gives you secure access to your electronic health record. If you see a primary care provider, you can also send messages to your care team and make appointments. If you have questions, please call your primary care clinic.  If you do not have a primary care provider, please call [phone] and they will assist you.        Care EveryWhere ID     This is your Care EveryWhere ID. This could be used by other organizations to access your [facility] medical records  [id number]        Your Vitals Were     Pulse Temperature Respirations Height Pulse Oximetry BMI (Body Mass Index)    115 97.1  F (36.2  C) (Tympanic) 14 5' 6.5\" (1.689 m) 97% 30.21 kg/m2       Blood Pressure from Last 3 Encounters:   01/04/18 132/86   12/15/17 130/80   09/28/17 116/70    Weight from Last 3 " Encounters:   01/04/18 190 lb (86.2 kg)   12/15/17 185 lb (83.9 kg)   09/28/17 189 lb 6.4 oz (85.9 kg)              We Performed the Following     ALT     AST     Basic metabolic panel     CBC with platelets differential     Lipid panel reflex to direct LDL Fasting        Primary Care Provider Office Phone # Fax #    Rosanne Cherry [phone]       [address] [facility]JIMMIE HERNANDEZ [facility] [address]        Equal Access to Services     NENA JAVIER : Hadii aad ku hadasho Soomaali, waaxda luqadaha, qaybta kaalmada adeegyada, waxay idiin hayaan adeeg kharash lahany . So [facility] [phone].    ATENCIÓN: Si habla español, tiene a wang disposición servicios gratuitos de asistencia lingüística. Llame al [phone].    We comply with applicable federal civil rights laws and [place] laws. We do not discriminate on the basis of race, color, national origin, age, disability, sex, sexual orientation, or gender identity.            Thank you!     Thank you for choosing [facility]  for your care. Our goal is always to provide you with excellent care. Hearing back from our patients is one way we can continue to improve our services. Please take a few minutes to complete the written survey that you may receive in the mail after your visit with us. Thank you!             Your Updated Medication List - Protect others around you: Learn how to safely use, store and throw away your medicines at www.disposemymeds.org.          This list is accurate as of: 1/4/18 12:22 PM.  Always use your most recent med list.                   Brand Name Dispense Instructions for use Diagnosis    allopurinol 300 MG tablet    ZYLOPRIM    90 tablet    TAKE ONE TABLET BY MOUTH ONCE DAILY *NEED  TO  BE  SEEN  FOR  MORE  REFILLS    Gout involving toe, unspecified cause, unspecified chronicity, unspecified laterality       amLODIPine 5 MG tablet    NORVASC    90 tablet    TAKE ONE TABLET BY MOUTH ONCE DAILY     Benign essential hypertension       cloNIDine 0.3 MG tablet    CATAPRES    180 tablet    TAKE ONE TABLET BY MOUTH TWICE DAILY. NEEDS TO BE SEEN FOR MORE.    Benign essential hypertension       eltrombopag 75 MG tablet    PROMACTA    30 tablet    Take 1 tablet (75 mg) by mouth daily Administer on an empty stomach, 1 hour before or 2 hours after a meal.    Idiopathic thrombocytopenic purpura (H)       folic acid 1 MG tablet    FOLVITE    90 tablet    TAKE ONE TABLET BY MOUTH ONCE DAILY    Alcohol abuse       gabapentin 300 MG capsule    NEURONTIN    270 capsule    TAKE ONE CAPSULE BY MOUTH THREE TIMES DAILY    Chronic pain       lisinopril 40 MG tablet    PRINIVIL/ZESTRIL    90 tablet    TAKE ONE TABLET BY MOUTH DAILY    Essential hypertension       omeprazole 40 MG capsule    priLOSEC    90 capsule    TAKE ONE CAPSULE BY MOUTH ONCE DAILY    Esophageal reflux       spironolactone 25 MG tablet    ALDACTONE    90 tablet    TAKE ONE TABLET BY MOUTH ONCE DAILY    Benign essential hypertension       XARELTO 20 MG Tabs tablet   Generic drug:  rivaroxaban ANTICOAGULANT     30 tablet    TAKE ONE TABLET BY MOUTH ONCE DAILY WITH DINNER    Deep vein thrombosis (DVT) of lower extremity, unspecified chronicity, unspecified laterality, unspecified vein (H)

## 2018-01-05 LAB
ALT SERPL W P-5'-P-CCNC: 37 U/L (ref 0–70)
ANION GAP SERPL CALCULATED.3IONS-SCNC: 11 MMOL/L (ref 3–14)
AST SERPL W P-5'-P-CCNC: 26 U/L (ref 0–45)
BUN SERPL-MCNC: 12 MG/DL (ref 7–30)
CALCIUM SERPL-MCNC: 8.9 MG/DL (ref 8.5–10.1)
CHLORIDE SERPL-SCNC: 108 MMOL/L (ref 94–109)
CHOLEST SERPL-MCNC: 209 MG/DL
CO2 SERPL-SCNC: 24 MMOL/L (ref 20–32)
CREAT SERPL-MCNC: 0.96 MG/DL (ref 0.66–1.25)
GFR SERPL CREATININE-BSD FRML MDRD: 77 ML/MIN/1.7M2
GLUCOSE SERPL-MCNC: 97 MG/DL (ref 70–99)
HDLC SERPL-MCNC: 76 MG/DL
LDLC SERPL CALC-MCNC: 119 MG/DL
NONHDLC SERPL-MCNC: 133 MG/DL
POTASSIUM SERPL-SCNC: 4.1 MMOL/L (ref 3.4–5.3)
SODIUM SERPL-SCNC: 143 MMOL/L (ref 133–144)
TRIGL SERPL-MCNC: 71 MG/DL

## 2018-01-12 ENCOUNTER — TELEPHONE (OUTPATIENT)
Dept: FAMILY MEDICINE | Facility: CLINIC | Age: 73
End: 2018-01-12

## 2018-01-12 NOTE — TELEPHONE ENCOUNTER
Reason for Call: Request for an order or referral:  Order or referral being requested: referral for a shot on right shoulder  Date needed: as soon as possible  Has the patient been seen by the PCP for this problem? YES  Additional comments: please call patient  Phone number Patient can be reached at:  Home number on file [phone] (home  Best Time:  any  Can we leave a detailed message on this number?  YES  Call taken on 1/12/2018 at 9:36 AM by AMAURI CUADRA

## 2018-01-12 NOTE — TELEPHONE ENCOUNTER
I do not do cortisone shots   He must have had one from ortho   I cannot order one from them  He can :   1. Return to the ortho who did the injection to get another   2. Go to [facility]  Open several locations 8am-8pm 7  Days a wk

## 2018-01-12 NOTE — PROGRESS NOTES
Please see attached lab results  All of your lab results are normal, except as noted below.    The following are explanations of some of our lab tests    THIS DOES NOT MEAN THAT YOU HAD ALL OF THESE DONE    Please continue on the same medications unless a change is noted above    These are some general explanations for tests:    Hgb is the blood iron level- just a little low  ### Please eat iron in diet : beets, molasses ,  red meat and greens eg spinach     WBC means White Blood Cells  Platelets are small blood cells that help with forming the blood clots along with other blood factors.  Electrolytes are Sodium, Potassium, Calcium, Magnesium, Phosphorus.  Liver tests are: AST, ALT, Bilirubin, Alkaline Phosphatase.  Kidney tests are Creatinine, GFR.  HDL Cholesterol - is the good cholesterol and it is good to have it high.  LDL cholesterol is the bad cholesterol and it is good to have it low.-- a little high   It is recommended to have LDL less than 130 for people with hypertension and to have it less than 100 for people with heart disease, diabetes and chronic kidney disease.  Triglycerides are another type of lipid and can also cause heart disease so should be kept low.   Thyroid tests are TSH, T4, T3  Glucose is sugar###a little high   The only way known to prevent diabetes or keep it from getting worse is exercise, 20-40 minutes 3 times a day around the time of meals as your insulin is wearing out  You need to get rid of the sugar using your muscles     A1c is a test that gives us an idea about how well was controlled the diabetes for the last 3 months.   PSA stands for Prostate Specific Antigen and it can be elevated with prostate cancer or prostate inflammation.    Labs are just a little off, but not bad!!!

## 2018-01-12 NOTE — TELEPHONE ENCOUNTER
Patient reports ongoing right shoulder pain. He has had Cortizone shot at a different state for this symptom. Would like PCP to place referral or order for Cortizone shot. Denies radiating pain but admits weakness- ongoing. Pt had OV 01/04/2018. Please advice ortho referral appropriate or OV at clinic preferred.

## 2018-01-18 ENCOUNTER — ONCOLOGY VISIT (OUTPATIENT)
Dept: ONCOLOGY | Facility: CLINIC | Age: 73
End: 2018-01-18
Attending: INTERNAL MEDICINE
Payer: COMMERCIAL

## 2018-01-18 ENCOUNTER — APPOINTMENT (OUTPATIENT)
Dept: LAB | Facility: CLINIC | Age: 73
End: 2018-01-18
Attending: INTERNAL MEDICINE
Payer: COMMERCIAL

## 2018-01-18 VITALS
WEIGHT: 199 LBS | OXYGEN SATURATION: 97 % | SYSTOLIC BLOOD PRESSURE: 116 MMHG | DIASTOLIC BLOOD PRESSURE: 56 MMHG | RESPIRATION RATE: 16 BRPM | BODY MASS INDEX: 31.64 KG/M2 | TEMPERATURE: 97.9 F | HEART RATE: 70 BPM

## 2018-01-18 DIAGNOSIS — Z86.711 HISTORY OF PULMONARY EMBOLISM: ICD-10-CM

## 2018-01-18 DIAGNOSIS — D64.9 ANEMIA, UNSPECIFIED TYPE: Primary | ICD-10-CM

## 2018-01-18 DIAGNOSIS — Z79.01 LONG TERM CURRENT USE OF ANTICOAGULANT THERAPY: ICD-10-CM

## 2018-01-18 DIAGNOSIS — D69.3 IDIOPATHIC THROMBOCYTOPENIC PURPURA (H): ICD-10-CM

## 2018-01-18 LAB
ALBUMIN SERPL-MCNC: 3.5 G/DL (ref 3.4–5)
ALP SERPL-CCNC: 62 U/L (ref 40–150)
ALT SERPL W P-5'-P-CCNC: 30 U/L (ref 0–70)
ANION GAP SERPL CALCULATED.3IONS-SCNC: 9 MMOL/L (ref 3–14)
AST SERPL W P-5'-P-CCNC: 28 U/L (ref 0–45)
BASOPHILS # BLD AUTO: 0.1 10E9/L (ref 0–0.2)
BASOPHILS NFR BLD AUTO: 0.8 %
BILIRUB SERPL-MCNC: 1 MG/DL (ref 0.2–1.3)
BUN SERPL-MCNC: 18 MG/DL (ref 7–30)
CALCIUM SERPL-MCNC: 8.6 MG/DL (ref 8.5–10.1)
CHLORIDE SERPL-SCNC: 104 MMOL/L (ref 94–109)
CO2 SERPL-SCNC: 24 MMOL/L (ref 20–32)
CREAT SERPL-MCNC: 1.45 MG/DL (ref 0.66–1.25)
DIFFERENTIAL METHOD BLD: ABNORMAL
EOSINOPHIL # BLD AUTO: 0.1 10E9/L (ref 0–0.7)
EOSINOPHIL NFR BLD AUTO: 1.4 %
ERYTHROCYTE [DISTWIDTH] IN BLOOD BY AUTOMATED COUNT: 19.1 % (ref 10–15)
GFR SERPL CREATININE-BSD FRML MDRD: 48 ML/MIN/1.7M2
GLUCOSE SERPL-MCNC: 120 MG/DL (ref 70–99)
HCT VFR BLD AUTO: 34.1 % (ref 40–53)
HGB BLD-MCNC: 10.6 G/DL (ref 13.3–17.7)
IMM GRANULOCYTES # BLD: 0.1 10E9/L (ref 0–0.4)
IMM GRANULOCYTES NFR BLD: 1.3 %
LYMPHOCYTES # BLD AUTO: 0.9 10E9/L (ref 0.8–5.3)
LYMPHOCYTES NFR BLD AUTO: 13.5 %
MCH RBC QN AUTO: 25.8 PG (ref 26.5–33)
MCHC RBC AUTO-ENTMCNC: 31.1 G/DL (ref 31.5–36.5)
MCV RBC AUTO: 83 FL (ref 78–100)
MONOCYTES # BLD AUTO: 0.6 10E9/L (ref 0–1.3)
MONOCYTES NFR BLD AUTO: 9.4 %
NEUTROPHILS # BLD AUTO: 4.7 10E9/L (ref 1.6–8.3)
NEUTROPHILS NFR BLD AUTO: 73.6 %
NRBC # BLD AUTO: 0 10*3/UL
NRBC BLD AUTO-RTO: 0 /100
PLATELET # BLD AUTO: 104 10E9/L (ref 150–450)
POTASSIUM SERPL-SCNC: 4.6 MMOL/L (ref 3.4–5.3)
PROT SERPL-MCNC: 7.3 G/DL (ref 6.8–8.8)
RBC # BLD AUTO: 4.11 10E12/L (ref 4.4–5.9)
SODIUM SERPL-SCNC: 137 MMOL/L (ref 133–144)
WBC # BLD AUTO: 6.3 10E9/L (ref 4–11)

## 2018-01-18 PROCEDURE — 36415 COLL VENOUS BLD VENIPUNCTURE: CPT

## 2018-01-18 PROCEDURE — 99215 OFFICE O/P EST HI 40 MIN: CPT | Mod: ZP | Performed by: INTERNAL MEDICINE

## 2018-01-18 PROCEDURE — 85025 COMPLETE CBC W/AUTO DIFF WBC: CPT | Performed by: INTERNAL MEDICINE

## 2018-01-18 PROCEDURE — 80053 COMPREHEN METABOLIC PANEL: CPT | Performed by: INTERNAL MEDICINE

## 2018-01-18 PROCEDURE — G0463 HOSPITAL OUTPT CLINIC VISIT: HCPCS | Mod: ZF

## 2018-01-18 ASSESSMENT — PAIN SCALES - GENERAL: PAINLEVEL: NO PAIN (0)

## 2018-01-18 NOTE — NURSING NOTE
Chief Complaint   Patient presents with     Blood Draw     Labs drawn from vpt by RN. Vs taken and pt checked in for appt     Labs collected from venipuncture by RN. Vitals taken. Checked in for appointment(s).    Beverly Gayle RN

## 2018-01-18 NOTE — MR AVS SNAPSHOT
After Visit Summary   1/18/2018    Raymon Ace    MRN: [id number]           Patient Information     Date Of Birth          1945        Visit Information        Provider Department      1/18/2018 12:00 PM Waylon Novoa MD [facility]        Today's Diagnoses     Anemia, unspecified type    -  1    ITP (idiopathic thrombocytopenic purpura) since 3-13 back surgery         History of pulmonary embolism        Long term current use of anticoagulant therapy           Follow-ups after your visit        Additional Services     GASTROENTEROLOGY ADULT REF PROCEDURE ONLY       71 yo with Hgb drifting down over several months.  Had gastric polyp removed 12/2016.    On rivaroxaban for h/o unprovoked PE 04/2017.  Needs EGD within next 2 weeks.  Knows to hold rivaroxaban prior to procedure.  Please page me with ?s -- [phone]                  Your next 10 appointments already scheduled     Feb 08, 2018   Procedure with Terri Crouch MD   [facility] ([facility] [place] [facility], [facility])    [address] [address] [phone]           The [facility] is located on the corner of [address] and [address] on the [address] [facility]. It is easily accessible from virtually any point in the [place] [place] [facility] area, via [address] and [address].            May 17, 2018 11:15 AM CDT   [facility] Lab Draw with  [facility] LAB DRAW   [facility] Draw ([facility])    [address] [place] [address] [address]  [address]  [facility] [id number] [phone]            May 17, 2018 11:45 AM CDT   (Arrive by 11:30 AM)   Return Visit with Waylon Novoa MD   [facility] ([facility])    [address]  [address]  [facility] [address] [phone]              Who to contact     If you have questions or need follow up information about today's  clinic visit or your schedule please contact [facility] directly at [phone].  Normal or non-critical lab and imaging results will be communicated to you by MyChart, letter or phone within 4 business days after the clinic has received the results. If you do not hear from us within 7 days, please contact the clinic through [employer] or phone. If you have a critical or abnormal lab result, we will notify you by phone as soon as possible.  Submit refill requests through [employer] or call your pharmacy and they will forward the refill request to us. Please allow 3 business days for your refill to be completed.          Additional Information About Your Visit        [employer] Information     [employer] gives you secure access to your electronic health record. If you see a primary care provider, you can also send messages to your care team and make appointments. If you have questions, please call your primary care clinic.  If you do not have a primary care provider, please call [phone] and they will assist you.        Care EveryWhere ID     This is your Care EveryWhere ID. This could be used by other organizations to access your [facility] medical records  [id number]        Your Vitals Were     Pulse Temperature Respirations Pulse Oximetry BMI (Body Mass Index)       70 97.9  F (36.6  C) (Oral) 16 97% 31.64 kg/m2        Blood Pressure from Last 3 Encounters:   01/23/18 (!) 160/93   01/18/18 116/56   01/04/18 132/86    Weight from Last 3 Encounters:   01/23/18 83.7 kg (184 lb 9.6 oz)   01/18/18 90.3 kg (199 lb)   01/04/18 86.2 kg (190 lb)              We Performed the Following     *CBC with platelets differential     Comprehensive metabolic panel     GASTROENTEROLOGY ADULT REF PROCEDURE ONLY        Primary Care Provider Office Phone # Fax #    Rosanne Cherry [phone] [address]  [facility] [address]        Equal Access to Services     NENA YEH: Herrera  chato Castellano, washoaibda luqadaha, qaybta kaalalvin teixeira, gonzales sarabjitin hayaachris kendrickcristy mirianmarlin laellenchris donavan. So [facility] [phone].    ATENCIÓN: Si habla loki, tiene a wang disposición servicios gratuitos de asistencia lingüística. Betsy al [phone].    We comply with applicable federal civil rights laws and [place] laws. We do not discriminate on the basis of race, color, national origin, age, disability, sex, sexual orientation, or gender identity.            Thank you!     Thank you for choosing [facility]  for your care. Our goal is always to provide you with excellent care. Hearing back from our patients is one way we can continue to improve our services. Please take a few minutes to complete the written survey that you may receive in the mail after your visit with us. Thank you!             Your Updated Medication List - Protect others around you: Learn how to safely use, store and throw away your medicines at www.disposemymeds.org.          This list is accurate as of 1/18/18 11:59 PM.  Always use your most recent med list.                   Brand Name Dispense Instructions for use Diagnosis    allopurinol 300 MG tablet    ZYLOPRIM    90 tablet    TAKE ONE TABLET BY MOUTH ONCE DAILY *NEED  TO  BE  SEEN  FOR  MORE  REFILLS    Gout involving toe, unspecified cause, unspecified chronicity, unspecified laterality       amLODIPine 5 MG tablet    NORVASC    90 tablet    TAKE ONE TABLET BY MOUTH ONCE DAILY    Benign essential hypertension       cloNIDine 0.3 MG tablet    CATAPRES    180 tablet    TAKE ONE TABLET BY MOUTH TWICE DAILY. NEEDS TO BE SEEN FOR MORE.    Benign essential hypertension       eltrombopag 75 MG tablet    PROMACTA    30 tablet    Take 1 tablet (75 mg) by mouth daily Administer on an empty stomach, 1 hour before or 2 hours after a meal.    Idiopathic thrombocytopenic purpura (H)       folic acid 1 MG tablet    FOLVITE    90 tablet    TAKE ONE TABLET BY MOUTH ONCE  DAILY    Alcohol abuse       gabapentin 300 MG capsule    NEURONTIN    270 capsule    TAKE ONE CAPSULE BY MOUTH THREE TIMES DAILY    Chronic pain       lisinopril 40 MG tablet    PRINIVIL/ZESTRIL    90 tablet    TAKE ONE TABLET BY MOUTH DAILY    Essential hypertension       omeprazole 40 MG capsule    priLOSEC    90 capsule    TAKE ONE CAPSULE BY MOUTH ONCE DAILY    Esophageal reflux       spironolactone 25 MG tablet    ALDACTONE    90 tablet    TAKE ONE TABLET BY MOUTH ONCE DAILY    Benign essential hypertension       XARELTO 20 MG Tabs tablet   Generic drug:  rivaroxaban ANTICOAGULANT     30 tablet    TAKE ONE TABLET BY MOUTH ONCE DAILY WITH DINNER    Deep vein thrombosis (DVT) of lower extremity, unspecified chronicity, unspecified laterality, unspecified vein (H)

## 2018-01-18 NOTE — LETTER
1/18/2018       RE: Raymon Ace  [address] [facility] [address]     Dear Colleague,    Thank you for referring your patient, Raymon Ace, to the [facility]. Please see a copy of my visit note below.      HEMATOLOGY CLINIC VISIT    Raymon Ace is a 72-year-old male who returns today for followup of chronic ITP and history of unprovoked pulmonary embolism for which he is on long-term anticoagulation.  Details of his history can be found in my previous note from 09/28/2017.      Overall, he is feeling well.  He is tolerating Promacta without any difficulty.  He is also doing well on the rivaroxaban without any nuisance bleeding symptoms.       His main complaint today is of his right shoulder in which he is known to have osteoarthritis.  He has had injections in the past and it has been bothering him again.  He has been using nonsteroidal anti-inflammatory drugs to treat this discomfort.  He is planning to seek another injection before he and his wife return to [place] for the remainder of the winter.      The remainder of his complete review of systems is negative.  He specifically denies any dysphagia, nausea, vomiting, abdominal pain, change in bowel habits, or blood in his stools.     PHYSICAL EXAMINATION:  He looks well.  He is not pale or jaundiced.  There are no obvious bruises or petechiae.  A more detailed exam was not performed today.       LABORATORY DATA:  Comprehensive metabolic panel is normal aside from a mild elevation in creatinine at 1.45.  CBC shows a white count of 6.3 with a normal differential.  His hemoglobin is 10.6.  Platelet count is stable at 104,000.  Reviewing his CBC trends over the last year, there has been a clear trend down in his hemoglobin over the last several months.        ASSESSMENT AND PLAN:   1.  Chronic immune thrombocytopenic purpura.  Overall, Raymon is doing well on Promacta with a platelet count that is stable in the 100,000 range over the  last year.  There is quite a bit of fluctuation in his platelet count, but overall, again, things are stable.  We will make no change in this part of his care plan.   2.  History of unprovoked pulmonary embolism in 04/2017.  The clot has completely resolved as outlined in my previous note.  He has no residual symptoms.  He needs to remain on long-term anticoagulation given the unprovoked nature of this episode of venous thromboembolism.  He is doing well on rivaroxaban.   3.  Anemia.  His hemoglobin has been drifting down noticeably over the last several months.  He has no new bleeding symptoms but given his previous history of upper GI bleeding related to a gastric polyp and his increased use of NSAIDs for his shoulder pain, I think he needs an upper endoscopy.  He did have a colonoscopy in 2015 which showed only one polyp which was removed.  We will try to get the upper GI endoscopy scheduled as soon as possible.  He knows that he will need to hold his rivaroxaban for 2 doses prior to the endoscopy.      We will plan to see him back when he returns from [place] in the spring.  He will call in the meantime if he has any new questions or concerns.       Total time 40 minutes, all in counseling and coordination of care.      Waylon Novoa MD  Associate Professor of Medicine  Division of Hematology, Oncology, and Transplantation  Director, [facility]

## 2018-01-18 NOTE — NURSING NOTE
"Oncology Rooming Note    January 18, 2018 11:51 AM   Raymon Ace is a 72 year old male who presents for:    Chief Complaint   Patient presents with     Blood Draw     Labs drawn from t by RN. Vs taken and pt checked in for appt     Oncology Clinic Visit     f/u regarding PE     Initial Vitals: /56 (BP Location: Right arm, Cuff Size: Adult Regular)  Pulse 70  Temp 97.9  F (36.6  C) (Oral)  Resp 16  Wt 90.3 kg (199 lb)  SpO2 97%  BMI 31.64 kg/m2 Estimated body mass index is 31.64 kg/(m^2) as calculated from the following:    Height as of 1/4/18: 1.689 m (5' 6.5\").    Weight as of this encounter: 90.3 kg (199 lb). Body surface area is 2.06 meters squared.  No Pain (0) Comment: Data Unavailable   No LMP for male patient.  Allergies reviewed: Yes  Medications reviewed: Yes    Medications: Medication refills not needed today.  Pharmacy name entered into [employer]:    [facility] - [place], [place] - [address] [facility] - [place], [place] - [address]  [facility] - [place], [place] - [address] [facility] - [place], [place] - [address] [facility] - [place], [place] [address]    Clinical concerns: Would like order for shoulder injection.  Dr Novoa was notified.    6 minutes for nursing intake (face to face time)     Terri Melton CMA              "

## 2018-01-18 NOTE — PROGRESS NOTES
HEMATOLOGY CLINIC VISIT    Raymon Ace is a 72-year-old male who returns today for followup of chronic ITP and history of unprovoked pulmonary embolism for which he is on long-term anticoagulation.  Details of his history can be found in my previous note from 09/28/2017.      Overall, he is feeling well.  He is tolerating Promacta without any difficulty.  He is also doing well on the rivaroxaban without any nuisance bleeding symptoms.       His main complaint today is of his right shoulder in which he is known to have osteoarthritis.  He has had injections in the past and it has been bothering him again.  He has been using nonsteroidal anti-inflammatory drugs to treat this discomfort.  He is planning to seek another injection before he and his wife return to [place] for the remainder of the winter.      The remainder of his complete review of systems is negative.  He specifically denies any dysphagia, nausea, vomiting, abdominal pain, change in bowel habits, or blood in his stools.     PHYSICAL EXAMINATION:  He looks well.  He is not pale or jaundiced.  There are no obvious bruises or petechiae.  A more detailed exam was not performed today.       LABORATORY DATA:  Comprehensive metabolic panel is normal aside from a mild elevation in creatinine at 1.45.  CBC shows a white count of 6.3 with a normal differential.  His hemoglobin is 10.6.  Platelet count is stable at 104,000.  Reviewing his CBC trends over the last year, there has been a clear trend down in his hemoglobin over the last several months.        ASSESSMENT AND PLAN:   1.  Chronic immune thrombocytopenic purpura.  Overall, Raymon is doing well on Promacta with a platelet count that is stable in the 100,000 range over the last year.  There is quite a bit of fluctuation in his platelet count, but overall, again, things are stable.  We will make no change in this part of his care plan.   2.  History of unprovoked pulmonary embolism in 04/2017.  The clot has  completely resolved as outlined in my previous note.  He has no residual symptoms.  He needs to remain on long-term anticoagulation given the unprovoked nature of this episode of venous thromboembolism.  He is doing well on rivaroxaban.   3.  Anemia.  His hemoglobin has been drifting down noticeably over the last several months.  He has no new bleeding symptoms but given his previous history of upper GI bleeding related to a gastric polyp and his increased use of NSAIDs for his shoulder pain, I think he needs an upper endoscopy.  He did have a colonoscopy in 2015 which showed only one polyp which was removed.  We will try to get the upper GI endoscopy scheduled as soon as possible.  He knows that he will need to hold his rivaroxaban for 2 doses prior to the endoscopy.      We will plan to see him back when he returns from [place] in the spring.  He will call in the meantime if he has any new questions or concerns.       Total time 40 minutes, all in counseling and coordination of care.      Waylon Novoa MD  Associate Professor of Medicine  Division of Hematology, Oncology, and Transplantation  Director, [facility]

## 2018-01-19 ENCOUNTER — TELEPHONE (OUTPATIENT)
Dept: GASTROENTEROLOGY | Facility: CLINIC | Age: 73
End: 2018-01-19

## 2018-01-19 ENCOUNTER — HOSPITAL ENCOUNTER (OUTPATIENT)
Facility: CLINIC | Age: 73
End: 2018-01-19
Attending: INTERNAL MEDICINE | Admitting: INTERNAL MEDICINE

## 2018-01-19 NOTE — TELEPHONE ENCOUNTER
Patient scheduled for EGD    Indication for procedure. anemia    Referring Provider. Dr. Novoa    ? no    Arrival time verified? 6:30 am    Facility location verified? [address] [facility], room 1-301    Instructions given regarding prep and procedure    Prep Type NPO for 6 hours prior to procedure    Are you taking any anticoagulants or blood thinners? Xarelto    Instructions given? Yes,     Electronic implanted devices? no    Pre procedure teaching completed? Yes    Transportation from procedure? Yes, wife    H&P / Pre op physical completed? To be completed by the PAC clinic. Number provided to patient and wife    Ileana Ortega RN

## 2018-01-23 ENCOUNTER — OFFICE VISIT (OUTPATIENT)
Dept: SURGERY | Facility: CLINIC | Age: 73
End: 2018-01-23
Payer: COMMERCIAL

## 2018-01-23 ENCOUNTER — APPOINTMENT (OUTPATIENT)
Dept: SURGERY | Facility: CLINIC | Age: 73
End: 2018-01-23
Payer: COMMERCIAL

## 2018-01-23 ENCOUNTER — ANESTHESIA EVENT (OUTPATIENT)
Dept: GASTROENTEROLOGY | Facility: CLINIC | Age: 73
End: 2018-01-23
Payer: COMMERCIAL

## 2018-01-23 VITALS
WEIGHT: 184.6 LBS | SYSTOLIC BLOOD PRESSURE: 160 MMHG | HEIGHT: 67 IN | OXYGEN SATURATION: 98 % | TEMPERATURE: 98.5 F | DIASTOLIC BLOOD PRESSURE: 93 MMHG | BODY MASS INDEX: 28.97 KG/M2 | HEART RATE: 108 BPM | RESPIRATION RATE: 16 BRPM

## 2018-01-23 DIAGNOSIS — Z01.818 PREOP EXAMINATION: Primary | ICD-10-CM

## 2018-01-23 ASSESSMENT — LIFESTYLE VARIABLES: TOBACCO_USE: 1

## 2018-01-23 NOTE — ANESTHESIA PREPROCEDURE EVALUATION
Anesthesia Evaluation     . Pt has had prior anesthetic. Type: General and MAC    No history of anesthetic complications          ROS/MED HX    ENT/Pulmonary:     (+)sleep apnea, tobacco use, Past use 23 pack years, quit 1982 packs/day  doesn't use CPAP , . .    Neurologic:     (+)other neuro lumbar radiculopathy    Cardiovascular:     (+) Dyslipidemia, hypertension----. : . . . :. . Previous cardiac testing Echodate:4/9/17results:: The left ventricle is normal in size. There is normal left ventricular wall thickness. The transmitral spectral Doppler flow pattern is normal for age. The visual ejection fraction is estimated at 55-60%. The right ventricle is normal in structure, function and size. No valvular disease.Stress Testdate:2012 results:Negative ECG for ischemia during stress.ECG reviewed date:8/4/17 results:Marked sinus  Bradycardia (HR 48), -Left axis -anterior fascicular block.    -Anterior infarct -age undetermined.  -  Negative precordial T-waves  -May be normal -consider anteroseptal ischemia.   date: results:          METS/Exercise Tolerance:  >4 METS   Hematologic: Comments: Unprovoked PE 4/2017 patient taking Xarelto, patient started holding 1/21/18    (+) History of blood clots pt is anticoagulated, Anemia, Other Hematologic Disorder-ITP on promacta      Musculoskeletal:   (+) , , other musculoskeletal- Chronic back pain, right shoulder pain-can't lift arm above head      GI/Hepatic: Comment: Alcoholic liver disease, normal liver synthetic function    (+) GERD Asymptomatic on medication, liver disease,       Renal/Genitourinary: Comment: Acute kidney injury, Cr elevated to 1.45 (1/18/18), normal prior on 1/4/18    (+) chronic renal disease,       Endo:     (+) Obesity, .      Psychiatric:     (+) psychiatric history other (comment) (-Alcohol use disorder, still drinks 4-5 glasses of hard liquor daily)      Infectious Disease:  - neg infectious disease ROS       Malignancy:      - no malignancy    Other:    (+) H/O Chronic Pain,                   Physical Exam  Normal systems: cardiovascular and pulmonary    Airway   Mallampati: II  TM distance: >3 FB  Neck ROM: full    Dental   (+) missing    Cardiovascular   Rhythm and rate: regular and normal      Pulmonary    breath sounds clear to auscultation               PAC Discussion and Assessment    ASA Classification: 3  Case is suitable for: [place]  Anesthetic techniques and relevant risks discussed: MAC with GA as backup  Invasive monitoring and risk discussed:   Types:   Possibility and Risk of blood transfusion discussed:   NPO instructions given:   Additional anesthetic preparation and risks discussed:   Needs early admission to pre-op area:   Other:     PAC Resident/NP Anesthesia Assessment:  Raymon Ace is a 72 year old male who presents for pre-operative H & P in preparation for an EGD on 1/25/18 with Dr. Crouch for anemia at the [facility], GI endoscopy center.   PAC referral for risk assessment and optimization for anesthesia with comorbid conditions of:    Pre-operative considerations:  1.  Cardiac:  Functional status METS>4   Risk of Major Adverse Cardiac event: 0.4%  -HTN controlled with spironolactone (hold DOS), Lisinopril (hold DOS), clonidine(take DOS), amlodipine(take DOS); HLD not on med   *ECHO 4/19/17:  The left ventricle is normal in size. There is normal left ventricular wall thickness. The transmitral spectral Doppler flow pattern is normal for age. The visual ejection fraction is estimated at 55-60%.  The right ventricle is normal in structure, function and size.  No valvular disease.   *Lexiscan Stress Test 2012:  Negative ECG for ischemia during stress.  *EKG 8/4/17:  Marked sinus  Bradycardia (HR 48), -Left axis -anterior fascicular block.    -Anterior infarct -age undetermined.  -  Negative precordial T-waves  -May be normal -consider anteroseptal ischemia.  2.  Pulm:     -KARL,  doesn't wear CPAP   -Former smoker, 23 pack years, quit 1982  3.  Heme:  - Unprovoked pulmonary embolism in 04/2017, Long term Xarelto, plan by hematology:  hold his rivaroxaban for 2 doses prior to the endoscopy, patient started holding 1/21/18  -ITP on Promacta, stable PLT count around 100,000  -Anemia, Hgb 10.6 (1/18/18)  4.  Renal:  -Elevated Cr to 1.45 1/18/18, normal prior to this time  5.  GI:  Risk of PONV score =1 .  If > 2, anti-emetic intervention recommended.  6.  Psych:  -Alcohol use disorder, still drinks 4-5 glasses of hard liquor daily    Patient is optimized and is an acceptable candidate for the proposed procedure.  No further diagnostic evaluation is needed.    Elsa Grajeda MS PA-C        Mid-Level Provider/Resident:   Date:   Time:     Attending Anesthesiologist Anesthesia Assessment:  72 year old for EGD in workup of anemia. Known PE and ITP, followed by Dr. Novoa. Stable except for hgb which is slowly decreasing - hence workup for GI bleed. Anticoag plan per Dr. Novoa.  Patient/case discussed with SCOTTY. No need to see patient. Patient is appropriate for the planned procedure without further work-up or medical management.      Reviewed and Signed by PAC Anesthesiologist  Anesthesiologist: johann  Date: 1/23 2018  Time:   Pass/Fail: Pass  Disposition:     PAC Pharmacist Assessment:        Pharmacist:   Date:   Time:      Anesthesia Plan      History & Physical Review  History and physical reviewed and following examination; no interval change.    ASA Status:  3 .    NPO Status:  > 6 hours    Plan for MAC with Intravenous induction. Maintenance will be TIVA.  Reason for MAC:  Difficulty with conscious sedation (QS)         Postoperative Care      Consents  Anesthetic plan, risks, benefits and alternatives discussed with:  Patient..            I have examined the patient and reviewed the record with the above resident or APN and agree with above assessment and recommendations.     Mracelo  Norman NICHOLS  Staff Anesthesiologist  February 8, 2018, 10:35 AM                .

## 2018-01-23 NOTE — PHARMACY - PREOPERATIVE ASSESSMENT CENTER
Anticoagulation Note - Preoperative Assessment Center (PAC) Pharmacist     Patient seen by SCOTTY and MA during time of PAC Clinic appointment January 23, 2018. Patient was not seen by PAC pharmacist due to a scheduling conflict. The purpose of this note is to document the perioperative anticoagulation plan outlined by the providers caring for Raymon Ace.     Current Regimen  Anticoagulation Regimen as of January 23, 2018: rivaroxaban 20 mg PO every evening with dinner  Indication: unprovoked PE 4/2017  Prescriber:  Dr. Harden  Expected Duration of therapy: indefinite    Perioperative plan  Raymon Ace is scheduled for EGD with Dr Crouch on 1/25/18 and the perioperative anticoagulation plan outlined by Dr. harden is hold rivaroxaban x2 doses prior to procedure.     Resumption of anticoagulation will be based on surgery team assessment of bleeding risks and complications.  This plan may require re-assessment and modification by his primary team in the perioperative setting depending on patients clinical situation.      Silvano Christensen RPH  January 23, 2018  4:01 PM

## 2018-01-23 NOTE — H&P
Pre-Operative H & P     CC:  Preoperative exam to assess for increased cardiopulmonary risk while undergoing surgery and anesthesia.    Date of Encounter: January 23, 2018   Primary Care Physician:  Rosanne Cherry  Raymon Ace is a 72 year old male who presents for pre-operative H & P in preparation for an EGD on 1/25/18 with Dr. Crouch for anemia at the [facility], GI endoscopy center.    He has a significant history for chronic ITP and unprovoked pulmonary embolism 4/2017 for which he is on long-term anticoagulation and followed by Dr. Loza in hematology.  He has no known cardiac or pulmonary disease       Raymon Ace   has a past medical history of Alcohol abuse since teens (01/15/2015); Alcoholic liver damage (H) (1/10/2014); Gastroesophageal reflux disease with esophagitis (12/6/2016); Gout involving toe, unspecified cause, unspecified chronicity, unspecified laterality (6/2/2016); Heart murmur; Hyperlipidemia (12/17/2015); Hypertension; ITP (idiopathic thrombocytopenic purpura); Obstructive sleep apnea; and Pulmonary embolism (H) large RLL w infarctio 4-17 (4/18/2017).      History is obtained from the patient and medical record including Care Everywhere.        Past Surgical History  Past Surgical History:   Procedure Laterality Date     APPENDECTOMY  1956     BACK SURGERY  1992, 1996    trimmed L4-L5, Fusion L4-L5     BONE MARROW BIOPSY, BONE SPECIMEN, NEEDLE/TROCAR  10/24/2012    Procedure: BIOPSY BONE MARROW;  BONE MARROW BIOPSY WITH ASPIRATE (AREVALO ORDERING);  Surgeon: Terri Hickey MD;  Location:  GI     FACIAL RECONSTRUCTION SURGERY  1965    jaw fracture     HC TOOTH EXTRACTION W/FORCEP  1990s       Personal or FH with difficulty with Anesthesia:  None    Prior to Admission Medications  Current Outpatient Prescriptions   Medication Sig Dispense Refill     eltrombopag (PROMACTA) 75 MG tablet Take 1 tablet (75 mg) by  mouth daily Administer on an empty stomach, 1 hour before or 2 hours after a meal. (Patient taking differently: Take 75 mg by mouth every morning Administer on an empty stomach, 1 hour before or 2 hours after a meal.) 30 tablet 5     omeprazole (PRILOSEC) 40 MG capsule TAKE ONE CAPSULE BY MOUTH ONCE DAILY (Patient taking differently: TAKE ONE CAPSULE BY MOUTH ONCE EVERY EVENING) 90 capsule 2     spironolactone (ALDACTONE) 25 MG tablet TAKE ONE TABLET BY MOUTH ONCE DAILY (Patient taking differently: TAKE ONE TABLET BY MOUTH ONCE EVERY EVENING) 90 tablet 2     lisinopril (PRINIVIL/ZESTRIL) 40 MG tablet TAKE ONE TABLET BY MOUTH DAILY (Patient taking differently: TAKE ONE TABLET BY MOUTH ONCE EVERY EVENING) 90 tablet 2     folic acid (FOLVITE) 1 MG tablet TAKE ONE TABLET BY MOUTH ONCE DAILY (Patient taking differently: TAKE ONE TABLET BY MOUTH ONCE EVERY EVENING) 90 tablet 2     allopurinol (ZYLOPRIM) 300 MG tablet TAKE ONE TABLET BY MOUTH ONCE DAILY *NEED  TO  BE  SEEN  FOR  MORE  REFILLS (Patient taking differently: TAKE ONE TABLET BY MOUTH ONCE EVERY EVENING *NEED  TO  BE  SEEN  FOR  MORE  REFILLS) 90 tablet 3     cloNIDine (CATAPRES) 0.3 MG tablet TAKE ONE TABLET BY MOUTH TWICE DAILY. NEEDS TO BE SEEN FOR MORE. (Patient taking differently: TAKE ONE TABLET BY MOUTH ONCE EVERY EVENING. NEEDS TO BE SEEN FOR MORE.) 180 tablet 3     amLODIPine (NORVASC) 5 MG tablet TAKE ONE TABLET BY MOUTH ONCE DAILY (Patient taking differently: TAKE ONE TABLET BY MOUTH ONCE EVERY EVENING) 90 tablet 3     gabapentin (NEURONTIN) 300 MG capsule TAKE ONE CAPSULE BY MOUTH THREE TIMES DAILY (Patient taking differently: TAKE ONE CAPSULE BY MOUTH EVERY EVENING) 270 capsule 0     XARELTO 20 MG TABS tablet TAKE ONE TABLET BY MOUTH ONCE DAILY WITH DINNER 30 tablet 3         Allergies  Benicar [olmesartan]     Social History  Social History     Social History     Marital status:      Spouse name: N/A     Number of children: N/A     Years of  education: N/A     Occupational History     Not on file.     Social History Main Topics     Smoking status: Former Smoker     Years: 28.00     Quit date: 9/26/1982     Smokeless tobacco: Never Used     Alcohol use 3.0 oz/week     0 Standard drinks or equivalent, 5 Shots of liquor per week      Comment: 4-5 drinks/day, alcohol use disorder     Drug use: No     Sexual activity: No     Other Topics Concern     Parent/Sibling W/ Cabg, Mi Or Angioplasty Before 65f 55m? Yes     Social History Narrative          Family History  Family History   Problem Relation Age of Onset     Unknown/Adopted Mother      Unknown/Adopted Father      HEART DISEASE Sister      DIABETES No family hx of      Coronary Artery Disease No family hx of      Hypertension No family hx of      Hyperlipidemia No family hx of      CEREBROVASCULAR DISEASE No family hx of      Breast Cancer No family hx of      Colon Cancer No family hx of      Prostate Cancer No family hx of      Other Cancer No family hx of      Depression No family hx of      Anxiety Disorder No family hx of      MENTAL ILLNESS No family hx of      Substance Abuse No family hx of      Anesthesia Reaction No family hx of      Asthma No family hx of      OSTEOPOROSIS No family hx of      Genetic Disorder No family hx of      Thyroid Disease No family hx of      Obesity No family hx of         ROS   The complete review of systems is negative other than noted in the HPI or here.   Constitutional: Denies  fevers/chills.    EENT: Denies difficulty swallowing.  Cardiovascular: Denies chest pain, TSE or orthopnea, palpitations or syncope.  Respiratory: Denies shortness of breath or significant cough.    GI: Denies nausea/vomiting     : Denies dysuria or urinary frequency  Musculoskeletal: Chronic back and right shoulder pain-can't lift right arm above head.  Skin: Denies rashes or wounds.    Hematologic: See above in HPI  Neurologic: Denies history of stroke, TIA, migraines, seizures,  "dizziness  Psychiatric: Denies changes in mood or affect.      Cardiology Tests: (personally reviewed):   Review Results Below in A/P    Labs: (personally reviewed):  Lab Results   Component Value Date    WBC 6.3 01/18/2018    HGB 10.6 (L) 01/18/2018    IRON 70 04/25/2017    IRONSAT 18 04/25/2017    HCT 34.1 (L) 01/18/2018     (L) 01/18/2018    INR 1.02 04/18/2017    PTT 28 04/18/2017     01/18/2018    POTASSIUM 4.6 01/18/2018    PHILIPPE 8.6 01/18/2018     (H) 01/18/2018    CR 1.45 (H) 01/18/2018    BUN 18 01/18/2018    CO2 24 01/18/2018    ALT 30 01/18/2018    AST 28 01/18/2018    BILITOTAL 1.0 01/18/2018    ALKPHOS 62 01/18/2018     Lab Results   Component Value Date    A1C 5.4 05/16/2016    A1C 5.4 01/10/2014    A1C 5.6 12/14/2012           Physical Exam:  No LMP for male patient.   Vital signs:  BP (!) 160/93  Pulse 108  Temp 98.5  F (36.9  C) (Oral)  Resp 16  Ht 1.689 m (5' 6.5\")  Wt 83.7 kg (184 lb 9.6 oz)  SpO2 98%  BMI 29.35 kg/m2    Constitutional: Awake, alert, cooperative, no apparent distress, and appears stated age.  Eyes: Pupils equal, round and reactive to light, sclera clear, conjunctiva normal.  HENT: Normocephalic, oral pharynx with moist mucus membranes. No goiter appreciated.   Respiratory: Clear to auscultation bilaterally, no crackles or wheezing.  Cardiovascular: Regular rate and rhythm,  no overt murmur noted.  No carotid bruits auscultated.  No edema. Palpable pulses to radial  DP and PT arteries.   GI: Normal bowel sounds, soft, non-distended, non-tender, no masses palpated  Skin: Warm and dry.  No visible rashes.  Musculoskeletal: Full ROM of neck. There is no redness, warmth, or swelling of the exposed joints. Gross motor strength is normal.    Neurologic: Awake, alert, oriented to name, place and time.  Gait is normal.   Neuropsychiatric: Calm, cooperative. Normal affect.     Assessment/Plan  Raymon Ace is a 72 year old male who presents for pre-operative H & P " in preparation for an EGD on 1/25/18 with Dr. Crouch for anemia at the [facility], GI endoscopy center.   PAC referral for risk assessment and optimization for anesthesia with comorbid conditions of:    Pre-operative considerations:  1.  Cardiac:  Functional status METS>4   Risk of Major Adverse Cardiac event: 0.4%  -HTN controlled with spironolactone (hold DOS), Lisinopril (hold DOS), clonidine(take DOS), amlodipine(take DOS); HLD not on med   *ECHO 4/19/17:  The left ventricle is normal in size. There is normal left ventricular wall thickness. The transmitral spectral Doppler flow pattern is normal for age. The visual ejection fraction is estimated at 55-60%.  The right ventricle is normal in structure, function and size.  No valvular disease.   *Lexiscan Stress Test 2012:  Negative ECG for ischemia during stress.  *EKG 8/4/17:  Marked sinus  Bradycardia (HR 48), -Left axis -anterior fascicular block.    -Anterior infarct -age undetermined.  -  Negative precordial T-waves  -May be normal -consider anteroseptal ischemia.  2.  Pulm:     -KARL, doesn t wear CPAP   -Former smoker, 23 pack years, quit 1982  3.  Heme:  - Unprovoked pulmonary embolism in 04/2017, Long term Xarelto, plan by hematology:  hold his rivaroxaban for 2 doses prior to the endoscopy, patient started holding 1/21/18  -ITP on Promacta, stable PLT count around 100,000  -Anemia, Hgb 10.6 (1/18/18)  4.  Renal:  -Elevated Cr to 1.45 1/18/18, normal prior to this time  5.  GI:  Risk of PONV score =1 .  If > 2, anti-emetic intervention recommended.  6.  Psych:  -Alcohol use disorder, still drinks 4-5 glasses of hard liquor daily    Patient is optimized and is an acceptable candidate for the proposed procedure.  No further diagnostic evaluation is needed.      AVS given to patient regarding medication instructions,  surgery time/arrival time and NPO status.  Elsa CH-C   Preoperative Assessment  [facility]  Clinic and Surgery Center  Phone: [phone]  Fax: [phone]

## 2018-02-08 ENCOUNTER — ANESTHESIA (OUTPATIENT)
Dept: GASTROENTEROLOGY | Facility: CLINIC | Age: 73
End: 2018-02-08
Payer: COMMERCIAL

## 2018-02-08 ENCOUNTER — HOSPITAL ENCOUNTER (OUTPATIENT)
Facility: CLINIC | Age: 73
Discharge: HOME | End: 2018-02-08
Attending: INTERNAL MEDICINE | Admitting: INTERNAL MEDICINE
Payer: COMMERCIAL

## 2018-02-08 ENCOUNTER — SURGERY (OUTPATIENT)
Age: 73
End: 2018-02-08

## 2018-02-08 VITALS
HEIGHT: 67 IN | WEIGHT: 184 LBS | DIASTOLIC BLOOD PRESSURE: 69 MMHG | RESPIRATION RATE: 20 BRPM | SYSTOLIC BLOOD PRESSURE: 127 MMHG | TEMPERATURE: 98.4 F | BODY MASS INDEX: 28.88 KG/M2 | OXYGEN SATURATION: 98 %

## 2018-02-08 DIAGNOSIS — K76.0 FATTY LIVER: ICD-10-CM

## 2018-02-08 DIAGNOSIS — K76.6 PORTAL HYPERTENSION (H): ICD-10-CM

## 2018-02-08 DIAGNOSIS — K70.9 ALCOHOLIC LIVER DAMAGE (H): ICD-10-CM

## 2018-02-08 DIAGNOSIS — K70.9 ALCOHOLIC LIVER DAMAGE (H): Primary | ICD-10-CM

## 2018-02-08 DIAGNOSIS — Z78.9 ALCOHOL CONSUPTION OF MORE THAN TWO DRINKS PER DAY: ICD-10-CM

## 2018-02-08 LAB — UPPER GI ENDOSCOPY: NORMAL

## 2018-02-08 PROCEDURE — 43235 EGD DIAGNOSTIC BRUSH WASH: CPT | Performed by: INTERNAL MEDICINE

## 2018-02-08 PROCEDURE — 37000008 ZZH ANESTHESIA TECHNICAL FEE, 1ST 30 MIN: Performed by: INTERNAL MEDICINE

## 2018-02-08 PROCEDURE — 25000132 ZZH RX MED GY IP 250 OP 250 PS 637: Performed by: INTERNAL MEDICINE

## 2018-02-08 PROCEDURE — 25000125 ZZHC RX 250: Performed by: NURSE ANESTHETIST, CERTIFIED REGISTERED

## 2018-02-08 PROCEDURE — 25000128 H RX IP 250 OP 636: Performed by: NURSE ANESTHETIST, CERTIFIED REGISTERED

## 2018-02-08 PROCEDURE — 91200 LIVER ELASTOGRAPHY: CPT | Mod: ZF

## 2018-02-08 RX ORDER — FENTANYL CITRATE 50 UG/ML
INJECTION, SOLUTION INTRAMUSCULAR; INTRAVENOUS PRN
Status: DISCONTINUED | OUTPATIENT
Start: 2018-02-08 | End: 2018-02-08

## 2018-02-08 RX ORDER — GLYCOPYRROLATE 0.2 MG/ML
INJECTION, SOLUTION INTRAMUSCULAR; INTRAVENOUS PRN
Status: DISCONTINUED | OUTPATIENT
Start: 2018-02-08 | End: 2018-02-08

## 2018-02-08 RX ORDER — SODIUM CHLORIDE, SODIUM LACTATE, POTASSIUM CHLORIDE, CALCIUM CHLORIDE 600; 310; 30; 20 MG/100ML; MG/100ML; MG/100ML; MG/100ML
INJECTION, SOLUTION INTRAVENOUS CONTINUOUS PRN
Status: DISCONTINUED | OUTPATIENT
Start: 2018-02-08 | End: 2018-02-08

## 2018-02-08 RX ORDER — LIDOCAINE HYDROCHLORIDE 20 MG/ML
INJECTION, SOLUTION INFILTRATION; PERINEURAL PRN
Status: DISCONTINUED | OUTPATIENT
Start: 2018-02-08 | End: 2018-02-08

## 2018-02-08 RX ORDER — PROPOFOL 10 MG/ML
INJECTION, EMULSION INTRAVENOUS CONTINUOUS PRN
Status: DISCONTINUED | OUTPATIENT
Start: 2018-02-08 | End: 2018-02-08

## 2018-02-08 RX ORDER — PROPOFOL 10 MG/ML
INJECTION, EMULSION INTRAVENOUS PRN
Status: DISCONTINUED | OUTPATIENT
Start: 2018-02-08 | End: 2018-02-08

## 2018-02-08 RX ORDER — SODIUM CHLORIDE, SODIUM LACTATE, POTASSIUM CHLORIDE, CALCIUM CHLORIDE 600; 310; 30; 20 MG/100ML; MG/100ML; MG/100ML; MG/100ML
INJECTION, SOLUTION INTRAVENOUS CONTINUOUS
Status: CANCELLED | OUTPATIENT
Start: 2018-02-08

## 2018-02-08 RX ORDER — SIMETHICONE
LIQUID (ML) MISCELLANEOUS PRN
Status: DISCONTINUED | OUTPATIENT
Start: 2018-02-08 | End: 2018-02-08 | Stop reason: HOSPADM

## 2018-02-08 RX ORDER — MEPERIDINE HYDROCHLORIDE 25 MG/ML
12.5 INJECTION INTRAMUSCULAR; INTRAVENOUS; SUBCUTANEOUS
Status: CANCELLED | OUTPATIENT
Start: 2018-02-08

## 2018-02-08 RX ORDER — NALOXONE HYDROCHLORIDE 0.4 MG/ML
.1-.4 INJECTION, SOLUTION INTRAMUSCULAR; INTRAVENOUS; SUBCUTANEOUS
Status: CANCELLED | OUTPATIENT
Start: 2018-02-08 | End: 2018-02-09

## 2018-02-08 RX ORDER — ONDANSETRON 2 MG/ML
4 INJECTION INTRAMUSCULAR; INTRAVENOUS EVERY 30 MIN PRN
Status: CANCELLED | OUTPATIENT
Start: 2018-02-08

## 2018-02-08 RX ORDER — ONDANSETRON 4 MG/1
4 TABLET, ORALLY DISINTEGRATING ORAL EVERY 30 MIN PRN
Status: CANCELLED | OUTPATIENT
Start: 2018-02-08

## 2018-02-08 RX ADMIN — Medication 2 ML: at 10:45

## 2018-02-08 RX ADMIN — PROPOFOL 20 MG: 10 INJECTION, EMULSION INTRAVENOUS at 10:44

## 2018-02-08 RX ADMIN — PROPOFOL 75 MCG/KG/MIN: 10 INJECTION, EMULSION INTRAVENOUS at 10:42

## 2018-02-08 RX ADMIN — GLYCOPYRROLATE 0.1 MG: 0.2 INJECTION, SOLUTION INTRAMUSCULAR; INTRAVENOUS at 10:40

## 2018-02-08 RX ADMIN — LIDOCAINE HYDROCHLORIDE 40 MG: 20 INJECTION, SOLUTION INFILTRATION; PERINEURAL at 10:40

## 2018-02-08 RX ADMIN — SODIUM CHLORIDE, POTASSIUM CHLORIDE, SODIUM LACTATE AND CALCIUM CHLORIDE: 600; 310; 30; 20 INJECTION, SOLUTION INTRAVENOUS at 10:37

## 2018-02-08 RX ADMIN — FENTANYL CITRATE 50 MCG: 50 INJECTION, SOLUTION INTRAMUSCULAR; INTRAVENOUS at 10:40

## 2018-02-08 RX ADMIN — LIDOCAINE HYDROCHLORIDE 15 ML: 20 SOLUTION ORAL; TOPICAL at 10:37

## 2018-02-08 RX ADMIN — MIDAZOLAM 1 MG: 1 INJECTION INTRAMUSCULAR; INTRAVENOUS at 10:37

## 2018-02-08 NOTE — ANESTHESIA CARE TRANSFER NOTE
Patient: Raymon Ace    Procedure(s):  EGD - Wound Class: II-Clean Contaminated    Diagnosis: low hgb, history of gastric polyp  Diagnosis Additional Information: No value filed.    Anesthesia Type:   MAC     Note:  Airway :Face Mask  Patient transferred to:Phase II  Comments: Awake, alert, responding appropriately. Stable on RA, sats 97%. Report to RN at bedside. Handoff Report: Identifed the Patient, Identified the Reponsible Provider, Reviewed the pertinent medical history, Discussed the surgical course, Reviewed Intra-OP anesthesia mangement and issues during anesthesia, Set expectations for post-procedure period and Allowed opportunity for questions and acknowledgement of understanding      Vitals: (Last set prior to Anesthesia Care Transfer)    CRNA VITALS  2/8/2018 1024 - 2/8/2018 1059      2/8/2018             Pulse: 61    Ht Rate: 66    SpO2: 98 %    Resp Rate (set): 10                Electronically Signed By: SCOTTIE Barton CRNA  February 8, 2018  10:59 AM

## 2018-02-08 NOTE — DISCHARGE INSTRUCTIONS
[facility] Upper Endoscopy (EGD) with Monitored Anesthesia Care  For 24 hours after your procedure  Sedation:  1. Get plenty of rest. A responsible adult must stay with you for at least 24 hours after you leave the hospital.   2. Do not drive or use heavy equipment. If you have weakness or tingling, don't drive or use heavy equipment until this feeling goes away.  3. Do not drink alcohol.  4. Avoid strenuous or risky activities. Ask for help when climbing stairs.   5. You may feel lightheaded. IF so, sit for a few minutes before standing. Have someone help you get up.   6. If you have nausea (feel sick to your stomach): Drink only clear liquids such as apple juice, ginger ale, broth or 7-Up. Rest may also help. Be sure to drink enough fluids. Move to a regular diet as you feel able.  7. You may have a slight fever. Call the doctor if your fever is over 100 F (37.7 C) (taken under the tongue) or lasts longer than 24 hours.  8. You may have a dry mouth, a sore throat, muscle aches or trouble sleeping. These should go away after 24 hours.  9. Do not make important or legal decisions.   Procedural:  1. Wait one hour before eating or drinking. Start with sips of water. When your gag reflex has returned, you may return to your normal diet, medicines, and light exercise.  2. Some bloating is normal. You may have large burps or pass air.  3. You may have a sore throat for 2 to 3 days. If so, it may help to:    Avoid hot liquids for 24 hours.    Use sore throat lozenges.    Gargle as need with salt water up to 4 times a day. Mix 1 cup of warm water with 1 teaspoon of salt. Do not swallow.  4. You may take Tylenol (acetaminophen) for pain unless your doctor has told you not to.   Do not take aspirin or ibuprofen (Advil, Motrin, or other anti-inflammatory  drugs) for 1 days.  Call right away if you have:  1. Unusual pain in belly or chest pain not relieved with passing air.  2. Severe throat pain or trouble swallowing.  3. Black  stools (tar-like looking bowel movement).  4. Signs of infection (fever, growing tenderness at the surgery site, a large amount of drainage or bleeding, severe pain, foul-smelling drainage, redness, swelling).  5. It has been over 8 to 10 hours since surgery and you are still not able to urinate (pass water).  6. Headache for over 24 hours.  7. Numbness, tingling or weakness the day after surgery (if you had spinal anesthesia).    Follow-up:  Per Dr. Crouch     If you have severe pain, bleeding, vomit blood, or shortness of breath, go to an emergency room.  If you have questions, call:  Endoscopy: Monday to Friday, 7 a.m. to 4:30 p.m. [phone] (We may have to call you back)  After hours: Hospital  [phone] (Ask for the GI fellow on call)

## 2018-02-08 NOTE — ANESTHESIA POSTPROCEDURE EVALUATION
Patient: Raymon Ace    Procedure(s):  EGD - Wound Class: II-Clean Contaminated    Diagnosis:low hgb, history of gastric polyp  Diagnosis Additional Information: No value filed.    Anesthesia Type:  MAC    Note:  Anesthesia Post Evaluation    Patient location during evaluation: PACU  Patient participation: Able to fully participate in evaluation  Level of consciousness: awake  Pain management: adequate  Airway patency: patent  Cardiovascular status: acceptable  Respiratory status: acceptable  Hydration status: acceptable  PONV: none     Anesthetic complications: None          Last vitals:  Vitals:    02/08/18 1100 02/08/18 1101 02/08/18 1102   BP: 119/76     Resp: 18 17 17   Temp:      SpO2: 98% 98% 98%         Electronically Signed By: Marcelo Austin MD  February 8, 2018  11:15 AM

## 2018-02-08 NOTE — OR NURSING
Procedure: EGD without intervention  Sedation:monitored anesthesia care  Specimens: none.   O2: per CRNA  Tolerated procedure: well  Pt to recovery area in stable condition accompanied by CRNA.   Other:  none    Yari Sanderson RN

## 2018-02-08 NOTE — IP AVS SNAPSHOT
[facility], [place], Endoscopy    [address] [address]    Phone:  [phone]                                       After Visit Summary   2/8/2018    Raymon Ace    MRN: [id number]           After Visit Summary Signature Page     I have received my discharge instructions, and my questions have been answered. I have discussed any challenges I see with this plan with the nurse or doctor.    ..........................................................................................................................................  Patient/Patient Representative Signature      ..........................................................................................................................................  Patient Representative Print Name and Relationship to Patient    ..................................................               ................................................  Date                                            Time    ..........................................................................................................................................  Reviewed by Signature/Title    ...................................................              ..............................................  Date                                                            Time

## 2018-02-09 ENCOUNTER — TELEPHONE (OUTPATIENT)
Dept: GASTROENTEROLOGY | Facility: CLINIC | Age: 73
End: 2018-02-09

## 2018-02-12 NOTE — TELEPHONE ENCOUNTER
Pt returned my call. Questioned when he plans to have the US of abdomen. Pt states he will contact our clinic once he returns from vacation.

## 2018-02-25 DIAGNOSIS — I82.409 DEEP VEIN THROMBOSIS (DVT) OF LOWER EXTREMITY, UNSPECIFIED CHRONICITY, UNSPECIFIED LATERALITY, UNSPECIFIED VEIN (H): ICD-10-CM

## 2018-02-26 RX ORDER — RIVAROXABAN 20 MG/1
TABLET, FILM COATED ORAL
Qty: 30 TABLET | Refills: 3 | Status: SHIPPED | OUTPATIENT
Start: 2018-02-26 | End: 2018-05-17 | Stop reason: DRUGHIGH

## 2018-03-28 DIAGNOSIS — I10 ESSENTIAL HYPERTENSION: ICD-10-CM

## 2018-03-29 RX ORDER — LISINOPRIL 40 MG/1
TABLET ORAL
Qty: 90 TABLET | Refills: 1 | Status: SHIPPED | OUTPATIENT
Start: 2018-03-29 | End: 2018-05-18

## 2018-03-29 NOTE — TELEPHONE ENCOUNTER
"Requested Prescriptions   Pending Prescriptions Disp Refills     lisinopril (PRINIVIL/ZESTRIL) 40 MG tablet [Pharmacy Med Name: LISINOPRIL 40MG     TAB] 90 tablet 2    Last Written Prescription Date:  04/26/2017  Last Fill Quantity: 90,  # refills: 2   Last office visit: 1/4/2018 with prescribing provider:  Dr. Cherry   Future Office Visit:   Sig: TAKE ONE TABLET BY MOUTH ONCE DAILY    ACE Inhibitors (Including Combos) Protocol Failed    3/28/2018  3:53 PM       Failed - Normal serum creatinine on file in past 12 months    Recent Labs   Lab Test  01/18/18   1140   CR  1.45*            Passed - Blood pressure under 140/90 in past 12 months    BP Readings from Last 3 Encounters:   02/08/18 127/69   01/23/18 (!) 160/93   01/18/18 116/56                Passed - Recent (12 mo) or future (30 days) visit within the authorizing provider's specialty    Patient had office visit in the last 12 months or has a visit in the next 30 days with authorizing provider or within the authorizing provider's specialty.  See \"Patient Info\" tab in inbasket, or \"Choose Columns\" in Meds & Orders section of the refill encounter.           Passed - Patient is age 18 or older       Passed - Normal serum potassium on file in past 12 months    Recent Labs   Lab Test  01/18/18   1140   POTASSIUM  4.6             "

## 2018-05-15 DIAGNOSIS — K70.9 ALCOHOLIC LIVER DAMAGE (H): Primary | ICD-10-CM

## 2018-05-16 DIAGNOSIS — I10 BENIGN ESSENTIAL HYPERTENSION: ICD-10-CM

## 2018-05-17 ENCOUNTER — ONCOLOGY VISIT (OUTPATIENT)
Dept: ONCOLOGY | Facility: CLINIC | Age: 73
End: 2018-05-17
Attending: INTERNAL MEDICINE
Payer: COMMERCIAL

## 2018-05-17 VITALS
WEIGHT: 196 LBS | TEMPERATURE: 97.6 F | OXYGEN SATURATION: 97 % | BODY MASS INDEX: 31.5 KG/M2 | HEART RATE: 68 BPM | DIASTOLIC BLOOD PRESSURE: 58 MMHG | SYSTOLIC BLOOD PRESSURE: 98 MMHG | HEIGHT: 66 IN | RESPIRATION RATE: 18 BRPM

## 2018-05-17 DIAGNOSIS — Z86.711 HISTORY OF PULMONARY EMBOLISM: ICD-10-CM

## 2018-05-17 DIAGNOSIS — K70.9 ALCOHOLIC LIVER DAMAGE (H): ICD-10-CM

## 2018-05-17 DIAGNOSIS — D64.9 ANEMIA, UNSPECIFIED TYPE: ICD-10-CM

## 2018-05-17 DIAGNOSIS — Z79.01 LONG TERM CURRENT USE OF ANTICOAGULANT THERAPY: ICD-10-CM

## 2018-05-17 DIAGNOSIS — I82.409 DEEP VEIN THROMBOSIS (DVT) OF LOWER EXTREMITY, UNSPECIFIED CHRONICITY, UNSPECIFIED LATERALITY, UNSPECIFIED VEIN (H): ICD-10-CM

## 2018-05-17 DIAGNOSIS — D69.3 IDIOPATHIC THROMBOCYTOPENIC PURPURA (H): ICD-10-CM

## 2018-05-17 DIAGNOSIS — Z86.718 PERSONAL HISTORY OF VENOUS THROMBOSIS AND EMBOLISM: Primary | ICD-10-CM

## 2018-05-17 LAB
ALBUMIN SERPL-MCNC: 3.3 G/DL (ref 3.4–5)
ALP SERPL-CCNC: 50 U/L (ref 40–150)
ALT SERPL W P-5'-P-CCNC: 27 U/L (ref 0–70)
ANION GAP SERPL CALCULATED.3IONS-SCNC: 9 MMOL/L (ref 3–14)
AST SERPL W P-5'-P-CCNC: 30 U/L (ref 0–45)
BASOPHILS # BLD AUTO: 0.1 10E9/L (ref 0–0.2)
BASOPHILS NFR BLD AUTO: 0.7 %
BILIRUB DIRECT SERPL-MCNC: 0.2 MG/DL (ref 0–0.2)
BILIRUB SERPL-MCNC: 1.1 MG/DL (ref 0.2–1.3)
BUN SERPL-MCNC: 26 MG/DL (ref 7–30)
CALCIUM SERPL-MCNC: 8.6 MG/DL (ref 8.5–10.1)
CHLORIDE SERPL-SCNC: 107 MMOL/L (ref 94–109)
CO2 SERPL-SCNC: 22 MMOL/L (ref 20–32)
CREAT SERPL-MCNC: 1.87 MG/DL (ref 0.66–1.25)
DIFFERENTIAL METHOD BLD: ABNORMAL
EOSINOPHIL # BLD AUTO: 0.2 10E9/L (ref 0–0.7)
EOSINOPHIL NFR BLD AUTO: 2.6 %
ERYTHROCYTE [DISTWIDTH] IN BLOOD BY AUTOMATED COUNT: 18.8 % (ref 10–15)
FERRITIN SERPL-MCNC: 7 NG/ML (ref 26–388)
GFR SERPL CREATININE-BSD FRML MDRD: 36 ML/MIN/1.7M2
GLUCOSE SERPL-MCNC: 90 MG/DL (ref 70–99)
HCT VFR BLD AUTO: 30.2 % (ref 40–53)
HGB BLD-MCNC: 9.7 G/DL (ref 13.3–17.7)
IMM GRANULOCYTES # BLD: 0.1 10E9/L (ref 0–0.4)
IMM GRANULOCYTES NFR BLD: 1.6 %
INR PPP: 2.78 (ref 0.86–1.14)
IRON SATN MFR SERPL: 23 % (ref 15–46)
IRON SERPL-MCNC: 101 UG/DL (ref 35–180)
LYMPHOCYTES # BLD AUTO: 1.4 10E9/L (ref 0.8–5.3)
LYMPHOCYTES NFR BLD AUTO: 19.5 %
MCH RBC QN AUTO: 26.3 PG (ref 26.5–33)
MCHC RBC AUTO-ENTMCNC: 32.1 G/DL (ref 31.5–36.5)
MCV RBC AUTO: 82 FL (ref 78–100)
MONOCYTES # BLD AUTO: 0.6 10E9/L (ref 0–1.3)
MONOCYTES NFR BLD AUTO: 8.7 %
NEUTROPHILS # BLD AUTO: 4.7 10E9/L (ref 1.6–8.3)
NEUTROPHILS NFR BLD AUTO: 66.9 %
NRBC # BLD AUTO: 0 10*3/UL
NRBC BLD AUTO-RTO: 0 /100
PLATELET # BLD AUTO: 106 10E9/L (ref 150–450)
POTASSIUM SERPL-SCNC: 4 MMOL/L (ref 3.4–5.3)
PROT SERPL-MCNC: 6.7 G/DL (ref 6.8–8.8)
RBC # BLD AUTO: 3.69 10E12/L (ref 4.4–5.9)
SODIUM SERPL-SCNC: 138 MMOL/L (ref 133–144)
TIBC SERPL-MCNC: 442 UG/DL (ref 240–430)
WBC # BLD AUTO: 7 10E9/L (ref 4–11)

## 2018-05-17 PROCEDURE — 83540 ASSAY OF IRON: CPT | Performed by: INTERNAL MEDICINE

## 2018-05-17 PROCEDURE — 99214 OFFICE O/P EST MOD 30 MIN: CPT | Mod: ZP | Performed by: INTERNAL MEDICINE

## 2018-05-17 PROCEDURE — 82728 ASSAY OF FERRITIN: CPT | Performed by: INTERNAL MEDICINE

## 2018-05-17 PROCEDURE — 80053 COMPREHEN METABOLIC PANEL: CPT | Performed by: INTERNAL MEDICINE

## 2018-05-17 PROCEDURE — 85610 PROTHROMBIN TIME: CPT | Performed by: INTERNAL MEDICINE

## 2018-05-17 PROCEDURE — 85025 COMPLETE CBC W/AUTO DIFF WBC: CPT | Performed by: INTERNAL MEDICINE

## 2018-05-17 PROCEDURE — G0463 HOSPITAL OUTPT CLINIC VISIT: HCPCS | Mod: ZF

## 2018-05-17 PROCEDURE — 83550 IRON BINDING TEST: CPT | Performed by: INTERNAL MEDICINE

## 2018-05-17 PROCEDURE — 82248 BILIRUBIN DIRECT: CPT | Performed by: INTERNAL MEDICINE

## 2018-05-17 PROCEDURE — 36415 COLL VENOUS BLD VENIPUNCTURE: CPT

## 2018-05-17 RX ORDER — ELTROMBOPAG OLAMINE 50 MG/1
TABLET, FILM COATED ORAL
COMMUNITY
Start: 2018-01-24 | End: 2018-05-18

## 2018-05-17 RX ORDER — CLONIDINE HYDROCHLORIDE 0.3 MG/1
TABLET ORAL
Qty: 180 TABLET | Refills: 3 | OUTPATIENT
Start: 2018-05-17

## 2018-05-17 ASSESSMENT — PAIN SCALES - GENERAL: PAINLEVEL: WORST PAIN (10)

## 2018-05-17 NOTE — TELEPHONE ENCOUNTER
"CLONIDINE 0.3MG TAB  Last Written Prescription Date:  01/25/17  Last Fill Quantity: 180,  # refills: 3   Last office visit: 1/4/2018 with prescribing provider:     Future Office Visit:    Requested Prescriptions   Pending Prescriptions Disp Refills     cloNIDine (CATAPRES) 0.3 MG tablet [Pharmacy Med Name: CLONIDINE 0.3MG     TAB] 180 tablet 3     Sig: TAKE ONE TABLET BY MOUTH TWICE DAILY .  NEEDS  TO  BE  SEEN  FOR  MORE    Central Acting Antiadrenergic Agents Failed    5/16/2018  9:54 PM       Failed - Normal serum creatinine on file within past 12 months    Recent Labs   Lab Test  01/18/18   1140   CR  1.45*            Passed - Blood pressure under 140/90 in past 12 months    BP Readings from Last 3 Encounters:   02/08/18 127/69   01/23/18 (!) 160/93   01/18/18 116/56                Passed - Patient is 6 years of age or older       Passed - Recent (12 mo) or future (30 days) visit within the authorizing provider's specialty    Patient had office visit in the last 12 months or has a visit in the next 30 days with authorizing provider or within the authorizing provider's specialty.  See \"Patient Info\" tab in inbasket, or \"Choose Columns\" in Meds & Orders section of the refill encounter.              "

## 2018-05-17 NOTE — NURSING NOTE
"Oncology Rooming Note    May 17, 2018 11:44 AM   Raymon Ace is a 72 year old male who presents for:    Chief Complaint   Patient presents with     Oncology Clinic Visit     Return: Pulmonary embolism      Initial Vitals: BP 98/58 (BP Location: Right arm, Patient Position: Sitting, Cuff Size: Adult Regular)  Pulse 68  Temp 97.6  F (36.4  C) (Tympanic)  Resp 18  Ht 1.689 m (5' 6.5\")  Wt 88.9 kg (196 lb)  SpO2 97%  BMI 31.17 kg/m2 Estimated body mass index is 31.17 kg/(m^2) as calculated from the following:    Height as of this encounter: 1.689 m (5' 6.5\").    Weight as of this encounter: 88.9 kg (196 lb). Body surface area is 2.04 meters squared.  Worst Pain (10) Comment: Data Unavailable   No LMP for male patient.  Allergies reviewed: YES  Medications reviewed: YES    Medications: Medication refills not needed today.  Pharmacy name entered into [employer]:    [facility] - [place], [place] - [address] [facility] - [place], [place] - [address]  [facility] - [place], [place] - [address] [facility] - [place], [place] - [address] [facility] - [place], [place] [address]    Clinical concerns: no new concerns.  Pt received flu shot elsewhere. See Immunizations     6 minutes for nursing intake (face to face time)     Maricel Wright CMA                "

## 2018-05-17 NOTE — TELEPHONE ENCOUNTER
Routing refill request to provider for review/approval because:  Labs out of range  Leyla Thompson RN- Triage [employer]

## 2018-05-17 NOTE — PROGRESS NOTES
HEMATOLOGY CLINIC VISIT    Raymon Ace is a 72-year-old male who returns today for followup of chronic ITP and a history of unprovoked pulmonary embolism for which he is on long-term anticoagulation.  Details of his history can be found in my previous note from 09/28/2017.      Raymon and his wife recently returned from [place] where they spend the winter.  Since we last saw him in January, he has continued to have increasing back pain.  This has been a longstanding issue and he has had previous surgeries.  He has an appointment with his surgeon again in June.  It sounds as though the pain is really limiting him.  He also has known osteoarthritis in the right shoulder which is problematic.  In addition, he has persistent fatigue which has been an issue over at least a few months.      When I last saw him in January, we noticed that his hemoglobin had been drifting down noticeably over the previous several months.  Given a previous history of upper GI bleeding related to gastric polyp and increased use of NSAIDs for shoulder pain, we urged him to have an upper endoscopy before traveling to [place] for the winter.  This was performed in early 02/2018.  There was no sign of active or recent bleeding, but he was found to have evidence of portal hypertensive gastropathy.  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  With these findings and the suspicion of underlying liver disease, he was referred to Hepatology and he does have an appointment there tomorrow.      He has not had any labs checked over the last 4 months.  He denies any new bleeding symptoms.  He does have some bruising on his extensor forearms which is not new or worsened.  This has been present since he started anticoagulation for the unprovoked venous thromboembolism.  He remains on rivaroxaban 20 mg daily without any other issues.  He also remains on Promacta 75 mg daily for his chronic ITP.  This has been holding his platelet count stable at around  100,000.        PHYSICAL EXAMINATION:  He looks okay.  He is in no acute distress, although he does appear a bit tired.  His blood pressure today is 98/58 which is lower than his usual baseline.  Pulse is 68 and regular.  Respirations are unlabored.  O2 sat is 97% on room air.  He was 196 pounds, which is within his baseline.  He is not pale or jaundiced.  There is no scleral icterus.  He has a few scattered bruises on his upper extremities.  No other obvious bruises and no petechiae were noted.      LABORATORY:  Serum electrolytes are normal.  Creatinine is 1.87.  This is increased from 1.45 back in January and increased from normal in the previous year.  LFTs are normal.      CBC shows a white count of 7.0 with a normal differential.  Hemoglobin is 9.7, which is decreased about 1 gram over the last 4 months.  Platelet count remains around his baseline at 106,000.  His MCV is 82.  This has been slowly decreasing over the last 18 months.      INR is 2.78.  Note that he is on rivaroxaban, which does elevate the INR to a degree that is not directly correlated with anticoagulant effect.  I assume this lab was ordered as part of his Hepatology Clinic appointment tomorrow.  Note that this degree of elevation in the INR is higher than we typically expect with rivaroxaban therapy.        ASSESSMENT AND PLAN:     1.  Chronic immune thrombocytopenic purpura.  Overall, Raymon is doing well on Promacta with a platelet count that remains stable in the 100,000 range over the last couple of years.  We will make no change in that part of his care plan.     2.  History of unprovoked pulmonary embolism in 04/2017.  He needs to remain on long-term anticoagulation given the unprovoked nature of his episode of venous thromboembolism.  He has done well on rivaroxaban, although now that his kidney function has changed, we are going to decrease the dose from 20 mg to 10 mg.  I explained to him that this is a prophylactic dose and recent  data and ongoing studies would suggest that this is likely sufficient to protect him from ongoing clotting.  In addition, we have to be mindful of his kidney function as rivaroxaban is cleared in part through the kidneys.  Please see further discussion below.  I did remind him to be on the lookout for any new or increased bleeding symptoms and report them promptly.     3.  Anemia.  His hemoglobin is down about a gram over the last 4 months.  He denies any obvious bleeding symptoms.  Although his MCV is normal, it is clear that it is drifting down over the last year and a half.  Thus, we added on an iron panel to today's labs.  This may warrant further investigation depending on those results.  Erythropoietin deficiency is another consideration given the change in his renal function over the last several months.     4.  Renal insufficiency.  This appears to be a new and worsening finding over the last few months.  Because of this and because of his relative hypotension, I told him to decrease his lisinopril from 40 mg to 20 mg.  I also urged him to contact his primary physician, Dr. Rosanne Cherry, and request an appointment in the next 7-10 days to further evaluate this issue.  I will also send her an in-basket message through Epic to alert her to these issues.      We will plan to see Raymon back in about 2 months.  He will call in the meantime with any new questions or concerns.  At the present time, his liver and kidney issues are needing further evaluation, and those appointments are scheduled with the appropriate providers.       Waylon Novoa MD  Associate Professor of Medicine  Division of Hematology, Oncology, and Transplantation  Director, [facility]

## 2018-05-17 NOTE — LETTER
5/17/2018       RE: Raymon Ace  [address] [facility] [address]     Dear Colleague,    Thank you for referring your patient, Raymon Ace, to the [facility]. Please see a copy of my visit note below.      HEMATOLOGY CLINIC VISIT    Raymon Ace is a 72-year-old male who returns today for followup of chronic ITP and a history of unprovoked pulmonary embolism for which he is on long-term anticoagulation.  Details of his history can be found in my previous note from 09/28/2017.      Raymon and his wife recently returned from [place] where they spend the winter.  Since we last saw him in January, he has continued to have increasing back pain.  This has been a longstanding issue and he has had previous surgeries.  He has an appointment with his surgeon again in June.  It sounds as though the pain is really limiting him.  He also has known osteoarthritis in the right shoulder which is problematic.  In addition, he has persistent fatigue which has been an issue over at least a few months.      When I last saw him in January, we noticed that his hemoglobin had been drifting down noticeably over the previous several months.  Given a previous history of upper GI bleeding related to gastric polyp and increased use of NSAIDs for shoulder pain, we urged him to have an upper endoscopy before traveling to [place] for the winter.  This was performed in early 02/2018.  There was no sign of active or recent bleeding, but he was found to have evidence of portal hypertensive gastropathy.  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  With these findings and the suspicion of underlying liver disease, he was referred to Hepatology and he does have an appointment there tomorrow.      He has not had any labs checked over the last 4 months.  He denies any new bleeding symptoms.  He does have some bruising on his extensor forearms which is not new or worsened.  This has been present since he started  anticoagulation for the unprovoked venous thromboembolism.  He remains on rivaroxaban 20 mg daily without any other issues.  He also remains on Promacta 75 mg daily for his chronic ITP.  This has been holding his platelet count stable at around 100,000.        PHYSICAL EXAMINATION:  He looks okay.  He is in no acute distress, although he does appear a bit tired.  His blood pressure today is 98/58 which is lower than his usual baseline.  Pulse is 68 and regular.  Respirations are unlabored.  O2 sat is 97% on room air.  He was 196 pounds, which is within his baseline.  He is not pale or jaundiced.  There is no scleral icterus.  He has a few scattered bruises on his upper extremities.  No other obvious bruises and no petechiae were noted.      LABORATORY:  Serum electrolytes are normal.  Creatinine is 1.87.  This is increased from 1.45 back in January and increased from normal in the previous year.  LFTs are normal.      CBC shows a white count of 7.0 with a normal differential.  Hemoglobin is 9.7, which is decreased about 1 gram over the last 4 months.  Platelet count remains around his baseline at 106,000.  His MCV is 82.  This has been slowly decreasing over the last 18 months.      INR is 2.78.  Note that he is on rivaroxaban, which does elevate the INR to a degree that is not directly correlated with anticoagulant effect.  I assume this lab was ordered as part of his Hepatology Clinic appointment tomorrow.  Note that this degree of elevation in the INR is higher than we typically expect with rivaroxaban therapy.        ASSESSMENT AND PLAN:     1.  Chronic immune thrombocytopenic purpura.  Overall, Raymon is doing well on Promacta with a platelet count that remains stable in the 100,000 range over the last couple of years.  We will make no change in that part of his care plan.     2.  History of unprovoked pulmonary embolism in 04/2017.  He needs to remain on long-term anticoagulation given the unprovoked nature of  his episode of venous thromboembolism.  He has done well on rivaroxaban, although now that his kidney function has changed, we are going to decrease the dose from 20 mg to 10 mg.  I explained to him that this is a prophylactic dose and recent data and ongoing studies would suggest that this is likely sufficient to protect him from ongoing clotting.  In addition, we have to be mindful of his kidney function as rivaroxaban is cleared in part through the kidneys.  Please see further discussion below.  I did remind him to be on the lookout for any new or increased bleeding symptoms and report them promptly.     3.  Anemia.  His hemoglobin is down about a gram over the last 4 months.  He denies any obvious bleeding symptoms.  Although his MCV is normal, it is clear that it is drifting down over the last year and a half.  Thus, we added on an iron panel to today's labs.  This may warrant further investigation depending on those results.  Erythropoietin deficiency is another consideration given the change in his renal function over the last several months.     4.  Renal insufficiency.  This appears to be a new and worsening finding over the last few months.  Because of this and because of his relative hypotension, I told him to decrease his lisinopril from 40 mg to 20 mg.  I also urged him to contact his primary physician, Dr. Rosanne Cherry, and request an appointment in the next 7-10 days to further evaluate this issue.  I will also send her an in-basket message through Epic to alert her to these issues.      We will plan to see Raymon back in about 2 months.  He will call in the meantime with any new questions or concerns.  At the present time, his liver and kidney issues are needing further evaluation, and those appointments are scheduled with the appropriate providers.       Waylon Novoa MD  Associate Professor of Medicine  Division of Hematology, Oncology, and Transplantation  Director, [facility]  [facility]ot[facility]              Again, thank you for allowing me to participate in the care of your patient.      Sincerely,    Waylon Novoa MD

## 2018-05-17 NOTE — LETTER
5/17/2018      RE: Raymon Ace  [address] [facility] [address]         HEMATOLOGY CLINIC VISIT    Raymon Ace is a 72-year-old male who returns today for followup of chronic ITP and a history of unprovoked pulmonary embolism for which he is on long-term anticoagulation.  Details of his history can be found in my previous note from 09/28/2017.      Raymon and his wife recently returned from [place] where they spend the winter.  Since we last saw him in January, he has continued to have increasing back pain.  This has been a longstanding issue and he has had previous surgeries.  He has an appointment with his surgeon again in June.  It sounds as though the pain is really limiting him.  He also has known osteoarthritis in the right shoulder which is problematic.  In addition, he has persistent fatigue which has been an issue over at least a few months.      When I last saw him in January, we noticed that his hemoglobin had been drifting down noticeably over the previous several months.  Given a previous history of upper GI bleeding related to gastric polyp and increased use of NSAIDs for shoulder pain, we urged him to have an upper endoscopy before traveling to [place] for the winter.  This was performed in early 02/2018.  There was no sign of active or recent bleeding, but he was found to have evidence of portal hypertensive gastropathy.  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  With these findings and the suspicion of underlying liver disease, he was referred to Hepatology and he does have an appointment there tomorrow.      He has not had any labs checked over the last 4 months.  He denies any new bleeding symptoms.  He does have some bruising on his extensor forearms which is not new or worsened.  This has been present since he started anticoagulation for the unprovoked venous thromboembolism.  He remains on rivaroxaban 20 mg daily without any other issues.  He also remains on Promacta 75 mg  daily for his chronic ITP.  This has been holding his platelet count stable at around 100,000.        PHYSICAL EXAMINATION:  He looks okay.  He is in no acute distress, although he does appear a bit tired.  His blood pressure today is 98/58 which is lower than his usual baseline.  Pulse is 68 and regular.  Respirations are unlabored.  O2 sat is 97% on room air.  He was 196 pounds, which is within his baseline.  He is not pale or jaundiced.  There is no scleral icterus.  He has a few scattered bruises on his upper extremities.  No other obvious bruises and no petechiae were noted.      LABORATORY:  Serum electrolytes are normal.  Creatinine is 1.87.  This is increased from 1.45 back in January and increased from normal in the previous year.  LFTs are normal.      CBC shows a white count of 7.0 with a normal differential.  Hemoglobin is 9.7, which is decreased about 1 gram over the last 4 months.  Platelet count remains around his baseline at 106,000.  His MCV is 82.  This has been slowly decreasing over the last 18 months.      INR is 2.78.  Note that he is on rivaroxaban, which does elevate the INR to a degree that is not directly correlated with anticoagulant effect.  I assume this lab was ordered as part of his Hepatology Clinic appointment tomorrow.  Note that this degree of elevation in the INR is higher than we typically expect with rivaroxaban therapy.        ASSESSMENT AND PLAN:     1.  Chronic immune thrombocytopenic purpura.  Overall, Raymon is doing well on Promacta with a platelet count that remains stable in the 100,000 range over the last couple of years.  We will make no change in that part of his care plan.     2.  History of unprovoked pulmonary embolism in 04/2017.  He needs to remain on long-term anticoagulation given the unprovoked nature of his episode of venous thromboembolism.  He has done well on rivaroxaban, although now that his kidney function has changed, we are going to decrease the dose  from 20 mg to 10 mg.  I explained to him that this is a prophylactic dose and recent data and ongoing studies would suggest that this is likely sufficient to protect him from ongoing clotting.  In addition, we have to be mindful of his kidney function as rivaroxaban is cleared in part through the kidneys.  Please see further discussion below.  I did remind him to be on the lookout for any new or increased bleeding symptoms and report them promptly.     3.  Anemia.  His hemoglobin is down about a gram over the last 4 months.  He denies any obvious bleeding symptoms.  Although his MCV is normal, it is clear that it is drifting down over the last year and a half.  Thus, we added on an iron panel to today's labs.  This may warrant further investigation depending on those results.  Erythropoietin deficiency is another consideration given the change in his renal function over the last several months.     4.  Renal insufficiency.  This appears to be a new and worsening finding over the last few months.  Because of this and because of his relative hypotension, I told him to decrease his lisinopril from 40 mg to 20 mg.  I also urged him to contact his primary physician, Dr. Rosanne Cherry, and request an appointment in the next 7-10 days to further evaluate this issue.  I will also send her an in-basket message through Epic to alert her to these issues.      We will plan to see Raymon back in about 2 months.  He will call in the meantime with any new questions or concerns.  At the present time, his liver and kidney issues are needing further evaluation, and those appointments are scheduled with the appropriate providers.       Waylon Novoa MD  Associate Professor of Medicine  Division of Hematology, Oncology, and Transplantation  Director, [facility]

## 2018-05-17 NOTE — MR AVS SNAPSHOT
After Visit Summary   5/17/2018    Raymon Ace    MRN: [id number]           Patient Information     Date Of Birth          1945        Visit Information        Provider Department      5/17/2018 11:45 AM Waylon Novoa MD [facility]        Today's Diagnoses     Personal history of venous thrombosis and embolism    -  1    Deep vein thrombosis (DVT) of lower extremity, unspecified chronicity, unspecified laterality, unspecified vein (H)           Follow-ups after your visit        Your next 10 appointments already scheduled     May 18, 2018  9:30 AM CDT   Lab with  LAB   [facility] ([facility])    [address]  1st Floor  [facility] [address]   [phone]            May 18, 2018 10:30 AM CDT   (Arrive by 10:15 AM)   New General Liver with Terri Crouch MD   [facility] ([facility])    [address]  [address]  [facility] [address] [phone]            Jun 28, 2018 10:45 AM CDT   [facility] Draw with  [facility] LAB DRAW   [facility] Draw ([facility])    [address]  [address]  [facility] [address] [phone]            Jun 28, 2018 11:15 AM CDT   (Arrive by 11:00 AM)   Return Visit with Waylon Novoa MD   [facility] ([facility])    [address]  [address]  [facility] [id number] [phone]              Who to contact     If you have questions or need follow up information about today's clinic visit or your schedule please contact [facility] directly at [phone].  Normal or non-critical lab and imaging results will be communicated to you by MyChart, letter or phone within 4 business days after the clinic has received the results. If you do not hear from us within 7 days, please contact the clinic through MyChart or phone. If  "you have a critical or abnormal lab result, we will notify you by phone as soon as possible.  Submit refill requests through [employer] or call your pharmacy and they will forward the refill request to us. Please allow 3 business days for your refill to be completed.          Additional Information About Your Visit        [employer] Information     [employer] gives you secure access to your electronic health record. If you see a primary care provider, you can also send messages to your care team and make appointments. If you have questions, please call your primary care clinic.  If you do not have a primary care provider, please call [phone] and they will assist you.        Care EveryWhere ID     This is your Care EveryWhere ID. This could be used by other organizations to access your [facility] medical records  [id number]        Your Vitals Were     Pulse Temperature Respirations Height Pulse Oximetry BMI (Body Mass Index)    68 97.6  F (36.4  C) (Tympanic) 18 1.689 m (5' 6.5\") 97% 31.17 kg/m2       Blood Pressure from Last 3 Encounters:   05/17/18 98/58   02/08/18 127/69   01/23/18 (!) 160/93    Weight from Last 3 Encounters:   05/17/18 88.9 kg (196 lb)   02/08/18 83.5 kg (184 lb)   01/23/18 83.7 kg (184 lb 9.6 oz)              Today, you had the following     No orders found for display         Today's Medication Changes          These changes are accurate as of 5/17/18 12:41 PM.  If you have any questions, ask your nurse or doctor.               These medicines have changed or have updated prescriptions.        Dose/Directions    allopurinol 300 MG tablet   Commonly known as:  ZYLOPRIM   This may have changed:  See the new instructions.   Used for:  Gout involving toe, unspecified cause, unspecified chronicity, unspecified laterality        TAKE ONE TABLET BY MOUTH ONCE DAILY *NEED  TO  BE  SEEN  FOR  MORE  REFILLS   Quantity:  90 tablet   Refills:  3       amLODIPine 5 MG tablet   Commonly known as:  NORVASC   This " may have changed:  See the new instructions.   Used for:  Benign essential hypertension        TAKE ONE TABLET BY MOUTH ONCE DAILY   Quantity:  90 tablet   Refills:  3       cloNIDine 0.3 MG tablet   Commonly known as:  CATAPRES   This may have changed:  See the new instructions.   Used for:  Benign essential hypertension        TAKE ONE TABLET BY MOUTH TWICE DAILY. NEEDS TO BE SEEN FOR MORE.   Quantity:  180 tablet   Refills:  3       * eltrombopag 75 MG tablet   Commonly known as:  PROMACTA   This may have changed:    - when to take this  - additional instructions   Used for:  Idiopathic thrombocytopenic purpura (H)        Dose:  75 mg   Take 1 tablet (75 mg) by mouth daily Administer on an empty stomach, 1 hour before or 2 hours after a meal.   Quantity:  30 tablet   Refills:  5       * PROMACTA 50 MG tablet   This may have changed:  Another medication with the same name was changed. Make sure you understand how and when to take each.   Generic drug:  eltrombopag        Refills:  0       gabapentin 300 MG capsule   Commonly known as:  NEURONTIN   This may have changed:  See the new instructions.   Used for:  Chronic pain        TAKE ONE CAPSULE BY MOUTH THREE TIMES DAILY   Quantity:  270 capsule   Refills:  0       omeprazole 40 MG capsule   Commonly known as:  priLOSEC   This may have changed:  See the new instructions.   Used for:  Esophageal reflux        TAKE ONE CAPSULE BY MOUTH ONCE DAILY   Quantity:  90 capsule   Refills:  2       rivaroxaban ANTICOAGULANT 10 MG Tabs tablet   Commonly known as:  XARELTO   This may have changed:  See the new instructions.   Used for:  Personal history of venous thrombosis and embolism   Changed by:  Waylon Novoa MD        Dose:  10 mg   Take 1 tablet (10 mg) by mouth daily (with dinner)   Quantity:  30 tablet   Refills:  3       spironolactone 25 MG tablet   Commonly known as:  ALDACTONE   This may have changed:  See the new instructions.   Used for:  Benign  essential hypertension        TAKE ONE TABLET BY MOUTH ONCE DAILY   Quantity:  90 tablet   Refills:  2       * Notice:  This list has 2 medication(s) that are the same as other medications prescribed for you. Read the directions carefully, and ask your doctor or other care provider to review them with you.         Where to get your medicines      These medications were sent to [id number] - [place], [place] - [address]  [address] , [facility] [address]     Phone:  [phone]     rivaroxaban ANTICOAGULANT 10 MG Tabs tablet                Primary Care Provider Office Phone # Fax #    Rosanne Kimberly Cherry [phone]       [address]  [facility] [address]        Equal Access to Services     NENA JAVIER : Hadii chato foleyo Soandria, waaxda luqadaha, qaybta kaalmada adeegyada, gonzales go. So [facility] [phone].    ATENCIÓN: Si habla español, tiene a wang disposición servicios gratuitos de asistencia lingüística. [facility] [phone].    We comply with applicable federal civil rights laws and [place] laws. We do not discriminate on the basis of race, color, national origin, age, disability, sex, sexual orientation, or gender identity.            Thank you!     Thank you for choosing [facility]  for your care. Our goal is always to provide you with excellent care. Hearing back from our patients is one way we can continue to improve our services. Please take a few minutes to complete the written survey that you may receive in the mail after your visit with us. Thank you!             Your Updated Medication List - Protect others around you: Learn how to safely use, store and throw away your medicines at www.disposemymeds.org.          This list is accurate as of 5/17/18 12:41 PM.  Always use your most recent med list.                   Brand Name Dispense Instructions for use Diagnosis    allopurinol 300 MG tablet     ZYLOPRIM    90 tablet    TAKE ONE TABLET BY MOUTH ONCE DAILY *NEED  TO  BE  SEEN  FOR  MORE  REFILLS    Gout involving toe, unspecified cause, unspecified chronicity, unspecified laterality       amLODIPine 5 MG tablet    NORVASC    90 tablet    TAKE ONE TABLET BY MOUTH ONCE DAILY    Benign essential hypertension       cloNIDine 0.3 MG tablet    CATAPRES    180 tablet    TAKE ONE TABLET BY MOUTH TWICE DAILY. NEEDS TO BE SEEN FOR MORE.    Benign essential hypertension       * eltrombopag 75 MG tablet    PROMACTA    30 tablet    Take 1 tablet (75 mg) by mouth daily Administer on an empty stomach, 1 hour before or 2 hours after a meal.    Idiopathic thrombocytopenic purpura (H)       * PROMACTA 50 MG tablet   Generic drug:  eltrombopag           folic acid 1 MG tablet    FOLVITE    90 tablet    TAKE ONE TABLET BY MOUTH ONCE DAILY    Alcohol abuse       gabapentin 300 MG capsule    NEURONTIN    270 capsule    TAKE ONE CAPSULE BY MOUTH THREE TIMES DAILY    Chronic pain       lisinopril 40 MG tablet    PRINIVIL/ZESTRIL    90 tablet    TAKE ONE TABLET BY MOUTH ONCE DAILY    Essential hypertension       omeprazole 40 MG capsule    priLOSEC    90 capsule    TAKE ONE CAPSULE BY MOUTH ONCE DAILY    Esophageal reflux       rivaroxaban ANTICOAGULANT 10 MG Tabs tablet    XARELTO    30 tablet    Take 1 tablet (10 mg) by mouth daily (with dinner)    Personal history of venous thrombosis and embolism       spironolactone 25 MG tablet    ALDACTONE    90 tablet    TAKE ONE TABLET BY MOUTH ONCE DAILY    Benign essential hypertension       * Notice:  This list has 2 medication(s) that are the same as other medications prescribed for you. Read the directions carefully, and ask your doctor or other care provider to review them with you.

## 2018-05-17 NOTE — NURSING NOTE
Chief Complaint   Patient presents with     Blood Draw     Labs only     Vitals done, labs drawn by venipuncture.  See doc flow sheets for details.  Minoo Remy CMA

## 2018-05-18 ENCOUNTER — APPOINTMENT (OUTPATIENT)
Dept: LAB | Facility: CLINIC | Age: 73
End: 2018-05-18
Payer: COMMERCIAL

## 2018-05-18 ENCOUNTER — OFFICE VISIT (OUTPATIENT)
Dept: GASTROENTEROLOGY | Facility: CLINIC | Age: 73
End: 2018-05-18
Attending: INTERNAL MEDICINE
Payer: COMMERCIAL

## 2018-05-18 VITALS
BODY MASS INDEX: 30.42 KG/M2 | HEIGHT: 67 IN | RESPIRATION RATE: 18 BRPM | WEIGHT: 193.8 LBS | HEART RATE: 64 BPM | TEMPERATURE: 97.9 F | SYSTOLIC BLOOD PRESSURE: 102 MMHG | DIASTOLIC BLOOD PRESSURE: 60 MMHG | OXYGEN SATURATION: 96 %

## 2018-05-18 DIAGNOSIS — K70.30 ALCOHOLIC CIRRHOSIS OF LIVER WITHOUT ASCITES (H): Primary | ICD-10-CM

## 2018-05-18 PROCEDURE — G0463 HOSPITAL OUTPT CLINIC VISIT: HCPCS | Mod: ZF

## 2018-05-18 ASSESSMENT — PAIN SCALES - GENERAL: PAINLEVEL: NO PAIN (0)

## 2018-05-18 NOTE — MR AVS SNAPSHOT
After Visit Summary   5/18/2018    Raymon Ace    MRN: [id number]           Patient Information     Date Of Birth          1945        Visit Information        Provider Department      5/18/2018 10:30 AM Terri Crouch MD [facility] Hepat[facility]         Follow-ups after your visit        Your next 10 appointments already scheduled     May 25, 2018 10:30 AM CDT   US ABDOMEN/PELVIS DUPLEX COMPLETE with UCUS2   [facility] [place] [facility] ([facility])    [address] [place] [id number] [phone]           Please bring a list of your medicines (including vitamins, minerals and over-the-counter drugs). Also, tell your doctor about any allergies you may have. Wear comfortable clothes and leave your valuables at home.  Adults: No eating or drinking for 8 hours before the exam. You may take medicine with a small sip of water.  Children: - Children 6+ years: No food or drink for 6 hours before exam. - Children 1-5 years: No food or drink for 4 hours before exam. - Infants, breast-fed: may have breast milk up to 2 hours before exam. - Infants, formula: may have bottle until 4 hours before exam.  Please call the Imaging Department at your exam site with any questions.            Jun 28, 2018 10:45 AM CDT   [facility] Lab Draw with  [employer] LAB DRAW   [facility] ([facility])    [address] [place] [address]  [address] [place] [id number]   [phone]            Jun 28, 2018 11:15 AM CDT   (Arrive by 11:00 AM)   Return Visit with Waylon Novoa MD   [facility] ([facility]    [address] [place] [address]  [address] [place] [id number]   [phone]            Oct 19, 2018  9:30 AM CDT   Lab with  LAB   [facility] ([facility])    [address] [place] [id number]   [phone]            Oct 19, 2018  10:30 AM CDT   (Arrive by 10:15 AM)   Return General Liver with Terri Crouch MD   [facility]at[facility] ([facility])    [address]  [address]  [facility] [address] [phone]              Future tests that were ordered for you today     Open Future Orders        Priority Expected Expires Ordered    *CBC with platelets differential Routine 6/1/2018 7/17/2018 5/17/2018    Reticulocyte count Routine 6/1/2018 7/17/2018 5/17/2018    Comprehensive metabolic panel Routine 6/1/2018 7/17/2018 5/17/2018    Iron and iron binding capacity Routine 6/1/2018 7/17/2018 5/17/2018    Ferritin Routine 6/1/2018 7/17/2018 5/17/2018            Who to contact     If you have questions or need follow up information about today's clinic visit or your schedule please contact [facility]PAT[facility] directly at [phone].  Normal or non-critical lab and imaging results will be communicated to you by "[employer]hart, letter or phone within 4 business days after the clinic has received the results. If you do not hear from us within 7 days, please contact the clinic through [employer] or phone. If you have a critical or abnormal lab result, we will notify you by phone as soon as possible.  Submit refill requests through [employer] or call your pharmacy and they will forward the refill request to us. Please allow 3 business days for your refill to be completed.          Additional Information About Your Visit        "[employer][email or web address] Information     [employer] gives you secure access to your electronic health record. If you see a primary care provider, you can also send messages to your care team and make appointments. If you have questions, please call your primary care clinic.  If you do not have a primary care provider, please call [phone] and they will assist you.        Care EveryWhere ID     This is your Care EveryWhere ID. This could be used by other organizations to access your [facility]  "records  [id number]        Your Vitals Were     Pulse Temperature Respirations Height Pulse Oximetry BMI (Body Mass Index)    64 97.9  F (36.6  C) (Oral) 18 1.689 m (5' 6.5\") 96% 30.81 kg/m2       Blood Pressure from Last 3 Encounters:   05/18/18 102/60   05/17/18 98/58   02/08/18 127/69    Weight from Last 3 Encounters:   05/18/18 87.9 kg (193 lb 12.8 oz)   05/17/18 88.9 kg (196 lb)   02/08/18 83.5 kg (184 lb)              Today, you had the following     No orders found for display         Today's Medication Changes          These changes are accurate as of 5/18/18 11:13 AM.  If you have any questions, ask your nurse or doctor.               These medicines have changed or have updated prescriptions.        Dose/Directions    allopurinol 300 MG tablet   Commonly known as:  ZYLOPRIM   This may have changed:  See the new instructions.   Used for:  Gout involving toe, unspecified cause, unspecified chronicity, unspecified laterality        TAKE ONE TABLET BY MOUTH ONCE DAILY *NEED  TO  BE  SEEN  FOR  MORE  REFILLS   Quantity:  90 tablet   Refills:  3       amLODIPine 5 MG tablet   Commonly known as:  NORVASC   This may have changed:  See the new instructions.   Used for:  Benign essential hypertension        TAKE ONE TABLET BY MOUTH ONCE DAILY   Quantity:  90 tablet   Refills:  3       eltrombopag 75 MG tablet   Commonly known as:  PROMACTA   This may have changed:    - when to take this  - additional instructions  - Another medication with the same name was removed. Continue taking this medication, and follow the directions you see here.   Used for:  Idiopathic thrombocytopenic purpura (H)        Dose:  75 mg   Take 1 tablet (75 mg) by mouth daily Administer on an empty stomach, 1 hour before or 2 hours after a meal.   Quantity:  30 tablet   Refills:  5       gabapentin 300 MG capsule   Commonly known as:  NEURONTIN   This may have changed:  See the new instructions.   Used for:  Chronic pain        TAKE ONE " CAPSULE BY MOUTH THREE TIMES DAILY   Quantity:  270 capsule   Refills:  0       LISINOPRIL PO   This may have changed:  Another medication with the same name was removed. Continue taking this medication, and follow the directions you see here.   Changed by:  Terri Crouch MD        Dose:  20 mg   Take 20 mg by mouth daily   Refills:  0       OMEPRAZOLE PO   This may have changed:  Another medication with the same name was removed. Continue taking this medication, and follow the directions you see here.   Changed by:  Terri Crouch MD        Dose:  40 mg   Take 40 mg by mouth every morning   Refills:  0       spironolactone 25 MG tablet   Commonly known as:  ALDACTONE   This may have changed:  See the new instructions.   Used for:  Benign essential hypertension        TAKE ONE TABLET BY MOUTH ONCE DAILY   Quantity:  90 tablet   Refills:  2         Stop taking these medicines if you haven't already. Please contact your care team if you have questions.     cloNIDine 0.3 MG tablet   Commonly known as:  CATAPRES   Stopped by:  Terri Crouch MD                    Primary Care Provider Office Phone # Fax #    Rosanne Kimberly Cherry [phone] [address] [facility] [address]        Equal Access to Services     [facility]: Hadii chato ku hadasho Soomaali, waaxda luqadaha, qaybta kaalmada adeegyada, gonzales go. So [facility] [phone].    ATENCIÓN: Si habla español, tiene a wang disposición servicios gratuitos de asistencia lingüística. [facility] [phone].    We comply with applicable federal civil rights laws and [place] laws. We do not discriminate on the basis of race, color, national origin, age, disability, sex, sexual orientation, or gender identity.            Thank you!     Thank you for choosing [facility]PAT[facility]  for your care. Our goal is always to provide you with excellent care. Hearing back from  our patients is one way we can continue to improve our services. Please take a few minutes to complete the written survey that you may receive in the mail after your visit with us. Thank you!             Your Updated Medication List - Protect others around you: Learn how to safely use, store and throw away your medicines at www.disposemymeds.org.          This list is accurate as of 5/18/18 11:13 AM.  Always use your most recent med list.                   Brand Name Dispense Instructions for use Diagnosis    allopurinol 300 MG tablet    ZYLOPRIM    90 tablet    TAKE ONE TABLET BY MOUTH ONCE DAILY *NEED  TO  BE  SEEN  FOR  MORE  REFILLS    Gout involving toe, unspecified cause, unspecified chronicity, unspecified laterality       amLODIPine 5 MG tablet    NORVASC    90 tablet    TAKE ONE TABLET BY MOUTH ONCE DAILY    Benign essential hypertension       eltrombopag 75 MG tablet    PROMACTA    30 tablet    Take 1 tablet (75 mg) by mouth daily Administer on an empty stomach, 1 hour before or 2 hours after a meal.    Idiopathic thrombocytopenic purpura (H)       folic acid 1 MG tablet    FOLVITE    90 tablet    TAKE ONE TABLET BY MOUTH ONCE DAILY    Alcohol abuse       gabapentin 300 MG capsule    NEURONTIN    270 capsule    TAKE ONE CAPSULE BY MOUTH THREE TIMES DAILY    Chronic pain       LISINOPRIL PO      Take 20 mg by mouth daily        OMEPRAZOLE PO      Take 40 mg by mouth every morning        rivaroxaban ANTICOAGULANT 10 MG Tabs tablet    XARELTO    30 tablet    Take 1 tablet (10 mg) by mouth daily (with dinner)    Personal history of venous thrombosis and embolism, Deep vein thrombosis (DVT) of lower extremity, unspecified chronicity, unspecified laterality, unspecified vein (H), Anemia, unspecified type, Idiopathic thrombocytopenic purpura (H)       spironolactone 25 MG tablet    ALDACTONE    90 tablet    TAKE ONE TABLET BY MOUTH ONCE DAILY    Benign essential hypertension

## 2018-05-18 NOTE — PROGRESS NOTES
"REASON FOR CONSULTATION: alcoholic liver disease  REFERRING PROVIDER: Dr. Waylon Novoa    A/P  Raymon Ace is a 72 year old male with alcoholic liver disease. He has portal hypertension with varices and  Low platelets. INR elevated in part due to anticoagulation, although not entirely.    Discussed cirrhosis and its sequelae. Discussed that he needs to quite alcohol and get help to do so. He did not appear to have much (if any) intention to seek sobriety. Discussed the health effects on his liver and cirrhosis. He had no questions other than \"would it work if I just cut down\". We discussed the impact of any alcohol on the liver as detrimental. Strongly recommended he seek counseling.    HCC screening: needs US. Ordered    Labs in 3-4 months  RTC 6 months.    This was a 40 minute visit, over 50% counseling and coordination of care.     Terri Crouch MD  Hepatology/Liver Transplant  Medical Director, Liver Transplantation  [facility]  Raymon Ace is a 72 year old male referred for alcoholic liver disease. Diagnosis was made when he had an EGD for SHANELLE and he had gastric varices and PHG. His wife is here with him today.    When I asked him how is he doing he said he said he is having shoulder and back pain and is seeing surgeons for it.    He still is drinking. He drinks every day at least 3-4 drinks per day. He drinks whiskey. He drives to the [facility] daily, has 3-4 drinks, drives home and has 1-3 drinks at home. He has given no thought to stopping. He doesn't have any idea why he should stop. He has been told in the past to stop drinking due to concerns for liver disease but he has not altered his pattern. He does not identify his drinking as a problem. He denies DUI. Never had CD eval/treatment. He has been a daily drinker for many many years. His wife tells me that there are friends at the [facility] who don't think he should be served alcohol. He was sober for about 9  months at one " point. He quit drinking because he had to have surgery.    Assessment of fibrosis. US fibrosis scan 2/12/18 showed F3-F4  Liver Function Studies -   Recent Labs   Lab Test  05/17/18   1118   PROTTOTAL  6.7*   ALBUMIN  3.3*   BILITOTAL  1.1   ALKPHOS  50   AST  30   ALT  27     INR 2.78 (AC noted)  Plt 106  Hgb 9.7    HCV neg  HBV neg    Iron panel ferritin 7       Past Medical History:   Diagnosis Date     Alcohol abuse since teens 01/15/2015    Still actively drinking     Alcoholic liver damage (H) 1/10/2014     Gastroesophageal reflux disease with esophagitis 12/6/2016     Gout involving toe, unspecified cause, unspecified chronicity, unspecified laterality 6/2/2016     Heart murmur     heart is positioned farther on left side then typical     Hyperlipidemia 12/17/2015     Hypertension      ITP (idiopathic thrombocytopenic purpura)      Obstructive sleep apnea     does not use CPAP  machine     Pulmonary embolism (H) large RLL w infarctio 4-17 4/18/2017     Past Medical History    Social History     Social History     Marital status:      Spouse name: N/A     Number of children: N/A     Years of education: N/A     Occupational History     Not on file.     Social History Main Topics     Smoking status: Former Smoker     Years: 28.00     Quit date: 9/26/1982     Smokeless tobacco: Never Used     Alcohol use 3.0 oz/week     0 Standard drinks or equivalent, 5 Shots of liquor per week      Comment: 4-5 drinks/day, alcohol use disorder     Drug use: No     Sexual activity: No     Other Topics Concern     Parent/Sibling W/ Cabg, Mi Or Angioplasty Before 65f 55m? Yes     Social History Narrative     Social History    Family History   Problem Relation Age of Onset     Unknown/Adopted Mother      Unknown/Adopted Father      HEART DISEASE Sister      DIABETES No family hx of      Coronary Artery Disease No family hx of      Hypertension No family hx of      Hyperlipidemia No family hx of      CEREBROVASCULAR DISEASE  "No family hx of      Breast Cancer No family hx of      Colon Cancer No family hx of      Prostate Cancer No family hx of      Other Cancer No family hx of      Depression No family hx of      Anxiety Disorder No family hx of      MENTAL ILLNESS No family hx of      Substance Abuse No family hx of      Anesthesia Reaction No family hx of      Asthma No family hx of      OSTEOPOROSIS No family hx of      Genetic Disorder No family hx of      Thyroid Disease No family hx of      Obesity No family hx of      Family History     ROS: 10 point ROS neg other than the symptoms noted above in the HPI.    /60 (Patient Position: Sitting)  Pulse 64  Temp 97.9  F (36.6  C) (Oral)  Resp 18  Ht 1.689 m (5' 6.5\")  Wt 87.9 kg (193 lb 12.8 oz)  SpO2 96%  BMI 30.81 kg/m2  .   Constitutional: alert and no distress.   Neck: Neck supple. No adenopathy. Thyroid symmetric, normal size  HEENT:Normocephalic. No masses, lesions, tenderness or abnormalities. No temporal muscle wasting ENT exam normal, no neck nodes or sinus tenderness. No oral lesions  Cardiovascular: negative, No lifts, heaves, or thrills. RRR. No murmurs, clicks gallops or rub  Respiratory: negative, Good diaphragmatic excursion. Lungs clear. No wheezes or rales  Gastrointestinal: Abdomen soft, non-tender. BS normal.  No masses, organomegaly  Skin: no suspicious lesions or rashes. No spider angiomata or palmar erythema. Nails normal.  Neurologic: Alert and oriented x3. No asterixis or tremor. Gait normal.   Psychiatric:  Appropriate, well groomed.  Hematologic/Lymphatic/Immunologic: Normal cervical and supraclavicular  lymph nodes      "

## 2018-05-18 NOTE — LETTER
"5/18/2018      RE: Raymon Ace  [address] [facility] [address]       REASON FOR CONSULTATION: alcoholic liver disease  REFERRING PROVIDER: Dr. Waylon Novoa    A/P  Raymon Ace is a 72 year old male with alcoholic liver disease. He has portal hypertension with varices and  Low platelets. INR elevated in part due to anticoagulation, although not entirely.    Discussed cirrhosis and its sequelae. Discussed that he needs to quite alcohol and get help to do so. He did not appear to have much (if any) intention to seek sobriety. Discussed the health effects on his liver and cirrhosis. He had no questions other than \"would it work if I just cut down\". We discussed the impact of any alcohol on the liver as detrimental. Strongly recommended he seek counseling.    HCC screening: needs US. Ordered    Labs in 3-4 months  RTC 6 months.    This was a 40 minute visit, over 50% counseling and coordination of care.     Terri Crouch MD  Hepatology/Liver Transplant  Medical Director, Liver Transplantation  [facility]  Subjective  Raymon Ace is a 72 year old male referred for alcoholic liver disease. Diagnosis was made when he had an EGD for SHANELLE and he had gastric varices and PHG. His wife is here with him today.    When I asked him how is he doing he said he said he is having shoulder and back pain and is seeing surgeons for it.    He still is drinking. He drinks every day at least 3-4 drinks per day. He drinks whiskey. He drives to the [facility] daily, has 3-4 drinks, drives home and has 1-3 drinks at home. He has given no thought to stopping. He doesn't have any idea why he should stop. He has been told in the past to stop drinking due to concerns for liver disease but he has not altered his pattern. He does not identify his drinking as a problem. He denies DUI. Never had CD eval/treatment. He has been a daily drinker for many many years. His wife tells me that there are friends at the [facility] who " don't think he should be served alcohol. He was sober for about 9  months at one point. He quit drinking because he had to have surgery.    Assessment of fibrosis. US fibrosis scan 2/12/18 showed F3-F4  Liver Function Studies -   Recent Labs   Lab Test  05/17/18   1118   PROTTOTAL  6.7*   ALBUMIN  3.3*   BILITOTAL  1.1   ALKPHOS  50   AST  30   ALT  27     INR 2.78 (AC noted)  Plt 106  Hgb 9.7    HCV neg  HBV neg    Iron panel ferritin 7       Past Medical History:   Diagnosis Date     Alcohol abuse since teens 01/15/2015    Still actively drinking     Alcoholic liver damage (H) 1/10/2014     Gastroesophageal reflux disease with esophagitis 12/6/2016     Gout involving toe, unspecified cause, unspecified chronicity, unspecified laterality 6/2/2016     Heart murmur     heart is positioned farther on left side then typical     Hyperlipidemia 12/17/2015     Hypertension      ITP (idiopathic thrombocytopenic purpura)      Obstructive sleep apnea     does not use CPAP  machine     Pulmonary embolism (H) large RLL w infarctio 4-17 4/18/2017     Past Medical History    Social History     Social History     Marital status:      Spouse name: N/A     Number of children: N/A     Years of education: N/A     Occupational History     Not on file.     Social History Main Topics     Smoking status: Former Smoker     Years: 28.00     Quit date: 9/26/1982     Smokeless tobacco: Never Used     Alcohol use 3.0 oz/week     0 Standard drinks or equivalent, 5 Shots of liquor per week      Comment: 4-5 drinks/day, alcohol use disorder     Drug use: No     Sexual activity: No     Other Topics Concern     Parent/Sibling W/ Cabg, Mi Or Angioplasty Before 65f 55m? Yes     Social History Narrative     Social History    Family History   Problem Relation Age of Onset     Unknown/Adopted Mother      Unknown/Adopted Father      HEART DISEASE Sister      DIABETES No family hx of      Coronary Artery Disease No family hx of      Hypertension  "No family hx of      Hyperlipidemia No family hx of      CEREBROVASCULAR DISEASE No family hx of      Breast Cancer No family hx of      Colon Cancer No family hx of      Prostate Cancer No family hx of      Other Cancer No family hx of      Depression No family hx of      Anxiety Disorder No family hx of      MENTAL ILLNESS No family hx of      Substance Abuse No family hx of      Anesthesia Reaction No family hx of      Asthma No family hx of      OSTEOPOROSIS No family hx of      Genetic Disorder No family hx of      Thyroid Disease No family hx of      Obesity No family hx of      Family History     ROS: 10 point ROS neg other than the symptoms noted above in the HPI.    /60 (Patient Position: Sitting)  Pulse 64  Temp 97.9  F (36.6  C) (Oral)  Resp 18  Ht 1.689 m (5' 6.5\")  Wt 87.9 kg (193 lb 12.8 oz)  SpO2 96%  BMI 30.81 kg/m2  .   Constitutional: alert and no distress.   Neck: Neck supple. No adenopathy. Thyroid symmetric, normal size  HEENT:Normocephalic. No masses, lesions, tenderness or abnormalities. No temporal muscle wasting ENT exam normal, no neck nodes or sinus tenderness. No oral lesions  Cardiovascular: negative, No lifts, heaves, or thrills. RRR. No murmurs, clicks gallops or rub  Respiratory: negative, Good diaphragmatic excursion. Lungs clear. No wheezes or rales  Gastrointestinal: Abdomen soft, non-tender. BS normal.  No masses, organomegaly  Skin: no suspicious lesions or rashes. No spider angiomata or palmar erythema. Nails normal.  Neurologic: Alert and oriented x3. No asterixis or tremor. Gait normal.   Psychiatric:  Appropriate, well groomed.  Hematologic/Lymphatic/Immunologic: Normal cervical and supraclavicular  lymph nodes        Terri Crouch MD      "

## 2018-05-20 DIAGNOSIS — I10 BENIGN ESSENTIAL HYPERTENSION: ICD-10-CM

## 2018-05-20 NOTE — TELEPHONE ENCOUNTER
"Requested Prescriptions   Pending Prescriptions Disp Refills     amLODIPine (NORVASC) 5 MG tablet [Pharmacy Med Name: AMLODIPINE 5MG TAB]  Last Written Prescription Date:  01/25/2017  Last Fill Quantity: 90,  # refills: 3   Last office visit: 1/4/2018 with prescribing provider:  GUANAKO KEBEDE    Future Office Visit:     90 tablet 3     Sig: TAKE ONE TABLET BY MOUTH ONCE DAILY    Calcium Channel Blockers Protocol  Failed    5/20/2018  6:30 AM       Failed - Normal serum creatinine on file in past 12 months    Recent Labs   Lab Test  05/17/18   1118   CR  1.87*            Passed - Blood pressure under 140/90 in past 12 months    BP Readings from Last 3 Encounters:   05/18/18 102/60   05/17/18 98/58   02/08/18 127/69                Passed - Recent (12 mo) or future (30 days) visit within the authorizing provider's specialty    Patient had office visit in the last 12 months or has a visit in the next 30 days with authorizing provider or within the authorizing provider's specialty.  See \"Patient Info\" tab in inbasket, or \"Choose Columns\" in Meds & Orders section of the refill encounter.           Passed - Patient is age 18 or older        cloNIDine (CATAPRES) 0.3 MG tablet [Pharmacy Med Name: CLONIDINE 0.3MG     TAB]  Last Written Prescription Date: 05/19/2014  Last Fill Quantity: 180,  # refills: 0   Last office visit: 1/4/2018 with prescribing provider:  GUANAKO KEBEDE    Future Office Visit:     180 tablet 3     Sig: TAKE ONE TABLET BY MOUTH TWICE DAILY .  NEEDS  TO  BE  SEEN  FOR  MORE    Central Acting Antiadrenergic Agents Failed    5/20/2018  6:30 AM       Failed - Normal serum creatinine on file within past 12 months    Recent Labs   Lab Test  05/17/18   1118   CR  1.87*            Passed - Blood pressure under 140/90 in past 12 months    BP Readings from Last 3 Encounters:   05/18/18 102/60   05/17/18 98/58   02/08/18 127/69                Passed - Patient is 6 years of age or older       Passed - Recent (12 mo) or " "future (30 days) visit within the authorizing provider's specialty    Patient had office visit in the last 12 months or has a visit in the next 30 days with authorizing provider or within the authorizing provider's specialty.  See \"Patient Info\" tab in inbasket, or \"Choose Columns\" in Meds & Orders section of the refill encounter.              "

## 2018-05-21 DIAGNOSIS — M10.9 GOUT INVOLVING TOE, UNSPECIFIED CAUSE, UNSPECIFIED CHRONICITY, UNSPECIFIED LATERALITY: ICD-10-CM

## 2018-05-21 NOTE — TELEPHONE ENCOUNTER
Routing refill request to provider for review/approval because:  1.  Labs out of range:  creatinine  2.  Catapres last filled in 2014

## 2018-05-22 RX ORDER — CLONIDINE HYDROCHLORIDE 0.3 MG/1
TABLET ORAL
Qty: 180 TABLET | Refills: 0 | Status: SHIPPED | OUTPATIENT
Start: 2018-05-22 | End: 2018-11-26

## 2018-05-22 RX ORDER — AMLODIPINE BESYLATE 5 MG/1
TABLET ORAL
Qty: 90 TABLET | Refills: 0 | Status: SHIPPED | OUTPATIENT
Start: 2018-05-22 | End: 2018-08-30

## 2018-05-22 NOTE — TELEPHONE ENCOUNTER
"Requested Prescriptions   Pending Prescriptions Disp Refills     allopurinol (ZYLOPRIM) 300 MG tablet [Pharmacy Med Name: ALLOPURINOL 300MG   TAB]  Last Written Prescription Date:  2/14/17  Last Fill Quantity: 90,  # refills: 3   Last office visit: 1/4/2018 with prescribing provider:  Dilip   Future Office Visit:     90 tablet 3     Sig: TAKE ONE TABLET BY MOUTH ONCE DAILY    Gout Agents Protocol Failed    5/21/2018  7:27 PM       Failed - Uric acid greater than or equal to 6 on file in past 12 months    Recent Labs   Lab Test  08/04/17   1134   URIC  2.7*     If level is 6mg/dL or greater, ok to refill one time and refer to provider.          Failed - Normal serum creatinine on file in the past 12 months    Recent Labs   Lab Test  05/17/18   1118   CR  1.87*            Passed - CBC on file in past 12 months    Recent Labs   Lab Test  05/17/18   1118   WBC  7.0   RBC  3.69*   HGB  9.7*   HCT  30.2*   PLT  106*            Passed - ALT on file in past 12 months    Recent Labs   Lab Test  05/17/18   1118   ALT  27            Passed - Recent (12 mo) or future (30 days) visit within the authorizing provider's specialty    Patient had office visit in the last 12 months or has a visit in the next 30 days with authorizing provider or within the authorizing provider's specialty.  See \"Patient Info\" tab in inbasket, or \"Choose Columns\" in Meds & Orders section of the refill encounter.           Passed - Patient is age 18 or older          "

## 2018-05-24 RX ORDER — ALLOPURINOL 300 MG/1
TABLET ORAL
Qty: 30 TABLET | Refills: 0 | Status: SHIPPED | OUTPATIENT
Start: 2018-05-24 | End: 2018-06-28

## 2018-05-24 NOTE — TELEPHONE ENCOUNTER
Routing refill request to provider for review/approval because:  Labs not current:  URIC ACID and Creatinine out of range

## 2018-05-25 ENCOUNTER — RADIANT APPOINTMENT (OUTPATIENT)
Dept: ULTRASOUND IMAGING | Facility: CLINIC | Age: 73
End: 2018-05-25
Attending: INTERNAL MEDICINE
Payer: COMMERCIAL

## 2018-05-25 DIAGNOSIS — K76.6 PORTAL HYPERTENSION (H): ICD-10-CM

## 2018-05-25 DIAGNOSIS — K76.0 FATTY LIVER: ICD-10-CM

## 2018-05-25 DIAGNOSIS — Z78.9 ALCOHOL CONSUPTION OF MORE THAN TWO DRINKS PER DAY: ICD-10-CM

## 2018-05-25 DIAGNOSIS — K70.9 ALCOHOLIC LIVER DAMAGE (H): ICD-10-CM

## 2018-06-12 ENCOUNTER — TRANSFERRED RECORDS (OUTPATIENT)
Dept: HEALTH INFORMATION MANAGEMENT | Facility: CLINIC | Age: 73
End: 2018-06-12

## 2018-06-13 DIAGNOSIS — D69.3 IDIOPATHIC THROMBOCYTOPENIC PURPURA (H): ICD-10-CM

## 2018-06-13 RX ORDER — ELTROMBOPAG OLAMINE 75 MG/1
TABLET, FILM COATED ORAL
Qty: 30 TABLET | Refills: 5 | Status: SHIPPED | OUTPATIENT
Start: 2018-06-13 | End: 2018-12-14

## 2018-06-15 ENCOUNTER — THERAPY VISIT (OUTPATIENT)
Dept: PHYSICAL THERAPY | Facility: CLINIC | Age: 73
End: 2018-06-15
Payer: COMMERCIAL

## 2018-06-15 DIAGNOSIS — G89.29 CHRONIC RIGHT SHOULDER PAIN: Primary | ICD-10-CM

## 2018-06-15 DIAGNOSIS — M25.511 CHRONIC RIGHT SHOULDER PAIN: Primary | ICD-10-CM

## 2018-06-15 PROCEDURE — G8987 SELF CARE CURRENT STATUS: HCPCS | Mod: GP | Performed by: PHYSICAL THERAPIST

## 2018-06-15 PROCEDURE — 97161 PT EVAL LOW COMPLEX 20 MIN: CPT | Mod: GP | Performed by: PHYSICAL THERAPIST

## 2018-06-15 PROCEDURE — 97110 THERAPEUTIC EXERCISES: CPT | Mod: GP | Performed by: PHYSICAL THERAPIST

## 2018-06-15 PROCEDURE — G8988 SELF CARE GOAL STATUS: HCPCS | Mod: GP | Performed by: PHYSICAL THERAPIST

## 2018-06-15 NOTE — PROGRESS NOTES
[facility] Initial Evaluation  Subjective:  HPI Comments: SPADI 80/100     Patient is a 72 year old male presenting with rehab right shoulder hpi. The history is provided by the patient. No  was used.   Raymon Ace is a 72 year old male with a right shoulder condition.  Condition occurred with:  Degenerative joint disease.  Condition occurred: for unknown reasons.  This is a recurrent condition  5/23/18.    Patient reports pain:  In the joint, anterior, lateral and upper arm.  Radiates to:  Upper arm and shoulder.  Pain is described as burning and aching and is constant and reported as 9/10.  Associated symptoms:  Loss of strength, painful arc and loss of motion/stiffness. Pain is the same all the time.  Symptoms are exacerbated by using arm behind back, using arm at shoulder level, using arm overhead, lifting, carrying and lying on extremity and relieved by analgesics, heat, ice and rest.  Since onset symptoms are gradually worsening.  Special tests:  X-ray.  Previous treatment: injection   There was moderate improvement following previous treatment.    Pertinent medical history includes:  High blood pressure.  Medical allergies: no.  Other surgeries include:  Orthopedic surgery (back x 4 ).  Current medications:  None as reported by the patient and high blood pressure medication.  Current occupation is Retired .        Barriers include:  None as reported by the patient.    Red flags:  None as reported by the patient.                        Objective:  Standing Alignment:    Cervical/Thoracic:  Forward head  Shoulder/UE:  Rounded shoulders, scapular winging R and depressed scapula R              Gait:    Gait Type:  Normal   Weight Bearing Status:  WBAT   Assistive Devices:  None      Flexibility/Screens:   Negative screens: Cervical           Neurological: He is alert. He has normal strength and normal reflexes. No cranial nerve deficit or sensory deficit.                       Shoulder Evaluation:  ROM:    PROM:    Flexion:  Left:  150    Right: 80          Internal Rotation:  Right:  60  External Rotation:  Right:  50                    Strength:  : 2+/5 strength all direction shoulder resisted tests with pain                       Stability Testing:  not assessed      Special Tests:  not assessed      Palpation:  not assessed                                         General     ROS    Assessment/Plan:    Patient is a 72 year old male with right side shoulder complaints.    Patient has the following significant findings with corresponding treatment plan.                Diagnosis 1:   R shoulder pain/ advanced OA       Therapy Evaluation Codes:   1) History comprised of:   Personal factors that impact the plan of care:      Age, Past/current experiences and Time since onset of symptoms.    Comorbidity factors that impact the plan of care are:      High blood pressure.     Medications impacting care: High blood pressure.  2) Examination of Body Systems comprised of:   Body structures and functions that impact the plan of care:      Shoulder.   Activity limitations that impact the plan of care are:      Cooking, Driving, Dressing, Lifting and Sleeping.  3) Clinical presentation characteristics are:   Stable/Uncomplicated.  4) Decision-Making    Low complexity using standardized patient assessment instrument and/or measureable assessment of functional outcome.  Cumulative Therapy Evaluation is: Low complexity.    Previous and current functional limitations:  (See Goal Flow Sheet for this information)    Short term and Long term goals: (See Goal Flow Sheet for this information)     Communication ability:  Patient appears to be able to clearly communicate and understand verbal and written communication and follow directions correctly.  Treatment Explanation - The following has been discussed with the patient:   RX ordered/plan of care  Anticipated outcomes  Possible risks and side  effects  This patient would benefit from PT intervention to resume normal activities.   Rehab potential is good.    Frequency:  1 X week, once daily  Duration:  for 5 weeks  Discharge Plan:  Achieve all LTG.  Independent in home treatment program.  Reach maximal therapeutic benefit.    Please refer to the daily flowsheet for treatment today, total treatment time and time spent performing 1:1 timed codes.

## 2018-06-15 NOTE — LETTER
[facility]  [address]  [place] [address]  [phone]    Traci 15, 2018    Re: Raymon Ace   :   1945  MRN:  [id number]   REFERRING PHYSICIAN:   [facility]    Date of Initial Evaluation:  6/15/18  Visits:  Rxs Used: 1  Reason for Referral:  Chronic right shoulder pain    [facility] Initial Evaluation    Subjective:  HPI Comments: SPADI 80/100   Patient is a 72 year old male presenting with rehab right shoulder hpi. The history is provided by the patient. No  was used.   Raymon Ace is a 72 year old male with a right shoulder condition.  Condition occurred with:  Degenerative joint disease.  Condition occurred: for unknown reasons.  This is a recurrent condition  18.    Patient reports pain:  In the joint, anterior, lateral and upper arm.  Radiates to:  Upper arm and shoulder.  Pain is described as burning and aching and is constant and reported as 9/10.  Associated symptoms:  Loss of strength, painful arc and loss of motion/stiffness. Pain is the same all the time.  Symptoms are exacerbated by using arm behind back, using arm at shoulder level, using arm overhead, lifting, carrying and lying on extremity and relieved by analgesics, heat, ice and rest.  Since onset symptoms are gradually worsening.  Special tests:  X-ray.  Previous treatment: injection   There was moderate improvement following previous treatment.    Pertinent medical history includes:  High blood pressure.  Medical allergies: no.  Other surgeries include:  Orthopedic surgery (back x 4 ).  Current medications:  None as reported by the patient and high blood pressure medication.  Current occupation is Retired .      Barriers include:  None as reported by the patient.  Red flags:  None as reported by the patient.    Objective:  Standing Alignment:    Cervical/Thoracic:  Forward head  Shoulder/UE:  Rounded shoulders, scapular winging  R and depressed scapula R    Gait:    Gait Type:  Normal   Weight Bearing Status:  WBAT   Assistive Devices:  None  Flexibility/Screens:   Negative screens: Cervical   Neurological: He is alert. He has normal strength and normal reflexes. No cranial nerve deficit or sensory deficit.     Shoulder Evaluation:  ROM:    PROM:    Flexion:  Left:  150    Right: 80    Internal Rotation:  Right:  60  External Rotation:  Right:  50    Strength:  : 2+/5 strength all direction shoulder resisted tests with pain     Stability Testing:  not assessed    Special Tests:  not assessed  Palpation:  not assessed    Assessment/Plan:    Patient is a 72 year old male with right side shoulder complaints.    Patient has the following significant findings with corresponding treatment plan.                Diagnosis 1:   R shoulder pain/ advanced OA       Therapy Evaluation Codes:   1) History comprised of:   Personal factors that impact the plan of care:      Age, Past/current experiences and Time since onset of symptoms.    Comorbidity factors that impact the plan of care are:      High blood pressure.     Medications impacting care: High blood pressure.  2) Examination of Body Systems comprised of:   Body structures and functions that impact the plan of care:      Shoulder.   Activity limitations that impact the plan of care are:      Cooking, Driving, Dressing, Lifting and Sleeping.  3) Clinical presentation characteristics are:   Stable/Uncomplicated.  4) Decision-Making    Low complexity using standardized patient assessment instrument and/or measureable assessment of functional outcome.    Cumulative Therapy Evaluation is: Low complexity.  Previous and current functional limitations:  (See Goal Flow Sheet for this information)    Short term and Long term goals: (See Goal Flow Sheet for this information)   Communication ability:  Patient appears to be able to clearly communicate and understand verbal and written communication and follow  directions correctly.  Treatment Explanation - The following has been discussed with the patient:   RX ordered/plan of care  Anticipated outcomes  Possible risks and side effects  This patient would benefit from PT intervention to resume normal activities.   Rehab potential is good.    Frequency:  1 X week, once daily  Duration:  for 5 weeks  Discharge Plan:  Achieve all LTG.  Independent in home treatment program.  Reach maximal therapeutic benefit.    Thank you for your referral.    INQUIRIES  Therapist: [facility]  [address] [place] [address]  Phone: [phone]  Fax: [phone]

## 2018-06-21 ENCOUNTER — THERAPY VISIT (OUTPATIENT)
Dept: PHYSICAL THERAPY | Facility: CLINIC | Age: 73
End: 2018-06-21
Payer: COMMERCIAL

## 2018-06-21 DIAGNOSIS — G89.29 CHRONIC RIGHT SHOULDER PAIN: Primary | ICD-10-CM

## 2018-06-21 DIAGNOSIS — M25.511 CHRONIC RIGHT SHOULDER PAIN: Primary | ICD-10-CM

## 2018-06-21 PROCEDURE — 97110 THERAPEUTIC EXERCISES: CPT | Mod: GP | Performed by: PHYSICAL THERAPIST

## 2018-06-21 PROCEDURE — G8987 SELF CARE CURRENT STATUS: HCPCS | Mod: GP | Performed by: PHYSICAL THERAPIST

## 2018-06-21 PROCEDURE — 97112 NEUROMUSCULAR REEDUCATION: CPT | Mod: GP | Performed by: PHYSICAL THERAPIST

## 2018-06-21 PROCEDURE — G8988 SELF CARE GOAL STATUS: HCPCS | Mod: GP | Performed by: PHYSICAL THERAPIST

## 2018-06-28 ENCOUNTER — ONCOLOGY VISIT (OUTPATIENT)
Dept: ONCOLOGY | Facility: CLINIC | Age: 73
End: 2018-06-28
Attending: INTERNAL MEDICINE
Payer: COMMERCIAL

## 2018-06-28 VITALS
DIASTOLIC BLOOD PRESSURE: 63 MMHG | HEIGHT: 66 IN | OXYGEN SATURATION: 100 % | HEART RATE: 67 BPM | RESPIRATION RATE: 18 BRPM | BODY MASS INDEX: 32.02 KG/M2 | WEIGHT: 199.2 LBS | SYSTOLIC BLOOD PRESSURE: 104 MMHG | TEMPERATURE: 98.4 F

## 2018-06-28 DIAGNOSIS — D69.3 IDIOPATHIC THROMBOCYTOPENIC PURPURA (H): Primary | ICD-10-CM

## 2018-06-28 DIAGNOSIS — D69.3 IDIOPATHIC THROMBOCYTOPENIC PURPURA (H): ICD-10-CM

## 2018-06-28 DIAGNOSIS — Z86.718 PERSONAL HISTORY OF VENOUS THROMBOSIS AND EMBOLISM: ICD-10-CM

## 2018-06-28 DIAGNOSIS — I82.409 DEEP VEIN THROMBOSIS (DVT) OF LOWER EXTREMITY, UNSPECIFIED CHRONICITY, UNSPECIFIED LATERALITY, UNSPECIFIED VEIN (H): ICD-10-CM

## 2018-06-28 DIAGNOSIS — D50.0 IRON DEFICIENCY ANEMIA DUE TO CHRONIC BLOOD LOSS: ICD-10-CM

## 2018-06-28 DIAGNOSIS — D64.9 ANEMIA, UNSPECIFIED TYPE: ICD-10-CM

## 2018-06-28 LAB
ALBUMIN SERPL-MCNC: 3.4 G/DL (ref 3.4–5)
ALP SERPL-CCNC: 56 U/L (ref 40–150)
ALT SERPL W P-5'-P-CCNC: 21 U/L (ref 0–70)
ANION GAP SERPL CALCULATED.3IONS-SCNC: 6 MMOL/L (ref 3–14)
AST SERPL W P-5'-P-CCNC: 21 U/L (ref 0–45)
BASOPHILS # BLD AUTO: 0.1 10E9/L (ref 0–0.2)
BASOPHILS NFR BLD AUTO: 0.7 %
BILIRUB SERPL-MCNC: 0.8 MG/DL (ref 0.2–1.3)
BUN SERPL-MCNC: 12 MG/DL (ref 7–30)
CALCIUM SERPL-MCNC: 8.6 MG/DL (ref 8.5–10.1)
CHLORIDE SERPL-SCNC: 109 MMOL/L (ref 94–109)
CO2 SERPL-SCNC: 26 MMOL/L (ref 20–32)
CREAT SERPL-MCNC: 0.99 MG/DL (ref 0.66–1.25)
DIFFERENTIAL METHOD BLD: ABNORMAL
EOSINOPHIL # BLD AUTO: 0.2 10E9/L (ref 0–0.7)
EOSINOPHIL NFR BLD AUTO: 2.8 %
ERYTHROCYTE [DISTWIDTH] IN BLOOD BY AUTOMATED COUNT: 17.4 % (ref 10–15)
FERRITIN SERPL-MCNC: 6 NG/ML (ref 26–388)
GFR SERPL CREATININE-BSD FRML MDRD: 74 ML/MIN/1.7M2
GLUCOSE SERPL-MCNC: 104 MG/DL (ref 70–99)
HCT VFR BLD AUTO: 30.9 % (ref 40–53)
HGB BLD-MCNC: 9.5 G/DL (ref 13.3–17.7)
IMM GRANULOCYTES # BLD: 0.1 10E9/L (ref 0–0.4)
IMM GRANULOCYTES NFR BLD: 1.1 %
IRON SATN MFR SERPL: 16 % (ref 15–46)
IRON SERPL-MCNC: 70 UG/DL (ref 35–180)
LYMPHOCYTES # BLD AUTO: 1.4 10E9/L (ref 0.8–5.3)
LYMPHOCYTES NFR BLD AUTO: 19.8 %
MCH RBC QN AUTO: 25.3 PG (ref 26.5–33)
MCHC RBC AUTO-ENTMCNC: 30.7 G/DL (ref 31.5–36.5)
MCV RBC AUTO: 82 FL (ref 78–100)
MONOCYTES # BLD AUTO: 0.7 10E9/L (ref 0–1.3)
MONOCYTES NFR BLD AUTO: 9.3 %
NEUTROPHILS # BLD AUTO: 4.7 10E9/L (ref 1.6–8.3)
NEUTROPHILS NFR BLD AUTO: 66.3 %
NRBC # BLD AUTO: 0 10*3/UL
NRBC BLD AUTO-RTO: 0 /100
PLATELET # BLD AUTO: 95 10E9/L (ref 150–450)
POTASSIUM SERPL-SCNC: 4 MMOL/L (ref 3.4–5.3)
PROT SERPL-MCNC: 6.8 G/DL (ref 6.8–8.8)
RBC # BLD AUTO: 3.76 10E12/L (ref 4.4–5.9)
RETICS # AUTO: 53 10E9/L (ref 25–95)
RETICS/RBC NFR AUTO: 1.4 % (ref 0.5–2)
SODIUM SERPL-SCNC: 141 MMOL/L (ref 133–144)
TIBC SERPL-MCNC: 441 UG/DL (ref 240–430)
WBC # BLD AUTO: 7.1 10E9/L (ref 4–11)

## 2018-06-28 PROCEDURE — 85025 COMPLETE CBC W/AUTO DIFF WBC: CPT | Performed by: INTERNAL MEDICINE

## 2018-06-28 PROCEDURE — G0463 HOSPITAL OUTPT CLINIC VISIT: HCPCS | Mod: ZF

## 2018-06-28 PROCEDURE — 99215 OFFICE O/P EST HI 40 MIN: CPT | Mod: ZP | Performed by: INTERNAL MEDICINE

## 2018-06-28 PROCEDURE — 85045 AUTOMATED RETICULOCYTE COUNT: CPT | Performed by: INTERNAL MEDICINE

## 2018-06-28 PROCEDURE — 83540 ASSAY OF IRON: CPT | Performed by: INTERNAL MEDICINE

## 2018-06-28 PROCEDURE — 82728 ASSAY OF FERRITIN: CPT | Performed by: INTERNAL MEDICINE

## 2018-06-28 PROCEDURE — 83550 IRON BINDING TEST: CPT | Performed by: INTERNAL MEDICINE

## 2018-06-28 PROCEDURE — 80053 COMPREHEN METABOLIC PANEL: CPT | Performed by: INTERNAL MEDICINE

## 2018-06-28 PROCEDURE — 36415 COLL VENOUS BLD VENIPUNCTURE: CPT

## 2018-06-28 ASSESSMENT — PAIN SCALES - GENERAL: PAINLEVEL: MODERATE PAIN (4)

## 2018-06-28 NOTE — NURSING NOTE
"Oncology Rooming Note    June 28, 2018 11:19 AM   Raymon Ace is a 72 year old male who presents for:    Chief Complaint   Patient presents with     Oncology Clinic Visit     One month f/u; Hx of PE     Initial Vitals: /63 (BP Location: Right arm, Patient Position: Sitting, Cuff Size: Adult Regular)  Pulse 67  Temp 98.4  F (36.9  C) (Oral)  Resp 18  Ht 1.689 m (5' 6.5\")  Wt 90.4 kg (199 lb 3.2 oz)  SpO2 100%  BMI 31.67 kg/m2 Estimated body mass index is 31.67 kg/(m^2) as calculated from the following:    Height as of this encounter: 1.689 m (5' 6.5\").    Weight as of this encounter: 90.4 kg (199 lb 3.2 oz). Body surface area is 2.06 meters squared.  Moderate Pain (4) Comment: Data Unavailable   No LMP for male patient.  Allergies reviewed: Yes  Medications reviewed: Yes    Medications: Medication refills not needed today.  Pharmacy name entered into [employer]:    [facility] - [place], [place] - [address] [facility] - [place], [place] - [address]  [facility] - [place], [place] - [address] [facility] - [place], [place] - [address] [facility] - [place], [place] - [address] [facility] [place], [place] - [address] [facility] [address]    Clinical concerns:  None    8 minutes for nursing intake (face to face time)     Terri Melton CMA              "

## 2018-06-28 NOTE — PROGRESS NOTES
HEMATOLOGY CLINIC VISIT    Raymon Ace is a 72-year-old male who returns today for followup of chronic ITP, history of unprovoked pulmonary embolism for which he is on longterm anticoagulation, and anemia.      Overall, he is feeling okay.  He continues to have problems with shoulder pain which is due to advanced osteoarthritis.  He recently saw Dr. Rivera and is undergoing physical therapy for that.  He also has severe back pain.  He was seen by an outside spine specialist.  He was scheduled for an epidural injection, but apparently this was canceled because he had not held his anticoagulation for 5 days.  We discussed that this is unnecessary, given that he is on rivaroxaban, which only needs to be held for 24-48 hours before procedures.  This has been rescheduled for early next week.      He remains on Promacta with a stable platelet count.  He is tolerating the medication without any side effects.  He has had no bleeding symptoms.      There seems to have been some sort of mixup with regard to his rivaroxaban dosing.  As outlined in my last note, we switched him to the prophylactic (10 mg) dose in part because of a change in his renal function and also because of recent data suggesting that longterm secondary prophylaxis can be achieved with prophylactic-intensity rather than full-intensity anticoagulation.  He took this dose for the first month, but then when he had it refilled he was given 20 mg tablets.  The reason for this is not entirely clear, as he does not have an active prescription that I can see for that dose.  Again, he has had no bleeding issues and is tolerating rivaroxaban without any difficulty.      As outlined in my last note, he has had a steady decrease in his hemoglobin over the last year.  Iron studies obtained at his last visit showed that he is clearly iron deficient.  He denies all bleeding symptoms.  He specifically denies any hematemesis or bloody stools.  He has not had anything that  sounds like melena.  He had an upper endoscopy in 02/2018 which demonstrated evidence of portal hypertensive gastropathy and gastric varices.  No evidence of active bleeding was noted.  He did have a colonoscopy in 2015, at which time 1 polyp was removed.      PHYSICAL EXAMINATION:  He looks well.  He is not pale or jaundiced.  There is no scleral icterus.  A more detailed exam not performed today.      LABORATORY DATA:  CBC today shows a white count of 7.1 with a normal differential.  Hemoglobin is 9.5, which is stable from 1 month ago but continues to slowly decrease over the last 6-12 months.  His MCV is 82, platelet count today is 95,000, which is within his baseline of approximately 100-130,000.  Retic count is not elevated.  Comprehensive metabolic panel today shows normal serum electrolytes.  His creatinine has returned to his usual baseline of 0.99.  LFTs are normal.  Iron panel continues to be consistent with iron deficiency with a serum ferritin of only 6.        ASSESSMENT AND PLAN:   1.  Chronic ITP.  Overall, Raymon is doing well on Promacta with a platelet count that remains stable at approximately 100,000 over the last 2-3 years.  We will make no change in that part of his care plan today.      2.  History of unprovoked pulmonary embolism in 04/2017.  Due to the unprovoked nature of his pulmonary embolism, he is an appropriate candidate for longterm anticoagulation.  He is doing well on rivaroxaban.  Although his kidney function has returned to baseline, I would prefer to keep him on the prophylactic-intensity dose longterm.  I re-sent the prescription to his local pharmacy due to the apparent mixup in the dose as described above.      For the upcoming epidural injection for his chronic low back pain, he only needs to hold the rivaroxaban for 24-48 hours before the procedure.  He can resume it the following day.  I did give him some telephone numbers to call should he run into issues with the procedure  being canceled again next week because he had not been off anticoagulation for 5 days.  Again, this is unnecessary for the anticoagulant drug that he is currently using.      3.  Iron deficiency anemia.  He is clearly severely iron deficient, and this likely explains his continued progressive anemia over the last year.  Presumably the source of his iron deficiency is GI bleeding, although there has been nothing to suggest active bleeding recently.  Our plan is to treat him with parenteral iron and follow his iron panel closely.  Should he develop any signs or symptoms consistent with GI bleeding, we will need to reconsider endoscopies, which have been performed in the recent past as outlined above.      We will plan to see Raymon back in early October [place].  In the meantime, we will be following his iron panel after he receives the parenteral iron, which will be set up at his convenience in the next couple of weeks.      Total time spent today was 40 minutes, all of which was in counseling and coordination of care.       Waylon Novoa MD  Associate Professor of Medicine  Division of Hematology, Oncology, and Transplantation  Director, [facility]

## 2018-06-28 NOTE — MR AVS SNAPSHOT
After Visit Summary   6/28/2018    Raymon Ace    MRN: [id number]           Patient Information     Date Of Birth          1945        Visit Information        Provider Department      6/28/2018 11:15 AM Waylon Novoa MD [facility]        Today's Diagnoses     Idiopathic thrombocytopenic purpura (H)    -  1    Iron deficiency anemia due to chronic blood loss        Personal history of venous thrombosis and embolism           Follow-ups after your visit        Your next 10 appointments already scheduled     Jul 03, 2018  5:20 PM CDT   VY Extremity with Clem Mortensen, PT   [facility] [facility] ([facility] [place])    [address]  [place] [address]   [phone]            Jul 17, 2018 10:00 AM CDT   Level 1 with ROOM 4 [facility] ([facility])    [facility]  [address]  [facility] [address]   [phone]            Jul 24, 2018 10:00 AM CDT   Level 1 with ROOM 4 [facility] ([facility])    [facility]  [address]  [facility] [place] [address]   [phone]            Aug 07, 2018 10:00 AM CDT   [facility]   [facility]    [address] [facility] [address]   [phone]           Please do not eat 10-12 hours before your appointment if you are coming in fasting for labs on lipids, cholesterol, or glucose (sugar). This does not apply to pregnant women. Water, hot tea and black coffee (with nothing added) are okay. Do not drink other fluids, diet soda or chew gum.            Oct 11, 2018 10:45 AM CDT   [facility] Draw with  [facility]RAW   [facility] ([facility] [place])    [address] [facility]  [address] [place] [id number] [phone]            Oct 11, 2018 11:15 AM CDT   (Arrive by 11:00 AM)   Return Visit with Waylon Castellanos  MD [place]   [facility] ([facility])    [address]  [address]  [facility] [id number] [phone]            Oct 19, 2018  9:30 AM CDT   Lab with UC LAB   [facility] ([facility])    [address]  1st Floor  [facility] [address]   [phone]            Oct 19, 2018 10:30 AM CDT   (Arrive by 10:15 AM)   Return General Liver with Terri Crouch MD   [facility] ([facility])    [address]  [address]  [facility] [address] [phone]              Future tests that were ordered for you today     Open Future Orders        Priority Expected Expires Ordered    *CBC with platelets differential Routine 10/1/2018 11/28/2018 6/28/2018    Comprehensive metabolic panel Routine 10/1/2018 11/28/2018 6/28/2018    Iron and iron binding capacity Routine 10/1/2018 11/28/2018 6/28/2018    Ferritin Routine 10/1/2018 11/28/2018 6/28/2018    *CBC with platelets differential Routine 6/28/2018 8/28/2018 6/28/2018    Iron and iron binding capacity Routine 6/28/2018 8/28/2018 6/28/2018    Ferritin Routine 6/28/2018 8/28/2018 6/28/2018            Who to contact     If you have questions or need follow up information about today's clinic visit or your schedule please contact [facility] directly at [phone].  Normal or non-critical lab and imaging results will be communicated to you by MyChart, letter or phone within 4 business days after the clinic has received the results. If you do not hear from us within 7 days, please contact the clinic through MyChart or phone. If you have a critical or abnormal lab result, we will notify you by phone as soon as possible.  Submit refill requests through [employer] or call your pharmacy and they will forward the refill request to us. Please allow 3 business days for your refill to be completed.          Additional  "Information About Your Visit        MyChart Information     [employer] gives you secure access to your electronic health record. If you see a primary care provider, you can also send messages to your care team and make appointments. If you have questions, please call your primary care clinic.  If you do not have a primary care provider, please call [phone] and they will assist you.        Care EveryWhere ID     This is your Care EveryWhere ID. This could be used by other organizations to access your [facility] medical records  [id number]        Your Vitals Were     Pulse Temperature Respirations Height Pulse Oximetry BMI (Body Mass Index)    67 98.4  F (36.9  C) (Oral) 18 1.689 m (5' 6.5\") 100% 31.67 kg/m2       Blood Pressure from Last 3 Encounters:   06/28/18 104/63   05/18/18 102/60   05/17/18 98/58    Weight from Last 3 Encounters:   06/28/18 90.4 kg (199 lb 3.2 oz)   05/18/18 87.9 kg (193 lb 12.8 oz)   05/17/18 88.9 kg (196 lb)                 Today's Medication Changes          These changes are accurate as of 6/28/18  4:02 PM.  If you have any questions, ask your nurse or doctor.               These medicines have changed or have updated prescriptions.        Dose/Directions    gabapentin 300 MG capsule   Commonly known as:  NEURONTIN   This may have changed:  See the new instructions.   Used for:  Chronic pain        TAKE ONE CAPSULE BY MOUTH THREE TIMES DAILY   Quantity:  270 capsule   Refills:  0       spironolactone 25 MG tablet   Commonly known as:  ALDACTONE   This may have changed:  See the new instructions.   Used for:  Benign essential hypertension        TAKE ONE TABLET BY MOUTH ONCE DAILY   Quantity:  90 tablet   Refills:  2            Where to get your medicines      These medications were sent to [facility] [address] [place] [address] - [address]   [address] [facility] [address]     Phone:  [phone]     rivaroxaban ANTICOAGULANT 10 MG Tabs tablet                Primary Care " Provider Office Phone # Fax #    Rosanne Cherry [phone] [address]  [facility] [address]        Equal Access to Services     NENA JAVIER : Hadjagjit chato anderson alainao Soomaali, waaxda luqadaha, qaybta kaalmada adeegyada, gonzales vega laToddchana go. So [facility] [phone].    ATENCIÓN: Si habla español, tiene a wang disposición servicios gratuitos de asistencia lingüística. Llame al [phone].    We comply with applicable federal civil rights laws and [place] laws. We do not discriminate on the basis of race, color, national origin, age, disability, sex, sexual orientation, or gender identity.            Thank you!     Thank you for choosing [facility]  for your care. Our goal is always to provide you with excellent care. Hearing back from our patients is one way we can continue to improve our services. Please take a few minutes to complete the written survey that you may receive in the mail after your visit with us. Thank you!             Your Updated Medication List - Protect others around you: Learn how to safely use, store and throw away your medicines at www.disposemymeds.org.          This list is accurate as of 6/28/18  4:02 PM.  Always use your most recent med list.                   Brand Name Dispense Instructions for use Diagnosis    allopurinol 300 MG tablet    ZYLOPRIM    30 tablet    TAKE ONE TABLET BY MOUTH ONCE DAILY    Gout involving toe, unspecified cause, unspecified chronicity, unspecified laterality       amLODIPine 5 MG tablet    NORVASC    90 tablet    TAKE ONE TABLET BY MOUTH ONCE DAILY    Benign essential hypertension       cloNIDine 0.3 MG tablet    CATAPRES    180 tablet    TAKE ONE TABLET BY MOUTH TWICE DAILY .  NEEDS  TO  BE  SEEN  FOR  MORE    Benign essential hypertension       folic acid 1 MG tablet    FOLVITE    90 tablet    TAKE ONE TABLET BY MOUTH ONCE DAILY    Alcohol abuse       gabapentin 300 MG capsule     NEURONTIN    270 capsule    TAKE ONE CAPSULE BY MOUTH THREE TIMES DAILY    Chronic pain       LISINOPRIL PO      Take 20 mg by mouth daily        OMEPRAZOLE PO      Take 40 mg by mouth every morning        PROMACTA 75 MG tablet   Generic drug:  eltrombopag     30 tablet    TAKE ONE TABLET BY MOUTH EVERY DAY ON AN EMPTY STOMACH 1 HOUR BEFORE OR 2 HOURS AFTER A MEAL    Idiopathic thrombocytopenic purpura (H)       rivaroxaban ANTICOAGULANT 10 MG Tabs tablet    XARELTO    30 tablet    Take 1 tablet (10 mg) by mouth daily (with dinner)    Iron deficiency anemia due to chronic blood loss, Idiopathic thrombocytopenic purpura (H), Personal history of venous thrombosis and embolism       spironolactone 25 MG tablet    ALDACTONE    90 tablet    TAKE ONE TABLET BY MOUTH ONCE DAILY    Benign essential hypertension

## 2018-06-28 NOTE — LETTER
6/28/2018       RE: Raymon Ace  [address] [facility] [address]     Dear Colleague,    Thank you for referring your patient, Raymon Ace, to the [facility]. Please see a copy of my visit note below.      HEMATOLOGY CLINIC VISIT    Raymon Ace is a 72-year-old male who returns today for followup of chronic ITP, history of unprovoked pulmonary embolism for which he is on longterm anticoagulation, and anemia.      Overall, he is feeling okay.  He continues to have problems with shoulder pain which is due to advanced osteoarthritis.  He recently saw Dr. Rivera and is undergoing physical therapy for that.  He also has severe back pain.  He was seen by an outside spine specialist.  He was scheduled for an epidural injection, but apparently this was canceled because he had not held his anticoagulation for 5 days.  We discussed that this is unnecessary, given that he is on rivaroxaban, which only needs to be held for 24-48 hours before procedures.  This has been rescheduled for early next week.      He remains on Promacta with a stable platelet count.  He is tolerating the medication without any side effects.  He has had no bleeding symptoms.      There seems to have been some sort of mixup with regard to his rivaroxaban dosing.  As outlined in my last note, we switched him to the prophylactic (10 mg) dose in part because of a change in his renal function and also because of recent data suggesting that longterm secondary prophylaxis can be achieved with prophylactic-intensity rather than full-intensity anticoagulation.  He took this dose for the first month, but then when he had it refilled he was given 20 mg tablets.  The reason for this is not entirely clear, as he does not have an active prescription that I can see for that dose.  Again, he has had no bleeding issues and is tolerating rivaroxaban without any difficulty.      As outlined in my last note, he has had a steady decrease in  his hemoglobin over the last year.  Iron studies obtained at his last visit showed that he is clearly iron deficient.  He denies all bleeding symptoms.  He specifically denies any hematemesis or bloody stools.  He has not had anything that sounds like melena.  He had an upper endoscopy in 02/2018 which demonstrated evidence of portal hypertensive gastropathy and gastric varices.  No evidence of active bleeding was noted.  He did have a colonoscopy in 2015, at which time 1 polyp was removed.      PHYSICAL EXAMINATION:  He looks well.  He is not pale or jaundiced.  There is no scleral icterus.  A more detailed exam not performed today.      LABORATORY DATA:  CBC today shows a white count of 7.1 with a normal differential.  Hemoglobin is 9.5, which is stable from 1 month ago but continues to slowly decrease over the last 6-12 months.  His MCV is 82, platelet count today is 95,000, which is within his baseline of approximately 100-130,000.  Retic count is not elevated.  Comprehensive metabolic panel today shows normal serum electrolytes.  His creatinine has returned to his usual baseline of 0.99.  LFTs are normal.  Iron panel continues to be consistent with iron deficiency with a serum ferritin of only 6.        ASSESSMENT AND PLAN:   1.  Chronic ITP.  Overall, Raymon is doing well on Promacta with a platelet count that remains stable at approximately 100,000 over the last 2-3 years.  We will make no change in that part of his care plan today.      2.  History of unprovoked pulmonary embolism in 04/2017.  Due to the unprovoked nature of his pulmonary embolism, he is an appropriate candidate for longterm anticoagulation.  He is doing well on rivaroxaban.  Although his kidney function has returned to baseline, I would prefer to keep him on the prophylactic-intensity dose longterm.  I re-sent the prescription to his local pharmacy due to the apparent mixup in the dose as described above.      For the upcoming epidural  injection for his chronic low back pain, he only needs to hold the rivaroxaban for 24-48 hours before the procedure.  He can resume it the following day.  I did give him some telephone numbers to call should he run into issues with the procedure being canceled again next week because he had not been off anticoagulation for 5 days.  Again, this is unnecessary for the anticoagulant drug that he is currently using.      3.  Iron deficiency anemia.  He is clearly severely iron deficient, and this likely explains his continued progressive anemia over the last year.  Presumably the source of his iron deficiency is GI bleeding, although there has been nothing to suggest active bleeding recently.  Our plan is to treat him with parenteral iron and follow his iron panel closely.  Should he develop any signs or symptoms consistent with GI bleeding, we will need to reconsider endoscopies, which have been performed in the recent past as outlined above.      We will plan to see Raymon back in early October [place].  In the meantime, we will be following his iron panel after he receives the parenteral iron, which will be set up at his convenience in the next couple of weeks.      Total time spent today was 40 minutes, all of which was in counseling and coordination of care.       Waylon Novoa MD  Associate Professor of Medicine  Division of Hematology, Oncology, and Transplantation  Director, [facility]

## 2018-07-03 ENCOUNTER — THERAPY VISIT (OUTPATIENT)
Dept: PHYSICAL THERAPY | Facility: CLINIC | Age: 73
End: 2018-07-03
Payer: COMMERCIAL

## 2018-07-03 DIAGNOSIS — M25.511 CHRONIC RIGHT SHOULDER PAIN: ICD-10-CM

## 2018-07-03 DIAGNOSIS — G89.29 CHRONIC RIGHT SHOULDER PAIN: ICD-10-CM

## 2018-07-03 PROCEDURE — G8987 SELF CARE CURRENT STATUS: HCPCS | Mod: GP | Performed by: PHYSICAL THERAPIST

## 2018-07-03 PROCEDURE — 97110 THERAPEUTIC EXERCISES: CPT | Mod: GP | Performed by: PHYSICAL THERAPIST

## 2018-07-03 PROCEDURE — 97112 NEUROMUSCULAR REEDUCATION: CPT | Mod: GP | Performed by: PHYSICAL THERAPIST

## 2018-07-03 PROCEDURE — G8988 SELF CARE GOAL STATUS: HCPCS | Mod: GP | Performed by: PHYSICAL THERAPIST

## 2018-07-12 ENCOUNTER — OFFICE VISIT (OUTPATIENT)
Dept: FAMILY MEDICINE | Facility: CLINIC | Age: 73
End: 2018-07-12
Payer: COMMERCIAL

## 2018-07-12 VITALS
TEMPERATURE: 97.4 F | DIASTOLIC BLOOD PRESSURE: 70 MMHG | HEIGHT: 67 IN | WEIGHT: 193 LBS | BODY MASS INDEX: 30.29 KG/M2 | SYSTOLIC BLOOD PRESSURE: 124 MMHG | OXYGEN SATURATION: 98 % | HEART RATE: 97 BPM | RESPIRATION RATE: 18 BRPM

## 2018-07-12 DIAGNOSIS — M54.40 CHRONIC BILATERAL LOW BACK PAIN WITH SCIATICA, SCIATICA LATERALITY UNSPECIFIED: ICD-10-CM

## 2018-07-12 DIAGNOSIS — Z78.9 ALCOHOL CONSUPTION OF MORE THAN TWO DRINKS PER DAY: ICD-10-CM

## 2018-07-12 DIAGNOSIS — I26.99 OTHER ACUTE PULMONARY EMBOLISM WITHOUT ACUTE COR PULMONALE (H): ICD-10-CM

## 2018-07-12 DIAGNOSIS — G89.29 CHRONIC BILATERAL LOW BACK PAIN WITH SCIATICA, SCIATICA LATERALITY UNSPECIFIED: ICD-10-CM

## 2018-07-12 DIAGNOSIS — G63 POLYNEUROPATHY IN OTHER DISEASES CLASSIFIED ELSEWHERE (H): ICD-10-CM

## 2018-07-12 DIAGNOSIS — F10.10 ALCOHOL ABUSE: ICD-10-CM

## 2018-07-12 DIAGNOSIS — R20.8 DYSESTHESIA OF FACE: ICD-10-CM

## 2018-07-12 DIAGNOSIS — K70.9 ALCOHOLIC LIVER DAMAGE (H): ICD-10-CM

## 2018-07-12 DIAGNOSIS — I10 BENIGN ESSENTIAL HYPERTENSION: ICD-10-CM

## 2018-07-12 DIAGNOSIS — R73.02 GLUCOSE INTOLERANCE (IMPAIRED GLUCOSE TOLERANCE): ICD-10-CM

## 2018-07-12 DIAGNOSIS — K76.0 FATTY LIVER: ICD-10-CM

## 2018-07-12 DIAGNOSIS — Z00.00 ROUTINE GENERAL MEDICAL EXAMINATION AT A HEALTH CARE FACILITY: Primary | ICD-10-CM

## 2018-07-12 PROCEDURE — 99213 OFFICE O/P EST LOW 20 MIN: CPT | Mod: 25 | Performed by: FAMILY MEDICINE

## 2018-07-12 PROCEDURE — G0439 PPPS, SUBSEQ VISIT: HCPCS | Performed by: FAMILY MEDICINE

## 2018-07-12 RX ORDER — GABAPENTIN 300 MG/1
CAPSULE ORAL
Qty: 270 CAPSULE | Refills: 1 | Status: SHIPPED | OUTPATIENT
Start: 2018-07-12 | End: 2020-08-04

## 2018-07-12 ASSESSMENT — ACTIVITIES OF DAILY LIVING (ADL): CURRENT_FUNCTION: NO ASSISTANCE NEEDED

## 2018-07-12 NOTE — NURSING NOTE
"Chief Complaint   Patient presents with     Recheck Medication     /70  Pulse 97  Temp 97.4  F (36.3  C) (Tympanic)  Resp 18  Ht 5' 6.5\" (1.689 m)  Wt 193 lb (87.5 kg)  SpO2 98%  BMI 30.68 kg/m2 Estimated body mass index is 30.68 kg/(m^2) as calculated from the following:    Height as of this encounter: 5' 6.5\" (1.689 m).    Weight as of this encounter: 193 lb (87.5 kg).  BP completed using cuff size: regular   Laura Kelly CMA    Health Maintenance Due   Topic Date Due     MEDICARE ANNUAL WELLNESS VISIT  08/19/1963     Health Maintenance reviewed at today's visit patient asked to schedule/complete:   Routine Health Visit: Patient agrees to schedule    "

## 2018-07-12 NOTE — PROGRESS NOTES
SUBJECTIVE:   Raymon Ace is a 72 year old male who presents for Preventive Visit.    Are you in the first 12 months of your Medicare coverage?  No    Physical   Annual:     Getting at least 3 servings of Calcium per day:  Yes    Bi-annual eye exam:  Yes    Dental care twice a year:  Yes    Sleep apnea or symptoms of sleep apnea:  Sleep apnea and Excessive snoring    Diet:  Regular (no restrictions)    Frequency of exercise:  None    Taking medications regularly:  Yes    Medication side effects:  None    Additional concerns today:  YES    Ability to successfully perform activities of daily living: no assistance needed    Home Safety:  No safety concerns identified    Hearing Impairment: no hearing concerns      Ability to successfully perform activities of daily living: Yes, no assistance needed  Home safety:  none identified   Hearing impairment: Yes, na    Fall risk:  Fallen 2 or more times in the past year?: No  Any fall with injury in the past year?: No  COGNITIVE SCREEN  1) Repeat 3 items (Leader, Season, Table)    2) Clock draw: NORMAL  3) 3 item recall: Recalls 3 objects  Results: 3 items recalled: COGNITIVE IMPAIRMENT LESS LIKELY    Mini-CogTM Copyright CARMEN[employer]A Cancino. Licensed by the author for use in [facility]; reprinted with permission (walter@[email or web address]). All rights reserved.      Reviewed and updated as needed this visit by clinical staff  Tobacco  Allergies  Meds  Problems         Reviewed and updated as needed this visit by Provider  Allergies  Meds  Problems        Social History   Substance Use Topics     Smoking status: Former Smoker     Years: 28.00     Quit date: 9/26/1982     Smokeless tobacco: Never Used     Alcohol use 3.0 oz/week     0 Standard drinks or equivalent, 5 Shots of liquor per week      Comment: 4-5 drinks/day, alcohol use disorder     No flowsheet data found.No flowsheet data found.    Numbness of entire Rt side of face       Duration: x 3 weeks    Description  (location/character/radiation): Right Side of Face feeling Numb, Itching and Tingling-began on Rt upper lip     Intensity:  Mild- always there but does ebb & increase     Accompanying signs and symptoms: None    History (similar episodes/previous evaluation): None    Precipitating or alleviating factors: None    Therapies tried and outcome: None     Hypertension Follow-up      Outpatient blood pressures are being checked at home and store.  Results are within normal range according to patient. Here < 140/80    Low Salt Diet: no added salt    Meds: norvasc, aldactone, lisinopril      ELEVATED LFTS with HX OF ONGOING ALCOHOL ABUSE AND FATTY LIVER FROM OBESITY       Duration: yrs --stable  & wnl    Was off alcohol for 6 mo till 12-17    Description (location/character/radiation): above     Intensity:  moderate    Accompanying signs and symptoms: 0    History (similar episodes/previous evaluation): None    Precipitating or alleviating factors: alcohol    Therapies tried and outcome: None     NONMORBID OBESITY      Little exercise     Ongoing alcohol intake q d     Has HTN and glu intol    Glucose Intolerance Follow-up      Patient is checking blood sugars: not at all    HgbA1C=  5.4 last yr     Diabetic concerns: None     Symptoms of hypoglycemia (low blood sugar): none     Paresthesias (numbness or burning in feet) or sores: No     Date of last diabetic eye exam: 2015    conts to drink alcohol q d      ITTP      - chronic idiopathic     -needed plat boost for surg and to get anticoag for PE in 4-17    NONMORBID OBESITY      -BMI= 30.3    -inactive     -q d alcohol       Amount of exercise or physical activity: 6-7 days/week for an average of 15-30 minutes    Problems taking medications regularly: No    Medication side effects: none    Diet: low salt    Today's PHQ-2 Score:   PHQ-2 ( 1999 [employer]) 7/12/2018   Q1: Little interest or pleasure in doing things 0   Q2: Feeling down, depressed or hopeless 0   PHQ-2 Score 0      Do you feel safe in your environment - Yes    Do you have a Health Care Directive?: Yes: Advance Directive has been received and scanned.    Current providers sharing in care for this patient include:   Patient Care Team:  Rosanne Cherry MD as PCP - General (Family Practice)  Waylon Novoa MD as MD (Hematology)  Ashely Fiore, RN as Nurse Coordinator (Hematology & Oncology)    The following health maintenance items are reviewed in Epic and correct as of today:  Health Maintenance   Topic Date Due     MEDICARE ANNUAL WELLNESS VISIT  08/19/1963     VERITO QUESTIONNAIRE 1 YEAR  08/04/2018     INFLUENZA VACCINE (1) 09/01/2018     PHQ-9 Q1YR  12/15/2018     LIPID MONITORING Q1 YEAR  01/04/2019     FALL RISK ASSESSMENT  01/18/2019     TETANUS IMMUNIZATION (SYSTEM ASSIGNED)  02/08/2019     ADVANCE DIRECTIVE PLANNING Q5 YRS  05/16/2021     COLON CANCER SCREEN (SYSTEM ASSIGNED)  09/08/2025     PNEUMOCOCCAL  Completed     AORTIC ANEURYSM SCREENING (SYSTEM ASSIGNED)  Completed     Labs reviewed in EPIC        Review of Systems  CONSTITUTIONAL: NEGATIVE for fever, chills, change in weight  INTEGUMENTARY/SKIN: NEGATIVE for worrisome rashes, moles or lesions  EYES: NEGATIVE for vision changes or irritation  ENT/MOUTH: NEGATIVE for ear, mouth and throat problems  RESP: NEGATIVE for significant cough or SOB  BREAST: NEGATIVE for masses, tenderness or discharge  CV: NEGATIVE for chest pain, palpitations or peripheral edema  GI: NEGATIVE for nausea, abdominal pain, heartburn, or change in bowel habits  : NEGATIVE for frequency, dysuria, or hematuria  MUSCULOSKELETAL: NEGATIVE for significant arthralgias or myalgia  NEURO: NEGATIVE for weakness, dizziness ; paresthesias of Rt side of face   ENDOCRINE: NEGATIVE for temperature intolerance, skin/hair changes  HEME: NEGATIVE for bleeding problems  PSYCHIATRIC: NEGATIVE for changes in mood or affect    OBJECTIVE:   /70  Pulse 97  Temp 97.4  F (36.3  C)  "(Tympanic)  Resp 18  Ht 5' 6.5\" (1.689 m)  Wt 193 lb (87.5 kg)  SpO2 98%  BMI 30.68 kg/m2 Estimated body mass index is 30.68 kg/(m^2) as calculated from the following:    Height as of this encounter: 5' 6.5\" (1.689 m).    Weight as of this encounter: 193 lb (87.5 kg).  Physical Exam  GENERAL: healthy, alert and no distress  EYES: Eyes grossly normal to inspection, PERRL and conjunctivae and sclerae normal  NECK: no adenopathy, no asymmetry, masses, or scars and thyroid normal to palpation  RESP: lungs clear to auscultation - no rales, rhonchi or wheezes  CV: regular rate and rhythm, normal S1 S2, no S3 or S4, no murmur, click or rub, no peripheral edema and peripheral pulses strong  ABDOMEN: soft, nontender, no hepatosplenomegaly, no masses and bowel sounds normal  MS: no gross musculoskeletal defects noted, no edema  SKIN: no suspicious lesions or rashes  NEURO: Normal strength and tone, mentation intact and speech normal; no change in sensation of Rt face and no motor tone loss   PSYCH: mentation appears normal, affect normal/bright  LYMPH: no cervical, supraclavicular, axillary, or inguinal adenopathy    Diagnostic Test Results:  Results for orders placed or performed in visit on 06/28/18   *CBC with platelets differential   Result Value Ref Range    WBC 7.1 4.0 - 11.0 10e9/L    RBC Count 3.76 (L) 4.4 - 5.9 10e12/L    Hemoglobin 9.5 (L) 13.3 - 17.7 g/dL    Hematocrit 30.9 (L) 40.0 - 53.0 %    MCV 82 78 - 100 fl    MCH 25.3 (L) 26.5 - 33.0 pg    MCHC 30.7 (L) 31.5 - 36.5 g/dL    RDW 17.4 (H) 10.0 - 15.0 %    Platelet Count 95 (L) 150 - 450 10e9/L    Diff Method Automated Method     % Neutrophils 66.3 %    % Lymphocytes 19.8 %    % Monocytes 9.3 %    % Eosinophils 2.8 %    % Basophils 0.7 %    % Immature Granulocytes 1.1 %    Nucleated RBCs 0 0 /100    Absolute Neutrophil 4.7 1.6 - 8.3 10e9/L    Absolute Lymphocytes 1.4 0.8 - 5.3 10e9/L    Absolute Monocytes 0.7 0.0 - 1.3 10e9/L    Absolute Eosinophils 0.2 0.0 " - 0.7 10e9/L    Absolute Basophils 0.1 0.0 - 0.2 10e9/L    Abs Immature Granulocytes 0.1 0 - 0.4 10e9/L    Absolute Nucleated RBC 0.0    Reticulocyte count   Result Value Ref Range    % Retic 1.4 0.5 - 2.0 %    Absolute Retic 53.0 25 - 95 10e9/L   Comprehensive metabolic panel   Result Value Ref Range    Sodium 141 133 - 144 mmol/L    Potassium 4.0 3.4 - 5.3 mmol/L    Chloride 109 94 - 109 mmol/L    Carbon Dioxide 26 20 - 32 mmol/L    Anion Gap 6 3 - 14 mmol/L    Glucose 104 (H) 70 - 99 mg/dL    Urea Nitrogen 12 7 - 30 mg/dL    Creatinine 0.99 0.66 - 1.25 mg/dL    GFR Estimate 74 >60 mL/min/1.7m2    GFR Estimate If Black 90 >60 mL/min/1.7m2    Calcium 8.6 8.5 - 10.1 mg/dL    Bilirubin Total 0.8 0.2 - 1.3 mg/dL    Albumin 3.4 3.4 - 5.0 g/dL    Protein Total 6.8 6.8 - 8.8 g/dL    Alkaline Phosphatase 56 40 - 150 U/L    ALT 21 0 - 70 U/L    AST 21 0 - 45 U/L   Iron and iron binding capacity   Result Value Ref Range    Iron 70 35 - 180 ug/dL    Iron Binding Cap 441 (H) 240 - 430 ug/dL    Iron Saturation Index 16 15 - 46 %   Ferritin   Result Value Ref Range    Ferritin 6 (L) 26 - 388 ng/mL       ASSESSMENT / PLAN:       ICD-10-CM    1. Routine general medical examination at a health care facility Z00.00    2. Dysesthesia of face-Rt side since early 6-18  R20.8 NEUROLOGY ADULT REFERRAL     OFFICE/OUTPT VISIT,EST,LEVL III   3. Polyneuropathy in other diseases classified elsewhere (H) G63 gabapentin (NEURONTIN) 300 MG capsule     ASPIRIN NOT PRESCRIBED (INTENTIONAL)   4. Chronic pain G89.29    5. Other acute pulmonary embolism without acute cor pulmonale (H) I26.99    6. Alcohol consuption of more than two drinks per day Z78.9    7. Chronic bilateral low back pain with sciatica, sciatica laterality unspecified M54.40 gabapentin (NEURONTIN) 300 MG capsule    G89.29    8. Fatty liver/ 1-14 US K76.0    9. Alcoholic liver damage (H) K70.9    10. Glucose intolerance (impaired glucose tolerance)  HgbA!C = 5.4 R73.02    11.  "Alcohol abuse since teens  F10.10    12. Benign essential hypertension I10        End of Life Planning:  Patient currently has an advanced directive: Yes.  Practitioner is supportive of decision.    COUNSELING:  Reviewed preventive health counseling, as reflected in patient instructions       Regular exercise       Healthy diet/nutrition       Vision screening    BP Readings from Last 1 Encounters:   07/12/18 124/70     Estimated body mass index is 30.68 kg/(m^2) as calculated from the following:    Height as of this encounter: 5' 6.5\" (1.689 m).    Weight as of this encounter: 193 lb (87.5 kg).      Weight management plan: Discussed healthy diet and exercise guidelines and patient will follow up in 3 months in clinic to re-evaluate.     reports that he quit smoking about 35 years ago. He quit after 28.00 years of use. He has never used smokeless tobacco.      Appropriate preventive services were discussed with this patient, including applicable screening as appropriate for cardiovascular disease, diabetes, osteopenia/osteoporosis, and glaucoma.  As appropriate for age/gender, discussed screening for colorectal cancer, prostate cancer, breast cancer, and cervical cancer. Checklist reviewing preventive services available has been given to the patient.    Reviewed patients plan of care and provided an AVS. The Basic Care Plan (routine screening as documented in Health Maintenance) for Raymon meets the Care Plan requirement. This Care Plan has been established and reviewed with the Patient    Patient Instructions   1. Shingrex is a 2 shot series that prevents shingles 97% of the time, as opposed to the old shingles shot that only prevented it at 40-50%  It costs less for medicare at a pharmacy  You should get it starting at 50 yrs old     2. Weight Loss Tips  1. Do not eat after 6 hrs before your expected bedtime  2. Have your heaviest meal for breakfast, a slightly lighter meal at lunch and a snack 6 hrs before bed  3. " No sugar/calorie drinks except milk ie no fruit juice, pop, alcohol.  4. Drink milk 30min before meals to decrease your hunger. Also it is excellent as part of your last meal of the day snack  5. Drink lots of water  6. Increase fiber in diet: all bran cereal, salads, popcorn etc  7. Have only one small serving of fruit a day about 1/2 cup (as this is high in sugar)  8. EXERCISE is the bottom line. Without it, you will gain weight even on a low calorie diet. Best if done 2-3X a day as can    Being overweight contributes to high blood pressure and high cholesterol, both of which cause heart attacks, strokes and kidney failure, prediabetes and diabetes, arthritis, and liver disease     4. Please do your breast exam every mo, when you  Change the  calendar page or set an alarm on your cell phone Do a  visual check for dimples, inversion or indentation or any different position of the nipple Feel manually  for any 1cm or larger  size mass ie about the size of an almond Be sure to cover the entire area of both breasts : this extends back to the back on either side and from the collar bone to the bottom of the breasts where you can begin to feel ribs.    5. Glucosamine chondroitin sulfate   1500mgm a day ++++  To help the joints     Fish oil also helps at 1000-3000mgm a day     6. Please do your breast exam every mo, when you  Change the  calendar page or set an alarm on your cell phone Do a  visual check for dimples, inversion or indentation or any different position of the nipple Feel manually  for any 1cm or larger  size mass ie about the size of an almond Be sure to cover the entire area of both breasts : this extends back to the back on either side and from the collar bone to the bottom of the breasts where you can begin to feel ribs.        Time spent with the patient 23 mins, more than 50% in counseling and coordinating care, Re above medical problems.--RT facial dysesthesia     .    Counseling Resources:  ATP IV  Guidelines  Pooled Cohorts Equation Calculator  Breast Cancer Risk Calculator  FRAX Risk Assessment  ICSI Preventive Guidelines  Dietary Guidelines for Americans, 2010  [employer]'s MyPlate  ASA Prophylaxis  Lung CA Screening    Rosanne Cherry MD  [facility]

## 2018-07-12 NOTE — PATIENT INSTRUCTIONS
1. Shingrex is a 2 shot series that prevents shingles 97% of the time, as opposed to the old shingles shot that only prevented it at 40-50%  It costs less for medicare at a pharmacy  You should get it starting at 50 yrs old     2. Weight Loss Tips  1. Do not eat after 6 hrs before your expected bedtime  2. Have your heaviest meal for breakfast, a slightly lighter meal at lunch and a snack 6 hrs before bed  3. No sugar/calorie drinks except milk ie no fruit juice, pop, alcohol.  4. Drink milk 30min before meals to decrease your hunger. Also it is excellent as part of your last meal of the day snack  5. Drink lots of water  6. Increase fiber in diet: all bran cereal, salads, popcorn etc  7. Have only one small serving of fruit a day about 1/2 cup (as this is high in sugar)  8. EXERCISE is the bottom line. Without it, you will gain weight even on a low calorie diet. Best if done 2-3X a day as can    Being overweight contributes to high blood pressure and high cholesterol, both of which cause heart attacks, strokes and kidney failure, prediabetes and diabetes, arthritis, and liver disease     4. Please do your breast exam every mo, when you  Change the  calendar page or set an alarm on your cell phone Do a  visual check for dimples, inversion or indentation or any different position of the nipple Feel manually  for any 1cm or larger  size mass ie about the size of an almond Be sure to cover the entire area of both breasts : this extends back to the back on either side and from the collar bone to the bottom of the breasts where you can begin to feel ribs.    5. Glucosamine chondroitin sulfate   1500mgm a day ++++  To help the joints     Fish oil also helps at 1000-3000mgm a day     6. Please do your breast exam every mo, when you  Change the  calendar page or set an alarm on your cell phone Do a  visual check for dimples, inversion or indentation or any different position of the nipple Feel manually  for any 1cm or  larger  size mass ie about the size of an almond Be sure to cover the entire area of both breasts : this extends back to the back on either side and from the collar bone to the bottom of the breasts where you can begin to feel ribs.

## 2018-07-12 NOTE — PROGRESS NOTES
SUBJECTIVE:   Raymon Ace is a 72 year old male who presents to clinic today for the following health issues:    Numbness      Duration: x 3 weeks    Description (location/character/radiation): Right Side of Face feeling Numb, Itching and Tingling    Intensity:  mild    Accompanying signs and symptoms: None    History (similar episodes/previous evaluation): None    Precipitating or alleviating factors: None    Therapies tried and outcome: None     Hypertension Follow-up      Outpatient blood pressures are being checked at home and store.  Results are within normal range according to patient. Here < 140/80    Low Salt Diet: no added salt    Meds: norvasc, aldactone, lisinopril      ELEVATED LFTS with HX OF ONGOING ALCOHOL ABUSE AND FATTY LIVER FROM OBESITY       Duration: yrs --stable  & wnl    Was off alcohol for 6 mo till 12-17    Description (location/character/radiation): above     Intensity:  moderate    Accompanying signs and symptoms: 0    History (similar episodes/previous evaluation): None    Precipitating or alleviating factors: alcohol    Therapies tried and outcome: None     NONMORBID OBESITY      Little exercise     Ongoing alcohol intake q d     Has HTN and glu intol    Glucose Intolerance Follow-up      Patient is checking blood sugars: not at all    HgbA1C=  5.4 last yr     Diabetic concerns: None     Symptoms of hypoglycemia (low blood sugar): none     Paresthesias (numbness or burning in feet) or sores: No     Date of last diabetic eye exam: 2015    conts to drink alcohol q d      ITTP      - chronic idiopathic     -needed plat boost for surg and to get anticoag for PE in 4-17    NONMORBID OBESITY      -BMI= 30.3    -inactive     -q d alcohol       Amount of exercise or physical activity: 6-7 days/week for an average of 15-30 minutes    Problems taking medications regularly: No    Medication side effects: none    Diet: low salt    {additional problems for provider to add:[id number]}    Problem list  and histories reviewed & adjusted, as indicated.  Additional history: as documented    {HIST REVIEW/ LINKS 2:169337}    Reviewed and updated as needed this visit by clinical staff       Reviewed and updated as needed this visit by Provider         {PROVIDER CHARTING PREFERENCE:[id number]}

## 2018-07-12 NOTE — MR AVS SNAPSHOT
After Visit Summary   7/12/2018    Raymon Ace    MRN: [id number]           Patient Information     Date Of Birth          1945        Visit Information        Provider Department      7/12/2018 11:20 AM Rosanne Cherry MD [facility]        Today's Diagnoses     Routine general medical examination at a health care facility    -  1    Dysesthesia of face-Rt side since early 6-18         Chronic pain        Polyneuropathy in other diseases classified elsewhere (H)        Other acute pulmonary embolism without acute cor pulmonale (H)        Chronic bilateral low back pain with sciatica, sciatica laterality unspecified          Care Instructions    1. Shingrex is a 2 shot series that prevents shingles 97% of the time, as opposed to the old shingles shot that only prevented it at 40-50%  It costs less for medicare at a pharmacy  You should get it starting at 50 yrs old     2. Weight Loss Tips  1. Do not eat after 6 hrs before your expected bedtime  2. Have your heaviest meal for breakfast, a slightly lighter meal at lunch and a snack 6 hrs before bed  3. No sugar/calorie drinks except milk ie no fruit juice, pop, alcohol.  4. Drink milk 30min before meals to decrease your hunger. Also it is excellent as part of your last meal of the day snack  5. Drink lots of water  6. Increase fiber in diet: all bran cereal, salads, popcorn etc  7. Have only one small serving of fruit a day about 1/2 cup (as this is high in sugar)  8. EXERCISE is the bottom line. Without it, you will gain weight even on a low calorie diet. Best if done 2-3X a day as can    Being overweight contributes to high blood pressure and high cholesterol, both of which cause heart attacks, strokes and kidney failure, prediabetes and diabetes, arthritis, and liver disease     4. Please do your breast exam every mo, when you  Change the  calendar page or set an alarm on your cell phone Do a  visual  check for dimples, inversion or indentation or any different position of the nipple Feel manually  for any 1cm or larger  size mass ie about the size of an almond Be sure to cover the entire area of both breasts : this extends back to the back on either side and from the collar bone to the bottom of the breasts where you can begin to feel ribs.    5. Glucosamine chondroitin sulfate   1500mgm a day ++++  To help the joints     Fish oil also helps at 1000-3000mgm a day     6. Please do your breast exam every mo, when you  Change the  calendar page or set an alarm on your cell phone Do a  visual check for dimples, inversion or indentation or any different position of the nipple Feel manually  for any 1cm or larger  size mass ie about the size of an almond Be sure to cover the entire area of both breasts : this extends back to the back on either side and from the collar bone to the bottom of the breasts where you can begin to feel ribs.              Follow-ups after your visit        Additional Services     NEUROLOGY ADULT REFERRAL       Your provider has referred you for the following:   EEG at  [facility]: [facility]  Mary Gee [phone]   [email or web address]    Please be aware that coverage of these services is subject to the terms and limitations of your health insurance plan.  Call member services at your health plan with any benefit or coverage questions.      Please bring the following with you to your appointment:    (1) Any X-Rays, CTs or MRIs which have been performed.  Contact the facility where they were done to arrange for  prior to your scheduled appointment.    (2) List of current medications  (3) This referral request   (4) Any documents/labs given to you for this referral                  Follow-up notes from your care team     Return in about 6 months (around 1/12/2019) for Routine Visit.      Your next 10 appointments already scheduled     Jul  17, 2018 10:00 AM CDT   Level 1 with ROOM 4 [facility] ([facility])    [facility]  [address]  [facility] [address]   [phone]            Jul 24, 2018 10:00 AM CDT   Level 1 with ROOM 4 [facility] ([facility])    [facility]  [address]  [facility] [address]   [phone]            Aug 01, 2018  1:55 PM CDT   VY Extremity with Clem Mortensen, PT   [facility] [place] [facility] ([facility] [place])    [address]  [place] [address] [phone]            Aug 07, 2018 10:00 AM CDT   LAB with  LAB   [facility] ([facility])    [address] [facility] [address]   [phone]           Please do not eat 10-12 hours before your appointment if you are coming in fasting for labs on lipids, cholesterol, or glucose (sugar). This does not apply to pregnant women. Water, hot tea and black coffee (with nothing added) are okay. Do not drink other fluids, diet soda or chew gum.            Oct 11, 2018 10:45 AM CDT   [facility] Lab Draw with [email or web address] LAB DRAW   [facility] ([facility]    [address] [place] [address]  [address] [place] [address]   [phone]            Oct 11, 2018 11:15 AM CDT   (Arrive by 11:00 AM)   Return Visit with Waylon Novoa MD   [facility] ([facility])    [address] [place] [address]  [address]  [facility] [address] [phone]            Oct 19, 2018  9:30 AM CDT   Lab with  LAB   [facility] Lab ([facility])    [address] [place] [address]  1st Floor  [facility] [address]   [phone]            Oct 19, 2018 10:30 AM CDT   (Arrive by 10:15 AM)   Return General Liver with Terri Crouch MD   [facility] Hepatology ([facility])    [address] [place] [address]  [address]  [facility] [address] [phone]              Who to contact     If you have questions or need follow up information about today's clinic visit or your schedule please contact [facility] directly at [phone].  Normal or non-critical lab and imaging results will be communicated to you by MyChart, letter or phone within 4 business days after the clinic has received the results. If you do not hear from us within 7 days, please contact the clinic through [employer] or phone. If you have a critical or abnormal lab result, we will notify you by phone as soon as possible.  Submit refill requests through [employer] or call your pharmacy and they will forward the refill request to us. Please allow 3 business days for your refill to be completed.          Additional Information About Your Visit        [employer] Information     [employer] gives you secure access to your electronic health record. If you see a primary care provider, you can also send messages to your care team and make appointments. If you have questions, please call your primary care clinic.  If you do not have a primary care provider, please call [phone] and they will assist you.        Care EveryWhere ID     This is your Care EveryWhere ID. This could be used by other organizations to access your [place] medical records  [id number]        Your Vitals Were     Pulse Temperature Respirations Height Pulse Oximetry BMI (Body Mass Index)    97 97.4  F (36.3  C) (Tympanic) 18 5' 6.5\" (1.689 m) 98% 30.68 kg/m2       Blood Pressure from Last 3 Encounters:   07/12/18 124/70   06/28/18 104/63   05/18/18 102/60    Weight from Last 3 Encounters:   07/12/18 193 lb (87.5 kg)   06/28/18 199 lb 3.2 oz (90.4 kg)   05/18/18 193 lb 12.8 oz (87.9 kg)              We Performed the Following     NEUROLOGY ADULT REFERRAL          Today's Medication Changes          These changes are accurate as of 7/12/18 12:25 PM.  If you have any " questions, ask your nurse or doctor.               These medicines have changed or have updated prescriptions.        Dose/Directions    gabapentin 300 MG capsule   Commonly known as:  NEURONTIN   This may have changed:  See the new instructions.   Used for:  Polyneuropathy in other diseases classified elsewhere (H), Chronic bilateral low back pain with sciatica, sciatica laterality unspecified        TAKE ONE CAPSULE BY MOUTH THREE TIMES DAILY   Quantity:  270 capsule   Refills:  1       spironolactone 25 MG tablet   Commonly known as:  ALDACTONE   This may have changed:  See the new instructions.   Used for:  Benign essential hypertension        TAKE ONE TABLET BY MOUTH ONCE DAILY   Quantity:  90 tablet   Refills:  2            Where to get your medicines      These medications were sent to [facility] - [place], [place] - [address]   [address] [facility] [address]     Phone:  [phone]     gabapentin 300 MG capsule                Primary Care Provider Office Phone # Fax #    Rosanne Kimberly Cherry [phone]       [address] [facility] [address]        Equal Access to Services     [facility]: Hadii chato ku hadasho Soomaali, waaxda luqadaha, qaybta kaalmada adeegyada, waxay idiin farida holguin . So [facility] [phone].    ATENCIÓN: Si habla español, tiene a wang disposición servicios gratuitos de asistencia lingüística. Llame al [phone].    We comply with applicable federal civil rights laws and [place] laws. We do not discriminate on the basis of race, color, national origin, age, disability, sex, sexual orientation, or gender identity.            Thank you!     Thank you for choosing [facility]  for your care. Our goal is always to provide you with excellent care. Hearing back from our patients is one way we can continue to improve our services. Please take a few minutes to complete the written survey that  you may receive in the mail after your visit with us. Thank you!             Your Updated Medication List - Protect others around you: Learn how to safely use, store and throw away your medicines at www.disposemymeds.org.          This list is accurate as of 7/12/18 12:25 PM.  Always use your most recent med list.                   Brand Name Dispense Instructions for use Diagnosis    allopurinol 300 MG tablet    ZYLOPRIM    90 tablet    TAKE 1 TABLET BY MOUTH ONCE DAILY    Gout involving toe, unspecified cause, unspecified chronicity, unspecified laterality       amLODIPine 5 MG tablet    NORVASC    90 tablet    TAKE ONE TABLET BY MOUTH ONCE DAILY    Benign essential hypertension       cloNIDine 0.3 MG tablet    CATAPRES    180 tablet    TAKE ONE TABLET BY MOUTH TWICE DAILY .  NEEDS  TO  BE  SEEN  FOR  MORE    Benign essential hypertension       folic acid 1 MG tablet    FOLVITE    90 tablet    TAKE ONE TABLET BY MOUTH ONCE DAILY    Alcohol abuse       gabapentin 300 MG capsule    NEURONTIN    270 capsule    TAKE ONE CAPSULE BY MOUTH THREE TIMES DAILY    Polyneuropathy in other diseases classified elsewhere (H), Chronic bilateral low back pain with sciatica, sciatica laterality unspecified       LISINOPRIL PO      Take 20 mg by mouth daily        OMEPRAZOLE PO      Take 40 mg by mouth every morning        PROMACTA 75 MG tablet   Generic drug:  eltrombopag     30 tablet    TAKE ONE TABLET BY MOUTH EVERY DAY ON AN EMPTY STOMACH 1 HOUR BEFORE OR 2 HOURS AFTER A MEAL    Idiopathic thrombocytopenic purpura (H)       rivaroxaban ANTICOAGULANT 10 MG Tabs tablet    XARELTO    30 tablet    Take 1 tablet (10 mg) by mouth daily (with dinner)    Iron deficiency anemia due to chronic blood loss, Idiopathic thrombocytopenic purpura (H), Personal history of venous thrombosis and embolism       spironolactone 25 MG tablet    ALDACTONE    90 tablet    TAKE ONE TABLET BY MOUTH ONCE DAILY    Benign essential hypertension

## 2018-07-13 ASSESSMENT — ANXIETY QUESTIONNAIRES
7. FEELING AFRAID AS IF SOMETHING AWFUL MIGHT HAPPEN: NOT AT ALL
GAD7 TOTAL SCORE: 0
2. NOT BEING ABLE TO STOP OR CONTROL WORRYING: NOT AT ALL
3. WORRYING TOO MUCH ABOUT DIFFERENT THINGS: NOT AT ALL
5. BEING SO RESTLESS THAT IT IS HARD TO SIT STILL: NOT AT ALL
1. FEELING NERVOUS, ANXIOUS, OR ON EDGE: NOT AT ALL
6. BECOMING EASILY ANNOYED OR IRRITABLE: NOT AT ALL
IF YOU CHECKED OFF ANY PROBLEMS ON THIS QUESTIONNAIRE, HOW DIFFICULT HAVE THESE PROBLEMS MADE IT FOR YOU TO DO YOUR WORK, TAKE CARE OF THINGS AT HOME, OR GET ALONG WITH OTHER PEOPLE: NOT DIFFICULT AT ALL

## 2018-07-13 ASSESSMENT — PATIENT HEALTH QUESTIONNAIRE - PHQ9: 5. POOR APPETITE OR OVEREATING: NOT AT ALL

## 2018-07-14 ASSESSMENT — PATIENT HEALTH QUESTIONNAIRE - PHQ9: SUM OF ALL RESPONSES TO PHQ QUESTIONS 1-9: 0

## 2018-07-14 ASSESSMENT — ANXIETY QUESTIONNAIRES: GAD7 TOTAL SCORE: 0

## 2018-07-17 ENCOUNTER — INFUSION THERAPY VISIT (OUTPATIENT)
Dept: INFUSION THERAPY | Facility: CLINIC | Age: 73
End: 2018-07-17
Attending: INTERNAL MEDICINE
Payer: COMMERCIAL

## 2018-07-17 VITALS — DIASTOLIC BLOOD PRESSURE: 49 MMHG | TEMPERATURE: 97.9 F | HEART RATE: 57 BPM | SYSTOLIC BLOOD PRESSURE: 93 MMHG

## 2018-07-17 DIAGNOSIS — D50.0 IRON DEFICIENCY ANEMIA DUE TO CHRONIC BLOOD LOSS: Primary | ICD-10-CM

## 2018-07-17 PROCEDURE — 96365 THER/PROPH/DIAG IV INF INIT: CPT

## 2018-07-17 PROCEDURE — 25000128 H RX IP 250 OP 636: Performed by: INTERNAL MEDICINE

## 2018-07-17 RX ADMIN — FERRIC CARBOXYMALTOSE INJECTION 750 MG: 50 INJECTION, SOLUTION INTRAVENOUS at 10:13

## 2018-07-17 RX ADMIN — SODIUM CHLORIDE 250 ML: 9 INJECTION, SOLUTION INTRAVENOUS at 10:11

## 2018-07-17 NOTE — MR AVS SNAPSHOT
After Visit Summary   7/17/2018    Raymon Ace    MRN: [id number]           Patient Information     Date Of Birth          1945        Visit Information        Provider Department      7/17/2018 10:00 AM ROOM 4 [facility]        Today's Diagnoses     Iron deficiency anemia due to chronic blood loss    -  1       Follow-ups after your visit        Your next 10 appointments already scheduled     Jul 24, 2018 10:00 AM CDT   Level 1 with ROOM 4 [facility] ([facility])    [facility]  [address]  [facility] [address]   [phone]            Aug 01, 2018  1:55 PM CDT   VY Extremity with Clem Mortensen, PT   [facility] [facility] ([facility] [place]    [address]  [place] [address] [phone]            Aug 07, 2018 10:00 AM CDT   LAB with  LAB   [facility] ([facility])    [address] [facility] [address]   [phone]           Please do not eat 10-12 hours before your appointment if you are coming in fasting for labs on lipids, cholesterol, or glucose (sugar). This does not apply to pregnant women. Water, hot tea and black coffee (with nothing added) are okay. Do not drink other fluids, diet soda or chew gum.            Oct 11, 2018 10:45 AM CDT   [facility] Lab Draw with  [facility] LAB DRAW   [facility] Lab Draw ([facility])    [address] [place] [address]  [address]  [facility] [address] [phone]            Oct 11, 2018 11:15 AM CDT   (Arrive by 11:00 AM)   Return Visit with Waylon Novoa MD   [facility] ([facility])    [address]  [address]  [facility] [address] [phone]            Oct 19, 2018  9:30 AM CDT   Lab with  LAB   [facility] ([facility])    [address] [place] [address]  1st Floor  [facility] [address]    [phone]            Oct 19, 2018 10:30 AM CDT   (Arrive by 10:15 AM)   Return General Liver with Terri Crouch MD   [facility]atology ([facility])    [address] [facility]  [address]  [facility] [address] [phone]              Who to contact     If you have questions or need follow up information about today's clinic visit or your schedule please contact [facility] directly at [phone].  Normal or non-critical lab and imaging results will be communicated to you by [employer], letter or phone within 4 business days after the clinic has received the results. If you do not hear from us within 7 days, please contact the clinic through [employer] or phone. If you have a critical or abnormal lab result, we will notify you by phone as soon as possible.  Submit refill requests through [employer] or call your pharmacy and they will forward the refill request to us. Please allow 3 business days for your refill to be completed.          Additional Information About Your Visit        [employer] Information     [employer] gives you secure access to your electronic health record. If you see a primary care provider, you can also send messages to your care team and make appointments. If you have questions, please call your primary care clinic.  If you do not have a primary care provider, please call [phone] and they will assist you.        Care EveryWhere ID     This is your Care EveryWhere ID. This could be used by other organizations to access your [place] medical records  [id number]        Your Vitals Were     Pulse Temperature                57 97.9  F (36.6  C) (Oral)           Blood Pressure from Last 3 Encounters:   07/17/18 93/49   07/12/18 124/70   06/28/18 104/63    Weight from Last 3 Encounters:   07/12/18 87.5 kg (193 lb)   06/28/18 90.4 kg (199 lb 3.2 oz)   05/18/18 87.9 kg (193 lb 12.8 oz)              Today, you had the following     No orders found for  display         Today's Medication Changes          These changes are accurate as of 7/17/18 12:23 PM.  If you have any questions, ask your nurse or doctor.               These medicines have changed or have updated prescriptions.        Dose/Directions    spironolactone 25 MG tablet   Commonly known as:  ALDACTONE   This may have changed:  See the new instructions.   Used for:  Benign essential hypertension        TAKE ONE TABLET BY MOUTH ONCE DAILY   Quantity:  90 tablet   Refills:  2                Primary Care Provider Office Phone # Fax #    Rosanne Kimberly Cherry [phone] [address] [facility] [address]        Equal Access to Services     [facility]: Hadii aad ku hadasho Soomaali, waaxda luqadaha, qaybta kaalmada adeegyada, gonzales holguin . So [facility] [phone].    ATENCIÓN: Si habla español, tiene a wang disposición servicios gratuitos de asistencia lingüística. LlUniversity [facility]als [facility] [phone].    We comply with applicable federal civil rights laws and [place] laws. We do not discriminate on the basis of race, color, national origin, age, disability, sex, sexual orientation, or gender identity.            Thank you!     Thank you for choosing [facility]  for your care. Our goal is always to provide you with excellent care. Hearing back from our patients is one way we can continue to improve our services. Please take a few minutes to complete the written survey that you may receive in the mail after your visit with us. Thank you!             Your Updated Medication List - Protect others around you: Learn how to safely use, store and throw away your medicines at www.disposemymeds.org.          This list is accurate as of 7/17/18 12:23 PM.  Always use your most recent med list.                   Brand Name Dispense Instructions for use Diagnosis    allopurinol 300 MG tablet    ZYLOPRIM    90 tablet    TAKE 1 TABLET BY MOUTH ONCE DAILY    Gout  involving toe, unspecified cause, unspecified chronicity, unspecified laterality       amLODIPine 5 MG tablet    NORVASC    90 tablet    TAKE ONE TABLET BY MOUTH ONCE DAILY    Benign essential hypertension       ASPIRIN NOT PRESCRIBED    INTENTIONAL    1 each    Please choose reason not prescribed, below    Polyneuropathy in other diseases classified elsewhere (H)       cloNIDine 0.3 MG tablet    CATAPRES    180 tablet    TAKE ONE TABLET BY MOUTH TWICE DAILY .  NEEDS  TO  BE  SEEN  FOR  MORE    Benign essential hypertension       folic acid 1 MG tablet    FOLVITE    90 tablet    TAKE ONE TABLET BY MOUTH ONCE DAILY    Alcohol abuse       gabapentin 300 MG capsule    NEURONTIN    270 capsule    TAKE ONE CAPSULE BY MOUTH THREE TIMES DAILY    Polyneuropathy in other diseases classified elsewhere (H), Chronic bilateral low back pain with sciatica, sciatica laterality unspecified       LISINOPRIL PO      Take 20 mg by mouth daily        OMEPRAZOLE PO      Take 40 mg by mouth every morning        PROMACTA 75 MG tablet   Generic drug:  eltrombopag     30 tablet    TAKE ONE TABLET BY MOUTH EVERY DAY ON AN EMPTY STOMACH 1 HOUR BEFORE OR 2 HOURS AFTER A MEAL    Idiopathic thrombocytopenic purpura (H)       rivaroxaban ANTICOAGULANT 10 MG Tabs tablet    XARELTO    30 tablet    Take 1 tablet (10 mg) by mouth daily (with dinner)    Iron deficiency anemia due to chronic blood loss, Idiopathic thrombocytopenic purpura (H), Personal history of venous thrombosis and embolism       spironolactone 25 MG tablet    ALDACTONE    90 tablet    TAKE ONE TABLET BY MOUTH ONCE DAILY    Benign essential hypertension

## 2018-07-17 NOTE — PROGRESS NOTES
Infusion Nursing Note:  Raymon Ace presents today for IV Injectafer.    Patient seen by provider today: No   present during visit today: Not Applicable.    Note: IV Injectafer infused over 15 minutes via a peripheral IV without complications. Pt. Observed for 30 minutes without complications. Pt. To return next week for another dose.    Intravenous Access:  No Intravenous access/labs at this visit.    Treatment Conditions:  Not Applicable.      Post Infusion Assessment:  Patient tolerated infusion without incident.  Blood return noted pre and post infusion.  Site patent and intact, free from redness, edema or discomfort.  No evidence of extravasations.  Access discontinued per protocol.    Discharge Plan:   Patient and/or family verbalized understanding of discharge instructions and all questions answered.  Patient discharged in stable condition accompanied by: wife.  Departure Mode: Ambulatory.    Rolanda Bell RN

## 2018-07-24 ENCOUNTER — INFUSION THERAPY VISIT (OUTPATIENT)
Dept: INFUSION THERAPY | Facility: CLINIC | Age: 73
End: 2018-07-24
Attending: INTERNAL MEDICINE
Payer: COMMERCIAL

## 2018-07-24 VITALS — DIASTOLIC BLOOD PRESSURE: 53 MMHG | TEMPERATURE: 97.2 F | SYSTOLIC BLOOD PRESSURE: 99 MMHG | HEART RATE: 58 BPM

## 2018-07-24 DIAGNOSIS — D50.0 IRON DEFICIENCY ANEMIA DUE TO CHRONIC BLOOD LOSS: Primary | ICD-10-CM

## 2018-07-24 PROCEDURE — 25000128 H RX IP 250 OP 636: Performed by: INTERNAL MEDICINE

## 2018-07-24 PROCEDURE — 96365 THER/PROPH/DIAG IV INF INIT: CPT

## 2018-07-24 RX ADMIN — SODIUM CHLORIDE 250 ML: 9 INJECTION, SOLUTION INTRAVENOUS at 10:18

## 2018-07-24 RX ADMIN — FERRIC CARBOXYMALTOSE INJECTION 750 MG: 50 INJECTION, SOLUTION INTRAVENOUS at 10:13

## 2018-07-24 NOTE — PROGRESS NOTES
Infusion Nursing Note:  Raymon Ace presents today for Injectafer.    Patient seen by provider today: No   present during visit today: Not Applicable.    Note: N/A.    Intravenous Access:  Peripheral IV placed.    Treatment Conditions:  Not Applicable.      Post Infusion Assessment:  Patient tolerated infusion without incident.  Patient observed for 30 minutes post Injectafer per protocol.  Blood return noted pre and post infusion.  Site patent and intact, free from redness, edema or discomfort.  No evidence of extravasations.  Access discontinued per protocol.    Discharge Plan:   Patient discharged in stable condition accompanied by: self.    Charlotte Quinn RN

## 2018-07-24 NOTE — MR AVS SNAPSHOT
After Visit Summary   7/24/2018    Raymon Ace    MRN: [id number]           Patient Information     Date Of Birth          1945        Visit Information        Provider Department      7/24/2018 10:00 AM ROOM 4 [facility]        Today's Diagnoses     Iron deficiency anemia due to chronic blood loss    -  1       Follow-ups after your visit        Your next 10 appointments already scheduled     Aug 01, 2018  1:55 PM CDT   VY Extremity with Clem Mortensen, PT   [facility] [facility] ([facility] [place])    [address]  [place] [address] [phone]            Aug 07, 2018 10:00 AM CDT   LAB with  LAB   [facility]    [address] [address]   [phone]           Please do not eat 10-12 hours before your appointment if you are coming in fasting for labs on lipids, cholesterol, or glucose (sugar). This does not apply to pregnant women. Water, hot tea and black coffee (with nothing added) are okay. Do not drink other fluids, diet soda or chew gum.            Oct 11, 2018 10:45 AM CDT   [facility] with  [place] LAB DRAW   [facility]    [address]  [address]  [facility] [id number] [phone]            Oct 11, 2018 11:15 AM CDT   (Arrive by 11:00 AM)   Return Visit with Waylon Novoa MD   [facility] ([facility])    [address]  [address]  [place] [id number] [phone]            Oct 19, 2018  9:30 AM CDT   Lab with  LAB   [facility] Lab ([facility])    [address] [place] [address] Se  1st Floor  [facility] [id number] [phone]            Oct 19, 2018 10:30 AM CDT   (Arrive by 10:15 AM)   Return General Liver with Terri Crouch MD   [facility]atology ([facility])    [address] [place] [address]  [address]  [facility] [address] [phone]              Who to contact     If you have questions or need follow up information about today's clinic visit or your schedule please contact [facility] directly at [phone].  Normal or non-critical lab and imaging results will be communicated to you by MakeMeReachhart, letter or phone within 4 business days after the clinic has received the results. If you do not hear from us within 7 days, please contact the clinic through AppliLogt or phone. If you have a critical or abnormal lab result, we will notify you by phone as soon as possible.  Submit refill requests through PixelPlay or call your pharmacy and they will forward the refill request to us. Please allow 3 business days for your refill to be completed.          Additional Information About Your Visit        MakeMeReachharDobango Information     PixelPlay gives you secure access to your electronic health record. If you see a primary care provider, you can also send messages to your care team and make appointments. If you have questions, please call your primary care clinic.  If you do not have a primary care provider, please call [phone] and they will assist you.        Care EveryWhere ID     This is your Care EveryWhere ID. This could be used by other organizations to access your [place] medical records  [id number]        Your Vitals Were     Pulse Temperature                58 97.2  F (36.2  C) (Oral)           Blood Pressure from Last 3 Encounters:   07/24/18 99/53   07/17/18 93/49   07/12/18 124/70    Weight from Last 3 Encounters:   07/12/18 87.5 kg (193 lb)   06/28/18 90.4 kg (199 lb 3.2 oz)   05/18/18 87.9 kg (193 lb 12.8 oz)              Today, you had the following     No orders found for display         Today's Medication Changes          These changes are accurate as of 7/24/18 11:19 AM.  If you have any questions, ask your nurse or doctor.               These medicines have changed or have updated  prescriptions.        Dose/Directions    spironolactone 25 MG tablet   Commonly known as:  ALDACTONE   This may have changed:  See the new instructions.   Used for:  Benign essential hypertension        TAKE ONE TABLET BY MOUTH ONCE DAILY   Quantity:  90 tablet   Refills:  2                Primary Care Provider Office Phone # Fax #    Rosanne Kimberly Cherry [phone] [address] [facility] [address]        Equal Access to Services     NENA JAVIER : Hadii aad ku hadasho Soomaali, waaxda luqadaha, qaybta kaalmada adeegyada, waxay idiin hayaan adeeg kharash lahany . So [facility] [phone].    ATENCIÓN: Si elham manjarrez, tiene a wang disposición servicios gratuitos de asistencia lingüística. [facility] [phone].    We comply with applicable federal civil rights laws and [place] laws. We do not discriminate on the basis of race, color, national origin, age, disability, sex, sexual orientation, or gender identity.            Thank you!     Thank you for choosing [facility]  for your care. Our goal is always to provide you with excellent care. Hearing back from our patients is one way we can continue to improve our services. Please take a few minutes to complete the written survey that you may receive in the mail after your visit with us. Thank you!             Your Updated Medication List - Protect others around you: Learn how to safely use, store and throw away your medicines at www.disposemymeds.org.          This list is accurate as of 7/24/18 11:19 AM.  Always use your most recent med list.                   Brand Name Dispense Instructions for use Diagnosis    allopurinol 300 MG tablet    ZYLOPRIM    90 tablet    TAKE 1 TABLET BY MOUTH ONCE DAILY    Gout involving toe, unspecified cause, unspecified chronicity, unspecified laterality       amLODIPine 5 MG tablet    NORVASC    90 tablet    TAKE ONE TABLET BY MOUTH ONCE DAILY    Benign essential hypertension        ASPIRIN NOT PRESCRIBED    INTENTIONAL    1 each    Please choose reason not prescribed, below    Polyneuropathy in other diseases classified elsewhere (H)       cloNIDine 0.3 MG tablet    CATAPRES    180 tablet    TAKE ONE TABLET BY MOUTH TWICE DAILY .  NEEDS  TO  BE  SEEN  FOR  MORE    Benign essential hypertension       folic acid 1 MG tablet    FOLVITE    90 tablet    TAKE ONE TABLET BY MOUTH ONCE DAILY    Alcohol abuse       gabapentin 300 MG capsule    NEURONTIN    270 capsule    TAKE ONE CAPSULE BY MOUTH THREE TIMES DAILY    Polyneuropathy in other diseases classified elsewhere (H), Chronic bilateral low back pain with sciatica, sciatica laterality unspecified       LISINOPRIL PO      Take 20 mg by mouth daily        OMEPRAZOLE PO      Take 40 mg by mouth every morning        PROMACTA 75 MG tablet   Generic drug:  eltrombopag     30 tablet    TAKE ONE TABLET BY MOUTH EVERY DAY ON AN EMPTY STOMACH 1 HOUR BEFORE OR 2 HOURS AFTER A MEAL    Idiopathic thrombocytopenic purpura (H)       rivaroxaban ANTICOAGULANT 10 MG Tabs tablet    XARELTO    30 tablet    Take 1 tablet (10 mg) by mouth daily (with dinner)    Iron deficiency anemia due to chronic blood loss, Idiopathic thrombocytopenic purpura (H), Personal history of venous thrombosis and embolism       spironolactone 25 MG tablet    ALDACTONE    90 tablet    TAKE ONE TABLET BY MOUTH ONCE DAILY    Benign essential hypertension

## 2018-08-01 ENCOUNTER — THERAPY VISIT (OUTPATIENT)
Dept: PHYSICAL THERAPY | Facility: CLINIC | Age: 73
End: 2018-08-01
Payer: COMMERCIAL

## 2018-08-01 DIAGNOSIS — M25.511 CHRONIC RIGHT SHOULDER PAIN: ICD-10-CM

## 2018-08-01 DIAGNOSIS — G89.29 CHRONIC RIGHT SHOULDER PAIN: ICD-10-CM

## 2018-08-01 PROCEDURE — G8988 SELF CARE GOAL STATUS: HCPCS | Mod: GP | Performed by: PHYSICAL THERAPIST

## 2018-08-01 PROCEDURE — 97110 THERAPEUTIC EXERCISES: CPT | Mod: GP | Performed by: PHYSICAL THERAPIST

## 2018-08-01 PROCEDURE — G8987 SELF CARE CURRENT STATUS: HCPCS | Mod: GP | Performed by: PHYSICAL THERAPIST

## 2018-08-01 PROCEDURE — 97112 NEUROMUSCULAR REEDUCATION: CPT | Mod: GP | Performed by: PHYSICAL THERAPIST

## 2018-08-01 NOTE — LETTER
[facility]  [address]  [place] [address]  [phone]    2018    Re: Raymon Ace   :   1945  MRN:  [id number]   REFERRING PHYSICIAN:   [facility]    Date of Initial Evaluation:  6/15/18  Visits:  Rxs Used: 4  Reason for Referral:  Chronic right shoulder pain    PROGRESS  REPORT  Progress reporting period is from Traci 15, 2018 to 2018. 4 visits.     SUBJECTIVE  Minimal to no change of R shoulder pain and function for the past 3-4 weeks. Temporary relief with heat, cold and self applicant of  Analgesics  At home. Patient function is fairly limited to use of his arms primarily by his side, unable to reach away from the body, overhead or behind, that is slightly more limited than 2 weeks ago.  Lifting is restricted to a couple pounds as a maximum and sleep is greatly disturbed at night. Patient has attempted self management exercises withgentle 4 corner ROM and supiine RTC program without success to this point over the past 6 weeks.    Current Pain level: 6/10   Initial Pain level: 9/10   Changes in function: No changes noted in function since last SOAP note   Adverse reactions: None    OBJECTIVE  Objective: Overall forward reach is70-80 degrees, laterally to roughly 45-50 degrees, posterior  reach to T10 , ER 50, IR 70.   Constant low grade, frustrated, pain.   Patient has been directed to hold off existing exercises to standing pendulum exercises only for pain relief  and ceiling punch only exercise. He is directed to follow up in 2-3 weeks for consult and directive rehab care at that point. In between he will consult by phone or visit. MD consult iin October.       ASSESSMENT/PLAN  Updated problem list and treatment plan: Diagnosis 1:   R shoulder pain, OA   Pain -  hot/cold therapy, self management, education, directional preference exercise and home program  Decreased ROM/flexibility - manual therapy and therapeutic  exercise  Decreased strength - therapeutic exercise and therapeutic activities  STG/LTGs have been met or progress has been made towards goals:  Yes (See Goal flow sheet completed today.)  Assessment of Progress: The patient's condition is unchanged.  Self Management Plans:  Patient has been instructed in a home treatment program.    Recommendations:  This patient would benefit from continued therapy.     Frequency:  1 X week, every 2-3 week once daily  Duration:  for 6 weeks    This patient would benefit from further evaluation.    Thank you for your referral.    INQUIRIES  Therapist: Abad Mortensen, PT, ATC, Cert. MDT  [facility]  [address]  [place] [address]  Phone: [phone]  Fax: [phone]

## 2018-08-02 NOTE — PROGRESS NOTES
Subjective:  HPI                    Objective:  System    Physical Exam    General     ROS    Assessment/Plan:    PROGRESS  REPORT    Progress reporting period is from Traci 15, 2018 to August 1, 2018. 4 visits.     SUBJECTIVE  Minimal to no change of R shoulder pain and function for the past 3-4 weeks. Temporary relief with heat, cold and self applicant of  Analgesics  At home. Patient function is fairly limited to use of his arms primarily by his side, unable to reach away from the body, overhead or behind, that is slightly more limited than 2 weeks ago.  Lifting is restricted to a couple pounds as a maximum and sleep is greatly disturbed at night. Patient has attempted self management exercises withgentle 4 corner ROM and supiine RTC program without success to this point over the past 6 weeks.    Current Pain level: 6/10   Initial Pain level: 9/10   Changes in function: No changes noted in function since last SOAP note   Adverse reactions: None;   ,         OBJECTIVE  Objective: Overall forward reach is70-80 degrees, laterally to roughly 45-50 degrees, posterior  reach to T10 , ER 50, IR 70.   Constant low grade, frustrated, pain.   Patient has been directed to hold off existing exercises to standing pendulum exercises only for pain relief  and ceiling punch only exercise. He is directed to follow up in 2-3 weeks for consult and directive rehab care at that point. In between he will consult by phone or visit. MD consult iichris October.       ASSESSMENT/PLAN  Updated problem list and treatment plan: Diagnosis 1:   R shoulder pain, OA   Pain -  hot/cold therapy, self management, education, directional preference exercise and home program  Decreased ROM/flexibility - manual therapy and therapeutic exercise  Decreased strength - therapeutic exercise and therapeutic activities  STG/LTGs have been met or progress has been made towards goals:  Yes (See Goal flow sheet completed today.)  Assessment of Progress: The patient's  condition is unchanged.  Self Management Plans:  Patient has been instructed in a home treatment program.        Recommendations:  This patient would benefit from continued therapy.     Frequency:  1 X week, every 2-3 week once daily  Duration:  for 6 weeks      This patient would benefit from further evaluation.    Please refer to the daily flowsheet for treatment today, total treatment time and time spent performing 1:1 timed codes.

## 2018-08-07 DIAGNOSIS — D50.0 IRON DEFICIENCY ANEMIA DUE TO CHRONIC BLOOD LOSS: ICD-10-CM

## 2018-08-07 DIAGNOSIS — D69.3 IDIOPATHIC THROMBOCYTOPENIC PURPURA (H): ICD-10-CM

## 2018-08-07 DIAGNOSIS — Z86.718 PERSONAL HISTORY OF VENOUS THROMBOSIS AND EMBOLISM: ICD-10-CM

## 2018-08-07 LAB
ALBUMIN SERPL-MCNC: 3.5 G/DL (ref 3.4–5)
ALP SERPL-CCNC: 65 U/L (ref 40–150)
ALT SERPL W P-5'-P-CCNC: 31 U/L (ref 0–70)
ANION GAP SERPL CALCULATED.3IONS-SCNC: 8 MMOL/L (ref 3–14)
AST SERPL W P-5'-P-CCNC: 22 U/L (ref 0–45)
BASOPHILS # BLD AUTO: 0.1 10E9/L (ref 0–0.2)
BASOPHILS NFR BLD AUTO: 0.9 %
BILIRUB SERPL-MCNC: 1.1 MG/DL (ref 0.2–1.3)
BUN SERPL-MCNC: 13 MG/DL (ref 7–30)
CALCIUM SERPL-MCNC: 8.8 MG/DL (ref 8.5–10.1)
CHLORIDE SERPL-SCNC: 109 MMOL/L (ref 94–109)
CO2 SERPL-SCNC: 24 MMOL/L (ref 20–32)
CREAT SERPL-MCNC: 0.95 MG/DL (ref 0.66–1.25)
DIFFERENTIAL METHOD BLD: ABNORMAL
EOSINOPHIL # BLD AUTO: 0.2 10E9/L (ref 0–0.7)
EOSINOPHIL NFR BLD AUTO: 2 %
ERYTHROCYTE [DISTWIDTH] IN BLOOD BY AUTOMATED COUNT: 24.5 % (ref 10–15)
FERRITIN SERPL-MCNC: 173 NG/ML (ref 26–388)
GFR SERPL CREATININE-BSD FRML MDRD: 78 ML/MIN/1.7M2
GLUCOSE SERPL-MCNC: 107 MG/DL (ref 70–99)
HCT VFR BLD AUTO: 41.2 % (ref 40–53)
HGB BLD-MCNC: 12.6 G/DL (ref 13.3–17.7)
IMM GRANULOCYTES # BLD: 0.1 10E9/L (ref 0–0.4)
IMM GRANULOCYTES NFR BLD: 1.8 %
IRON SATN MFR SERPL: 40 % (ref 15–46)
IRON SERPL-MCNC: 152 UG/DL (ref 35–180)
LYMPHOCYTES # BLD AUTO: 1.7 10E9/L (ref 0.8–5.3)
LYMPHOCYTES NFR BLD AUTO: 21 %
MCH RBC QN AUTO: 27.7 PG (ref 26.5–33)
MCHC RBC AUTO-ENTMCNC: 30.6 G/DL (ref 31.5–36.5)
MCV RBC AUTO: 91 FL (ref 78–100)
MONOCYTES # BLD AUTO: 0.7 10E9/L (ref 0–1.3)
MONOCYTES NFR BLD AUTO: 8.6 %
NEUTROPHILS # BLD AUTO: 5.2 10E9/L (ref 1.6–8.3)
NEUTROPHILS NFR BLD AUTO: 65.7 %
NRBC # BLD AUTO: 0 10*3/UL
NRBC BLD AUTO-RTO: 0 /100
PLATELET # BLD AUTO: 111 10E9/L (ref 150–450)
POTASSIUM SERPL-SCNC: 4.1 MMOL/L (ref 3.4–5.3)
PROT SERPL-MCNC: 7.1 G/DL (ref 6.8–8.8)
RBC # BLD AUTO: 4.55 10E12/L (ref 4.4–5.9)
SODIUM SERPL-SCNC: 141 MMOL/L (ref 133–144)
TIBC SERPL-MCNC: 378 UG/DL (ref 240–430)
WBC # BLD AUTO: 7.9 10E9/L (ref 4–11)

## 2018-08-07 PROCEDURE — 85025 COMPLETE CBC W/AUTO DIFF WBC: CPT | Performed by: INTERNAL MEDICINE

## 2018-08-07 PROCEDURE — 83550 IRON BINDING TEST: CPT | Performed by: INTERNAL MEDICINE

## 2018-08-07 PROCEDURE — 36415 COLL VENOUS BLD VENIPUNCTURE: CPT | Performed by: INTERNAL MEDICINE

## 2018-08-07 PROCEDURE — 83540 ASSAY OF IRON: CPT | Performed by: INTERNAL MEDICINE

## 2018-08-07 PROCEDURE — 80053 COMPREHEN METABOLIC PANEL: CPT | Performed by: INTERNAL MEDICINE

## 2018-08-07 PROCEDURE — 82728 ASSAY OF FERRITIN: CPT | Performed by: INTERNAL MEDICINE

## 2018-08-08 ENCOUNTER — TRANSFERRED RECORDS (OUTPATIENT)
Dept: HEALTH INFORMATION MANAGEMENT | Facility: CLINIC | Age: 73
End: 2018-08-08

## 2018-08-14 ENCOUNTER — TRANSFERRED RECORDS (OUTPATIENT)
Dept: HEALTH INFORMATION MANAGEMENT | Facility: CLINIC | Age: 73
End: 2018-08-14

## 2018-08-30 DIAGNOSIS — I10 BENIGN ESSENTIAL HYPERTENSION: ICD-10-CM

## 2018-08-30 RX ORDER — AMLODIPINE BESYLATE 5 MG/1
TABLET ORAL
Qty: 90 TABLET | Refills: 1 | Status: SHIPPED | OUTPATIENT
Start: 2018-08-30 | End: 2019-05-06

## 2018-08-30 NOTE — TELEPHONE ENCOUNTER
Routing refill request to provider for review/approval because:  Please review current BP reading and if advice ok to continue with current dose

## 2018-08-30 NOTE — TELEPHONE ENCOUNTER
"Requested Prescriptions   Pending Prescriptions Disp Refills     amLODIPine (NORVASC) 5 MG tablet [Pharmacy Med Name: AMLODIPINE 5MG TAB]  Last Written Prescription Date:  5/22/2018  Last Fill Quantity: 90,  # refills: 0   Last office visit: 7/12/2018 with prescribing provider:  Dr GUANAKO Cherry   Future Office Visit:     90 tablet 0     Sig: TAKE 1 TABLET BY MOUTH ONCE DAILY **NEEDS TO BE SEEN FOR REFILLS**    Calcium Channel Blockers Protocol  Passed    8/30/2018  5:30 AM       Passed - Blood pressure under 140/90 in past 12 months    BP Readings from Last 3 Encounters:   07/24/18 99/53   07/17/18 93/49   07/12/18 124/70                Passed - Recent (12 mo) or future (30 days) visit within the authorizing provider's specialty    Patient had office visit in the last 12 months or has a visit in the next 30 days with authorizing provider or within the authorizing provider's specialty.  See \"Patient Info\" tab in inbasket, or \"Choose Columns\" in Meds & Orders section of the refill encounter.           Passed - Patient is age 18 or older       Passed - Normal serum creatinine on file in past 12 months    Recent Labs   Lab Test  08/07/18   1005   CR  0.95               "

## 2018-09-02 DIAGNOSIS — I10 BENIGN ESSENTIAL HYPERTENSION: ICD-10-CM

## 2018-09-04 NOTE — TELEPHONE ENCOUNTER
"Requested Prescriptions   Pending Prescriptions Disp Refills     spironolactone (ALDACTONE) 25 MG tablet [Pharmacy Med Name: SPIRONOLACTONE 25MG    TAB]  Last Written Prescription Date:  9/25/2017  Last Fill Quantity: 90 tablet,  # refills: 2   Last Office Visit  7/12/2018        with  [facility], P or [facility] prescribing provider:     Future Office Visit:      90 tablet 2     Sig: TAKE ONE TABLET BY MOUTH ONCE DAILY    Diuretics (Including Combos) Protocol Passed    9/2/2018  6:30 AM       Passed - Blood pressure under 140/90 in past 12 months    BP Readings from Last 3 Encounters:   07/24/18 99/53   07/17/18 93/49   07/12/18 124/70            Passed - Recent (12 mo) or future (30 days) visit within the authorizing provider's specialty    Patient had office visit in the last 12 months or has a visit in the next 30 days with authorizing provider or within the authorizing provider's specialty.  See \"Patient Info\" tab in inbasket, or \"Choose Columns\" in Meds & Orders section of the refill encounter.           Passed - Patient is age 18 or older       Passed - Normal serum creatinine on file in past 12 months    Recent Labs   Lab Test  08/07/18   1005   CR  0.95             Passed - Normal serum potassium on file in past 12 months    Recent Labs   Lab Test  08/07/18   1005   POTASSIUM  4.1             Passed - Normal serum sodium on file in past 12 months    Recent Labs   Lab Test  08/07/18   1005   NA  141                "

## 2018-09-05 NOTE — TELEPHONE ENCOUNTER
Routing refill request to provider for review/approval because:  BP ? In range    Leyla Thompson RN- Triage [employer]

## 2018-09-06 ENCOUNTER — TRANSFERRED RECORDS (OUTPATIENT)
Dept: HEALTH INFORMATION MANAGEMENT | Facility: CLINIC | Age: 73
End: 2018-09-06

## 2018-09-06 RX ORDER — SPIRONOLACTONE 25 MG/1
TABLET ORAL
Qty: 90 TABLET | Refills: 0 | Status: SHIPPED | OUTPATIENT
Start: 2018-09-06 | End: 2018-12-14

## 2018-09-07 ENCOUNTER — THERAPY VISIT (OUTPATIENT)
Dept: PHYSICAL THERAPY | Facility: CLINIC | Age: 73
End: 2018-09-07
Payer: COMMERCIAL

## 2018-09-07 DIAGNOSIS — M25.511 CHRONIC RIGHT SHOULDER PAIN: ICD-10-CM

## 2018-09-07 DIAGNOSIS — D50.0 IRON DEFICIENCY ANEMIA DUE TO CHRONIC BLOOD LOSS: ICD-10-CM

## 2018-09-07 DIAGNOSIS — G89.29 CHRONIC RIGHT SHOULDER PAIN: ICD-10-CM

## 2018-09-07 PROCEDURE — 97110 THERAPEUTIC EXERCISES: CPT | Mod: GP | Performed by: PHYSICAL THERAPIST

## 2018-09-07 PROCEDURE — G8988 SELF CARE GOAL STATUS: HCPCS | Mod: GP | Performed by: PHYSICAL THERAPIST

## 2018-09-07 PROCEDURE — G8987 SELF CARE CURRENT STATUS: HCPCS | Mod: GP | Performed by: PHYSICAL THERAPIST

## 2018-09-07 NOTE — PROGRESS NOTES
Subjective:  HPI                    Objective:  System    Physical Exam    General     ROS    Assessment/Plan:    PROGRESS  REPORT    Progress reporting period is from Aug 1, 2018  to September 7 , 2018 .     SUBJECTIVE  Patient has chief complaints of R face sx's constant, fluctiuates intensity.. Patient was referred to neurolosist who noted that sx's  he felt was a referred sx from the R shoulder.. Upon questioning , patient notes of having sx's al the way down to his hand. Patient also notes that he will be undergoing low back surgery within the next couple weeks.  No change of shoulder pan, in fact, seems that he is worsening pain and function.       Current Pain level: 7/10   Initial Pain level: 9/10   Changes in function: Yes, see goal flow sheet for change in function   Adverse reactions: None;   ,         OBJECTIVE  R hand  strength 30#, L hand 40#.. Neuro upper extremity test is normal. No c/o tinnitus, weight loss, blurred visiion, difficult swallowing  associated with neuro exam. He is advised to follow up with MD for further consult.       Concern for face numbness. Referred arm pain to the hand.     ASSESSMENT/PLAN  Updated problem list and treatment plan: Diagnosis 1:  R shoulder OA    STG/LTGs have been met or progress has been made towards goals:  None  Assessment of Progress: The patient's condition is unchanged.        Recommendations:  This patient would benefit from further evaluation.    Please refer to the daily flowsheet for treatment today, total treatment time and time spent performing 1:1 timed codes.

## 2018-09-07 NOTE — LETTER
[facility]   [address]  [place] [address] [phone]    2018    Re: Raymon Ace   :   1945  MRN:  [id number]   REFERRING PHYSICIAN:   [facility]    Date of Initial Evaluation:  6/15/18  Visits:  Rxs Used: 5  Reason for Referral:     Iron deficiency anemia due to chronic blood loss  Chronic right shoulder pain    PROGRESS  REPORT  Progress reporting period is from Aug 1, 2018  to  .     SUBJECTIVE  Patient has chief complaints of R face sx's constant, fluctiuates intensity.. Patient was referred to neurolosist who noted that sx's  he felt was a referred sx from the R shoulder.. Upon questioning , patient notes of having sx's al the way down to his hand. Patient also notes that he will be undergoing low back surgery within the next couple weeks.  No change of shoulder pan, in fact, seems that he is worsening pain and function.     Current Pain level: 7/10   Initial Pain level: 9/10   Changes in function: Yes, see goal flow sheet for change in function   Adverse reactions: None    OBJECTIVE  R hand  strength 30#, L hand 40#.. Neuro upper extremity test is normal. No c/o tinnitus, weight loss, blurred visiion, difficult swallowing  associated with neuro exam. He is advised to follow up with MD for further consult.     Concern for face numbness. Referred arm pain to the hand.     ASSESSMENT/PLAN  Updated problem list and treatment plan: Diagnosis 1:  R shoulder OA    STG/LTGs have been met or progress has been made towards goals:  None  Assessment of Progress: The patient's condition is unchanged.    Recommendations:  This patient would benefit from further evaluation.    Thank you for your referral.    INQUIRIES  Therapist: Abad Mortensen, PT, ATC, Cert. MDT  [facility]   [address] [place] [address]  Phone: [phone]  Fax: [phone]

## 2018-09-14 ENCOUNTER — OFFICE VISIT (OUTPATIENT)
Dept: FAMILY MEDICINE | Facility: CLINIC | Age: 73
End: 2018-09-14
Payer: COMMERCIAL

## 2018-09-14 ENCOUNTER — RADIANT APPOINTMENT (OUTPATIENT)
Dept: GENERAL RADIOLOGY | Facility: CLINIC | Age: 73
End: 2018-09-14
Attending: FAMILY MEDICINE
Payer: COMMERCIAL

## 2018-09-14 VITALS
OXYGEN SATURATION: 94 % | HEIGHT: 67 IN | SYSTOLIC BLOOD PRESSURE: 110 MMHG | WEIGHT: 198 LBS | TEMPERATURE: 98.2 F | BODY MASS INDEX: 31.08 KG/M2 | DIASTOLIC BLOOD PRESSURE: 70 MMHG | HEART RATE: 93 BPM | RESPIRATION RATE: 18 BRPM

## 2018-09-14 DIAGNOSIS — Z01.818 PREOP GENERAL PHYSICAL EXAM: Primary | ICD-10-CM

## 2018-09-14 DIAGNOSIS — Z78.9 ALCOHOL CONSUPTION OF MORE THAN TWO DRINKS PER DAY: ICD-10-CM

## 2018-09-14 DIAGNOSIS — Z23 NEED FOR PROPHYLACTIC VACCINATION AND INOCULATION AGAINST INFLUENZA: ICD-10-CM

## 2018-09-14 DIAGNOSIS — Z79.899 ENCOUNTER FOR LONG-TERM (CURRENT) USE OF MEDICATIONS: ICD-10-CM

## 2018-09-14 DIAGNOSIS — G89.29 CHRONIC BILATERAL LOW BACK PAIN WITHOUT SCIATICA: ICD-10-CM

## 2018-09-14 DIAGNOSIS — M54.50 CHRONIC BILATERAL LOW BACK PAIN WITHOUT SCIATICA: ICD-10-CM

## 2018-09-14 DIAGNOSIS — K70.9 ALCOHOLIC LIVER DAMAGE (H): ICD-10-CM

## 2018-09-14 DIAGNOSIS — R94.31 ABNORMAL ELECTROCARDIOGRAM: ICD-10-CM

## 2018-09-14 DIAGNOSIS — M48.061 SPINAL STENOSIS OF LUMBAR REGION, UNSPECIFIED WHETHER NEUROGENIC CLAUDICATION PRESENT: ICD-10-CM

## 2018-09-14 DIAGNOSIS — Z01.818 PREOP GENERAL PHYSICAL EXAM: ICD-10-CM

## 2018-09-14 DIAGNOSIS — Z87.891 PERSONAL HISTORY OF TOBACCO USE, PRESENTING HAZARDS TO HEALTH: ICD-10-CM

## 2018-09-14 DIAGNOSIS — T56.0X1S LEAD-INDUCED CHRONIC GOUT OF FOOT WITHOUT TOPHUS, UNSPECIFIED LATERALITY, SEQUELA: ICD-10-CM

## 2018-09-14 DIAGNOSIS — R73.02 GLUCOSE INTOLERANCE (IMPAIRED GLUCOSE TOLERANCE): ICD-10-CM

## 2018-09-14 DIAGNOSIS — K76.0 FATTY LIVER: ICD-10-CM

## 2018-09-14 DIAGNOSIS — E78.01 FAMILIAL HYPERCHOLESTEROLEMIA: ICD-10-CM

## 2018-09-14 DIAGNOSIS — R74.8 ELEVATED LIVER ENZYMES: ICD-10-CM

## 2018-09-14 DIAGNOSIS — M1A.1790 LEAD-INDUCED CHRONIC GOUT OF FOOT WITHOUT TOPHUS, UNSPECIFIED LATERALITY, SEQUELA: ICD-10-CM

## 2018-09-14 DIAGNOSIS — F10.10 ALCOHOL ABUSE: ICD-10-CM

## 2018-09-14 DIAGNOSIS — D69.3 IDIOPATHIC THROMBOCYTOPENIC PURPURA (H): ICD-10-CM

## 2018-09-14 DIAGNOSIS — I10 BENIGN ESSENTIAL HYPERTENSION: ICD-10-CM

## 2018-09-14 DIAGNOSIS — I26.99 OTHER ACUTE PULMONARY EMBOLISM WITHOUT ACUTE COR PULMONALE (H): ICD-10-CM

## 2018-09-14 LAB
BASOPHILS # BLD AUTO: 0 10E9/L (ref 0–0.2)
BASOPHILS NFR BLD AUTO: 0.3 %
DIFFERENTIAL METHOD BLD: ABNORMAL
EOSINOPHIL # BLD AUTO: 0.1 10E9/L (ref 0–0.7)
EOSINOPHIL NFR BLD AUTO: 1.5 %
ERYTHROCYTE [DISTWIDTH] IN BLOOD BY AUTOMATED COUNT: 19 % (ref 10–15)
HCT VFR BLD AUTO: 47.3 % (ref 40–53)
HGB BLD-MCNC: 15.8 G/DL (ref 13.3–17.7)
LYMPHOCYTES # BLD AUTO: 1.2 10E9/L (ref 0.8–5.3)
LYMPHOCYTES NFR BLD AUTO: 15.3 %
MCH RBC QN AUTO: 30.3 PG (ref 26.5–33)
MCHC RBC AUTO-ENTMCNC: 33.4 G/DL (ref 31.5–36.5)
MCV RBC AUTO: 91 FL (ref 78–100)
MONOCYTES # BLD AUTO: 0.7 10E9/L (ref 0–1.3)
MONOCYTES NFR BLD AUTO: 9.5 %
NEUTROPHILS # BLD AUTO: 5.5 10E9/L (ref 1.6–8.3)
NEUTROPHILS NFR BLD AUTO: 73.4 %
PLATELET # BLD AUTO: 70 10E9/L (ref 150–450)
RBC # BLD AUTO: 5.22 10E12/L (ref 4.4–5.9)
WBC # BLD AUTO: 7.5 10E9/L (ref 4–11)

## 2018-09-14 PROCEDURE — 93000 ELECTROCARDIOGRAM COMPLETE: CPT | Performed by: FAMILY MEDICINE

## 2018-09-14 PROCEDURE — 84550 ASSAY OF BLOOD/URIC ACID: CPT | Performed by: FAMILY MEDICINE

## 2018-09-14 PROCEDURE — 80053 COMPREHEN METABOLIC PANEL: CPT | Performed by: FAMILY MEDICINE

## 2018-09-14 PROCEDURE — 99215 OFFICE O/P EST HI 40 MIN: CPT | Mod: 25 | Performed by: FAMILY MEDICINE

## 2018-09-14 PROCEDURE — 90662 IIV NO PRSV INCREASED AG IM: CPT | Performed by: FAMILY MEDICINE

## 2018-09-14 PROCEDURE — 36415 COLL VENOUS BLD VENIPUNCTURE: CPT | Performed by: FAMILY MEDICINE

## 2018-09-14 PROCEDURE — G0008 ADMIN INFLUENZA VIRUS VAC: HCPCS | Performed by: FAMILY MEDICINE

## 2018-09-14 PROCEDURE — 71046 X-RAY EXAM CHEST 2 VIEWS: CPT | Mod: FY

## 2018-09-14 PROCEDURE — 85025 COMPLETE CBC W/AUTO DIFF WBC: CPT | Performed by: FAMILY MEDICINE

## 2018-09-14 NOTE — LETTER
September 17, 2018      Raymon Ace  [address] [facility] [address]        Dear ,    We are writing to inform you of your test results.    Please see attached lab results   All of your lab results are normal, except as noted below.     The following are explanations of some of our lab tests     THIS DOES NOT MEAN THAT YOU HAD ALL OF THESE DONE     Please continue on the same medications unless a change is noted above     These are some general explanations for tests:     Hgb is the blood iron level   WBC means White Blood Cells   Platelets are small blood cells that help with forming the blood clots along with other blood factors.   Electrolytes are Sodium, Potassium, Calcium, Magnesium, Phosphorus.   Liver tests are: AST, ALT, Bilirubin, Alkaline Phosphatase.   Kidney tests are Creatinine, GFR.   HDL Cholesterol - is the good cholesterol and it is good to have it high.   LDL cholesterol is the bad cholesterol and it is good to have it low.   It is recommended to have LDL less than 130 for people with hypertension and to have it less than 100 for people with heart disease, diabetes and chronic kidney disease.   Triglycerides are another type of lipid and can also cause heart disease so should be kept low.   Thyroid tests are TSH, T4, T3   Glucose is sugar###it remains slightly high###   A1c is a test that gives us an idea about how well was controlled the diabetes for the last 3 months.   PSA stands for Prostate Specific Antigen and it can be elevated with prostate cancer or prostate inflammation.     The gout test, uric acid , remains nice and low ###     Resulted Orders   CBC with platelets differential   Result Value Ref Range    WBC 7.5 4.0 - 11.0 10e9/L    RBC Count 5.22 4.4 - 5.9 10e12/L    Hemoglobin 15.8 13.3 - 17.7 g/dL    Hematocrit 47.3 40.0 - 53.0 %    MCV 91 78 - 100 fl    MCH 30.3 26.5 - 33.0 pg    MCHC 33.4 31.5 - 36.5 g/dL    RDW 19.0 (H) 10.0 - 15.0 %    Platelet Count 70 (L)  150 - 450 10e9/L    Diff Method Automated Method     % Neutrophils 73.4 %    % Lymphocytes 15.3 %    % Monocytes 9.5 %    % Eosinophils 1.5 %    % Basophils 0.3 %    Absolute Neutrophil 5.5 1.6 - 8.3 10e9/L    Absolute Lymphocytes 1.2 0.8 - 5.3 10e9/L    Absolute Monocytes 0.7 0.0 - 1.3 10e9/L    Absolute Eosinophils 0.1 0.0 - 0.7 10e9/L    Absolute Basophils 0.0 0.0 - 0.2 10e9/L   Uric acid   Result Value Ref Range    Uric Acid 2.0 (L) 3.5 - 7.2 mg/dL   Comprehensive metabolic panel   Result Value Ref Range    Sodium 142 133 - 144 mmol/L    Potassium 4.1 3.4 - 5.3 mmol/L    Chloride 107 94 - 109 mmol/L    Carbon Dioxide 24 20 - 32 mmol/L    Anion Gap 11 3 - 14 mmol/L    Glucose 106 (H) 70 - 99 mg/dL    Urea Nitrogen 11 7 - 30 mg/dL    Creatinine 0.83 0.66 - 1.25 mg/dL    GFR Estimate >90 >60 mL/min/1.7m2      Comment:      Non  GFR Calc    GFR Estimate If Black >90 >60 mL/min/1.7m2      Comment:       GFR Calc    Calcium 9.3 8.5 - 10.1 mg/dL    Bilirubin Total 1.2 0.2 - 1.3 mg/dL    Albumin 4.1 3.4 - 5.0 g/dL    Protein Total 8.0 6.8 - 8.8 g/dL    Alkaline Phosphatase 85 40 - 150 U/L    ALT 25 0 - 70 U/L    AST 23 0 - 45 U/L       If you have any questions or concerns, please call the clinic at the number listed above.       Sincerely,        Rosanne Cherry MD

## 2018-09-14 NOTE — LETTER
September 17, 2018      Raymon Ace  [address] [facility] [address]        Dear ,    We are writing to inform you of your test results.    Chest x ray does not show any acute mass, infection   Heart is still slightly enlarged     Resulted Orders   XR Chest 2 Views    Narrative    CHEST TWO VIEWS  9/14/2018 3:00 PM     HISTORY: 73-year-old with spinal stenosis.       Impression    IMPRESSION: Since 8/4/2017, heart size remains slightly enlarged. No  pleural effusion, pneumothorax, or abnormal area of consolidation.  Overall, unchanged.    LUDY CAMPBELL MD       If you have any questions or concerns, please call the clinic at the number listed above.       Sincerely,        Rosanne Cherry MD

## 2018-09-14 NOTE — PROGRESS NOTES

## 2018-09-14 NOTE — NURSING NOTE
"Chief Complaint   Patient presents with     Pre-Op Exam     /70  Pulse 93  Temp 98.2  F (36.8  C) (Tympanic)  Resp 18  Ht 5' 6.5\" (1.689 m)  Wt 198 lb (89.8 kg)  SpO2 94%  BMI 31.48 kg/m2 Estimated body mass index is 31.48 kg/(m^2) as calculated from the following:    Height as of this encounter: 5' 6.5\" (1.689 m).    Weight as of this encounter: 198 lb (89.8 kg).  BP completed using cuff size: regular   Laura Kelly CMA    Health Maintenance Due   Topic Date Due     INFLUENZA VACCINE (1) 09/01/2018     Health Maintenance reviewed at today's visit patient asked to schedule/complete:   Immunizations:  Patient declined    "

## 2018-09-14 NOTE — PROGRESS NOTES
[facility]  [address]  [place] [address]  [phone]  Dept: [phone]    PRE-OP EVALUATION:  Today's date: 2018    Raymon Ace (: 1945) presents for pre-operative evaluation assessment as requested by Dr. Singh.  He requires evaluation and anesthesia risk assessment prior to undergoing surgery/procedure for treatment of Back .    Proposed Surgery/ Procedure: Back Repair  Date of Surgery/ Procedure: 10/10/18  Time of Surgery/ Procedure: 430 PM CHECK- PM SURGERY  Hospital/Surgical Facility: [facility], Dr Singh at [facility]   Fax number for surgical facility: [phone]  Primary Physician: Rosanne Cherry  Type of Anesthesia Anticipated: to be determined    Patient has a Health Care Directive or Living Will:  YES     1. NO - Do you have a history of heart attack, stroke, stent, bypass or surgery on an artery in the head, neck, heart or legs?  2. NO - Do you ever have any pain or discomfort in your chest?  3. NO - Do you have a history of  Heart Failure?  4. NO - Are you troubled by shortness of breath when: walking on the level, up a slight hill or at night?  5. NO - Do you currently have a cold, bronchitis or other respiratory infection?  6. NO - Do you have a cough, shortness of breath or wheezing?  7. NO - Do you sometimes get pains in the calves of your legs when you walk?  8. NO - Do you or anyone in your family have previous history of blood clots?  9. NO - Do you or does anyone in your family have a serious bleeding problem such as prolonged bleeding following surgeries or cuts?  10. NO - Have you ever had problems with anemia or been told to take iron pills?  11. NO - Have you had any abnormal blood loss such as black, tarry or bloody stools, or abnormal vaginal bleeding?  12. NO - Have you ever had a blood transfusion?  13. NO - Have you or any of your relatives ever had problems with  anesthesia?  14. NO - Do you have sleep apnea, excessive snoring or daytime drowsiness?  15. NO - Do you have any prosthetic heart valves?  16. NO - Do you have prosthetic joints?  17. NO - Is there any chance that you may be pregnant?      HPI:     HPI related to upcoming procedure:     Back Pain       Duration: since 1988     Surgery in 1992, 1996 and last in 3-13 per DR Gramajo         Specific cause: none    Description:   Location of pain: low back bilateral and middle of back bilateral  Character of pain: sharp and dull ache  Pain radiation:radiates into the right leg, radiates into the right foot, radiates into the left leg and radiates into the left foot  New numbness or weakness in legs, not attributed to pain:  YES    Intensity: Currently 8/10    History:   Pain interferes with job: YES  History of back problems: no prior back problems  Any previous MRI or X-rays: None  Sees a specialist for back pain:  No  Therapies tried without relief: NSAIDs, opioids and Physical Therapy    Alleviating factors:   Improved by: 0      Precipitating factors:  Worsened by: Lifting, Bending, Standing, Sitting and Walking          Accompanying Signs & Symptoms:  Risk of Fracture:  None  Risk of Cauda Equina:  None  Risk of Infection:  None  Risk of Cancer:  None  Risk of Ankylosing Spondylitis:  Onset at age <35, male, AND morning back stiffness. no                 HYPERLIPIDEMIA - Patient has a long history of significant Hyperlipidemia requiring medication for treatment with recent good control. Patient reports no problems or side effects with the medication.                                                                                                                                                       .  HYPERTENSION - Patient has longstanding history of HTN , currently denies any symptoms referable to elevated blood pressure. Specifically denies chest pain, palpitations, dyspnea, orthopnea, PND or peripheral edema.  Blood pressure readings have been in normal range. Current medication regimen is as listed below. Patient denies any side effects of medication.                                                                                                                                                                                          .    MEDICAL HISTORY:     Patient Active Problem List    Diagnosis Date Noted     ITP (idiopathic thrombocytopenic purpura) since 3-13 back surgery       Priority: High     Encounter for long-term (current) use of medications 09/14/2018     Priority: Medium     Iron deficiency anemia due to chronic blood loss 06/28/2018     Priority: Medium     Pulmonary embolism (H) large RLL w infarctio 4-17 04/18/2017     Priority: Medium     Gastroesophageal reflux disease with esophagitis 12/06/2016     Priority: Medium     Chronic right dominant  shoulder pain w ltd ROM  since 3-16 08/09/2016     Priority: Medium     Need for prophylactic vaccination against Streptococcus pneumoniae (pneumococcus) 08/04/2016     Priority: Medium     Gout involving toe, unspecified cause, unspecified chronicity, unspecified laterality 06/02/2016     Priority: Medium     ACP (advance care planning) 05/16/2016     Priority: Medium     Advance Care Planning 5/16/2016: ACP Review of Chart / Resources Provided:  Reviewed chart for advance care plan.  Raymon LORIE Ace has no plan or code status on file however states presence of ACP document. Copy requested. Confirmed code status reflects current choices pending receipt of document/advance care plan review.  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 Provider to review       Change in bowel habits since 1-14: diarrhea-thinks better off benicar 07/10/2014     Priority: Medium     Family history of ischemic heart disease 01/17/2014     Priority: Medium     Family history of diabetes mellitus 01/17/2014     Priority: Medium     Glucose intolerance (impaired glucose tolerance)  HgbA!C = 5.4 01/10/2014     Priority: Medium     Back pain since 1988 s/po surgery 1992,1996 and 3-13/ Dr Singh  01/10/2014     Priority: Medium     No CSA on file   8-=22-17 no concerns       Alcoholic liver damage (H) 01/10/2014     Priority: Medium     Diverticulosis of large intestine 01/10/2014     Priority: Medium     Problem list name updated by automated process. Provider to review       Elevated liver enzymes AST  01/10/2014     Priority: Medium     Hypotension 03/16/2013     Priority: Medium     Lumbar spinal stenosis 12/14/2012     Priority: Medium     Steroid-induced hyperglycemia 12/14/2012     Priority: Medium     Although glucose normal on 1/30/13 atr 86       Fatty liver/ 1-14 US 12/14/2012     Priority: Medium     Atherosclerosis 12/14/2012     Priority: Medium     Alcohol consuption of more than two drinks per day 12/14/2012     Priority: Medium     Obstructive sleep apnea syndrome 09/21/2012     Priority: Medium     Does not tolerate CPAP  Problem list name updated by automated process. Provider to review       Restless legs syndrome (RLS) 09/21/2012     Priority:  Medium     Polyneuropathy in other diseases classified elsewhere (H) 09/21/2012     Priority: Medium     Due to back issues       Gastroesophageal reflux disease without esophagitis 09/21/2012     Priority: Medium     Preventive measure 01/31/2013     Priority: Low     [facility]14[facility]  Colonoscopy neg in 5/10; PSA 0.31 in 9/12        Past Medical History:   Diagnosis Date     Alcohol abuse since teens 01/15/2015    Still actively drinking     Alcoholic liver damage (H) 1/10/2014     Gastroesophageal reflux disease with esophagitis 12/6/2016     Gout involving toe, unspecified cause, unspecified chronicity, unspecified laterality 6/2/2016     Heart murmur     heart is positioned farther on left side then typical     Hyperlipidemia 12/17/2015     Hypertension      ITP (idiopathic thrombocytopenic purpura)      Obstructive sleep apnea     does not use CPAP  machine     Pulmonary embolism (H) large RLL w infarctio 4-17 4/18/2017     Past Surgical History:   Procedure Laterality Date     APPENDECTOMY  1956     BACK SURGERY  1992, 1996    trimmed L4-L5, Fusion L4-L5     BONE MARROW BIOPSY, BONE SPECIMEN, NEEDLE/TROCAR  10/24/2012    Procedure: BIOPSY BONE MARROW;  BONE MARROW BIOPSY WITH ASPIRATE (AREVALO ORDERING);  Surgeon: Terri Hickey MD;  Location:  GI     ESOPHAGOSCOPY, GASTROSCOPY, DUODENOSCOPY (EGD), COMBINED N/A 2/8/2018    Procedure: COMBINED ESOPHAGOSCOPY, GASTROSCOPY, DUODENOSCOPY (EGD);  EGD;  Surgeon: Terri Crouch MD;  Location:  GI     FACIAL RECONSTRUCTION SURGERY  1965    jaw fracture     HC TOOTH EXTRACTION W/FORCEP  1990s     Current Outpatient Prescriptions   Medication Sig Dispense Refill     allopurinol (ZYLOPRIM) 300 MG tablet TAKE 1 TABLET BY MOUTH ONCE DAILY 90 tablet 2     amLODIPine (NORVASC) 5 MG tablet TAKE 1 TABLET BY MOUTH ONCE DAILY **NEEDS TO BE SEEN FOR REFILLS** 90 tablet 1     ASPIRIN NOT PRESCRIBED (INTENTIONAL) Please choose reason  not prescribed, below 1 each 0     cloNIDine (CATAPRES) 0.3 MG tablet TAKE ONE TABLET BY MOUTH TWICE DAILY .  NEEDS  TO  BE  SEEN  FOR  MORE 180 tablet 0     folic acid (FOLVITE) 1 MG tablet TAKE ONE TABLET BY MOUTH ONCE DAILY 90 tablet 3     gabapentin (NEURONTIN) 300 MG capsule TAKE ONE CAPSULE BY MOUTH THREE TIMES DAILY 270 capsule 1     OMEPRAZOLE PO Take 40 mg by mouth every morning       PROMACTA 75 MG tablet TAKE ONE TABLET BY MOUTH EVERY DAY ON AN EMPTY STOMACH 1 HOUR BEFORE OR 2 HOURS AFTER A MEAL 30 tablet 5     rivaroxaban ANTICOAGULANT (XARELTO) 10 MG TABS tablet Take 1 tablet (10 mg) by mouth daily (with dinner) 30 tablet 3     spironolactone (ALDACTONE) 25 MG tablet TAKE ONE TABLET BY MOUTH ONCE DAILY 90 tablet 0     dexamethasone (DECADRON) 4 MG tablet Take 40mgm(10 tablets of 4 mgm each )  po q day from Oct 6 to and including Oct 9, 2018 40 tablet 0     Glucos-Chond-Sterol-Fish Oil (GLUCOSAMINE CHONDROITIN PLUS PO) Take 1 tablet by mouth daily       lisinopril (PRINIVIL/ZESTRIL) 20 MG tablet Take 20 mg by mouth daily       lisinopril (PRINIVIL/ZESTRIL) 40 MG tablet Take 40 mg by mouth daily       omeprazole (PRILOSEC) 40 MG capsule TAKE ONE CAPSULE BY MOUTH ONCE DAILY 30 capsule 0     OTC products: None, except as noted above    Allergies   Allergen Reactions     Benicar [Olmesartan] Diarrhea     Onset 1-14   On benicar since 2012      Latex Allergy: NO    Social History   Substance Use Topics     Smoking status: Former Smoker     Packs/day: 1.00     Years: 28.00     Types: Cigarettes     Start date: 1958     Quit date: 9/26/1982     Smokeless tobacco: Never Used     Alcohol use 3.0 oz/week     0 Standard drinks or equivalent, 5 Shots of liquor per week      Comment: 4-5 drinks/day, alcohol use disorder     History   Drug Use No       REVIEW OF SYSTEMS:   CONSTITUTIONAL: NEGATIVE for fever, chills, change in weight  INTEGUMENTARY/SKIN: NEGATIVE for worrisome rashes, moles or lesions  EYES: NEGATIVE  "for vision changes or irritation  ENT/MOUTH: NEGATIVE for ear, mouth and throat problems  RESP: NEGATIVE for significant cough or SOB  BREAST: NEGATIVE for masses, tenderness or discharge  CV: NEGATIVE for chest pain, palpitations or peripheral edema  GI: NEGATIVE for nausea, abdominal pain, heartburn, or change in bowel habits  : NEGATIVE for frequency, dysuria, or hematuria  MUSCULOSKELETAL: NEGATIVE for significant arthralgias or myalgia  NEURO: NEGATIVE for weakness, dizziness or paresthesias  ENDOCRINE: NEGATIVE for temperature intolerance, skin/hair changes  HEME: NEGATIVE for bleeding problems  PSYCHIATRIC: NEGATIVE for changes in mood or affect    EXAM:   /70  Pulse 93  Temp 98.2  F (36.8  C) (Tympanic)  Resp 18  Ht 5' 6.5\" (1.689 m)  Wt 198 lb (89.8 kg)  SpO2 94%  BMI 31.48 kg/m2    GENERAL APPEARANCE: healthy, alert and no distress; over wt     EYES: EOMI,  PERRL     NECK: no adenopathy, no asymmetry, masses, or scars and thyroid normal to palpation     RESP: lungs clear to auscultation - no rales, rhonchi or wheezes     CV: regular rates and rhythm, normal S1 S2, no S3 or S4 and no murmur, click or rub     ABDOMEN:  soft, nontender, no HSM or masses and bowel sounds normal     MS: extremities normal- no gross deformities noted, no evidence of inflammation in joints, FROM in all extremities.     SKIN: no suspicious lesions or rashes     NEURO: Normal strength and tone, sensory exam grossly normal, mentation intact and speech normal     PSYCH: mentation appears normal. and affect normal/bright     LYMPHATICS: No cervical adenopathy    DIAGNOSTICS:     EKG: appears normal, NSR, normal axis, normal intervals, no acute ST/T changes c/w ischemia, no LVH by voltage criteria, unchanged from previous tracings, old inf MI   Labs Resulted Today:   Results for orders placed or performed in visit on 09/14/18   CBC with platelets differential   Result Value Ref Range    WBC 7.5 4.0 - 11.0 10e9/L    RBC " Count 5.22 4.4 - 5.9 10e12/L    Hemoglobin 15.8 13.3 - 17.7 g/dL    Hematocrit 47.3 40.0 - 53.0 %    MCV 91 78 - 100 fl    MCH 30.3 26.5 - 33.0 pg    MCHC 33.4 31.5 - 36.5 g/dL    RDW 19.0 (H) 10.0 - 15.0 %    Platelet Count 70 (L) 150 - 450 10e9/L    Diff Method Automated Method     % Neutrophils 73.4 %    % Lymphocytes 15.3 %    % Monocytes 9.5 %    % Eosinophils 1.5 %    % Basophils 0.3 %    Absolute Neutrophil 5.5 1.6 - 8.3 10e9/L    Absolute Lymphocytes 1.2 0.8 - 5.3 10e9/L    Absolute Monocytes 0.7 0.0 - 1.3 10e9/L    Absolute Eosinophils 0.1 0.0 - 0.7 10e9/L    Absolute Basophils 0.0 0.0 - 0.2 10e9/L   Uric acid   Result Value Ref Range    Uric Acid 2.0 (L) 3.5 - 7.2 mg/dL   Comprehensive metabolic panel   Result Value Ref Range    Sodium 142 133 - 144 mmol/L    Potassium 4.1 3.4 - 5.3 mmol/L    Chloride 107 94 - 109 mmol/L    Carbon Dioxide 24 20 - 32 mmol/L    Anion Gap 11 3 - 14 mmol/L    Glucose 106 (H) 70 - 99 mg/dL    Urea Nitrogen 11 7 - 30 mg/dL    Creatinine 0.83 0.66 - 1.25 mg/dL    GFR Estimate >90 >60 mL/min/1.7m2    GFR Estimate If Black >90 >60 mL/min/1.7m2    Calcium 9.3 8.5 - 10.1 mg/dL    Bilirubin Total 1.2 0.2 - 1.3 mg/dL    Albumin 4.1 3.4 - 5.0 g/dL    Protein Total 8.0 6.8 - 8.8 g/dL    Alkaline Phosphatase 85 40 - 150 U/L    ALT 25 0 - 70 U/L    AST 23 0 - 45 U/L       Recent Labs   Lab Test  08/07/18   1005  06/28/18   1103  05/17/18   1118   04/18/17   1125   05/16/16   1220   01/10/14   1349   HGB  12.6*  9.5*  9.7*   < >  13.3   < >   --    < >  15.0   PLT  111*  95*  106*   < >  94*   < >   --    < >  34*   INR   --    --   2.78*   --   1.02   --    --    --    --    NA  141  141  138   < >  144   < >   --    --   142   POTASSIUM  4.1  4.0  4.0   < >  4.2   < >   --    < >  4.2   CR  0.95  0.99  1.87*   < >  0.75   < >   --    --   1.00   A1C   --    --    --    --    --    --   5.4   --   5.4    < > = values in this interval not displayed.        IMPRESSION:   Reason for  surgery/procedure: LBP   Diagnosis/reason for consult: medical review       The proposed surgical procedure is considered INTERMEDIATE risk.    REVISED CARDIAC RISK INDEX  The patient has the following serious cardiovascular risks for perioperative complications such as (MI, PE, VFib and 3  AV Block):  No serious cardiac risks  INTERPRETATION: 0 risks: Class I (very low risk - 0.4% complication rate)  Cleared by [facility] HEart 9-18-18    The patient has the following additional risks for perioperative complications:  No identified additional risks      ICD-10-CM    1. Preop general physical exam Z01.818 CBC with platelets differential     XR Chest 2 Views     EKG 12-lead complete w/read - Clinics     CARDIOLOGY EVAL ADULT REFERRAL   2. Chronic bilateral low back pain with bilat  sciatica M54.5 Comprehensive metabolic panel    G89.29    3. Spinal stenosis of lumbar region, unspecified whether neurogenic claudication present M48.061 CBC with platelets differential     XR Chest 2 Views   4. Abnormal electrocardiogram 2017 old inf MI  R94.31 CARDIOLOGY EVAL ADULT REFERRAL   5. ITP (idiopathic thrombocytopenic purpura) since 3-13 back surgery  D69.3    6. Glucose intolerance (impaired glucose tolerance)  HgbA!C = 5.4- increased glucose w steroids  R73.02 Comprehensive metabolic panel   7. Other acute pulmonary embolism without acute cor pulmonale (H) on chronic anticoagulation= xarelto  I26.99    8. Fatty liver/ 1-14 US K76.0 Comprehensive metabolic panel   9. Alcohol consuption of more than two drinks per day Z78.9    10. Alcoholic liver damage (H) K70.9 Comprehensive metabolic panel   11. Elevated liver enzymes AST  R74.8 Comprehensive metabolic panel   12. Alcohol abuse since teens  F10.10    13. Benign essential hypertension I10 Comprehensive metabolic panel   14. Familial hypercholesterolemia E78.01 Comprehensive metabolic panel   15. 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 Personal history of tobacco use, presenting hazards to health: [age] y/.o@1ppd=22 pk yr hx  Z87.891    17. Encounter for long-term (current) use of medications Z79.899    18. Need for prophylactic vaccination and inoculation against influenza Z23 FLU VACCINE, INCREASED ANTIGEN, PRESV FREE, AGE 65+ [55871]     Vaccine Administration, Initial [03551]     ADMIN INFLUENZA (For MEDICARE Patients ONLY) []       RECOMMENDATIONS:     --Consult hospital rounder / IM to assist post-op medical management    --Patient is to take all scheduled medications on the day of surgery EXCEPT for modifications listed below.    APPROVAL GIVEN to proceed with proposed procedure, without further diagnostic evaluation     Weight management plan: Discussed healthy diet and exercise guidelines and patient will follow up in 6 months in clinic to re-evaluate.    Time spent with the patient 55mins, more than 50% in counseling and coordinating care, Re above medical problems.    1. ITP   -platelets usually < 90,000   -Dr Novoa , heme , eval on 9-25-18 : - need > 90,000 for surgery   10-4-18 plat = 65,000  Per Dr Novoa, will give 40 mgmdexamethasone the next 4 days ie Oct 6 to 9   ####needs platelets done prior to surgery on the day of surgery   ###platelet transfusion if < 90,000     2. Long term Anticoagulation   - on xarelto   -for PE   - will stop it 2 days prior and   May restart it 2 days after surgery ###with the approval of Dr Singh, the surgeon    3. Hyperglycemia  -chronic low grade   -does increase on steroids , which pt is receiving prior to surgery   -need to monitor his sugars     4. Hx of Alcohol Consumption   -daily since teens   -at least 2 drinks a day   -need to observe for possible DTs /withdrawal  Symptoms   -does have fatty alcoholic liver     5. Old Inferior MI   -per EKG   -no known hx   -eval per [facility] HEart on 9-18-18-->  -cleared for surgery     6. Needs medicine consult post op in  [facility]       Signed Electronically by: Rosanne Cherry MD    Copy of this evaluation report is provided to requesting physician.    [place] Preop Guidelines    Revised Cardiac Risk Index

## 2018-09-14 NOTE — LETTER
September 14, 2018      Raymon Ace  [address] [facility] [address]        Dear ,    We are writing to inform you of your test results.    All of your lab results are normal, except as noted below.     The following are explanations of some of our lab tests     THIS DOES NOT MEAN THAT YOU HAD ALL OF THESE DONE     Please continue on the same medications unless a change is noted above     These are some general explanations for tests:     Hgb is the blood iron level   WBC means White Blood Cells   Platelets are small blood cells that help with forming the blood clots along with other blood factors.###These are, of course , low   Electrolytes are Sodium, Potassium, Calcium, Magnesium, Phosphorus.   Liver tests are: AST, ALT, Bilirubin, Alkaline Phosphatase.   Kidney tests are Creatinine, GFR.   HDL Cholesterol - is the good cholesterol and it is good to have it high.   LDL cholesterol is the bad cholesterol and it is good to have it low.   It is recommended to have LDL less than 130 for people with hypertension and to have it less than 100 for people with heart disease, diabetes and chronic kidney disease.   Triglycerides are another type of lipid and can also cause heart disease so should be kept low.   Thyroid tests are TSH, T4, T3   Glucose is sugar   A1c is a test that gives us an idea about how well was controlled the diabetes for the last 3 months.   PSA stands for Prostate Specific Antigen and it can be elevated with prostate cancer or prostate inflammation.     Resulted Orders   CBC with platelets differential   Result Value Ref Range    WBC 7.5 4.0 - 11.0 10e9/L    RBC Count 5.22 4.4 - 5.9 10e12/L    Hemoglobin 15.8 13.3 - 17.7 g/dL    Hematocrit 47.3 40.0 - 53.0 %    MCV 91 78 - 100 fl    MCH 30.3 26.5 - 33.0 pg    MCHC 33.4 31.5 - 36.5 g/dL    RDW 19.0 (H) 10.0 - 15.0 %    Platelet Count 70 (L) 150 - 450 10e9/L    Diff Method Automated Method     % Neutrophils 73.4 %    % Lymphocytes  15.3 %    % Monocytes 9.5 %    % Eosinophils 1.5 %    % Basophils 0.3 %    Absolute Neutrophil 5.5 1.6 - 8.3 10e9/L    Absolute Lymphocytes 1.2 0.8 - 5.3 10e9/L    Absolute Monocytes 0.7 0.0 - 1.3 10e9/L    Absolute Eosinophils 0.1 0.0 - 0.7 10e9/L    Absolute Basophils 0.0 0.0 - 0.2 10e9/L       If you have any questions or concerns, please call the clinic at the number listed above.       Sincerely,        Rosanne Cherry MD

## 2018-09-14 NOTE — MR AVS SNAPSHOT
After Visit Summary   9/14/2018    Raymon Ace    MRN: [id number]           Patient Information     Date Of Birth          1945        Visit Information        Provider Department      9/14/2018 1:20 PM Rosanne Cherry MD [facility]        Today's Diagnoses     Preop general physical exam    -  1    Chronic bilateral low back pain without sciatica        Spinal stenosis of lumbar region, unspecified whether neurogenic claudication present        Abnormal electrocardiogram 2017 old inf MI         Fatty liver/ 1-14 US        Alcohol consuption of more than two drinks per day        Glucose intolerance (impaired glucose tolerance)  HgbA!C = 5.4        Alcoholic liver damage (H)        Elevated liver enzymes AST         Alcohol abuse since teens         Benign essential hypertension        Familial hypercholesterolemia        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 This includes signs of a cold or flu (such as a sore throat, runny nose, cough, rash or fever).    Do not smoke, drink alcohol or take over the counter medicine (unless your surgeon or primary care doctor tells you to) for the 24 hours before and after surgery.    If you take prescribed drugs: Follow your doctor s orders about which medicines to take and which to stop until after surgery.    Eating and drinking prior to surgery: follow the instructions  from your surgeon    Take a shower or bath the night before surgery. Use the soap your surgeon gave you to gently clean your skin. If you do not have soap from your surgeon, use your regular soap. Do not shave or scrub the surgery site.  Wear clean pajamas and have clean sheets on your bed.      1. Please stop fish oil,  aspirin, any NSAIDs ( incuding aleve advil, ibuprofen, etc--use tylenol= acetaminophen instead)  vitamin D, and multivitamins for 7 days prior to and 7 days after surgery     2. Shingrex is a 2 shot series that prevents shingles 97% of the time, as opposed to the old shingles shot that only prevented it at 40-50%  It costs less for medicare at a pharmacy  You should get it starting at 50 yrs old     3. Stop the xarelto 2 days prior to surgery   You have had > 6mo of it , which should be adequate for the pulmonary embolus     4.  Weight Loss Tips  1. Do not eat after 6 hrs before your expected bedtime  2. Have your heaviest meal for breakfast, a slightly lighter meal at lunch and a snack 6 hrs before bed  3. No sugar/calorie drinks except milk ie no fruit juice, pop, alcohol.  4. Drink milk 30min before meals to decrease your hunger. Also it is excellent as part of your last meal of the day snack  5. Drink lots of water  6. Increase fiber in diet: all bran cereal, salads, popcorn etc  7. Have only one small serving of fruit a day about 1/2 cup (as this is high in sugar)  8. EXERCISE is the bottom line. Without it, you will gain weight even on a low calorie diet. Best if done 2-3X a day as can    Being overweight contributes to high blood pressure and high cholesterol, both of which cause heart attacks, strokes and kidney failure, prediabetes and diabetes, arthritis, and liver disease     5. SEE ME  After you get cleared by heart& Dr Novoa  for final surgery clearance     And of course before the 10-10-18 surgery     6. See DR Novoa , hematology at [facility]  He needs to decide on the xarelto ,  steroids etc for surgery with your history of PE  And ITP  [phone]   [address] at [facility]     If you cannot get in by next week , call me by Tuesday               Follow-ups after your visit        Additional Services     CARDIOLOGY EVAL ADULT REFERRAL       Preferred location:  [facility] [phone]   [email or web address]    Please be aware that coverage of these services is subject to the terms and limitations of your health insurance plan.  Call member services at your health plan with any benefit or coverage questions.      Please bring the following to your appointment:  Any x-rays, CTs or MRIs which have been performed. Contact the facility where they were done to arrange for  prior to your scheduled appointment.    List of current medications  This referral request   Any documents/labs given to you for this referral    NEEDS CLEARANCE FOR SURGERY  ####                  Follow-up notes from your care team     Return in about 2 weeks (around 9/28/2018) for preop.      Your next 10 appointments already scheduled     Oct 18, 2018 11:45 AM CDT   [facility] with  [facility] LAB DRAW   [facility] ([facility])    [address] [place] [address]  [address]  [facility] [address]   [phone]            Oct 18, 2018 12:15 PM CDT   (Arrive by 12:00 PM)   Return Visit with Waylon Novoa MD   [facility] ([facility])    [address] [place] [address]  [address]  [facility] [address]   [phone]            Oct 19, 2018  9:30 AM CDT   Lab with  LAB   [facility] ([facility])    [address] [place] [address]  1st Floor  [facility] [address]   [phone]            Oct 19, 2018 10:30 AM CDT   (Arrive by 10:15 AM)   Return General Liver with Terri Coruch MD   [facility] ([facility]  "[place])    [address]  [address]  [facility] [address] [phone]              Who to contact     If you have questions or need follow up information about today's clinic visit or your schedule please contact [facility] directly at [phone].  Normal or non-critical lab and imaging results will be communicated to you by MyChart, letter or phone within 4 business days after the clinic has received the results. If you do not hear from us within 7 days, please contact the clinic through [employer] or phone. If you have a critical or abnormal lab result, we will notify you by phone as soon as possible.  Submit refill requests through [employer] or call your pharmacy and they will forward the refill request to us. Please allow 3 business days for your refill to be completed.          Additional Information About Your Visit        MyChart Information     [employer] gives you secure access to your electronic health record. If you see a primary care provider, you can also send messages to your care team and make appointments. If you have questions, please call your primary care clinic.  If you do not have a primary care provider, please call [phone] and they will assist you.        Care EveryWhere ID     This is your Care EveryWhere ID. This could be used by other organizations to access your [facility] medical records  [id number]        Your Vitals Were     Pulse Temperature Respirations Height Pulse Oximetry BMI (Body Mass Index)    93 98.2  F (36.8  C) (Tympanic) 18 5' 6.5\" (1.689 m) 94% 31.48 kg/m2       Blood Pressure from Last 3 Encounters:   09/14/18 110/70   07/24/18 99/53   07/17/18 93/49    Weight from Last 3 Encounters:   09/14/18 198 lb (89.8 kg)   07/12/18 193 lb (87.5 kg)   06/28/18 199 lb 3.2 oz (90.4 kg)              We Performed the Following     CARDIOLOGY EVAL ADULT REFERRAL     CBC with platelets differential     Comprehensive metabolic panel     EKG " 12-lead complete w/read - Clinics     Uric acid        Primary Care Provider Office Phone # Fax #    Rosanne Kimberly Cherry [phone]       [address] [facility] [address]        Equal Access to Services     SHERIWHIT YAYA : Hadii aad ku hadasho Soomaali, waaxda luqadaha, qaybta kaalmada adeegyada, waxay idiin haysairan adeeg khnestorsh la'sairan donavan. So [facility] [phone].    ATENCIÓN: Si habla español, tiene a wang disposición servicios gratuitos de asistencia lingüística. Llame al [phone].    We comply with applicable federal civil rights laws and [place] laws. We do not discriminate on the basis of race, color, national origin, age, disability, sex, sexual orientation, or gender identity.            Thank you!     Thank you for choosing [facility]  for your care. Our goal is always to provide you with excellent care. Hearing back from our patients is one way we can continue to improve our services. Please take a few minutes to complete the written survey that you may receive in the mail after your visit with us. Thank you!             Your Updated Medication List - Protect others around you: Learn how to safely use, store and throw away your medicines at www.disposemymeds.org.          This list is accurate as of 9/14/18  3:19 PM.  Always use your most recent med list.                   Brand Name Dispense Instructions for use Diagnosis    allopurinol 300 MG tablet    ZYLOPRIM    90 tablet    TAKE 1 TABLET BY MOUTH ONCE DAILY    Gout involving toe, unspecified cause, unspecified chronicity, unspecified laterality       amLODIPine 5 MG tablet    NORVASC    90 tablet    TAKE 1 TABLET BY MOUTH ONCE DAILY **NEEDS TO BE SEEN FOR REFILLS**    Benign essential hypertension       ASPIRIN NOT PRESCRIBED    INTENTIONAL    1 each    Please choose reason not prescribed, below    Polyneuropathy in other diseases classified elsewhere (H)       cloNIDine 0.3 MG tablet     CATAPRES    180 tablet    TAKE ONE TABLET BY MOUTH TWICE DAILY .  NEEDS  TO  BE  SEEN  FOR  MORE    Benign essential hypertension       folic acid 1 MG tablet    FOLVITE    90 tablet    TAKE ONE TABLET BY MOUTH ONCE DAILY    Alcohol abuse       gabapentin 300 MG capsule    NEURONTIN    270 capsule    TAKE ONE CAPSULE BY MOUTH THREE TIMES DAILY    Polyneuropathy in other diseases classified elsewhere (H), Chronic bilateral low back pain with sciatica, sciatica laterality unspecified       LISINOPRIL PO      Take 20 mg by mouth daily        OMEPRAZOLE PO      Take 40 mg by mouth every morning        PROMACTA 75 MG tablet   Generic drug:  eltrombopag     30 tablet    TAKE ONE TABLET BY MOUTH EVERY DAY ON AN EMPTY STOMACH 1 HOUR BEFORE OR 2 HOURS AFTER A MEAL    Idiopathic thrombocytopenic purpura (H)       rivaroxaban ANTICOAGULANT 10 MG Tabs tablet    XARELTO    30 tablet    Take 1 tablet (10 mg) by mouth daily (with dinner)    Iron deficiency anemia due to chronic blood loss, Idiopathic thrombocytopenic purpura (H), Personal history of venous thrombosis and embolism       spironolactone 25 MG tablet    ALDACTONE    90 tablet    TAKE ONE TABLET BY MOUTH ONCE DAILY    Benign essential hypertension

## 2018-09-14 NOTE — PATIENT INSTRUCTIONS
Before Your Surgery      Call your surgeon if there is any change in your health. This includes signs of a cold or flu (such as a sore throat, runny nose, cough, rash or fever).    Do not smoke, drink alcohol or take over the counter medicine (unless your surgeon or primary care doctor tells you to) for the 24 hours before and after surgery.    If you take prescribed drugs: Follow your doctor s orders about which medicines to take and which to stop until after surgery.    Eating and drinking prior to surgery: follow the instructions from your surgeon    Take a shower or bath the night before surgery. Use the soap your surgeon gave you to gently clean your skin. If you do not have soap from your surgeon, use your regular soap. Do not shave or scrub the surgery site.  Wear clean pajamas and have clean sheets on your bed.      1. Please stop fish oil,  aspirin, any NSAIDs ( incuding aleve advil, ibuprofen, etc--use tylenol= acetaminophen instead)  vitamin D, and multivitamins for 7 days prior to and 7 days after surgery     2. Shingrex is a 2 shot series that prevents shingles 97% of the time, as opposed to the old shingles shot that only prevented it at 40-50%  It costs less for medicare at a pharmacy  You should get it starting at 50 yrs old     3. Stop the xarelto 2 days prior to surgery   You have had > 6mo of it , which should be adequate for the pulmonary embolus     4.  Weight Loss Tips  1. Do not eat after 6 hrs before your expected bedtime  2. Have your heaviest meal for breakfast, a slightly lighter meal at lunch and a snack 6 hrs before bed  3. No sugar/calorie drinks except milk ie no fruit juice, pop, alcohol.  4. Drink milk 30min before meals to decrease your hunger. Also it is excellent as part of your last meal of the day snack  5. Drink lots of water  6. Increase fiber in diet: all bran cereal, salads, popcorn etc  7. Have only one small serving of fruit a day about 1/2 cup (as this is high in  sugar)  8. EXERCISE is the bottom line. Without it, you will gain weight even on a low calorie diet. Best if done 2-3X a day as can    Being overweight contributes to high blood pressure and high cholesterol, both of which cause heart attacks, strokes and kidney failure, prediabetes and diabetes, arthritis, and liver disease     5. SEE ME  After you get cleared by heart& Dr Novoa  for final surgery clearance     And of course before the 10-10-18 surgery     6. See DR Novoa , hematology at [facility]  He needs to decide on the xarelto , steroids etc for surgery with your history of PE  And ITP  [phone] [address] at [facility]     If you cannot get in by next week , call me by Tuesday

## 2018-09-14 NOTE — PROGRESS NOTES
Please see attached lab results  All of your lab results are normal, except as noted below.    The following are explanations of some of our lab tests    THIS DOES NOT MEAN THAT YOU HAD ALL OF THESE DONE    Please continue on the same medications unless a change is noted above    These are some general explanations for tests:    Hgb is the blood iron level  WBC means White Blood Cells  Platelets are small blood cells that help with forming the blood clots along with other blood factors.###These are, of course , low   Electrolytes are Sodium, Potassium, Calcium, Magnesium, Phosphorus.  Liver tests are: AST, ALT, Bilirubin, Alkaline Phosphatase.  Kidney tests are Creatinine, GFR.  HDL Cholesterol - is the good cholesterol and it is good to have it high.  LDL cholesterol is the bad cholesterol and it is good to have it low.  It is recommended to have LDL less than 130 for people with hypertension and to have it less than 100 for people with heart disease, diabetes and chronic kidney disease.  Triglycerides are another type of lipid and can also cause heart disease so should be kept low.   Thyroid tests are TSH, T4, T3  Glucose is sugar  A1c is a test that gives us an idea about how well was controlled the diabetes for the last 3 months.   PSA stands for Prostate Specific Antigen and it can be elevated with prostate cancer or prostate inflammation.

## 2018-09-15 LAB
ALBUMIN SERPL-MCNC: 4.1 G/DL (ref 3.4–5)
ALP SERPL-CCNC: 85 U/L (ref 40–150)
ALT SERPL W P-5'-P-CCNC: 25 U/L (ref 0–70)
ANION GAP SERPL CALCULATED.3IONS-SCNC: 11 MMOL/L (ref 3–14)
AST SERPL W P-5'-P-CCNC: 23 U/L (ref 0–45)
BILIRUB SERPL-MCNC: 1.2 MG/DL (ref 0.2–1.3)
BUN SERPL-MCNC: 11 MG/DL (ref 7–30)
CALCIUM SERPL-MCNC: 9.3 MG/DL (ref 8.5–10.1)
CHLORIDE SERPL-SCNC: 107 MMOL/L (ref 94–109)
CO2 SERPL-SCNC: 24 MMOL/L (ref 20–32)
CREAT SERPL-MCNC: 0.83 MG/DL (ref 0.66–1.25)
GFR SERPL CREATININE-BSD FRML MDRD: >90 ML/MIN/1.7M2
GLUCOSE SERPL-MCNC: 106 MG/DL (ref 70–99)
POTASSIUM SERPL-SCNC: 4.1 MMOL/L (ref 3.4–5.3)
PROT SERPL-MCNC: 8 G/DL (ref 6.8–8.8)
SODIUM SERPL-SCNC: 142 MMOL/L (ref 133–144)
URATE SERPL-MCNC: 2 MG/DL (ref 3.5–7.2)

## 2018-09-15 NOTE — PROGRESS NOTES
Please see attached lab results  All of your lab results are normal, except as noted below.    The following are explanations of some of our lab tests    THIS DOES NOT MEAN THAT YOU HAD ALL OF THESE DONE    Please continue on the same medications unless a change is noted above    These are some general explanations for tests:    Hgb is the blood iron level  WBC means White Blood Cells  Platelets are small blood cells that help with forming the blood clots along with other blood factors.  Electrolytes are Sodium, Potassium, Calcium, Magnesium, Phosphorus.  Liver tests are: AST, ALT, Bilirubin, Alkaline Phosphatase.  Kidney tests are Creatinine, GFR.  HDL Cholesterol - is the good cholesterol and it is good to have it high.  LDL cholesterol is the bad cholesterol and it is good to have it low.  It is recommended to have LDL less than 130 for people with hypertension and to have it less than 100 for people with heart disease, diabetes and chronic kidney disease.  Triglycerides are another type of lipid and can also cause heart disease so should be kept low.   Thyroid tests are TSH, T4, T3  Glucose is sugar###it remains slightly high###  A1c is a test that gives us an idea about how well was controlled the diabetes for the last 3 months.   PSA stands for Prostate Specific Antigen and it can be elevated with prostate cancer or prostate inflammation.    The gout test, uric acid , remains nice and low ###

## 2018-09-18 ENCOUNTER — OFFICE VISIT (OUTPATIENT)
Dept: CARDIOLOGY | Facility: CLINIC | Age: 73
End: 2018-09-18
Attending: FAMILY MEDICINE
Payer: COMMERCIAL

## 2018-09-18 VITALS
WEIGHT: 199.4 LBS | DIASTOLIC BLOOD PRESSURE: 77 MMHG | BODY MASS INDEX: 31.3 KG/M2 | HEART RATE: 66 BPM | SYSTOLIC BLOOD PRESSURE: 125 MMHG | HEIGHT: 67 IN

## 2018-09-18 DIAGNOSIS — E78.2 MIXED HYPERLIPIDEMIA: ICD-10-CM

## 2018-09-18 DIAGNOSIS — I10 BENIGN ESSENTIAL HYPERTENSION: Primary | ICD-10-CM

## 2018-09-18 PROCEDURE — 99213 OFFICE O/P EST LOW 20 MIN: CPT | Performed by: INTERNAL MEDICINE

## 2018-09-18 RX ORDER — LISINOPRIL 20 MG/1
20 TABLET ORAL DAILY
COMMUNITY
End: 2019-06-07

## 2018-09-18 NOTE — PROGRESS NOTES
HPI and Plan:   See dictation    No orders of the defined types were placed in this encounter.      Orders Placed This Encounter   Medications     Glucos-Chond-Sterol-Fish Oil (GLUCOSAMINE CHONDROITIN PLUS PO)     Sig: Take 1 tablet by mouth daily     lisinopril (PRINIVIL/ZESTRIL) 20 MG tablet     Sig: Take 20 mg by mouth daily       Encounter Diagnoses   Name Primary?     Benign essential hypertension Yes     Mixed hyperlipidemia        CURRENT MEDICATIONS:  Current Outpatient Prescriptions   Medication Sig Dispense Refill     allopurinol (ZYLOPRIM) 300 MG tablet TAKE 1 TABLET BY MOUTH ONCE DAILY 90 tablet 2     amLODIPine (NORVASC) 5 MG tablet TAKE 1 TABLET BY MOUTH ONCE DAILY **NEEDS TO BE SEEN FOR REFILLS** 90 tablet 1     cloNIDine (CATAPRES) 0.3 MG tablet TAKE ONE TABLET BY MOUTH TWICE DAILY .  NEEDS  TO  BE  SEEN  FOR  MORE 180 tablet 0     folic acid (FOLVITE) 1 MG tablet TAKE ONE TABLET BY MOUTH ONCE DAILY 90 tablet 3     gabapentin (NEURONTIN) 300 MG capsule TAKE ONE CAPSULE BY MOUTH THREE TIMES DAILY 270 capsule 1     Glucos-Chond-Sterol-Fish Oil (GLUCOSAMINE CHONDROITIN PLUS PO) Take 1 tablet by mouth daily       lisinopril (PRINIVIL/ZESTRIL) 20 MG tablet Take 20 mg by mouth daily       OMEPRAZOLE PO Take 40 mg by mouth every morning       PROMACTA 75 MG tablet TAKE ONE TABLET BY MOUTH EVERY DAY ON AN EMPTY STOMACH 1 HOUR BEFORE OR 2 HOURS AFTER A MEAL 30 tablet 5     rivaroxaban ANTICOAGULANT (XARELTO) 10 MG TABS tablet Take 1 tablet (10 mg) by mouth daily (with dinner) 30 tablet 3     spironolactone (ALDACTONE) 25 MG tablet TAKE ONE TABLET BY MOUTH ONCE DAILY 90 tablet 0     ASPIRIN NOT PRESCRIBED (INTENTIONAL) Please choose reason not prescribed, below 1 each 0       ALLERGIES     Allergies   Allergen Reactions     Benicar [Olmesartan] Diarrhea     Onset 1-14   On benicar since 2012       PAST MEDICAL HISTORY:  Past Medical History:   Diagnosis Date     Alcohol abuse since teens 01/15/2015    Still  actively drinking     Alcoholic liver damage (H) 1/10/2014     Gastroesophageal reflux disease with esophagitis 12/6/2016     Gout involving toe, unspecified cause, unspecified chronicity, unspecified laterality 6/2/2016     Heart murmur     heart is positioned farther on left side then typical     Hyperlipidemia 12/17/2015     Hypertension      ITP (idiopathic thrombocytopenic purpura)      Obstructive sleep apnea     does not use CPAP  machine     Pulmonary embolism (H) large RLL w infarctio 4-17 4/18/2017       PAST SURGICAL HISTORY:  Past Surgical History:   Procedure Laterality Date     APPENDECTOMY  1956     BACK SURGERY  1992, 1996    trimmed L4-L5, Fusion L4-L5     BONE MARROW BIOPSY, BONE SPECIMEN, NEEDLE/TROCAR  10/24/2012    Procedure: BIOPSY BONE MARROW;  BONE MARROW BIOPSY WITH ASPIRATE (AREVALO ORDERING);  Surgeon: Terri Hickey MD;  Location:  GI     ESOPHAGOSCOPY, GASTROSCOPY, DUODENOSCOPY (EGD), COMBINED N/A 2/8/2018    Procedure: COMBINED ESOPHAGOSCOPY, GASTROSCOPY, DUODENOSCOPY (EGD);  EGD;  Surgeon: Terri Crouch MD;  Location:  GI     FACIAL RECONSTRUCTION SURGERY  1965    jaw fracture     HC TOOTH EXTRACTION W/FORCEP  1990s       FAMILY HISTORY:  Family History   Problem Relation Age of Onset     Unknown/Adopted Mother      Unknown/Adopted Father      HEART DISEASE Sister      Diabetes No family hx of      Coronary Artery Disease No family hx of      Hypertension No family hx of      Hyperlipidemia No family hx of      Cerebrovascular Disease No family hx of      Breast Cancer No family hx of      Colon Cancer No family hx of      Prostate Cancer No family hx of      Other Cancer No family hx of      Depression No family hx of      Anxiety Disorder No family hx of      Mental Illness No family hx of      Substance Abuse No family hx of      Anesthesia Reaction No family hx of      Asthma No family hx of      Osteoporosis No family hx of      Genetic Disorder No family hx  "of      Thyroid Disease No family hx of      Obesity No family hx of        SOCIAL HISTORY:  Social History     Social History     Marital status:      Spouse name: N/A     Number of children: N/A     Years of education: N/A     Social History Main Topics     Smoking status: Former Smoker     Packs/day: 1.00     Years: 28.00     Types: Cigarettes     Start date: 1958     Quit date: 9/26/1982     Smokeless tobacco: Never Used     Alcohol use 3.0 oz/week     0 Standard drinks or equivalent, 5 Shots of liquor per week      Comment: 4-5 drinks/day, alcohol use disorder     Drug use: No     Sexual activity: No     Other Topics Concern     Parent/Sibling W/ Cabg, Mi Or Angioplasty Before 65f 55m? Yes     Social History Narrative       Review of Systems:  Skin:  Negative     Eyes:  Positive for glasses  ENT:  Negative    Respiratory:  Positive for shortness of breath  Cardiovascular:  Negative    Gastroenterology: Negative    Genitourinary:  Negative    Musculoskeletal:  Positive for back pain;joint pain  Neurologic:  Negative    Psychiatric:  Positive for substance abuse  Heme/Lymph/Imm:  Positive for allergies  Endocrine:  Negative      Physical Exam:  Vitals: /77  Pulse 66  Ht 1.689 m (5' 6.5\")  Wt 90.4 kg (199 lb 6.4 oz)  BMI 31.7 kg/m2    Constitutional:  cooperative, alert and oriented, well developed, well nourished, in no acute distress        Skin:  warm and dry to the touch, no apparent skin lesions or masses noted          Head:  normocephalic, no masses or lesions        Eyes:  pupils equal and round, conjunctivae and lids unremarkable, sclera white, no xanthalasma, EOMS intact, no nystagmus        Lymph:No Cervical lymphadenopathy present     ENT:  no pallor or cyanosis, dentition good        Neck:  carotid pulses are full and equal bilaterally, JVP normal, no carotid bruit        Respiratory:  clear to auscultation;no use of accessory muscles    pectus excavatum    Cardiac: regular " rhythm;normal S1 and S2;apical impulse not displaced       grade 1;systolic murmur                                                 GI:  abdomen soft, non-tender, BS normoactive, no mass, no HSM, no bruits        Extremities and Muscular Skeletal:  no edema              Neurological:  no gross motor deficits        Psych:  Alert and Oriented x 3        Recent Lab Results:  LIPID RESULTS:  Lab Results   Component Value Date    CHOL 209 (H) 01/04/2018    HDL 76 01/04/2018     (H) 01/04/2018    TRIG 71 01/04/2018    CHOLHDLRATIO 2.1 10/09/2013       LIVER ENZYME RESULTS:  Lab Results   Component Value Date    AST 23 09/14/2018    ALT 25 09/14/2018       CBC RESULTS:  Lab Results   Component Value Date    WBC 7.5 09/14/2018    RBC 5.22 09/14/2018    HGB 15.8 09/14/2018    HCT 47.3 09/14/2018    MCV 91 09/14/2018    MCH 30.3 09/14/2018    MCHC 33.4 09/14/2018    RDW 19.0 (H) 09/14/2018    PLT 70 (L) 09/14/2018       BMP RESULTS:  Lab Results   Component Value Date     09/14/2018    POTASSIUM 4.1 09/14/2018    CHLORIDE 107 09/14/2018    CO2 24 09/14/2018    ANIONGAP 11 09/14/2018     (H) 09/14/2018    BUN 11 09/14/2018    CR 0.83 09/14/2018    GFRESTIMATED >90 09/14/2018    GFRESTBLACK >90 09/14/2018    PHILIPPE 9.3 09/14/2018        A1C RESULTS:  Lab Results   Component Value Date    A1C 5.4 05/16/2016       INR RESULTS:  Lab Results   Component Value Date    INR 2.78 (H) 05/17/2018    INR 1.02 04/18/2017           CC  Rosanne Cherry MD  [address]  [place] [address]

## 2018-09-18 NOTE — MR AVS SNAPSHOT
After Visit Summary   9/18/2018    Raymon Ace    MRN: [id number]           Patient Information     Date Of Birth          1945        Visit Information        Provider Department      9/18/2018 12:15 PM Ip, Nick Quiñones MD [facility]        Today's Diagnoses     Benign essential hypertension    -  1    Mixed hyperlipidemia           Follow-ups after your visit        Your next 10 appointments already scheduled     Oct 18, 2018 11:45 AM CDT   [facility] with  [facility]   [facility])    [address]  [address]  [facility] [address]   [phone]            Oct 18, 2018 12:15 PM CDT   (Arrive by 12:00 PM)   Return Visit with Waylon Novoa MD   [facility] ([facility])    [address]  [address]  [facility] [address]   [phone]            Oct 19, 2018  9:30 AM CDT   Lab with  LAB   [facility] ([facility])    [address]  1st Floor  [facility] [address]   [phone]            Oct 19, 2018 10:30 AM CDT   (Arrive by 10:15 AM)   Return General Liver with Terri Crouch MD   [facility] ([facility])    [address] [place] [address]  [address]  [facility] [address]   [phone]              Who to contact     If you have questions or need follow up information about today's clinic visit or your schedule please contact [facility] directly at [phone].  Normal or non-critical lab and imaging results will be communicated to you by MyChart, letter or phone within 4 business days after the clinic has received the results. If you do not hear from us within 7 days, please contact the clinic through MyChart or phone. If you have a critical or abnormal lab result, we will notify you by phone as  "soon as possible.  Submit refill requests through [employer] or call your pharmacy and they will forward the refill request to us. Please allow 3 business days for your refill to be completed.          Additional Information About Your Visit        [employer] Information     [employer] gives you secure access to your electronic health record. If you see a primary care provider, you can also send messages to your care team and make appointments. If you have questions, please call your primary care clinic.  If you do not have a primary care provider, please call [phone] and they will assist you.        Care EveryWhere ID     This is your Care EveryWhere ID. This could be used by other organizations to access your [facility] medical records  [id number]        Your Vitals Were     Pulse Height BMI (Body Mass Index)             66 1.689 m (5' 6.5\") 31.7 kg/m2          Blood Pressure from Last 3 Encounters:   09/18/18 125/77   09/14/18 110/70   07/24/18 99/53    Weight from Last 3 Encounters:   09/18/18 90.4 kg (199 lb 6.4 oz)   09/14/18 89.8 kg (198 lb)   07/12/18 87.5 kg (193 lb)              Today, you had the following     No orders found for display       Primary Care Provider Office Phone # Fax #    Rosanne Kimberly Cherry [phone] [address] [facility] [address]        Equal Access to Services     [facility]: Hadii aad ku hadasho Soomaali, waaxda luqadaha, qaybta kaalmada adeegyada, gonzales holguin . So [facility] [phone].    ATENCIÓN: Si habla español, tiene a wang disposición servicios gratuitos de asistencia lingüística. Llame al [phone].    We comply with applicable federal civil rights laws and [place] laws. We do not discriminate on the basis of race, color, national origin, age, disability, sex, sexual orientation, or gender identity.            Thank you!     Thank you for choosing [facility] HEART [facility]  for your " care. Our goal is always to provide you with excellent care. Hearing back from our patients is one way we can continue to improve our services. Please take a few minutes to complete the written survey that you may receive in the mail after your visit with us. Thank you!             Your Updated Medication List - Protect others around you: Learn how to safely use, store and throw away your medicines at www.disposemymeds.org.          This list is accurate as of 9/18/18 12:44 PM.  Always use your most recent med list.                   Brand Name Dispense Instructions for use Diagnosis    allopurinol 300 MG tablet    ZYLOPRIM    90 tablet    TAKE 1 TABLET BY MOUTH ONCE DAILY    Gout involving toe, unspecified cause, unspecified chronicity, unspecified laterality       amLODIPine 5 MG tablet    NORVASC    90 tablet    TAKE 1 TABLET BY MOUTH ONCE DAILY **NEEDS TO BE SEEN FOR REFILLS**    Benign essential hypertension       ASPIRIN NOT PRESCRIBED    INTENTIONAL    1 each    Please choose reason not prescribed, below    Polyneuropathy in other diseases classified elsewhere (H)       cloNIDine 0.3 MG tablet    CATAPRES    180 tablet    TAKE ONE TABLET BY MOUTH TWICE DAILY .  NEEDS  TO  BE  SEEN  FOR  MORE    Benign essential hypertension       folic acid 1 MG tablet    FOLVITE    90 tablet    TAKE ONE TABLET BY MOUTH ONCE DAILY    Alcohol abuse       gabapentin 300 MG capsule    NEURONTIN    270 capsule    TAKE ONE CAPSULE BY MOUTH THREE TIMES DAILY    Polyneuropathy in other diseases classified elsewhere (H), Chronic bilateral low back pain with sciatica, sciatica laterality unspecified       GLUCOSAMINE CHONDROITIN PLUS PO      Take 1 tablet by mouth daily        lisinopril 20 MG tablet    PRINIVIL/ZESTRIL     Take 20 mg by mouth daily        OMEPRAZOLE PO      Take 40 mg by mouth every morning        PROMACTA 75 MG tablet   Generic drug:  eltrombopag     30 tablet    TAKE ONE TABLET BY MOUTH EVERY DAY ON AN EMPTY  STOMACH 1 HOUR BEFORE OR 2 HOURS AFTER A MEAL    Idiopathic thrombocytopenic purpura (H)       rivaroxaban ANTICOAGULANT 10 MG Tabs tablet    XARELTO    30 tablet    Take 1 tablet (10 mg) by mouth daily (with dinner)    Iron deficiency anemia due to chronic blood loss, Idiopathic thrombocytopenic purpura (H), Personal history of venous thrombosis and embolism       spironolactone 25 MG tablet    ALDACTONE    90 tablet    TAKE ONE TABLET BY MOUTH ONCE DAILY    Benign essential hypertension

## 2018-09-18 NOTE — LETTER
9/18/2018      Rosanne Cherry MD  [address]  [facility] [address]      RE: Raymon Ace       Dear Colleague,    I had the pleasure of seeing Raymon Ace in the [facility].    Service Date: 09/18/2018      HISTORY OF PRESENT ILLNESS:  It is a pleasure for me to see Mr. Ace.  He is scheduled to have spine surgery and Dr. Cherry asked him to see us because of an abnormal EKG.  His most recent EKG shows a sinus rhythm, possible left atrial enlargement, borderline first-degree AV block and left anterior hemiblock, poor R-wave progression and nonspecific ST segment changes.  Q-waves are also visible in the septal leads.  He did have an EKG prior to his spine surgery last year which pretty much shows the same changes.  He underwent surgery on his lumbar spine without any issues.        This is a gentleman with no history of congestive heart failure or coronary artery disease.  He tells me that he has had a murmur since childhood.  I do note that he has a pectus excavatum.  Previous cardiac imaging studies have been performed.  In 2012, he underwent stress nuclear study and this showed normal myocardial perfusion with normal left ventricular systolic function.  It is noted that his heart is positioned extremely posteriorly and the apex was pointed posteriorly and inferiorly.  His stress EKG from 2012 was described as having left anterior fascicular block, poor R-wave progression and nonspecific T-wave flattening diffusely.      This gentleman describes no chest pains and no shortness of breath.  However, his physical activity is limited by severe back pain with weakness in both legs.  He has no PND or orthopnea.  He is on Xarelto due to a left arm blood clot and I note that he has ITP as well.  His medical records document alcoholism which is something he denies.  He does not smoke or abuse drugs.        Cardiac examination is notable for the presence of a 1/6 systolic  murmur which does not sound hemodynamically significant.  As mentioned above, he has a pectus excavatum.  Otherwise, chest is clear and he has no significant peripheral edema.      SUMMARY:  This is a gentleman with no previous cardiac disease except for the presence of a longstanding murmur.  He has had echocardiography before which did not demonstrate any significant valvular lesions.  I suspect his murmur may be a flow murmur detected due to the presence of his pectus excavatum.  This latter chest condition may also cause EKG changes as mentioned above.  In any case, I do not think his EKG has shown any major changes over the past few years.  I do think his cardiac condition is optimized for his spine surgery and I do not think any further evaluation at this time would be helpful.  I wished him well and we will be happy to see him again in the future on an as-required basis.         LUDY JANG MD, [facility]             D: 2018   T: 2018   MT: ANGI      Name:     OSMIN ROBERTSON   MRN:      [id number]        Account:      [id number]   :      1945           Service Date: 2018      Document: [id number]         Outpatient Encounter Prescriptions as of 2018   Medication Sig Dispense Refill     allopurinol (ZYLOPRIM) 300 MG tablet TAKE 1 TABLET BY MOUTH ONCE DAILY 90 tablet 2     amLODIPine (NORVASC) 5 MG tablet TAKE 1 TABLET BY MOUTH ONCE DAILY **NEEDS TO BE SEEN FOR REFILLS** 90 tablet 1     cloNIDine (CATAPRES) 0.3 MG tablet TAKE ONE TABLET BY MOUTH TWICE DAILY .  NEEDS  TO  BE  SEEN  FOR  MORE 180 tablet 0     folic acid (FOLVITE) 1 MG tablet TAKE ONE TABLET BY MOUTH ONCE DAILY 90 tablet 3     gabapentin (NEURONTIN) 300 MG capsule TAKE ONE CAPSULE BY MOUTH THREE TIMES DAILY 270 capsule 1     Glucos-Chond-Sterol-Fish Oil (GLUCOSAMINE CHONDROITIN PLUS PO) Take 1 tablet by mouth daily       lisinopril (PRINIVIL/ZESTRIL) 20 MG tablet Take 20 mg by mouth daily       OMEPRAZOLE PO Take 40 mg by  mouth every morning       PROMACTA 75 MG tablet TAKE ONE TABLET BY MOUTH EVERY DAY ON AN EMPTY STOMACH 1 HOUR BEFORE OR 2 HOURS AFTER A MEAL 30 tablet 5     rivaroxaban ANTICOAGULANT (XARELTO) 10 MG TABS tablet Take 1 tablet (10 mg) by mouth daily (with dinner) 30 tablet 3     spironolactone (ALDACTONE) 25 MG tablet TAKE ONE TABLET BY MOUTH ONCE DAILY 90 tablet 0     ASPIRIN NOT PRESCRIBED (INTENTIONAL) Please choose reason not prescribed, below 1 each 0     [DISCONTINUED] LISINOPRIL PO Take 20 mg by mouth daily       No facility-administered encounter medications on file as of 9/18/2018.        Again, thank you for allowing me to participate in the care of your patient.      Sincerely,    DR LUDY JANG MD     [facility]

## 2018-09-18 NOTE — PROGRESS NOTES
Service Date: 09/18/2018      HISTORY OF PRESENT ILLNESS:  It is a pleasure for me to see Mr. Ace.  He is scheduled to have spine surgery and Dr. Cherry asked him to see us because of an abnormal EKG.  His most recent EKG shows a sinus rhythm, possible left atrial enlargement, borderline first-degree AV block and left anterior hemiblock, poor R-wave progression and nonspecific ST segment changes.  Q-waves are also visible in the septal leads.  He did have an EKG prior to his spine surgery last year which pretty much shows the same changes.  He underwent surgery on his lumbar spine without any issues.        This is a gentleman with no history of congestive heart failure or coronary artery disease.  He tells me that he has had a murmur since childhood.  I do note that he has a pectus excavatum.  Previous cardiac imaging studies have been performed.  In 2012, he underwent stress nuclear study and this showed normal myocardial perfusion with normal left ventricular systolic function.  It is noted that his heart is positioned extremely posteriorly and the apex was pointed posteriorly and inferiorly.  His stress EKG from 2012 was described as having left anterior fascicular block, poor R-wave progression and nonspecific T-wave flattening diffusely.      This gentleman describes no chest pains and no shortness of breath.  However, his physical activity is limited by severe back pain with weakness in both legs.  He has no PND or orthopnea.  He is on Xarelto due to a left arm blood clot and I note that he has ITP as well.  His medical records document alcoholism which is something he denies.  He does not smoke or abuse drugs.        Cardiac examination is notable for the presence of a 1/6 systolic murmur which does not sound hemodynamically significant.  As mentioned above, he has a pectus excavatum.  Otherwise, chest is clear and he has no significant peripheral edema.      SUMMARY:  This is a gentleman with no  previous cardiac disease except for the presence of a longstanding murmur.  He has had echocardiography before which did not demonstrate any significant valvular lesions.  I suspect his murmur may be a flow murmur detected due to the presence of his pectus excavatum.  This latter chest condition may also cause EKG changes as mentioned above.  In any case, I do not think his EKG has shown any major changes over the past few years.  I do think his cardiac condition is optimized for his spine surgery and I do not think any further evaluation at this time would be helpful.  I wished him well and we will be happy to see him again in the future on an as-required basis.         LUDY JANG MD, [facility]             D: 2018   T: 2018   MT: ANGI      Name:     OSMIN ROBERTSON   MRN:      [id number]        Account:      [id number]   :      1945           Service Date: 2018      Document: [id number]

## 2018-09-18 NOTE — LETTER
9/18/2018    Rosanne Cherry MD  [address]  [facility] [address]    RE: Raymon Ace       Dear Colleague,    I had the pleasure of seeing Raymon Ace in the [facility].    HPI and Plan:   See dictation    No orders of the defined types were placed in this encounter.      Orders Placed This Encounter   Medications     Glucos-Chond-Sterol-Fish Oil (GLUCOSAMINE CHONDROITIN PLUS PO)     Sig: Take 1 tablet by mouth daily     lisinopril (PRINIVIL/ZESTRIL) 20 MG tablet     Sig: Take 20 mg by mouth daily       Encounter Diagnoses   Name Primary?     Benign essential hypertension Yes     Mixed hyperlipidemia        CURRENT MEDICATIONS:  Current Outpatient Prescriptions   Medication Sig Dispense Refill     allopurinol (ZYLOPRIM) 300 MG tablet TAKE 1 TABLET BY MOUTH ONCE DAILY 90 tablet 2     amLODIPine (NORVASC) 5 MG tablet TAKE 1 TABLET BY MOUTH ONCE DAILY **NEEDS TO BE SEEN FOR REFILLS** 90 tablet 1     cloNIDine (CATAPRES) 0.3 MG tablet TAKE ONE TABLET BY MOUTH TWICE DAILY .  NEEDS  TO  BE  SEEN  FOR  MORE 180 tablet 0     folic acid (FOLVITE) 1 MG tablet TAKE ONE TABLET BY MOUTH ONCE DAILY 90 tablet 3     gabapentin (NEURONTIN) 300 MG capsule TAKE ONE CAPSULE BY MOUTH THREE TIMES DAILY 270 capsule 1     Glucos-Chond-Sterol-Fish Oil (GLUCOSAMINE CHONDROITIN PLUS PO) Take 1 tablet by mouth daily       lisinopril (PRINIVIL/ZESTRIL) 20 MG tablet Take 20 mg by mouth daily       OMEPRAZOLE PO Take 40 mg by mouth every morning       PROMACTA 75 MG tablet TAKE ONE TABLET BY MOUTH EVERY DAY ON AN EMPTY STOMACH 1 HOUR BEFORE OR 2 HOURS AFTER A MEAL 30 tablet 5     rivaroxaban ANTICOAGULANT (XARELTO) 10 MG TABS tablet Take 1 tablet (10 mg) by mouth daily (with dinner) 30 tablet 3     spironolactone (ALDACTONE) 25 MG tablet TAKE ONE TABLET BY MOUTH ONCE DAILY 90 tablet 0     ASPIRIN NOT PRESCRIBED (INTENTIONAL) Please choose reason not prescribed, below 1 each 0       ALLERGIES      Allergies   Allergen Reactions     Benicar [Olmesartan] Diarrhea     Onset 1-14   On benicar since 2012       PAST MEDICAL HISTORY:  Past Medical History:   Diagnosis Date     Alcohol abuse since teens 01/15/2015    Still actively drinking     Alcoholic liver damage (H) 1/10/2014     Gastroesophageal reflux disease with esophagitis 12/6/2016     Gout involving toe, unspecified cause, unspecified chronicity, unspecified laterality 6/2/2016     Heart murmur     heart is positioned farther on left side then typical     Hyperlipidemia 12/17/2015     Hypertension      ITP (idiopathic thrombocytopenic purpura)      Obstructive sleep apnea     does not use CPAP  machine     Pulmonary embolism (H) large RLL w infarctio 4-17 4/18/2017       PAST SURGICAL HISTORY:  Past Surgical History:   Procedure Laterality Date     APPENDECTOMY  1956     BACK SURGERY  1992, 1996    trimmed L4-L5, Fusion L4-L5     BONE MARROW BIOPSY, BONE SPECIMEN, NEEDLE/TROCAR  10/24/2012    Procedure: BIOPSY BONE MARROW;  BONE MARROW BIOPSY WITH ASPIRATE (AREVALO ORDERING);  Surgeon: Terri Hickey MD;  Location:  GI     ESOPHAGOSCOPY, GASTROSCOPY, DUODENOSCOPY (EGD), COMBINED N/A 2/8/2018    Procedure: COMBINED ESOPHAGOSCOPY, GASTROSCOPY, DUODENOSCOPY (EGD);  EGD;  Surgeon: Terri Crouch MD;  Location:  GI     FACIAL RECONSTRUCTION SURGERY  1965    jaw fracture     HC TOOTH EXTRACTION W/FORCEP  1990s       FAMILY HISTORY:  Family History   Problem Relation Age of Onset     Unknown/Adopted Mother      Unknown/Adopted Father      HEART DISEASE Sister      Diabetes No family hx of      Coronary Artery Disease No family hx of      Hypertension No family hx of      Hyperlipidemia No family hx of      Cerebrovascular Disease No family hx of      Breast Cancer No family hx of      Colon Cancer No family hx of      Prostate Cancer No family hx of      Other Cancer No family hx of      Depression No family hx of      Anxiety Disorder No  "family hx of      Mental Illness No family hx of      Substance Abuse No family hx of      Anesthesia Reaction No family hx of      Asthma No family hx of      Osteoporosis No family hx of      Genetic Disorder No family hx of      Thyroid Disease No family hx of      Obesity No family hx of        SOCIAL HISTORY:  Social History     Social History     Marital status:      Spouse name: N/A     Number of children: N/A     Years of education: N/A     Social History Main Topics     Smoking status: Former Smoker     Packs/day: 1.00     Years: 28.00     Types: Cigarettes     Start date: 1958     Quit date: 9/26/1982     Smokeless tobacco: Never Used     Alcohol use 3.0 oz/week     0 Standard drinks or equivalent, 5 Shots of liquor per week      Comment: 4-5 drinks/day, alcohol use disorder     Drug use: No     Sexual activity: No     Other Topics Concern     Parent/Sibling W/ Cabg, Mi Or Angioplasty Before 65f 55m? Yes     Social History Narrative       Review of Systems:  Skin:  Negative     Eyes:  Positive for glasses  ENT:  Negative    Respiratory:  Positive for shortness of breath  Cardiovascular:  Negative    Gastroenterology: Negative    Genitourinary:  Negative    Musculoskeletal:  Positive for back pain;joint pain  Neurologic:  Negative    Psychiatric:  Positive for substance abuse  Heme/Lymph/Imm:  Positive for allergies  Endocrine:  Negative      Physical Exam:  Vitals: /77  Pulse 66  Ht 1.689 m (5' 6.5\")  Wt 90.4 kg (199 lb 6.4 oz)  BMI 31.7 kg/m2    Constitutional:  cooperative, alert and oriented, well developed, well nourished, in no acute distress        Skin:  warm and dry to the touch, no apparent skin lesions or masses noted          Head:  normocephalic, no masses or lesions        Eyes:  pupils equal and round, conjunctivae and lids unremarkable, sclera white, no xanthalasma, EOMS intact, no nystagmus        Lymph:No Cervical lymphadenopathy present     ENT:  no pallor or cyanosis, " dentition good        Neck:  carotid pulses are full and equal bilaterally, JVP normal, no carotid bruit        Respiratory:  clear to auscultation;no use of accessory muscles    pectus excavatum    Cardiac: regular rhythm;normal S1 and S2;apical impulse not displaced       grade 1;systolic murmur                                                 GI:  abdomen soft, non-tender, BS normoactive, no mass, no HSM, no bruits        Extremities and Muscular Skeletal:  no edema              Neurological:  no gross motor deficits        Psych:  Alert and Oriented x 3        Recent Lab Results:  LIPID RESULTS:  Lab Results   Component Value Date    CHOL 209 (H) 01/04/2018    HDL 76 01/04/2018     (H) 01/04/2018    TRIG 71 01/04/2018    CHOLHDLRATIO 2.1 10/09/2013       LIVER ENZYME RESULTS:  Lab Results   Component Value Date    AST 23 09/14/2018    ALT 25 09/14/2018       CBC RESULTS:  Lab Results   Component Value Date    WBC 7.5 09/14/2018    RBC 5.22 09/14/2018    HGB 15.8 09/14/2018    HCT 47.3 09/14/2018    MCV 91 09/14/2018    MCH 30.3 09/14/2018    MCHC 33.4 09/14/2018    RDW 19.0 (H) 09/14/2018    PLT 70 (L) 09/14/2018       BMP RESULTS:  Lab Results   Component Value Date     09/14/2018    POTASSIUM 4.1 09/14/2018    CHLORIDE 107 09/14/2018    CO2 24 09/14/2018    ANIONGAP 11 09/14/2018     (H) 09/14/2018    BUN 11 09/14/2018    CR 0.83 09/14/2018    GFRESTIMATED >90 09/14/2018    GFRESTBLACK >90 09/14/2018    PHILIPPE 9.3 09/14/2018        A1C RESULTS:  Lab Results   Component Value Date    A1C 5.4 05/16/2016       INR RESULTS:  Lab Results   Component Value Date    INR 2.78 (H) 05/17/2018    INR 1.02 04/18/2017           CC  Rosanne Cherry MD  [address] [place] [address]                    Thank you for allowing me to participate in the care of your patient.      Sincerely,     DR LUDY JANG MD     [facility]    cc:   Rosanne Whitaker  MD Dilip  [address] [place] [address]

## 2018-09-24 ENCOUNTER — TELEPHONE (OUTPATIENT)
Dept: ONCOLOGY | Facility: CLINIC | Age: 73
End: 2018-09-24

## 2018-09-24 NOTE — TELEPHONE ENCOUNTER
Raymon's wife Mirela calling regarding his low platelets and concern with upcoming back surgery. His platelets went from 106 >70 and he has back surgery planned with Dr. Singh 10/10. His PCP Dr. Cherry is concerned he will not be able to have surgery if his platelets remain low or continue to trend down.    Pt wondering if this could be from his Xarelto? If so, could he change prescriptions or hold Xarelto prior to surgery for his platelets to increase? Do you have recommendations on increasing his platelets prior to surgery?     Writer routed message to Dr. Novoa.     Per Dr. Novoa:     We will see him in [facility] clinic this week.  My  is calling him now.       Tracy Horne RN   [facility] Triage

## 2018-09-25 ENCOUNTER — OFFICE VISIT (OUTPATIENT)
Dept: HEMATOLOGY | Facility: CLINIC | Age: 73
End: 2018-09-25
Attending: INTERNAL MEDICINE
Payer: COMMERCIAL

## 2018-09-25 VITALS
BODY MASS INDEX: 32.14 KG/M2 | RESPIRATION RATE: 12 BRPM | WEIGHT: 200 LBS | OXYGEN SATURATION: 100 % | TEMPERATURE: 98.3 F | DIASTOLIC BLOOD PRESSURE: 81 MMHG | SYSTOLIC BLOOD PRESSURE: 133 MMHG | HEIGHT: 66 IN | HEART RATE: 65 BPM

## 2018-09-25 DIAGNOSIS — D69.3 IDIOPATHIC THROMBOCYTOPENIC PURPURA (H): Primary | ICD-10-CM

## 2018-09-25 LAB
ERYTHROCYTE [DISTWIDTH] IN BLOOD BY AUTOMATED COUNT: 17.2 % (ref 10–15)
HCT VFR BLD AUTO: 47 % (ref 40–53)
HGB BLD-MCNC: 15.6 G/DL (ref 13.3–17.7)
MCH RBC QN AUTO: 30.4 PG (ref 26.5–33)
MCHC RBC AUTO-ENTMCNC: 33.2 G/DL (ref 31.5–36.5)
MCV RBC AUTO: 91 FL (ref 78–100)
PLATELET # BLD AUTO: 66 10E9/L (ref 150–450)
RBC # BLD AUTO: 5.14 10E12/L (ref 4.4–5.9)
WBC # BLD AUTO: 7.7 10E9/L (ref 4–11)

## 2018-09-25 PROCEDURE — G0463 HOSPITAL OUTPT CLINIC VISIT: HCPCS

## 2018-09-25 PROCEDURE — 40000809 ZZH STATISTIC NO DOCUMENTATION TO SUPPORT CHARGE

## 2018-09-25 PROCEDURE — 99215 OFFICE O/P EST HI 40 MIN: CPT | Performed by: INTERNAL MEDICINE

## 2018-09-25 PROCEDURE — 85027 COMPLETE CBC AUTOMATED: CPT | Performed by: INTERNAL MEDICINE

## 2018-09-25 RX ORDER — LISINOPRIL 40 MG/1
40 TABLET ORAL DAILY
COMMUNITY
Start: 2018-07-05 | End: 2018-10-18 | Stop reason: DRUGHIGH

## 2018-09-25 ASSESSMENT — PAIN SCALES - GENERAL: PAINLEVEL: WORST PAIN (10)

## 2018-09-25 NOTE — PROGRESS NOTES
[facility]  Outpatient Clinic Visit    Raymon Ace is a 73-year-old male with chronic ITP and a history of unprovoked pulmonary embolism in April 2017 for which he is maintained on long-term anticoagulation.  I last saw him in late June 2018.  At that time he was noted to be iron deficient and we arranged for a course of parenteral iron replacement therapy to which he has had a good response.  He is here today for preoperative evaluation and recommendations.  He is scheduled for spine surgery to be performed on October 10, 2018 by Dr. Singh at [facility].    For his chronic ITP, Raymon has done quite well over the last 2-3 years on Promacta with a baseline platelet count that has been stable at approximately 100,000.  On his recent preoperative evaluation by his primary physician, his platelet count was noted to be a bit lower at 70,000.  He is concerned about this because his understanding is that Dr. Singh requires a platelet count of 90,000 or greater for the upcoming surgery.    He has tolerated long-term anticoagulation with rivaroxaban without difficulty.  He has had no significant bleeding issues and nothing to suggest recurrent thrombosis.  He also needs recommendations regarding when to stop this medication prior to the upcoming surgery, and when to resume it postoperatively.    Exam:  He appears to be in reasonably good overall health.  No bruises or petechiae were noted.    Labs:  CBC today shows a white count of 7.7, hemoglobin 15.6, MCV 91, platelet count 66,000.      ASSESSMENT / PLAN:    1.  Chronic ITP.  Raymon has done quite well for the last few years on Promacta with a baseline platelet count of approximately 100,000.  His current platelet count is a bit lower than that, but still in a safe range even for long-term anticoagulation.  However, for the upcoming spine surgery, Dr. Singh reportedly would like his platelet count above 90,000.  I  explained to Raymon and his wife that platelet counts in those with chronic ITP can fluctuate quite a bit.  Thus, we will need to recheck this a few days before surgery to see where he is at at that point.  If at that time his platelet count is less than 90,000 our plan is to treat him with pulse dexamethasone (40 mg by mouth daily ×4 days).  He will have his platelet count checked at his local clinic on October 5 and we will arrange to send the dexamethasone prescription to his local pharmacy if needed.  Regardless of whether or not he needs to be treated with dexamethasone, his platelet count should be rechecked on the day of surgery.  If at that time his platelet count is not in the acceptable range, a platelet transfusion could be given.  Note that because he has ITP, he may have a less than normal response to platelet transfusion.  He has previously responded to pulse dexamethasone quite well and so I would expect he should respond to it again now if needed.  The usual response time is 1-3 days and lasts 2-3 weeks.  Thus a single course of this given in the few days prior to surgery should suffice to keep his platelet count in a safe range for the required perioperative time period.  He should also continue with Promacta at the current dose of 75 mg daily.  I reminded him to bring the medication with him to the hospital as inpatient pharmacies typically do not stock this medication.  If that is the case, he should be allowed to receive it from his home supply so as not to interrupt therapy.    2.  History of unprovoked pulmonary embolism (April 2017).  Raymon is currently doing well on long-term anticoagulation with rivaroxaban.  Because of the short half-life of this drug, he only needs to stop it 1 or 2 days prior to surgery. He takes the medication in the evening.  Thus he should take his last dose on October 7.  He should not take this medication on October 8 or 9.  Obviously he will not take it on the day of  surgery.  Assuming there are no bleeding complications, this should be resumed at his preoperative dose of 20 mg daily within 24 hours following surgery.    Raymon currently has a follow-up appointment scheduled with me on October 18.  We may reschedule that depending on how things go with the upcoming surgery.  I would like to see him back prior to when he [place] his [place] head [place] the [place].  He has all of our contact information to help facilitate rescheduling the appointment if needed.      Total time 40 minutes, all in counseling and coordination of care.        Waylon Novoa MD  Associate Professor of Medicine  Division of Hematology, Oncology, and Transplantation  Director, [facility]

## 2018-09-25 NOTE — PATIENT INSTRUCTIONS
Instructions for upcoming surgery on 10/10/18:    1.  Check platelet count on 10/5.  If platelets are less than 90,000, will treat with dexamethasone 40 mg by mouth daily x 4 days.  Either way, they should re-check platelets on the day of surgery.    2.  If platelets are too low on the day of surgery, platelet transfusion can be given.    3.  Do not take rivaroxaban (Xarelto) on 10/8 or 10/9.  Should be able to resume on 10/11.    4.  Continue taking Promacta 75 mg each day.  Bring your home supply to the hospital in case the inpatient pharmacy does not have the medication.    Numbers to call if needed:  [facility]:  [phone]  Outside Physician referral line:  [phone]

## 2018-09-25 NOTE — MR AVS SNAPSHOT
After Visit Summary   9/25/2018    Raymon Ace    MRN: [id number]           Patient Information     Date Of Birth          1945        Visit Information        Provider Department      9/25/2018 1:30 PM Waylon Novoa MD [facility]        Care Instructions    Instructions for upcoming surgery on 10/10/18:    1.  Check platelet count on 10/5.  If platelets are less than 90,000, will treat with dexamethasone 40 mg by mouth daily x 4 days.  Either way, they should re-check platelets on the day of surgery.    2.  If platelets are too low on the day of surgery, platelet transfusion can be given.    3.  Do not take rivaroxaban (Xarelto) on 10/8 or 10/9.  Should be able to resume on 10/11.    4.  Continue taking Promacta 75 mg each day.  Bring your home supply to the hospital in case the inpatient pharmacy does not have the medication.    Numbers to call if needed:  [facility]:  [phone]  Outside Physician referral line:  [phone]                  Follow-ups after your visit        Your next 10 appointments already scheduled     Oct 18, 2018 11:45 AM CDT   [facility] with  [facility]   [facility] ([facility])    [address]  [address]  [facility] [id number] [phone]            Oct 18, 2018 12:15 PM CDT   (Arrive by 12:00 PM)   Return Visit with Waylon Novoa MD   [facility] ([facility])    [address]  [address]  [facility] [id number] [phone]            Oct 19, 2018  9:30 AM CDT   Lab with  LAB   [facility] Lab ([facility])    [address]  1st Floor  [facility] [address]   [phone]            Oct 19, 2018 10:30 AM CDT   (Arrive by 10:15 AM)   Return General Liver with Terri Crouch MD   [facility] Hepatology ([facility]  "Surgery Center)    [address]  [address]  [facility] [address] [phone]              Who to contact     If you have questions or need follow up information about today's clinic visit or your schedule please contact [facility] directly at [phone].  Normal or non-critical lab and imaging results will be communicated to you by MyChart, letter or phone within 4 business days after the clinic has received the results. If you do not hear from us within 7 days, please contact the clinic through [employer] or phone. If you have a critical or abnormal lab result, we will notify you by phone as soon as possible.  Submit refill requests through [employer] or call your pharmacy and they will forward the refill request to us. Please allow 3 business days for your refill to be completed.          Additional Information About Your Visit        MyChart Information     [employer] gives you secure access to your electronic health record. If you see a primary care provider, you can also send messages to your care team and make appointments. If you have questions, please call your primary care clinic.  If you do not have a primary care provider, please call [phone] and they will assist you.        Care EveryWhere ID     This is your Care EveryWhere ID. This could be used by other organizations to access your [facility] medical records  [id number]        Your Vitals Were     Pulse Temperature Respirations Height Pulse Oximetry BMI (Body Mass Index)    65 98.3  F (36.8  C) (Oral) 12 1.669 m (5' 5.7\") 100% 32.58 kg/m2       Blood Pressure from Last 3 Encounters:   09/25/18 133/81   09/18/18 125/77   09/14/18 110/70    Weight from Last 3 Encounters:   09/25/18 90.7 kg (200 lb)   09/18/18 90.4 kg (199 lb 6.4 oz)   09/14/18 89.8 kg (198 lb)              Today, you had the following     No orders found for display       Primary Care Provider Office Phone # Fax #    Rosanne Whitaker " MD [place]ip [phone]       [address]  [facility] [address]        Equal Access to Services     NENA JAVIER : Hadii aad ku hadtheodorajaime Ivisandria, fernda ritaingeha, qabetota katerranceda puneet, gonzales sarabjitkatherine vega laToddchana go. So [facility] [place] [phone].    ATENCIÓN: Si habla español, tiene a wang disposición servicios gratuitos de asistencia lingüística. Llame al [phone].    We comply with applicable federal civil rights laws and [place] laws. We do not discriminate on the basis of race, color, national origin, age, disability, sex, sexual orientation, or gender identity.            Thank you!     Thank you for choosing [facility]  for your care. Our goal is always to provide you with excellent care. Hearing back from our patients is one way we can continue to improve our services. Please take a few minutes to complete the written survey that you may receive in the mail after your visit with us. Thank you!             Your Updated Medication List - Protect others around you: Learn how to safely use, store and throw away your medicines at www.disposemymeds.org.          This list is accurate as of 9/25/18  2:10 PM.  Always use your most recent med list.                   Brand Name Dispense Instructions for use Diagnosis    allopurinol 300 MG tablet    ZYLOPRIM    90 tablet    TAKE 1 TABLET BY MOUTH ONCE DAILY    Gout involving toe, unspecified cause, unspecified chronicity, unspecified laterality       amLODIPine 5 MG tablet    NORVASC    90 tablet    TAKE 1 TABLET BY MOUTH ONCE DAILY **NEEDS TO BE SEEN FOR REFILLS**    Benign essential hypertension       ASPIRIN NOT PRESCRIBED    INTENTIONAL    1 each    Please choose reason not prescribed, below    Polyneuropathy in other diseases classified elsewhere (H)       cloNIDine 0.3 MG tablet    CATAPRES    180 tablet    TAKE ONE TABLET BY MOUTH TWICE DAILY .  NEEDS  TO  BE  SEEN  FOR  MORE    Benign essential  hypertension       folic acid 1 MG tablet    FOLVITE    90 tablet    TAKE ONE TABLET BY MOUTH ONCE DAILY    Alcohol abuse       gabapentin 300 MG capsule    NEURONTIN    270 capsule    TAKE ONE CAPSULE BY MOUTH THREE TIMES DAILY    Polyneuropathy in other diseases classified elsewhere (H), Chronic bilateral low back pain with sciatica, sciatica laterality unspecified       GLUCOSAMINE CHONDROITIN PLUS PO      Take 1 tablet by mouth daily        * lisinopril 20 MG tablet    PRINIVIL/ZESTRIL     Take 20 mg by mouth daily        * lisinopril 40 MG tablet    PRINIVIL/ZESTRIL     Take 40 mg by mouth daily        OMEPRAZOLE PO      Take 40 mg by mouth every morning        PROMACTA 75 MG tablet   Generic drug:  eltrombopag     30 tablet    TAKE ONE TABLET BY MOUTH EVERY DAY ON AN EMPTY STOMACH 1 HOUR BEFORE OR 2 HOURS AFTER A MEAL    Idiopathic thrombocytopenic purpura (H)       rivaroxaban ANTICOAGULANT 10 MG Tabs tablet    XARELTO    30 tablet    Take 1 tablet (10 mg) by mouth daily (with dinner)    Iron deficiency anemia due to chronic blood loss, Idiopathic thrombocytopenic purpura (H), Personal history of venous thrombosis and embolism       spironolactone 25 MG tablet    ALDACTONE    90 tablet    TAKE ONE TABLET BY MOUTH ONCE DAILY    Benign essential hypertension       * Notice:  This list has 2 medication(s) that are the same as other medications prescribed for you. Read the directions carefully, and ask your doctor or other care provider to review them with you.

## 2018-09-27 DIAGNOSIS — K76.0 FATTY LIVER: Primary | ICD-10-CM

## 2018-09-27 NOTE — NURSING NOTE
Raymon Ace has chronic ITP and history of DVT and is here to discuss  recent low platelet counts, as he is scheduled for spine surgery on 10/10.    Welcomed them provided them with a card with our contact phone numbers.     Medications and allergies were reviewed, no updates needed.    I thanked them for coming in and encouraged them to call with any concerns or questions.    Rosie Jalloh, RN - Nurse Clinician - [facility] - [phone]

## 2018-10-01 DIAGNOSIS — K21.9 GASTROESOPHAGEAL REFLUX DISEASE WITHOUT ESOPHAGITIS: Primary | ICD-10-CM

## 2018-10-01 NOTE — TELEPHONE ENCOUNTER
"Requested Prescriptions   Pending Prescriptions Disp Refills     omeprazole (PRILOSEC) 40 MG capsule [Pharmacy Med Name: OMEPRAZOLE DR 40MG  CAP] 90 capsule 2      Last Written Prescription Date:  n/a  Last Fill Quantity: n/a,  # refills: n/a   Last office visit: 9/14/2018 with prescribing provider:     Future Office Visit:   Next 5 appointments (look out 90 days)     Oct 05, 2018  1:00 PM CDT   [facility] with Rosanne Cherry MD   [facility]    [address] [place] [id number]   [phone]                  Sig: TAKE ONE CAPSULE BY MOUTH ONCE DAILY    PPI Protocol Passed    10/1/2018  5:31 AM       Passed - Not on Clopidogrel (unless Pantoprazole ordered)       Passed - No diagnosis of osteoporosis on record       Passed - Recent (12 mo) or future (30 days) visit within the authorizing provider's specialty    Patient had office visit in the last 12 months or has a visit in the next 30 days with authorizing provider or within the authorizing provider's specialty.  See \"Patient Info\" tab in inbasket, or \"Choose Columns\" in Meds & Orders section of the refill encounter.           Passed - Patient is age 18 or older          "

## 2018-10-03 NOTE — TELEPHONE ENCOUNTER
Routing refill request to provider for review/approval because:  Medication is reported/historical    Mary TSE RN  [employer] Triage

## 2018-10-04 RX ORDER — OMEPRAZOLE 40 MG/1
CAPSULE, DELAYED RELEASE ORAL
Qty: 30 CAPSULE | Refills: 0 | Status: SHIPPED | OUTPATIENT
Start: 2018-10-04 | End: 2018-11-12

## 2018-10-05 ENCOUNTER — TELEPHONE (OUTPATIENT)
Dept: FAMILY MEDICINE | Facility: CLINIC | Age: 73
End: 2018-10-05

## 2018-10-05 ENCOUNTER — OFFICE VISIT (OUTPATIENT)
Dept: FAMILY MEDICINE | Facility: CLINIC | Age: 73
End: 2018-10-05
Payer: COMMERCIAL

## 2018-10-05 VITALS
HEIGHT: 67 IN | HEART RATE: 83 BPM | DIASTOLIC BLOOD PRESSURE: 72 MMHG | RESPIRATION RATE: 18 BRPM | WEIGHT: 200 LBS | TEMPERATURE: 97.7 F | BODY MASS INDEX: 31.39 KG/M2 | SYSTOLIC BLOOD PRESSURE: 124 MMHG | OXYGEN SATURATION: 97 %

## 2018-10-05 DIAGNOSIS — M54.42 CHRONIC BILATERAL LOW BACK PAIN WITH LEFT-SIDED SCIATICA: ICD-10-CM

## 2018-10-05 DIAGNOSIS — T38.0X5A STEROID-INDUCED HYPERGLYCEMIA: ICD-10-CM

## 2018-10-05 DIAGNOSIS — G89.29 CHRONIC BILATERAL LOW BACK PAIN WITH LEFT-SIDED SCIATICA: ICD-10-CM

## 2018-10-05 DIAGNOSIS — R73.9 STEROID-INDUCED HYPERGLYCEMIA: ICD-10-CM

## 2018-10-05 DIAGNOSIS — M48.061 SPINAL STENOSIS OF LUMBAR REGION, UNSPECIFIED WHETHER NEUROGENIC CLAUDICATION PRESENT: ICD-10-CM

## 2018-10-05 DIAGNOSIS — I26.99 OTHER ACUTE PULMONARY EMBOLISM WITHOUT ACUTE COR PULMONALE (H): ICD-10-CM

## 2018-10-05 DIAGNOSIS — D69.3 IDIOPATHIC THROMBOCYTOPENIC PURPURA (H): Primary | ICD-10-CM

## 2018-10-05 LAB
BASOPHILS # BLD AUTO: 0 10E9/L (ref 0–0.2)
BASOPHILS NFR BLD AUTO: 0.4 %
DIFFERENTIAL METHOD BLD: ABNORMAL
EOSINOPHIL # BLD AUTO: 0.2 10E9/L (ref 0–0.7)
EOSINOPHIL NFR BLD AUTO: 1.7 %
ERYTHROCYTE [DISTWIDTH] IN BLOOD BY AUTOMATED COUNT: 16.4 % (ref 10–15)
HCT VFR BLD AUTO: 46.7 % (ref 40–53)
HGB BLD-MCNC: 15.6 G/DL
LYMPHOCYTES # BLD AUTO: 1.6 10E9/L (ref 0.8–5.3)
LYMPHOCYTES NFR BLD AUTO: 16.7 %
MCH RBC QN AUTO: 30.6 PG (ref 26.5–33)
MCHC RBC AUTO-ENTMCNC: 33.4 G/DL (ref 31.5–36.5)
MCV RBC AUTO: 92 FL (ref 78–100)
MONOCYTES # BLD AUTO: 0.8 10E9/L (ref 0–1.3)
MONOCYTES NFR BLD AUTO: 8.4 %
NEUTROPHILS # BLD AUTO: 6.9 10E9/L (ref 1.6–8.3)
NEUTROPHILS NFR BLD AUTO: 72.8 %
PLATELET # BLD AUTO: 65 10E9/L (ref 150–450)
RBC # BLD AUTO: 5.09 10E12/L (ref 4.4–5.9)
WBC # BLD AUTO: 9.5 10E9/L (ref 4–11)

## 2018-10-05 PROCEDURE — 85025 COMPLETE CBC W/AUTO DIFF WBC: CPT | Performed by: FAMILY MEDICINE

## 2018-10-05 PROCEDURE — 99213 OFFICE O/P EST LOW 20 MIN: CPT | Performed by: FAMILY MEDICINE

## 2018-10-05 PROCEDURE — 36415 COLL VENOUS BLD VENIPUNCTURE: CPT | Performed by: FAMILY MEDICINE

## 2018-10-05 RX ORDER — DEXAMETHASONE 4 MG/1
TABLET ORAL
Qty: 40 TABLET | Refills: 0 | Status: SHIPPED | OUTPATIENT
Start: 2018-10-05 | End: 2018-11-11

## 2018-10-05 NOTE — PROGRESS NOTES
"  SUBJECTIVE:   Raymon Ace is a 73 year old male who presents to clinic today for the following health issues:             ITTP     -chronic   -platelets usually < 90,000  F/u per heme Dr Novoa     ANTICOAGULATION     - for PE   -on xarelto      HYPERGLYCEMIA ON STEROIDS     - in past   - giving large dose of dexamethasone for 4 days now to raise platelets         Amount of exercise or physical activity: 6-7 days/week for an average of 15-30 minutes    Problems taking medications regularly: No    Medication side effects: none    Diet: low salt        Problem list and histories reviewed & adjusted, as indicated.  Additional history: as documented    Labs reviewed in EPIC    Reviewed and updated as needed this visit by clinical staff  Tobacco  Allergies  Meds       Reviewed and updated as needed this visit by Provider         ROS:  CONSTITUTIONAL: NEGATIVE for fever, chills, change in weight  INTEGUMENTARY/SKIN: NEGATIVE for worrisome rashes, moles or lesions  EYES: NEGATIVE for vision changes or irritation  ENT/MOUTH: NEGATIVE for ear, mouth and throat problems  RESP: NEGATIVE for significant cough or SOB  BREAST: NEGATIVE for masses, tenderness or discharge  CV: NEGATIVE for chest pain, palpitations or peripheral edema  GI: NEGATIVE for nausea, abdominal pain, heartburn, or change in bowel habits  : NEGATIVE for frequency, dysuria, or hematuria  MUSCULOSKELETAL: NEGATIVE for significant arthralgias or myalgia  POS LBP with bilat sciatica   NEURO: NEGATIVE for weakness, dizziness or paresthesias  ENDOCRINE: NEGATIVE for temperature intolerance, skin/hair changes  HEME: NEGATIVE for bleeding problems  PSYCHIATRIC: NEGATIVE for changes in mood or affect    OBJECTIVE:     /72  Pulse 83  Temp 97.7  F (36.5  C) (Tympanic)  Resp 18  Ht 5' 6.5\" (1.689 m)  Wt 200 lb (90.7 kg)  SpO2 97%  BMI 31.8 kg/m2  Body mass index is 31.8 kg/(m^2).  GENERAL: healthy, alert, no distress, over weight and " elderly  EYES: Eyes grossly normal to inspection, PERRL and conjunctivae and sclerae normal  RESP: lungs clear to auscultation - no rales, rhonchi or wheezes  CV: regular rate and rhythm, normal S1 S2, no S3 or S4, no murmur, click or rub, no peripheral edema and peripheral pulses strong  MS: no gross musculoskeletal defects noted, no edema  SKIN: no suspicious lesions or rashes  NEURO: Normal strength and tone, mentation intact and speech normal  PSYCH: mentation appears normal, affect normal/bright    Diagnostic Test Results:  Results for orders placed or performed in visit on 10/05/18   CBC with platelets differential   Result Value Ref Range    WBC 9.5 4.0 - 11.0 10e9/L    RBC Count 5.09 4.4 - 5.9 10e12/L    Hemoglobin 15.6 g/dL    Hematocrit 46.7 40.0 - 53.0 %    MCV 92 78 - 100 fl    MCH 30.6 26.5 - 33.0 pg    MCHC 33.4 31.5 - 36.5 g/dL    RDW 16.4 (H) 10.0 - 15.0 %    Platelet Count 65 (L) 150 - 450 10e9/L    % Neutrophils 72.8 %    % Lymphocytes 16.7 %    % Monocytes 8.4 %    % Eosinophils 1.7 %    % Basophils 0.4 %    Absolute Neutrophil 6.9 1.6 - 8.3 10e9/L    Absolute Lymphocytes 1.6 0.8 - 5.3 10e9/L    Absolute Monocytes 0.8 0.0 - 1.3 10e9/L    Absolute Eosinophils 0.2 0.0 - 0.7 10e9/L    Absolute Basophils 0.0 0.0 - 0.2 10e9/L    Diff Method Automated Method        ASSESSMENT/PLAN:               ICD-10-CM    1. ITP (idiopathic thrombocytopenic purpura) since 3-13 back surgery  D69.3 CBC with platelets differential     dexamethasone (DECADRON) 4 MG tablet   2. Spinal stenosis of lumbar region, unspecified whether neurogenic claudication present M48.061    3. Chronic bilateral low back pain with bilat sided sciatica M54.42     G89.29    4. Steroid-induced hyperglycemia R73.9     T38.0X5A    5. Other acute pulmonary embolism without acute cor pulmonale (H)-anticoag w xarelto  I26.99        Patient Instructions   1. ####you need to get a platelet count the am of surgery before surgery     So they can give  you a platelet transfusion prior to surgery if the platelets are < 90,000    2. Stop the xarelto 2 days prior to surgery & restart it 2 days after surgery with the permission of Dr Singh #####    3. Please stop fish oil,  aspirin, any NSAIDs ( incuding aleve advil, ibuprofen, etc--use tylenol= acetaminophen instead)  vitamin D, and multivitamins for 7 days prior to and 7 days after surgery     4. We are giving you 40mgm dexamethasone, as per recommendationof Dr Nevarez  To raise the platelets --for the next 4 days       Rosanne Cherry MD  [facility]

## 2018-10-05 NOTE — NURSING NOTE
"Chief Complaint   Patient presents with     Recheck Medication     /72  Pulse 83  Temp 97.7  F (36.5  C) (Tympanic)  Resp 18  Ht 5' 6.5\" (1.689 m)  Wt 200 lb (90.7 kg)  SpO2 97%  BMI 31.8 kg/m2 Estimated body mass index is 31.8 kg/(m^2) as calculated from the following:    Height as of this encounter: 5' 6.5\" (1.689 m).    Weight as of this encounter: 200 lb (90.7 kg).  BP completed using cuff size: kendy Kelly CMA    There are no preventive care reminders to display for this patient.  Health Maintenance reviewed at today's visit patient asked to schedule/complete:   None, Health Maintenance up to date.    "

## 2018-10-05 NOTE — PATIENT INSTRUCTIONS
1. ####you need to get a platelet count the am of surgery before surgery     So they can give you a platelet transfusion prior to surgery if the platelets are < 90,000    2. Stop the xarelto 2 days prior to surgery & restart it 2 days after surgery with the permission of Dr Singh #####    3. Please stop fish oil,  aspirin, any NSAIDs ( incuding aleve advil, ibuprofen, etc--use tylenol= acetaminophen instead)  vitamin D, and multivitamins for 7 days prior to and 7 days after surgery     4. We are giving you 40mgm dexamethasone, as per recommendationof Dr Nevarez  To raise the platelets --for the next 4 days

## 2018-10-05 NOTE — MR AVS SNAPSHOT
After Visit Summary   10/5/2018    Raymon Ace    MRN: [id number]           Patient Information     Date Of Birth          1945        Visit Information        Provider Department      10/5/2018 1:00 PM Rosanne Cherry MD [facility]        Today's Diagnoses     ITP (idiopathic thrombocytopenic purpura) since 3-13 back surgery     -  1    Spinal stenosis of lumbar region, unspecified whether neurogenic claudication present        Chronic bilateral low back pain with bilat sided sciatica          Care Instructions    1. ####you need to get a platelet count the am of surgery before surgery     So they can give you a platelet transfusion prior to surgery if the platelets are < 90,000    2. Stop the xarelto 2 days prior to surgery & restart it 2 days after surgery with the permission of Dr Singh #####    3. Please stop fish oil,  aspirin, any NSAIDs ( incuding aleve advil, ibuprofen, etc--use tylenol= acetaminophen instead)  vitamin D, and multivitamins for 7 days prior to and 7 days after surgery     4. We are giving you 40mgm dexamethasone, as per recommendationof Dr Nevarez  To raise the platelets --for the next 4 days           Follow-ups after your visit        Your next 10 appointments already scheduled     Oct 19, 2018  9:30 AM CDT   Lab with  LAB   [facility])    [address] [place] [address]  1st Floor  [facility] [id number] [phone]            Oct 19, 2018 10:30 AM CDT   (Arrive by 10:15 AM)   Return General Liver with Terri Crouch MD   [facility] ([facility])    [address]  [address]  [facility] [id number] [phone]            Nov 15, 2018  4:45 PM CST   [facility] Draw with  [facility] DRAW   [facility] Draw ([facility])    [address] [place] [address]  [address]  [facility] [id number] [phone]     "        Nov 15, 2018  5:15 PM CST   (Arrive by 5:00 PM)   Return Visit with Waylon Novoa MD   [facility] ([facility])    [address]  [address]  [facility] [address] [phone]              Who to contact     If you have questions or need follow up information about today's clinic visit or your schedule please contact [facility] directly at [phone].  Normal or non-critical lab and imaging results will be communicated to you by Qoniachart, letter or phone within 4 business days after the clinic has received the results. If you do not hear from us within 7 days, please contact the clinic through [employer] Yoursel[employer] or phone. If you have a critical or abnormal lab result, we will notify you by phone as soon as possible.  Submit refill requests through Telarix or call your pharmacy and they will forward the refill request to us. Please allow 3 business days for your refill to be completed.          Additional Information About Your Visit        QoniacharTripConnect Information     [employer] gives you secure access to your electronic health record. If you see a primary care provider, you can also send messages to your care team and make appointments. If you have questions, please call your primary care clinic.  If you do not have a primary care provider, please call [phone] and they will assist you.        Care EveryWhere ID     This is your Care EveryWhere ID. This could be used by other organizations to access your [place] medical records  [id number]        Your Vitals Were     Pulse Temperature Respirations Height Pulse Oximetry BMI (Body Mass Index)    83 97.7  F (36.5  C) (Tympanic) 18 5' 6.5\" (1.689 m) 97% 31.8 kg/m2       Blood Pressure from Last 3 Encounters:   10/05/18 124/72   09/25/18 133/81   09/18/18 125/77    Weight from Last 3 Encounters:   10/05/18 200 lb (90.7 kg)   09/25/18 200 lb (90.7 kg)   09/18/18 199 lb 6.4 oz (90.4 " kg)              We Performed the Following     CBC with platelets differential          Today's Medication Changes          These changes are accurate as of 10/5/18  3:13 PM.  If you have any questions, ask your nurse or doctor.               Start taking these medicines.        Dose/Directions    dexamethasone 4 MG tablet   Commonly known as:  DECADRON   Used for:  Idiopathic thrombocytopenic purpura (H)   Started by:  Rosanne Cherry MD        Take 40mgm(10 tablets of 4 mgm each )  po q day from Oct 6 to and including Oct 9, 2018   Quantity:  40 tablet   Refills:  0            Where to get your medicines      These medications were sent to [id number] [address]  [address] [facility] [address]     Phone:  [phone]     dexamethasone 4 MG tablet                Primary Care Provider Office Phone # Fax #    Rosanne Cherry [phone] [address] [facility] [address]        Equal Access to Services     WHIT [facility] AH: Hadii aad ku hadasho Soomaali, waaxda luqadaha, qaybta kaalmada adeegyada, waxay idiin hayaan adecristy kharaduglas lahany . So [facility] [phone].    ATENCIÓN: Si habla español, tiene a wang disposición servicios gratuitos de asistencia lingüística. Llame al [phone].    We comply with applicable federal civil rights laws and [place] laws. We do not discriminate on the basis of race, color, national origin, age, disability, sex, sexual orientation, or gender identity.            Thank you!     Thank you for choosing [facility]  for your care. Our goal is always to provide you with excellent care. Hearing back from our patients is one way we can continue to improve our services. Please take a few minutes to complete the written survey that you may receive in the mail after your visit with us. Thank you!             Your Updated Medication List - Protect others around you: Learn how  to safely use, store and throw away your medicines at www.disposemymeds.org.          This list is accurate as of 10/5/18  3:13 PM.  Always use your most recent med list.                   Brand Name Dispense Instructions for use Diagnosis    allopurinol 300 MG tablet    ZYLOPRIM    90 tablet    TAKE 1 TABLET BY MOUTH ONCE DAILY    Gout involving toe, unspecified cause, unspecified chronicity, unspecified laterality       amLODIPine 5 MG tablet    NORVASC    90 tablet    TAKE 1 TABLET BY MOUTH ONCE DAILY **NEEDS TO BE SEEN FOR REFILLS**    Benign essential hypertension       ASPIRIN NOT PRESCRIBED    INTENTIONAL    1 each    Please choose reason not prescribed, below    Polyneuropathy in other diseases classified elsewhere (H)       cloNIDine 0.3 MG tablet    CATAPRES    180 tablet    TAKE ONE TABLET BY MOUTH TWICE DAILY .  NEEDS  TO  BE  SEEN  FOR  MORE    Benign essential hypertension       dexamethasone 4 MG tablet    DECADRON    40 tablet    Take 40mgm(10 tablets of 4 mgm each )  po q day from Oct 6 to and including Oct 9, 2018    Idiopathic thrombocytopenic purpura (H)       folic acid 1 MG tablet    FOLVITE    90 tablet    TAKE ONE TABLET BY MOUTH ONCE DAILY    Alcohol abuse       gabapentin 300 MG capsule    NEURONTIN    270 capsule    TAKE ONE CAPSULE BY MOUTH THREE TIMES DAILY    Polyneuropathy in other diseases classified elsewhere (H), Chronic bilateral low back pain with sciatica, sciatica laterality unspecified       GLUCOSAMINE CHONDROITIN PLUS PO      Take 1 tablet by mouth daily        * lisinopril 20 MG tablet    PRINIVIL/ZESTRIL     Take 20 mg by mouth daily        * lisinopril 40 MG tablet    PRINIVIL/ZESTRIL     Take 40 mg by mouth daily    Idiopathic thrombocytopenic purpura (H)       * OMEPRAZOLE PO      Take 40 mg by mouth every morning        * omeprazole 40 MG capsule    priLOSEC    30 capsule    TAKE ONE CAPSULE BY MOUTH ONCE DAILY    Gastroesophageal reflux disease without esophagitis        PROMACTA 75 MG tablet   Generic drug:  eltrombopag     30 tablet    TAKE ONE TABLET BY MOUTH EVERY DAY ON AN EMPTY STOMACH 1 HOUR BEFORE OR 2 HOURS AFTER A MEAL    Idiopathic thrombocytopenic purpura (H)       rivaroxaban ANTICOAGULANT 10 MG Tabs tablet    XARELTO    30 tablet    Take 1 tablet (10 mg) by mouth daily (with dinner)    Iron deficiency anemia due to chronic blood loss, Idiopathic thrombocytopenic purpura (H), Personal history of venous thrombosis and embolism       spironolactone 25 MG tablet    ALDACTONE    90 tablet    TAKE ONE TABLET BY MOUTH ONCE DAILY    Benign essential hypertension       * Notice:  This list has 4 medication(s) that are the same as other medications prescribed for you. Read the directions carefully, and ask your doctor or other care provider to review them with you.

## 2018-10-05 NOTE — LETTER
October 6, 2018      Raymon Ace  [address] [facility] [address]        Dear ,    We are writing to inform you of your test results.    Platelets are < 90,000 you will take the dexamethasone on the 4 days starting OCt 6 and retest just before surgery     Do not forget to stop the xarelto 2 days before surgery     Resulted Orders   CBC with platelets differential   Result Value Ref Range    WBC 9.5 4.0 - 11.0 10e9/L    RBC Count 5.09 4.4 - 5.9 10e12/L    Hemoglobin 15.6 g/dL    Hematocrit 46.7 40.0 - 53.0 %    MCV 92 78 - 100 fl    MCH 30.6 26.5 - 33.0 pg    MCHC 33.4 31.5 - 36.5 g/dL    RDW 16.4 (H) 10.0 - 15.0 %    Platelet Count 65 (L) 150 - 450 10e9/L      Comment:      Reviewed: OK with previous    % Neutrophils 72.8 %    % Lymphocytes 16.7 %    % Monocytes 8.4 %    % Eosinophils 1.7 %    % Basophils 0.4 %    Absolute Neutrophil 6.9 1.6 - 8.3 10e9/L    Absolute Lymphocytes 1.6 0.8 - 5.3 10e9/L    Absolute Monocytes 0.8 0.0 - 1.3 10e9/L    Absolute Eosinophils 0.2 0.0 - 0.7 10e9/L    Absolute Basophils 0.0 0.0 - 0.2 10e9/L    Diff Method Automated Method        If you have any questions or concerns, please call the clinic at the number listed above.       Sincerely,        Rosanne Cherry MD

## 2018-10-05 NOTE — TELEPHONE ENCOUNTER
Tell pt that platelets are low at 65,000   He needs to take 40mgm dexamethasone a day for 4 days , starting tomorrow   He DOES need the platelet count done the am of surgery

## 2018-10-10 ENCOUNTER — TRANSFERRED RECORDS (OUTPATIENT)
Dept: HEALTH INFORMATION MANAGEMENT | Facility: CLINIC | Age: 73
End: 2018-10-10

## 2018-10-15 ENCOUNTER — TELEPHONE (OUTPATIENT)
Dept: FAMILY MEDICINE | Facility: CLINIC | Age: 73
End: 2018-10-15

## 2018-10-15 NOTE — TELEPHONE ENCOUNTER
Reason for Call: Request for an order or referral:    Order or referral being requested: Sanam from [facility] called requesting.  Skilled nursing 3 x this week, then 2 x for 2 weeks, weekly nursing visits, 2 PRN nursing visits, eval for OT and PT    Date needed: as soon as possible.  Sanam would like to be called yet this afternoon.    Has the patient been seen by the PCP for this problem? YES    Additional comments: n/a    Phone number Patient can be reached at:  Sanam: [phone]    Best Time: any    Can we leave a detailed message on this number?  YES      Call taken on 10/15/2018 at 4:16 PM by Lynette Galvan

## 2018-10-18 ENCOUNTER — DOCUMENTATION ONLY (OUTPATIENT)
Dept: CARE COORDINATION | Facility: CLINIC | Age: 73
End: 2018-10-18

## 2018-10-18 ENCOUNTER — TELEPHONE (OUTPATIENT)
Dept: FAMILY MEDICINE | Facility: CLINIC | Age: 73
End: 2018-10-18

## 2018-10-18 ENCOUNTER — ONCOLOGY VISIT (OUTPATIENT)
Dept: ONCOLOGY | Facility: CLINIC | Age: 73
End: 2018-10-18
Attending: INTERNAL MEDICINE
Payer: COMMERCIAL

## 2018-10-18 VITALS
HEART RATE: 71 BPM | DIASTOLIC BLOOD PRESSURE: 72 MMHG | SYSTOLIC BLOOD PRESSURE: 108 MMHG | OXYGEN SATURATION: 98 % | TEMPERATURE: 98 F

## 2018-10-18 VITALS
OXYGEN SATURATION: 98 % | RESPIRATION RATE: 18 BRPM | DIASTOLIC BLOOD PRESSURE: 80 MMHG | WEIGHT: 198.6 LBS | TEMPERATURE: 98 F | BODY MASS INDEX: 31.57 KG/M2 | HEART RATE: 66 BPM | SYSTOLIC BLOOD PRESSURE: 111 MMHG

## 2018-10-18 DIAGNOSIS — D69.3 IDIOPATHIC THROMBOCYTOPENIC PURPURA (H): ICD-10-CM

## 2018-10-18 DIAGNOSIS — K76.0 FATTY LIVER: ICD-10-CM

## 2018-10-18 DIAGNOSIS — Z86.711 HISTORY OF PULMONARY EMBOLISM: ICD-10-CM

## 2018-10-18 DIAGNOSIS — Z79.01 LONG TERM CURRENT USE OF ANTICOAGULANT THERAPY: ICD-10-CM

## 2018-10-18 DIAGNOSIS — D69.3 IDIOPATHIC THROMBOCYTOPENIC PURPURA (H): Primary | ICD-10-CM

## 2018-10-18 LAB
ALBUMIN SERPL-MCNC: 2.8 G/DL (ref 3.4–5)
ALP SERPL-CCNC: 81 U/L (ref 40–150)
ALT SERPL W P-5'-P-CCNC: 27 U/L (ref 0–70)
ANION GAP SERPL CALCULATED.3IONS-SCNC: 8 MMOL/L (ref 3–14)
AST SERPL W P-5'-P-CCNC: 17 U/L (ref 0–45)
BASOPHILS # BLD AUTO: 0 10E9/L (ref 0–0.2)
BASOPHILS NFR BLD AUTO: 0 %
BILIRUB DIRECT SERPL-MCNC: 0.1 MG/DL (ref 0–0.2)
BILIRUB SERPL-MCNC: 0.8 MG/DL (ref 0.2–1.3)
BUN SERPL-MCNC: 10 MG/DL (ref 7–30)
CALCIUM SERPL-MCNC: 8.8 MG/DL (ref 8.5–10.1)
CHLORIDE SERPL-SCNC: 104 MMOL/L (ref 94–109)
CO2 SERPL-SCNC: 26 MMOL/L (ref 20–32)
CREAT SERPL-MCNC: 1.02 MG/DL (ref 0.66–1.25)
DIFFERENTIAL METHOD BLD: ABNORMAL
EOSINOPHIL # BLD AUTO: 0.1 10E9/L (ref 0–0.7)
EOSINOPHIL NFR BLD AUTO: 0.9 %
ERYTHROCYTE [DISTWIDTH] IN BLOOD BY AUTOMATED COUNT: 14.6 % (ref 10–15)
GFR SERPL CREATININE-BSD FRML MDRD: 71 ML/MIN/1.7M2
GLUCOSE SERPL-MCNC: 118 MG/DL (ref 70–99)
HCT VFR BLD AUTO: 36.5 % (ref 40–53)
HGB BLD-MCNC: 12 G/DL (ref 13.3–17.7)
INR PPP: 1.1 (ref 0.86–1.14)
LYMPHOCYTES # BLD AUTO: 1.3 10E9/L (ref 0.8–5.3)
LYMPHOCYTES NFR BLD AUTO: 8.7 %
MCH RBC QN AUTO: 30.5 PG (ref 26.5–33)
MCHC RBC AUTO-ENTMCNC: 32.9 G/DL (ref 31.5–36.5)
MCV RBC AUTO: 93 FL (ref 78–100)
MONOCYTES # BLD AUTO: 0.5 10E9/L (ref 0–1.3)
MONOCYTES NFR BLD AUTO: 3.5 %
MYELOCYTES # BLD: 0.5 10E9/L
MYELOCYTES NFR BLD MANUAL: 3.5 %
NEUTROPHILS # BLD AUTO: 12.5 10E9/L (ref 1.6–8.3)
NEUTROPHILS NFR BLD AUTO: 83.4 %
PLATELET # BLD AUTO: 167 10E9/L (ref 150–450)
PLATELET # BLD EST: ABNORMAL 10*3/UL
POTASSIUM SERPL-SCNC: 4.1 MMOL/L (ref 3.4–5.3)
PROT SERPL-MCNC: 6.9 G/DL (ref 6.8–8.8)
RBC # BLD AUTO: 3.94 10E12/L (ref 4.4–5.9)
RBC MORPH BLD: NORMAL
SODIUM SERPL-SCNC: 138 MMOL/L (ref 133–144)
WBC # BLD AUTO: 15 10E9/L (ref 4–11)

## 2018-10-18 PROCEDURE — 82248 BILIRUBIN DIRECT: CPT | Performed by: INTERNAL MEDICINE

## 2018-10-18 PROCEDURE — 80053 COMPREHEN METABOLIC PANEL: CPT | Performed by: INTERNAL MEDICINE

## 2018-10-18 PROCEDURE — 85610 PROTHROMBIN TIME: CPT | Performed by: INTERNAL MEDICINE

## 2018-10-18 PROCEDURE — 85025 COMPLETE CBC W/AUTO DIFF WBC: CPT | Performed by: INTERNAL MEDICINE

## 2018-10-18 PROCEDURE — 36415 COLL VENOUS BLD VENIPUNCTURE: CPT

## 2018-10-18 PROCEDURE — 99213 OFFICE O/P EST LOW 20 MIN: CPT | Mod: ZP | Performed by: INTERNAL MEDICINE

## 2018-10-18 PROCEDURE — G0463 HOSPITAL OUTPT CLINIC VISIT: HCPCS | Mod: ZF

## 2018-10-18 RX ORDER — DIAZEPAM 2 MG
2 TABLET ORAL EVERY 6 HOURS PRN
COMMUNITY
Start: 2018-10-12 | End: 2018-11-11

## 2018-10-18 RX ORDER — AMOXICILLIN 250 MG
1-4 CAPSULE ORAL 2 TIMES DAILY
COMMUNITY
Start: 2018-10-12 | End: 2018-11-11

## 2018-10-18 RX ORDER — OXYCODONE HYDROCHLORIDE 5 MG/1
5-10 TABLET ORAL EVERY 4 HOURS PRN
COMMUNITY
Start: 2018-10-12 | End: 2019-12-27

## 2018-10-18 ASSESSMENT — PAIN SCALES - GENERAL
PAINLEVEL: MODERATE PAIN (5)
PAINLEVEL: MODERATE PAIN (5)

## 2018-10-18 NOTE — NURSING NOTE
Chief Complaint   Patient presents with     Blood Draw     Labs drawn via  by RN. VS taken.     Cecilia Triplett RN

## 2018-10-18 NOTE — PROGRESS NOTES
[facility] now requests orders and shares plan of care/discharge summaries for some patients through [employer].  Please REPLY TO THIS MESSAGE OR ROUTE BACK TO THE AUTHOR in order to give authorization for orders when needed.  This is considered a verbal order, you will still receive a faxed copy of orders for signature.  Thank you for your assistance in improving collaboration for our patients.    ORDER    PT home assessment done yesterday. PT requesting further orders PT 1 x more this week then 2x a week x 3 weeks to work on LE strengthening within lamenectomy precautions, increasing walking endurance with least restrictive safe assistive device,  manage his stairs to safely leave his home, fall prevention education

## 2018-10-18 NOTE — TELEPHONE ENCOUNTER
Verbal okay given for Home Physical Therapy for walking,endurance and fall prevention and stairs to get out of house,1xwk x 1 and 2x week x3wks.Will send order in for signature.

## 2018-10-18 NOTE — NURSING NOTE
"Oncology Rooming Note    October 18, 2018 12:51 PM   Raymon Ace is a 73 year old male who presents for:    Chief Complaint   Patient presents with     Oncology Clinic Visit     Return; Hx of PE     Initial Vitals: /72  Pulse 71  Temp 98  F (36.7  C) (Oral)  SpO2 98% Estimated body mass index is 31.57 kg/(m^2) as calculated from the following:    Height as of 10/5/18: 1.689 m (5' 6.5\").    Weight as of an earlier encounter on 10/18/18: 90.1 kg (198 lb 9.6 oz). There is no height or weight on file to calculate BSA.  Moderate Pain (5) Comment: Data Unavailable   No LMP for male patient.  Allergies reviewed: Yes  Medications reviewed: Yes    Medications: Medication refills not needed today.  Pharmacy name entered into Cadence[employer]:    [facility] - [place], [place] - [address] [facility] - [place], [place] - [address]   [facility] - [place], [place] - [address] [facility] [facility] - [place], [place] - [address] [facility] - [place], [place] - [address] - [place], [place] - [phone]    Clinical concerns:      8 minutes for nursing intake (face to face time)     Terri Melton CMA              "

## 2018-10-18 NOTE — LETTER
10/18/2018       RE: Raymon Ace  [address] [facility] [address]     Dear Colleague,    Thank you for referring your patient, Raymon Ace, to the [facility]. Please see a copy of my visit note below.      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  He is here today for follow-up after recent spine surgery which was performed 8 days ago.   He has done quite well over the last few years with regard to his chronic ITP while on Promacta, with a baseline platelet count of approximately 100,000.  Prior to the recent spine surgery his platelet count was only in the 60-70,000 range and so we treated him with 4 days of dexamethasone which resulted in a platelet count of approximately 140,000.  He went through the surgery without any bleeding difficulties.    For his history of unprovoked pulmonary embolism he is on long-term anticoagulation with rivaroxaban.  We held that prior to surgery.  The plan was for him to resume that 24 hours postoperatively but he apparently has not yet started it.    He and his wife are planning to again spend the winter in [place].  He has follow-up with the spine surgeon in late November and he would prefer to travel to [place] soon thereafter.  His wife would rather wait until after the holidays.  They have not yet made a final decision in that regard.    He feels the surgery was successful.  He had significant improvement of his pain is quite happy with his progress with regard to rehab thus far.    Exam: He looks well.  No bruises or petechiae.  His incision is clean and dry.  There is no evidence of excess bleeding or bruising around the surgical site.  Lower extremities are symmetrical with no edema.  He is breathing comfortably on room air.    Labs:  Serum electrolytes are normal, creatinine 1.02, LFTs normal.   White count 15.0, hemoglobin 12.0, platelet count 167,000.      ASSESSMENT / PLAN:  1.  Chronic ITP -- Overall Raymon is doing well and currently has a normal platelet count on Promacta.  His platelet count has recently increased above his baseline following 4 days of pulse dexamethasone which was given prior to his spine surgery on October 10.  I expect his platelet count will probably drift back down towards his previous baseline in the 70,000 to 100,000 range over the next few weeks.  He should continue on the same dose of Promacta for now.    2. History of unprovoked pulmonary embolism (April 2017) -- He is done well on long-term and coagulation with rivaroxaban.  I urged him to restart this as soon as possible.  We had intended for this to be restarted within 24 hours of surgery but apparently that did not happen.  There are no clinical signs or symptoms to suggest DVT or PE at this time.    I would like to see Raymon back prior to him traveling back to [place] for the winter.  Because their plans are unclear, he will call for a return appointment once they have made a decision in that regard.      Total time 15 minutes, all in counseling and coordination of care.      Waylon Novoa MD  Associate Professor of Medicine  Division of Hematology, Oncology, and Transplantation  Director, [facility]

## 2018-10-18 NOTE — TELEPHONE ENCOUNTER
Reason for Call:  Other call back    Detailed comments: Antonietta request a verbal response yet today regarding requesting further PT orders.    Phone Number Patient can be reached at: Antonietta:  [phone]    Best Time: any    Can we leave a detailed message on this number? YES    Call taken on 10/18/2018 at 4:02 PM by Lynette Galvan

## 2018-10-18 NOTE — MR AVS SNAPSHOT
After Visit Summary   10/18/2018    Raymon Ace    MRN: [id number]           Patient Information     Date Of Birth          1945        Visit Information        Provider Department      10/18/2018 12:15 PM Waylon Novoa MD [facility]        Today's Diagnoses     Idiopathic thrombocytopenic purpura (H)    -  1    History of pulmonary embolism        Long term current use of anticoagulant therapy           Follow-ups after your visit        Your next 10 appointments already scheduled     Nov 15, 2018  4:45 PM CST   [facility] Draw with [facility] DRAW   [facility] Draw ([facility])    [address] [place] [address]  [address]  [facility] [id number] [phone]            Nov 15, 2018  5:15 PM CST   (Arrive by 5:00 PM)   Return Visit with Waylon Novoa MD   [facility] ([facility])    [address] [place] [address]  [address] [place] [address] [id number] [phone]              Who to contact     If you have questions or need follow up information about today's clinic visit or your schedule please contact [facility] directly at [phone].  Normal or non-critical lab and imaging results will be communicated to you by MyChart, letter or phone within 4 business days after the clinic has received the results. If you do not hear from us within 7 days, please contact the clinic through MyChart or phone. If you have a critical or abnormal lab result, we will notify you by phone as soon as possible.  Submit refill requests through [employer] or call your pharmacy and they will forward the refill request to us. Please allow 3 business days for your refill to be completed.          Additional Information About Your Visit        MyChart Information     [facility] gives you secure access to your electronic health record. If you see a primary care provider, you can also send messages to  your care team and make appointments. If you have questions, please call your primary care clinic.  If you do not have a primary care provider, please call [phone] and they will assist you.        Care EveryWhere ID     This is your Care EveryWhere ID. This could be used by other organizations to access your [place] medical records  [id number]        Your Vitals Were     Pulse Temperature Pulse Oximetry             71 98  F (36.7  C) (Oral) 98%          Blood Pressure from Last 3 Encounters:   10/18/18 108/72   10/18/18 111/80   10/05/18 124/72    Weight from Last 3 Encounters:   10/18/18 90.1 kg (198 lb 9.6 oz)   10/05/18 90.7 kg (200 lb)   09/25/18 90.7 kg (200 lb)              Today, you had the following     No orders found for display         Today's Medication Changes          These changes are accurate as of 10/18/18  6:44 PM.  If you have any questions, ask your nurse or doctor.               These medicines have changed or have updated prescriptions.        Dose/Directions    lisinopril 20 MG tablet   Commonly known as:  PRINIVIL/ZESTRIL   This may have changed:  Another medication with the same name was removed. Continue taking this medication, and follow the directions you see here.   Changed by:  Waylon Novoa MD        Dose:  20 mg   Take 20 mg by mouth daily   Refills:  0               Information about OPIOIDS     PRESCRIPTION OPIOIDS: WHAT YOU NEED TO KNOW   We gave you an opioid (narcotic) pain medicine. It is important to manage your pain, but opioids are not always the best choice. You should first try all the other options your care team gave you. Take this medicine for as short a time (and as few doses) as possible.    Some activities can increase your pain, such as bandage changes or therapy sessions. It may help to take your pain medicine 30 to 60 minutes before these activities. Reduce your stress by getting enough sleep, working on hobbies you enjoy and practicing relaxation  or meditation. Talk to your care team about ways to manage your pain beyond prescription opioids.    These medicines have risks:    DO NOT drive when on new or higher doses of pain medicine. These medicines can affect your alertness and reaction times, and you could be arrested for driving under the influence (DUI). If you need to use opioids long-term, talk to your care team about driving.    DO NOT operate heavy machinery    DO NOT do any other dangerous activities while taking these medicines.    DO NOT drink any alcohol while taking these medicines.     If the opioid prescribed includes acetaminophen, DO NOT take with any other medicines that contain acetaminophen. Read all labels carefully. Look for the word  acetaminophen  or  Tylenol.  Ask your pharmacist if you have questions or are unsure.    You can get addicted to pain medicines, especially if you have a history of addiction (chemical, alcohol or substance dependence). Talk to your care team about ways to reduce this risk.    All opioids tend to cause constipation. Drink plenty of water and eat foods that have a lot of fiber, such as fruits, vegetables, prune juice, apple juice and high-fiber cereal. Take a laxative (Miralax, milk of magnesia, Colace, Senna) if you don t move your bowels at least every other day. Other side effects include upset stomach, sleepiness, dizziness, throwing up, tolerance (needing more of the medicine to have the same effect), physical dependence and slowed breathing.    Store your pills in a secure place, locked if possible. We will not replace any lost or stolen medicine. If you don t finish your medicine, please throw away (dispose) as directed by your pharmacist. The [place] [employer]ution [employer] has more information about safe disposal: [email or web address]         Primary Care Provider Office Phone # Fax #    Rosanne Cherry [phone]        [address]  [facility] [address]        Equal Access to Services     SHERIWHIT YAYA : Hadii chato ku hadtheodorao Sohipolitoali, waaxda luqadaha, qaybta katerranceda aroldoanna, gonzales murray lucillechris velanestorduglas go. So [facility] [phone].    ATENCIÓN: Si habla español, tiene a wang disposición servicios gratuitos de asistencia lingüística. Llame al [phone].    We comply with applicable federal civil rights laws and [place] laws. We do not discriminate on the basis of race, color, national origin, age, disability, sex, sexual orientation, or gender identity.            Thank you!     Thank you for choosing [facility]  for your care. Our goal is always to provide you with excellent care. Hearing back from our patients is one way we can continue to improve our services. Please take a few minutes to complete the written survey that you may receive in the mail after your visit with us. Thank you!             Your Updated Medication List - Protect others around you: Learn how to safely use, store and throw away your medicines at www.disposemymeds.org.          This list is accurate as of 10/18/18  6:44 PM.  Always use your most recent med list.                   Brand Name Dispense Instructions for use Diagnosis    allopurinol 300 MG tablet    ZYLOPRIM    90 tablet    TAKE 1 TABLET BY MOUTH ONCE DAILY    Gout involving toe, unspecified cause, unspecified chronicity, unspecified laterality       amLODIPine 5 MG tablet    NORVASC    90 tablet    TAKE 1 TABLET BY MOUTH ONCE DAILY **NEEDS TO BE SEEN FOR REFILLS**    Benign essential hypertension       ASPIRIN NOT PRESCRIBED    INTENTIONAL    1 each    Please choose reason not prescribed, below    Polyneuropathy in other diseases classified elsewhere (H)       cloNIDine 0.3 MG tablet    CATAPRES    180 tablet    TAKE ONE TABLET BY MOUTH TWICE DAILY .  NEEDS  TO  BE  SEEN  FOR  MORE    Benign essential hypertension       dexamethasone 4 MG tablet    DECADRON     40 tablet    Take 40mgm(10 tablets of 4 mgm each )  po q day from Oct 6 to and including Oct 9, 2018    Idiopathic thrombocytopenic purpura (H)       diazepam 2 MG tablet    VALIUM     Take 2 mg by mouth        folic acid 1 MG tablet    FOLVITE    90 tablet    TAKE ONE TABLET BY MOUTH ONCE DAILY    Alcohol abuse       gabapentin 300 MG capsule    NEURONTIN    270 capsule    TAKE ONE CAPSULE BY MOUTH THREE TIMES DAILY    Polyneuropathy in other diseases classified elsewhere (H), Chronic bilateral low back pain with sciatica, sciatica laterality unspecified       GLUCOSAMINE CHONDROITIN PLUS PO      Take 1 tablet by mouth daily        lisinopril 20 MG tablet    PRINIVIL/ZESTRIL     Take 20 mg by mouth daily        omeprazole 40 MG capsule    priLOSEC    30 capsule    TAKE ONE CAPSULE BY MOUTH ONCE DAILY    Gastroesophageal reflux disease without esophagitis       oxyCODONE IR 5 MG tablet    ROXICODONE     Take 5-10 mg by mouth        PROMACTA 75 MG tablet   Generic drug:  eltrombopag     30 tablet    TAKE ONE TABLET BY MOUTH EVERY DAY ON AN EMPTY STOMACH 1 HOUR BEFORE OR 2 HOURS AFTER A MEAL    Idiopathic thrombocytopenic purpura (H)       rivaroxaban ANTICOAGULANT 10 MG Tabs tablet    XARELTO    30 tablet    Take 1 tablet (10 mg) by mouth daily (with dinner)    Iron deficiency anemia due to chronic blood loss, Idiopathic thrombocytopenic purpura (H), Personal history of venous thrombosis and embolism       senna-docusate 8.6-50 MG per tablet    SENOKOT-S;PERICOLACE     Take 1-4 tablets by mouth        spironolactone 25 MG tablet    ALDACTONE    90 tablet    TAKE ONE TABLET BY MOUTH ONCE DAILY    Benign essential hypertension

## 2018-10-18 NOTE — PROGRESS NOTES
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  He is here today for follow-up after recent spine surgery which was performed 8 days ago.   He has done quite well over the last few years with regard to his chronic ITP while on Promacta, with a baseline platelet count of approximately 100,000.  Prior to the recent spine surgery his platelet count was only in the 60-70,000 range and so we treated him with 4 days of dexamethasone which resulted in a platelet count of approximately 140,000.  He went through the surgery without any bleeding difficulties.    For his history of unprovoked pulmonary embolism he is on long-term anticoagulation with rivaroxaban.  We held that prior to surgery.  The plan was for him to resume that 24 hours postoperatively but he apparently has not yet started it.    He and his wife are planning to again spend the winter in [place].  He has follow-up with the spine surgeon in late November and he would prefer to travel to [place] soon thereafter.  His wife would rather wait until after the holidays.  They have not yet made a final decision in that regard.    He feels the surgery was successful.  He had significant improvement of his pain is quite happy with his progress with regard to rehab thus far.    Exam: He looks well.  No bruises or petechiae.  His incision is clean and dry.  There is no evidence of excess bleeding or bruising around the surgical site.  Lower extremities are symmetrical with no edema.  He is breathing comfortably on room air.    Labs:  Serum electrolytes are normal, creatinine 1.02, LFTs normal.  White count 15.0, hemoglobin 12.0, platelet count 167,000.      ASSESSMENT / PLAN:  1.  Chronic ITP -- Overall Raymon is doing well and currently has a normal platelet count on Promacta.  His platelet count has recently increased above his  baseline following 4 days of pulse dexamethasone which was given prior to his spine surgery on October 10.  I expect his platelet count will probably drift back down towards his previous baseline in the 70,000 to 100,000 range over the next few weeks.  He should continue on the same dose of Promacta for now.    2. History of unprovoked pulmonary embolism (April 2017) -- He is done well on long-term and coagulation with rivaroxaban.  I urged him to restart this as soon as possible.  We had intended for this to be restarted within 24 hours of surgery but apparently that did not happen.  There are no clinical signs or symptoms to suggest DVT or PE at this time.    I would like to see Raymon back prior to him traveling back to [place] for the winter.  Because their plans are unclear, he will call for a return appointment once they have made a decision in that regard.      Total time 15 minutes, all in counseling and coordination of care.        Waylon Novoa MD  Associate Professor of Medicine  Division of Hematology, Oncology, and Transplantation  Director, [facility]

## 2018-10-23 ENCOUNTER — DOCUMENTATION ONLY (OUTPATIENT)
Dept: CARE COORDINATION | Facility: CLINIC | Age: 73
End: 2018-10-23

## 2018-10-23 NOTE — PROGRESS NOTES
[facility] now requests orders and shares plan of care/discharge summaries for some patients through [employer].  Please REPLY TO THIS MESSAGE OR ROUTE BACK TO THE AUTHOR in order to give authorization for orders when needed.  This is considered a verbal order, you will still receive a faxed copy of orders for signature.  Thank you for your assistance in improving collaboration for our patients.    ORDER    Skilled OT 1 Week 1 for ADL retraining.

## 2018-10-24 ENCOUNTER — TELEPHONE (OUTPATIENT)
Dept: FAMILY MEDICINE | Facility: CLINIC | Age: 73
End: 2018-10-24

## 2018-10-24 NOTE — TELEPHONE ENCOUNTER
Our goal is to have forms completed within 72 hours, however some forms may require a visit or additional information.    What clinic location was the form placed at [facility] or [place]     Who is the form from?   Where did the form come from? Faxed to clinic   The form was placed in the inbox of Rosanne Cherry MD      Please fax to [phone]   Phone number:      Additional comments: [facility] PT 1 w 1, 2 w 2, 1 w 2    Call take on 10/24/2018 at 9:33 AM by Suzan Bull

## 2018-11-05 ENCOUNTER — TELEPHONE (OUTPATIENT)
Dept: FAMILY MEDICINE | Facility: CLINIC | Age: 73
End: 2018-11-05

## 2018-11-05 NOTE — TELEPHONE ENCOUNTER
Our goal is to have forms completed within 72 hours, however some forms may require a visit or additional information.    What clinic location was the form placed at [facility] or [place].?     Who is the form from?   Where did the form come from? Faxed to clinic   The form was placed in the inbox of Rosanne Cherry MD      Please fax to [phone]  Phone number:      Additional comments: [facility] OT 1 w 1    Call take on 11/5/2018 at 10:52 AM by Suzan Bull

## 2018-11-11 ENCOUNTER — APPOINTMENT (OUTPATIENT)
Dept: CT IMAGING | Facility: CLINIC | Age: 73
End: 2018-11-11
Attending: FAMILY MEDICINE
Payer: COMMERCIAL

## 2018-11-11 ENCOUNTER — HOSPITAL ENCOUNTER (EMERGENCY)
Facility: CLINIC | Age: 73
Discharge: HOME | End: 2018-11-11
Attending: FAMILY MEDICINE | Admitting: FAMILY MEDICINE
Payer: COMMERCIAL

## 2018-11-11 VITALS
HEART RATE: 115 BPM | SYSTOLIC BLOOD PRESSURE: 161 MMHG | DIASTOLIC BLOOD PRESSURE: 85 MMHG | RESPIRATION RATE: 16 BRPM | WEIGHT: 185 LBS | TEMPERATURE: 98.9 F | BODY MASS INDEX: 29.41 KG/M2 | OXYGEN SATURATION: 96 %

## 2018-11-11 DIAGNOSIS — R11.2 NON-INTRACTABLE VOMITING WITH NAUSEA, UNSPECIFIED VOMITING TYPE: ICD-10-CM

## 2018-11-11 LAB
ALBUMIN SERPL-MCNC: 4.1 G/DL (ref 3.4–5)
ALP SERPL-CCNC: 104 U/L (ref 40–150)
ALT SERPL W P-5'-P-CCNC: 23 U/L (ref 0–70)
ANION GAP SERPL CALCULATED.3IONS-SCNC: 11 MMOL/L (ref 3–14)
AST SERPL W P-5'-P-CCNC: 15 U/L (ref 0–45)
BASOPHILS # BLD AUTO: 0 10E9/L (ref 0–0.2)
BASOPHILS NFR BLD AUTO: 0.3 %
BILIRUB SERPL-MCNC: 1.4 MG/DL (ref 0.2–1.3)
BUN SERPL-MCNC: 13 MG/DL (ref 7–30)
CALCIUM SERPL-MCNC: 9.7 MG/DL (ref 8.5–10.1)
CHLORIDE SERPL-SCNC: 107 MMOL/L (ref 94–109)
CO2 SERPL-SCNC: 23 MMOL/L (ref 20–32)
CREAT SERPL-MCNC: 0.92 MG/DL (ref 0.66–1.25)
DIFFERENTIAL METHOD BLD: ABNORMAL
EOSINOPHIL # BLD AUTO: 0 10E9/L (ref 0–0.7)
EOSINOPHIL NFR BLD AUTO: 0 %
ERYTHROCYTE [DISTWIDTH] IN BLOOD BY AUTOMATED COUNT: 13.7 % (ref 10–15)
GFR SERPL CREATININE-BSD FRML MDRD: 81 ML/MIN/1.7M2
GLUCOSE SERPL-MCNC: 131 MG/DL (ref 70–99)
HCT VFR BLD AUTO: 41.7 % (ref 40–53)
HGB BLD-MCNC: 14.1 G/DL (ref 13.3–17.7)
IMM GRANULOCYTES # BLD: 0.2 10E9/L (ref 0–0.4)
IMM GRANULOCYTES NFR BLD: 1.1 %
LACTATE BLD-SCNC: 1.1 MMOL/L (ref 0.7–2)
LIPASE SERPL-CCNC: 115 U/L (ref 73–393)
LYMPHOCYTES # BLD AUTO: 1 10E9/L (ref 0.8–5.3)
LYMPHOCYTES NFR BLD AUTO: 6.4 %
MCH RBC QN AUTO: 30.8 PG (ref 26.5–33)
MCHC RBC AUTO-ENTMCNC: 33.8 G/DL (ref 31.5–36.5)
MCV RBC AUTO: 91 FL (ref 78–100)
MONOCYTES # BLD AUTO: 0.8 10E9/L (ref 0–1.3)
MONOCYTES NFR BLD AUTO: 5.2 %
NEUTROPHILS # BLD AUTO: 13 10E9/L (ref 1.6–8.3)
NEUTROPHILS NFR BLD AUTO: 87 %
NRBC # BLD AUTO: 0 10*3/UL
NRBC BLD AUTO-RTO: 0 /100
PLATELET # BLD AUTO: 129 10E9/L (ref 150–450)
POTASSIUM SERPL-SCNC: 4.1 MMOL/L (ref 3.4–5.3)
PROT SERPL-MCNC: 8.5 G/DL (ref 6.8–8.8)
RBC # BLD AUTO: 4.58 10E12/L (ref 4.4–5.9)
SODIUM SERPL-SCNC: 141 MMOL/L (ref 133–144)
WBC # BLD AUTO: 15 10E9/L (ref 4–11)

## 2018-11-11 PROCEDURE — 96374 THER/PROPH/DIAG INJ IV PUSH: CPT | Mod: 59 | Performed by: FAMILY MEDICINE

## 2018-11-11 PROCEDURE — 25000125 ZZHC RX 250: Performed by: FAMILY MEDICINE

## 2018-11-11 PROCEDURE — 99285 EMERGENCY DEPT VISIT HI MDM: CPT | Mod: 25 | Performed by: FAMILY MEDICINE

## 2018-11-11 PROCEDURE — 25000128 H RX IP 250 OP 636: Performed by: FAMILY MEDICINE

## 2018-11-11 PROCEDURE — 96376 TX/PRO/DX INJ SAME DRUG ADON: CPT | Performed by: FAMILY MEDICINE

## 2018-11-11 PROCEDURE — 99284 EMERGENCY DEPT VISIT MOD MDM: CPT | Mod: Z6 | Performed by: FAMILY MEDICINE

## 2018-11-11 PROCEDURE — 85025 COMPLETE CBC W/AUTO DIFF WBC: CPT | Performed by: FAMILY MEDICINE

## 2018-11-11 PROCEDURE — 74177 CT ABD & PELVIS W/CONTRAST: CPT

## 2018-11-11 PROCEDURE — 96375 TX/PRO/DX INJ NEW DRUG ADDON: CPT | Performed by: FAMILY MEDICINE

## 2018-11-11 PROCEDURE — 80053 COMPREHEN METABOLIC PANEL: CPT | Performed by: FAMILY MEDICINE

## 2018-11-11 PROCEDURE — 83690 ASSAY OF LIPASE: CPT | Performed by: FAMILY MEDICINE

## 2018-11-11 PROCEDURE — 96361 HYDRATE IV INFUSION ADD-ON: CPT | Performed by: FAMILY MEDICINE

## 2018-11-11 PROCEDURE — 83605 ASSAY OF LACTIC ACID: CPT | Performed by: FAMILY MEDICINE

## 2018-11-11 RX ORDER — ONDANSETRON 4 MG/1
4-8 TABLET, ORALLY DISINTEGRATING ORAL EVERY 8 HOURS PRN
Qty: 12 TABLET | Refills: 0 | Status: SHIPPED | OUTPATIENT
Start: 2018-11-11 | End: 2023-05-02

## 2018-11-11 RX ORDER — ACETAMINOPHEN 500 MG
500-1000 TABLET ORAL EVERY 6 HOURS PRN
COMMUNITY
End: 2019-06-07

## 2018-11-11 RX ORDER — IOPAMIDOL 755 MG/ML
90 INJECTION, SOLUTION INTRAVASCULAR ONCE
Status: COMPLETED | OUTPATIENT
Start: 2018-11-11 | End: 2018-11-11

## 2018-11-11 RX ORDER — ONDANSETRON 2 MG/ML
4 INJECTION INTRAMUSCULAR; INTRAVENOUS
Status: COMPLETED | OUTPATIENT
Start: 2018-11-11 | End: 2018-11-11

## 2018-11-11 RX ORDER — KETOROLAC TROMETHAMINE 15 MG/ML
15 INJECTION, SOLUTION INTRAMUSCULAR; INTRAVENOUS ONCE
Status: COMPLETED | OUTPATIENT
Start: 2018-11-11 | End: 2018-11-11

## 2018-11-11 RX ADMIN — ONDANSETRON 4 MG: 2 INJECTION INTRAMUSCULAR; INTRAVENOUS at 10:38

## 2018-11-11 RX ADMIN — SODIUM CHLORIDE 62 ML: 9 INJECTION, SOLUTION INTRAVENOUS at 11:04

## 2018-11-11 RX ADMIN — IOPAMIDOL 90 ML: 755 INJECTION, SOLUTION INTRAVENOUS at 11:04

## 2018-11-11 RX ADMIN — SODIUM CHLORIDE 1000 ML: 9 INJECTION, SOLUTION INTRAVENOUS at 09:29

## 2018-11-11 RX ADMIN — KETOROLAC TROMETHAMINE 15 MG: 15 INJECTION, SOLUTION INTRAMUSCULAR; INTRAVENOUS at 09:31

## 2018-11-11 RX ADMIN — ONDANSETRON 4 MG: 2 INJECTION INTRAMUSCULAR; INTRAVENOUS at 09:31

## 2018-11-11 NOTE — ED AVS SNAPSHOT
[facility] Emergency Department    [address] [address]    Phone:  [phone]    Fax:  [phone]                                       Raymon Ace   MRN: [id number]    Department:  [facility] Emergency Department   Date of Visit:  11/11/2018           Patient Information     Date Of Birth          1945        Your diagnoses for this visit were:     Non-intractable vomiting with nausea, unspecified vomiting type        You were seen by Tiago Rose MD.        Discharge Instructions       Return to the Emergency Room if the following occurs:     Severely worsened pain, nausea with vomiting and dehydration, or for any concern at anytime.    Or, follow-up with the following provider as we discussed:     Return to your primary doctor as needed, or if not improved over the next 5 days.    Medications discussed:    Zofran for nausea, as needed.  Ibuprofen 600 mg every six hours for pain (7 days duration).  Tylenol 1000 mg every six hours for pain (7 days duration).  Therefore, you can alternate these every three hours and do it safely.    If you received pain-relieving or sedating medication during your time in the ER, avoid alcohol, driving automobiles, or working with machinery.  Also, a responsible adult must stay with you.        Call the Nurse Advice Line at [phone] or [phone] for any concern at anytime.      Your next 10 appointments already scheduled     Nov 15, 2018  4:45 PM CST   [facility] with  [facility]   [facility] ([facility])    [address] [place] [address]  [address]  [facility] [address] [phone]            Nov 15, 2018  5:15 PM CST   (Arrive by 5:00 PM)   Return Visit with Waylon Novoa MD   [facility] ([facility])    [address]  [address]  [facility] [address] [phone]              24 Hour Appointment Hotline        To make an appointment at any [facility], call [phone] ([phone]). If you don't have a family doctor or clinic, we will help you find one. [facility] clinics are conveniently located to serve the needs of you and your family.             Review of your medicines      START taking        Dose / Directions Last dose taken    ondansetron 4 MG ODT tab   Commonly known as:  ZOFRAN ODT   Dose:  4-8 mg   Quantity:  12 tablet        Take 1-2 tablets (4-8 mg) by mouth every 8 hours as needed for nausea   Refills:  0          Our records show that you are taking the medicines listed below. If these are incorrect, please call your family doctor or clinic.        Dose / Directions Last dose taken    acetaminophen 500 MG tablet   Commonly known as:  TYLENOL   Dose:  500-1000 mg        Take 500-1,000 mg by mouth every 6 hours as needed for mild pain   Refills:  0        allopurinol 300 MG tablet   Commonly known as:  ZYLOPRIM   Quantity:  90 tablet        TAKE 1 TABLET BY MOUTH ONCE DAILY   Refills:  2        amLODIPine 5 MG tablet   Commonly known as:  NORVASC   Quantity:  90 tablet        TAKE 1 TABLET BY MOUTH ONCE DAILY **NEEDS TO BE SEEN FOR REFILLS**   Refills:  1        ASPIRIN NOT PRESCRIBED   Commonly known as:  INTENTIONAL   Quantity:  1 each        Please choose reason not prescribed, below   Refills:  0        cloNIDine 0.3 MG tablet   Commonly known as:  CATAPRES   Quantity:  180 tablet        TAKE ONE TABLET BY MOUTH TWICE DAILY .  NEEDS  TO  BE  SEEN  FOR  MORE   Refills:  0        folic acid 1 MG tablet   Commonly known as:  FOLVITE   Quantity:  90 tablet        TAKE ONE TABLET BY MOUTH ONCE DAILY   Refills:  3        gabapentin 300 MG capsule   Commonly known as:  NEURONTIN   Quantity:  270 capsule        TAKE ONE CAPSULE BY MOUTH THREE TIMES DAILY   Refills:  1        GLUCOSAMINE CHONDROITIN PLUS PO   Dose:  1 tablet        Take 1 tablet by mouth daily   Refills:  0        lisinopril 20 MG  tablet   Commonly known as:  PRINIVIL/ZESTRIL   Dose:  20 mg        Take 20 mg by mouth daily   Refills:  0        omeprazole 40 MG capsule   Commonly known as:  priLOSEC   Quantity:  30 capsule        TAKE ONE CAPSULE BY MOUTH ONCE DAILY   Refills:  0        oxyCODONE IR 5 MG tablet   Commonly known as:  ROXICODONE   Dose:  5-10 mg        Take 5-10 mg by mouth every 4 hours as needed   Refills:  0        PROMACTA 75 MG tablet   Quantity:  30 tablet   Generic drug:  eltrombopag        TAKE ONE TABLET BY MOUTH EVERY DAY ON AN EMPTY STOMACH 1 HOUR BEFORE OR 2 HOURS AFTER A MEAL   Refills:  5        rivaroxaban ANTICOAGULANT 10 MG Tabs tablet   Commonly known as:  XARELTO   Dose:  10 mg   Quantity:  30 tablet        Take 1 tablet (10 mg) by mouth daily (with dinner)   Refills:  3        spironolactone 25 MG tablet   Commonly known as:  ALDACTONE   Quantity:  90 tablet        TAKE ONE TABLET BY MOUTH ONCE DAILY   Refills:  0                Prescriptions were sent or printed at these locations (1 Prescription)                   [facility] [place] [address] [facility]   [address] [address]    Telephone:  [phone]   Fax:  [phone]   Hours:                  E-Prescribed (1 of 1)         ondansetron (ZOFRAN ODT) 4 MG ODT tab                Procedures and tests performed during your visit     Abd/pelvis CT, IV contrast only TRAUMA  / AAA    CBC with platelets, differential    Cardiac Continuous Monitoring    Comprehensive metabolic panel    Lactate for Sepsis Protocol    Lipase    Pulse oximetry nursing      Orders Needing Specimen Collection     None      Pending Results     No orders found from 11/9/2018 to 11/12/2018.            Pending Culture Results     No orders found from 11/9/2018 to 11/12/2018.            Pending Results Instructions     If you had any lab results that were not finalized at the time of your Discharge, you can call the ED Lab Result RN at [phone].  You will be contacted by this team for any positive Lab results or changes in treatment. The nurses are available 7 days a week from 10A to 6:30P.  You can leave a message 24 hours per day and they will return your call.        Test Results From Your Hospital Stay        11/11/2018  9:38 AM      Component Results     Component Value Ref Range & Units Status    WBC 15.0 (H) 4.0 - 11.0 10e9/L Final    RBC Count 4.58 4.4 - 5.9 10e12/L Final    Hemoglobin 14.1 13.3 - 17.7 g/dL Final    Hematocrit 41.7 40.0 - 53.0 % Final    MCV 91 78 - 100 fl Final    MCH 30.8 26.5 - 33.0 pg Final    MCHC 33.8 31.5 - 36.5 g/dL Final    RDW 13.7 10.0 - 15.0 % Final    Platelet Count 129 (L) 150 - 450 10e9/L Final    Diff Method Automated Method  Final    % Neutrophils 87.0 % Final    % Lymphocytes 6.4 % Final    % Monocytes 5.2 % Final    % Eosinophils 0.0 % Final    % Basophils 0.3 % Final    % Immature Granulocytes 1.1 % Final    Nucleated RBCs 0 0 /100 Final    Absolute Neutrophil 13.0 (H) 1.6 - 8.3 10e9/L Final    Absolute Lymphocytes 1.0 0.8 - 5.3 10e9/L Final    Absolute Monocytes 0.8 0.0 - 1.3 10e9/L Final    Absolute Eosinophils 0.0 0.0 - 0.7 10e9/L Final    Absolute Basophils 0.0 0.0 - 0.2 10e9/L Final    Abs Immature Granulocytes 0.2 0 - 0.4 10e9/L Final    Absolute Nucleated RBC 0.0  Final         11/11/2018  9:54 AM      Component Results     Component Value Ref Range & Units Status    Sodium 141 133 - 144 mmol/L Final    Potassium 4.1 3.4 - 5.3 mmol/L Final    Chloride 107 94 - 109 mmol/L Final    Carbon Dioxide 23 20 - 32 mmol/L Final    Anion Gap 11 3 - 14 mmol/L Final    Glucose 131 (H) 70 - 99 mg/dL Final    Urea Nitrogen 13 7 - 30 mg/dL Final    Creatinine 0.92 0.66 - 1.25 mg/dL Final    GFR Estimate 81 >60 mL/min/1.7m2 Final    Non  GFR Calc    GFR Estimate If Black >90 >60 mL/min/1.7m2 Final    African American GFR Calc    Calcium 9.7 8.5 - 10.1 mg/dL Final    Bilirubin Total 1.4 (H) 0.2 - 1.3 mg/dL  Final    Albumin 4.1 3.4 - 5.0 g/dL Final    Protein Total 8.5 6.8 - 8.8 g/dL Final    Alkaline Phosphatase 104 40 - 150 U/L Final    ALT 23 0 - 70 U/L Final    AST 15 0 - 45 U/L Final         11/11/2018  9:52 AM      Component Results     Component Value Ref Range & Units Status    Lipase 115 73 - 393 U/L Final         11/11/2018 10:15 AM      Component Results     Component Value Ref Range & Units Status    Lactate for Sepsis Protocol 1.1 0.7 - 2.0 mmol/L Final         11/11/2018 12:01 PM      Narrative     CT ABDOMEN AND PELVIS WITH CONTRAST  11/11/2018 11:18 AM    HISTORY: Nausea/vomiting, abdominal pain and tenderness, wbc 15K.     COMPARISON: A CT on 4/18/2017.    TECHNIQUE: Routine transverse CT imaging of the abdomen and pelvis was  performed following the uneventful administration of 90 mL Isovue-370  intravenous contrast. Radiation dose for this scan was reduced using  automated exposure control, adjustment of the mA and/or kV according  to patient size, or iterative reconstruction technique.    FINDINGS: The visualized lung bases are clear. There is diffuse  decreased density of the liver representing fatty infiltration. Again  seen is a small left hepatic cyst and small right renal cysts. The  spleen, pancreas, gallbladder, adrenal glands, kidneys, and bladder  are otherwise unremarkable. No enlarged lymph node or other abnormal  mass is demonstrated. No free fluid is seen. No free intraperitoneal  gas is identified. There are a few scattered diverticula of the distal  colon with no evidence of diverticulitis. No other gastrointestinal  tract abnormality is seen. There has been an appendectomy. There is  calcification in the vascular structures. There are surgical and  degenerative changes of the spine. No other osseous abnormality is  seen. The osseous structures are unremarkable. No abdominal or pelvic  wall pathology is demonstrated.         Impression     IMPRESSION: Colonic diverticulosis with no  evidence of diverticulitis.  No other gastrointestinal tract abnormality is seen.    IGNACIO ALATORRE MD                Thank you for choosing [facility]       Thank you for choosing [facility] for your care. Our goal is always to provide you with excellent care. Hearing back from our patients is one way we can continue to improve our services. Please take a few minutes to complete the written survey that you may receive in the mail after you visit with us. Thank you!        [facility] Information     [facility] gives you secure access to your electronic health record. If you see a primary care provider, you can also send messages to your care team and make appointments. If you have questions, please call your primary care clinic.  If you do not have a primary care provider, please call [phone] and they will assist you.        Care EveryWhere ID     This is your Care EveryWhere ID. This could be used by other organizations to access your [facility] medical records  [id number]        Equal Access to Services     NENA JAVIER : Herrera Castellano, rodo martínez, ines teixeira, gonzales holguin . So [facility] [phone].    ATENCIÓN: Si habla español, tiene a wang disposición servicios gratuitos de asistencia lingüística. Llame al [phone].    We comply with applicable federal civil rights laws and [place] laws. We do not discriminate on the basis of race, color, national origin, age, disability, sex, sexual orientation, or gender identity.            After Visit Summary       This is your record. Keep this with you and show to your community pharmacist(s) and doctor(s) at your next visit.

## 2018-11-11 NOTE — ED AVS SNAPSHOT
[facility] Emergency Department    [address] [address]    Phone:  [phone]    Fax:  [phone]                                       Raymon Ace   MRN: [id number]    Department:  [facility] Emergency Department   Date of Visit:  11/11/2018           After Visit Summary Signature Page     I have received my discharge instructions, and my questions have been answered. I have discussed any challenges I see with this plan with the nurse or doctor.    ..........................................................................................................................................  Patient/Patient Representative Signature      ..........................................................................................................................................  Patient Representative Print Name and Relationship to Patient    ..................................................               ................................................  Date                                   Time    ..........................................................................................................................................  Reviewed by Signature/Title    ...................................................              ..............................................  Date                                               Time          22EPIC Rev 08/18

## 2018-11-11 NOTE — ED PROVIDER NOTES
MG Metcalf is a 73-year-old male presenting with abdominal pain and vomiting.  He has a known history of appendectomy and lumbar fixation which was through an abdominal approach.  He has a known history of alcohol abuse and associated liver disease.  He has had a pulmonary embolism and takes Xeralto.  Former smoker.  No drugs of abuse.    The patient has had nausea over the past 3-4 days.  He began to throw up yesterday.  He has thrown up about 10 times in total.  He complains of generalized abdominal pain that is constant.  He describes tenderness throughout.  No hematemesis or coffee-ground emesis.  No diarrhea.  No constipation.  No hematochezia or melena.  He has been taking oxycodone for his recent low back surgery.  He has been tapering this dose.  He denies a fever but has had some recent sick contacts with similar symptoms.  No dysuria, urgency, or frequency.  No trauma or injury.  No skin rash.  No chest pain.  No shortness of breath.    ROS: All other review of systems are negative other than that noted above.     Past Medical History:   Diagnosis Date     Alcohol abuse since teens 01/15/2015    Still actively drinking     Alcoholic liver damage (H) 1/10/2014     Gastroesophageal reflux disease with esophagitis 12/6/2016     Gout involving toe, unspecified cause, unspecified chronicity, unspecified laterality 6/2/2016     Heart murmur     heart is positioned farther on left side then typical     Hyperlipidemia 12/17/2015     Hypertension      ITP (idiopathic thrombocytopenic purpura)      Obstructive sleep apnea     does not use CPAP  machine     Pulmonary embolism (H) large RLL w infarctio 4-17 4/18/2017     Past Surgical History:   Procedure Laterality Date     APPENDECTOMY  1956     BACK SURGERY  1992, 1996    trimmed L4-L5, Fusion L4-L5     BONE MARROW BIOPSY, BONE SPECIMEN, NEEDLE/TROCAR  10/24/2012    Procedure: BIOPSY BONE MARROW;  BONE MARROW BIOPSY WITH ASPIRATE (IRINA COURTNEY);  Surgeon:  Terri Hickey MD;  Location:  GI     ESOPHAGOSCOPY, GASTROSCOPY, DUODENOSCOPY (EGD), COMBINED N/A 2/8/2018    Procedure: COMBINED ESOPHAGOSCOPY, GASTROSCOPY, DUODENOSCOPY (EGD);  EGD;  Surgeon: Terri Crouch MD;  Location:  GI     FACIAL RECONSTRUCTION SURGERY  1965    jaw fracture     HC TOOTH EXTRACTION W/FORCEP  1990s     Family History   Problem Relation Age of Onset     Unknown/Adopted Mother      Unknown/Adopted Father      HEART DISEASE Sister      Diabetes No family hx of      Coronary Artery Disease No family hx of      Hypertension No family hx of      Hyperlipidemia No family hx of      Cerebrovascular Disease No family hx of      Breast Cancer No family hx of      Colon Cancer No family hx of      Prostate Cancer No family hx of      Other Cancer No family hx of      Depression No family hx of      Anxiety Disorder No family hx of      Mental Illness No family hx of      Substance Abuse No family hx of      Anesthesia Reaction No family hx of      Asthma No family hx of      Osteoporosis No family hx of      Genetic Disorder No family hx of      Thyroid Disease No family hx of      Obesity No family hx of      Social History   Substance Use Topics     Smoking status: Former Smoker     Packs/day: 1.00     Years: 28.00     Types: Cigarettes     Start date: 1958     Quit date: 9/26/1982     Smokeless tobacco: Never Used     Alcohol use 3.0 oz/week     0 Standard drinks or equivalent, 5 Shots of liquor per week      Comment: 4-5 drinks/day, alcohol use disorder         PHYSICAL  /85  Pulse 115  Temp 98.9  F (37.2  C) (Temporal)  Resp 16  Wt 83.9 kg (185 lb)  SpO2 96%  BMI 29.41 kg/m2  General: Patient is alert and in moderate distress.  Cooperative, lying back in bed.  Neurological: Alert.  Moving upper and lower extremities equally, bilaterally.  Head / Neck: Atraumatic.  Ears: Not done.  Eyes: Pupils are equal, round, and reactive.  Normal conjunctiva.  Nose: Midline.   No epistaxis.  Mouth / Throat: No ulcerations or lesions.  Upper pharynx is not erythematous.  Dry.  Respiratory: No respiratory distress. CTA B.  Cardiovascular: Regular rhythm.  Peripheral extremities are warm.  No edema.  No calf tenderness.  Abdomen / Pelvis: Mild to moderate tenderness throughout, worse in the right lower quadrant.  No distention.  Soft throughout.  Genitalia: Not done.  Musculoskeletal: No tenderness over major muscles and joints.  Skin: No evidence of rash or trauma.        ED COURSE  0923.  The patient has abdominal pain and nausea with vomiting.  No diarrhea.  Lab values pending.  Fluid bolus, Zofran, Toradol.    Labs Ordered and Resulted from Time of ED Arrival Up to the Time of Departure from the ED   CBC WITH PLATELETS DIFFERENTIAL - Abnormal; Notable for the following:        Result Value    WBC 15.0 (*)     Platelet Count 129 (*)     Absolute Neutrophil 13.0 (*)     All other components within normal limits   COMPREHENSIVE METABOLIC PANEL - Abnormal; Notable for the following:     Glucose 131 (*)     Bilirubin Total 1.4 (*)     All other components within normal limits   LIPASE   LACTATE FOR SEPSIS PROTOCOL   PULSE OXIMETRY NURSING   CARDIAC CONTINUOUS MONITORING       IMAGING  Images reviewed by me.  Radiology report also reviewed.  Abd/pelvis CT, IV contrast only TRAUMA  / AAA   Final Result   IMPRESSION: Colonic diverticulosis with no evidence of diverticulitis.   No other gastrointestinal tract abnormality is seen.      IGNACIO ALATORRE MD        1035.  The patient does feel better.  He continues to have some mild nausea.  Zofran will be repeated.  White blood cell count is elevated.  He continues to have mild to moderate tenderness.  CT scan ordered.    1205.  CT imaging is unremarkable.  No emergent need for hospitalization or surgery.  Infectious causes of the patient's nausea and vomiting are most likely.  Zofran prescription will be provided.  Follow-up in the clinic if not  improved.  Return here for worsening as discussed.    Medications   0.9% sodium chloride BOLUS (0 mLs Intravenous Stopped 11/11/18 1040)   ondansetron (ZOFRAN) injection 4 mg (4 mg Intravenous Given 11/11/18 1038)   ketorolac (TORADOL) injection 15 mg (15 mg Intravenous Given 11/11/18 0931)   iopamidol (ISOVUE-370) solution 90 mL (90 mLs Intravenous Given 11/11/18 1104)   sodium chloride 0.9 % bag 500mL for CT scan flush use (62 mLs As instructed Given 11/11/18 1104)       IMPRESSION    ICD-10-CM    1. Non-intractable vomiting with nausea, unspecified vomiting type R11.2            Critical Care time:  none                    Tiago Rose MD  11/11/18 2227

## 2018-11-11 NOTE — DISCHARGE INSTRUCTIONS
Return to the Emergency Room if the following occurs:     Severely worsened pain, nausea with vomiting and dehydration, or for any concern at anytime.    Or, follow-up with the following provider as we discussed:     Return to your primary doctor as needed, or if not improved over the next 5 days.    Medications discussed:    Zofran for nausea, as needed.  Ibuprofen 600 mg every six hours for pain (7 days duration).  Tylenol 1000 mg every six hours for pain (7 days duration).  Therefore, you can alternate these every three hours and do it safely.    If you received pain-relieving or sedating medication during your time in the ER, avoid alcohol, driving automobiles, or working with machinery.  Also, a responsible adult must stay with you.        Call the Nurse Advice Line at [phone] or [phone] for any concern at anytime.

## 2018-11-12 ENCOUNTER — MEDICAL CORRESPONDENCE (OUTPATIENT)
Dept: HEALTH INFORMATION MANAGEMENT | Facility: CLINIC | Age: 73
End: 2018-11-12

## 2018-11-12 DIAGNOSIS — K21.9 GASTROESOPHAGEAL REFLUX DISEASE WITHOUT ESOPHAGITIS: ICD-10-CM

## 2018-11-13 RX ORDER — OMEPRAZOLE 40 MG/1
CAPSULE, DELAYED RELEASE ORAL
Qty: 90 CAPSULE | Refills: 3 | Status: SHIPPED | OUTPATIENT
Start: 2018-11-13 | End: 2019-12-12

## 2018-11-13 NOTE — TELEPHONE ENCOUNTER
Prescription approved per [facility] Refill Protocol.  Leyla Thompson RN- Triage [employer]

## 2018-11-13 NOTE — TELEPHONE ENCOUNTER
"Requested Prescriptions   Pending Prescriptions Disp Refills     omeprazole (PRILOSEC) 40 MG capsule [Pharmacy Med Name: OMEPRAZOLE DR 40MG  CAP]  Last Written Prescription Date:  10/4/2018  Last Fill Quantity: 30 capsule,  # refills: 0   Last Office Visit: 10/5/2018   Future Office Visit:    30 capsule 0     Sig: TAKE 1 CAPSULE BY MOUTH ONCE DAILY . APPOINTMENT REQUIRED FOR FUTURE REFILLS    PPI Protocol Passed    11/12/2018  8:46 PM       Passed - Not on Clopidogrel (unless Pantoprazole ordered)       Passed - No diagnosis of osteoporosis on record       Passed - Recent (12 mo) or future (30 days) visit within the authorizing provider's specialty    Patient had office visit in the last 12 months or has a visit in the next 30 days with authorizing provider or within the authorizing provider's specialty.  See \"Patient Info\" tab in inbasket, or \"Choose Columns\" in Meds & Orders section of the refill encounter.             Passed - Patient is age 18 or older          "

## 2018-11-15 ENCOUNTER — APPOINTMENT (OUTPATIENT)
Dept: LAB | Facility: CLINIC | Age: 73
End: 2018-11-15
Attending: INTERNAL MEDICINE
Payer: COMMERCIAL

## 2018-11-15 ENCOUNTER — ONCOLOGY VISIT (OUTPATIENT)
Dept: ONCOLOGY | Facility: CLINIC | Age: 73
End: 2018-11-15
Attending: INTERNAL MEDICINE
Payer: COMMERCIAL

## 2018-11-15 VITALS
HEART RATE: 81 BPM | DIASTOLIC BLOOD PRESSURE: 81 MMHG | SYSTOLIC BLOOD PRESSURE: 125 MMHG | TEMPERATURE: 98.2 F | BODY MASS INDEX: 30.51 KG/M2 | OXYGEN SATURATION: 98 % | WEIGHT: 191.9 LBS | RESPIRATION RATE: 18 BRPM

## 2018-11-15 DIAGNOSIS — Z79.01 LONG TERM CURRENT USE OF ANTICOAGULANT THERAPY: Primary | ICD-10-CM

## 2018-11-15 DIAGNOSIS — D69.3 IDIOPATHIC THROMBOCYTOPENIC PURPURA (H): ICD-10-CM

## 2018-11-15 DIAGNOSIS — Z86.711 HISTORY OF PULMONARY EMBOLISM: ICD-10-CM

## 2018-11-15 LAB
ERYTHROCYTE [DISTWIDTH] IN BLOOD BY AUTOMATED COUNT: 14.4 % (ref 10–15)
HCT VFR BLD AUTO: 39 % (ref 40–53)
HGB BLD-MCNC: 12.9 G/DL (ref 13.3–17.7)
MCH RBC QN AUTO: 30 PG (ref 26.5–33)
MCHC RBC AUTO-ENTMCNC: 33.1 G/DL (ref 31.5–36.5)
MCV RBC AUTO: 91 FL (ref 78–100)
PLATELET # BLD AUTO: 105 10E9/L (ref 150–450)
RBC # BLD AUTO: 4.3 10E12/L (ref 4.4–5.9)
WBC # BLD AUTO: 11.7 10E9/L (ref 4–11)

## 2018-11-15 PROCEDURE — 85027 COMPLETE CBC AUTOMATED: CPT | Performed by: INTERNAL MEDICINE

## 2018-11-15 PROCEDURE — 36415 COLL VENOUS BLD VENIPUNCTURE: CPT

## 2018-11-15 PROCEDURE — 99213 OFFICE O/P EST LOW 20 MIN: CPT | Mod: ZP | Performed by: INTERNAL MEDICINE

## 2018-11-15 PROCEDURE — G0463 HOSPITAL OUTPT CLINIC VISIT: HCPCS | Mod: ZF

## 2018-11-15 ASSESSMENT — PAIN SCALES - GENERAL: PAINLEVEL: MODERATE PAIN (5)

## 2018-11-15 NOTE — LETTER
11/15/2018       RE: Raymon Ace  [address] [facility] [address]     Dear Colleague,    Thank you for referring your patient, Raymon Ace, to the [facility]. Please see a copy of my visit note below.      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  He is here today for routine follow-up.  I just saw him a month ago, in follow-up after spine surgery.  He continues to do well in that regard.  He was recently seen in the ED for viral gastroenteritis, which has resolved.  He is still planning to spend the winter in [place], although he and his wife are still discussing whether they will leave before or after [place].  Overall he fells well, denies any bleeding symptoms, and has no new concerns.      Exam: He looks well.  No bruises or petechiae.  He is breathing comfortably on room air.    Labs:  White count 11.7, hemoglobin 12.9, platelet count 105,000.      ASSESSMENT / PLAN:  1.  Chronic ITP -- Overall Raymon is doing well and currently has a stable platelet count on Promacta.  His baseline is in the 70,000 to 100,000 range.  He should continue on the same dose of Promacta for now.    2. History of unprovoked pulmonary embolism (April 2017) -- He has done well on long-term anticoagulation with rivaroxaban, which he should continue.      He will have labs checked just prior to leaving for [place].  Assuming they are stable, he does not need to have them checked over the winter unless he has any new bleeding symptoms in which case he will contact me.  Otherwise we will plan to see him back in early May 2019.      Total time 15 minutes, all in counseling and coordination of care.        Waylon Novoa MD  Associate Professor of Medicine  Division of Hematology, Oncology, and Transplantation  Director, [facility]

## 2018-11-15 NOTE — MR AVS SNAPSHOT
After Visit Summary   11/15/2018    Raymon Ace    MRN: [id number]           Patient Information     Date Of Birth          1945        Visit Information        Provider Department      11/15/2018 5:15 PM Waylon Novoa MD [facility]        Today's Diagnoses     Long term current use of anticoagulant therapy    -  1    Idiopathic thrombocytopenic purpura (H)        History of pulmonary embolism           Follow-ups after your visit        Future tests that were ordered for you today     Open Standing Orders        Priority Remaining Interval Expires Ordered    *CBC with platelets differential Routine 4/4 every 3 months, or as directed 11/15/2019 11/15/2018    Comprehensive metabolic panel Routine 4/4 every 3 months, or as directed 11/15/2019 11/15/2018            Who to contact     If you have questions or need follow up information about today's clinic visit or your schedule please contact [facility] directly at [phone].  Normal or non-critical lab and imaging results will be communicated to you by [employer], letter or phone within 4 business days after the clinic has received the results. If you do not hear from us within 7 days, please contact the clinic through [employer] or phone. If you have a critical or abnormal lab result, we will notify you by phone as soon as possible.  Submit refill requests through Signature or call your pharmacy and they will forward the refill request to us. Please allow 3 business days for your refill to be completed.          Additional Information About Your Visit        MyChart Information     Signature gives you secure access to your electronic health record. If you see a primary care provider, you can also send messages to your care team and make appointments. If you have questions, please call your primary care clinic.  If you do not have a primary care provider, please call [phone] and they will assist you.         Care EveryWhere ID     This is your Care EveryWhere ID. This could be used by other organizations to access your [place] medical records  [id number]        Your Vitals Were     Pulse Temperature Respirations Pulse Oximetry BMI (Body Mass Index)       81 98.2  F (36.8  C) (Oral) 18 98% 30.51 kg/m2        Blood Pressure from Last 3 Encounters:   11/15/18 125/81   11/11/18 161/85   10/18/18 108/72    Weight from Last 3 Encounters:   11/15/18 87 kg (191 lb 14.4 oz)   11/11/18 83.9 kg (185 lb)   10/18/18 90.1 kg (198 lb 9.6 oz)              We Performed the Following     CBC with platelets        Primary Care Provider Office Phone # Fax #    Rosanne Cherry [phone] [address] [facility] [address]        Equal Access to Services     [facility]: Hadii aad ku hadasho Soandria, waaxda luqadaha, qaybta kaalmada adeegyada, gonzales holguin . So [facility] [phone].    ATENCIÓN: Si habla español, tiene a wang disposición servicios gratuitos de asistencia lingüística. Llame al [phone].    We comply with applicable federal civil rights laws and [place] laws. We do not discriminate on the basis of race, color, national origin, age, disability, sex, sexual orientation, or gender identity.            Thank you!     Thank you for choosing [facility]  for your care. Our goal is always to provide you with excellent care. Hearing back from our patients is one way we can continue to improve our services. Please take a few minutes to complete the written survey that you may receive in the mail after your visit with us. Thank you!             Your Updated Medication List - Protect others around you: Learn how to safely use, store and throw away your medicines at www.disposemymeds.org.          This list is accurate as of 11/15/18  5:57 PM.  Always use your most recent med list.                   Brand Name Dispense Instructions for use  Diagnosis    acetaminophen 500 MG tablet    TYLENOL     Take 500-1,000 mg by mouth every 6 hours as needed for mild pain        allopurinol 300 MG tablet    ZYLOPRIM    90 tablet    TAKE 1 TABLET BY MOUTH ONCE DAILY    Gout involving toe, unspecified cause, unspecified chronicity, unspecified laterality       amLODIPine 5 MG tablet    NORVASC    90 tablet    TAKE 1 TABLET BY MOUTH ONCE DAILY **NEEDS TO BE SEEN FOR REFILLS**    Benign essential hypertension       ASPIRIN NOT PRESCRIBED    INTENTIONAL    1 each    Please choose reason not prescribed, below    Polyneuropathy in other diseases classified elsewhere (H)       cloNIDine 0.3 MG tablet    CATAPRES    180 tablet    TAKE ONE TABLET BY MOUTH TWICE DAILY .  NEEDS  TO  BE  SEEN  FOR  MORE    Benign essential hypertension       folic acid 1 MG tablet    FOLVITE    90 tablet    TAKE ONE TABLET BY MOUTH ONCE DAILY    Alcohol abuse       gabapentin 300 MG capsule    NEURONTIN    270 capsule    TAKE ONE CAPSULE BY MOUTH THREE TIMES DAILY    Polyneuropathy in other diseases classified elsewhere (H), Chronic bilateral low back pain with sciatica, sciatica laterality unspecified       GLUCOSAMINE CHONDROITIN PLUS PO      Take 1 tablet by mouth daily        lisinopril 20 MG tablet    PRINIVIL/ZESTRIL     Take 20 mg by mouth daily        omeprazole 40 MG capsule    priLOSEC    90 capsule    TAKE 1 CAPSULE BY MOUTH ONCE DAILY . APPOINTMENT REQUIRED FOR FUTURE REFILLS    Gastroesophageal reflux disease without esophagitis       ondansetron 4 MG ODT tab    ZOFRAN ODT    12 tablet    Take 1-2 tablets (4-8 mg) by mouth every 8 hours as needed for nausea        oxyCODONE IR 5 MG tablet    ROXICODONE     Take 5-10 mg by mouth every 4 hours as needed        PROMACTA 75 MG tablet   Generic drug:  eltrombopag     30 tablet    TAKE ONE TABLET BY MOUTH EVERY DAY ON AN EMPTY STOMACH 1 HOUR BEFORE OR 2 HOURS AFTER A MEAL    Idiopathic thrombocytopenic purpura (H)       rivaroxaban  ANTICOAGULANT 10 MG Tabs tablet    XARELTO    30 tablet    Take 1 tablet (10 mg) by mouth daily (with dinner)    Iron deficiency anemia due to chronic blood loss, Idiopathic thrombocytopenic purpura (H), Personal history of venous thrombosis and embolism       spironolactone 25 MG tablet    ALDACTONE    90 tablet    TAKE ONE TABLET BY MOUTH ONCE DAILY    Benign essential hypertension

## 2018-11-15 NOTE — PROGRESS NOTES
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  He is here today for routine follow-up.  I just saw him a month ago, in follow-up after spine surgery.  He continues to do well in that regard.  He was recently seen in the ED for viral gastroenteritis, which has resolved.  He is still planning to spend the winter in [place], although he and his wife are still discussing whether they will leave before or after [place].  Overall he fells well, denies any bleeding symptoms, and has no new concerns.      Exam: He looks well.  No bruises or petechiae.  He is breathing comfortably on room air.    Labs:  White count 11.7, hemoglobin 12.9, platelet count 105,000.      ASSESSMENT / PLAN:  1.  Chronic ITP -- Overall Raymon is doing well and currently has a stable platelet count on Promacta.  His baseline is in the 70,000 to 100,000 range.  He should continue on the same dose of Promacta for now.    2. History of unprovoked pulmonary embolism (April 2017) -- He has done well on long-term anticoagulation with rivaroxaban, which he should continue.      He will have labs checked just prior to leaving for [place].  Assuming they are stable, he does not need to have them checked over the winter unless he has any new bleeding symptoms in which case he will contact me.  Otherwise we will plan to see him back in early May 2019.      Total time 15 minutes, all in counseling and coordination of care.        Waylon Novoa MD  Associate Professor of Medicine  Division of Hematology, Oncology, and Transplantation  Director, [facility]

## 2018-11-15 NOTE — NURSING NOTE
Chief Complaint   Patient presents with     Blood Draw     Labs drawn via  by RN. VS taken.          Kelly Salas RN

## 2018-11-15 NOTE — NURSING NOTE
"Oncology Rooming Note    November 15, 2018 5:25 PM   Raymon Ace is a 73 year old male who presents for:    Chief Complaint   Patient presents with     Blood Draw     Labs drawn via  by RN. VS taken.      Oncology Clinic Visit     Return; Thrombcytopenic Purpura     Initial Vitals: /81 (BP Location: Right arm, Patient Position: Sitting, Cuff Size: Adult Regular)  Pulse 81  Temp 98.2  F (36.8  C) (Oral)  Resp 18  Wt 87 kg (191 lb 14.4 oz)  SpO2 98%  BMI 30.51 kg/m2 Estimated body mass index is 30.51 kg/(m^2) as calculated from the following:    Height as of 10/5/18: 1.689 m (5' 6.5\").    Weight as of this encounter: 87 kg (191 lb 14.4 oz). Body surface area is 2.02 meters squared.  Moderate Pain (5) Comment: Data Unavailable   No LMP for male patient.  Allergies reviewed: Yes  Medications reviewed: Yes    Medications: Medication refills not needed today.  Pharmacy name entered into [employer]:    [facility] - [place], [place] - [address] [facility] - [place], [place] - [address]  [facility] - [place], [place] - [address] [facility] - [place], [place] - [address] [facility] - [place], [place] - [address] [facility] - [place], [place] - [address] [facility] [phone]    Clinical concerns: No Concerns Sommer was NOT notified.    7 minutes for nursing intake (face to face time)     Carolynn Lee MA              "

## 2018-11-20 ENCOUNTER — TRANSFERRED RECORDS (OUTPATIENT)
Dept: HEALTH INFORMATION MANAGEMENT | Facility: CLINIC | Age: 73
End: 2018-11-20

## 2018-11-26 DIAGNOSIS — I10 BENIGN ESSENTIAL HYPERTENSION: ICD-10-CM

## 2018-11-26 RX ORDER — CLONIDINE HYDROCHLORIDE 0.3 MG/1
TABLET ORAL
Qty: 180 TABLET | Refills: 1 | Status: SHIPPED | OUTPATIENT
Start: 2018-11-26 | End: 2020-01-02

## 2018-11-26 NOTE — TELEPHONE ENCOUNTER
"Requested Prescriptions   Pending Prescriptions Disp Refills     cloNIDine (CATAPRES) 0.3 MG tablet [Pharmacy Med Name: CLONIDINE 0.3MG     TAB]  Last Written Prescription Date:  05/22/2018  Last Fill Quantity: 180,  # refills: 0   Last office visit: 10/5/2018 with prescribing provider:  GUANAKO KEBEDE    Future Office Visit:     180 tablet 0     Sig: TAKE 1 TABLET BY MOUTH TWICE DAILY **NEEDS TO BE SEEN FOR REFILLS**    Central Acting Antiadrenergic Agents Passed    11/26/2018  5:30 AM       Passed - Blood pressure under 140/90 in past 12 months    BP Readings from Last 3 Encounters:   11/15/18 125/81   11/11/18 161/85   10/18/18 108/72                Passed - Patient is 6 years of age or older       Passed - Recent (12 mo) or future (30 days) visit within the authorizing provider's specialty    Patient had office visit in the last 12 months or has a visit in the next 30 days with authorizing provider or within the authorizing provider's specialty.  See \"Patient Info\" tab in inbasket, or \"Choose Columns\" in Meds & Orders section of the refill encounter.             Passed - Normal serum creatinine on file within past 12 months    Recent Labs   Lab Test  11/11/18   0928   CR  0.92               "

## 2018-12-14 DIAGNOSIS — D69.3 IDIOPATHIC THROMBOCYTOPENIC PURPURA (H): ICD-10-CM

## 2018-12-28 ENCOUNTER — TELEPHONE (OUTPATIENT)
Dept: FAMILY MEDICINE | Facility: CLINIC | Age: 73
End: 2018-12-28

## 2018-12-28 DIAGNOSIS — Z53.9 DIAGNOSIS NOT YET DEFINED: Primary | ICD-10-CM

## 2018-12-28 PROCEDURE — G0180 MD CERTIFICATION HHA PATIENT: HCPCS | Performed by: FAMILY MEDICINE

## 2018-12-28 NOTE — TELEPHONE ENCOUNTER
Our goal is to have forms completed within 72 hours, however some forms may require a visit or additional information.    What clinic location was the form placed at [facility] or [facility].?     Who is the form from?   Where did the form come from? Faxed to clinic   The form was placed in the inbox of Rosanne Cherry MD      Please fax to [phone]  Phone number:      Additional comments: [facility] 10/15/18 to 12/13/18    Call take on 12/28/2018 at 11:38 AM by Suzan Bull

## 2019-01-14 ENCOUNTER — THERAPY VISIT (OUTPATIENT)
Dept: PHYSICAL THERAPY | Facility: CLINIC | Age: 74
End: 2019-01-14
Payer: COMMERCIAL

## 2019-01-14 DIAGNOSIS — M25.511 CHRONIC RIGHT SHOULDER PAIN: Primary | ICD-10-CM

## 2019-01-14 DIAGNOSIS — G89.29 CHRONIC RIGHT SHOULDER PAIN: Primary | ICD-10-CM

## 2019-01-14 PROCEDURE — 97110 THERAPEUTIC EXERCISES: CPT | Mod: GP | Performed by: PHYSICAL THERAPIST

## 2019-01-14 PROCEDURE — 97010 HOT OR COLD PACKS THERAPY: CPT | Mod: GP | Performed by: PHYSICAL THERAPIST

## 2019-01-14 NOTE — LETTER
[facility]  [address]  [place] [address]  [phone]    January 15, 2019    Re: Raymon Ace   :   1945  MRN:  [id number]   REFERRING PHYSICIAN:   [facility]    Date of Initial Evaluation:  6/15/18  Visits:  Rxs Used: 1  Reason for Referral:  Chronic right shoulder pain     PROGRESS  REPORT  Progress reporting period is from 2018  to 2019.     SUBJECTIVE   Raymon returns to therapy for the first time follow up since early 2018.   He has been living with high level R shoulder pain that has prevented him from all day to day activity, work, self cares and sleeping at night.   SPADI score 95/100.   He has constant 8-9/10 pain.   Raymon is scheduled for shoulder surgery in 2019. At this point, Raymon is referred back to rehabilitation for re-introduction of supine cuff program   Patient is apprehensive to move and re-initiate exercises. Raymon is undergoing a flouroscopic guided shoulder injection later today.   Therefore he is advised to hold off exercises until after the injection, then consder 2-3 days no exercises unless directed different.   Raymon also presents with R lower  cx neck pain with  radicular sx's to the R hand and fingers that seems to be potential for most of his upper extremity pain.   Strong recommendation for referral for cx spine consult, especially if no change of pain sx's after the shoulder injection today.  Current Pain level: 10/10   Initial Pain level: 10/10   Changes in function: No changes noted in function since last SOAP note   Adverse reactions: None      OBJECTIVE  AROM and AAROM is roughly 30-60 degrees, 0 ER and 30-40 degrees IR, very apprehensive to pain .   No special tests or  strength tests done today     Careful progression with supine cuff program, most likely initiate with assist     ASSESSMENT/PLAN  Updated problem list and treatment plan: Diagnosis 1:  Advance R  shoulder OA as well as cx radiculopathy      Recommendations:  This patient would benefit from continued therapy.     Frequency:  1 X week,to every other week  once daily  Duration:  for 12 weeks    Thank you for your referral.    INQUIRIES  Therapist: Abad Mortensen, PT, ATC, Cert. MDT  [facility]  [address] [place] [address]  Phone: [phone]  Fax: [phone]

## 2019-01-15 ENCOUNTER — TELEPHONE (OUTPATIENT)
Dept: ONCOLOGY | Facility: CLINIC | Age: 74
End: 2019-01-15

## 2019-01-15 ENCOUNTER — TRANSFERRED RECORDS (OUTPATIENT)
Dept: HEALTH INFORMATION MANAGEMENT | Facility: CLINIC | Age: 74
End: 2019-01-15

## 2019-01-15 NOTE — TELEPHONE ENCOUNTER
Prior Authorization Approval    Authorization Effective Date: 1/15/2019  Authorization Expiration Date: 12/31/2019  Medication: Promacta- PA Approved  Approved Dose/Quantity: 75mg 1day  Reference #: [id number]   Insurance Company: [employer] D - Phone [phone] Fax [phone]  Expected CoPay:       CoPay Card Available:      Foundation Assistance Needed:    Which Pharmacy is filling the prescription (Not needed for infusion/clinic administered): [facility] [place], [place] - [address] [phone]  Pharmacy Notified: Yes  Patient Notified:

## 2019-01-15 NOTE — TELEPHONE ENCOUNTER
PA Initiation    Medication: Promacta- PA PENDING  Insurance Company: [employer] D - Phone [phone] Fax [phone]  Pharmacy Filling the Rx: [facility] [place], [place] - [address] [place] [address] [phone]  Filling Pharmacy Phone:    Filling Pharmacy Fax:    Start Date: 1/15/2019

## 2019-01-15 NOTE — PROGRESS NOTES
Subjective:  HPI                    Objective:  System    Physical Exam    General     ROS    Assessment/Plan:    PROGRESS  REPORT    Progress reporting period is from September 2018  to January 14, 2019.     SUBJECTIVE   Raymon returns to therapy for the first time follow up since early September 2018.     He has been living with high level R shoulder pain that has prevented him from all day to day activity, work, self cares and sleeping at night.   SPADI score 95/100.   He has constant 8-9/10 pain.     Raymon is scheduled for shoulder surgery in September 2019. At this point, Raymon is referred back to rehabilitation for re-introduction of supine cuff program.     Patient is apprehensive to move and re-initiate exercises. Raymon is undergoing a flouroscopic guided shoulder injection later today.     Therefore he is advised to hold off exercises until after the injection, then consder 2-3 days no exercises unless directed different.   Raymon also presents with R lower  cx neck pain with  radicular sx's to the R hand and fingers that seems to be potential for most of his upper extremity pain.     Strong recommendation for referral for cx spine consult, especially if no change of pain sx's after the shoulder injection today.        Current Pain level: 10/10   Initial Pain level: 10/10   Changes in function: No changes noted in function since last SOAP note   Adverse reactions: None;   ,         OBJECTIVE  AROM and AAROM is roughly 30-60 degrees, 0 ER and 30-40 degrees IR, very apprehensive to pain .     No special tests or  strength tests done today       Careful progression with supine cuff program, most likely initiate with assist     ASSESSMENT/PLAN  Updated problem list and treatment plan: Diagnosis 1:  Advance R shoulder OA as well as cx radiculopathy      Recommendations:  This patient would benefit from continued therapy.     Frequency:  1 X week,to every other week  once daily  Duration:  for 12  weeks          Please refer to the daily flowsheet for treatment today, total treatment time and time spent performing 1:1 timed codes.

## 2019-01-16 DIAGNOSIS — Z86.711 HISTORY OF PULMONARY EMBOLISM: ICD-10-CM

## 2019-01-16 DIAGNOSIS — Z79.01 LONG TERM CURRENT USE OF ANTICOAGULANT THERAPY: ICD-10-CM

## 2019-01-16 DIAGNOSIS — D69.3 IDIOPATHIC THROMBOCYTOPENIC PURPURA (H): ICD-10-CM

## 2019-01-16 LAB
ALBUMIN SERPL-MCNC: 3.7 G/DL (ref 3.4–5)
ALP SERPL-CCNC: 74 U/L (ref 40–150)
ALT SERPL W P-5'-P-CCNC: 29 U/L (ref 0–70)
ANION GAP SERPL CALCULATED.3IONS-SCNC: 5 MMOL/L (ref 3–14)
AST SERPL W P-5'-P-CCNC: 15 U/L (ref 0–45)
BASOPHILS # BLD AUTO: 0 10E9/L (ref 0–0.2)
BASOPHILS NFR BLD AUTO: 0.1 %
BILIRUB SERPL-MCNC: 0.9 MG/DL (ref 0.2–1.3)
BUN SERPL-MCNC: 24 MG/DL (ref 7–30)
CALCIUM SERPL-MCNC: 8.5 MG/DL (ref 8.5–10.1)
CHLORIDE SERPL-SCNC: 104 MMOL/L (ref 94–109)
CO2 SERPL-SCNC: 30 MMOL/L (ref 20–32)
CREAT SERPL-MCNC: 1.14 MG/DL (ref 0.66–1.25)
DIFFERENTIAL METHOD BLD: ABNORMAL
EOSINOPHIL # BLD AUTO: 0 10E9/L (ref 0–0.7)
EOSINOPHIL NFR BLD AUTO: 0 %
ERYTHROCYTE [DISTWIDTH] IN BLOOD BY AUTOMATED COUNT: 15.5 % (ref 10–15)
GFR SERPL CREATININE-BSD FRML MDRD: 63 ML/MIN/{1.73_M2}
GLUCOSE SERPL-MCNC: 90 MG/DL (ref 70–99)
HCT VFR BLD AUTO: 37.6 % (ref 40–53)
HGB BLD-MCNC: 12.3 G/DL (ref 13.3–17.7)
IMM GRANULOCYTES # BLD: 0.2 10E9/L (ref 0–0.4)
IMM GRANULOCYTES NFR BLD: 1.2 %
LYMPHOCYTES # BLD AUTO: 0.5 10E9/L (ref 0.8–5.3)
LYMPHOCYTES NFR BLD AUTO: 3.4 %
MCH RBC QN AUTO: 29.8 PG (ref 26.5–33)
MCHC RBC AUTO-ENTMCNC: 32.7 G/DL (ref 31.5–36.5)
MCV RBC AUTO: 91 FL (ref 78–100)
MONOCYTES # BLD AUTO: 0.9 10E9/L (ref 0–1.3)
MONOCYTES NFR BLD AUTO: 6.5 %
NEUTROPHILS # BLD AUTO: 12 10E9/L (ref 1.6–8.3)
NEUTROPHILS NFR BLD AUTO: 88.8 %
NRBC # BLD AUTO: 0 10*3/UL
NRBC BLD AUTO-RTO: 0 /100
PLATELET # BLD AUTO: 130 10E9/L (ref 150–450)
POTASSIUM SERPL-SCNC: 4.1 MMOL/L (ref 3.4–5.3)
PROT SERPL-MCNC: 7.1 G/DL (ref 6.8–8.8)
RBC # BLD AUTO: 4.13 10E12/L (ref 4.4–5.9)
SODIUM SERPL-SCNC: 139 MMOL/L (ref 133–144)
WBC # BLD AUTO: 13.5 10E9/L (ref 4–11)

## 2019-01-16 PROCEDURE — 85025 COMPLETE CBC W/AUTO DIFF WBC: CPT | Performed by: INTERNAL MEDICINE

## 2019-01-16 PROCEDURE — 36415 COLL VENOUS BLD VENIPUNCTURE: CPT | Performed by: INTERNAL MEDICINE

## 2019-01-16 PROCEDURE — 80053 COMPREHEN METABOLIC PANEL: CPT | Performed by: INTERNAL MEDICINE

## 2019-01-21 ENCOUNTER — CARE COORDINATION (OUTPATIENT)
Dept: ONCOLOGY | Facility: CLINIC | Age: 74
End: 2019-01-21

## 2019-01-21 NOTE — PROGRESS NOTES
Phoned Raymon and let him know that his labs looked okay, per Dr. Novoa.  He is on his way to [place].  He does not need to get labs checked over the winter unless he feels unwell.  Plan was to see him back in May, he returns to [place] end of April.

## 2019-01-23 DIAGNOSIS — Z86.718 PERSONAL HISTORY OF VENOUS THROMBOSIS AND EMBOLISM: ICD-10-CM

## 2019-01-23 DIAGNOSIS — D69.3 IDIOPATHIC THROMBOCYTOPENIC PURPURA (H): ICD-10-CM

## 2019-01-23 DIAGNOSIS — D50.0 IRON DEFICIENCY ANEMIA DUE TO CHRONIC BLOOD LOSS: ICD-10-CM

## 2019-01-23 NOTE — TELEPHONE ENCOUNTER
"Per 11/15/18 visit note:   \"He has done well on long-term anticoagulation with rivaroxaban, which he should continue\"    RTC May 2019. Writer escribed adequate refills to pharmacy as requested.     JOE Ardon Triage    "

## 2019-02-08 ENCOUNTER — TELEPHONE (OUTPATIENT)
Dept: ONCOLOGY | Facility: CLINIC | Age: 74
End: 2019-02-08

## 2019-02-08 NOTE — TELEPHONE ENCOUNTER
Prior Authorization Approval    Authorization Effective Date: 1/15/2019  Authorization Expiration Date: 12/31/2019  Medication: Promacta prior authorization approval.  Approved Dose/Quantity: 75mg, 30/30 days  Reference #: [id number]   Insurance Company: [employer] Part D - Phone [phone] Fax [phone]  Expected CoPay: $17.40     CoPay Card Available: No    Foundation Assistance Needed: N/A  Which Pharmacy is filling the prescription (Not needed for infusion/clinic administered): [facility] [place], [place] - [address] [place] [address] [phone]  Pharmacy Notified: Yes  Patient Notified: Yes

## 2019-04-29 DIAGNOSIS — F10.10 ALCOHOL ABUSE: ICD-10-CM

## 2019-04-30 RX ORDER — FOLIC ACID 1 MG/1
TABLET ORAL
Qty: 90 TABLET | Refills: 2 | Status: SHIPPED | OUTPATIENT
Start: 2019-04-30 | End: 2020-02-14

## 2019-04-30 NOTE — TELEPHONE ENCOUNTER
"Requested Prescriptions   Pending Prescriptions Disp Refills     folic acid (FOLVITE) 1 MG tablet [Pharmacy Med Name: FOLIC ACID 1MG      TAB]  Last Written Prescription Date:  2/27/18  Last Fill Quantity: 90,  # refills: 3   Last office visit: 10/5/2018 with prescribing provider:  omkar   Future Office Visit:     90 tablet 3     Sig: TAKE 1 TABLET BY MOUTH ONCE DAILY       Vitamin Supplements (Adult) Protocol Passed - 4/29/2019  8:46 PM        Passed - High dose Vitamin D not ordered        Passed - Recent (12 mo) or future (30 days) visit within the authorizing provider's specialty     Patient had office visit in the last 12 months or has a visit in the next 30 days with authorizing provider or within the authorizing provider's specialty.  See \"Patient Info\" tab in inbasket, or \"Choose Columns\" in Meds & Orders section of the refill encounter.              Passed - Medication is active on med list          "

## 2019-05-06 DIAGNOSIS — M10.9 GOUT INVOLVING TOE, UNSPECIFIED CAUSE, UNSPECIFIED CHRONICITY, UNSPECIFIED LATERALITY: ICD-10-CM

## 2019-05-06 DIAGNOSIS — I10 BENIGN ESSENTIAL HYPERTENSION: ICD-10-CM

## 2019-05-06 NOTE — TELEPHONE ENCOUNTER
"Requested Prescriptions   Pending Prescriptions Disp Refills     amLODIPine (NORVASC) 5 MG tablet [Pharmacy Med Name: AMLODIPINE 5MG TAB]  Last Written Prescription Date:  8/30/2018  Last Fill Quantity: 90 tablet,  # refills: 1   Last office visit: 10/5/2018 with prescribing provider:  GUANAKO Cherry   Future Office Visit:     90 tablet 1     Sig: TAKE 1 TABLET BY MOUTH ONCE DAILY. APPOINTMENT REQUIRED FOR FUTURE REFILLS       Calcium Channel Blockers Protocol  Passed - 5/6/2019  1:02 PM        Passed - Blood pressure under 140/90 in past 12 months     BP Readings from Last 3 Encounters:   11/15/18 125/81   11/11/18 161/85   10/18/18 108/72                 Passed - Recent (12 mo) or future (30 days) visit within the authorizing provider's specialty     Patient had office visit in the last 12 months or has a visit in the next 30 days with authorizing provider or within the authorizing provider's specialty.  See \"Patient Info\" tab in inbasket, or \"Choose Columns\" in Meds & Orders section of the refill encounter.              Passed - Medication is active on med list        Passed - Patient is age 18 or older        Passed - Normal serum creatinine on file in past 12 months     Recent Labs   Lab Test 01/16/19  1446   CR 1.14             allopurinol (ZYLOPRIM) 300 MG tablet [Pharmacy Med Name: ALLOPURINOL 300MG   TAB]  Last Written Prescription Date:  7/2/2018  Last Fill Quantity: 90 tablet,  # refills: 2   Last office visit: 10/5/2018 with prescribing provider:  GUANAKO Cherry   Future Office Visit:     90 tablet 2     Sig: TAKE 1 TABLET BY MOUTH ONCE DAILY       Gout Agents Protocol Passed - 5/6/2019  1:02 PM        Passed - CBC on file in past 12 months     Recent Labs   Lab Test 01/16/19  1446   WBC 13.5*   RBC 4.13*   HGB 12.3*   HCT 37.6*   *                 Passed - ALT on file in past 12 months     Recent Labs   Lab Test 01/16/19  1446   ALT 29             Passed - Has Uric Acid on file in past 12 months and " "value is less than 6     Recent Labs   Lab Test 09/14/18  1359   URIC 2.0*     If level is 6mg/dL or greater, ok to refill one time and refer to provider.           Passed - Recent (12 mo) or future (30 days) visit within the authorizing provider's specialty     Patient had office visit in the last 12 months or has a visit in the next 30 days with authorizing provider or within the authorizing provider's specialty.  See \"Patient Info\" tab in inbasket, or \"Choose Columns\" in Meds & Orders section of the refill encounter.              Passed - Medication is active on med list        Passed - Patient is age 18 or older        Passed - Normal serum creatinine on file in the past 12 months     Recent Labs   Lab Test 01/16/19  1446   CR 1.14                "

## 2019-05-08 RX ORDER — ALLOPURINOL 300 MG/1
TABLET ORAL
Qty: 90 TABLET | Refills: 1 | Status: SHIPPED | OUTPATIENT
Start: 2019-05-08 | End: 2019-12-27

## 2019-05-08 RX ORDER — AMLODIPINE BESYLATE 5 MG/1
TABLET ORAL
Qty: 90 TABLET | Refills: 1 | Status: SHIPPED | OUTPATIENT
Start: 2019-05-08 | End: 2019-11-22

## 2019-05-16 ENCOUNTER — ONCOLOGY VISIT (OUTPATIENT)
Dept: ONCOLOGY | Facility: CLINIC | Age: 74
End: 2019-05-16
Attending: INTERNAL MEDICINE
Payer: COMMERCIAL

## 2019-05-16 VITALS
BODY MASS INDEX: 31.57 KG/M2 | DIASTOLIC BLOOD PRESSURE: 66 MMHG | OXYGEN SATURATION: 98 % | HEART RATE: 65 BPM | TEMPERATURE: 97.7 F | SYSTOLIC BLOOD PRESSURE: 109 MMHG | WEIGHT: 196.43 LBS | RESPIRATION RATE: 18 BRPM | HEIGHT: 66 IN

## 2019-05-16 DIAGNOSIS — D50.0 IRON DEFICIENCY ANEMIA DUE TO CHRONIC BLOOD LOSS: Primary | ICD-10-CM

## 2019-05-16 DIAGNOSIS — Z86.711 HISTORY OF PULMONARY EMBOLISM: ICD-10-CM

## 2019-05-16 DIAGNOSIS — Z79.01 LONG TERM CURRENT USE OF ANTICOAGULANT THERAPY: ICD-10-CM

## 2019-05-16 DIAGNOSIS — D50.0 IRON DEFICIENCY ANEMIA DUE TO CHRONIC BLOOD LOSS: ICD-10-CM

## 2019-05-16 DIAGNOSIS — D69.3 IDIOPATHIC THROMBOCYTOPENIC PURPURA (H): ICD-10-CM

## 2019-05-16 DIAGNOSIS — K64.4 EXTERNAL HEMORRHOIDS: ICD-10-CM

## 2019-05-16 LAB
ALBUMIN SERPL-MCNC: 3.5 G/DL (ref 3.4–5)
ALP SERPL-CCNC: 62 U/L (ref 40–150)
ALT SERPL W P-5'-P-CCNC: 45 U/L (ref 0–70)
ANION GAP SERPL CALCULATED.3IONS-SCNC: 11 MMOL/L (ref 3–14)
AST SERPL W P-5'-P-CCNC: 32 U/L (ref 0–45)
BASOPHILS # BLD AUTO: 0.1 10E9/L (ref 0–0.2)
BASOPHILS NFR BLD AUTO: 0.9 %
BILIRUB SERPL-MCNC: 1 MG/DL (ref 0.2–1.3)
BUN SERPL-MCNC: 21 MG/DL (ref 7–30)
CALCIUM SERPL-MCNC: 9 MG/DL (ref 8.5–10.1)
CHLORIDE SERPL-SCNC: 106 MMOL/L (ref 94–109)
CO2 SERPL-SCNC: 22 MMOL/L (ref 20–32)
CREAT SERPL-MCNC: 1.23 MG/DL (ref 0.66–1.25)
DIFFERENTIAL METHOD BLD: ABNORMAL
EOSINOPHIL # BLD AUTO: 0.1 10E9/L (ref 0–0.7)
EOSINOPHIL NFR BLD AUTO: 1.7 %
ERYTHROCYTE [DISTWIDTH] IN BLOOD BY AUTOMATED COUNT: 17 % (ref 10–15)
FERRITIN SERPL-MCNC: 11 NG/ML (ref 26–388)
GFR SERPL CREATININE-BSD FRML MDRD: 57 ML/MIN/{1.73_M2}
GLUCOSE SERPL-MCNC: 87 MG/DL (ref 70–99)
HCT VFR BLD AUTO: 33.8 % (ref 40–53)
HGB BLD-MCNC: 10.9 G/DL (ref 13.3–17.7)
IMM GRANULOCYTES # BLD: 0.2 10E9/L (ref 0–0.4)
IMM GRANULOCYTES NFR BLD: 2.3 %
IRON SATN MFR SERPL: 28 % (ref 15–46)
IRON SERPL-MCNC: 132 UG/DL (ref 35–180)
LYMPHOCYTES # BLD AUTO: 1.2 10E9/L (ref 0.8–5.3)
LYMPHOCYTES NFR BLD AUTO: 14.8 %
MCH RBC QN AUTO: 28.8 PG (ref 26.5–33)
MCHC RBC AUTO-ENTMCNC: 32.2 G/DL (ref 31.5–36.5)
MCV RBC AUTO: 89 FL (ref 78–100)
MONOCYTES # BLD AUTO: 0.7 10E9/L (ref 0–1.3)
MONOCYTES NFR BLD AUTO: 8.7 %
NEUTROPHILS # BLD AUTO: 5.9 10E9/L (ref 1.6–8.3)
NEUTROPHILS NFR BLD AUTO: 71.6 %
NRBC # BLD AUTO: 0 10*3/UL
NRBC BLD AUTO-RTO: 0 /100
PLATELET # BLD AUTO: 97 10E9/L (ref 150–450)
POTASSIUM SERPL-SCNC: 4 MMOL/L (ref 3.4–5.3)
PROT SERPL-MCNC: 6.9 G/DL (ref 6.8–8.8)
RBC # BLD AUTO: 3.79 10E12/L (ref 4.4–5.9)
SODIUM SERPL-SCNC: 139 MMOL/L (ref 133–144)
TIBC SERPL-MCNC: 466 UG/DL (ref 240–430)
WBC # BLD AUTO: 8.2 10E9/L (ref 4–11)

## 2019-05-16 PROCEDURE — 85025 COMPLETE CBC W/AUTO DIFF WBC: CPT | Performed by: INTERNAL MEDICINE

## 2019-05-16 PROCEDURE — 83550 IRON BINDING TEST: CPT | Performed by: INTERNAL MEDICINE

## 2019-05-16 PROCEDURE — 83540 ASSAY OF IRON: CPT | Performed by: INTERNAL MEDICINE

## 2019-05-16 PROCEDURE — 36415 COLL VENOUS BLD VENIPUNCTURE: CPT

## 2019-05-16 PROCEDURE — 80053 COMPREHEN METABOLIC PANEL: CPT | Performed by: INTERNAL MEDICINE

## 2019-05-16 PROCEDURE — 82728 ASSAY OF FERRITIN: CPT | Performed by: INTERNAL MEDICINE

## 2019-05-16 PROCEDURE — 99215 OFFICE O/P EST HI 40 MIN: CPT | Mod: ZP | Performed by: INTERNAL MEDICINE

## 2019-05-16 PROCEDURE — G0463 HOSPITAL OUTPT CLINIC VISIT: HCPCS | Mod: ZF

## 2019-05-16 ASSESSMENT — MIFFLIN-ST. JEOR: SCORE: 1581.62

## 2019-05-16 ASSESSMENT — PAIN SCALES - GENERAL: PAINLEVEL: MODERATE PAIN (5)

## 2019-05-16 NOTE — PROGRESS NOTES
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  He is here today for routine follow-up.  He continues on Promacta 75 mg daily and rivaroxaban 10 mg daily.    We last saw him 6 months ago.  He spent the winter again in [place].  Over the last few months he notes increasing fatigue.  He has a history of iron deficiency for which he received a course of parenteral iron replacement in July 2018.  He has noticed frequent hemorrhoidal bleeding over the last few months but denies any other bleeding.  He specifically denies any black tarry stools or maroon-colored stools.  He had a colonoscopy in 2015 at which time one polyp was removed.  He does have a history of probable cirrhosis and on upper endoscopy in February 2018 had evidence of portal hypertensive changes.  He has no upper abdominal symptoms including no epigastric pain, heartburn, nausea / vomiting, or hematemesis.  He also denies hematuria.    He has spine surgery last fall and unfortunately is again developing pain and weakness in his legs.  He is due to see the spine surgeon again next week.  He also is scheduled for a right shoulder surgery with Dr. Rivera on September 12.    Exam: He looks well.  No bruises or petechiae.  He is breathing comfortably on room air.  More detailed exam was not performed today.    Labs:  Metabolic panel is remarkable for normal serum electrolytes, creatinine 1.23, normal LFTs.  White count 8.2, hemoglobin 10.9, platelet count 97,000.  Iron panel is pending.      ASSESSMENT / PLAN:  1.  Chronic ITP -- Overall Raymon is doing well and currently has a stable platelet count on Promacta.  His baseline is in the 70,000 to 100,000 range.  He should continue on the same dose of Promacta (75 mg daily) for now.  For the upcoming shoulder surgery, he will have his preop physical with Dr. Cherry.  I  encouraged him to schedule this for the week before surgery and to be sure a CBC is checked.  If his platelet count is unexpectedly below his baseline, we will have time to intervene.  If his platelet count remains in his current baseline, this should provide adequate hemostasis for shoulder surgery.    2. History of unprovoked pulmonary embolism (April 2017) -- He has done well on long-term anticoagulation with rivaroxaban, which he should continue.  He is on the prophylactic dose of 10 mg daily due to a combination of mild chronic renal insufficiency and underlying liver disease.    3.  Iron deficiency anemia -- His hemoglobin has dropped approximately 2 g over the last few months and I assume he is again iron deficient.  Labs are pending.  We will plan to repeat the course of parenteral iron at [facility] if needed.  We discussed whether or not repeat endoscopy should be performed.  See details regarding previous endoscopies outlined above.  I will defer to Dr. Cherry with regard to this.  He does have what sounds like fairly significant hemorrhoidal bleeding.  Will refer him to colorectal surgery for consultation.    We will plan to see him back in October.  He will call with any new questions or concerns in the meantime.      Total time 40 minutes, all in counseling and coordination of care.        Waylon Novoa MD  Associate Professor of Medicine  Division of Hematology, Oncology, and Transplantation  Director, [facility]          ADDENDUM:  Ferritin is low at 11.  Will arrange for IV iron as noted above.    MTR

## 2019-05-16 NOTE — LETTER
5/16/2019       RE: Raymon Ace  [address] [facility] [address]     Dear Colleague,    Thank you for referring your patient, Raymon Ace, to the [facility]. Please see a copy of my visit note below.      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  He is here today for routine follow-up.  He continues on Promacta 75 mg daily and rivaroxaban 10 mg daily.    We last saw him 6 months ago.  He spent the winter again in [place].  Over the last few months he notes increasing fatigue.  He has a history of iron deficiency for which he received a course of parenteral iron replacement in July 2018.  He has noticed frequent hemorrhoidal bleeding over the last few months but denies any other bleeding.  He specifically denies any black tarry stools or maroon-colored stools.  He had a colonoscopy in 2015 at which time one polyp was removed.  He does have a history of probable cirrhosis and on upper endoscopy in February 2018 had evidence of portal hypertensive changes.  He has no upper abdominal symptoms including no epigastric pain, heartburn, nausea / vomiting, or hematemesis.  He also denies hematuria.    He has spine surgery last fall and unfortunately is again developing pain and weakness in his legs.  He is due to see the spine surgeon again next week.  He also is scheduled for a right shoulder surgery with Dr. Rivera on September 12.    Exam: He looks well.  No bruises or petechiae.  He is breathing comfortably on room air.  More detailed exam was not performed today.    Labs:  Metabolic panel is remarkable for normal serum electrolytes, creatinine 1.23, normal LFTs.  White count 8.2, hemoglobin 10.9, platelet count 97,000.  Iron panel is pending.      ASSESSMENT / PLAN:  1.  Chronic ITP -- Overall Raymon is doing well and currently has a stable  platelet count on Promacta.  His baseline is in the 70,000 to 100,000 range.  He should continue on the same dose of Promacta (75 mg daily) for now.  For the upcoming shoulder surgery, he will have his preop physical with Dr. Cherry.  I encouraged him to schedule this for the week before surgery and to be sure a CBC is checked.  If his platelet count is unexpectedly below his baseline, we will have time to intervene.  If his platelet count remains in his current baseline, this should provide adequate hemostasis for shoulder surgery.    2. History of unprovoked pulmonary embolism (April 2017) -- He has done well on long-term anticoagulation with rivaroxaban, which he should continue.  He is on the prophylactic dose of 10 mg daily due to a combination of mild chronic renal insufficiency and underlying liver disease.    3.  Iron deficiency anemia -- His hemoglobin has dropped approximately 2 g over the last few months and I assume he is again iron deficient.  Labs are pending.  We will plan to repeat the course of parenteral iron at [facility] if needed.  We discussed whether or not repeat endoscopy should be performed.  See details regarding previous endoscopies outlined above.  I will defer to Dr. Cherry with regard to this.  He does have what sounds like fairly significant hemorrhoidal bleeding.  Will refer him to colorectal surgery for consultation.    We will plan to see him back in October.  He will call with any new questions or concerns in the meantime.      Total time 40 minutes, all in counseling and coordination of care.        Waylon Novoa MD  Associate Professor of Medicine  Division of Hematology, Oncology, and Transplantation  Director, [facility]        Again, thank you for allowing me to participate in the care of your patient.      Sincerely,    Waylon Novoa MD

## 2019-05-16 NOTE — PATIENT INSTRUCTIONS
1.  Continue same dose of Promacta.  2.  For shoulder surgery on Sept 12 with Dr. Rivera, take your last dose of Xarelto on Sept 10.  Resume taking it on Sept 13, assuming no bleeding issues with the surgery.  3.  At pre-op physical, be sure to get CBC checked.  Ideally this would be done the week of Sept 2, to give us time to increase the platelet count if needed.  4.  Colorectal surgery referral placed today -- let us know if you haven't heard about an appointment within 1 week.  5.  We will call you with the results of iron tests today, and set up IV iron if needed.

## 2019-05-16 NOTE — NURSING NOTE
Chief Complaint   Patient presents with     Blood Draw     VPt blood draw and vitals     Venipuncture labs drawn from left arm.   Red gel tube drawn for iron test at request of patient.

## 2019-05-16 NOTE — NURSING NOTE
"Oncology Rooming Note    May 16, 2019 10:25 AM   Raymon Ace is a 73 year old male who presents for:    Chief Complaint   Patient presents with     Blood Draw     VPt blood draw and vitals     Oncology Clinic Visit     Return visit related to Idiopathic thrombocytopenic purpura     Initial Vitals: /66 (BP Location: Right arm, Patient Position: Sitting, Cuff Size: Adult Large)   Pulse 65   Temp 97.7  F (36.5  C) (Oral)   Resp 18   Ht 1.689 m (5' 6.5\")   Wt 89.1 kg (196 lb 6.9 oz)   SpO2 98%   BMI 31.23 kg/m   Estimated body mass index is 31.23 kg/m  as calculated from the following:    Height as of this encounter: 1.689 m (5' 6.5\").    Weight as of this encounter: 89.1 kg (196 lb 6.9 oz). Body surface area is 2.04 meters squared.  Moderate Pain (5) Comment: Data Unavailable   No LMP for male patient.  Allergies reviewed: Yes  Medications reviewed: Yes    Medications: Medication refills not needed today.  Pharmacy name entered into [employer]:    [facility] - [place], [place] - [address] [facility] - [place], [place] - [address]  [facility] - [place], [place] - [address] [facility] - [place], [place] - [address] [facility] [place], [place] - [address] PHARMACY [place], [place] - [address] [facility] [phone]    Clinical concerns: No new concerns. Provider was notified.      Anna Dominguez LPN            "

## 2019-05-20 ENCOUNTER — TELEPHONE (OUTPATIENT)
Dept: ONCOLOGY | Facility: CLINIC | Age: 74
End: 2019-05-20

## 2019-05-20 NOTE — TELEPHONE ENCOUNTER
Called patient to discuss low ferritin results and need for IV iron infusion.  Patient prefers to have infusion at [facility]

## 2019-05-21 ENCOUNTER — TRANSFERRED RECORDS (OUTPATIENT)
Dept: HEALTH INFORMATION MANAGEMENT | Facility: CLINIC | Age: 74
End: 2019-05-21

## 2019-06-03 ENCOUNTER — INFUSION THERAPY VISIT (OUTPATIENT)
Dept: INFUSION THERAPY | Facility: CLINIC | Age: 74
End: 2019-06-03
Attending: INTERNAL MEDICINE
Payer: COMMERCIAL

## 2019-06-03 VITALS
HEART RATE: 70 BPM | DIASTOLIC BLOOD PRESSURE: 60 MMHG | TEMPERATURE: 98.6 F | RESPIRATION RATE: 16 BRPM | SYSTOLIC BLOOD PRESSURE: 107 MMHG

## 2019-06-03 DIAGNOSIS — D50.0 IRON DEFICIENCY ANEMIA DUE TO CHRONIC BLOOD LOSS: Primary | ICD-10-CM

## 2019-06-03 PROCEDURE — 25800030 ZZH RX IP 258 OP 636: Performed by: INTERNAL MEDICINE

## 2019-06-03 PROCEDURE — 96365 THER/PROPH/DIAG IV INF INIT: CPT

## 2019-06-03 PROCEDURE — 25000128 H RX IP 250 OP 636: Performed by: INTERNAL MEDICINE

## 2019-06-03 RX ADMIN — FERRIC CARBOXYMALTOSE INJECTION 750 MG: 50 INJECTION, SOLUTION INTRAVENOUS at 10:16

## 2019-06-03 RX ADMIN — SODIUM CHLORIDE 250 ML: 9 INJECTION, SOLUTION INTRAVENOUS at 10:04

## 2019-06-03 NOTE — PROGRESS NOTES
Infusion Nursing Note:  Raymon Ace presents today for Injectafer.    Patient seen by provider today: No   present during visit today: Not Applicable.    Note: N/A.    Intravenous Access:  Peripheral IV placed.    Treatment Conditions:  Not Applicable.      Post Infusion Assessment:  Patient tolerated infusion without incident.       Discharge Plan:   Patient discharged in stable condition accompanied by: wife. Returns in 1wk.    Douglas Luis RN

## 2019-06-05 DIAGNOSIS — D69.3 IDIOPATHIC THROMBOCYTOPENIC PURPURA (H): ICD-10-CM

## 2019-06-05 DIAGNOSIS — I10 ESSENTIAL HYPERTENSION: ICD-10-CM

## 2019-06-05 DIAGNOSIS — Z86.718 PERSONAL HISTORY OF VENOUS THROMBOSIS AND EMBOLISM: ICD-10-CM

## 2019-06-05 DIAGNOSIS — D50.0 IRON DEFICIENCY ANEMIA DUE TO CHRONIC BLOOD LOSS: ICD-10-CM

## 2019-06-05 RX ORDER — RIVAROXABAN 10 MG/1
TABLET, FILM COATED ORAL
Qty: 30 TABLET | Refills: 11 | Status: SHIPPED | OUTPATIENT
Start: 2019-06-05 | End: 2020-06-25

## 2019-06-05 NOTE — TELEPHONE ENCOUNTER
"Last OV 10/05/2018.    Requested Prescriptions   Pending Prescriptions Disp Refills     lisinopril (PRINIVIL/ZESTRIL) 40 MG tablet [Pharmacy Med Name: LISINOPRIL 40MG     TAB] 90 tablet 1     Sig: TAKE 1 TABLET BY MOUTH ONCE DAILY       ACE Inhibitors (Including Combos) Protocol Passed - 6/5/2019  7:21 AM        Passed - Blood pressure under 140/90 in past 12 months     BP Readings from Last 3 Encounters:   06/03/19 107/60   05/16/19 109/66   11/15/18 125/81                 Passed - Recent (12 mo) or future (30 days) visit within the authorizing provider's specialty     Patient had office visit in the last 12 months or has a visit in the next 30 days with authorizing provider or within the authorizing provider's specialty.  See \"Patient Info\" tab in inbasket, or \"Choose Columns\" in Meds & Orders section of the refill encounter.              Passed - Medication is active on med list        Passed - Patient is age 18 or older        Passed - Normal serum creatinine on file in past 12 months     Recent Labs   Lab Test 05/16/19  1006   CR 1.23             Passed - Normal serum potassium on file in past 12 months     Recent Labs   Lab Test 05/16/19  1006   POTASSIUM 4.0             Routing refill request to provider for review/approval because:  Medication is reported/historical          "

## 2019-06-05 NOTE — TELEPHONE ENCOUNTER
FUTURE VISIT INFORMATION      FUTURE VISIT INFORMATION:    Date: 7/19/19    Time: 10:00 am    [facility]  REFERRAL INFORMATION:    Referring provider:  Dr. Waylon Novoa    Referring providers clinic:  [facility]    Reason for visit/diagnosis:  Iron Deficiency, Hemorrhoidal Bleeding    NOTES STATUS DETAILS   OFFICE NOTE from referring provider  Internal Office Visit-5/16/19   OFFICE NOTE from other specialist   Internal Office Visit-5/18/18-[facility]   DISCHARGE SUMMARY FROM HOSPITAL N/A    DISCHARGE REPORT FROM ED Internal [facility] ED-11/11/18   OPERATIVE REPORT  N/A    PFC REPORT N/A    MEDICATION LIST Internal    LABS     FIT/STOOL TESTING N/A    ANAL PAP N/A    PATHOLOGY REPORTS RELATED TO DIAGNOSIS Internal 12/21/16, 9/8/15   DIAGNOSTIC PROCEDURES     COLONOSCOPY Reports Internal-Requested color photos 9/8/15, 5/25/10-[facility]   ENDOSCOPY (EGD) Reports Internal-Requested color photos 2/8/18- [place]  12/21/16-[facility] [place]   ERCP N/A    ANOSCOPY N/A    FLEX SIGMOIDOSCOPY N/A    IMAGING & REPORT      CT, MRI, US, XR Internal US Abdomen-5/25/18 PACS  CT Abd/Pelvis-11/11/18, 4/18/17 PACS  CT Chest/abd/pelvis-10/23/12 PACS     Action    Action Taken 6/5/2019-8:45 AM-Faxed request for color photos of 9/8/15 colonoscopy and 12/21/16 EGD to [facility].  MIKE

## 2019-06-06 RX ORDER — LISINOPRIL 40 MG/1
TABLET ORAL
Qty: 90 TABLET | Refills: 0 | OUTPATIENT
Start: 2019-06-06

## 2019-06-07 ENCOUNTER — OFFICE VISIT (OUTPATIENT)
Dept: FAMILY MEDICINE | Facility: CLINIC | Age: 74
End: 2019-06-07
Payer: COMMERCIAL

## 2019-06-07 VITALS
DIASTOLIC BLOOD PRESSURE: 70 MMHG | TEMPERATURE: 98.2 F | RESPIRATION RATE: 18 BRPM | HEART RATE: 94 BPM | SYSTOLIC BLOOD PRESSURE: 130 MMHG | BODY MASS INDEX: 30.13 KG/M2 | HEIGHT: 67 IN | OXYGEN SATURATION: 94 % | WEIGHT: 192 LBS

## 2019-06-07 DIAGNOSIS — K76.6 PORTAL HYPERTENSION (H): ICD-10-CM

## 2019-06-07 DIAGNOSIS — R73.01 IMPAIRED FASTING GLUCOSE: ICD-10-CM

## 2019-06-07 DIAGNOSIS — M54.42 CHRONIC BILATERAL LOW BACK PAIN WITH LEFT-SIDED SCIATICA: Primary | ICD-10-CM

## 2019-06-07 DIAGNOSIS — N18.30 CKD (CHRONIC KIDNEY DISEASE) STAGE 3, GFR 30-59 ML/MIN (H): ICD-10-CM

## 2019-06-07 DIAGNOSIS — D50.0 IRON DEFICIENCY ANEMIA DUE TO CHRONIC BLOOD LOSS: ICD-10-CM

## 2019-06-07 DIAGNOSIS — I26.99 OTHER ACUTE PULMONARY EMBOLISM WITHOUT ACUTE COR PULMONALE (H): ICD-10-CM

## 2019-06-07 DIAGNOSIS — Z86.711 HISTORY OF PULMONARY EMBOLISM: ICD-10-CM

## 2019-06-07 DIAGNOSIS — G89.29 CHRONIC BILATERAL LOW BACK PAIN WITH LEFT-SIDED SCIATICA: Primary | ICD-10-CM

## 2019-06-07 DIAGNOSIS — D69.3 IDIOPATHIC THROMBOCYTOPENIC PURPURA (H): ICD-10-CM

## 2019-06-07 DIAGNOSIS — E78.2 MIXED HYPERLIPIDEMIA: ICD-10-CM

## 2019-06-07 DIAGNOSIS — Z23 NEED FOR VACCINATION: ICD-10-CM

## 2019-06-07 DIAGNOSIS — Z79.01 LONG TERM CURRENT USE OF ANTICOAGULANT THERAPY: ICD-10-CM

## 2019-06-07 DIAGNOSIS — I10 ESSENTIAL HYPERTENSION: ICD-10-CM

## 2019-06-07 DIAGNOSIS — R53.83 FATIGUE, UNSPECIFIED TYPE: ICD-10-CM

## 2019-06-07 DIAGNOSIS — Z12.5 SCREENING FOR PROSTATE CANCER: ICD-10-CM

## 2019-06-07 DIAGNOSIS — E66.01 MORBID OBESITY (H): ICD-10-CM

## 2019-06-07 DIAGNOSIS — G63 POLYNEUROPATHY IN OTHER DISEASES CLASSIFIED ELSEWHERE (H): ICD-10-CM

## 2019-06-07 DIAGNOSIS — I10 BENIGN ESSENTIAL HYPERTENSION: ICD-10-CM

## 2019-06-07 LAB
ALBUMIN SERPL-MCNC: 3.8 G/DL (ref 3.4–5)
ALP SERPL-CCNC: 70 U/L (ref 40–150)
ALT SERPL W P-5'-P-CCNC: 68 U/L (ref 0–70)
ALT SERPL W P-5'-P-CCNC: 69 U/L (ref 0–70)
ANION GAP SERPL CALCULATED.3IONS-SCNC: 8 MMOL/L (ref 3–14)
AST SERPL W P-5'-P-CCNC: 57 U/L (ref 0–45)
BASOPHILS # BLD AUTO: 0 10E9/L (ref 0–0.2)
BASOPHILS NFR BLD AUTO: 0.4 %
BILIRUB SERPL-MCNC: 0.8 MG/DL (ref 0.2–1.3)
BUN SERPL-MCNC: 12 MG/DL (ref 7–30)
CALCIUM SERPL-MCNC: 9.1 MG/DL (ref 8.5–10.1)
CHLORIDE SERPL-SCNC: 109 MMOL/L (ref 94–109)
CHOLEST SERPL-MCNC: 171 MG/DL
CO2 SERPL-SCNC: 24 MMOL/L (ref 20–32)
CREAT SERPL-MCNC: 1.01 MG/DL (ref 0.66–1.25)
DIFFERENTIAL METHOD BLD: ABNORMAL
EOSINOPHIL # BLD AUTO: 0.1 10E9/L (ref 0–0.7)
EOSINOPHIL NFR BLD AUTO: 1.2 %
ERYTHROCYTE [DISTWIDTH] IN BLOOD BY AUTOMATED COUNT: 17.7 % (ref 10–15)
GFR SERPL CREATININE-BSD FRML MDRD: 73 ML/MIN/{1.73_M2}
GLUCOSE SERPL-MCNC: 91 MG/DL (ref 70–99)
HCT VFR BLD AUTO: 34.4 % (ref 40–53)
HDLC SERPL-MCNC: 68 MG/DL
HGB BLD-MCNC: 11.7 G/DL (ref 13.3–17.7)
LDLC SERPL CALC-MCNC: 84 MG/DL
LYMPHOCYTES # BLD AUTO: 1 10E9/L (ref 0.8–5.3)
LYMPHOCYTES NFR BLD AUTO: 11.7 %
MCH RBC QN AUTO: 29.9 PG (ref 26.5–33)
MCHC RBC AUTO-ENTMCNC: 34 G/DL (ref 31.5–36.5)
MCV RBC AUTO: 88 FL (ref 78–100)
MONOCYTES # BLD AUTO: 0.7 10E9/L (ref 0–1.3)
MONOCYTES NFR BLD AUTO: 8.3 %
NEUTROPHILS # BLD AUTO: 6.6 10E9/L (ref 1.6–8.3)
NEUTROPHILS NFR BLD AUTO: 78.4 %
NONHDLC SERPL-MCNC: 103 MG/DL
PLATELET # BLD AUTO: 139 10E9/L (ref 150–450)
POTASSIUM SERPL-SCNC: 4.2 MMOL/L (ref 3.4–5.3)
PROT SERPL-MCNC: 7.3 G/DL (ref 6.8–8.8)
PSA SERPL-ACNC: 1.22 UG/L (ref 0–4)
RBC # BLD AUTO: 3.91 10E12/L (ref 4.4–5.9)
SODIUM SERPL-SCNC: 141 MMOL/L (ref 133–144)
TRIGL SERPL-MCNC: 94 MG/DL
WBC # BLD AUTO: 8.4 10E9/L (ref 4–11)

## 2019-06-07 PROCEDURE — 85025 COMPLETE CBC W/AUTO DIFF WBC: CPT | Performed by: INTERNAL MEDICINE

## 2019-06-07 PROCEDURE — 80053 COMPREHEN METABOLIC PANEL: CPT | Performed by: INTERNAL MEDICINE

## 2019-06-07 PROCEDURE — 90471 IMMUNIZATION ADMIN: CPT | Performed by: FAMILY MEDICINE

## 2019-06-07 PROCEDURE — 84460 ALANINE AMINO (ALT) (SGPT): CPT | Mod: 59 | Performed by: FAMILY MEDICINE

## 2019-06-07 PROCEDURE — G0103 PSA SCREENING: HCPCS | Performed by: FAMILY MEDICINE

## 2019-06-07 PROCEDURE — 90714 TD VACC NO PRESV 7 YRS+ IM: CPT | Performed by: FAMILY MEDICINE

## 2019-06-07 PROCEDURE — 99214 OFFICE O/P EST MOD 30 MIN: CPT | Mod: 25 | Performed by: FAMILY MEDICINE

## 2019-06-07 PROCEDURE — 80061 LIPID PANEL: CPT | Performed by: FAMILY MEDICINE

## 2019-06-07 PROCEDURE — 36415 COLL VENOUS BLD VENIPUNCTURE: CPT | Performed by: FAMILY MEDICINE

## 2019-06-07 RX ORDER — LISINOPRIL 20 MG/1
20 TABLET ORAL DAILY
Qty: 90 TABLET | Refills: 1 | Status: SHIPPED | OUTPATIENT
Start: 2019-06-07 | End: 2020-05-21 | Stop reason: DRUGHIGH

## 2019-06-07 ASSESSMENT — MIFFLIN-ST. JEOR: SCORE: 1561.6

## 2019-06-07 NOTE — LETTER
"[facility]  [address] [place] [address]  [phone]    6/11/2019     Raymon Ace  [address] [facility] [address]    Dear Raymon:    Here are the results of your latest lipid tests:    LDL Cholesterol Calculated   Date Value Ref Range Status   06/07/2019 84 <100 mg/dL Final     Comment:     Desirable:       <100 mg/dl   ]  HDL Cholesterol   Date Value Ref Range Status   06/07/2019 68 >39 mg/dL Final   ]  Triglycerides   Date Value Ref Range Status   06/07/2019 94 <150 mg/dL Final     Comment:     Fasting specimen   ]  Cholesterol   Date Value Ref Range Status   06/07/2019 171 <200 mg/dL Final   ]    LDL is the \"bad\" cholesterol linked to heart disease and stroke. & is a little high   HDL is the \"good\" choesterol and when it is high, it decreases the risk for above problems.    Follow a low-fat, low-cholesterol diet and get regular exercise.  Please feel free to call the clinic if you have any questions.  Your prostate antigen test is negative     Sincerely,      Rosanne Cherry   "

## 2019-06-07 NOTE — NURSING NOTE
"Chief Complaint   Patient presents with     Recheck Medication     There were no vitals taken for this visit. Estimated body mass index is 31.23 kg/m  as calculated from the following:    Height as of 5/16/19: 1.689 m (5' 6.5\").    Weight as of 5/16/19: 89.1 kg (196 lb 6.9 oz).  BP completed using cuff size: kendy Kelly CMA    Health Maintenance Due   Topic Date Due     URINE DRUG SCREEN  1945     ZOSTER IMMUNIZATION (2 of 3) 10/16/2014     DTAP/TDAP/TD IMMUNIZATION (2 - Td) 02/08/2019     Health Maintenance reviewed at today's visit patient asked to schedule/complete:   Immunizations:  Patient agrees to schedule    "

## 2019-06-07 NOTE — PATIENT INSTRUCTIONS
1. Shingrex is a 2 shot series that prevents shingles 97% of the time, as opposed to the old shingles shot that only prevented it at 40-50%  It costs less for medicare at a pharmacy  You should get it starting at 50 yrs old get the 2nd shot 5-6 mo after the first one    2.  Weight Loss Tips  1. Do not eat after 6 hrs before your expected bedtime  2. Have your heaviest meal for breakfast, a slightly lighter meal at lunch and a snack 6 hrs before bed  3. No sugar/calorie drinks except milk ie no fruit juice, pop, alcohol.  4. Drink milk 30min before meals to decrease your hunger. Also it is excellent as part of your last meal of the day snack  5. Drink lots of water  6. Increase fiber in diet: all bran cereal, salads, popcorn etc  7. Have only one small serving of fruit a day about 1/2 cup (as this is high in sugar)  8. EXERCISE is the bottom line. Without it, you will gain weight even on a low calorie diet. Best if done 2-3X a day as can    Being overweight contributes to high blood pressure and high cholesterol, both of which cause heart attacks, strokes and kidney failure, prediabetes and diabetes, arthritis, and liver disease     3 ICE  decreases inflammation & kills the pain coming from the nerves     WARM SOAKS/PACKS  Relax & loosen up tight muscles to reduce the pain of the        muscle tightness & spasm    3. Your vitamin D is normal.  Please take 1,000 units in the summer and 2,000 units in the winter to maintain it       4. Glucosamine 1500mgm up to 2-3 times a day     5. Fish oil up to 3600mgm a day     ##start the above two drugs  one at a time to help the muscles and joints   Start with one and work up    6. You could take advil up to 800mgm 3 x a day with food ++++ and 2 glasses of fluid

## 2019-06-07 NOTE — NURSING NOTE
Screening Questionnaire for Adult Immunization    Are you sick today?   No   Do you have allergies to medications, food, a vaccine component or latex?   No   Have you ever had a serious reaction after receiving a vaccination?   No   Do you have a long-term health problem with heart disease, lung disease, asthma, kidney disease, metabolic disease (e.g. diabetes), anemia, or other blood disorder?   Yes   Do you have cancer, leukemia, HIV/AIDS, or any other immune system problem?   No   In the past 3 months, have you taken medications that affect  your immune system, such as prednisone, other steroids, or anticancer drugs; drugs for the treatment of rheumatoid arthritis, Crohn s disease, or psoriasis; or have you had radiation treatments?   No   Have you had a seizure, or a brain or other nervous system problem?   No   During the past year, have you received a transfusion of blood or blood     products, or been given immune (gamma) globulin or antiviral drug?   No   For women: Are you pregnant or is there a chance you could become        pregnant during the next month?   No   Have you received any vaccinations in the past 4 weeks?   No            Per orders of Dr. Cherry, injection of TD given by Cassia Parada. Patient instructed to remain in clinic for 15 minutes afterwards, and to report any adverse reaction to me immediately.       Screening performed by Cassia Parada on 6/7/2019 at 12:15 PM.

## 2019-06-08 NOTE — TELEPHONE ENCOUNTER
"LISINOPRIL 40MG     TAB  Last Written Prescription Date:  06/07/2019  Last Fill Quantity: 90,  # refills: 1   Last office visit: 6/7/2019 with prescribing provider:  06/07/2019   Future Office Visit:    Requested Prescriptions   Pending Prescriptions Disp Refills     lisinopril (PRINIVIL/ZESTRIL) 40 MG tablet [Pharmacy Med Name: LISINOPRIL 40MG     TAB] 90 tablet 1     Sig: TAKE 1 TABLET BY MOUTH ONCE DAILY       ACE Inhibitors (Including Combos) Protocol Passed - 6/7/2019  5:17 PM        Passed - Blood pressure under 140/90 in past 12 months     BP Readings from Last 3 Encounters:   06/07/19 130/70   06/03/19 107/60   05/16/19 109/66                 Passed - Recent (12 mo) or future (30 days) visit within the authorizing provider's specialty     Patient had office visit in the last 12 months or has a visit in the next 30 days with authorizing provider or within the authorizing provider's specialty.  See \"Patient Info\" tab in inbasket, or \"Choose Columns\" in Meds & Orders section of the refill encounter.              Passed - Medication is active on med list        Passed - Patient is age 18 or older        Passed - Normal serum creatinine on file in past 12 months     Recent Labs   Lab Test 06/07/19  1251   CR 1.01             Passed - Normal serum potassium on file in past 12 months     Recent Labs   Lab Test 06/07/19  1251   POTASSIUM 4.2               "

## 2019-06-10 ENCOUNTER — INFUSION THERAPY VISIT (OUTPATIENT)
Dept: INFUSION THERAPY | Facility: CLINIC | Age: 74
End: 2019-06-10
Attending: INTERNAL MEDICINE
Payer: COMMERCIAL

## 2019-06-10 VITALS — TEMPERATURE: 97.4 F | HEART RATE: 80 BPM | DIASTOLIC BLOOD PRESSURE: 53 MMHG | SYSTOLIC BLOOD PRESSURE: 107 MMHG

## 2019-06-10 DIAGNOSIS — D50.0 IRON DEFICIENCY ANEMIA DUE TO CHRONIC BLOOD LOSS: Primary | ICD-10-CM

## 2019-06-10 PROCEDURE — 25800030 ZZH RX IP 258 OP 636: Performed by: INTERNAL MEDICINE

## 2019-06-10 PROCEDURE — 25000128 H RX IP 250 OP 636: Performed by: INTERNAL MEDICINE

## 2019-06-10 PROCEDURE — 96374 THER/PROPH/DIAG INJ IV PUSH: CPT

## 2019-06-10 RX ORDER — LISINOPRIL 40 MG/1
TABLET ORAL
Qty: 90 TABLET | Refills: 1 | Status: SHIPPED | OUTPATIENT
Start: 2019-06-10 | End: 2019-12-20

## 2019-06-10 RX ADMIN — SODIUM CHLORIDE 250 ML: 9 INJECTION, SOLUTION INTRAVENOUS at 10:32

## 2019-06-10 RX ADMIN — FERRIC CARBOXYMALTOSE INJECTION 750 MG: 50 INJECTION, SOLUTION INTRAVENOUS at 10:32

## 2019-06-10 NOTE — PROGRESS NOTES
Infusion Nursing Note:  Raymon Ace presents today for Injectafer.    Patient seen by provider today: No   present during visit today: Not Applicable.    Note: N/A.    Intravenous Access:  Peripheral IV placed.    Treatment Conditions:  Not Applicable.      Post Infusion Assessment:  Patient tolerated infusion without incident.  Patient observed for 30 minutes post Injectafer per protocol.  Blood return noted pre and post infusion.  Site patent and intact, free from redness, edema or discomfort.  No evidence of extravasations.  Access discontinued per protocol.       Discharge Plan:   Patient discharged in stable condition accompanied by: wife.  Departure Mode: Ambulatory.    Tisha Hale RN

## 2019-06-10 NOTE — TELEPHONE ENCOUNTER
Rx sent to wrong pharmacy. Cancelled at [facility], and sent to [facility]. No further action needed.

## 2019-06-24 DIAGNOSIS — D69.3 IDIOPATHIC THROMBOCYTOPENIC PURPURA (H): ICD-10-CM

## 2019-07-01 ENCOUNTER — MYC MEDICAL ADVICE (OUTPATIENT)
Dept: ONCOLOGY | Facility: CLINIC | Age: 74
End: 2019-07-01

## 2019-07-01 DIAGNOSIS — D50.0 IRON DEFICIENCY ANEMIA DUE TO CHRONIC BLOOD LOSS: Primary | ICD-10-CM

## 2019-07-09 DIAGNOSIS — Z79.01 LONG TERM CURRENT USE OF ANTICOAGULANT THERAPY: ICD-10-CM

## 2019-07-09 DIAGNOSIS — D50.0 IRON DEFICIENCY ANEMIA DUE TO CHRONIC BLOOD LOSS: ICD-10-CM

## 2019-07-09 DIAGNOSIS — Z86.711 HISTORY OF PULMONARY EMBOLISM: ICD-10-CM

## 2019-07-09 DIAGNOSIS — D69.3 IDIOPATHIC THROMBOCYTOPENIC PURPURA (H): ICD-10-CM

## 2019-07-09 LAB
ALBUMIN SERPL-MCNC: 3.9 G/DL (ref 3.4–5)
ALP SERPL-CCNC: 75 U/L (ref 40–150)
ALT SERPL W P-5'-P-CCNC: 37 U/L (ref 0–70)
ANION GAP SERPL CALCULATED.3IONS-SCNC: 3 MMOL/L (ref 3–14)
AST SERPL W P-5'-P-CCNC: 38 U/L (ref 0–45)
BASOPHILS # BLD AUTO: 0.1 10E9/L (ref 0–0.2)
BASOPHILS NFR BLD AUTO: 1.1 %
BILIRUB SERPL-MCNC: 1.1 MG/DL (ref 0.2–1.3)
BUN SERPL-MCNC: 12 MG/DL (ref 7–30)
CALCIUM SERPL-MCNC: 8.8 MG/DL (ref 8.5–10.1)
CHLORIDE SERPL-SCNC: 109 MMOL/L (ref 94–109)
CO2 SERPL-SCNC: 29 MMOL/L (ref 20–32)
CREAT SERPL-MCNC: 1.11 MG/DL (ref 0.66–1.25)
DIFFERENTIAL METHOD BLD: ABNORMAL
EOSINOPHIL # BLD AUTO: 0.1 10E9/L (ref 0–0.7)
EOSINOPHIL NFR BLD AUTO: 1.9 %
ERYTHROCYTE [DISTWIDTH] IN BLOOD BY AUTOMATED COUNT: 19.8 % (ref 10–15)
FERRITIN SERPL-MCNC: 164 NG/ML (ref 26–388)
GFR SERPL CREATININE-BSD FRML MDRD: 65 ML/MIN/{1.73_M2}
GLUCOSE SERPL-MCNC: 102 MG/DL (ref 70–99)
HCT VFR BLD AUTO: 45.7 % (ref 40–53)
HGB BLD-MCNC: 14.8 G/DL (ref 13.3–17.7)
IMM GRANULOCYTES # BLD: 0.2 10E9/L (ref 0–0.4)
IMM GRANULOCYTES NFR BLD: 3.1 %
IRON SATN MFR SERPL: 81 % (ref 15–46)
IRON SERPL-MCNC: 296 UG/DL (ref 35–180)
LYMPHOCYTES # BLD AUTO: 0.9 10E9/L (ref 0.8–5.3)
LYMPHOCYTES NFR BLD AUTO: 14.2 %
MCH RBC QN AUTO: 31.3 PG (ref 26.5–33)
MCHC RBC AUTO-ENTMCNC: 32.4 G/DL (ref 31.5–36.5)
MCV RBC AUTO: 97 FL (ref 78–100)
MONOCYTES # BLD AUTO: 0.5 10E9/L (ref 0–1.3)
MONOCYTES NFR BLD AUTO: 8.1 %
NEUTROPHILS # BLD AUTO: 4.6 10E9/L (ref 1.6–8.3)
NEUTROPHILS NFR BLD AUTO: 71.6 %
NRBC # BLD AUTO: 0 10*3/UL
NRBC BLD AUTO-RTO: 0 /100
PLATELET # BLD AUTO: 136 10E9/L (ref 150–450)
POTASSIUM SERPL-SCNC: 4.9 MMOL/L (ref 3.4–5.3)
PROT SERPL-MCNC: 7 G/DL (ref 6.8–8.8)
RBC # BLD AUTO: 4.73 10E12/L (ref 4.4–5.9)
SODIUM SERPL-SCNC: 141 MMOL/L (ref 133–144)
TIBC SERPL-MCNC: 367 UG/DL (ref 240–430)
WBC # BLD AUTO: 6.4 10E9/L (ref 4–11)

## 2019-07-09 PROCEDURE — 82728 ASSAY OF FERRITIN: CPT | Performed by: INTERNAL MEDICINE

## 2019-07-09 PROCEDURE — 83540 ASSAY OF IRON: CPT | Performed by: INTERNAL MEDICINE

## 2019-07-09 PROCEDURE — 36415 COLL VENOUS BLD VENIPUNCTURE: CPT | Performed by: INTERNAL MEDICINE

## 2019-07-09 PROCEDURE — 80053 COMPREHEN METABOLIC PANEL: CPT | Performed by: INTERNAL MEDICINE

## 2019-07-09 PROCEDURE — 85025 COMPLETE CBC W/AUTO DIFF WBC: CPT | Performed by: INTERNAL MEDICINE

## 2019-07-09 PROCEDURE — 83550 IRON BINDING TEST: CPT | Performed by: INTERNAL MEDICINE

## 2019-07-12 NOTE — TELEPHONE ENCOUNTER
"7/12/19  Called and left a voicemail regarding recent labs.  \"Labs look good your iron levels have returned to normal, please call if any questions\"  "

## 2019-07-19 ENCOUNTER — TELEPHONE (OUTPATIENT)
Dept: GASTROENTEROLOGY | Facility: CLINIC | Age: 74
End: 2019-07-19

## 2019-07-19 ENCOUNTER — PRE VISIT (OUTPATIENT)
Dept: SURGERY | Facility: CLINIC | Age: 74
End: 2019-07-19

## 2019-07-19 ENCOUNTER — OFFICE VISIT (OUTPATIENT)
Dept: SURGERY | Facility: CLINIC | Age: 74
End: 2019-07-19
Payer: COMMERCIAL

## 2019-07-19 VITALS
OXYGEN SATURATION: 97 % | HEART RATE: 65 BPM | HEIGHT: 67 IN | WEIGHT: 199.3 LBS | SYSTOLIC BLOOD PRESSURE: 130 MMHG | DIASTOLIC BLOOD PRESSURE: 76 MMHG | BODY MASS INDEX: 31.28 KG/M2

## 2019-07-19 DIAGNOSIS — K62.5 RECTAL BLEEDING: Primary | ICD-10-CM

## 2019-07-19 DIAGNOSIS — D50.8 OTHER IRON DEFICIENCY ANEMIA: ICD-10-CM

## 2019-07-19 ASSESSMENT — PATIENT HEALTH QUESTIONNAIRE - PHQ9
10. IF YOU CHECKED OFF ANY PROBLEMS, HOW DIFFICULT HAVE THESE PROBLEMS MADE IT FOR YOU TO DO YOUR WORK, TAKE CARE OF THINGS AT HOME, OR GET ALONG WITH OTHER PEOPLE: NOT DIFFICULT AT ALL
SUM OF ALL RESPONSES TO PHQ QUESTIONS 1-9: 19
SUM OF ALL RESPONSES TO PHQ QUESTIONS 1-9: 19

## 2019-07-19 ASSESSMENT — MIFFLIN-ST. JEOR: SCORE: 1594.71

## 2019-07-19 NOTE — PROGRESS NOTES
"Colon and Rectal Surgery Consult Clinic Note    Date: 2019     Referring provider:  Referred Self, MD  No address on file     RE: Raymon Ace  : 1945  JHONY: 2019    Raymon Ace is a very pleasant 73 year old male with idiopathic thrombocytopenia and history of pulmonary embolism on long-term anticoagulation with a recent diagnosis of rectal bleeding and anemia.  Given these findings they were subsequently sent to the [facility] for an opinion on this and a new patient consultation.     Raymon reports having a bright red blood with bowel movements about once or twice a month.  This is sometimes just when he wipes but can drip into the toilet.  He denies any associated pain.  His bowel movements are fairly loose and he thinks he goes about 6-7 times a day.  No constipation or straining.  He denies any prolapsing tissue that needs to be manually reduced.  He denies any changes in his stool caliber, nausea, vomiting, abdominal pain, or unexplained weight loss.  No family history of any colon cancer.  His father had pancreatic cancer.  He has had iron deficiency anemia and is gotten iron infusions.  His most recent hemoglobin is 14.8 after 2 infusions.  He last had a colonoscopy in  with one tubular adenoma.  He reports prior adenomatous polyps on colonoscopy 5 years prior to that.    Physical Examination: Exam was chaperoned by AMRIT Valiente  /76 (BP Location: Left arm, Patient Position: Sitting, Cuff Size: Adult Regular)   Pulse 65   Ht 5' 6.5\"   Wt 199 lb 4.8 oz   SpO2 97%   BMI 31.69 kg/m    General: Alert, oriented, in no acute distress, sitting comfortably  HEENT: Mucous membranes moist  Perianal external examination:  Perianal skin: Intact with no excoriation or lichenification.  Lesions: No evidence of an external lesion, nodularity, or induration in the perianal region.  Eversion of buttocks: There was not evidence of an anal fissure. Details: " N/A.  Skin tags or external hemorrhoids: Yes: External hemorrhoidal skin tags with some prolapsing tissue present in the right lateral position.  Digital rectal examination: Was performed.   Sphincter tone: Good.  Palpable lesions: No.  Prostate: Normal.  Other: None.    Anoscopy: Was performed.   Hemorrhoids: Yes.  Moderate-sized internal hemorrhoids without active bleeding.  Lesions: No    Assessment/Plan: On exam he does have moderate-sized internal hemorrhoids.  Bleeding is only once or twice a month so I think is likely not contributing to his anemia.  Given his thrombocytopenia and long-term anticoagulation, he would not be a candidate for any hemorrhoid banding and not a great candidate for surgical hemorrhoidectomy unless necessary.  I recommended starting a daily fiber supplement such as Metamucil or Citrucel given his frequent loose stools to see if this improves his hemorrhoidal symptoms.  I would also recommend a colonoscopy to rule out any other sources of bleeding and given his history of tubular adenoma on colonoscopy 4 years ago.  Hemorrhoidal bleeding worsens, could always consider further intervention at that time.   He does have a history of possible cirrhosis and on EGD in 2018 evidence of portal hypertension. Continue to follow with hematology for anemia.   Patient's questions were answered to his stated satisfaction and he is in agreement with this plan.    Medical history:  Past Medical History:   Diagnosis Date     Alcohol abuse since teens 01/15/2015    Still actively drinking     Alcoholic liver damage (H) 1/10/2014     Gastroesophageal reflux disease with esophagitis 12/6/2016     Gout involving toe, unspecified cause, unspecified chronicity, unspecified laterality 6/2/2016     Heart murmur     heart is positioned farther on left side then typical     Hyperlipidemia 12/17/2015     Hypertension      ITP (idiopathic thrombocytopenic purpura)      Obstructive sleep apnea     does not use CPAP   machine     Pulmonary embolism (H) large RLL w infarctio 4-17 4/18/2017       Surgical history:  Past Surgical History:   Procedure Laterality Date     APPENDECTOMY  1956     BACK SURGERY  1992, 1996    trimmed L4-L5, Fusion L4-L5     BONE MARROW BIOPSY, BONE SPECIMEN, NEEDLE/TROCAR  10/24/2012    Procedure: BIOPSY BONE MARROW;  BONE MARROW BIOPSY WITH ASPIRATE (AREVALO ORDERING);  Surgeon: Terri Hickey MD;  Location:  GI     ESOPHAGOSCOPY, GASTROSCOPY, DUODENOSCOPY (EGD), COMBINED N/A 2/8/2018    Procedure: COMBINED ESOPHAGOSCOPY, GASTROSCOPY, DUODENOSCOPY (EGD);  EGD;  Surgeon: Terri Crouch MD;  Location:  GI     FACIAL RECONSTRUCTION SURGERY  1965    jaw fracture     HC TOOTH EXTRACTION W/FORCEP  1990s       Problem list:  Patient Active Problem List    Diagnosis Date Noted     ITP (idiopathic thrombocytopenic purpura) since 3-13 back surgery       Priority: High     CKD (chronic kidney disease) stage 3, GFR 30-59 ml/min (H) 06/07/2019     Priority: Medium     Portal hypertension (H) 06/07/2019     Priority: Medium     Impaired fasting glucose 06/07/2019     Priority: Medium     Fatigue, unspecified type 06/07/2019     Priority: Medium     Screening for prostate cancer 09/14/2018     Priority: Medium     Iron deficiency anemia due to chronic blood loss 06/28/2018     Priority: Medium     Pulmonary embolism (H) large RLL w infarctio 4-17 04/18/2017     Priority: Medium     Gastroesophageal reflux disease with esophagitis 12/06/2016     Priority: Medium     Chronic right dominant  shoulder pain w ltd ROM  since 3-16 08/09/2016     Priority: Medium     Need for prophylactic vaccination against Streptococcus pneumoniae (pneumococcus) 08/04/2016     Priority: Medium     Gout involving toe, unspecified cause, unspecified chronicity, unspecified laterality 06/02/2016     Priority: Medium     ACP (advance care planning) 05/16/2016     Priority: Medium     Advance Care Planning 5/16/2016: ACP  Review of Chart / Resources Provided:  Reviewed chart for advance care plan.  Raymon Ace has no plan or code status on file however states presence of ACP document. Copy requested. Confirmed code status reflects current choices pending receipt of document/advance care plan review.  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 Provider to review       Change in bowel habits since 1-14: diarrhea-thinks better off benicar 07/10/2014     Priority: Medium     Family history of ischemic heart disease 01/17/2014     Priority: Medium     Family history of diabetes mellitus 01/17/2014     Priority: Medium     Morbid obesity  BMI  30-35 01/10/2014     Priority: Medium     Glucose intolerance (impaired glucose tolerance)  HgbA!C = 5.4 01/10/2014     Priority: Medium     Back pain since 1988 s/po surgery 1992,1996 and 3-13/ Dr Singh  01/10/2014     Priority: Medium     No CSA on file   8-=22-17 no concerns       Alcoholic liver damage (H) 01/10/2014     Priority: Medium     Diverticulosis of large intestine 01/10/2014     Priority: Medium     Problem list name updated by automated process.  Provider to review       Elevated liver enzymes AST  01/10/2014     Priority: Medium     Hypotension 03/16/2013     Priority: Medium     Lumbar spinal stenosis 12/14/2012     Priority: Medium     Steroid-induced hyperglycemia 12/14/2012     Priority: Medium     Although glucose normal on 1/30/13 atr 86       Fatty liver/ 1-14 US 12/14/2012     Priority: Medium     Atherosclerosis 12/14/2012     Priority: Medium     Alcohol consuption of more than two drinks per day 12/14/2012     Priority: Medium     Obstructive sleep apnea syndrome 09/21/2012     Priority: Medium     Does not tolerate CPAP  Problem list name updated by automated process. Provider to review       Restless legs syndrome (RLS) 09/21/2012     Priority: Medium     Polyneuropathy in other diseases classified elsewhere (H) 09/21/2012     Priority: Medium     Due to back issues       Gastroesophageal reflux disease without esophagitis 09/21/2012     Priority: Medium     Preventive measure 01/31/2013     Priority: Low     APRIMA DATA BASE UNDER THE 12/14/12 NOTE  Colonoscopy neg in 5/10; PSA 0.31 in 9/12         Medications:  Current Outpatient Medications   Medication Sig Dispense Refill     allopurinol (ZYLOPRIM) 300 MG tablet TAKE 1 TABLET BY MOUTH ONCE DAILY 90 tablet 1     amLODIPine (NORVASC) 5 MG tablet TAKE 1 TABLET BY MOUTH ONCE DAILY. APPOINTMENT REQUIRED FOR FUTURE REFILLS 90 tablet 1     cloNIDine (CATAPRES) 0.3 MG tablet TAKE 1 TABLET BY MOUTH TWICE DAILY **NEEDS TO BE SEEN FOR REFILLS** 180 tablet 1     eltrombopag (PROMACTA) 75 MG tablet Take 1 tablet (75 mg) by mouth daily Administer on an empty stomach, 1 hour before or 2 hours after a meal. 30 tablet 11     folic acid (FOLVITE) 1 MG tablet TAKE 1 TABLET BY MOUTH ONCE DAILY 90 tablet 2     gabapentin (NEURONTIN) 300 MG capsule TAKE ONE CAPSULE BY MOUTH THREE TIMES DAILY 270 capsule 1     Glucos-Chond-Sterol-Fish Oil (GLUCOSAMINE CHONDROITIN PLUS PO) Take 1 tablet by mouth daily        lisinopril (PRINIVIL/ZESTRIL) 20 MG tablet Take 1 tablet (20 mg) by mouth daily 90 tablet 1     lisinopril (PRINIVIL/ZESTRIL) 40 MG tablet TAKE 1 TABLET BY MOUTH ONCE DAILY 90 tablet 1     omeprazole (PRILOSEC) 40 MG capsule TAKE 1 CAPSULE BY MOUTH ONCE DAILY . APPOINTMENT REQUIRED FOR FUTURE REFILLS 90 capsule 3     ondansetron (ZOFRAN ODT) 4 MG ODT tab Take 1-2 tablets (4-8 mg) by mouth every 8 hours as needed for nausea 12 tablet 0     spironolactone (ALDACTONE) 25 MG tablet TAKE 1 TABLET BY MOUTH ONCE DAILY . APPOINTMENT REQUIRED FOR FUTURE REFILLS 90 tablet 2     XARELTO 10 MG TABS tablet TAKE 1 TABLET BY MOUTH ONCE DAILY WITH DINNER 30 tablet 11     ASPIRIN NOT PRESCRIBED (INTENTIONAL) Please choose reason not prescribed, below (Patient not taking: Reported on 7/19/2019) 1 each 0     oxyCODONE IR (ROXICODONE) 5 MG tablet Take 5-10 mg by mouth every 4 hours as needed          Allergies:  Allergies   Allergen Reactions     Benicar [Olmesartan] Diarrhea     Onset 1-14   On benicar since 2012       Family history:  Family History   Problem Relation Age of Onset     Unknown/Adopted Mother      Unknown/Adopted Father      Heart Disease Sister      Diabetes No family hx of      Coronary Artery Disease No family hx of      Hypertension No family hx of      Hyperlipidemia No family hx of      Cerebrovascular Disease No family hx of      Breast Cancer No family hx of      Colon Cancer No family hx of      Prostate Cancer No family hx of      Other Cancer No family hx of      Depression No family hx of      Anxiety Disorder No family hx of      Mental Illness No family hx of      Substance Abuse No family hx of      Anesthesia Reaction No family hx of      Asthma No family hx of      Osteoporosis No family hx of      Genetic Disorder No family hx of      Thyroid Disease No family hx of      Obesity No family hx of        Social history:  Social History     Tobacco Use     Smoking status: Former Smoker     Packs/day: 1.00  "    Years: 28.00     Pack years: 28.00     Types: Cigarettes     Start date:      Last attempt to quit: 1982     Years since quittin.8     Smokeless tobacco: Never Used   Substance Use Topics     Alcohol use: Yes     Alcohol/week: 3.0 oz     Types: 5 Shots of liquor per week     Comment: 4-5 drinks/day, alcohol use disorder    Marital status: .  Nursing Notes:   Luisa Kennedy EMT  2019  9:36 AM  Signed  Depression Response    Patient completed the PHQ-9 assessment for depression and scored >9? Yes  Question 9 on the PHQ-9 was positive for suicidality? No  Is the patient already receiving treatment for depression? No  Patient would like to speak with behavioral health team ([facility] [place] No    I personally notified the following: visit provider    Behavioral Health/Social Work Contact Information     [facility]  Kb Johnson MA, LMFT  Lead Behavioral Health Clinician  Phone: [phone]  [facility] Pager: [phone]    [facility]  [facility] On-Call   Pager: [phone]         Luisa Kennedy EMT  2019 10:00 AM  Signed  Chief Complaint   Patient presents with     Rectal Problem     Rectal bleeding and external hemorrhoids.       Vitals:    19 0941   BP: 130/76   BP Location: Left arm   Patient Position: Sitting   Cuff Size: Adult Regular   Pulse: 65   SpO2: 97%   Weight: 199 lb 4.8 oz   Height: 5' 6.5\"       Body mass index is 31.69 kg/m .      AMRIT Valiente                         Total face to face time was 30 minutes, >50% counseling.    SCOTTIE العراقي, NP-C  Colon and Rectal Surgery   [facility]    This note was created using speech recognition software and may contain unintended word substitutions.      "

## 2019-07-19 NOTE — NURSING NOTE
Depression Response    Patient completed the PHQ-9 assessment for depression and scored >9? Yes  Question 9 on the PHQ-9 was positive for suicidality? No  Is the patient already receiving treatment for depression? No  Patient would like to speak with behavioral health team ([facility] only)? No    I personally notified the following: visit provider    Behavioral Health/Social Work Contact Information     [facility]  Kb Johnson MA, LMFT  Lead Behavioral Health Clinician  Phone: [phone]  [facility] Pager: [phone]    [facility]  [facility] On-Call   Pager: [phone]

## 2019-07-19 NOTE — LETTER
"     RE: Raymon Ace  [address] [facility] [address]     Dear Colleague,    Thank you for referring your patient, Raymon Ace, to the [facility] COLON AND RECTAL SURGERY at [facility]. Please see a copy of my visit note below.    Colon and Rectal Surgery Consult Clinic Note    Date: 2019     Referring provider:  Referred Self, MD  No address on file     RE: Raymon Ace  : 1945  JHONY: 2019    Raymon Ace is a very pleasant 73 year old male with idiopathic thrombocytopenia and history of pulmonary embolism on long-term anticoagulation with a recent diagnosis of rectal bleeding and anemia.  Given these findings they were subsequently sent to the Colon and Rectal Surgery Clinic for an opinion on this and a new patient consultation.     Raymon reports having a bright red blood with bowel movements about once or twice a month.  This is sometimes just when he wipes but can drip into the toilet.  He denies any associated pain.  His bowel movements are fairly loose and he thinks he goes about 6-7 times a day.  No constipation or straining.  He denies any prolapsing tissue that needs to be manually reduced.  He denies any changes in his stool caliber, nausea, vomiting, abdominal pain, or unexplained weight loss.  No family history of any colon cancer.  His father had pancreatic cancer.  He has had iron deficiency anemia and is gotten iron infusions.  His most recent hemoglobin is 14.8 after 2 infusions.  He last had a colonoscopy in  with one tubular adenoma.  He reports prior adenomatous polyps on colonoscopy 5 years prior to that.    Physical Examination: Exam was chaperoned by Luisa Kennedy, EMT  /76 (BP Location: Left arm, Patient Position: Sitting, Cuff Size: Adult Regular)   Pulse 65   Ht 5' 6.5\"   Wt 199 lb 4.8 oz   SpO2 97%   BMI 31.69 kg/m     General: Alert, oriented, in no acute distress, sitting comfortably  HEENT: Mucous " membranes moist  Perianal external examination:  Perianal skin: Intact with no excoriation or lichenification.  Lesions: No evidence of an external lesion, nodularity, or induration in the perianal region.  Eversion of buttocks: There was not evidence of an anal fissure. Details: N/A.  Skin tags or external hemorrhoids: Yes: External hemorrhoidal skin tags with some prolapsing tissue present in the right lateral position.  Digital rectal examination: Was performed.   Sphincter tone: Good.  Palpable lesions: No.  Prostate: Normal.  Other: None.    Anoscopy: Was performed.   Hemorrhoids: Yes.  Moderate-sized internal hemorrhoids without active bleeding.  Lesions: No    Assessment/Plan: On exam he does have moderate-sized internal hemorrhoids.  Bleeding is only once or twice a month so I think is likely not contributing to his anemia.  Given his thrombocytopenia and long-term anticoagulation, he would not be a candidate for any hemorrhoid banding and not a great candidate for surgical hemorrhoidectomy unless necessary.  I recommended starting a daily fiber supplement such as Metamucil or Citrucel given his frequent loose stools to see if this improves his hemorrhoidal symptoms.  I would also recommend a colonoscopy to rule out any other sources of bleeding and given his history of tubular adenoma on colonoscopy 4 years ago.  Hemorrhoidal bleeding worsens, could always consider further intervention at that time.   He does have a history of possible cirrhosis and on EGD in 2018 evidence of portal hypertension. Continue to follow with hematology for anemia.   Patient's questions were answered to his stated satisfaction and he is in agreement with this plan.    Medical history:  Past Medical History:   Diagnosis Date     Alcohol abuse since teens 01/15/2015    Still actively drinking     Alcoholic liver damage (H) 1/10/2014     Gastroesophageal reflux disease with esophagitis 12/6/2016     Gout involving toe, unspecified  cause, unspecified chronicity, unspecified laterality 6/2/2016     Heart murmur     heart is positioned farther on left side then typical     Hyperlipidemia 12/17/2015     Hypertension      ITP (idiopathic thrombocytopenic purpura)      Obstructive sleep apnea     does not use CPAP  machine     Pulmonary embolism (H) large RLL w infarctio 4-17 4/18/2017       Surgical history:  Past Surgical History:   Procedure Laterality Date     APPENDECTOMY  1956     BACK SURGERY  1992, 1996    trimmed L4-L5, Fusion L4-L5     BONE MARROW BIOPSY, BONE SPECIMEN, NEEDLE/TROCAR  10/24/2012    Procedure: BIOPSY BONE MARROW;  BONE MARROW BIOPSY WITH ASPIRATE (AREVALO ORDERING);  Surgeon: Terri Hickey MD;  Location:  GI     ESOPHAGOSCOPY, GASTROSCOPY, DUODENOSCOPY (EGD), COMBINED N/A 2/8/2018    Procedure: COMBINED ESOPHAGOSCOPY, GASTROSCOPY, DUODENOSCOPY (EGD);  EGD;  Surgeon: Terri Crouch MD;  Location:  GI     FACIAL RECONSTRUCTION SURGERY  1965    jaw fracture     HC TOOTH EXTRACTION W/FORCEP  1990s       Problem list:  Patient Active Problem List    Diagnosis Date Noted     ITP (idiopathic thrombocytopenic purpura) since 3-13 back surgery       Priority: High     CKD (chronic kidney disease) stage 3, GFR 30-59 ml/min (H) 06/07/2019     Priority: Medium     Portal hypertension (H) 06/07/2019     Priority: Medium     Impaired fasting glucose 06/07/2019     Priority: Medium     Fatigue, unspecified type 06/07/2019     Priority: Medium     Screening for prostate cancer 09/14/2018     Priority: Medium     Iron deficiency anemia due to chronic blood loss 06/28/2018     Priority: Medium     Pulmonary embolism (H) large RLL w infarctio 4-17 04/18/2017     Priority: Medium     Gastroesophageal reflux disease with esophagitis 12/06/2016     Priority: Medium     Chronic right dominant  shoulder pain w ltd ROM  since 3-16 08/09/2016     Priority: Medium     Need for prophylactic vaccination against Streptococcus  pneumoniae (pneumococcus) 08/04/2016     Priority: Medium     Gout involving toe, unspecified cause, unspecified chronicity, unspecified laterality 06/02/2016     Priority: Medium     ACP (advance care planning) 05/16/2016     Priority: Medium     Advance Care Planning 5/16/2016: ACP Review of Chart / Resources Provided:  Reviewed chart for advance care plan.  Raymon Ace has no plan or code status on file however states presence of ACP document. Copy requested. Confirmed code status reflects current choices pending receipt of document/advance care plan review.  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 Provider to review       Change in bowel habits since 1-14: diarrhea-thinks better off benicar 07/10/2014     Priority: Medium     Family history of ischemic heart disease 01/17/2014     Priority: Medium     Family history of diabetes mellitus 01/17/2014     Priority: Medium     Morbid obesity  BMI  30-35 01/10/2014     Priority: Medium     Glucose intolerance (impaired glucose tolerance)  HgbA!C = 5.4 01/10/2014     Priority: Medium     Back pain since 1988 s/po surgery 1992,1996 and  3-13/ Dr Singh  01/10/2014     Priority: Medium     No CSA on file   8-=22-17 no concerns       Alcoholic liver damage (H) 01/10/2014     Priority: Medium     Diverticulosis of large intestine 01/10/2014     Priority: Medium     Problem list name updated by automated process. Provider to review       Elevated liver enzymes AST  01/10/2014     Priority: Medium     Hypotension 03/16/2013     Priority: Medium     Lumbar spinal stenosis 12/14/2012     Priority: Medium     Steroid-induced hyperglycemia 12/14/2012     Priority: Medium     Although glucose normal on 1/30/13 atr 86       Fatty liver/ 1-14 US 12/14/2012     Priority: Medium     Atherosclerosis 12/14/2012     Priority: Medium     Alcohol consuption of more than two drinks per day 12/14/2012     Priority: Medium     Obstructive sleep apnea syndrome 09/21/2012     Priority: Medium     Does not tolerate CPAP  Problem list name updated by automated process. Provider to review       Restless legs syndrome (RLS) 09/21/2012     Priority: Medium     Polyneuropathy in other diseases classified elsewhere (H) 09/21/2012     Priority: Medium     Due to back issues       Gastroesophageal reflux disease without esophagitis 09/21/2012     Priority: Medium     Preventive measure 01/31/2013     Priority: Low     APRIMA DATA BASE UNDER THE 12/14/12 NOTE  Colonoscopy neg in 5/10; PSA 0.31 in 9/12         Medications:  Current Outpatient Medications   Medication Sig Dispense Refill     allopurinol (ZYLOPRIM) 300 MG tablet TAKE 1 TABLET BY MOUTH ONCE DAILY 90 tablet 1     amLODIPine (NORVASC) 5 MG tablet TAKE 1 TABLET BY MOUTH ONCE DAILY. APPOINTMENT REQUIRED FOR FUTURE REFILLS 90 tablet 1     cloNIDine (CATAPRES) 0.3 MG tablet TAKE 1 TABLET BY MOUTH TWICE DAILY **NEEDS TO BE SEEN FOR REFILLS** 180 tablet 1     eltrombopag (PROMACTA) 75 MG tablet Take 1 tablet (75 mg) by mouth daily Administer on an empty stomach, 1 hour before or 2 hours after a meal. 30 tablet 11     folic  acid (FOLVITE) 1 MG tablet TAKE 1 TABLET BY MOUTH ONCE DAILY 90 tablet 2     gabapentin (NEURONTIN) 300 MG capsule TAKE ONE CAPSULE BY MOUTH THREE TIMES DAILY 270 capsule 1     Glucos-Chond-Sterol-Fish Oil (GLUCOSAMINE CHONDROITIN PLUS PO) Take 1 tablet by mouth daily       lisinopril (PRINIVIL/ZESTRIL) 20 MG tablet Take 1 tablet (20 mg) by mouth daily 90 tablet 1     lisinopril (PRINIVIL/ZESTRIL) 40 MG tablet TAKE 1 TABLET BY MOUTH ONCE DAILY 90 tablet 1     omeprazole (PRILOSEC) 40 MG capsule TAKE 1 CAPSULE BY MOUTH ONCE DAILY . APPOINTMENT REQUIRED FOR FUTURE REFILLS 90 capsule 3     ondansetron (ZOFRAN ODT) 4 MG ODT tab Take 1-2 tablets (4-8 mg) by mouth every 8 hours as needed for nausea 12 tablet 0     spironolactone (ALDACTONE) 25 MG tablet TAKE 1 TABLET BY MOUTH ONCE DAILY . APPOINTMENT REQUIRED FOR FUTURE REFILLS 90 tablet 2     XARELTO 10 MG TABS tablet TAKE 1 TABLET BY MOUTH ONCE DAILY WITH DINNER 30 tablet 11     ASPIRIN NOT PRESCRIBED (INTENTIONAL) Please choose reason not prescribed, below (Patient not taking: Reported on 7/19/2019) 1 each 0     oxyCODONE IR (ROXICODONE) 5 MG tablet Take 5-10 mg by mouth every 4 hours as needed          Allergies:  Allergies   Allergen Reactions     Benicar [Olmesartan] Diarrhea     Onset 1-14   On benicar since 2012       Family history:  Family History   Problem Relation Age of Onset     Unknown/Adopted Mother      Unknown/Adopted Father      Heart Disease Sister      Diabetes No family hx of      Coronary Artery Disease No family hx of      Hypertension No family hx of      Hyperlipidemia No family hx of      Cerebrovascular Disease No family hx of      Breast Cancer No family hx of      Colon Cancer No family hx of      Prostate Cancer No family hx of      Other Cancer No family hx of      Depression No family hx of      Anxiety Disorder No family hx of      Mental Illness No family hx of      Substance Abuse No family hx of      Anesthesia Reaction No family hx of  "     Asthma No family hx of      Osteoporosis No family hx of      Genetic Disorder No family hx of      Thyroid Disease No family hx of      Obesity No family hx of        Social history:  Social History     Tobacco Use     Smoking status: Former Smoker     Packs/day: 1.00     Years: 28.00     Pack years: 28.00     Types: Cigarettes     Start date:      Last attempt to quit: 1982     Years since quittin.8     Smokeless tobacco: Never Used   Substance Use Topics     Alcohol use: Yes     Alcohol/week: 3.0 oz     Types: 5 Shots of liquor per week     Comment: 4-5 drinks/day, alcohol use disorder    Marital status: .  Nursing Notes:   Luisa Kennedy EMT  2019  9:36 AM  Signed  Depression Response    Patient completed the PHQ-9 assessment for depression and scored >9? Yes  Question 9 on the PHQ-9 was positive for suicidality? No  Is the patient already receiving treatment for depression? No  Patient would like to speak with behavioral health team ([facility] clinics only)? No    I personally notified the following: visit provider    Behavioral Health/Social Work Contact Information     [facility]  Kb Johnson MA, LMFT  Lead Behavioral Health Clinician  Phone: [phone]  [facility] Pager: [phone]    Non-[facility] Clinics  [facility] On-Call   Pager: [phone]     Luisa Kennedy EMT  2019 10:00 AM  Signed  Chief Complaint   Patient presents with     Rectal Problem     Rectal bleeding and external hemorrhoids.       Vitals:    19 0941   BP: 130/76   BP Location: Left arm   Patient Position: Sitting   Cuff Size: Adult Regular   Pulse: 65   SpO2: 97%   Weight: 199 lb 4.8 oz   Height: 5' 6.5\"       Body mass index is 31.69 kg/m .    AMRIT Valiente     Total face to face time was 30 minutes, >50% counseling.    SCOTTIE العراقي, NP-C  Colon and Rectal Surgery   [facility]    This note was created using speech recognition software and " may contain unintended word substitutions.

## 2019-07-19 NOTE — PATIENT INSTRUCTIONS
1) Start a daily fiber supplement such as Citrucel or Metamucil. Start with once a day and slowly increase up to three times a day, if needed, over the next 4-6 weeks    2) Colonoscopy to rule out any other sources of bleeding

## 2019-07-19 NOTE — NURSING NOTE
"Chief Complaint   Patient presents with     Rectal Problem     Rectal bleeding and external hemorrhoids.       Vitals:    07/19/19 0941   BP: 130/76   BP Location: Left arm   Patient Position: Sitting   Cuff Size: Adult Regular   Pulse: 65   SpO2: 97%   Weight: 199 lb 4.8 oz   Height: 5' 6.5\"       Body mass index is 31.69 kg/m .      Luisa Kennedy, EMT                      "

## 2019-07-24 ENCOUNTER — TELEPHONE (OUTPATIENT)
Dept: GASTROENTEROLOGY | Facility: CLINIC | Age: 74
End: 2019-07-24

## 2019-07-24 NOTE — TELEPHONE ENCOUNTER
Patient Name: Raymon Ace   : 1945  MRN: [id number]       : [] N/A   [] Yes:  Language  /  ID:      Unable to leave aVM with request pt contact Endoscopy Pre-assessment RN to review upcoming procedure information.  Telephone call-back number provided with If You Can message and email address .    Terri Bae RN  [facility] Endoscopy    Additional Information regarding appointment:      Patient scheduled for:  [] EGD  [x] Colonoscopy  [] EUS  [] Flex Sig   [] Other:    Indication for procedure. [] Screening   [x]    Rectal bleeding [K62.5]  - Primary       Other iron deficiency anemia [D50.8]               Sedation Type: [x] Conscious Sedation   [] MAC   [] None    Procedure Provider:  Dr. Garcia      Referring Provider. Shoshana Jesus    Arrival time verified: 12 noon / Wednesday  /     Facility location verified:   [x][facility] Endoscopy Unit - [address], 1st Floor, [address]    Pt meets medical necessity for outpatient procedure in hospital Endoscopy Unit:  n/a  [] [facility], 5th floor     [][facility] OR - [address], 3rd Floor Surgery check-in      Prep Type:   [x]Golytely eRx:  ;  [] MoviPrep:  , [] MiraLax:  , [] Other:    []NPO /p MN, No solid food /p 2200 the night before    Anticoagulants or blood thinners: []None [] ASA 81mg  - may continue           [] Warfarin   [] Warfarin + Lovenox bridge [] Plavix [] Effient [] Eliquis         [x] Xarelto holding interval 2 day per endoscopy policy  [] Brilinta [] NSAIDS  [] Other     LAST anticoagulant dose: Date/Time:    INR:      Electronic implanted devices: [x] No  [] IPG  []  ICD  []  LVAD  []      H&P / Pre op physical completed: [x] N/A, [] Complete, Date  , [] Scheduled, Date  , [] No,      Additional Information:      _______________________________________________      Instructions given: [] Rec'd & Read   [] Reviewed       [] Resent via [employer] -       []  Resent via eMail -         Pre procedure teaching completed: [] Yes - Reviewed, [] No,      [] No questions regarding Sedation as ordered, []      Transportation from procedure & responsible adult to be with patient following procedure for a minimum of 6 hrs (Conscious Sedation) 24 hrs (MAC): [] Yes   - confirmed will have post-procedure companionship as required, [] Pending  , [] No       Terri Bae RN  [facility] Endoscopy

## 2019-07-26 ENCOUNTER — TELEPHONE (OUTPATIENT)
Dept: GASTROENTEROLOGY | Facility: CLINIC | Age: 74
End: 2019-07-26

## 2019-07-26 DIAGNOSIS — K62.5 RECTAL BLEEDING: Primary | ICD-10-CM

## 2019-07-26 RX ORDER — BISACODYL 5 MG/1
10 TABLET, DELAYED RELEASE ORAL DAILY
Qty: 4 TABLET | Refills: 0 | Status: SHIPPED | OUTPATIENT
Start: 2019-07-26 | End: 2019-07-28

## 2019-07-26 NOTE — TELEPHONE ENCOUNTER
Patient Name: Raymon Ace   : 1945  MRN: [id number]       : [x] N/A   [] Yes:  Language /  ID:      with request pt contact Endoscopy Pre-assessment RN to review upcoming procedure information.  Telephone call-back number provided.    Anna Rocha RN  [facility] Endoscopy    Additional Information regarding appointment:      Patient scheduled for:  [] EGD  [x] Colonoscopy  [] EUS  [] Flex Sig   [] Other:     Indication for procedure. [] Screening   [x] Rectal bleeding    Sedation Type: [x] Conscious Sedation   [] MAC   [] None    Procedure Provider:  Jose      Referring Provider. Burke Alcocer    Arrival time verified: 19    Facility location verified:   [x][facility] Endoscopy Unit - [address], 1st Floor, [address]    Pt meets medical necessity for outpatient procedure in hospital Endoscopy Unit: N/A for this payor  [facility] [address], 5th floor     [][facility] - [address], 3rd Floor Surgery check-in      Prep Type:   [x]Golytely eRx: [facility] [address] [place] [address] [facility] ;  [] MoviPrep: , [] MiraLax: , [] Other:   []NPO /p MN, No solid food /p 2200 the night before    Anticoagulants or blood thinners: []None [] ASA 81mg  - may continue           [] Warfarin   [] Warfarin + Lovenox bridge [] Plavix [] Effient [] Eliquis         [x] Xarelto  [] Brilinta [] NSAIDS  [] Other    LAST anticoagulant dose: Date/Time: , 2019  INR: N/A    Electronic implanted devices: [x] No  [] IPG  []  ICD  []  LVAD  []     H&P / Pre op physical completed: [x] N/A, [] Complete, Date , [] Scheduled, Date , [] No,     Additional Information: Xarelto for pulmonary embolus ~ 2 yrs ago    _______________________________________________      Instructions given: [] Rec'd & Read   [x] Reviewed       [] Resent via [email or web address] -      [x] Resent via eMail - eMail address confirmed: Pt requests resend endoscopy appointment  information communication via unencrypted e-mail. This includes Split-dose Golytely  instructions, Unit J  map, Conscious Sedation policy, procedure date/time/location/provider.  Pt acknowledges that they have agreed to accept the risks and receive unencrypted communications for this incidence.        Pre procedure teaching completed: [x] Yes - Reviewed, [] No,     [] No questions regarding Sedation as ordered, []     Transportation from procedure & responsible adult to be with patient following procedure for a minimum of 6 hrs (Conscious Sedation) 24 hrs (MAC): [x] Yes Wife - confirmed will have post-procedure companionship as required, [] Pending , [] No     Anna Rocha RN  [facility] Endoscopy

## 2019-07-31 ENCOUNTER — HOSPITAL ENCOUNTER (OUTPATIENT)
Facility: CLINIC | Age: 74
Discharge: HOME | End: 2019-07-31
Attending: INTERNAL MEDICINE | Admitting: INTERNAL MEDICINE
Payer: COMMERCIAL

## 2019-07-31 VITALS
SYSTOLIC BLOOD PRESSURE: 121 MMHG | DIASTOLIC BLOOD PRESSURE: 75 MMHG | HEART RATE: 64 BPM | RESPIRATION RATE: 21 BRPM | OXYGEN SATURATION: 98 %

## 2019-07-31 PROCEDURE — 25000132 ZZH RX MED GY IP 250 OP 250 PS 637: Performed by: INTERNAL MEDICINE

## 2019-07-31 PROCEDURE — 99153 MOD SED SAME PHYS/QHP EA: CPT | Performed by: INTERNAL MEDICINE

## 2019-07-31 PROCEDURE — G0500 MOD SEDAT ENDO SERVICE >5YRS: HCPCS | Performed by: INTERNAL MEDICINE

## 2019-07-31 PROCEDURE — 45378 DIAGNOSTIC COLONOSCOPY: CPT | Performed by: INTERNAL MEDICINE

## 2019-07-31 PROCEDURE — 25000128 H RX IP 250 OP 636: Performed by: INTERNAL MEDICINE

## 2019-07-31 RX ORDER — ONDANSETRON 2 MG/ML
4 INJECTION INTRAMUSCULAR; INTRAVENOUS
Status: DISCONTINUED | OUTPATIENT
Start: 2019-07-31 | End: 2019-07-31 | Stop reason: HOSPADM

## 2019-07-31 RX ORDER — FENTANYL CITRATE 50 UG/ML
INJECTION, SOLUTION INTRAMUSCULAR; INTRAVENOUS PRN
Status: DISCONTINUED | OUTPATIENT
Start: 2019-07-31 | End: 2019-07-31 | Stop reason: HOSPADM

## 2019-07-31 RX ORDER — LIDOCAINE 40 MG/G
CREAM TOPICAL
Status: DISCONTINUED | OUTPATIENT
Start: 2019-07-31 | End: 2019-07-31 | Stop reason: HOSPADM

## 2019-07-31 RX ORDER — SIMETHICONE
LIQUID (ML) MISCELLANEOUS PRN
Status: DISCONTINUED | OUTPATIENT
Start: 2019-07-31 | End: 2019-07-31 | Stop reason: HOSPADM

## 2019-07-31 NOTE — DISCHARGE INSTRUCTIONS
Discharge Instructions after Colonoscopy    Today you had a Colonoscopy    Activity and Diet  You were given medicine for pain. You may be dizzy or sleepy.  For 24 hours:    Do not drive or use heavy equipment.    Do not make important decisions.    Do not drink any alcohol.  You may return to your normal diet and medicines.    Discomfort    Air was placed in your colon during the exam in order to see it. Walking helps to pass the air.    You may take Tylenol (acetaminophen) for pain unless your doctor has told you not to.  Do not take aspirin or ibuprofen (Advil, Motrin, or other anti-inflammatory  drugs) for __0___ days.    Follow-up      When to call:    Call right away if you have:    Unusual pain in belly or chest pain not relieved with passing air.    More than 1 to 2 Tablespoons of bleeding from your rectum.    Fever above 100.6  F (37.5  C).    If you have severe pain, bleeding, or shortness of breath, go to an emergency room.    If you have questions, call:  Monday to Friday, 7 a.m. to 4:30 p.m.  Endoscopy: [phone] (We may have to call you back)    After hours  Hospital: [phone] (Ask for the GI fellow on call)

## 2019-07-31 NOTE — OR NURSING
Pt had colonoscopy, no interventions. Toelrated well. VSS. o2 at 2LNC throughout case. Pt to recovery with RN.

## 2019-08-01 LAB — COLONOSCOPY: NORMAL

## 2019-08-30 ENCOUNTER — OFFICE VISIT (OUTPATIENT)
Dept: FAMILY MEDICINE | Facility: CLINIC | Age: 74
End: 2019-08-30
Payer: COMMERCIAL

## 2019-08-30 ENCOUNTER — ANCILLARY PROCEDURE (OUTPATIENT)
Dept: GENERAL RADIOLOGY | Facility: CLINIC | Age: 74
End: 2019-08-30
Attending: FAMILY MEDICINE
Payer: COMMERCIAL

## 2019-08-30 VITALS
BODY MASS INDEX: 31.39 KG/M2 | TEMPERATURE: 98.2 F | WEIGHT: 200 LBS | OXYGEN SATURATION: 95 % | DIASTOLIC BLOOD PRESSURE: 80 MMHG | SYSTOLIC BLOOD PRESSURE: 136 MMHG | RESPIRATION RATE: 18 BRPM | HEART RATE: 92 BPM | HEIGHT: 67 IN

## 2019-08-30 DIAGNOSIS — E66.811 CLASS 1 OBESITY DUE TO EXCESS CALORIES WITH SERIOUS COMORBIDITY AND BODY MASS INDEX (BMI) OF 31.0 TO 31.9 IN ADULT: ICD-10-CM

## 2019-08-30 DIAGNOSIS — M10.9 GOUT INVOLVING TOE, UNSPECIFIED CAUSE, UNSPECIFIED CHRONICITY, UNSPECIFIED LATERALITY: ICD-10-CM

## 2019-08-30 DIAGNOSIS — K76.6 PORTAL HYPERTENSION (H): ICD-10-CM

## 2019-08-30 DIAGNOSIS — K21.9 GASTROESOPHAGEAL REFLUX DISEASE WITHOUT ESOPHAGITIS: ICD-10-CM

## 2019-08-30 DIAGNOSIS — R73.01 IMPAIRED FASTING GLUCOSE: ICD-10-CM

## 2019-08-30 DIAGNOSIS — R94.31 ABNORMAL ELECTROCARDIOGRAM: ICD-10-CM

## 2019-08-30 DIAGNOSIS — Z01.818 PREOP GENERAL PHYSICAL EXAM: ICD-10-CM

## 2019-08-30 DIAGNOSIS — I51.7 CARDIOMEGALY: ICD-10-CM

## 2019-08-30 DIAGNOSIS — Z01.818 PREOP GENERAL PHYSICAL EXAM: Primary | ICD-10-CM

## 2019-08-30 DIAGNOSIS — I10 BENIGN ESSENTIAL HYPERTENSION: ICD-10-CM

## 2019-08-30 DIAGNOSIS — G47.33 OBSTRUCTIVE SLEEP APNEA SYNDROME: ICD-10-CM

## 2019-08-30 DIAGNOSIS — Z79.899 ENCOUNTER FOR LONG-TERM (CURRENT) USE OF MEDICATIONS: ICD-10-CM

## 2019-08-30 DIAGNOSIS — I26.99 OTHER ACUTE PULMONARY EMBOLISM WITHOUT ACUTE COR PULMONALE (H): ICD-10-CM

## 2019-08-30 DIAGNOSIS — K76.0 FATTY LIVER: ICD-10-CM

## 2019-08-30 DIAGNOSIS — F32.5 MAJOR DEPRESSION IN COMPLETE REMISSION (H): ICD-10-CM

## 2019-08-30 DIAGNOSIS — M19.211 OTHER SECONDARY OSTEOARTHRITIS OF RIGHT SHOULDER: ICD-10-CM

## 2019-08-30 DIAGNOSIS — R73.02 GLUCOSE INTOLERANCE (IMPAIRED GLUCOSE TOLERANCE): ICD-10-CM

## 2019-08-30 DIAGNOSIS — K70.9 ALCOHOLIC LIVER DAMAGE (H): ICD-10-CM

## 2019-08-30 DIAGNOSIS — N18.30 CKD (CHRONIC KIDNEY DISEASE) STAGE 3, GFR 30-59 ML/MIN (H): ICD-10-CM

## 2019-08-30 DIAGNOSIS — E66.09 CLASS 1 OBESITY DUE TO EXCESS CALORIES WITH SERIOUS COMORBIDITY AND BODY MASS INDEX (BMI) OF 31.0 TO 31.9 IN ADULT: ICD-10-CM

## 2019-08-30 DIAGNOSIS — F10.10 ALCOHOL ABUSE: ICD-10-CM

## 2019-08-30 LAB
ALBUMIN SERPL-MCNC: 3.7 G/DL (ref 3.4–5)
ALP SERPL-CCNC: 81 U/L (ref 40–150)
ALT SERPL W P-5'-P-CCNC: 33 U/L (ref 0–70)
ANION GAP SERPL CALCULATED.3IONS-SCNC: 10 MMOL/L (ref 3–14)
AST SERPL W P-5'-P-CCNC: 37 U/L (ref 0–45)
BASOPHILS # BLD AUTO: 0 10E9/L (ref 0–0.2)
BASOPHILS NFR BLD AUTO: 0.6 %
BILIRUB SERPL-MCNC: 1.1 MG/DL (ref 0.2–1.3)
BUN SERPL-MCNC: 8 MG/DL (ref 7–30)
CALCIUM SERPL-MCNC: 9.1 MG/DL (ref 8.5–10.1)
CHLORIDE SERPL-SCNC: 112 MMOL/L (ref 94–109)
CO2 SERPL-SCNC: 24 MMOL/L (ref 20–32)
CREAT SERPL-MCNC: 0.74 MG/DL (ref 0.66–1.25)
DIFFERENTIAL METHOD BLD: ABNORMAL
EOSINOPHIL # BLD AUTO: 0.1 10E9/L (ref 0–0.7)
EOSINOPHIL NFR BLD AUTO: 2 %
ERYTHROCYTE [DISTWIDTH] IN BLOOD BY AUTOMATED COUNT: 14.4 % (ref 10–15)
GFR SERPL CREATININE-BSD FRML MDRD: >90 ML/MIN/{1.73_M2}
GLUCOSE SERPL-MCNC: 101 MG/DL (ref 70–99)
HBA1C MFR BLD: 5.3 % (ref 0–5.6)
HCT VFR BLD AUTO: 47.1 % (ref 40–53)
HGB BLD-MCNC: 16.6 G/DL (ref 13.3–17.7)
LYMPHOCYTES # BLD AUTO: 1 10E9/L (ref 0.8–5.3)
LYMPHOCYTES NFR BLD AUTO: 14.6 %
MCH RBC QN AUTO: 33.7 PG (ref 26.5–33)
MCHC RBC AUTO-ENTMCNC: 35.2 G/DL (ref 31.5–36.5)
MCV RBC AUTO: 96 FL (ref 78–100)
MONOCYTES # BLD AUTO: 0.6 10E9/L (ref 0–1.3)
MONOCYTES NFR BLD AUTO: 9.1 %
NEUTROPHILS # BLD AUTO: 5.2 10E9/L (ref 1.6–8.3)
NEUTROPHILS NFR BLD AUTO: 73.7 %
PLATELET # BLD AUTO: 61 10E9/L (ref 150–450)
POTASSIUM SERPL-SCNC: 3.9 MMOL/L (ref 3.4–5.3)
PROT SERPL-MCNC: 7.4 G/DL (ref 6.8–8.8)
RBC # BLD AUTO: 4.92 10E12/L (ref 4.4–5.9)
SODIUM SERPL-SCNC: 145 MMOL/L (ref 133–144)
URATE SERPL-MCNC: 1.5 MG/DL (ref 3.5–7.2)
WBC # BLD AUTO: 7.1 10E9/L (ref 4–11)

## 2019-08-30 PROCEDURE — 36415 COLL VENOUS BLD VENIPUNCTURE: CPT | Performed by: FAMILY MEDICINE

## 2019-08-30 PROCEDURE — 99215 OFFICE O/P EST HI 40 MIN: CPT | Performed by: FAMILY MEDICINE

## 2019-08-30 PROCEDURE — 84550 ASSAY OF BLOOD/URIC ACID: CPT | Performed by: FAMILY MEDICINE

## 2019-08-30 PROCEDURE — 71046 X-RAY EXAM CHEST 2 VIEWS: CPT | Mod: FY

## 2019-08-30 PROCEDURE — 83036 HEMOGLOBIN GLYCOSYLATED A1C: CPT | Performed by: FAMILY MEDICINE

## 2019-08-30 PROCEDURE — 85025 COMPLETE CBC W/AUTO DIFF WBC: CPT | Performed by: FAMILY MEDICINE

## 2019-08-30 PROCEDURE — 93000 ELECTROCARDIOGRAM COMPLETE: CPT | Performed by: FAMILY MEDICINE

## 2019-08-30 PROCEDURE — 82043 UR ALBUMIN QUANTITATIVE: CPT | Performed by: FAMILY MEDICINE

## 2019-08-30 PROCEDURE — 80053 COMPREHEN METABOLIC PANEL: CPT | Performed by: FAMILY MEDICINE

## 2019-08-30 ASSESSMENT — ANXIETY QUESTIONNAIRES
7. FEELING AFRAID AS IF SOMETHING AWFUL MIGHT HAPPEN: NOT AT ALL
1. FEELING NERVOUS, ANXIOUS, OR ON EDGE: NOT AT ALL
GAD7 TOTAL SCORE: 0
3. WORRYING TOO MUCH ABOUT DIFFERENT THINGS: NOT AT ALL
5. BEING SO RESTLESS THAT IT IS HARD TO SIT STILL: NOT AT ALL
2. NOT BEING ABLE TO STOP OR CONTROL WORRYING: NOT AT ALL
6. BECOMING EASILY ANNOYED OR IRRITABLE: NOT AT ALL
IF YOU CHECKED OFF ANY PROBLEMS ON THIS QUESTIONNAIRE, HOW DIFFICULT HAVE THESE PROBLEMS MADE IT FOR YOU TO DO YOUR WORK, TAKE CARE OF THINGS AT HOME, OR GET ALONG WITH OTHER PEOPLE: NOT DIFFICULT AT ALL

## 2019-08-30 ASSESSMENT — PATIENT HEALTH QUESTIONNAIRE - PHQ9
SUM OF ALL RESPONSES TO PHQ QUESTIONS 1-9: 0
5. POOR APPETITE OR OVEREATING: NOT AT ALL

## 2019-08-30 ASSESSMENT — MIFFLIN-ST. JEOR: SCORE: 1592.88

## 2019-08-30 NOTE — PATIENT INSTRUCTIONS
Before Your Surgery      Call your surgeon if there is any change in your health. This includes signs of a cold or flu (such as a sore throat, runny nose, cough, rash or fever).    Do not smoke, drink alcohol or take over the counter medicine (unless your surgeon or primary care doctor tells you to) for the 24 hours before and after surgery.    If you take prescribed drugs: Follow your doctor s orders about which medicines to take and which to stop until after surgery.    Eating and drinking prior to surgery: follow the instructions from your surgeon    Take a shower or bath the night before surgery. Use the soap your surgeon gave you to gently clean your skin. If you do not have soap from your surgeon, use your regular soap. Do not shave or scrub the surgery site.  Wear clean pajamas and have clean sheets on your bed.     1. .Please stop fish oil,  aspirin, any NSAIDs ( incuding aleve advil, ibuprofen, etc--use tylenol= acetaminophen instead)  vitamin D, and multivitamins for 7 days prior to and 7 days after surgery     2.  Weight Loss Tips  1. Do not eat after 6 hrs before your expected bedtime  2. Have your heaviest meal for breakfast, a slightly lighter meal at lunch and a snack 6 hrs before bed  3. No sugar/calorie drinks except milk ie no fruit juice, pop, alcohol.  4. Drink milk 30min before meals to decrease your hunger. Also it is excellent as part of your last meal of the day snack  5. Drink lots of water  6. Increase fiber in diet: all bran cereal, salads, popcorn etc  7. Have only one small serving of fruit a day about 1/2 cup (as this is high in sugar)  8. EXERCISE is the bottom line. Without it, you will gain weight even on a low calorie diet. Best if done 2-3X a day as can    Being overweight contributes to high blood pressure and high cholesterol, both of which cause heart attacks, strokes and kidney failure, prediabetes and diabetes, arthritis, and liver disease     3. Shingrex is a 2 shot series  that prevents shingles 97% of the time, as opposed to the old shingles shot that only prevented it at 40-50%  It costs less for medicare at a pharmacy  You should get it starting at 50 yrs old get the 2nd shot 5-6 mo after the first one

## 2019-08-30 NOTE — PROGRESS NOTES
[facility]  [address]  [facility] [address]  [phone]  Dept: [phone]    PRE-OP EVALUATION:  Today's date: 2019    Raymon Ace (: 1945) presents for pre-operative evaluation assessment as requested by Dr. Rivera.  He requires evaluation and anesthesia risk assessment prior to undergoing surgery/procedure for treatment of RT Shoulder .    Proposed Surgery/ Procedure: Shoulder Joint Replacement  Date of Surgery/ Procedure: 2019  Time of Surgery/ Procedure: [facility]  Hospital/Surgical Facility: [place]  Fax number for surgical facility: [phone]  Primary Physician: Rosanne Cherry  Type of Anesthesia Anticipated: to be determined    Patient has a Health Care Directive or Living Will:  YES     1. NO - Do you have a history of heart attack, stroke, stent, bypass or surgery on an artery in the head, neck, heart or legs?  2. NO - Do you ever have any pain or discomfort in your chest?  3. NO - Do you have a history of  Heart Failure?  4. NO - Are you troubled by shortness of breath when: walking on the level, up a slight hill or at night?  5. NO - Do you currently have a cold, bronchitis or other respiratory infection?  6. NO - Do you have a cough, shortness of breath or wheezing?  7. NO - Do you sometimes get pains in the calves of your legs when you walk?  8. NO - Do you or anyone in your family have previous history of blood clots?  9. NO - Do you or does anyone in your family have a serious bleeding problem such as prolonged bleeding following surgeries or cuts?  10. NO - Have you ever had problems with anemia or been told to take iron pills?  11. NO - Have you had any abnormal blood loss such as black, tarry or bloody stools, or abnormal vaginal bleeding?  12. NO - Have you ever had a blood transfusion?  13. NO - Have you or any of your relatives ever had problems with anesthesia?  14. NO - Do you have sleep apnea,  excessive snoring or daytime drowsiness?  15. NO - Do you have any prosthetic heart valves?  16. NO - Do you have prosthetic joints?  17. NO - Is there any chance that you may be pregnant?      HPI:     HPI related to upcoming procedure:   Joint Pain :degenerative Rt Dominant Shoulder Joint     Onset: 2010     But bad this last few mos     Description:   Location: right shoulder  Character: Sharp, Dull ache, Stabbing and Gnawing    Intensity: severe, 10/10    Progression of Symptoms: worse    Accompanying Signs & Symptoms:  Other symptoms: radiation of pain to FTs and RT neck     History:   Previous similar pain: no       Precipitating factors:   Trauma or overuse: YES    Alleviating factors:  Improved by: rest/inactivity, acetaminophen and deep heating rub    Therapies Tried and outcome: no help        Glucose Intolerance   Follow-up      How often are you checking your blood sugar? Not at all    What time of day are you checking your blood sugars (select all that apply)?      Have you had any blood sugars above 200?  No    Have you had any blood sugars below 70?  No    What symptoms do you notice when your blood sugar is low?  None    What concerns do you have today about your diabetes? None     Do you have any of these symptoms? (Select all that apply)  No numbness or tingling in feet.  No redness, sores or blisters on feet.  No complaints of excessive thirst.  No reports of blurry vision.  No significant changes to weight.     Have you had a diabetic eye exam in the last 12 months? No     Drinks at least 4 beer a day     BP Readings from Last 2 Encounters:   08/30/19 136/80   07/31/19 121/75     Hemoglobin A1C (%)   Date Value   08/30/2019 5.3   05/16/2016 5.4     LDL Cholesterol Calculated (mg/dL)   Date Value   06/07/2019 84   01/04/2018 119 (H)       Diabetes Management Resources  Hypertension Follow-up      Do you check your blood pressure regularly outside of the clinic? No     Are you following a low salt  diet? No    Are your blood pressures ever more than 140 on the top number (systolic) OR more   than 90 on the bottom number (diastolic), for example 140/90? No   Has PORTAL HYPERTENSION    CARDIOMEGALY     -per CXR     Are you experiencing any shortness of breath? No    Are you experiencing any swelling in your legs or feet?  No    Are you using more pillows than usual? Yes    Do you cough at night?  Yes    Do you check your weight daily?  Yes    Have you had a weight change recently?  No    Are you having any of the following side effects from your medications? (Select all that apply)  The patient does not report symptoms of dizziness, fatigue, cough, swelling, or slow heart beat.    Since your last visit, how many times have you gone to the cardiologist, urgent care, emergency room, or hospital because of your heart failure?   None    Depression and Anxiety Follow-Up    How are you doing with your depression since your last visit? No change-inremission     How are you doing with your anxiety since your last visit?  No change    Are you having other symptoms that might be associated with depression or anxiety? No    Have you had a significant life event? No     Do you have any concerns with your use of alcohol or other drugs? No    Social History     Tobacco Use     Smoking status: Former Smoker     Packs/day: 1.00     Years: 28.00     Pack years: 28.00     Types: Cigarettes     Start date:      Last attempt to quit: 1982     Years since quittin.9     Smokeless tobacco: Former User   Substance Use Topics     Alcohol use: Yes     Alcohol/week: 3.0 oz     Types: 5 Shots of liquor per week     Comment: 4-5 drinks/day, alcohol use disorder     Drug use: No     PHQ 2018   PHQ-9 Total Score 0 19 0   Q9: Thoughts of better off dead/self-harm past 2 weeks Not at all Not at all Not at all     VERITO-7 SCORE 2017   Total Score 0 0 0           Suicide Assessment  Five-step Evaluation and Treatment (SAFE-T)  Chronic Kidney Disease:Stage 3  Follow-up      Current NSAID use?  No  Comorbid  liver dis, CKD, KARL , glu intol gout , HTN       NONMORBID OBESITY    --BMI = 31.8  -active   -comorbid liver dis, CKD, KARL , glu intol gout , HTN           GERD/Heartburn      Duration: \    Description (location/character/radiation): above     Intensity:  mild    Accompanying signs and symptoms:  food getting stuck: no   nausea/vomiting/blood: no   abdominal pain: no   black/tarry or bloody stools: no :    History (similar episodes/previous evaluation): None    Precipitating or alleviating factors:  worse with alcohol.  current NSAID/Aspirin use: no     Therapies tried and outcome: Omeprazole (Prilosec)    Gout  Duration: yrs   Description   Location: big toe - bilateral  Joint Swelling: no   Redness: no   Pain intensity:  mild  Accompanying signs and symptoms: None  History  Previous history of gout: YES   Trauma to the area: no   Precipitating factors:   Alcohol usage: Frequent  Diuretic use: no   Recent illness: no   Therapies tried and outcome: None     FATTY /ALCOHOLIC LIVER with PORTAL HYPERTENSION    - HX OF hi LFTs   -none now   -conts to drink > 4 beers / d     Hypertension Follow-up      Do you check your blood pressure regularly outside of the clinic? No     Are you following a low salt diet? No    Are your blood pressures ever more than 140 on the top number (systolic) OR more   than 90 on the bottom number (diastolic), for example 140/90? No               See problem list for active medical problems.  Problems all longstanding and stable, except as noted/documented.  See ROS for pertinent symptoms related to these conditions.    CAD - Patient has a longstanding history of moderate-severe CAD. Patient denies recent chest pain or NTG use, denies exercise induced dyspnea or PND. Last Stress test , EKG today     CHF - Patient has a longstanding history of moderate-severe CHF.  Exacerbating conditions include alcoholism, ischemic heart disease, hypertension and cardiomegaly on CXR . Currently the patient's condition is same. Current treatment regimen includes ACE inhibitor and spirolactone. The patient denies chest pain, edema, orthopnea, SOB or recent weight gain. Last Echocardiogram --, EKG today .     RENAL INSUFFICIENCY - Patient has a longstanding history of moderate-severe chronic renal insufficiency. Last Cr 1.11 in 7-19 .       MEDICAL HISTORY:     Patient Active Problem List    Diagnosis Date Noted     ITP (idiopathic thrombocytopenic purpura) since 3-13 back surgery       Priority: High     CKD (chronic kidney disease) stage 3, GFR 30-59 ml/min (H) 06/07/2019     Priority: Medium     Portal hypertension (H) 06/07/2019     Priority: Medium     Impaired fasting glucose 06/07/2019     Priority: Medium     Fatigue, unspecified type 06/07/2019     Priority: Medium     Screening for prostate cancer 09/14/2018     Priority: Medium     Iron deficiency anemia due to chronic blood loss 06/28/2018     Priority: Medium     Pulmonary embolism (H) large RLL w infarctio 4-17 04/18/2017     Priority: Medium     Gastroesophageal reflux disease with esophagitis 12/06/2016     Priority: Medium     Chronic right dominant  shoulder pain w ltd ROM  since 3-16 08/09/2016     Priority: Medium     Need for prophylactic vaccination against Streptococcus pneumoniae (pneumococcus) 08/04/2016     Priority: Medium     Gout involving toe, unspecified cause, unspecified chronicity, unspecified laterality 06/02/2016     Priority: Medium     ACP (advance care planning) 05/16/2016     Priority: Medium     Advance Care Planning 5/16/2016: ACP Review of Chart / Resources Provided:  Reviewed chart for advance care plan.  Raymon Ace has no plan or code status on file however states presence of ACP document. Copy requested. Confirmed code status reflects current choices pending receipt of document/advance care plan  review.  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 Provider to review       Change in bowel habits since 1-14: diarrhea-thinks better off benicar 07/10/2014     Priority: Medium     Family history of ischemic heart disease 01/17/2014     Priority: Medium     Family history of diabetes mellitus 01/17/2014     Priority: Medium     Morbid obesity  BMI  30-35 01/10/2014     Priority: Medium     Glucose intolerance (impaired glucose tolerance)  HgbA!C = 5.4 01/10/2014     Priority: Medium     Back pain since 1988 s/po surgery 1992,1996 and 3-13/ Dr Singh  01/10/2014     Priority: Medium     No CSA on file   8-=22-17 no concerns       Alcoholic liver damage (H) 01/10/2014     Priority: Medium     Diverticulosis of large intestine 01/10/2014     Priority: Medium     Problem list name updated by automated process. Provider to review       Elevated liver enzymes AST  01/10/2014     Priority: Medium     Hypotension 03/16/2013     Priority: Medium     Lumbar spinal stenosis 12/14/2012     Priority: Medium     Steroid-induced hyperglycemia 12/14/2012     Priority: Medium     Although glucose  normal on 1/30/13 atr 86       Fatty liver/ 1-14 US 12/14/2012     Priority: Medium     Atherosclerosis 12/14/2012     Priority: Medium     Alcohol consuption of more than two drinks per day 12/14/2012     Priority: Medium     Obstructive sleep apnea syndrome 09/21/2012     Priority: Medium     Does not tolerate CPAP  Problem list name updated by automated process. Provider to review       Restless legs syndrome (RLS) 09/21/2012     Priority: Medium     Polyneuropathy in other diseases classified elsewhere (H) 09/21/2012     Priority: Medium     Due to back issues       Gastroesophageal reflux disease without esophagitis 09/21/2012     Priority: Medium     Preventive measure 01/31/2013     Priority: Low     APRIMA DATA BASE UNDER THE 12/14/12 NOTE  Colonoscopy neg in 5/10; PSA 0.31 in 9/12        Past Medical History:   Diagnosis Date     Alcohol abuse since teens 01/15/2015    Still actively drinking     Alcoholic liver damage (H) 1/10/2014     Gastroesophageal reflux disease with esophagitis 12/6/2016     Gout involving toe, unspecified cause, unspecified chronicity, unspecified laterality 6/2/2016     Heart murmur     heart is positioned farther on left side then typical     Hyperlipidemia 12/17/2015     Hypertension      ITP (idiopathic thrombocytopenic purpura)      Obstructive sleep apnea     does not use CPAP  machine     Pulmonary embolism (H) large RLL w infarctio 4-17 4/18/2017     Past Surgical History:   Procedure Laterality Date     APPENDECTOMY  1956     BACK SURGERY  1992, 1996    trimmed L4-L5, Fusion L4-L5     BONE MARROW BIOPSY, BONE SPECIMEN, NEEDLE/TROCAR  10/24/2012    Procedure: BIOPSY BONE MARROW;  BONE MARROW BIOPSY WITH ASPIRATE (AREVALO ORDERING);  Surgeon: Terri Hickey MD;  Location:  GI     COLONOSCOPY N/A 7/31/2019    Procedure: COLONOSCOPY;  Surgeon: Sebastian Garcia MD;  Location:  GI     ESOPHAGOSCOPY, GASTROSCOPY, DUODENOSCOPY (EGD), COMBINED N/A 2/8/2018    Procedure:  COMBINED ESOPHAGOSCOPY, GASTROSCOPY, DUODENOSCOPY (EGD);  EGD;  Surgeon: Terri Crouch MD;  Location:  GI     FACIAL RECONSTRUCTION SURGERY  1965    jaw fracture     HC TOOTH EXTRACTION W/FORCEP  1990s     Current Outpatient Medications   Medication Sig Dispense Refill     allopurinol (ZYLOPRIM) 300 MG tablet TAKE 1 TABLET BY MOUTH ONCE DAILY 90 tablet 1     amLODIPine (NORVASC) 5 MG tablet TAKE 1 TABLET BY MOUTH ONCE DAILY. APPOINTMENT REQUIRED FOR FUTURE REFILLS 90 tablet 1     ASPIRIN NOT PRESCRIBED (INTENTIONAL) Please choose reason not prescribed, below 1 each 0     cloNIDine (CATAPRES) 0.3 MG tablet TAKE 1 TABLET BY MOUTH TWICE DAILY **NEEDS TO BE SEEN FOR REFILLS** 180 tablet 1     eltrombopag (PROMACTA) 75 MG tablet Take 1 tablet (75 mg) by mouth daily Administer on an empty stomach, 1 hour before or 2 hours after a meal. 30 tablet 11     folic acid (FOLVITE) 1 MG tablet TAKE 1 TABLET BY MOUTH ONCE DAILY 90 tablet 2     gabapentin (NEURONTIN) 300 MG capsule TAKE ONE CAPSULE BY MOUTH THREE TIMES DAILY 270 capsule 1     Glucos-Chond-Sterol-Fish Oil (GLUCOSAMINE CHONDROITIN PLUS PO) Take 1 tablet by mouth daily       lisinopril (PRINIVIL/ZESTRIL) 20 MG tablet Take 1 tablet (20 mg) by mouth daily 90 tablet 1     lisinopril (PRINIVIL/ZESTRIL) 40 MG tablet TAKE 1 TABLET BY MOUTH ONCE DAILY 90 tablet 1     omeprazole (PRILOSEC) 40 MG capsule TAKE 1 CAPSULE BY MOUTH ONCE DAILY . APPOINTMENT REQUIRED FOR FUTURE REFILLS 90 capsule 3     ondansetron (ZOFRAN ODT) 4 MG ODT tab Take 1-2 tablets (4-8 mg) by mouth every 8 hours as needed for nausea 12 tablet 0     oxyCODONE IR (ROXICODONE) 5 MG tablet Take 5-10 mg by mouth every 4 hours as needed        spironolactone (ALDACTONE) 25 MG tablet TAKE 1 TABLET BY MOUTH ONCE DAILY . APPOINTMENT REQUIRED FOR FUTURE REFILLS 90 tablet 2     XARELTO 10 MG TABS tablet TAKE 1 TABLET BY MOUTH ONCE DAILY WITH DINNER 30 tablet 11     OTC products: None, except as noted  "above    Allergies   Allergen Reactions     Benicar [Olmesartan] Diarrhea     Onset 1-14   On benicar since 2012      Latex Allergy: NO    Social History     Tobacco Use     Smoking status: Former Smoker     Packs/day: 1.00     Years: 28.00     Pack years: 28.00     Types: Cigarettes     Start date:      Last attempt to quit: 1982     Years since quittin.9     Smokeless tobacco: Former User   Substance Use Topics     Alcohol use: Yes     Alcohol/week: 3.0 oz     Types: 5 Shots of liquor per week     Comment: 4-5 drinks/day, alcohol use disorder     History   Drug Use No       REVIEW OF SYSTEMS:   CONSTITUTIONAL: NEGATIVE for fever, chills, change in weight  INTEGUMENTARY/SKIN: NEGATIVE for worrisome rashes, moles or lesions  EYES: NEGATIVE for vision changes or irritation  ENT/MOUTH: NEGATIVE for ear, mouth and throat problems  RESP: NEGATIVE for significant cough or SOB  BREAST: NEGATIVE for masses, tenderness or discharge  CV: NEGATIVE for chest pain, palpitations or peripheral edema  GI: NEGATIVE for nausea, abdominal pain, heartburn, or change in bowel habits  : NEGATIVE for frequency, dysuria, or hematuria  MUSCULOSKELETAL:POSITIVE  for , back pain, joint stiffness  and neck pain  NEURO: NEGATIVE for weakness, dizziness or paresthesias  ENDOCRINE: NEGATIVE for temperature intolerance, skin/hair changes  HEME: NEGATIVE for bleeding problems  PSYCHIATRIC: NEGATIVE for changes in mood or affect    EXAM:   /80   Pulse 92   Temp 98.2  F (36.8  C) (Tympanic)   Resp 18   Ht 1.689 m (5' 6.5\")   Wt 90.7 kg (200 lb)   SpO2 95%   BMI 31.80 kg/m      GENERAL APPEARANCE: healthy, alert, active, moderate distress, cooperative and obese     EYES: EOMI,  PERRL     NECK: no adenopathy, no asymmetry, masses, or scars and thyroid normal to palpation     RESP: lungs clear to auscultation - no rales, rhonchi or wheezes     CV: regular rates and rhythm, normal S1 S2, no S3 or S4 and no murmur, click or " rub     ABDOMEN:  soft, nontender, no HSM or masses and bowel sounds normal     MS: extremities normal- no gross deformities noted, no evidence of inflammation in joints, FROM in all extremities.  POS  Painful Rt shoulder      SKIN: no suspicious lesions or rashes     NEURO: Normal strength and tone, sensory exam grossly normal, mentation intact and speech normal     PSYCH: mentation appears normal. and affect normal/bright    DIAGNOSTICS:     EKG: appears normal, NSR, normal axis, normal intervals, no acute ST/T changes c/w ischemia, no LVH by voltage criteria, unchanged from previous tracings, see reading   Chest XRay  Labs Resulted Today:   Results for orders placed or performed in visit on 08/30/19   Albumin Random Urine Quantitative with Creat Ratio   Result Value Ref Range    Creatinine Urine 130 mg/dL    Albumin Urine mg/L 12 mg/L    Albumin Urine mg/g Cr 8.85 0 - 17 mg/g Cr   CBC with platelets differential   Result Value Ref Range    WBC 7.1 4.0 - 11.0 10e9/L    RBC Count 4.92 4.4 - 5.9 10e12/L    Hemoglobin 16.6 13.3 - 17.7 g/dL    Hematocrit 47.1 40.0 - 53.0 %    MCV 96 78 - 100 fl    MCH 33.7 (H) 26.5 - 33.0 pg    MCHC 35.2 31.5 - 36.5 g/dL    RDW 14.4 10.0 - 15.0 %    Platelet Count 61 (L) 150 - 450 10e9/L    Diff Method Automated Method     % Neutrophils 73.7 %    % Lymphocytes 14.6 %    % Monocytes 9.1 %    % Eosinophils 2.0 %    % Basophils 0.6 %    Absolute Neutrophil 5.2 1.6 - 8.3 10e9/L    Absolute Lymphocytes 1.0 0.8 - 5.3 10e9/L    Absolute Monocytes 0.6 0.0 - 1.3 10e9/L    Absolute Eosinophils 0.1 0.0 - 0.7 10e9/L    Absolute Basophils 0.0 0.0 - 0.2 10e9/L   Comprehensive metabolic panel   Result Value Ref Range    Sodium 145 (H) 133 - 144 mmol/L    Potassium 3.9 3.4 - 5.3 mmol/L    Chloride 112 (H) 94 - 109 mmol/L    Carbon Dioxide 24 20 - 32 mmol/L    Anion Gap 10 3 - 14 mmol/L    Glucose 101 (H) 70 - 99 mg/dL    Urea Nitrogen 8 7 - 30 mg/dL    Creatinine 0.74 0.66 - 1.25 mg/dL    GFR  Estimate >90 >60 mL/min/[1.73_m2]    GFR Estimate If Black >90 >60 mL/min/[1.73_m2]    Calcium 9.1 8.5 - 10.1 mg/dL    Bilirubin Total 1.1 0.2 - 1.3 mg/dL    Albumin 3.7 3.4 - 5.0 g/dL    Protein Total 7.4 6.8 - 8.8 g/dL    Alkaline Phosphatase 81 40 - 150 U/L    ALT 33 0 - 70 U/L    AST 37 0 - 45 U/L   Hemoglobin A1c   Result Value Ref Range    Hemoglobin A1C 5.3 0 - 5.6 %   Uric acid   Result Value Ref Range    Uric Acid 1.5 (L) 3.5 - 7.2 mg/dL       Recent Labs   Lab Test 07/09/19  1629 07/09/19  1626 06/07/19  1251  10/18/18  1231  05/17/18  1118  05/16/16  1220  01/10/14  1349   HGB  --  14.8 11.7*   < > 12.0*   < > 9.7*   < >  --    < > 15.0   PLT  --  136* 139*   < > 167   < > 106*   < >  --    < > 34*   INR  --   --   --   --  1.10  --  2.78*   < >  --   --   --      --  141   < > 138   < > 138   < >  --   --  142   POTASSIUM 4.9  --  4.2   < > 4.1   < > 4.0   < >  --    < > 4.2   CR 1.11  --  1.01   < > 1.02   < > 1.87*   < >  --   --  1.00   A1C  --   --   --   --   --   --   --   --  5.4  --  5.4    < > = values in this interval not displayed.        IMPRESSION:   Reason for surgery/procedure: shoulder jt damage   Diagnosis/reason for consult: medical review       The proposed surgical procedure is considered INTERMEDIATE risk.    REVISED CARDIAC RISK INDEX  The patient has the following serious cardiovascular risks for perioperative complications such as (MI, PE, VFib and 3  AV Block):  No serious cardiac risks  INTERPRETATION: 0 risks: Class I (very low risk - 0.4% complication rate)    The patient has the following additional risks for perioperative complications:  No identified additional risks      ICD-10-CM    1. Preop general physical exam Z01.818 CBC with platelets differential     XR Chest 2 Views     EKG 12-lead complete w/read - Clinics   2. 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 Abnormal electrocardiogram: old ant MI w Lt ant fascicular block showing ischemia   8-19  R94.31    4. Alcohol abuse F10.10 Comprehensive metabolic panel   5. Alcoholic liver damage (H) K70.9 Comprehensive metabolic panel   6. Fatty liver/ 1-14 US K76.0 Comprehensive metabolic panel   7. Portal hypertension (H) K76.6 Albumin Random Urine Quantitative with Creat Ratio     Comprehensive metabolic panel   8. Benign essential hypertension I10 Albumin Random Urine Quantitative with Creat Ratio     CBC with platelets differential     Comprehensive metabolic panel     EKG 12-lead complete w/read - Clinics   9. Impaired fasting glucose R73.01 Albumin Random Urine Quantitative with Creat Ratio     Comprehensive metabolic panel   10. CKD (chronic kidney disease) stage 3, GFR 30-59 ml/min (H) N18.3 Albumin Random Urine Quantitative with Creat Ratio     CBC with platelets differential     Comprehensive metabolic panel   11. Other acute pulmonary embolism without acute cor pulmonale (H) I26.99    12. Gout involving toe, unspecified cause, unspecified chronicity, unspecified laterality M10.9 Uric acid   13. Glucose intolerance (impaired glucose tolerance)  HgbA!C = 5.4 R73.02 Albumin Random Urine Quantitative with Creat Ratio     Comprehensive metabolic panel     Hemoglobin A1c   14. Obstructive sleep apnea syndrome G47.33    15. Gastroesophageal reflux disease without esophagitis K21.9    16. Cardiomegaly per 2017 CXR  I51.7    17. Major depression in complete remission (H) F32.5    18. Class 1 obesity due to excess calories with serious comorbidity and body mass index (BMI) of 31.0 to 31.9 in adult E66.09     Z68.31    19. Encounter for long-term (current) use of medications Z79.899 Comprehensive metabolic panel       RECOMMENDATIONS:       Patient Instructions     Before Your Surgery      Call your surgeon if there is any change in your health. This includes signs of a cold or flu (such as a sore throat, runny nose, cough, rash or fever).    Do not smoke, drink alcohol or take over the counter medicine  (unless your surgeon or primary care doctor tells you to) for the 24 hours before and after surgery.    If you take prescribed drugs: Follow your doctor s orders about which medicines to take and which to stop until after surgery.    Eating and drinking prior to surgery: follow the instructions from your surgeon    Take a shower or bath the night before surgery. Use the soap your surgeon gave you to gently clean your skin. If you do not have soap from your surgeon, use your regular soap. Do not shave or scrub the surgery site.  Wear clean pajamas and have clean sheets on your bed.     1. .Please stop fish oil,  aspirin, any NSAIDs ( incuding aleve advil, ibuprofen, etc--use tylenol= acetaminophen instead)  vitamin D, and multivitamins for 7 days prior to and 7 days after surgery     2.  Weight Loss Tips  1. Do not eat after 6 hrs before your expected bedtime  2. Have your heaviest meal for breakfast, a slightly lighter meal at lunch and a snack 6 hrs before bed  3. No sugar/calorie drinks except milk ie no fruit juice, pop, alcohol.  4. Drink milk 30min before meals to decrease your hunger. Also it is excellent as part of your last meal of the day snack  5. Drink lots of water  6. Increase fiber in diet: all bran cereal, salads, popcorn etc  7. Have only one small serving of fruit a day about 1/2 cup (as this is high in sugar)  8. EXERCISE is the bottom line. Without it, you will gain weight even on a low calorie diet. Best if done 2-3X a day as can    Being overweight contributes to high blood pressure and high cholesterol, both of which cause heart attacks, strokes and kidney failure, prediabetes and diabetes, arthritis, and liver disease     3. Shingrex is a 2 shot series that prevents shingles 97% of the time, as opposed to the old shingles shot that only prevented it at 40-50%  It costs less for medicare at a pharmacy  You should get it starting at 50 yrs old get the 2nd shot 5-6 mo after the first  one        DISCUSSION     Time spent with the patient 54mins, more than 50% in counseling and coordinating care, Re above medical problems.in preparation for surgery     1. xarelto--the surgeon is in agreement and will keep him on it   With proper precautions against recurrent PE or DVT   As is shoulder surgery , he can be up right away     2. 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 ALCOHOL     -drinks 4+ beers a nite   -has a fatty liver   -portal hypertension, checking LFTs    Cleared for surgery     Awaiting labs     Rosanne Cherry MD    -    Reviewed all for surgery                                                                                                             --Patient is to take all scheduled medications on the day of surgery EXCEPT for modifications listed below.    APPROVAL GIVEN to proceed with proposed procedure, without further diagnostic evaluation       Signed Electronically by: Rosanne Cherry MD    Copy of this evaluation report is provided to requesting physician.    [facility] Preop Guidelines    Revised Cardiac Risk Index

## 2019-08-30 NOTE — LETTER
My Depression Action Plan  Name: Raymon Ace   Date of Birth 1945  Date: 8/30/2019    My doctor: Rosanne Cherry   My clinic: [address] [address]  [phone]          GREEN    ZONE   Good Control    What it looks like:     Things are going generally well. You have normal up s and down s. You may even feel depressed from time to time, but bad moods usually last less than a day.   What you need to do:  1. Continue to care for yourself (see self care plan)  2. Check your depression survival kit and update it as needed  3. Follow your physician s recommendations including any medication.  4. Do not stop taking medication unless you consult with your physician first.           YELLOW         ZONE Getting Worse    What it looks like:     Depression is starting to interfere with your life.     It may be hard to get out of bed; you may be starting to isolate yourself from others.    Symptoms of depression are starting to last most all day and this has happened for several days.     You may have suicidal thoughts but they are not constant.   What you need to do:     1. Call your care team, your response to treatment will improve if you keep your care team informed of your progress. Yellow periods are signs an adjustment may need to be made.     2. Continue your self-care, even if you have to fake it!    3. Talk to someone in your support network    4. Open up your depression survival kit           RED    ZONE Medical Alert - Get Help    What it looks like:     Depression is seriously interfering with your life.     You may experience these or other symptoms: You can t get out of bed most days, can t work or engage in other necessary activities, you have trouble taking care of basic hygiene, or basic responsibilities, thoughts of suicide or death that will not go away, self-injurious behavior.     What you  need to do:  1. Call your care team and request a same-day appointment. If they are not available (weekends or after hours) call your local crisis line, emergency room or 911.            Depression Self Care Plan / Survival Kit    Self-Care for Depression  Here s the deal. Your body and mind are really not as separate as most people think.  What you do and think affects how you feel and how you feel influences what you do and think. This means if you do things that people who feel good do, it will help you feel better.  Sometimes this is all it takes.  There is also a place for medication and therapy depending on how severe your depression is, so be sure to consult with your medical provider and/ or Behavioral Health Consultant if your symptoms are worsening or not improving.     In order to better manage my stress, I will:    Exercise  Get some form of exercise, every day. This will help reduce pain and release endorphins, the  feel good  chemicals in your brain. This is almost as good as taking antidepressants!  This is not the same as joining a gym and then never going! (they count on that by the way ) It can be as simple as just going for a walk or doing some gardening, anything that will get you moving.      Hygiene   Maintain good hygiene (Get out of bed in the morning, Make your bed, Brush your teeth, Take a shower, and Get dressed like you were going to work, even if you are unemployed).  If your clothes don't fit try to get ones that do.    Diet  I will strive to eat foods that are good for me, drink plenty of water, and avoid excessive sugar, caffeine, alcohol, and other mood-altering substances.  Some foods that are helpful in depression are: complex carbohydrates, B vitamins, flaxseed, fish or fish oil, fresh fruits and vegetables.    Psychotherapy  I agree to participate in Individual Therapy (if recommended).    Medication  If prescribed medications, I agree to take them.  Missing doses can result in  serious side effects.  I understand that drinking alcohol, or other illicit drug use, may cause potential side effects.  I will not stop my medication abruptly without first discussing it with my provider.    Staying Connected With Others  I will stay in touch with my friends, family members, and my primary care provider/team.    Use your imagination  Be creative.  We all have a creative side; it doesn t matter if it s oil painting, sand castles, or mud pies! This will also kick up the endorphins.    Witness Beauty  (AKA stop and smell the roses) Take a look outside, even in mid-winter. Notice colors, textures. Watch the squirrels and birds.     Service to others  Be of service to others.  There is always someone else in need.  By helping others we can  get out of ourselves  and remember the really important things.  This also provides opportunities for practicing all the other parts of the program.    Humor  Laugh and be silly!  Adjust your TV habits for less news and crime-drama and more comedy.    Control your stress  Try breathing deep, massage therapy, biofeedback, and meditation. Find time to relax each day.     My support system    Clinic Contact:  Phone number:    Contact 1:  Phone number:    Contact 2:  Phone number:    Confucianism/:  Phone number:    Therapist:  Phone number:    Local crisis center:    Phone number:    Other community support:  Phone number:

## 2019-08-30 NOTE — NURSING NOTE
"Chief Complaint   Patient presents with     Pre-Op Exam     /80   Pulse 92   Temp 98.2  F (36.8  C) (Tympanic)   Resp 18   Ht 1.689 m (5' 6.5\")   Wt 90.7 kg (200 lb)   SpO2 95%   BMI 31.80 kg/m   Estimated body mass index is 31.8 kg/m  as calculated from the following:    Height as of this encounter: 1.689 m (5' 6.5\").    Weight as of this encounter: 90.7 kg (200 lb).  BP completed using cuff size: regular   Laura Kelly CMA    Health Maintenance Due   Topic Date Due     MICROALBUMIN  1945     URINE DRUG SCREEN  1945     ZOSTER IMMUNIZATION (2 of 3) 10/16/2014     MEDICARE ANNUAL WELLNESS VISIT  07/12/2019     Health Maintenance reviewed at today's visit patient asked to schedule/complete:   Routine Health Visit: Patient agrees to schedule  Immunizations:  Patient agrees to schedule    "

## 2019-08-30 NOTE — LETTER
September 10, 2019      Raymon Ace  [address] [facility] [address]        Dear ,    We are writing to inform you of your test results.    Please see attached lab results   They are all normal     THE FOLLOWING ARE EXPLANATIONS OF SOME OF OUR LAB TESTS     YOU DID NOT NECESSARILY HAVE ALL OF THESE DONE     Hgb is the blood iron level   WBC means White Blood Cells   Platelets are small blood    cells that help with forming the blood clots along with other blood factors.   Electrolytes are Sodium, Potassium, Calcium, Magnesium, Phosphorus.   Liver tests are: AST, ALT, Bilirubin, Alkaline Phosphatase.   Kidney tests are Creatinine, GFR.   HDL Choles   terol - is the good cholesterol and it is good to have it high.   LDL cholesterol is the bad cholesterol and it is good to have it low.   It is recommended to have LDL less than 130 for people with hypertension and to have it less than 100 for people with   heart disease, diabetes and chronic kidney disease.   Triglycerides are another type of lipid that can cause heart disease, like the cholesterol and should be kept low   Thyroid tests are TSH, T4, T3   Glucose is sugar.   A1c is a test that gives us an idea    about how well was controlled the diabetes for the last 3 months.   PSA stands for Prostate Specific Antigen and it can be elevated with prostate cancer or prostate inflammation.     Please continue on the same medications     Resulted Orders   Albumin Random Urine Quantitative with Creat Ratio   Result Value Ref Range    Creatinine Urine 130 mg/dL    Albumin Urine mg/L 12 mg/L    Albumin Urine mg/g Cr 8.85 0 - 17 mg/g Cr   CBC with platelets differential   Result Value Ref Range    WBC 7.1 4.0 - 11.0 10e9/L    RBC Count 4.92 4.4 - 5.9 10e12/L    Hemoglobin 16.6 13.3 - 17.7 g/dL    Hematocrit 47.1 40.0 - 53.0 %    MCV 96 78 - 100 fl    MCH 33.7 (H) 26.5 - 33.0 pg    MCHC 35.2 31.5 - 36.5 g/dL    RDW 14.4 10.0 - 15.0 %    Platelet Count 61 (L)  150 - 450 10e9/L    Diff Method Automated Method     % Neutrophils 73.7 %    % Lymphocytes 14.6 %    % Monocytes 9.1 %    % Eosinophils 2.0 %    % Basophils 0.6 %    Absolute Neutrophil 5.2 1.6 - 8.3 10e9/L    Absolute Lymphocytes 1.0 0.8 - 5.3 10e9/L    Absolute Monocytes 0.6 0.0 - 1.3 10e9/L    Absolute Eosinophils 0.1 0.0 - 0.7 10e9/L    Absolute Basophils 0.0 0.0 - 0.2 10e9/L   Comprehensive metabolic panel   Result Value Ref Range    Sodium 145 (H) 133 - 144 mmol/L    Potassium 3.9 3.4 - 5.3 mmol/L    Chloride 112 (H) 94 - 109 mmol/L    Carbon Dioxide 24 20 - 32 mmol/L    Anion Gap 10 3 - 14 mmol/L    Glucose 101 (H) 70 - 99 mg/dL    Urea Nitrogen 8 7 - 30 mg/dL    Creatinine 0.74 0.66 - 1.25 mg/dL    GFR Estimate >90 >60 mL/min/[1.73_m2]      Comment:      Non  GFR Calc  Starting 12/18/2018, serum creatinine based estimated GFR (eGFR) will be   calculated using the Chronic Kidney Disease Epidemiology Collaboration   (CKD-EPI) equation.      GFR Estimate If Black >90 >60 mL/min/[1.73_m2]      Comment:       GFR Calc  Starting 12/18/2018, serum creatinine based estimated GFR (eGFR) will be   calculated using the Chronic Kidney Disease Epidemiology Collaboration   (CKD-EPI) equation.      Calcium 9.1 8.5 - 10.1 mg/dL    Bilirubin Total 1.1 0.2 - 1.3 mg/dL    Albumin 3.7 3.4 - 5.0 g/dL    Protein Total 7.4 6.8 - 8.8 g/dL    Alkaline Phosphatase 81 40 - 150 U/L    ALT 33 0 - 70 U/L    AST 37 0 - 45 U/L   Hemoglobin A1c   Result Value Ref Range    Hemoglobin A1C 5.3 0 - 5.6 %      Comment:      Normal <5.7% Prediabetes 5.7-6.4%  Diabetes 6.5% or higher - adopted from ADA   consensus guidelines.     Uric acid   Result Value Ref Range    Uric Acid 1.5 (L) 3.5 - 7.2 mg/dL       If you have any questions or concerns, please call the clinic at the number listed above.       Sincerely,        Rosanne Cherry MD

## 2019-08-31 LAB
CREAT UR-MCNC: 130 MG/DL
MICROALBUMIN UR-MCNC: 12 MG/L
MICROALBUMIN/CREAT UR: 8.85 MG/G CR (ref 0–17)

## 2019-08-31 ASSESSMENT — ANXIETY QUESTIONNAIRES: GAD7 TOTAL SCORE: 0

## 2019-09-06 DIAGNOSIS — E61.1 IRON DEFICIENCY: Primary | ICD-10-CM

## 2019-09-06 DIAGNOSIS — D69.3 IDIOPATHIC THROMBOCYTOPENIC PURPURA (H): ICD-10-CM

## 2019-09-06 RX ORDER — DEXAMETHASONE 4 MG/1
40 TABLET ORAL
Qty: 40 TABLET | Refills: 0 | Status: SHIPPED | OUTPATIENT
Start: 2019-09-06 | End: 2020-05-21

## 2019-09-06 NOTE — TELEPHONE ENCOUNTER
Raymon had planned surgery on 9/12/19, preop labs show platelets are 61.  Although this is safe for surgeries, surgeon would like platelets to be 100.  Will instruct patient to take a pulse dose of dexamethasone starting Saturday  To Tuesday and may recheck labs on Wednesday.  Script sent to local pharmacy.  Patient is in agreement with the plan.

## 2019-09-11 DIAGNOSIS — D69.3 IDIOPATHIC THROMBOCYTOPENIC PURPURA (H): ICD-10-CM

## 2019-09-11 LAB
BASOPHILS # BLD AUTO: 0.1 10E9/L (ref 0–0.2)
BASOPHILS NFR BLD AUTO: 0.3 %
DIFFERENTIAL METHOD BLD: ABNORMAL
EOSINOPHIL # BLD AUTO: 0 10E9/L (ref 0–0.7)
EOSINOPHIL NFR BLD AUTO: 0 %
ERYTHROCYTE [DISTWIDTH] IN BLOOD BY AUTOMATED COUNT: 13.2 % (ref 10–15)
HCT VFR BLD AUTO: 45.6 % (ref 40–53)
HGB BLD-MCNC: 15.6 G/DL (ref 13.3–17.7)
IMM GRANULOCYTES # BLD: 0.4 10E9/L (ref 0–0.4)
IMM GRANULOCYTES NFR BLD: 2.9 %
LYMPHOCYTES # BLD AUTO: 0.6 10E9/L (ref 0.8–5.3)
LYMPHOCYTES NFR BLD AUTO: 3.9 %
MCH RBC QN AUTO: 32.6 PG (ref 26.5–33)
MCHC RBC AUTO-ENTMCNC: 34.2 G/DL (ref 31.5–36.5)
MCV RBC AUTO: 95 FL (ref 78–100)
MONOCYTES # BLD AUTO: 0.7 10E9/L (ref 0–1.3)
MONOCYTES NFR BLD AUTO: 4.8 %
NEUTROPHILS # BLD AUTO: 13.3 10E9/L (ref 1.6–8.3)
NEUTROPHILS NFR BLD AUTO: 88.1 %
NRBC # BLD AUTO: 0 10*3/UL
NRBC BLD AUTO-RTO: 0 /100
PLATELET # BLD AUTO: 146 10E9/L (ref 150–450)
RBC # BLD AUTO: 4.78 10E12/L (ref 4.4–5.9)
WBC # BLD AUTO: 15.1 10E9/L (ref 4–11)

## 2019-09-11 PROCEDURE — 85025 COMPLETE CBC W/AUTO DIFF WBC: CPT | Performed by: INTERNAL MEDICINE

## 2019-09-11 PROCEDURE — 36415 COLL VENOUS BLD VENIPUNCTURE: CPT | Performed by: INTERNAL MEDICINE

## 2019-09-11 NOTE — TELEPHONE ENCOUNTER
After a 4 days course of dexamethasone platelets are at 146.  Noted WBC is high as well.  Spouse reports patient feels great.  Surgery is scheduled for tomorrow

## 2019-09-13 ENCOUNTER — TRANSFERRED RECORDS (OUTPATIENT)
Dept: HEALTH INFORMATION MANAGEMENT | Facility: CLINIC | Age: 74
End: 2019-09-13

## 2019-09-20 ENCOUNTER — HOSPITAL ENCOUNTER (EMERGENCY)
Facility: CLINIC | Age: 74
Discharge: HOME | End: 2019-09-20
Attending: EMERGENCY MEDICINE | Admitting: EMERGENCY MEDICINE
Payer: COMMERCIAL

## 2019-09-20 ENCOUNTER — APPOINTMENT (OUTPATIENT)
Dept: GENERAL RADIOLOGY | Facility: CLINIC | Age: 74
End: 2019-09-20
Attending: EMERGENCY MEDICINE
Payer: COMMERCIAL

## 2019-09-20 VITALS
HEART RATE: 75 BPM | TEMPERATURE: 97.5 F | SYSTOLIC BLOOD PRESSURE: 141 MMHG | RESPIRATION RATE: 7 BRPM | BODY MASS INDEX: 30.21 KG/M2 | OXYGEN SATURATION: 97 % | DIASTOLIC BLOOD PRESSURE: 87 MMHG | WEIGHT: 190 LBS

## 2019-09-20 DIAGNOSIS — R55 NEAR SYNCOPE: ICD-10-CM

## 2019-09-20 DIAGNOSIS — R06.02 SHORTNESS OF BREATH: ICD-10-CM

## 2019-09-20 DIAGNOSIS — R11.0 NAUSEA: ICD-10-CM

## 2019-09-20 LAB
ALBUMIN SERPL-MCNC: 3.4 G/DL (ref 3.4–5)
ALBUMIN UR-MCNC: NEGATIVE MG/DL
ALP SERPL-CCNC: 70 U/L (ref 40–150)
ALT SERPL W P-5'-P-CCNC: 41 U/L (ref 0–70)
ANION GAP SERPL CALCULATED.3IONS-SCNC: 9 MMOL/L (ref 3–14)
APPEARANCE UR: CLEAR
AST SERPL W P-5'-P-CCNC: 40 U/L (ref 0–45)
BASOPHILS # BLD AUTO: 0 10E9/L (ref 0–0.2)
BASOPHILS NFR BLD AUTO: 0 %
BILIRUB SERPL-MCNC: 1.3 MG/DL (ref 0.2–1.3)
BILIRUB UR QL STRIP: NEGATIVE
BUN SERPL-MCNC: 14 MG/DL (ref 7–30)
CALCIUM SERPL-MCNC: 9.1 MG/DL (ref 8.5–10.1)
CHLORIDE SERPL-SCNC: 109 MMOL/L (ref 94–109)
CO2 SERPL-SCNC: 24 MMOL/L (ref 20–32)
COLOR UR AUTO: ABNORMAL
CREAT SERPL-MCNC: 0.92 MG/DL (ref 0.66–1.25)
DIFFERENTIAL METHOD BLD: ABNORMAL
EOSINOPHIL # BLD AUTO: 0.2 10E9/L (ref 0–0.7)
EOSINOPHIL NFR BLD AUTO: 1 %
ERYTHROCYTE [DISTWIDTH] IN BLOOD BY AUTOMATED COUNT: 14 % (ref 10–15)
GFR SERPL CREATININE-BSD FRML MDRD: 81 ML/MIN/{1.73_M2}
GLUCOSE BLDC GLUCOMTR-MCNC: 146 MG/DL (ref 70–99)
GLUCOSE SERPL-MCNC: 145 MG/DL (ref 70–99)
GLUCOSE UR STRIP-MCNC: NEGATIVE MG/DL
HCT VFR BLD AUTO: 36.8 % (ref 40–53)
HGB BLD-MCNC: 12.1 G/DL (ref 13.3–17.7)
HGB UR QL STRIP: NEGATIVE
HYALINE CASTS #/AREA URNS LPF: 28 /LPF (ref 0–2)
KETONES UR STRIP-MCNC: 5 MG/DL
LEUKOCYTE ESTERASE UR QL STRIP: NEGATIVE
LYMPHOCYTES # BLD AUTO: 2.4 10E9/L (ref 0.8–5.3)
LYMPHOCYTES NFR BLD AUTO: 15 %
MCH RBC QN AUTO: 33.3 PG (ref 26.5–33)
MCHC RBC AUTO-ENTMCNC: 32.9 G/DL (ref 31.5–36.5)
MCV RBC AUTO: 101 FL (ref 78–100)
MONOCYTES # BLD AUTO: 0.9 10E9/L (ref 0–1.3)
MONOCYTES NFR BLD AUTO: 6 %
MUCOUS THREADS #/AREA URNS LPF: PRESENT /LPF
NEUTROPHILS # BLD AUTO: 12.2 10E9/L (ref 1.6–8.3)
NEUTROPHILS NFR BLD AUTO: 78 %
NITRATE UR QL: NEGATIVE
PH UR STRIP: 5 PH (ref 5–7)
PLATELET # BLD AUTO: 206 10E9/L (ref 150–450)
POTASSIUM SERPL-SCNC: 3.9 MMOL/L (ref 3.4–5.3)
PROT SERPL-MCNC: 7.5 G/DL (ref 6.8–8.8)
RBC # BLD AUTO: 3.63 10E12/L (ref 4.4–5.9)
RBC #/AREA URNS AUTO: 1 /HPF (ref 0–2)
SODIUM SERPL-SCNC: 142 MMOL/L (ref 133–144)
SOURCE: ABNORMAL
SP GR UR STRIP: 1.02 (ref 1–1.03)
SQUAMOUS #/AREA URNS AUTO: <1 /HPF (ref 0–1)
TROPONIN I SERPL-MCNC: <0.015 UG/L (ref 0–0.04)
TROPONIN I SERPL-MCNC: <0.015 UG/L (ref 0–0.04)
UROBILINOGEN UR STRIP-MCNC: 4 MG/DL (ref 0–2)
WBC # BLD AUTO: 15.7 10E9/L (ref 4–11)
WBC #/AREA URNS AUTO: 8 /HPF (ref 0–5)

## 2019-09-20 PROCEDURE — 85025 COMPLETE CBC W/AUTO DIFF WBC: CPT | Performed by: EMERGENCY MEDICINE

## 2019-09-20 PROCEDURE — 84484 ASSAY OF TROPONIN QUANT: CPT | Mod: 91 | Performed by: EMERGENCY MEDICINE

## 2019-09-20 PROCEDURE — 00000146 ZZHCL STATISTIC GLUCOSE BY METER IP

## 2019-09-20 PROCEDURE — 96374 THER/PROPH/DIAG INJ IV PUSH: CPT

## 2019-09-20 PROCEDURE — 81001 URINALYSIS AUTO W/SCOPE: CPT | Performed by: EMERGENCY MEDICINE

## 2019-09-20 PROCEDURE — 93005 ELECTROCARDIOGRAM TRACING: CPT

## 2019-09-20 PROCEDURE — 99284 EMERGENCY DEPT VISIT MOD MDM: CPT | Mod: 25 | Performed by: EMERGENCY MEDICINE

## 2019-09-20 PROCEDURE — 99285 EMERGENCY DEPT VISIT HI MDM: CPT | Mod: 25

## 2019-09-20 PROCEDURE — 93010 ELECTROCARDIOGRAM REPORT: CPT | Mod: Z6 | Performed by: EMERGENCY MEDICINE

## 2019-09-20 PROCEDURE — 71045 X-RAY EXAM CHEST 1 VIEW: CPT

## 2019-09-20 PROCEDURE — 80053 COMPREHEN METABOLIC PANEL: CPT | Performed by: EMERGENCY MEDICINE

## 2019-09-20 PROCEDURE — 25000128 H RX IP 250 OP 636: Performed by: EMERGENCY MEDICINE

## 2019-09-20 RX ORDER — ONDANSETRON 2 MG/ML
4 INJECTION INTRAMUSCULAR; INTRAVENOUS ONCE
Status: COMPLETED | OUTPATIENT
Start: 2019-09-20 | End: 2019-09-20

## 2019-09-20 RX ADMIN — ONDANSETRON 4 MG: 2 INJECTION INTRAMUSCULAR; INTRAVENOUS at 15:16

## 2019-09-20 NOTE — ED PROVIDER NOTES
History     Chief Complaint   Patient presents with     Dizziness     HPI  Raymon Ace is a 74 year old male with complex past medical history including  hypertension, hyperlipidemia, obesity, CKD, KARL, VTE (on rivaroxaban), and 1 week status post right shoulder replacement, who presents after acute onset shortness of breath, diaphoresis, nausea, and lightheadedness.  He was returning home from his scheduled postoperative follow-up and while in the car had sudden onset of symptoms.  His wife reports that he was pale appearing and broke out in a cold sweat.  They stopped the car pulled over open his door felt like vomiting but was unable to.  Symptoms lasted approximately 10 minutes.  Says he is feeling 100% better but still not right upon arrival to emergency department.  States he has been consistently taking some of rivaroxaban and therapy was not interrupted for his recent orthopedic procedure.  States this does not feel anything like when he had a pulmonary embolism.  Was feeling well this morning.  No fevers, chills, nausea, vomiting, abdominal.  Eating and drinking well.  No change in bowel or bladder function.    The patient's PMHx, Surgical Hx, Allergies, and Medications were all reviewed with the patient.    Allergies:  Allergies   Allergen Reactions     Benicar [Olmesartan] Diarrhea     Onset 1-14   On benicar since 2012       Problem List:    Patient Active Problem List    Diagnosis Date Noted     ITP (idiopathic thrombocytopenic purpura) since 3-13 back surgery       Priority: High     CKD (chronic kidney disease) stage 3, GFR 30-59 ml/min (H) 06/07/2019     Priority: Medium     Portal hypertension (H) 06/07/2019     Priority: Medium     Impaired fasting glucose 06/07/2019     Priority: Medium     Fatigue, unspecified type 06/07/2019     Priority: Medium     Screening for prostate cancer 09/14/2018     Priority: Medium     Iron deficiency anemia due to chronic blood loss 06/28/2018     Priority:  Medium     Pulmonary embolism (H) large RLL w infarctio 4-17 04/18/2017     Priority: Medium     Gastroesophageal reflux disease with esophagitis 12/06/2016     Priority: Medium     Chronic right dominant  shoulder pain w ltd ROM  since 3-16 08/09/2016     Priority: Medium     Need for prophylactic vaccination against Streptococcus pneumoniae (pneumococcus) 08/04/2016     Priority: Medium     Gout involving toe, unspecified cause, unspecified chronicity, unspecified laterality 06/02/2016     Priority: Medium     ACP (advance care planning) 05/16/2016     Priority: Medium     Advance Care Planning 5/16/2016: ACP Review of Chart / Resources Provided:  Reviewed chart for advance care plan.  Raymon Ace has no plan or code status on file however states presence of ACP document. Copy requested. Confirmed code status reflects current choices pending receipt of document/advance care plan review.  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 Provider to review       Change in bowel habits since 1-14: diarrhea-thinks better off benicar 07/10/2014     Priority: Medium     Family history of  ischemic heart disease 01/17/2014     Priority: Medium     Family history of diabetes mellitus 01/17/2014     Priority: Medium     Morbid obesity  BMI  30-35 01/10/2014     Priority: Medium     Glucose intolerance (impaired glucose tolerance)  HgbA!C = 5.4 01/10/2014     Priority: Medium     Back pain since 1988 s/po surgery 1992,1996 and 3-13/ Dr Singh  01/10/2014     Priority: Medium     No CSA on file   8-=22-17 no concerns       Alcoholic liver damage (H) 01/10/2014     Priority: Medium     Diverticulosis of large intestine 01/10/2014     Priority: Medium     Problem list name updated by automated process. Provider to review       Elevated liver enzymes AST  01/10/2014     Priority: Medium     Hypotension 03/16/2013     Priority: Medium     Lumbar spinal stenosis 12/14/2012     Priority: Medium     Steroid-induced hyperglycemia 12/14/2012     Priority: Medium     Although glucose normal on 1/30/13 atr 86       Fatty liver/ 1-14 US 12/14/2012     Priority: Medium     Atherosclerosis 12/14/2012     Priority: Medium     Alcohol consuption of more than two drinks per day 12/14/2012     Priority: Medium     Obstructive sleep apnea syndrome 09/21/2012     Priority: Medium     Does not tolerate CPAP  Problem list name updated by automated process. Provider to review       Restless legs syndrome (RLS) 09/21/2012     Priority: Medium     Polyneuropathy in other diseases classified elsewhere (H) 09/21/2012     Priority: Medium     Due to back issues       Gastroesophageal reflux disease without esophagitis 09/21/2012     Priority: Medium     Preventive measure 01/31/2013     Priority: Low     APRIMA DATA BASE UNDER THE 12/14/12 NOTE  Colonoscopy neg in 5/10; PSA 0.31 in 9/12          Past Medical History:    Past Medical History:   Diagnosis Date     Alcohol abuse since teens 01/15/2015     Alcoholic liver damage (H) 1/10/2014     Gastroesophageal reflux disease with esophagitis 12/6/2016     Gout involving toe,  unspecified cause, unspecified chronicity, unspecified laterality 6/2/2016     Heart murmur      Hyperlipidemia 12/17/2015     Hypertension      ITP (idiopathic thrombocytopenic purpura)      Obstructive sleep apnea      Pulmonary embolism (H) large RLL w infarctio 4-17 4/18/2017       Past Surgical History:    Past Surgical History:   Procedure Laterality Date     APPENDECTOMY  1956     BACK SURGERY  1992, 1996    trimmed L4-L5, Fusion L4-L5     BONE MARROW BIOPSY, BONE SPECIMEN, NEEDLE/TROCAR  10/24/2012    Procedure: BIOPSY BONE MARROW;  BONE MARROW BIOPSY WITH ASPIRATE (AREVALO ORDERING);  Surgeon: Terri Hickey MD;  Location:  GI     COLONOSCOPY N/A 7/31/2019    Procedure: COLONOSCOPY;  Surgeon: Sebastian Garcia MD;  Location:  GI     ESOPHAGOSCOPY, GASTROSCOPY, DUODENOSCOPY (EGD), COMBINED N/A 2/8/2018    Procedure: COMBINED ESOPHAGOSCOPY, GASTROSCOPY, DUODENOSCOPY (EGD);  EGD;  Surgeon: Terri Crouch MD;  Location:  GI     FACIAL RECONSTRUCTION SURGERY  1965    jaw fracture     HC TOOTH EXTRACTION W/FORCEP  1990s       Family History:    Family History   Problem Relation Age of Onset     Unknown/Adopted Mother      Unknown/Adopted Father      Heart Disease Sister      Diabetes No family hx of      Coronary Artery Disease No family hx of      Hypertension No family hx of      Hyperlipidemia No family hx of      Cerebrovascular Disease No family hx of      Breast Cancer No family hx of      Colon Cancer No family hx of      Prostate Cancer No family hx of      Other Cancer No family hx of      Depression No family hx of      Anxiety Disorder No family hx of      Mental Illness No family hx of      Substance Abuse No family hx of      Anesthesia Reaction No family hx of      Asthma No family hx of      Osteoporosis No family hx of      Genetic Disorder No family hx of      Thyroid Disease No family hx of      Obesity No family hx of        Social History:  Marital Status:    [2]  Social History     Tobacco Use     Smoking status: Former Smoker     Packs/day: 1.00     Years: 28.00     Pack years: 28.00     Types: Cigarettes     Start date:      Last attempt to quit: 1982     Years since quittin.0     Smokeless tobacco: Former User   Substance Use Topics     Alcohol use: Yes     Alcohol/week: 3.0 oz     Types: 5 Shots of liquor per week     Comment: 4-5 drinks/day, alcohol use disorder     Drug use: No        Medications:      allopurinol (ZYLOPRIM) 300 MG tablet   amLODIPine (NORVASC) 5 MG tablet   ASPIRIN NOT PRESCRIBED (INTENTIONAL)   cloNIDine (CATAPRES) 0.3 MG tablet   dexamethasone (DECADRON) 4 MG tablet   eltrombopag (PROMACTA) 75 MG tablet   folic acid (FOLVITE) 1 MG tablet   gabapentin (NEURONTIN) 300 MG capsule   Glucos-Chond-Sterol-Fish Oil (GLUCOSAMINE CHONDROITIN PLUS PO)   lisinopril (PRINIVIL/ZESTRIL) 20 MG tablet   lisinopril (PRINIVIL/ZESTRIL) 40 MG tablet   omeprazole (PRILOSEC) 40 MG capsule   ondansetron (ZOFRAN ODT) 4 MG ODT tab   oxyCODONE IR (ROXICODONE) 5 MG tablet   spironolactone (ALDACTONE) 25 MG tablet   XARELTO 10 MG TABS tablet         Review of Systems  A 10 point review of systems performed and is otherwise negative except as noted in the HPI.    Physical Exam   BP: 112/62  Pulse: 56  Heart Rate: 90  Temp: 97.5  F (36.4  C)  Resp: 18  Weight: 86.2 kg (190 lb)  SpO2: 98 %    Physical Exam  GEN: Awake, alert, and cooperative.  Nontoxic appearance  HENT: MMM. External ears and nose normal bilaterally.  EYES: EOM intact. Conjunctiva clear. PEERL.   NECK: Supple, symmetric.  CV : Regular rate, irregular rhythm. No JVD. No murmurs appreciated.   PULM: Normal effort. No wheezes, rales, or rhonchi bilaterally.  ABD: Soft, non-tender, non-distended. No rebound or guarding.   NEURO: Normal speech. Following commands. CN II-XII grossly intact. Patient answering questions and interacting appropriately.   EXT: right shoulder sling. Large area of ecchymosis  extending from right shoulder to anterior chest. Incision with steri-strips c/d/i  INT: Warm. No diaphoresis. Normal color. See EXT above.        ED Course        Procedures  Results for orders placed during the hospital encounter of 09/20/19   POC US ECHO LIMITED    Impression [facility] Procedure Note      Limited Bedside ED Cardiac Ultrasound:    PROCEDURE: PERFORMED BY: Dr. Ricardo Caballero MD  INDICATIONS/SYMPTOM:  Shortness of Breath  PROBE: Cardiac phased array probe  BODY LOCATION: Chest  FINDINGS:   The ultrasound was performed utilizing the subcostal, parasternal long axis and parasternal short axis views.  Cardiac contractility:  Present  Gross estimation of cardiac kinesis: normal  Pericardial Effusion:  None  RV:LV ratio: LV > RV  Unable to evaluate IVC  No B-lines    INTERPRETATION:    Chamber size and motion were grossly normal with LV > RV, normal cardiac kinesis.  No pericardial effusion was found.  IVC visualized and findings indicate unable to estimate.  IMAGE DOCUMENTATION: Images were archived to PACs system.                   EKG Interpretation:      Interpreted by Ricardo Caballero MD  Time reviewed: 1530  Symptoms at time of EKG: None   Rhythm: normal sinus  and sinus arrhythmia  Rate: Bradycardia  Axis: Left Axis Deviation  Ectopy: premature atrial contraction  Conduction: left anterior fasciclar block  ST Segments/ T Waves: Non-specific ST-T wave changes and T wave inversion V2 and V3  Q Waves: none  Comparison to prior: Unchanged from 8/30/19    Clinical Impression: Abnormal ECG. TWI stable from prior. LAFB              Critical Care time:  none               Results for orders placed or performed during the hospital encounter of 09/20/19 (from the past 24 hour(s))   CBC with platelets, differential   Result Value Ref Range    WBC 15.7 (H) 4.0 - 11.0 10e9/L    RBC Count 3.63 (L) 4.4 - 5.9 10e12/L    Hemoglobin 12.1 (L) 13.3 - 17.7 g/dL    Hematocrit 36.8 (L) 40.0 - 53.0 %      (H) 78 - 100 fl    MCH 33.3 (H) 26.5 - 33.0 pg    MCHC 32.9 31.5 - 36.5 g/dL    RDW 14.0 10.0 - 15.0 %    Platelet Count 206 150 - 450 10e9/L    Diff Method Manual Differential     % Neutrophils 78.0 %    % Lymphocytes 15.0 %    % Monocytes 6.0 %    % Eosinophils 1.0 %    % Basophils 0.0 %    Absolute Neutrophil 12.2 (H) 1.6 - 8.3 10e9/L    Absolute Lymphocytes 2.4 0.8 - 5.3 10e9/L    Absolute Monocytes 0.9 0.0 - 1.3 10e9/L    Absolute Eosinophils 0.2 0.0 - 0.7 10e9/L    Absolute Basophils 0.0 0.0 - 0.2 10e9/L   Comprehensive metabolic panel   Result Value Ref Range    Sodium 142 133 - 144 mmol/L    Potassium 3.9 3.4 - 5.3 mmol/L    Chloride 109 94 - 109 mmol/L    Carbon Dioxide 24 20 - 32 mmol/L    Anion Gap 9 3 - 14 mmol/L    Glucose 145 (H) 70 - 99 mg/dL    Urea Nitrogen 14 7 - 30 mg/dL    Creatinine 0.92 0.66 - 1.25 mg/dL    GFR Estimate 81 >60 mL/min/[1.73_m2]    GFR Estimate If Black >90 >60 mL/min/[1.73_m2]    Calcium 9.1 8.5 - 10.1 mg/dL    Bilirubin Total 1.3 0.2 - 1.3 mg/dL    Albumin 3.4 3.4 - 5.0 g/dL    Protein Total 7.5 6.8 - 8.8 g/dL    Alkaline Phosphatase 70 40 - 150 U/L    ALT 41 0 - 70 U/L    AST 40 0 - 45 U/L   Troponin I   Result Value Ref Range    Troponin I ES <0.015 0.000 - 0.045 ug/L   Glucose by meter   Result Value Ref Range    Glucose 146 (H) 70 - 99 mg/dL   POC US ECHO LIMITED    [facility] Procedure Note      Limited Bedside ED Cardiac Ultrasound:    PROCEDURE: PERFORMED BY: Dr. Ricardo Caballero MD  INDICATIONS/SYMPTOM:  Shortness of Breath  PROBE: Cardiac phased array probe  BODY LOCATION: Chest  FINDINGS:   The ultrasound was performed utilizing the subcostal, parasternal long axis and parasternal short axis views.  Cardiac contractility:  Present  Gross estimation of cardiac kinesis: normal  Pericardial Effusion:  None  RV:LV ratio: LV > RV  Unable to evaluate IVC  No B-lines    INTERPRETATION:    Chamber size and motion were grossly normal with LV >  RV, normal cardiac kinesis.  No pericardial effusion was found.  IVC visualized and findings indicate unable to estimate.  IMAGE DOCUMENTATION: Images were archived to PACs system.         Troponin I (second draw)   Result Value Ref Range    Troponin I ES <0.015 0.000 - 0.045 ug/L   XR Chest 1 View    Narrative    CHEST ONE VIEW  9/20/2019 7:24 PM     HISTORY: Acute onset dyspnea with shortness of air and diaphoresis,  now improving.    COMPARISON: 4/30/2019      Impression    IMPRESSION: The heart is enlarged. Pulmonary vascularity within normal  limits. No airspace consolidation, pneumothorax, or pleural effusion.  Right shoulder replacement.    MELANI FRANKLIN MD   UA reflex to Microscopic   Result Value Ref Range    Color Urine Yari     Appearance Urine Clear     Glucose Urine Negative NEG^Negative mg/dL    Bilirubin Urine Negative NEG^Negative    Ketones Urine 5 (A) NEG^Negative mg/dL    Specific Gravity Urine 1.025 1.003 - 1.035    Blood Urine Negative NEG^Negative    pH Urine 5.0 5.0 - 7.0 pH    Protein Albumin Urine Negative NEG^Negative mg/dL    Urobilinogen mg/dL 4.0 (H) 0.0 - 2.0 mg/dL    Nitrite Urine Negative NEG^Negative    Leukocyte Esterase Urine Negative NEG^Negative    Source Midstream Urine     RBC Urine 1 0 - 2 /HPF    WBC Urine 8 (H) 0 - 5 /HPF    Squamous Epithelial /HPF Urine <1 0 - 1 /HPF    Mucous Urine Present (A) NEG^Negative /LPF    Hyaline Casts 28 (H) 0 - 2 /LPF       Medications   ondansetron (ZOFRAN) injection 4 mg (4 mg Intravenous Given 9/20/19 1516)       Assessments & Plan (with Medical Decision Making)   74 year old male with complex past medical history including  hypertension, hyperlipidemia, obesity, CKD, KARL, VTE (on rivaroxaban), and 1 week status post right shoulder replacement, who presents after acute onset shortness of breath, diaphoresis, nausea, and lightheadedness.  On arrival to emergency department he was slightly bradycardic with heart rate of 55.  ECG obtained  and notable for PACs, T wave inversions in V2 V3 and flattening of T waves in lead III unchanged from prior study on August 30 of this year.  He is afebrile and no hypoxia.  Exam clear lungs, no JVD or lower extremity edema.  Chest x-ray, basic labs and troponin pending. CXR with cardiomegaly, no effusion, effusion, or pneumothorax. ECG with no acute ST changes or ST elevations/depressions and unchanged from 8/30/19. Initial troponin below level of detection. CMP grossly normal. CBC with leukocytosis of 15.7 (15.1 9 days prior).  Point-of-care ultrasound with suboptimal windows however grossly preserved LV function, no pericardial effusion, and no sonographic evidence of pulmonary edema.  Patient does have a history of pulmonary embolus however he is on rivaroxaban and did not interrupt his therapy for his shoulder surgery.  He states this does not feel anything like when he had a pulmonary embolus.  Additionally he does not have any tachycardia, hypoxia, chest pain, or ongoing shortness of breath.  ECG without acute evidence of right heart strain.  No unilateral leg swelling, pain or erythema.  I have very low suspicion for PE at this time.  Story does not seem overly suggestive of ACS.  He did not have any chest pain and symptoms occurred while at rest.  No known history of coronary artery disease.  His heart score is 4 which does not make him low risk.  Delta troponin obtained at 3 hours and below level of detection.  I discussion with patient as well as his wife regarding observation admission for cardiac rule out versus outpatient follow-up and possibility of further risk stratification.  Risks and benefits discussed with patient and he elected to follow-up on this as an outpatient.  Presentation also suggestive of near syncopal event.  ECG did not have any abnormal intervals or arrhythmias to suggest cardiac etiology.  His lightheadedness, diaphoresis, and nausea would be consistent with vasovagal event.  I  feel he would be appropriate for outpatient management, this decision is supported by the [place] syncope rule.  Again discussed with patient possibility of observation for further cardiac monitoring.  Again he is comfortable being discharged home with close primary care follow-up.  I feel this is a reasonable plan.  Strict ED return precautions discussed.  Patient as well as wife expressed agreement understanding of plan.  He remained symptom-free while in the emergency department and he medically stable.  Discharged in stable condition per    I have reviewed the nursing notes.    I have reviewed the findings, diagnosis, plan and need for follow up with the patient.       HEART Score  Background  Calculates the overall risk of adverse event in patient's presenting with chest pain.  Based on 5 criteria (each assigned 0-2 points) including suspiciousness of history, EKG, age, risk factors and troponin.    Data  74 year old male  has Obstructive sleep apnea syndrome; Restless legs syndrome (RLS); Polyneuropathy in other diseases classified elsewhere (H); Gastroesophageal reflux disease without esophagitis; Lumbar spinal stenosis; Steroid-induced hyperglycemia; Fatty liver/ 1-14 US; Atherosclerosis; ITP (idiopathic thrombocytopenic purpura) since 3-13 back surgery ; Alcohol consuption of more than two drinks per day; Preventive measure; Morbid obesity  BMI  30-35; Glucose intolerance (impaired glucose tolerance)  HgbA!C = 5.4; Back pain since 1988 s/po surgery 1992,1996 and 3-13/ Dr Singh ; Alcoholic liver damage (H); Diverticulosis of large intestine; Elevated liver enzymes AST ; Family history of ischemic heart disease; Family history of diabetes mellitus; Risk for falls; History of colonic polyps; Change in bowel habits since 1-14: diarrhea-thinks better off benicar; ED (erectile dysfunction); Alcohol abuse since teens ; Seborrheic dermatitis Lt temple ; Colon polyp in 2010 and 9-15 -->  rept in 2020; Benign  essential hypertension; Hyperlipidemia; 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, unspecified laterality, sequela; ACP (advance care planning); Heel spur, left; Personal history of tobacco use, presenting hazards to health: [age] y/.o@1ppd=23 pk yr hx ; Gout involving toe, unspecified cause, unspecified chronicity, unspecified laterality; Need for prophylactic vaccination against Streptococcus pneumoniae (pneumococcus); Chronic right dominant  shoulder pain w ltd ROM  since 3-16; Gastroesophageal reflux disease with esophagitis; Pulmonary embolism (H) large RLL w infarctio 4-17; Hypotension; Iron deficiency anemia due to chronic blood loss; Screening for prostate cancer; CKD (chronic kidney disease) stage 3, GFR 30-59 ml/min (H); Portal hypertension (H); Impaired fasting glucose; and Fatigue, unspecified type on their problem list.   reports that he quit smoking about 37 years ago. His smoking use included cigarettes. He started smoking about 61 years ago. He has a 28.00 pack-year smoking history. He has quit using smokeless tobacco.  family history includes Heart Disease in his sister; Unknown/Adopted in his father and mother.  Lab Results   Component Value Date    TROPI <0.015 09/20/2019     Criteria   0-2 points for each of 5 items (maximum of 10 points):  Score 0- History slightly suspicious for coronary syndrome  Score 1- EKG with Non-specific repolarization disturbance  Score 2- Age 65 years or older  Score 1- One to 2 risk factors for atherosclerotic disease  Score 0- Within normal limits for troponin levels  Interpretation  Risk of adverse outcome  Heart Score: 4          Discharge Medication List as of 9/20/2019  8:37 PM          Final diagnoses:   Near syncope   Nausea   Shortness of breath     Ricardo Caballero MD    9/20/2019   [facility] EMERGENCY DEPARTMENT    Disclaimer: This note consists of words and symbols derived from keyboarding and dictation using voice recognition software.  As a  result, there may be errors that have gone undetected.  Please consider this when interpreting information found in this note.     Ricardo Caballero MD  09/20/19 6017

## 2019-09-20 NOTE — ED AVS SNAPSHOT
[facility] Emergency Department  [address] [address]  Phone:  [phone]  Fax:  [phone]                                    Raymon Ace   MRN: [id number]    Department:  [facility] Emergency Department   Date of Visit:  9/20/2019           After Visit Summary Signature Page    I have received my discharge instructions, and my questions have been answered. I have discussed any challenges I see with this plan with the nurse or doctor.    ..........................................................................................................................................  Patient/Patient Representative Signature      ..........................................................................................................................................  Patient Representative Print Name and Relationship to Patient    ..................................................               ................................................  Date                                   Time    ..........................................................................................................................................  Reviewed by Signature/Title    ...................................................              ..............................................  Date                                               Time          22EPIC Rev 08/18

## 2019-09-20 NOTE — ED NOTES
Dizziness, nausea, sweating, shortness of breath.  Patient was on the way to clinic appointment in [place] and started feeling sick.  Patient had surgery on right shoulder approximately 1 week ago.  Patient ate cupcake this morning and has been drinking fluids.

## 2019-09-20 NOTE — ED TRIAGE NOTES
Dizziness, nausea, sweating, shortness of breath.  Patient was on the way to clinic appointment in [place] and started feeling sick.  Patient had surgery on right shoulder approximately 1 week ago.  Patient ate cupcake this morning and has been drinking fluids.   Shane Chung RN on 9/20/2019 at 3:00 PM

## 2019-09-21 NOTE — ED NOTES
Pt back from x-ray. Vitals reassessed. Pt denies complaints at rest. Pt requesting to use urinal. Pt is slightly dizzy with standing, is using urinal at bedside with wife.

## 2019-09-21 NOTE — DISCHARGE INSTRUCTIONS
Please call your primary care provider on Monday to schedule a follow-up appointment.  If you experience chest pain, shortness of breath, lightheadedness, or feel like you might faint, return to emergency room immediately for further evaluation and treatment.

## 2019-09-26 ENCOUNTER — OFFICE VISIT (OUTPATIENT)
Dept: FAMILY MEDICINE | Facility: CLINIC | Age: 74
End: 2019-09-26
Payer: COMMERCIAL

## 2019-09-26 VITALS
BODY MASS INDEX: 30.21 KG/M2 | HEART RATE: 80 BPM | SYSTOLIC BLOOD PRESSURE: 120 MMHG | WEIGHT: 192.5 LBS | HEIGHT: 67 IN | OXYGEN SATURATION: 98 % | DIASTOLIC BLOOD PRESSURE: 70 MMHG | RESPIRATION RATE: 16 BRPM | TEMPERATURE: 98 F

## 2019-09-26 DIAGNOSIS — F32.5 MAJOR DEPRESSION IN COMPLETE REMISSION (H): ICD-10-CM

## 2019-09-26 DIAGNOSIS — R42 VERTIGO: ICD-10-CM

## 2019-09-26 DIAGNOSIS — D62 ANEMIA DUE TO BLOOD LOSS, ACUTE: ICD-10-CM

## 2019-09-26 DIAGNOSIS — M25.511 CHRONIC RIGHT SHOULDER PAIN: ICD-10-CM

## 2019-09-26 DIAGNOSIS — E66.01 MORBID OBESITY (H): ICD-10-CM

## 2019-09-26 DIAGNOSIS — R55 NEAR SYNCOPE: Primary | ICD-10-CM

## 2019-09-26 DIAGNOSIS — I10 BENIGN ESSENTIAL HYPERTENSION: ICD-10-CM

## 2019-09-26 DIAGNOSIS — R73.01 IMPAIRED FASTING GLUCOSE: ICD-10-CM

## 2019-09-26 DIAGNOSIS — G89.29 CHRONIC RIGHT SHOULDER PAIN: ICD-10-CM

## 2019-09-26 DIAGNOSIS — Z23 NEED FOR PROPHYLACTIC VACCINATION AND INOCULATION AGAINST INFLUENZA: ICD-10-CM

## 2019-09-26 DIAGNOSIS — D72.825 BANDEMIA: ICD-10-CM

## 2019-09-26 LAB
BASOPHILS # BLD AUTO: 0.1 10E9/L (ref 0–0.2)
BASOPHILS NFR BLD AUTO: 0.9 %
DIFFERENTIAL METHOD BLD: ABNORMAL
EOSINOPHIL # BLD AUTO: 0.1 10E9/L (ref 0–0.7)
EOSINOPHIL NFR BLD AUTO: 1.3 %
ERYTHROCYTE [DISTWIDTH] IN BLOOD BY AUTOMATED COUNT: 13.5 % (ref 10–15)
HCT VFR BLD AUTO: 38.6 % (ref 40–53)
HGB BLD-MCNC: 13.1 G/DL (ref 13.3–17.7)
LYMPHOCYTES # BLD AUTO: 1.3 10E9/L (ref 0.8–5.3)
LYMPHOCYTES NFR BLD AUTO: 18 %
MCH RBC QN AUTO: 33.5 PG (ref 26.5–33)
MCHC RBC AUTO-ENTMCNC: 33.9 G/DL (ref 31.5–36.5)
MCV RBC AUTO: 99 FL (ref 78–100)
MONOCYTES # BLD AUTO: 0.6 10E9/L (ref 0–1.3)
MONOCYTES NFR BLD AUTO: 7.5 %
NEUTROPHILS # BLD AUTO: 5.4 10E9/L (ref 1.6–8.3)
NEUTROPHILS NFR BLD AUTO: 72.3 %
PLATELET # BLD AUTO: 128 10E9/L (ref 150–450)
RBC # BLD AUTO: 3.91 10E12/L (ref 4.4–5.9)
WBC # BLD AUTO: 7.4 10E9/L (ref 4–11)

## 2019-09-26 PROCEDURE — 90662 IIV NO PRSV INCREASED AG IM: CPT | Performed by: FAMILY MEDICINE

## 2019-09-26 PROCEDURE — 36415 COLL VENOUS BLD VENIPUNCTURE: CPT | Performed by: FAMILY MEDICINE

## 2019-09-26 PROCEDURE — 99215 OFFICE O/P EST HI 40 MIN: CPT | Mod: 25 | Performed by: FAMILY MEDICINE

## 2019-09-26 PROCEDURE — G0008 ADMIN INFLUENZA VIRUS VAC: HCPCS | Performed by: FAMILY MEDICINE

## 2019-09-26 PROCEDURE — 85025 COMPLETE CBC W/AUTO DIFF WBC: CPT | Performed by: FAMILY MEDICINE

## 2019-09-26 ASSESSMENT — MIFFLIN-ST. JEOR: SCORE: 1566.8

## 2019-09-26 NOTE — PATIENT INSTRUCTIONS
1.  Weight Loss Tips  1. Do not eat after 6 hrs before your expected bedtime  2. Have your heaviest meal for breakfast, a slightly lighter meal at lunch and a snack 6 hrs before bed  3. No sugar/calorie drinks except milk ie no fruit juice, pop, alcohol.  4. Drink milk 30min before meals to decrease your hunger. Also it is excellent as part of your last meal of the day snack  5. Drink lots of water  6. Increase fiber in diet: all bran cereal, salads, popcorn etc  7. Have only one small serving of fruit a day about 1/2 cup (as this is high in sugar)  8. EXERCISE is the bottom line. Without it, you will gain weight even on a low calorie diet. Best if done 2-3X a day as can    Being overweight contributes to high blood pressure and high cholesterol, both of which cause heart attacks, strokes and kidney failure, prediabetes and diabetes, arthritis, and liver disease     2. Eat small frequent amts of food

## 2019-09-26 NOTE — PROGRESS NOTES
Subjective     Raymon Ace is a 74 year old male who presents to clinic today for the following health issues:    HPI     ED/UC Follow-Up    Facility:  [facility] ED  Date of visit: 09/20/2019  Reason for visit: Dizziness  Current Status: Outpatient    Dizziness      Duration: happened last Thursdayn 9-19-19 while passenger in a car , no longer having symptoms    Lasted 10 min : severe dizziness, head spinning , cold sweat, near fainting , feeling of impending doom     Weak for 10 hr afterward     Now completely resolved     Had not eaten all day and happened at 4 pm     Description   Feeling faint:  YES  Feeling like the surroundings are moving: YES  Loss of consciousness or falls: no     Intensity:  moderate    Accompanying signs and symptoms:   Nausea/vomitting: YES  Palpitations: no   Weakness in arms or legs: no   Vision or speech changes: YES  Ringing in ears (Tinnitus): no  Hearing loss related to dizziness: no   Other (fevers/chills/sweating/dyspnea): YES- sweating    History (similar episodes/head trauma/previous evaluation/recent bleeding): None    Precipitating or alleviating factors (new meds/chemicals): took oxycodone after shoulder surgery x2 weeks ago  Worse with activity/head movement: no     Therapies tried and outcome: None     ED  Neg troponins x2 and EKG    Joint Pain :Degenerative Right Dominant Shoulder s/po surgery 8-30-19      Onset: 2010     But bad this last few mos     Severe ecchymoses about entire shoulder     Pain and had pain in the car     Description:   Location: right shoulder  Character: Sharp, Dull ache, Stabbing and Gnawing    Intensity: severe, 10/10    Progression of Symptoms: worse    Accompanying Signs & Symptoms:  Other symptoms: radiation of pain to FTs and RT neck     History:   Previous similar pain: no       Precipitating factors:   Trauma or overuse: YES    Alleviating factors:  Improved by: rest/inactivity, acetaminophen and deep heating rub  Therapies Tried and  outcome: no help   On oxycontin tid till the am of the episode    Glucose Intolerance Follow-up      How often are you checking your blood sugar? Not at all    What time of day are you checking your blood sugars (select all that apply)?      Have you had any blood sugars above 200?  No    Have you had any blood sugars below 70?  No    What symptoms do you notice when your blood sugar is low?  None    What concerns do you have today about your diabetes? None     Do you have any of these symptoms? (Select all that apply)  No numbness or tingling in feet.  No redness, sores or blisters on feet.  No complaints of excessive thirst.  No reports of blurry vision.  No significant changes to weight.     Have you had a diabetic eye exam in the last 12 months? No     Drinks at least 4 beer a day     BP Readings from Last 2 Encounters:   19 136/80   19 121/75     Hemoglobin A1C (%)   Date Value   2019 5.3   2016 5.4     LDL Cholesterol Calculated (mg/dL)   Date Value   2019 84   2018 119 (H)   Diabetes Management Resources          Depression and Anxiety Follow-Up      How are you doing with your depression since your last visit? No change-inremission     How are you doing with your anxiety since your last visit?  No change    Are you having other symptoms that might be associated with depression or anxiety? No    Have you had a significant life event? No     Do you have any concerns with your use of alcohol or other drugs? No    Social History     Tobacco Use     Smoking status: Former Smoker     Packs/day: 1.00     Years: 28.00     Pack years: 28.00     Types: Cigarettes     Start date:      Last attempt to quit: 1982     Years since quittin.9     Smokeless tobacco: Former User   Substance Use Topics     Alcohol use: Yes     Alcohol/week: 3.0 oz     Types: 5 Shots of liquor per week     Comment: 4-5 drinks/day, alcohol use disorder     Drug use: No     PHQ 2018  8/30/2019   PHQ-9 Total Score 0 19 0   Q9: Thoughts of better off dead/self-harm past 2 weeks Not at all Not at all Not at all     VERITO-7 SCORE 8/4/2017 7/13/2018 8/30/2019   Total Score 0 0 0   Suicide Assessment Five-step Evaluation and Treatment (SAFE-T)        NONMORBID OBESITY      --BMI = 30.8    -active     -comorbid liver dis, CKD, KARL , glu intol gout , HTN             Hypertension Follow-up      Do you check your blood pressure regularly outside of the clinic? No     Are you following a low salt diet? No   Here < 140/80    Are your blood pressures ever more than 140 on the top number (systolic) OR more   than 90 on the bottom number (diastolic), for example 140/90? No      How many servings of fruits and vegetables do you eat daily?  2-3    On average, how many sweetened beverages do you drink each day (soda, juice, sweet tea, etc)?   1    How many days per week do you miss taking your medication? 0        Medication Followup of Anemia       Taking Medication as prescribed: NO    Side Effects:  None    Medication Helping Symptoms:  Yes    Hx of chronic anemia     Acute blood loss with the 8-30-19 shoulder surgery with drop in Hgb from 15 p preop to 12 in ED on 9-19-19               Reviewed and updated as needed this visit by Provider  Tobacco  Allergies  Meds  Problems  Med Hx  Surg Hx  Fam Hx         Review of Systems   ROS COMP: CONSTITUTIONAL: NEGATIVE for fever, chills, change in weight  INTEGUMENTARY/SKIN: NEGATIVE for worrisome rashes, moles or lesions  EYES: NEGATIVE for vision changes or irritation  ENT/MOUTH: NEGATIVE for ear, mouth and throat problems  RESP: NEGATIVE for significant cough or SOB  BREAST: NEGATIVE for masses, tenderness or discharge  CV: NEGATIVE for chest pain, palpitations or peripheral edema  GI: NEGATIVE for nausea, abdominal pain, heartburn, or change in bowel habits  : NEGATIVE for frequency, dysuria, or hematuria  MUSCULOSKELETAL:NEGATIVE for significant  "arthralgias or myalgia, POSITIVE  for  and joint stiffness Rt shoulder -in sling  NEURO: NEGATIVE for weakness, dizziness or paresthesias  ENDOCRINE: NEGATIVE for temperature intolerance, skin/hair changes  HEME: NEGATIVE for bleeding problems  PSYCHIATRIC: NEGATIVE for changes in mood or affect--POS anxious re the episode      Objective    /70 (Patient Position: Sitting, Cuff Size: Adult Regular)   Pulse 80   Temp 98  F (36.7  C) (Tympanic)   Resp 16   Ht 1.702 m (5' 7\")   Wt 87.3 kg (192 lb 8 oz)   SpO2 98%   BMI 30.15 kg/m    Body mass index is 30.15 kg/m .  Physical Exam   GENERAL: healthy, alert, rt shoulder pain distress and obese  EYES: Eyes grossly normal to inspection, PERRL and conjunctivae and sclerae normal  RESP: lungs clear to auscultation - no rales, rhonchi or wheezes  CV: regular rate and rhythm, normal S1 S2, no S3 or S4, no murmur, click or rub, no peripheral edema and peripheral pulses strong  MS: no gross musculoskeletal defects noted, no edema  SKIN: no suspicious lesions or rashes  NEURO: Normal strength and tone, mentation intact and speech normal  PSYCH: mentation appears normal, affect normal/bright, anxious, fatigued, judgement and insight intact and appearance well groomed    Diagnostic Test Results:  Labs reviewed in Epic  EKG  No change from 9-18:LAD  9-19-19 Hgb = 13 and WBC =11,400 with Lt shift  Results for orders placed or performed in visit on 09/26/19   CBC with platelets differential   Result Value Ref Range    WBC 7.4 4.0 - 11.0 10e9/L    RBC Count 3.91 (L) 4.4 - 5.9 10e12/L    Hemoglobin 13.1 (L) 13.3 - 17.7 g/dL    Hematocrit 38.6 (L) 40.0 - 53.0 %    MCV 99 78 - 100 fl    MCH 33.5 (H) 26.5 - 33.0 pg    MCHC 33.9 31.5 - 36.5 g/dL    RDW 13.5 10.0 - 15.0 %    Platelet Count 128 (L) 150 - 450 10e9/L    % Neutrophils 72.3 %    % Lymphocytes 18.0 %    % Monocytes 7.5 %    % Eosinophils 1.3 %    % Basophils 0.9 %    Absolute Neutrophil 5.4 1.6 - 8.3 10e9/L    Absolute " "Lymphocytes 1.3 0.8 - 5.3 10e9/L    Absolute Monocytes 0.6 0.0 - 1.3 10e9/L    Absolute Eosinophils 0.1 0.0 - 0.7 10e9/L    Absolute Basophils 0.1 0.0 - 0.2 10e9/L    Diff Method Automated Method            Assessment & Plan       ICD-10-CM    1. Near syncope on 9-20-19  R55 CBC with platelets differential   2. Vertigo R42 CBC with platelets differential   3. Anemia due to blood loss, acute D62    4. Bandemia D72.825 CBC with platelets differential   5. Impaired fasting glucose R73.01    6. Chronic right dominant  shoulder pain w ltd ROM  since 3-16 s/po surgery 8-30-19 M25.511     G89.29    7. Benign essential hypertension I10    8. 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 Morbid obesity (H) BMI 30-40 E66.01    10. Need for prophylactic vaccination and inoculation against influenza Z23 INFLUENZA (HIGH DOSE) 3 VALENT VACCINE [63457]     ADMIN INFLUENZA (For MEDICARE Patients ONLY) []     Vaccine Administration, Initial [09206]        BMI:   Estimated body mass index is 30.15 kg/m  as calculated from the following:    Height as of this encounter: 1.702 m (5' 7\").    Weight as of this encounter: 87.3 kg (192 lb 8 oz).   Weight management plan: Discussed healthy diet and exercise guidelines        Patient Instructions   1.  Weight Loss Tips  1. Do not eat after 6 hrs before your expected bedtime  2. Have your heaviest meal for breakfast, a slightly lighter meal at lunch and a snack 6 hrs before bed  3. No sugar/calorie drinks except milk ie no fruit juice, pop, alcohol.  4. Drink milk 30min before meals to decrease your hunger. Also it is excellent as part of your last meal of the day snack  5. Drink lots of water  6. Increase fiber in diet: all bran cereal, salads, popcorn etc  7. Have only one small serving of fruit a day about 1/2 cup (as this is high in sugar)  8. EXERCISE is the bottom line. Without it, you will gain weight even on a low calorie diet. Best if done 2-3X a day as can    Being " overweight contributes to high blood pressure and high cholesterol, both of which cause heart attacks, strokes and kidney failure, prediabetes and diabetes, arthritis, and liver disease     2. Eat small frequent amts of food   I  Explained the treatment and the reason for it   Discussed all with pt  And the patient expresses understanding    Time spent with the patient 48mins, more than 50% in counseling and coordinating care, Re above medical problems  Re eval of all symptoms of that d to r/o MI .  Reassured pt that his severe episode was from :   1. Should er pain   -had suregery 2 weeks prior   -riding in a car which chelo it   -last pain med was that am -10 hrs prior --had been on tid oxycontin     2. Has hyperglycemia  -can have hypoglycemia with this   -had not eaten all d and it was 4 pm    3. Anemic   -recent blood loss frrom surgery     3. Probable dehydration  -WBC was elevated          Return in about 6 months (around 3/26/2020) for BP Recheck, impaired glucose, depression, anemia.    Rosanne Cherry MD  [facility]

## 2019-09-26 NOTE — LETTER
September 28, 2019      Raymon Ace  [address] [facility] [address]        Dear ,    We are writing to inform you of your test results.    Your test results fall within the expected range(s) or remain unchanged from previous results.  Please continue with current treatment plan.    Resulted Orders   CBC with platelets differential   Result Value Ref Range    WBC 7.4 4.0 - 11.0 10e9/L    RBC Count 3.91 (L) 4.4 - 5.9 10e12/L    Hemoglobin 13.1 (L) 13.3 - 17.7 g/dL    Hematocrit 38.6 (L) 40.0 - 53.0 %    MCV 99 78 - 100 fl    MCH 33.5 (H) 26.5 - 33.0 pg    MCHC 33.9 31.5 - 36.5 g/dL    RDW 13.5 10.0 - 15.0 %    Platelet Count 128 (L) 150 - 450 10e9/L    % Neutrophils 72.3 %    % Lymphocytes 18.0 %    % Monocytes 7.5 %    % Eosinophils 1.3 %    % Basophils 0.9 %    Absolute Neutrophil 5.4 1.6 - 8.3 10e9/L    Absolute Lymphocytes 1.3 0.8 - 5.3 10e9/L    Absolute Monocytes 0.6 0.0 - 1.3 10e9/L    Absolute Eosinophils 0.1 0.0 - 0.7 10e9/L    Absolute Basophils 0.1 0.0 - 0.2 10e9/L    Diff Method Automated Method        If you have any questions or concerns, please call the clinic at the number listed above.       Sincerely,        Rosanne Cherry MD

## 2019-10-03 ENCOUNTER — HEALTH MAINTENANCE LETTER (OUTPATIENT)
Age: 74
End: 2019-10-03

## 2019-10-11 ENCOUNTER — THERAPY VISIT (OUTPATIENT)
Dept: PHYSICAL THERAPY | Facility: CLINIC | Age: 74
End: 2019-10-11
Payer: COMMERCIAL

## 2019-10-11 DIAGNOSIS — Z47.1 AFTERCARE FOLLOWING RIGHT SHOULDER JOINT REPLACEMENT SURGERY: Primary | ICD-10-CM

## 2019-10-11 DIAGNOSIS — Z96.611 AFTERCARE FOLLOWING RIGHT SHOULDER JOINT REPLACEMENT SURGERY: Primary | ICD-10-CM

## 2019-10-11 PROCEDURE — 97161 PT EVAL LOW COMPLEX 20 MIN: CPT | Mod: GP | Performed by: PHYSICAL THERAPIST

## 2019-10-11 PROCEDURE — 97112 NEUROMUSCULAR REEDUCATION: CPT | Mod: GP | Performed by: PHYSICAL THERAPIST

## 2019-10-11 PROCEDURE — 97110 THERAPEUTIC EXERCISES: CPT | Mod: GP | Performed by: PHYSICAL THERAPIST

## 2019-10-11 NOTE — LETTER
DEPARTMENT OF HEALTH AND HUMAN SERVICES  CENTERS FOR MEDICARE & MEDICAID SERVICES    PLAN/UPDATED PLAN OF PROGRESS FOR OUTPATIENT REHABILITATION    PATIENTS NAME:  Raymon Ace     : 1945    PROVIDER NUMBER:    [id number]    [id number]      PROVIDER NAME: [facility]    MEDICAL RECORD NUMBER: [id number]     START OF CARE DATE:  SOC Date: 10/11/19   TYPE:  PT    PRIMARY/TREATMENT DIAGNOSIS: (Pertinent Medical Diagnosis)  Aftercare following right shoulder joint replacement surgery    VISITS FROM START OF CARE:  Rxs Used: 1     [place] [facility] Initial Evaluation    Subjective:  The history is provided by the patient. No  was used.   Raymon Ace being seen for Right postoperative total shoulder replacement.   Problem began 2019. Where condition occurred: for unknown reasons.Problem occurred: Surgery  and reported as 4/10 on pain scale. General health as reported by patient is fair. Pertinent medical history includes:  None.    Surgeries include:  Orthopedic surgery. Other surgery history details: Low back surgery.  Current medications:  High blood pressure medication and heparin/coumadin. Other medications details: Blood clot medication.     Pain is described as aching and is intermittent. Pain is the same all the time. Since onset symptoms are gradually improving. Special tests:  MRI. Previous treatment includes physical therapy. There was mild improvement following previous treatment.   Patient is Retired.   Barriers include:  None as reported by patient.  Red flags:  None as reported by patient.  Patient is unable to sleep at night and regular bed, therefore using recliner with interrupted sleep at night.  Raymon is also continuously weaning from his sling in which he was given the  greenlight to discharge for 4 weeks as tolerated           Objective:  Standing Alignment:    Cervical/Thoracic:  Forward head  Shoulder/UE:  Depressed scapula R    Gait:     Gait Type:  Normal   Weight Bearing Status:  WBAT   Assistive Devices:  None  Flexibility/Screens:   Negative screens: Cervical   Neurological: He is alert. He has normal strength and normal reflexes. No cranial nerve deficit or sensory deficit.     Shoulder Evaluation:  ROM:    PROM:    Flexion:  Left:  60    Right: 40    Internal Rotation:  Left:  80    Right:  30  External Rotation:  Left:  70    Right:  0    Strength:  not assessed    Palpation:  Palpation assessed shoulder: Global bruising, neck, chest, deltoid, and scapular area.  Incision is clean covered with Steri-Strips.    Assessment/Plan:    Patient is a 74 year old male with right side shoulder complaints.    Patient has the following significant findings with corresponding treatment plan.                Diagnosis 1: Right total shoulder arthroplasty, biceps resection/transplantation, date of surgery 9/12/2019     Therapy Evaluation Codes:   1) History comprised of:   Personal factors that impact the plan of care:      None.    Comorbidity factors that impact the plan of care are:      High blood pressure.     Medications impacting care: High blood pressure and Heparin/coumadin.  2) Examination of Body Systems comprised of:   Body structures and functions that impact the plan of care:      Shoulder.   Activity limitations that impact the plan of care are:      Bathing, Cooking, Driving, Dressing, Lifting, Sitting and Sleeping.  3) Clinical presentation characteristics are:   Stable/Uncomplicated.  4) Decision-Making    Low complexity using standardized patient assessment instrument and/or measureable assessment of functional outcome.    Cumulative Therapy Evaluation is: Low complexity.  Previous and current functional limitations:  (See Goal Flow Sheet for this information)    Short term and Long term goals: (See Goal Flow Sheet for this information)   Communication ability:  Patient appears to be able to clearly communicate and understand verbal and  "written communication and follow directions correctly.  Treatment Explanation - The following has been discussed with the patient:   RX ordered/plan of care  Anticipated outcomes  Possible risks and side effects  This patient would benefit from PT intervention to resume normal activities.   Rehab potential is good.  Frequency:  1 X week, once daily  Duration:  for 6 weeks  Discharge Plan:  Achieve all LTG.  Independent in home treatment program.  Reach maximal therapeutic benefit.  Rehabilitation plan:  SAPOS upper extremity operative protocol: Total shoulder arthroplasty    Scheduled to see his physician in April 2020    Caregiver Signature/Credentials _____________________________ Date ________       Treating Provider: Abad Mortensen PT ATC      I have reviewed and certified the need for these services and plan of treatment while under my care.        PHYSICIAN'S SIGNATURE:   _________________________________________  Date___________   Sanjay Rivera MD  Certification period:  Beginning of Cert date period: 10/11/19 to  End of Cert period date: 11/10/19     Functional Level Progress Report: Please see attached \"Goal Flow sheet for Functional level.\"    ____X____ Continue Services or       ________ DC Services                Service dates: From  SOC Date: 10/11/19 date to present                         "

## 2019-10-11 NOTE — LETTER
[facility] STALL[facility]  [address]  [place] [address]  [phone]    2019    Re: Raymon Ace   :   1945  MRN:  [id number]   REFERRING PHYSICIAN:   Sanjay STALLINGS [facility]    Date of Initial Evaluation:  10/11/19  Visits:  Rxs Used: 1  Reason for Referral:  Aftercare following right shoulder joint replacement surgery    [place] [facility] Initial Evaluation    Subjective:  The history is provided by the patient. No  was used.   Raymon Ace being seen for Right postoperative total shoulder replacement.   Problem began 2019. Where condition occurred: for unknown reasons.Problem occurred: Surgery  and reported as 4/10 on pain scale. General health as reported by patient is fair. Pertinent medical history includes:  None.    Surgeries include:  Orthopedic surgery. Other surgery history details: Low back surgery.  Current medications:  High blood pressure medication and heparin/coumadin. Other medications details: Blood clot medication.     Pain is described as aching and is intermittent. Pain is the same all the time. Since onset symptoms are gradually improving. Special tests:  MRI. Previous treatment includes physical therapy. There was mild improvement following previous treatment.   Patient is Retired.   Barriers include:  None as reported by patient.  Red flags:  None as reported by patient.  Patient is unable to sleep at night and regular bed, therefore using recliner with interrupted sleep at night.  Raymon is also continuously weaning from his sling in which he was given the  greenlight to discharge for 4 weeks as tolerated           Objective:  Standing Alignment:    Cervical/Thoracic:  Forward head  Shoulder/UE:  Depressed scapula R    Gait:    Gait Type:  Normal   Weight Bearing Status:  WBAT   Assistive Devices:  None  Flexibility/Screens:   Negative screens: Cervical   Neurological: He is alert. He has normal strength and normal  reflexes. No cranial nerve deficit or sensory deficit.     Shoulder Evaluation:  ROM:    PROM:    Flexion:  Left:  60    Right: 40    Internal Rotation:  Left:  80    Right:  30  External Rotation:  Left:  70    Right:  0    Strength:  not assessed    Palpation:  Palpation assessed shoulder: Global bruising, neck, chest, deltoid, and scapular area.  Incision is clean covered with Steri-Strips.    Assessment/Plan:    Patient is a 74 year old male with right side shoulder complaints.    Patient has the following significant findings with corresponding treatment plan.                Diagnosis 1: Right total shoulder arthroplasty, biceps resection/transplantation, date of surgery 9/12/2019     Therapy Evaluation Codes:   1) History comprised of:   Personal factors that impact the plan of care:      None.    Comorbidity factors that impact the plan of care are:      High blood pressure.     Medications impacting care: High blood pressure and Heparin/coumadin.  2) Examination of Body Systems comprised of:   Body structures and functions that impact the plan of care:      Shoulder.   Activity limitations that impact the plan of care are:      Bathing, Cooking, Driving, Dressing, Lifting, Sitting and Sleeping.  3) Clinical presentation characteristics are:   Stable/Uncomplicated.  4) Decision-Making    Low complexity using standardized patient assessment instrument and/or measureable assessment of functional outcome.    Cumulative Therapy Evaluation is: Low complexity.  Previous and current functional limitations:  (See Goal Flow Sheet for this information)    Short term and Long term goals: (See Goal Flow Sheet for this information)   Communication ability:  Patient appears to be able to clearly communicate and understand verbal and written communication and follow directions correctly.  Treatment Explanation - The following has been discussed with the patient:   RX ordered/plan of care  Anticipated outcomes  Possible  risks and side effects  This patient would benefit from PT intervention to resume normal activities.   Rehab potential is good.  Frequency:  1 X week, once daily  Duration:  for 6 weeks  Discharge Plan:  Achieve all LTG.  Independent in home treatment program.  Reach maximal therapeutic benefit.  Rehabilitation plan:  SAPOS upper extremity operative protocol: Total shoulder arthroplasty  Scheduled to see his physician in April 2020    Thank you for your referral.    INQUIRIES  Therapist: Abad Mortensen, PT, ATC, Cert. [facility]ALL[facility]  [address]  [place] [address]  Phone: [phone]  Fax: [phone]

## 2019-10-11 NOTE — PROGRESS NOTES
[facility] Initial Evaluation  Subjective:  The history is provided by the patient. No  was used.   Raymon Ace being seen for Right postoperative total shoulder replacement.   Problem began 9/12/2019. Where condition occurred: for unknown reasons.Problem occurred: Surgery  and reported as 4/10 on pain scale. General health as reported by patient is fair. Pertinent medical history includes:  None.    Surgeries include:  Orthopedic surgery. Other surgery history details: Low back surgery.  Current medications:  High blood pressure medication and heparin/coumadin. Other medications details: Blood clot medication.     Pain is described as aching and is intermittent. Pain is the same all the time. Since onset symptoms are gradually improving. Special tests:  MRI. Previous treatment includes physical therapy. There was mild improvement following previous treatment.   Patient is Retired.   Barriers include:  None as reported by patient.  Red flags:  None as reported by patient.           Patient is unable to sleep at night and regular bed, therefore using recliner with interrupted sleep at night.  Raymon is also continuously weaning from his sling in which he was given the  greenlight to discharge for 4 weeks as tolerated           Objective:  Standing Alignment:    Cervical/Thoracic:  Forward head  Shoulder/UE:  Depressed scapula R              Gait:    Gait Type:  Normal   Weight Bearing Status:  WBAT   Assistive Devices:  None      Flexibility/Screens:   Negative screens: Cervical           Neurological: He is alert. He has normal strength and normal reflexes. No cranial nerve deficit or sensory deficit.                      Shoulder Evaluation:  ROM:    PROM:    Flexion:  Left:  60    Right: 40          Internal Rotation:  Left:  80    Right:  30  External Rotation:  Left:  70    Right:  0                    Strength:  not assessed                          Palpation:   Palpation assessed shoulder: Global bruising, neck, chest, deltoid, and scapular area.  Incision is clean covered with Steri-Strips.                                         General     ROS    Assessment/Plan:    Patient is a 74 year old male with right side shoulder complaints.    Patient has the following significant findings with corresponding treatment plan.                Diagnosis 1: Right total shoulder arthroplasty, biceps resection/transplantation, date of surgery 9/12/2019     Therapy Evaluation Codes:   1) History comprised of:   Personal factors that impact the plan of care:      None.    Comorbidity factors that impact the plan of care are:      High blood pressure.     Medications impacting care: High blood pressure and Heparin/coumadin.  2) Examination of Body Systems comprised of:   Body structures and functions that impact the plan of care:      Shoulder.   Activity limitations that impact the plan of care are:      Bathing, Cooking, Driving, Dressing, Lifting, Sitting and Sleeping.  3) Clinical presentation characteristics are:   Stable/Uncomplicated.  4) Decision-Making    Low complexity using standardized patient assessment instrument and/or measureable assessment of functional outcome.  Cumulative Therapy Evaluation is: Low complexity.    Previous and current functional limitations:  (See Goal Flow Sheet for this information)    Short term and Long term goals: (See Goal Flow Sheet for this information)     Communication ability:  Patient appears to be able to clearly communicate and understand verbal and written communication and follow directions correctly.  Treatment Explanation - The following has been discussed with the patient:   RX ordered/plan of care  Anticipated outcomes  Possible risks and side effects  This patient would benefit from PT intervention to resume normal activities.   Rehab potential is good.    Frequency:  1 X week, once daily  Duration:  for 6 weeks  Discharge Plan:   Achieve all LTG.  Independent in home treatment program.  Reach maximal therapeutic benefit.  Rehabilitation plan:    SAPOS upper extremity operative protocol: Total shoulder arthroplasty    Scheduled to see his physician in April 2020    Please refer to the daily flowsheet for treatment today, total treatment time and time spent performing 1:1 timed codes.

## 2019-10-17 ENCOUNTER — THERAPY VISIT (OUTPATIENT)
Dept: PHYSICAL THERAPY | Facility: CLINIC | Age: 74
End: 2019-10-17
Payer: COMMERCIAL

## 2019-10-17 DIAGNOSIS — Z47.1 AFTERCARE FOLLOWING RIGHT SHOULDER JOINT REPLACEMENT SURGERY: Primary | ICD-10-CM

## 2019-10-17 DIAGNOSIS — Z96.611 AFTERCARE FOLLOWING RIGHT SHOULDER JOINT REPLACEMENT SURGERY: Primary | ICD-10-CM

## 2019-10-17 PROCEDURE — 97110 THERAPEUTIC EXERCISES: CPT | Mod: GP | Performed by: PHYSICAL THERAPIST

## 2019-10-24 ENCOUNTER — THERAPY VISIT (OUTPATIENT)
Dept: PHYSICAL THERAPY | Facility: CLINIC | Age: 74
End: 2019-10-24
Payer: COMMERCIAL

## 2019-10-24 DIAGNOSIS — Z96.611 STATUS POST REPLACEMENT OF RIGHT SHOULDER JOINT: Primary | ICD-10-CM

## 2019-10-24 PROCEDURE — 97112 NEUROMUSCULAR REEDUCATION: CPT | Mod: GP | Performed by: PHYSICAL THERAPIST

## 2019-10-24 PROCEDURE — 97110 THERAPEUTIC EXERCISES: CPT | Mod: GP | Performed by: PHYSICAL THERAPIST

## 2019-10-29 ENCOUNTER — THERAPY VISIT (OUTPATIENT)
Dept: PHYSICAL THERAPY | Facility: CLINIC | Age: 74
End: 2019-10-29
Payer: COMMERCIAL

## 2019-10-29 DIAGNOSIS — M25.511 ACUTE POSTOPERATIVE PAIN OF RIGHT SHOULDER: Primary | ICD-10-CM

## 2019-10-29 DIAGNOSIS — G89.18 ACUTE POSTOPERATIVE PAIN OF RIGHT SHOULDER: Primary | ICD-10-CM

## 2019-10-29 PROCEDURE — 97112 NEUROMUSCULAR REEDUCATION: CPT | Mod: GP | Performed by: PHYSICAL THERAPIST

## 2019-10-29 PROCEDURE — 97110 THERAPEUTIC EXERCISES: CPT | Mod: GP | Performed by: PHYSICAL THERAPIST

## 2019-11-22 DIAGNOSIS — I10 BENIGN ESSENTIAL HYPERTENSION: ICD-10-CM

## 2019-11-22 RX ORDER — AMLODIPINE BESYLATE 5 MG/1
TABLET ORAL
Qty: 90 TABLET | Refills: 1 | Status: SHIPPED | OUTPATIENT
Start: 2019-11-22 | End: 2020-06-01

## 2019-11-22 NOTE — TELEPHONE ENCOUNTER
"Requested Prescriptions   Pending Prescriptions Disp Refills     amLODIPine (NORVASC) 5 MG tablet  Last Written Prescription Date:  5/8/2019  Last Fill Quantity: 90 tablet,  # refills: 1   Last office visit: 9/26/2019 with prescribing provider:  GUANAKO Cherry   Future Office Visit:     90 tablet 1       Calcium Channel Blockers Protocol  Passed - 11/22/2019 11:50 AM        Passed - Blood pressure under 140/90 in past 12 months     BP Readings from Last 3 Encounters:   09/26/19 120/70   09/20/19 (!) 141/87   08/30/19 136/80                 Passed - Recent (12 mo) or future (30 days) visit within the authorizing provider's specialty     Patient has had an office visit with the authorizing provider or a provider within the authorizing providers department within the previous 12 mos or has a future within next 30 days. See \"Patient Info\" tab in inbasket, or \"Choose Columns\" in Meds & Orders section of the refill encounter.              Passed - Medication is active on med list        Passed - Patient is age 18 or older        Passed - Normal serum creatinine on file in past 12 months     Recent Labs   Lab Test 09/20/19  1510   CR 0.92                "

## 2019-11-25 DIAGNOSIS — M10.9 GOUT INVOLVING TOE, UNSPECIFIED CAUSE, UNSPECIFIED CHRONICITY, UNSPECIFIED LATERALITY: ICD-10-CM

## 2019-11-25 RX ORDER — ALLOPURINOL 300 MG/1
TABLET ORAL
Qty: 90 TABLET | Refills: 2 | Status: SHIPPED | OUTPATIENT
Start: 2019-11-25 | End: 2020-09-10

## 2019-11-25 NOTE — TELEPHONE ENCOUNTER
"Requested Prescriptions   Pending Prescriptions Disp Refills     allopurinol (ZYLOPRIM) 300 MG tablet [Pharmacy Med Name: ALLOPURINOL 300MG   TAB]  Last Written Prescription Date:  5/8/2019  Last Fill Quantity: 90 tablet,  # refills: 1   Last office visit: 9/26/2019 with prescribing provider:  GUANAKO Cherry   Future Office Visit:      0     Sig: TAKE 1 TABLET BY MOUTH ONCE DAILY       Gout Agents Protocol Passed - 11/25/2019  9:24 AM        Passed - CBC on file in past 12 months     Recent Labs   Lab Test 09/26/19  1417   WBC 7.4   RBC 3.91*   HGB 13.1*   HCT 38.6*   *                 Passed - ALT on file in past 12 months     Recent Labs   Lab Test 09/20/19  1510   ALT 41             Passed - Has Uric Acid on file in past 12 months and value is less than 6     Recent Labs   Lab Test 08/30/19  1218   URIC 1.5*     If level is 6mg/dL or greater, ok to refill one time and refer to provider.           Passed - Recent (12 mo) or future (30 days) visit within the authorizing provider's specialty     Patient has had an office visit with the authorizing provider or a provider within the authorizing providers department within the previous 12 mos or has a future within next 30 days. See \"Patient Info\" tab in inbasket, or \"Choose Columns\" in Meds & Orders section of the refill encounter.              Passed - Medication is active on med list        Passed - Patient is age 18 or older        Passed - Normal serum creatinine on file in the past 12 months     Recent Labs   Lab Test 09/20/19  1510   CR 0.92                "

## 2019-12-03 DIAGNOSIS — I10 BENIGN ESSENTIAL HYPERTENSION: ICD-10-CM

## 2019-12-03 NOTE — TELEPHONE ENCOUNTER
"Requested Prescriptions   Pending Prescriptions Disp Refills     spironolactone (ALDACTONE) 25 MG tablet [Pharmacy Med Name: SPIRONOLACTONE 25MG    TAB]  Last Written Prescription Date:  12/17/2018  Last Fill Quantity: 90 tablet,  # refills: 2   Last office visit: 9/26/2019 with prescribing provider:  GUANAKO Cherry   Future Office Visit:     90 tablet 2     Sig: TAKE 1 TABLET BY MOUTH ONCE DAILY. APPOINTMENT REQUIRED FOR FUTURE REFILLS       Diuretics (Including Combos) Protocol Passed - 12/3/2019  9:09 AM        Passed - Blood pressure under 140/90 in past 12 months     BP Readings from Last 3 Encounters:   09/26/19 120/70   09/20/19 (!) 141/87   08/30/19 136/80                 Passed - Recent (12 mo) or future (30 days) visit within the authorizing provider's specialty     Patient has had an office visit with the authorizing provider or a provider within the authorizing providers department within the previous 12 mos or has a future within next 30 days. See \"Patient Info\" tab in inbasket, or \"Choose Columns\" in Meds & Orders section of the refill encounter.              Passed - Medication is active on med list        Passed - Patient is age 18 or older        Passed - Normal serum creatinine on file in past 12 months     Recent Labs   Lab Test 09/20/19  1510   CR 0.92              Passed - Normal serum potassium on file in past 12 months     Recent Labs   Lab Test 09/20/19  1510   POTASSIUM 3.9                    Passed - Normal serum sodium on file in past 12 months     Recent Labs   Lab Test 09/20/19  1510                    "

## 2019-12-06 RX ORDER — SPIRONOLACTONE 25 MG/1
TABLET ORAL
Qty: 90 TABLET | Refills: 2 | Status: SHIPPED | OUTPATIENT
Start: 2019-12-06 | End: 2020-09-29

## 2019-12-12 ENCOUNTER — ONCOLOGY VISIT (OUTPATIENT)
Dept: ONCOLOGY | Facility: CLINIC | Age: 74
End: 2019-12-12
Attending: INTERNAL MEDICINE
Payer: COMMERCIAL

## 2019-12-12 ENCOUNTER — APPOINTMENT (OUTPATIENT)
Dept: LAB | Facility: CLINIC | Age: 74
End: 2019-12-12
Attending: INTERNAL MEDICINE
Payer: COMMERCIAL

## 2019-12-12 VITALS
HEART RATE: 85 BPM | BODY MASS INDEX: 31.54 KG/M2 | TEMPERATURE: 98.1 F | WEIGHT: 201.4 LBS | OXYGEN SATURATION: 97 % | RESPIRATION RATE: 20 BRPM | SYSTOLIC BLOOD PRESSURE: 133 MMHG | DIASTOLIC BLOOD PRESSURE: 77 MMHG

## 2019-12-12 DIAGNOSIS — D69.3 IDIOPATHIC THROMBOCYTOPENIC PURPURA (H): ICD-10-CM

## 2019-12-12 DIAGNOSIS — E61.1 IRON DEFICIENCY: ICD-10-CM

## 2019-12-12 DIAGNOSIS — K21.9 GASTROESOPHAGEAL REFLUX DISEASE WITHOUT ESOPHAGITIS: ICD-10-CM

## 2019-12-12 LAB
ALBUMIN SERPL-MCNC: 3.5 G/DL (ref 3.4–5)
ALP SERPL-CCNC: 76 U/L (ref 40–150)
ALT SERPL W P-5'-P-CCNC: 52 U/L (ref 0–70)
ANION GAP SERPL CALCULATED.3IONS-SCNC: 6 MMOL/L (ref 3–14)
AST SERPL W P-5'-P-CCNC: 47 U/L (ref 0–45)
BASOPHILS # BLD AUTO: 0.1 10E9/L (ref 0–0.2)
BASOPHILS NFR BLD AUTO: 0.8 %
BILIRUB SERPL-MCNC: 1.5 MG/DL (ref 0.2–1.3)
BUN SERPL-MCNC: 6 MG/DL (ref 7–30)
CALCIUM SERPL-MCNC: 8.8 MG/DL (ref 8.5–10.1)
CHLORIDE SERPL-SCNC: 110 MMOL/L (ref 94–109)
CO2 SERPL-SCNC: 26 MMOL/L (ref 20–32)
CREAT SERPL-MCNC: 0.67 MG/DL (ref 0.66–1.25)
DIFFERENTIAL METHOD BLD: ABNORMAL
EOSINOPHIL # BLD AUTO: 0.1 10E9/L (ref 0–0.7)
EOSINOPHIL NFR BLD AUTO: 1.6 %
ERYTHROCYTE [DISTWIDTH] IN BLOOD BY AUTOMATED COUNT: 13.5 % (ref 10–15)
FERRITIN SERPL-MCNC: 22 NG/ML (ref 26–388)
GFR SERPL CREATININE-BSD FRML MDRD: >90 ML/MIN/{1.73_M2}
GLUCOSE SERPL-MCNC: 115 MG/DL (ref 70–99)
HCT VFR BLD AUTO: 43.5 % (ref 40–53)
HGB BLD-MCNC: 14.3 G/DL (ref 13.3–17.7)
IMM GRANULOCYTES # BLD: 0.1 10E9/L (ref 0–0.4)
IMM GRANULOCYTES NFR BLD: 1.3 %
IRON SATN MFR SERPL: 51 % (ref 15–46)
IRON SERPL-MCNC: 222 UG/DL (ref 35–180)
LYMPHOCYTES # BLD AUTO: 0.9 10E9/L (ref 0.8–5.3)
LYMPHOCYTES NFR BLD AUTO: 15 %
MCH RBC QN AUTO: 31.2 PG (ref 26.5–33)
MCHC RBC AUTO-ENTMCNC: 32.9 G/DL (ref 31.5–36.5)
MCV RBC AUTO: 95 FL (ref 78–100)
MONOCYTES # BLD AUTO: 0.7 10E9/L (ref 0–1.3)
MONOCYTES NFR BLD AUTO: 10.5 %
NEUTROPHILS # BLD AUTO: 4.4 10E9/L (ref 1.6–8.3)
NEUTROPHILS NFR BLD AUTO: 70.8 %
NRBC # BLD AUTO: 0 10*3/UL
NRBC BLD AUTO-RTO: 0 /100
PLATELET # BLD AUTO: 65 10E9/L (ref 150–450)
POTASSIUM SERPL-SCNC: 3.5 MMOL/L (ref 3.4–5.3)
PROT SERPL-MCNC: 7.1 G/DL (ref 6.8–8.8)
RBC # BLD AUTO: 4.59 10E12/L (ref 4.4–5.9)
SODIUM SERPL-SCNC: 142 MMOL/L (ref 133–144)
TIBC SERPL-MCNC: 438 UG/DL (ref 240–430)
WBC # BLD AUTO: 6.2 10E9/L (ref 4–11)

## 2019-12-12 PROCEDURE — 83540 ASSAY OF IRON: CPT | Performed by: INTERNAL MEDICINE

## 2019-12-12 PROCEDURE — 80053 COMPREHEN METABOLIC PANEL: CPT | Performed by: INTERNAL MEDICINE

## 2019-12-12 PROCEDURE — 85025 COMPLETE CBC W/AUTO DIFF WBC: CPT | Performed by: INTERNAL MEDICINE

## 2019-12-12 PROCEDURE — G0463 HOSPITAL OUTPT CLINIC VISIT: HCPCS | Mod: ZF

## 2019-12-12 PROCEDURE — 99214 OFFICE O/P EST MOD 30 MIN: CPT | Mod: GC | Performed by: INTERNAL MEDICINE

## 2019-12-12 PROCEDURE — 36415 COLL VENOUS BLD VENIPUNCTURE: CPT

## 2019-12-12 PROCEDURE — 83550 IRON BINDING TEST: CPT | Performed by: INTERNAL MEDICINE

## 2019-12-12 PROCEDURE — 82728 ASSAY OF FERRITIN: CPT | Performed by: INTERNAL MEDICINE

## 2019-12-12 RX ORDER — HEPARIN SODIUM,PORCINE 10 UNIT/ML
5 VIAL (ML) INTRAVENOUS
Status: CANCELLED | OUTPATIENT
Start: 2019-12-12

## 2019-12-12 RX ORDER — HEPARIN SODIUM (PORCINE) LOCK FLUSH IV SOLN 100 UNIT/ML 100 UNIT/ML
5 SOLUTION INTRAVENOUS
Status: CANCELLED | OUTPATIENT
Start: 2019-12-12

## 2019-12-12 RX ORDER — OMEPRAZOLE 40 MG/1
CAPSULE, DELAYED RELEASE ORAL
Qty: 90 CAPSULE | Refills: 3 | Status: SHIPPED | OUTPATIENT
Start: 2019-12-12 | End: 2021-03-25

## 2019-12-12 ASSESSMENT — PAIN SCALES - GENERAL: PAINLEVEL: MODERATE PAIN (5)

## 2019-12-12 NOTE — LETTER
12/12/2019       RE: Raymon Ace  [address]  [facility] [address]     Dear Colleague,    Thank you for referring your patient, Raymon Ace, to the [facility]. Please see a copy of my visit note below.    HEMATOLOGY CLINIC VISIT    Raymon is a 74-year-old male with chronic ITP and a history of unprovoked pulmonary embolism in April 2017 for which he is maintained on long-term anticoagulation.  He is here today for routine follow-up.  He continues on Promacta 75 mg daily and rivaroxaban 10 mg daily.    We last saw him 7 months ago.  Raymon had a right shoulder replacement in September 2019.  He required booster of steroids to keep his platelets above 100.  He is a still recovering from the surgery.  His wife reported that he had a lot of bruising after surgery.  He reports that he has been feeling really tired in the past few months.  He continues to have mild bleeding from hemorrhoids.  He came back to [place] from [place] for this visit and for [place].  He plans to go back to [place] in early January.  His appetite has been fair.  His weight is stable.  He has not been very active.  He denies any bleeding or black stool.  He denies fever, chills, shortness of breath, or chest pain.    Exam:  /77 (BP Location: Right arm, Patient Position: Sitting, Cuff Size: Adult Regular)   Pulse 85   Temp 98.1  F (36.7  C) (Oral)   Resp 20   Wt 91.4 kg (201 lb 6.4 oz)   SpO2 97%   BMI 31.54 kg/m     Constitutional: Awake and alert, sitting in chair, not in acute distress.  Eyes: No scleral icterus. Eyes exhibit no discharge.  ENT/Mouth: Oral mucosa pink and moist  Cardiovascular: Normal rate, regular rhythm, S1, S2.  Systolic murmur. 1+ LE edema.  Respiratory: No respiratory distress. Clear to auscultation bilaterally. No wheezes.  Gastrointestinal: Soft. No distension. No tenderness or garding.  Neurological: AAOX3, grossly non-focal  Psychiatric: Mentation and affect appear  normal.  Skin: Skin is warm, not diaphoretic.  Some petechiae in the lower legs.  Hematologic/Lymphatic/Immunologic: No overt bleeding. No lymphadenopathy    Labs:  CBC   Lab Results   Component Value Date/Time    WBC 6.2 12/12/2019 02:22 PM    HGB 14.3 12/12/2019 02:22 PM    HCT 43.5 12/12/2019 02:22 PM    PLT 65 (L) 12/12/2019 02:22 PM    MCV 95 12/12/2019 02:22 PM    RBC 4.59 12/12/2019 02:22 PM    ANEU 4.4 12/12/2019 02:22 PM     BMP  Lab Results   Component Value Date/Time     12/12/2019 02:22 PM    POTASSIUM 3.5 12/12/2019 02:22 PM    CHLORIDE 110 (H) 12/12/2019 02:22 PM    CO2 26 12/12/2019 02:22 PM    BUN 6 (L) 12/12/2019 02:22 PM    CR 0.67 12/12/2019 02:22 PM    PHILIPPE 8.8 12/12/2019 02:22 PM     LFTs  Lab Results   Component Value Date    PROTTOTAL 7.1 12/12/2019    ALBUMIN 3.5 12/12/2019    AST 47 (H) 12/12/2019    ALT 52 12/12/2019    BILITOTAL 1.5 (H) 12/12/2019    DBIL 0.1 10/18/2018    ALKPHOS 76 12/12/2019       LFTs  Lab Results   Component Value Date    PROTTOTAL 7.1 12/12/2019    ALBUMIN 3.5 12/12/2019    AST 47 (H) 12/12/2019    ALT 52 12/12/2019    BILITOTAL 1.5 (H) 12/12/2019    DBIL 0.1 10/18/2018    ALKPHOS 76 12/12/2019     Ferritin 22  Iron 222  Iron binding capacity 438  Iron saturation 51    ASSESSMENT / PLAN:  1.  Chronic ITP -- Raymon's platelet count is 65,000 today, close to baseline - in the 70,000 to 100,000 range. He responded well to pulse dexamethasone prior to his shoulder surgery, with his platelet count increasing in the normal range.  He should continue on the same dose of Promacta (75 mg daily) for now.     2. History of unprovoked pulmonary embolism (April 2017) -- He has done well on long-term anticoagulation with rivaroxaban, which he should continue.  He is on the prophylactic dose of 10 mg daily due to a combination of mild chronic renal insufficiency and underlying liver disease.    3.  Iron deficiency anemia -- His hemoglobin has been OK, but he is iron deficient  again. Ferritin is low at 22 today, although iron saturation is 51. This is likely due to chronic occult blood loss from portal hypertensive gastropathy, hemorrhoids, and bleeding from recent surgery. We previously referred him to colorectal surgery for consultation regarding hemorrhoids; decision was made for conservative management. Will plan to give him Injectafer in the next couple of weeks before he returns to [place]. This will likely help his fatigue.     We will see him back in the spring (May 2020) when he returns from [place].    Staffed with Dr. Sommer Smith MD, PhD  Hem/Onc Fellow    HEMATOLOGY STAFF:  Seen with fellow, whose note reflects our joint evaluation, assessment, and plan.    Waylon Novoa MD  Associate Professor of Medicine  Division of Hematology, Oncology, and Transplantation  Director, [facility]

## 2019-12-12 NOTE — NURSING NOTE
Chief Complaint   Patient presents with     Blood Draw     Labs drawn via  by RN in lab. VS taken.      Cecilia Triplett RN

## 2019-12-12 NOTE — PROGRESS NOTES
HEMATOLOGY CLINIC VISIT    Raymon is a 74-year-old male with chronic ITP and a history of unprovoked pulmonary embolism in April 2017 for which he is maintained on long-term anticoagulation.  He is here today for routine follow-up.  He continues on Promacta 75 mg daily and rivaroxaban 10 mg daily.    We last saw him 7 months ago.  Raymon had a right shoulder replacement in September 2019.  He required booster of steroids to keep his platelets above 100.  He is a still recovering from the surgery.  His wife reported that he had a lot of bruising after surgery.  He reports that he has been feeling really tired in the past few months.  He continues to have mild bleeding from hemorrhoids.  He came back to [place] from [place] for this visit and for [place].  He plans to go back to [place] in early January.  His appetite has been fair.  His weight is stable.  He has not been very active.  He denies any bleeding or black stool.  He denies fever, chills, shortness of breath, or chest pain.    Exam:  /77 (BP Location: Right arm, Patient Position: Sitting, Cuff Size: Adult Regular)   Pulse 85   Temp 98.1  F (36.7  C) (Oral)   Resp 20   Wt 91.4 kg (201 lb 6.4 oz)   SpO2 97%   BMI 31.54 kg/m    Constitutional: Awake and alert, sitting in chair, not in acute distress.  Eyes: No scleral icterus. Eyes exhibit no discharge.  ENT/Mouth: Oral mucosa pink and moist  Cardiovascular: Normal rate, regular rhythm, S1, S2.  Systolic murmur. 1+ LE edema.  Respiratory: No respiratory distress. Clear to auscultation bilaterally. No wheezes.  Gastrointestinal: Soft. No distension. No tenderness or garding.  Neurological: AAOX3, grossly non-focal  Psychiatric: Mentation and affect appear normal.  Skin: Skin is warm, not diaphoretic.  Some petechiae in the lower legs.  Hematologic/Lymphatic/Immunologic: No overt bleeding. No lymphadenopathy    Labs:  CBC   Lab Results   Component Value Date/Time    WBC 6.2 12/12/2019 02:22 PM    HGB  14.3 12/12/2019 02:22 PM    HCT 43.5 12/12/2019 02:22 PM    PLT 65 (L) 12/12/2019 02:22 PM    MCV 95 12/12/2019 02:22 PM    RBC 4.59 12/12/2019 02:22 PM    ANEU 4.4 12/12/2019 02:22 PM     BMP  Lab Results   Component Value Date/Time     12/12/2019 02:22 PM    POTASSIUM 3.5 12/12/2019 02:22 PM    CHLORIDE 110 (H) 12/12/2019 02:22 PM    CO2 26 12/12/2019 02:22 PM    BUN 6 (L) 12/12/2019 02:22 PM    CR 0.67 12/12/2019 02:22 PM    PHILIPPE 8.8 12/12/2019 02:22 PM     LFTs  Lab Results   Component Value Date    PROTTOTAL 7.1 12/12/2019    ALBUMIN 3.5 12/12/2019    AST 47 (H) 12/12/2019    ALT 52 12/12/2019    BILITOTAL 1.5 (H) 12/12/2019    DBIL 0.1 10/18/2018    ALKPHOS 76 12/12/2019       LFTs  Lab Results   Component Value Date    PROTTOTAL 7.1 12/12/2019    ALBUMIN 3.5 12/12/2019    AST 47 (H) 12/12/2019    ALT 52 12/12/2019    BILITOTAL 1.5 (H) 12/12/2019    DBIL 0.1 10/18/2018    ALKPHOS 76 12/12/2019     Ferritin 22  Iron 222  Iron binding capacity 438  Iron saturation 51      ASSESSMENT / PLAN:  1.  Chronic ITP -- Raymon's platelet count is 65,000 today, close to baseline - in the 70,000 to 100,000 range. He responded well to pulse dexamethasone prior to his shoulder surgery, with his platelet count increasing in the normal range.  He should continue on the same dose of Promacta (75 mg daily) for now.     2. History of unprovoked pulmonary embolism (April 2017) -- He has done well on long-term anticoagulation with rivaroxaban, which he should continue.  He is on the prophylactic dose of 10 mg daily due to a combination of mild chronic renal insufficiency and underlying liver disease.    3.  Iron deficiency anemia -- His hemoglobin has been OK, but he is iron deficient again. Ferritin is low at 22 today, although iron saturation is 51. This is likely due to chronic occult blood loss from portal hypertensive gastropathy, hemorrhoids, and bleeding from recent surgery. We previously referred him to colorectal  surgery for consultation regarding hemorrhoids; decision was made for conservative management. Will plan to give him Injectafer in the next couple of weeks before he returns to [place]. This will likely help his fatigue.     We will see him back in the spring (May 2020) when he returns from [place].    Staffed with Dr. Sommer Smith MD, PhD  Hem/Onc Fellow        HEMATOLOGY STAFF:  Seen with fellow, whose note reflects our joint evaluation, assessment, and plan.      Waylon Novoa MD  Associate Professor of Medicine  Division of Hematology, Oncology, and Transplantation  Director, [facility]

## 2019-12-12 NOTE — NURSING NOTE
"Oncology Rooming Note    December 12, 2019 2:49 PM   Raymon Ace is a 74 year old male who presents for:    Chief Complaint   Patient presents with     Blood Draw     Labs drawn via  by RN in lab. VS taken.      Oncology Clinic Visit     Return - Iron deficiency anemia due to chronic blood loss      Initial Vitals: /77 (BP Location: Right arm, Patient Position: Sitting, Cuff Size: Adult Regular)   Pulse 85   Temp 98.1  F (36.7  C) (Oral)   Resp 20   Wt 91.4 kg (201 lb 6.4 oz)   SpO2 97%   BMI 31.54 kg/m   Estimated body mass index is 31.54 kg/m  as calculated from the following:    Height as of 9/26/19: 1.702 m (5' 7\").    Weight as of this encounter: 91.4 kg (201 lb 6.4 oz). Body surface area is 2.08 meters squared.  Moderate Pain (5) Comment: Data Unavailable   No LMP for male patient.  Allergies reviewed: Yes  Medications reviewed: Yes    Medications: Medication refills not needed today.  Pharmacy name entered into [employer]:    [facility] - [place], [place] - [address] [facility] - [place], [place] - [address]  [facility] - [place], [place] - [address] [facility] - [place], [place] [address] [facility] - [place], [place] [address] [facility] [address] [facility] [place], [place] - [address] [facility] [phone]  [facility] - [place], [place] - [address]     Clinical concerns: Lab Results       Kb Mendez              "

## 2019-12-12 NOTE — TELEPHONE ENCOUNTER
"Requested Prescriptions   Pending Prescriptions Disp Refills     omeprazole (PRILOSEC) 40 MG DR capsule [Pharmacy Med Name: OMEPRAZOLE DR 40MG  CAP]    Last Written Prescription Date:  11/13/2018  Last Fill Quantity: 90 capsule,  # refills: 3   Last office visit: 9/26/2019 with prescribing provider:  GUANAKO Cherry   Future Office Visit:     90 capsule 3     Sig: TAKE 1 CAPSULE BY MOUTH ONCE DAILY APPOINTMENT  REQUIRED  FOR  FURTHER  REFILLS       PPI Protocol Passed - 12/12/2019  5:30 AM        Passed - Not on Clopidogrel (unless Pantoprazole ordered)        Passed - No diagnosis of osteoporosis on record        Passed - Recent (12 mo) or future (30 days) visit within the authorizing provider's specialty     Patient has had an office visit with the authorizing provider or a provider within the authorizing providers department within the previous 12 mos or has a future within next 30 days. See \"Patient Info\" tab in inbasket, or \"Choose Columns\" in Meds & Orders section of the refill encounter.              Passed - Medication is active on med list        Passed - Patient is age 18 or older           "

## 2019-12-18 ENCOUNTER — THERAPY VISIT (OUTPATIENT)
Dept: PHYSICAL THERAPY | Facility: CLINIC | Age: 74
End: 2019-12-18
Payer: COMMERCIAL

## 2019-12-18 DIAGNOSIS — Z96.611 AFTERCARE FOLLOWING RIGHT SHOULDER JOINT REPLACEMENT SURGERY: Primary | ICD-10-CM

## 2019-12-18 DIAGNOSIS — Z47.1 AFTERCARE FOLLOWING RIGHT SHOULDER JOINT REPLACEMENT SURGERY: Primary | ICD-10-CM

## 2019-12-18 PROCEDURE — 97110 THERAPEUTIC EXERCISES: CPT | Mod: GP | Performed by: PHYSICAL THERAPIST

## 2019-12-18 PROCEDURE — 97112 NEUROMUSCULAR REEDUCATION: CPT | Mod: GP | Performed by: PHYSICAL THERAPIST

## 2019-12-19 DIAGNOSIS — I10 ESSENTIAL HYPERTENSION: ICD-10-CM

## 2019-12-20 RX ORDER — LISINOPRIL 40 MG/1
TABLET ORAL
Qty: 90 TABLET | Refills: 2 | Status: SHIPPED | OUTPATIENT
Start: 2019-12-20 | End: 2020-10-09

## 2019-12-20 NOTE — TELEPHONE ENCOUNTER
"Requested Prescriptions   Pending Prescriptions Disp Refills     lisinopril (PRINIVIL/ZESTRIL) 40 MG tablet [Pharmacy Med Name: Lisinopril 40 MG Oral Tablet]  Last Written Prescription Date:  6/10/2019  Last Fill Quantity: 90 tablet,  # refills: 1   Last office visit: 9/26/2019 with prescribing provider:  GUANAKO Cherry   Future Office Visit:      0     Sig: TAKE 1 TABLET BY MOUTH ONCE DAILY       ACE Inhibitors (Including Combos) Protocol Passed - 12/19/2019  8:53 PM        Passed - Blood pressure under 140/90 in past 12 months     BP Readings from Last 3 Encounters:   12/12/19 133/77   09/26/19 120/70   09/20/19 (!) 141/87                 Passed - Recent (12 mo) or future (30 days) visit within the authorizing provider's specialty     Patient has had an office visit with the authorizing provider or a provider within the authorizing providers department within the previous 12 mos or has a future within next 30 days. See \"Patient Info\" tab in inbasket, or \"Choose Columns\" in Meds & Orders section of the refill encounter.              Passed - Medication is active on med list        Passed - Patient is age 18 or older        Passed - Normal serum creatinine on file in past 12 months     Recent Labs   Lab Test 12/12/19  1422   CR 0.67             Passed - Normal serum potassium on file in past 12 months     Recent Labs   Lab Test 12/12/19  1422   POTASSIUM 3.5                "

## 2019-12-27 ENCOUNTER — OFFICE VISIT (OUTPATIENT)
Dept: FAMILY MEDICINE | Facility: CLINIC | Age: 74
End: 2019-12-27
Payer: COMMERCIAL

## 2019-12-27 VITALS
TEMPERATURE: 97 F | DIASTOLIC BLOOD PRESSURE: 78 MMHG | RESPIRATION RATE: 16 BRPM | SYSTOLIC BLOOD PRESSURE: 136 MMHG | BODY MASS INDEX: 31.23 KG/M2 | WEIGHT: 199 LBS | HEART RATE: 80 BPM | HEIGHT: 67 IN

## 2019-12-27 DIAGNOSIS — K70.9 ALCOHOLIC LIVER DAMAGE (H): ICD-10-CM

## 2019-12-27 DIAGNOSIS — N18.30 CKD (CHRONIC KIDNEY DISEASE) STAGE 3, GFR 30-59 ML/MIN (H): ICD-10-CM

## 2019-12-27 DIAGNOSIS — F10.10 ALCOHOL ABUSE: ICD-10-CM

## 2019-12-27 DIAGNOSIS — I51.7 CARDIOMEGALY: ICD-10-CM

## 2019-12-27 DIAGNOSIS — F32.5 MAJOR DEPRESSION IN COMPLETE REMISSION (H): ICD-10-CM

## 2019-12-27 DIAGNOSIS — Z00.00 ENCOUNTER FOR MEDICARE ANNUAL WELLNESS EXAM: Primary | ICD-10-CM

## 2019-12-27 DIAGNOSIS — K76.0 FATTY LIVER: ICD-10-CM

## 2019-12-27 DIAGNOSIS — G89.29 CHRONIC BILATERAL LOW BACK PAIN WITH LEFT-SIDED SCIATICA: ICD-10-CM

## 2019-12-27 DIAGNOSIS — I10 ESSENTIAL HYPERTENSION: ICD-10-CM

## 2019-12-27 DIAGNOSIS — K76.6 PORTAL HYPERTENSION (H): ICD-10-CM

## 2019-12-27 DIAGNOSIS — E66.09 CLASS 1 OBESITY DUE TO EXCESS CALORIES WITH SERIOUS COMORBIDITY AND BODY MASS INDEX (BMI) OF 31.0 TO 31.9 IN ADULT: ICD-10-CM

## 2019-12-27 DIAGNOSIS — M54.42 CHRONIC BILATERAL LOW BACK PAIN WITH LEFT-SIDED SCIATICA: ICD-10-CM

## 2019-12-27 DIAGNOSIS — R23.8 LONGITUDINAL RIDGING OF NAIL: ICD-10-CM

## 2019-12-27 DIAGNOSIS — E66.811 CLASS 1 OBESITY DUE TO EXCESS CALORIES WITH SERIOUS COMORBIDITY AND BODY MASS INDEX (BMI) OF 31.0 TO 31.9 IN ADULT: ICD-10-CM

## 2019-12-27 PROCEDURE — 99397 PER PM REEVAL EST PAT 65+ YR: CPT | Performed by: FAMILY MEDICINE

## 2019-12-27 PROCEDURE — 99214 OFFICE O/P EST MOD 30 MIN: CPT | Mod: 25 | Performed by: FAMILY MEDICINE

## 2019-12-27 ASSESSMENT — MIFFLIN-ST. JEOR: SCORE: 1596.29

## 2019-12-27 NOTE — PROGRESS NOTES
"SUBJECTIVE:   Raymon Ace is a 74 year old male who presents for Preventive Visit.  click delete button to remove this line now  click delete button to remove this line now  Are you in the first 12 months of your Medicare Part B coverage?  No    Physical Health:    In general, how would you rate your overall physical health? poor    Outside of work, how many days during the week do you exercise? none    Outside of work, approximately how many minutes a day do you exercise?not applicable  If you drink alcohol do you typically have >3 drinks per day or >7 drinks per week? Yes - AUDIT SCORE:       No flowsheet data found.    Do you usually eat at least 4 servings of fruit and vegetables a day, include whole grains & fiber and avoid regularly eating high fat or \"junk\" foods? Yes    Do you have any problems taking medications regularly?  No    Do you have any side effects from medications? none    Needs assistance for the following daily activities: no assistance needed    Which of the following safety concerns are present in your home?  none identified     Hearing impairment: Yes, Difficulty following a conversation in a noisy restaurant or crowded room.    Feel that people are mumbling or not speaking clearly.    Difficulty following dialogue in the theater.    Need to ask people to speak up or repeat themselves.    Find that men's voices are easier to understand than women's.    Difficulty understanding soft or whispered speech.    Difficulty understanding speech on the telephone.    In the past 6 months, have you been bothered by leaking of urine? no    Hypertension Follow-up      Do you check your blood pressure regularly outside of the clinic? No    Here < 140/80    Are you following a low salt diet? No    Are your blood pressures ever more than 140 on the top number (systolic) OR more   than 90 on the bottom number (diastolic), for example 140/90? No   Has cardiomegaly per CXR since 2017     Chronic Kidney " Disease :Stage 3 Follow-up    Do you take any over the counter pain medicine?: Yes  What over the counter medicine are you taking for your pain?:  q wk       How often do you take this medicine?:  A few times a week            Chronic Low Back Pain with bilat Sciatica since 1980s s/po mult surgeries       Duration: above        Specific cause: none    Description:   Location of pain: low back bilateral  Character of pain: sharp and dull ache  Pain radiation:radiates into the right foot and radiates into the left foot  New numbness or weakness in legs, not attributed to pain:  no     Intensity: Currently 9/10    History:   Pain interferes with job: No  History of back problems: yes  Any previous MRI or X-rays: Yes- at [facility].  Date   Sees a specialist for back pain:  Yes, Dr. Singh -last ov 5-19 , no contact or appointment made at this time  Therapies tried without relief: chiropractor, cold, heat, massage, muscle relaxants, NSAIDs, opioids, Physical Therapy and surgery    Alleviating factors:   Improved by:   0    Precipitating factors:  Worsened by: Lifting, Bending, Standing, Sitting, Lying Flat, Walking and Coughing          Accompanying Signs & Symptoms:  Risk of Fracture:  None  Risk of Cauda Equina:  None  Risk of Infection:  None  Risk of Cancer:  None  Risk of Ankylosing Spondylitis:  Onset at age <35, male, AND morning back stiffness. no              NONMORBID OBESITY    -BMI = 32  -comorbid HTN, CKD , fiver disease from alcohol and obesity with portal HTN   -sedentary     Depression and Anxiety Follow-Up    How are you doing with your depression since your last visit? No change-in remission in 8-19 and stable     How are you doing with your anxiety since your last visit?  No change    Are you having other symptoms that might be associated with depression or anxiety? No    Have you had a significant life event? No     Do you have any concerns with your use of alcohol or other drugs? Yes:  daily use      Social History     Tobacco Use     Smoking status: Former Smoker     Packs/day: 1.00     Years: 28.00     Pack years: 28.00     Types: Cigarettes     Start date:      Last attempt to quit: 1982     Years since quittin.2     Smokeless tobacco: Former User   Substance Use Topics     Alcohol use: Yes     Alcohol/week: 5.0 standard drinks     Types: 5 Shots of liquor per week     Comment: 4-5 drinks/day, alcohol use disorder     Drug use: No     PHQ 2018   PHQ-9 Total Score 0 19 0   Q9: Thoughts of better off dead/self-harm past 2 weeks Not at all Not at all Not at all     VERITO-7 SCORE 2017   Total Score 0 0 0         Abnormalities of finger nails  : vertical ridging and spooning       Duration: increasing over the last yrs     Description  Location: above   Itching: no    Intensity:  moderate    Accompanying signs and symptoms: None    History (similar episodes/previous evaluation): None    Precipitating or alleviating factors:  New exposures:  None  Recent travel: no      Therapies tried and outcome: none    DAILY USE OF ALCOHOL ( up to 5 drinks whiskey a day ) with resultant Fatty LIver, Portal HTN ,       Duration: since teens     Description (location/character/radiation): above     Intensity:  moderate    Accompanying signs and symptoms: fatigue     History (similar episodes/previous evaluation): None    Precipitating or alleviating factors: None    Therapies tried and outcome: sees GI but refuses to  Quit drinking         Chronic Kidney Disease :Stage 3 Follow-up      Do you take any over the counter pain medicine?: No   -comorbid HTN, CKD , fiver disease from alcohol and obesity with portal HTN         Mental Health:    In general, how would you rate your overall mental or emotional health? fair  PHQ-2 Score:      Do you feel safe in your environment? Yes    Have you ever done Advance Care Planning? (For example, a Health Directive, POLST, or a  discussion with a medical provider or your loved ones about your wishes): Yes, patient states has an Advance Care Planning document and will bring a copy to the clinic.    Additional concerns to address?  No    Fall risk:  Fallen 2 or more times in the past year?: Yes  Any fall with injury in the past year?: No  Timed Up and Go Test (>13.5 is fall risk; contact physician) : 9  click delete button to remove this line now  Cognitive Screenin) Repeat 3 items (Leader, Season, Table)    2) Clock draw: NORMAL  3) 3 item recall: Recalls 2 objects   Results: NORMAL clock, 1-2 items recalled: COGNITIVE IMPAIRMENT LESS LIKELY    Mini-CogTM Copyright S Julita. Licensed by the author for use in [facility]; reprinted with permission (walter@[facility]). All rights reserved.      Do you have sleep apnea, excessive snoring or daytime drowsiness?: yes        Reviewed and updated as needed this visit by clinical staff         Reviewed and updated as needed this visit by Provider        Social History     Tobacco Use     Smoking status: Former Smoker     Packs/day: 1.00     Years: 28.00     Pack years: 28.00     Types: Cigarettes     Start date:      Last attempt to quit: 1982     Years since quittin.2     Smokeless tobacco: Former User   Substance Use Topics     Alcohol use: Yes     Alcohol/week: 5.0 standard drinks     Types: 5 Shots of liquor per week     Comment: 4-5 drinks/day, alcohol use disorder                           Current providers sharing in care for this patient include:   Patient Care Team:  Rosanne Cherry MD as PCP - General (Family Practice)  Rosanne Cherry MD as Assigned PCP  Waylon Novoa MD as MD (Hematology)  Edel Zuluaga LPN as LPN (Hematology)    The following health maintenance items are reviewed in Epic and correct as of today:  Health Maintenance   Topic Date Due     URINE DRUG SCREEN  1945     ZOSTER IMMUNIZATION (2 of 3) 10/16/2014  "    MEDICARE ANNUAL WELLNESS VISIT  07/12/2019     LIPID  06/07/2020     MICROALBUMIN  08/30/2020     PHQ-9  08/30/2020     BMP  12/12/2020     FALL RISK ASSESSMENT  12/12/2020     ADVANCE CARE PLANNING  05/16/2021     DTAP/TDAP/TD IMMUNIZATION (3 - Td) 06/07/2029     COLONOSCOPY  07/31/2029     DEPRESSION ACTION PLAN  Completed     INFLUENZA VACCINE  Completed     PNEUMOCOCCAL IMMUNIZATION 65+ LOW/MEDIUM RISK  Completed     AORTIC ANEURYSM SCREENING (SYSTEM ASSIGNED)  Completed     IPV IMMUNIZATION  Aged Out     MENINGITIS IMMUNIZATION  Aged Out     Labs reviewed in EPIC      ROS:  CONSTITUTIONAL: NEGATIVE for fever, chills, change in weight POS fatigue   INTEGUMENTARY/SKIN: NEGATIVE for worrisome rashes, moles or lesions  EYES: NEGATIVE for vision changes or irritation  ENT/MOUTH: NEGATIVE for ear, mouth and throat problems  RESP: NEGATIVE for significant cough or SOB  BREAST: NEGATIVE for masses, tenderness or discharge  CV: NEGATIVE for chest pain, palpitations or peripheral edema  GI: NEGATIVE for nausea, abdominal pain, heartburn, or change in bowel habits  : NEGATIVE for frequency, dysuria, or hematuria  MUSCULOSKELETAL:POSITIVE  for , back pain, joint stiffness Rt shoulder  and radicular pain bilat legs   NEURO: NEGATIVE for weakness, dizziness or paresthesias  ENDOCRINE: NEGATIVE for temperature intolerance, skin/hair changes  HEME: NEGATIVE for bleeding problems  PSYCHIATRIC: NEGATIVE for changes in mood or affect    OBJECTIVE:   There were no vitals taken for this visit. Estimated body mass index is 31.54 kg/m  as calculated from the following:    Height as of 9/26/19: 1.702 m (5' 7\").    Weight as of 12/12/19: 91.4 kg (201 lb 6.4 oz).  EXAM:   GENERAL: healthy, alert and no distress  GENERAL: healthy, alert, no distress, obese, frail, elderly and fatigued  EYES: Eyes grossly normal to inspection, PERRL and conjunctivae and sclerae normal  NECK: no adenopathy, no asymmetry, masses, or scars and thyroid " "normal to palpation  RESP: lungs clear to auscultation - no rales, rhonchi or wheezes  CV: regular rate and rhythm, normal S1 S2, no S3 or S4, no murmur, click or rub, no peripheral edema and peripheral pulses strong  MS: no gross musculoskeletal defects noted, no edema  SKIN: no suspicious lesions or rashes  NEURO: Normal strength and tone, mentation intact and speech normal  PSYCH: mentation appears normal, affect normal/bright    Diagnostic Test Results:  Labs reviewed in Epic    ASSESSMENT / PLAN:       ICD-10-CM    1. Encounter for Medicare annual wellness exam Z00.00    2. Longitudinal ridging of nail + spooning of middle ones  R23.8    3. Chronic bilateral low back pain with bilat sided sciatica since 1988 w 5 surgeries- w last one in 10-18 M54.42     G89.29    4. Alcohol abuse F10.10    5. Essential hypertension I10    6. Cardiomegaly per 2017 CXR  I51.7    7. CKD (chronic kidney disease) stage 3, GFR 30-59 ml/min (H) N18.3    8. 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 Class 1 obesity due to excess calories with serious comorbidity and body mass index (BMI) of 31.0 to 31.9 in adult E66.09     Z68.31        COUNSELING:  Reviewed preventive health counseling, as reflected in patient instructions       Regular exercise       Healthy diet/nutrition       Vision screening    Estimated body mass index is 31.54 kg/m  as calculated from the following:    Height as of 9/26/19: 1.702 m (5' 7\").    Weight as of 12/12/19: 91.4 kg (201 lb 6.4 oz).    Weight management plan: Discussed healthy diet and exercise guidelines     reports that he quit smoking about 37 years ago. His smoking use included cigarettes. He started smoking about 62 years ago. He has a 28.00 pack-year smoking history. He has quit using smokeless tobacco.     Patient Instructions       Patient Education   Personalized Prevention Plan  You are due for the preventive services outlined below.  Your care team is available to assist you in " scheduling these services.  If you have already completed any of these items, please share that information with your care team to update in your medical record.  Health Maintenance Due   Topic Date Due     URINE DRUG SCREEN  1945     Zoster (Shingles) Vaccine (2 of 3) 10/16/2014     Annual Wellness Visit  07/12/2019      1.      Weight Loss Tips  1. Do not eat after 6 hrs before your expected bedtime  2. Have your heaviest meal for breakfast, a slightly lighter meal at lunch and a snack 6 hrs before bed  3. No sugar/calorie drinks except milk ie no fruit juice, pop, alcohol.  4. Drink milk 30min before meals to decrease your hunger. Also it is excellent as part of your last meal of the day snack  5. Drink lots of water  6. Increase fiber in diet: all bran cereal, salads, popcorn etc  7. Have only one small serving of fruit a day about 1/2 cup (as this is high in sugar)  8. EXERCISE is the bottom line. Without it, you will gain weight even on a low calorie diet. Best if done 2-3X a day as can    Being overweight contributes to high blood pressure and high cholesterol, both of which cause heart attacks, strokes and kidney failure, prediabetes and diabetes, arthritis, and liver disease     You must also decrease your caloric intake and especially decrease the carbs or carbohydrates as these are the most harmful regarding the above health risks    4. Shingrex is a 2 shot series that prevents shingles 97% of the time, as opposed to the old shingles shot that only prevented it at 40-50%  It costs less for medicare at a pharmacy  You should get it starting at 50 yrs old get the 2nd shot 5-6 mo after the first one    5. Stop drinking   It has elevated your blood sugar , damaged your liver, and your heart     6. Call Dr Singh and find out what to do next    In 5-19 he wanted to do anMRI  But I do not have results   Was it ever done?  After that, he was to eval the MRI  And perhaps send you to Dr Efren Leal   See if  you should see Dr Leal now     7. Review your pill bottles with your med list and correlate & let us know   In 6 mo when you return , bring in all pill bottles     8. For hair growth: minoxidil otc   Use as directed   This will slow the hair loss   But if you stop it , you are back to where you were when you started it         I  Explained the treatment and the reason for it    he states the drinking is not the problem , tho outlined as above # 5   His chief concern is the LBP with bilat sciatica --can hardly walk =constant pain   See above # 6 re he did not f//u with Dr Gramajo --needs to do this to see if anything else can be done       Appropriate preventive services were discussed with this patient, including applicable screening as appropriate for cardiovascular disease, diabetes, osteopenia/osteoporosis, and glaucoma.  As appropriate for age/gender, discussed screening for colorectal cancer, prostate cancer, breast cancer, and cervical cancer. Checklist reviewing preventive services available has been given to the patient.    Reviewed patients plan of care and provided an AVS. The Basic Care Plan (routine screening as documented in Health Maintenance) for Raymon meets the Care Plan requirement. This Care Plan has been established and reviewed with the Patient.    Counseling Resources:  ATP IV Guidelines  Pooled Cohorts Equation Calculator  Breast Cancer Risk Calculator  FRAX Risk Assessment  ICSI Preventive Guidelines  Dietary Guidelines for Americans, 2010  USDA's MyPlate  ASA Prophylaxis  Lung CA Screening    Rosanne Cherry MD  [facility]

## 2019-12-27 NOTE — PATIENT INSTRUCTIONS
Patient Education   Personalized Prevention Plan  You are due for the preventive services outlined below.  Your care team is available to assist you in scheduling these services.  If you have already completed any of these items, please share that information with your care team to update in your medical record.  Health Maintenance Due   Topic Date Due     URINE DRUG SCREEN  1945     Zoster (Shingles) Vaccine (2 of 3) 10/16/2014     Annual Wellness Visit  07/12/2019      1.      Weight Loss Tips  1. Do not eat after 6 hrs before your expected bedtime  2. Have your heaviest meal for breakfast, a slightly lighter meal at lunch and a snack 6 hrs before bed  3. No sugar/calorie drinks except milk ie no fruit juice, pop, alcohol.  4. Drink milk 30min before meals to decrease your hunger. Also it is excellent as part of your last meal of the day snack  5. Drink lots of water  6. Increase fiber in diet: all bran cereal, salads, popcorn etc  7. Have only one small serving of fruit a day about 1/2 cup (as this is high in sugar)  8. EXERCISE is the bottom line. Without it, you will gain weight even on a low calorie diet. Best if done 2-3X a day as can    Being overweight contributes to high blood pressure and high cholesterol, both of which cause heart attacks, strokes and kidney failure, prediabetes and diabetes, arthritis, and liver disease     You must also decrease your caloric intake and especially decrease the carbs or carbohydrates as these are the most harmful regarding the above health risks    4. Shingrex is a 2 shot series that prevents shingles 97% of the time, as opposed to the old shingles shot that only prevented it at 40-50%  It costs less for medicare at a pharmacy  You should get it starting at 50 yrs old get the 2nd shot 5-6 mo after the first one    5. Stop drinking   It has elevated your blood sugar , damaged your liver, and your heart     6. Call Dr Singh and find out what to do next    In 5-19  he wanted to do anMRI  But I do not have results   Was it ever done?  After that, he was to eval the MRI  And perhaps send you to Dr Efren Leal   See if you should see Dr Leal now     7. Review your pill bottles with your med list and correlate & let us know   In 6 mo when you return , bring in all pill bottles     8. For hair growth: minoxidil otc   Use as directed   This will slow the hair loss   But if you stop it , you are back to where you were when you started it

## 2020-01-01 DIAGNOSIS — I10 BENIGN ESSENTIAL HYPERTENSION: ICD-10-CM

## 2020-01-02 ENCOUNTER — INFUSION THERAPY VISIT (OUTPATIENT)
Dept: INFUSION THERAPY | Facility: CLINIC | Age: 75
End: 2020-01-02
Attending: INTERNAL MEDICINE
Payer: COMMERCIAL

## 2020-01-02 VITALS
TEMPERATURE: 98.4 F | RESPIRATION RATE: 16 BRPM | DIASTOLIC BLOOD PRESSURE: 68 MMHG | SYSTOLIC BLOOD PRESSURE: 129 MMHG | HEART RATE: 72 BPM

## 2020-01-02 DIAGNOSIS — D50.0 IRON DEFICIENCY ANEMIA DUE TO CHRONIC BLOOD LOSS: Primary | ICD-10-CM

## 2020-01-02 PROCEDURE — 25800030 ZZH RX IP 258 OP 636: Performed by: INTERNAL MEDICINE

## 2020-01-02 PROCEDURE — 25000128 H RX IP 250 OP 636: Performed by: INTERNAL MEDICINE

## 2020-01-02 PROCEDURE — 96365 THER/PROPH/DIAG IV INF INIT: CPT

## 2020-01-02 RX ORDER — HEPARIN SODIUM,PORCINE 10 UNIT/ML
5 VIAL (ML) INTRAVENOUS
Status: CANCELLED | OUTPATIENT
Start: 2020-01-09

## 2020-01-02 RX ORDER — CLONIDINE HYDROCHLORIDE 0.3 MG/1
0.3 TABLET ORAL 2 TIMES DAILY
Qty: 180 TABLET | Refills: 3 | Status: SHIPPED | OUTPATIENT
Start: 2020-01-02 | End: 2021-05-12

## 2020-01-02 RX ORDER — HEPARIN SODIUM (PORCINE) LOCK FLUSH IV SOLN 100 UNIT/ML 100 UNIT/ML
5 SOLUTION INTRAVENOUS
Status: CANCELLED | OUTPATIENT
Start: 2020-01-09

## 2020-01-02 RX ADMIN — SODIUM CHLORIDE 250 ML: 9 INJECTION, SOLUTION INTRAVENOUS at 14:22

## 2020-01-02 RX ADMIN — FERRIC CARBOXYMALTOSE INJECTION 750 MG: 50 INJECTION, SOLUTION INTRAVENOUS at 14:28

## 2020-01-02 NOTE — PROGRESS NOTES
Infusion Nursing Note:  Raymon Ace presents today for Injectafer.    Patient seen by provider today: No   present during visit today: Not Applicable.    Note: N/A.    Intravenous Access:  Peripheral IV placed.    Treatment Conditions:  Not Applicable.      Post Infusion Assessment:  Patient tolerated infusion without incident.       Discharge Plan:   Patient discharged in stable condition accompanied by: spouse. Scheduled to return next Thur.    Douglas Luis RN

## 2020-01-06 ENCOUNTER — TELEPHONE (OUTPATIENT)
Dept: HEMATOLOGY | Facility: CLINIC | Age: 75
End: 2020-01-06

## 2020-01-06 NOTE — TELEPHONE ENCOUNTER
[facility] Prior Authorization Team   Phone: [phone]  Fax: [phone]    PA Initiation    Medication: Promacta 75mg tablets  Insurance Company: [employer] PART D - Phone [phone] Fax [phone]  Pharmacy Filling the Rx: [facility] - [place] [address]   Filling Pharmacy Phone: [phone]  Filling Pharmacy Fax: [phone]  Start Date: 1/6/2020        Prior Authorization Approval    Authorization Effective Date: 1/6/2020  Authorization Expiration Date: 12/31/2020  Medication: Promacta 75mg tablets  Approved Dose/Quantity: ud  Reference #: [id number]   Insurance Company: [employer] PART D - Phone [phone] Fax [phone]  Expected CoPay: $20     CoPay Card Available: No    Foundation Assistance Needed:    Which Pharmacy is filling the prescription (Not needed for infusion/clinic administered): [facility] - [place] [address]   Pharmacy Notified: Yes  Patient Notified: Yes

## 2020-01-07 ENCOUNTER — THERAPY VISIT (OUTPATIENT)
Dept: PHYSICAL THERAPY | Facility: CLINIC | Age: 75
End: 2020-01-07
Payer: COMMERCIAL

## 2020-01-07 DIAGNOSIS — M25.511 ACUTE PAIN OF RIGHT SHOULDER: ICD-10-CM

## 2020-01-07 DIAGNOSIS — Z96.611 AFTERCARE FOLLOWING RIGHT SHOULDER JOINT REPLACEMENT SURGERY: Primary | ICD-10-CM

## 2020-01-07 DIAGNOSIS — Z47.1 AFTERCARE FOLLOWING RIGHT SHOULDER JOINT REPLACEMENT SURGERY: Primary | ICD-10-CM

## 2020-01-07 PROCEDURE — 97110 THERAPEUTIC EXERCISES: CPT | Mod: GP | Performed by: PHYSICAL THERAPIST

## 2020-01-09 ENCOUNTER — INFUSION THERAPY VISIT (OUTPATIENT)
Dept: INFUSION THERAPY | Facility: CLINIC | Age: 75
End: 2020-01-09
Attending: INTERNAL MEDICINE
Payer: COMMERCIAL

## 2020-01-09 VITALS
DIASTOLIC BLOOD PRESSURE: 58 MMHG | TEMPERATURE: 98.5 F | HEART RATE: 68 BPM | SYSTOLIC BLOOD PRESSURE: 114 MMHG | RESPIRATION RATE: 16 BRPM

## 2020-01-09 DIAGNOSIS — D50.0 IRON DEFICIENCY ANEMIA DUE TO CHRONIC BLOOD LOSS: Primary | ICD-10-CM

## 2020-01-09 PROCEDURE — 25000128 H RX IP 250 OP 636: Performed by: INTERNAL MEDICINE

## 2020-01-09 PROCEDURE — 96374 THER/PROPH/DIAG INJ IV PUSH: CPT

## 2020-01-09 PROCEDURE — 25800030 ZZH RX IP 258 OP 636: Performed by: INTERNAL MEDICINE

## 2020-01-09 RX ORDER — HEPARIN SODIUM,PORCINE 10 UNIT/ML
5 VIAL (ML) INTRAVENOUS
Status: CANCELLED | OUTPATIENT
Start: 2020-01-09

## 2020-01-09 RX ORDER — HEPARIN SODIUM (PORCINE) LOCK FLUSH IV SOLN 100 UNIT/ML 100 UNIT/ML
5 SOLUTION INTRAVENOUS
Status: CANCELLED | OUTPATIENT
Start: 2020-01-09

## 2020-01-09 RX ADMIN — FERRIC CARBOXYMALTOSE INJECTION 750 MG: 50 INJECTION, SOLUTION INTRAVENOUS at 14:50

## 2020-01-09 RX ADMIN — SODIUM CHLORIDE 250 ML: 9 INJECTION, SOLUTION INTRAVENOUS at 14:48

## 2020-01-09 NOTE — PROGRESS NOTES
Infusion Nursing Note:  Raymon Ace presents today for Injectafer.    Patient seen by provider today: No   present during visit today: Not Applicable.    Note: N/A.    Intravenous Access:  Peripheral IV placed.    Treatment Conditions:  Not Applicable.      Post Infusion Assessment:  Patient tolerated infusion without incident. Observed for 30min.      Discharge Plan:   Patient discharged in stable condition accompanied by: wife. Observed for 30min.    Douglas Luis RN

## 2020-01-21 ENCOUNTER — THERAPY VISIT (OUTPATIENT)
Dept: PHYSICAL THERAPY | Facility: CLINIC | Age: 75
End: 2020-01-21
Payer: COMMERCIAL

## 2020-01-21 DIAGNOSIS — M25.511 ACUTE PAIN OF RIGHT SHOULDER: Primary | ICD-10-CM

## 2020-01-21 PROCEDURE — 97110 THERAPEUTIC EXERCISES: CPT | Mod: GP | Performed by: PHYSICAL THERAPIST

## 2020-01-21 NOTE — LETTER
[facility] [place] [facility]  [address]  [place] [id number] [phone]    2020    Re: Raymon Ace   :   1945  MRN:  [id number]   REFERRING PHYSICIAN:   [facility] [place] [facility]    Date of Initial Evaluation:  10/11/19  Visits:  Rxs Used: 7  Reason for Referral:  Acute pain of right shoulder    PROGRESS  REPORT  Progress reporting period is from 19 to 20.     SUBJECTIVE  Subjective: No change with overall sx's. Limited pain with referred sx's to the R elbow, despite backing to supine RTC program only.   Current Pain level: 10   Initial Pain level: 8/10   Changes in function: Yes, see goal flow sheet for change in function   Adverse reactions: None     OBJECTIVE  Objective: L cx sidebend reproduces R shoulder pain .     AROM to 60, ER 45, IR 80. Assisted flexion 110-115.   Back off to icing, assisted reverse pendulum and ceiling punch.   Consider cx SB R only for pain relief.  Patient will be leaving to winter in [place] for the next 3-4 months.     ASSESSMENT/PLAN  Updated problem list and treatment plan: Diagnosis 1:  R shoulder pain    STG/LTGs have been met or progress has been made towards goals:  Yes (See Goal flow sheet completed today.)  Assessment of Progress: The patient's condition is improving.  The patient's condition has potential to improve.  Self Management Plans:  Patient has been instructed in a home treatment program.    Recommendations:  This patient would benefit from further evaluation.  Follow up in clinic after returning from [place].     Thank you for your referral.    INQUIRIES  Therapist: Abad Mortensen, PT, ATC, Cert. MDT  [facility] [place] [facility]  [address] [place] [address]  Phone: [phone]  Fax: [phone]

## 2020-01-21 NOTE — PROGRESS NOTES
Subjective:  HPI                    Objective:  System    Physical Exam    General     ROS    Assessment/Plan:    PROGRESS  REPORT    Progress reporting period is from 12/20/19 to 1/22/20.     SUBJECTIVE  Subjective: No change with overall sx's. Limited pain with referred sx's to the R elbow, despite backing to supine RTC program only.     Current Pain level: 4/10   Initial Pain level: 8/10   Changes in function: Yes, see goal flow sheet for change in function   Adverse reactions: None;   ,         OBJECTIVE  Objective: L cx sidebend reproduces R shoulder pain .       AROM to 60, ER 45, IR 80. Assisted flexion 110-115.     Back off to icing, assisted reverse pendulum and ceiling punch.     Consider cx SB R only for pain relief.         Patient will be leaving to winter in [place] for the next 3-4 months.     ASSESSMENT/PLAN  Updated problem list and treatment plan: Diagnosis 1:  R shoulder pain        STG/LTGs have been met or progress has been made towards goals:  Yes (See Goal flow sheet completed today.)  Assessment of Progress: The patient's condition is improving.  The patient's condition has potential to improve.  Self Management Plans:  Patient has been instructed in a home treatment program.      Recommendations:  This patient would benefit from further evaluation.      Follow up in clinic after returning from [place].   Please refer to the daily flowsheet for treatment today, total treatment time and time spent performing 1:1 timed codes.

## 2020-02-14 DIAGNOSIS — F10.10 ALCOHOL ABUSE: ICD-10-CM

## 2020-02-14 RX ORDER — FOLIC ACID 1 MG/1
TABLET ORAL
Qty: 90 TABLET | Refills: 2 | Status: SHIPPED | OUTPATIENT
Start: 2020-02-14 | End: 2020-12-21

## 2020-02-14 NOTE — TELEPHONE ENCOUNTER
"Last office visit 12/27/2019.    folic acid (FOLVITE) 1 MG tablet 90 tablet 2 4/30/2019         Requested Prescriptions   Pending Prescriptions Disp Refills     folic acid (FOLVITE) 1 MG tablet [Pharmacy Med Name: Folic Acid 1 MG Oral Tablet]  0     Sig: TAKE 1 TABLET BY MOUTH ONCE DAILY       Vitamin Supplements (Adult) Protocol Passed - 2/14/2020  2:04 PM        Passed - High dose Vitamin D not ordered        Passed - Recent (12 mo) or future (30 days) visit within the authorizing provider's specialty     Patient has had an office visit with the authorizing provider or a provider within the authorizing providers department within the previous 12 mos or has a future within next 30 days. See \"Patient Info\" tab in inbasket, or \"Choose Columns\" in Meds & Orders section of the refill encounter.              Passed - Medication is active on med list        Prescription approved per [facility] Refill Protocol.    "

## 2020-05-12 ENCOUNTER — TRANSFERRED RECORDS (OUTPATIENT)
Dept: HEALTH INFORMATION MANAGEMENT | Facility: CLINIC | Age: 75
End: 2020-05-12

## 2020-05-21 ENCOUNTER — VIRTUAL VISIT (OUTPATIENT)
Dept: ONCOLOGY | Facility: CLINIC | Age: 75
End: 2020-05-21
Attending: INTERNAL MEDICINE
Payer: COMMERCIAL

## 2020-05-21 VITALS — BODY MASS INDEX: 28.56 KG/M2 | WEIGHT: 182 LBS | HEIGHT: 67 IN

## 2020-05-21 DIAGNOSIS — R63.4 WEIGHT LOSS: ICD-10-CM

## 2020-05-21 DIAGNOSIS — D69.3 IDIOPATHIC THROMBOCYTOPENIC PURPURA (H): ICD-10-CM

## 2020-05-21 DIAGNOSIS — Z79.01 LONG TERM CURRENT USE OF ANTICOAGULANT THERAPY: ICD-10-CM

## 2020-05-21 DIAGNOSIS — Z86.718 PERSONAL HISTORY OF VENOUS THROMBOSIS AND EMBOLISM: ICD-10-CM

## 2020-05-21 DIAGNOSIS — D69.3 IDIOPATHIC THROMBOCYTOPENIC PURPURA (H): Primary | ICD-10-CM

## 2020-05-21 DIAGNOSIS — E61.1 IRON DEFICIENCY: ICD-10-CM

## 2020-05-21 LAB
ALBUMIN SERPL-MCNC: 2.7 G/DL (ref 3.4–5)
ALP SERPL-CCNC: 115 U/L (ref 40–150)
ALT SERPL W P-5'-P-CCNC: 33 U/L (ref 0–70)
ANION GAP SERPL CALCULATED.3IONS-SCNC: 5 MMOL/L (ref 3–14)
AST SERPL W P-5'-P-CCNC: 31 U/L (ref 0–45)
BASOPHILS # BLD AUTO: 0.1 10E9/L (ref 0–0.2)
BASOPHILS NFR BLD AUTO: 0.5 %
BILIRUB SERPL-MCNC: 1.5 MG/DL (ref 0.2–1.3)
BUN SERPL-MCNC: 7 MG/DL (ref 7–30)
CALCIUM SERPL-MCNC: 9 MG/DL (ref 8.5–10.1)
CHLORIDE SERPL-SCNC: 105 MMOL/L (ref 94–109)
CO2 SERPL-SCNC: 26 MMOL/L (ref 20–32)
CREAT SERPL-MCNC: 0.75 MG/DL (ref 0.66–1.25)
DIFFERENTIAL METHOD BLD: ABNORMAL
EOSINOPHIL # BLD AUTO: 0 10E9/L (ref 0–0.7)
EOSINOPHIL NFR BLD AUTO: 0.2 %
ERYTHROCYTE [DISTWIDTH] IN BLOOD BY AUTOMATED COUNT: 13.6 % (ref 10–15)
FERRITIN SERPL-MCNC: 610 NG/ML (ref 26–388)
GFR SERPL CREATININE-BSD FRML MDRD: 89 ML/MIN/{1.73_M2}
GLUCOSE SERPL-MCNC: 123 MG/DL (ref 70–99)
HCT VFR BLD AUTO: 38 % (ref 40–53)
HGB BLD-MCNC: 12.5 G/DL (ref 13.3–17.7)
IMM GRANULOCYTES # BLD: 0.7 10E9/L (ref 0–0.4)
IMM GRANULOCYTES NFR BLD: 3.6 %
IRON SATN MFR SERPL: 79 % (ref 15–46)
IRON SERPL-MCNC: 203 UG/DL (ref 35–180)
LYMPHOCYTES # BLD AUTO: 1.1 10E9/L (ref 0.8–5.3)
LYMPHOCYTES NFR BLD AUTO: 5.9 %
MCH RBC QN AUTO: 32.3 PG (ref 26.5–33)
MCHC RBC AUTO-ENTMCNC: 32.9 G/DL (ref 31.5–36.5)
MCV RBC AUTO: 98 FL (ref 78–100)
MONOCYTES # BLD AUTO: 1 10E9/L (ref 0–1.3)
MONOCYTES NFR BLD AUTO: 5.1 %
NEUTROPHILS # BLD AUTO: 16 10E9/L (ref 1.6–8.3)
NEUTROPHILS NFR BLD AUTO: 84.7 %
NRBC # BLD AUTO: 0 10*3/UL
NRBC BLD AUTO-RTO: 0 /100
PLATELET # BLD AUTO: 160 10E9/L (ref 150–450)
POTASSIUM SERPL-SCNC: 4.1 MMOL/L (ref 3.4–5.3)
PROT SERPL-MCNC: 7.6 G/DL (ref 6.8–8.8)
RBC # BLD AUTO: 3.87 10E12/L (ref 4.4–5.9)
SODIUM SERPL-SCNC: 136 MMOL/L (ref 133–144)
TIBC SERPL-MCNC: 256 UG/DL (ref 240–430)
WBC # BLD AUTO: 18.9 10E9/L (ref 4–11)

## 2020-05-21 PROCEDURE — 40000114 ZZH STATISTIC NO CHARGE CLINIC VISIT

## 2020-05-21 PROCEDURE — 82728 ASSAY OF FERRITIN: CPT | Performed by: INTERNAL MEDICINE

## 2020-05-21 PROCEDURE — 85025 COMPLETE CBC W/AUTO DIFF WBC: CPT | Performed by: INTERNAL MEDICINE

## 2020-05-21 PROCEDURE — 36415 COLL VENOUS BLD VENIPUNCTURE: CPT | Performed by: INTERNAL MEDICINE

## 2020-05-21 PROCEDURE — 83550 IRON BINDING TEST: CPT | Performed by: INTERNAL MEDICINE

## 2020-05-21 PROCEDURE — 99215 OFFICE O/P EST HI 40 MIN: CPT | Mod: 95 | Performed by: INTERNAL MEDICINE

## 2020-05-21 PROCEDURE — 83540 ASSAY OF IRON: CPT | Performed by: INTERNAL MEDICINE

## 2020-05-21 PROCEDURE — 80053 COMPREHEN METABOLIC PANEL: CPT | Performed by: INTERNAL MEDICINE

## 2020-05-21 ASSESSMENT — MIFFLIN-ST. JEOR: SCORE: 1511.24

## 2020-05-21 NOTE — PROGRESS NOTES
HEMATOLOGY CLINIC VISIT    NOTE:  Due to the ongoing COVID-19 outbreak, this visit was conducted by telephone, with the patient's approval.    Raymon is a 74-year-old male with chronic ITP and a history of unprovoked pulmonary embolism in April 2017 for which he is maintained on long-term anticoagulation.  He has a history of recurrent iron deficiency anemia felt secondary to chronic occult blood loss from portal hypertensive gastropathy and hemorrhoids.  He also has underlying alcoholic liver disease.  He was scheduled today for routine follow-up visit.  I last saw him in December 2019.  He and his wife recently returned to [place], after spending the winter in [place].    When I last saw him, he was still recovering from shoulder surgery.  He also was iron deficient again and we arranged for a course of parenteral iron therapy in early January 2020 prior to [place].  He says that after receiving IV iron he felt much better.  However, about a month later he again began to feel unwell.  He has had persistent difficulty with eating secondary to a gagging sensation anytime he tries to eat solid food.  He denies anything actually getting stuck however.  He also has had diarrhea off and on over the last 4 months.  He has limited his diet to essentially liquids.  He has lost almost 20 pounds of weight over the last 5 months.  He complains of worsening fatigue, and says that much of how he feels now is similar to how he felt when he was iron deficient.  He is also having difficulty with recurrent back pain, and had a follow-up MRI scan yesterday which will be reviewed by his spine surgeon.    He denies any cough, chest pain, or shortness of breath.  He denies easy bruising although he did mention that he had gingival bleeding for about 3 to 4 weeks over the winter.  This issue has resolved.  He denies any blood in his stools or black tarry stools.    Physical exam:  Not done, telephone  visit.    Labs:  Comprehensive metabolic panel shows normal serum electrolytes, creatinine 0.75, albumin low at 2.7, LFTs normal except for a slightly elevated total bilirubin of 1.5 which is unchanged from December 2019.    He is not iron deficient, with a serum iron of 203, iron binding capacity 256, iron saturation 79%, and serum ferritin 610.    CBC shows an elevated white count of 18.9 with 85% neutrophils and an otherwise unremarkable differential.  Hemoglobin is 12.5, MCV 98.  Platelet count is above his typical baseline at 160,000.        ASSESSMENT / PLAN:  1.  Chronic ITP -- Raymon's baseline platelet count has been in the 70,000 to 100,000 range over the last 3 years.  He remains on Promacta 75 mg daily.  He is responsive to pulse dexamethasone and we have used this to increase his platelet count prior to surgeries.  At the present time, his platelet count is above his usual baseline.  However, we will make no change in his treatment given that he is on long-term anticoagulation and the goal is to maintain his platelet count consistently above 50,000 for that reason.    2. History of unprovoked pulmonary embolism (April 2017) -- He has done well on long-term anticoagulation with rivaroxaban, which he should continue.  He is on the prophylactic dose of 10 mg daily due to a combination of mild chronic renal insufficiency and underlying liver disease.  We will make no change in this part of his treatment plan today.    3.  Iron deficiency anemia -- This has been attributed to to chronic occult blood loss from portal hypertensive gastropathy, and hemorrhoids.  He denies any new bleeding symptoms or any obvious signs of GI bleeding in the last few months.  Current iron studies show that he is not iron deficient.    4.  GI symptoms -- I am concerned about Raymon's weight loss of 20 pounds over the last several months, along with his difficulty eating as described above.  His serum albumin is low and this is a  change from baseline.  In addition I do not have a clear explanation for his leukocytosis and his iron panel is consistent with an underlying inflammatory process.  Progression of his underlying liver disease is a concern, although his LFTs appear unchanged over the last several months and are normal except for a mildly elevated total bilirubin.    Review of his chart indicates that he had a colonoscopy in July 2019 which was remarkable only for external hemorrhoids and diverticulosis.  His last upper endoscopy was in February 2018 at which time he was found to have portal hypertensive gastropathy.    Due to his combination of weight loss, difficulty eating solid food, low albumin, and unexplained leukocytosis along with what appears to be a reactive increase in ferritin, further evaluation is needed.  He would prefer to coordinate this through Dr. Cherry who has been his longstanding primary care physician.  I will reach out to her to discuss coordinating further work-up such as an abdominal CT scan and/or upper endoscopy.      Total time 40 minutes, all in counseling and coordination of care.      Waylon Novoa MD  Associate Professor of Medicine  Division of Hematology, Oncology, and Transplantation  Director, [facility]

## 2020-05-21 NOTE — LETTER
"    5/21/2020         RE: Raymon Ace  [address] [facility] [address]        Dear Colleague,    Thank you for referring your patient, Raymon Ace, to the [facility]. Please see a copy of my visit note below.    Raymon Ace is a 74 year old male who is being evaluated via a billable telephone visit.      The patient has been notified of following:     \"This telephone visit will be conducted via a call between you and your physician/provider. We have found that certain health care needs can be provided without the need for a physical exam.  This service lets us provide the care you need with a short phone conversation.  If a prescription is necessary we can send it directly to your pharmacy.  If lab work is needed we can place an order for that and you can then stop by our lab to have the test done at a later time.    Telephone visits are billed at different rates depending on your insurance coverage. During this emergency period, for some insurers they may be billed the same as an in-person visit.  Please reach out to your insurance provider with any questions.    If during the course of the call the physician/provider feels a telephone visit is not appropriate, you will not be charged for this service.\"    Patient has given verbal consent for Telephone visit?  Yes    What phone number would you like to be contacted at? [phone]    How would you like to obtain your AVS? Chloé     Patient hasn't done his vitals for a while;     Jazmin Martin, [facility]                HEMATOLOGY CLINIC VISIT    NOTE:  Due to the ongoing COVID-19 outbreak, this visit was conducted by telephone, with the patient's approval.    Raymon is a 74-year-old male with chronic ITP and a history of unprovoked pulmonary embolism in April 2017 for which he is maintained on long-term anticoagulation.  He has a history of recurrent iron deficiency anemia felt secondary to chronic occult blood loss from portal " hypertensive gastropathy and hemorrhoids.  He also has underlying alcoholic liver disease.  He was scheduled today for routine follow-up visit.  I last saw him in December 2019.  He and his wife recently returned to [place], after spending the winter in [place].    When I last saw him, he was still recovering from shoulder surgery.  He also was iron deficient again and we arranged for a course of parenteral iron therapy in early January 2020 prior to his travels south [place] the [place].  He says that after receiving IV iron he felt much better.  However, about a month later he again began to feel unwell.  He has had persistent difficulty with eating secondary to a gagging sensation anytime he tries to eat solid food.  He denies anything actually getting stuck however.  He also has had diarrhea off and on over the last 4 months.  He has limited his diet to essentially liquids.  He has lost almost 20 pounds of weight over the last 5 months.  He complains of worsening fatigue, and says that much of how he feels now is similar to how he felt when he was iron deficient.  He is also having difficulty with recurrent back pain, and had a follow-up MRI scan yesterday which will be reviewed by his spine surgeon.    He denies any cough, chest pain, or shortness of breath.  He denies easy bruising although he did mention that he had gingival bleeding for about 3 to 4 weeks over the winter.  This issue has resolved.  He denies any blood in his stools or black tarry stools.    Physical exam:  Not done, telephone visit.    Labs:  Comprehensive metabolic panel shows normal serum electrolytes, creatinine 0.75, albumin low at 2.7, LFTs normal except for a slightly elevated total bilirubin of 1.5 which is unchanged from December 2019.    He is not iron deficient, with a serum iron of 203, iron binding capacity 256, iron saturation 79%, and serum ferritin 610.    CBC shows an elevated white count of 18.9 with 85% neutrophils and an  otherwise unremarkable differential.  Hemoglobin is 12.5, MCV 98.  Platelet count is above his typical baseline at 160,000.        ASSESSMENT / PLAN:  1.  Chronic ITP -- Raymon's baseline platelet count has been in the 70,000 to 100,000 range over the last 3 years.  He remains on Promacta 75 mg daily.  He is responsive to pulse dexamethasone and we have used this to increase his platelet count prior to surgeries.  At the present time, his platelet count is above his usual baseline.  However, we will make no change in his treatment given that he is on long-term anticoagulation and the goal is to maintain his platelet count consistently above 50,000 for that reason.    2. History of unprovoked pulmonary embolism (April 2017) -- He has done well on long-term anticoagulation with rivaroxaban, which he should continue.  He is on the prophylactic dose of 10 mg daily due to a combination of mild chronic renal insufficiency and underlying liver disease.  We will make no change in this part of his treatment plan today.    3.  Iron deficiency anemia -- This has been attributed to to chronic occult blood loss from portal hypertensive gastropathy, and hemorrhoids.  He denies any new bleeding symptoms or any obvious signs of GI bleeding in the last few months.  Current iron studies show that he is not iron deficient.    4.  GI symptoms -- I am concerned about Raymon's weight loss of 20 pounds over the last several months, along with his difficulty eating as described above.  His serum albumin is low and this is a change from baseline.  In addition I do not have a clear explanation for his leukocytosis and his iron panel is consistent with an underlying inflammatory process.  Progression of his underlying liver disease is a concern, although his LFTs appear unchanged over the last several months and are normal except for a mildly elevated total bilirubin.    Review of his chart indicates that he had a colonoscopy in July 2019 which  was remarkable only for external hemorrhoids and diverticulosis.  His last upper endoscopy was in February 2018 at which time he was found to have portal hypertensive gastropathy.    Due to his combination of weight loss, difficulty eating solid food, low albumin, and unexplained leukocytosis along with what appears to be a reactive increase in ferritin, further evaluation is needed.  He would prefer to coordinate this through Dr. Cherry who has been his longstanding primary care physician.  I will reach out to her to discuss coordinating further work-up such as an abdominal CT scan and/or upper endoscopy.      Total time 40 minutes, all in counseling and coordination of care.      Waylon Novoa MD  Associate Professor of Medicine  Division of Hematology, Oncology, and Transplantation  Director, [facility]

## 2020-05-21 NOTE — PROGRESS NOTES
"Raymon Ace is a 74 year old male who is being evaluated via a billable telephone visit.      The patient has been notified of following:     \"This telephone visit will be conducted via a call between you and your physician/provider. We have found that certain health care needs can be provided without the need for a physical exam.  This service lets us provide the care you need with a short phone conversation.  If a prescription is necessary we can send it directly to your pharmacy.  If lab work is needed we can place an order for that and you can then stop by our lab to have the test done at a later time.    Telephone visits are billed at different rates depending on your insurance coverage. During this emergency period, for some insurers they may be billed the same as an in-person visit.  Please reach out to your insurance provider with any questions.    If during the course of the call the physician/provider feels a telephone visit is not appropriate, you will not be charged for this service.\"    Patient has given verbal consent for Telephone visit?  Yes    What phone number would you like to be contacted at? [phone]    How would you like to obtain your AVS? Chloé     Patient hasn't done his vitals for a while;     Jazmin Martin [facility]              "

## 2020-05-30 DIAGNOSIS — I10 BENIGN ESSENTIAL HYPERTENSION: ICD-10-CM

## 2020-06-01 RX ORDER — AMLODIPINE BESYLATE 5 MG/1
TABLET ORAL
Qty: 90 TABLET | Refills: 1 | Status: SHIPPED | OUTPATIENT
Start: 2020-06-01 | End: 2021-05-12

## 2020-06-24 ENCOUNTER — PATIENT OUTREACH (OUTPATIENT)
Dept: ONCOLOGY | Facility: CLINIC | Age: 75
End: 2020-06-24

## 2020-06-24 NOTE — PROGRESS NOTES
Spoke with Raymon today.  Noted he had not followed up with Dr. Cherry as Dr. Novoa had suggested.  He stated he does remember the discussion.  He is not an avid mychart user and had not read the message sent by Dr. Cherry's team.    I asked Raymon to give his PCP clinic a call to get scheduled    Copied from Dr. Novoa:  Due to his combination of weight loss, difficulty eating solid food, low albumin, and unexplained leukocytosis along with what appears to be a reactive increase in ferritin, further evaluation is needed.  He would prefer to coordinate this through Dr. Cherry who has been his longstanding primary care physician.  I will reach out to her to discuss coordinating further work-up such as an abdominal CT scan and/or upper endoscopy.

## 2020-06-25 DIAGNOSIS — D69.3 IDIOPATHIC THROMBOCYTOPENIC PURPURA (H): ICD-10-CM

## 2020-06-25 DIAGNOSIS — D50.0 IRON DEFICIENCY ANEMIA DUE TO CHRONIC BLOOD LOSS: ICD-10-CM

## 2020-06-25 DIAGNOSIS — Z86.718 PERSONAL HISTORY OF VENOUS THROMBOSIS AND EMBOLISM: ICD-10-CM

## 2020-06-25 RX ORDER — RIVAROXABAN 10 MG/1
TABLET, FILM COATED ORAL
Qty: 30 TABLET | Refills: 11 | Status: SHIPPED | OUTPATIENT
Start: 2020-06-25 | End: 2021-06-10

## 2020-06-30 ENCOUNTER — THERAPY VISIT (OUTPATIENT)
Dept: PHYSICAL THERAPY | Facility: CLINIC | Age: 75
End: 2020-06-30
Payer: COMMERCIAL

## 2020-06-30 DIAGNOSIS — M25.511 CHRONIC RIGHT SHOULDER PAIN: ICD-10-CM

## 2020-06-30 DIAGNOSIS — G89.29 CHRONIC RIGHT SHOULDER PAIN: ICD-10-CM

## 2020-06-30 DIAGNOSIS — Z96.611 STATUS POST REVERSE TOTAL REPLACEMENT OF RIGHT SHOULDER: Primary | ICD-10-CM

## 2020-06-30 PROCEDURE — 97110 THERAPEUTIC EXERCISES: CPT | Mod: GP | Performed by: PHYSICAL THERAPIST
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
## 2020-07-02 ENCOUNTER — VIRTUAL VISIT (OUTPATIENT)
Dept: FAMILY MEDICINE | Facility: CLINIC | Age: 75
End: 2020-07-02
Payer: COMMERCIAL

## 2020-07-02 DIAGNOSIS — N18.30 CKD (CHRONIC KIDNEY DISEASE) STAGE 3, GFR 30-59 ML/MIN (H): ICD-10-CM

## 2020-07-02 DIAGNOSIS — K76.6 PORTAL HYPERTENSION (H): ICD-10-CM

## 2020-07-02 DIAGNOSIS — R73.01 IMPAIRED FASTING GLUCOSE: ICD-10-CM

## 2020-07-02 DIAGNOSIS — F10.10 ALCOHOL ABUSE: ICD-10-CM

## 2020-07-02 DIAGNOSIS — F32.5 MAJOR DEPRESSION IN COMPLETE REMISSION (H): ICD-10-CM

## 2020-07-02 DIAGNOSIS — Z79.01 LONG TERM CURRENT USE OF ANTICOAGULANT THERAPY: ICD-10-CM

## 2020-07-02 DIAGNOSIS — K76.0 FATTY LIVER: ICD-10-CM

## 2020-07-02 DIAGNOSIS — R63.4 WEIGHT LOSS: ICD-10-CM

## 2020-07-02 DIAGNOSIS — E66.3 OVERWEIGHT: ICD-10-CM

## 2020-07-02 DIAGNOSIS — I10 BENIGN ESSENTIAL HYPERTENSION: ICD-10-CM

## 2020-07-02 DIAGNOSIS — I26.99 OTHER ACUTE PULMONARY EMBOLISM WITHOUT ACUTE COR PULMONALE (H): ICD-10-CM

## 2020-07-02 DIAGNOSIS — D62 ANEMIA DUE TO BLOOD LOSS, ACUTE: ICD-10-CM

## 2020-07-02 DIAGNOSIS — K21.00 GASTROESOPHAGEAL REFLUX DISEASE WITH ESOPHAGITIS: ICD-10-CM

## 2020-07-02 DIAGNOSIS — R13.10 DYSPHAGIA, UNSPECIFIED TYPE: Primary | ICD-10-CM

## 2020-07-02 DIAGNOSIS — G63 POLYNEUROPATHY IN OTHER DISEASES CLASSIFIED ELSEWHERE (H): ICD-10-CM

## 2020-07-02 PROCEDURE — 99214 OFFICE O/P EST MOD 30 MIN: CPT | Mod: 95 | Performed by: FAMILY MEDICINE

## 2020-07-02 NOTE — PROGRESS NOTES
"Raymon Ace is a 74 year old male who is being evaluated via a billable telephone visit.      The patient has been notified of following:     \"This telephone visit will be conducted via a call between you and your physician/provider. We have found that certain health care needs can be provided without the need for a physical exam.  This service lets us provide the care you need with a short phone conversation.  If a prescription is necessary we can send it directly to your pharmacy.  If lab work is needed we can place an order for that and you can then stop by our lab to have the test done at a later time.    Telephone visits are billed at different rates depending on your insurance coverage. During this emergency period, for some insurers they may be billed the same as an in-person visit.  Please reach out to your insurance provider with any questions.    If during the course of the call the physician/provider feels a telephone visit is not appropriate, you will not be charged for this service.\"    Patient has given verbal consent for Telephone visit?  Yes    What phone number would you like to be contacted at? [phone]    How would you like to obtain your AVS? [place]    Subjective     Raymon Ace is a 74 year old male who presents via phone visit today for the following health issues:    HPI  Difficulty Swallowing = Dysphagia      Duration: Ongoing-worsening    Description (location/character/radiation): Problems swallowing meat.    Intensity:  moderate    Accompanying signs and symptoms: Weight loss    History (similar episodes/previous evaluation): Symptoms are intermittent    Precipitating or alleviating factors: None    Therapies tried and outcome: None    Hx of gerd         GERD/Heartburn      Duration: many yrs but worse lately and limiting po intake     Description (location/character/radiation): gerd and dysphagia    Intensity:  severe    Accompanying signs and symptoms:  food getting stuck: " YES  nausea/vomiting/blood: no but anemia from blood loss  abdominal pain: no   black/tarry or bloody stools: no :    History (similar episodes/previous evaluation): None    Precipitating or alleviating factors:  worse with alcohol.-which he abuses  current NSAID/Aspirin use: no --n xarelto    Therapies tried and outcome: Omeprazole (Prilosec)    Glucose Intolerance  Follow-up      How often are you checking your blood sugar? Not at all    What concerns do you have today about your diabetes? None     Do you have any of these symptoms? (Select all that apply)  No numbness or tingling in feet.  No redness, sores or blisters on feet.  No complaints of excessive thirst.  No reports of blurry vision.  No significant changes to weight.      BP Readings from Last 2 Encounters:   20 114/58   20 129/68     Hemoglobin A1C (%)   Date Value   2019 5.3   2016 5.4     LDL Cholesterol Calculated (mg/dL)   Date Value   2019 84   2018 119 (H)         Hypertension Follow-up      Do you check your blood pressure regularly outside of the clinic? No     Are you following a low salt diet? No    Are your blood pressures ever more than 140 on the top number (systolic) OR more   than 90 on the bottom number (diastolic), for example 140/90? No    Depression and Anxiety Follow-Up    How are you doing with your depression since your last visit? No change    How are you doing with your anxiety since your last visit?  No change    Are you having other symptoms that might be associated with depression or anxiety? No    Have you had a significant life event? No     Do you have any concerns with your use of alcohol or other drugs? Yes:  conts to use alcohol    Social History     Tobacco Use     Smoking status: Former Smoker     Packs/day: 1.00     Years: 28.00     Pack years: 28.00     Types: Cigarettes     Start date:      Last attempt to quit: 1982     Years since quittin.8     Smokeless tobacco:  Former User   Substance Use Topics     Alcohol use: Yes     Alcohol/week: 5.0 standard drinks     Types: 5 Shots of liquor per week     Comment: 4-5 drinks/day, alcohol use disorder     Drug use: No     PHQ 7/13/2018 7/19/2019 8/30/2019   PHQ-9 Total Score 0 19 0   Q9: Thoughts of better off dead/self-harm past 2 weeks Not at all Not at all Not at all     VERITO-7 SCORE 8/4/2017 7/13/2018 8/30/2019   Total Score 0 0 0           Chronic Kidney Disease 3 Follow-up      Do you take any over the counter pain medicine?: No     Comorbid HTN , glu intol,        POLYNEUROPATHY    Gradual onset over 10 yrs   -uses gabapentin   -glu intol *+& liver disease    PORTAL HYPERTENSION with FATTY LIVER DISEASE  From ALCOHOL ABUSE= lifelong     -now also with wt loss   -needs to see GI --has not wanted to in past     OVERWEIGHT    --BMI= 29  -prior was obese   -active      PULMONARY EMBOLUS     - on xarelto since 2018   -no recurrence     Reviewed and updated as needed this visit by Provider  Tobacco  Allergies  Meds  Problems  Med Hx  Surg Hx  Fam Hx         Review of Systems   CONSTITUTIONAL: NEGATIVE for fever, chills, change in weight POS for loss  INTEGUMENTARY/SKIN: NEGATIVE for worrisome rashes, moles or lesions  EYES: NEGATIVE for vision changes or irritation  ENT/MOUTH: NEGATIVE for ear, mouth and throat problems  RESP: NEGATIVE for significant cough or SOB  BREAST: NEGATIVE for masses, tenderness or discharge  CV: NEGATIVE for chest pain, palpitations or peripheral edema  GI: NEGATIVE for nausea, abdominal pain, POS heartburn, dysphagia no  change in bowel habits  : NEGATIVE for frequency, dysuria, or hematuria  MUSCULOSKELETAL: NEGATIVE for significant arthralgias or myalgia  NEURO: NEGATIVE for weakness, dizziness or paresthesias  ENDOCRINE: NEGATIVE for temperature intolerance, skin/hair changes  HEME: NEGATIVE for bleeding problems  PSYCHIATRIC: NEGATIVE for changes in mood or affect       Objective   Reported  vitals:  There were no vitals taken for this visit.   healthy, alert, no distress, cooperative, smiling and over weight  PSYCH: Alert and oriented times 3; coherent speech, normal   rate and volume, able to articulate logical thoughts, able   to abstract reason, no tangential thoughts, no hallucinations   or delusions  His affect is normal, pleasant and full  RESP: No cough, no audible wheezing, able to talk in full sentences  Remainder of exam unable to be completed due to telephone visits    Diagnostic Test Results:  Labs reviewed in Epic        Assessment/Plan:      ICD-10-CM    1. Dysphagia, unspecified type  R13.10 GASTROENTEROLOGY ADULT REF CONSULT ONLY   2. Portal hypertension (H)  K76.6 GASTROENTEROLOGY ADULT REF CONSULT ONLY   3. Polyneuropathy in other diseases classified elsewhere (H)  G63    4. CKD (chronic kidney disease) stage 3, GFR 30-59 ml/min (H)  N18.3    5. Other acute pulmonary embolism without acute cor pulmonale (H)  I26.99    6. Long term current use of anticoagulant therapy  Z79.01    7. Gastroesophageal reflux disease with esophagitis  K21.0 GASTROENTEROLOGY ADULT REF CONSULT ONLY   8. Weight loss  R63.4 GASTROENTEROLOGY ADULT REF CONSULT ONLY   9. Anemia due to blood loss, acute  D62 GASTROENTEROLOGY ADULT REF CONSULT ONLY   10. Alcohol abuse  F10.10 GASTROENTEROLOGY ADULT REF CONSULT ONLY   11. Fatty liver/ 1-14 US  K76.0 GASTROENTEROLOGY ADULT REF CONSULT ONLY   12. Benign essential hypertension  I10    13. Impaired fasting glucose  R73.01    14. Overweight  E66.3    15. Major depression in complete remission (H)  F32.5      Patient Instructions   Wife will be sure that pt sees GI re the dysphagia   And a liver specialist --as his onconlogist wants    2. colonoscopy due in 09-20    3.  Weight Loss Tips  1. Do not eat after 6 hrs before your expected bedtime  2. Have your heaviest meal for breakfast, a slightly lighter meal at lunch and a snack 6 hrs before bed  3. No sugar/calorie drinks  except milk ie no fruit juice, pop, alcohol.  4. Drink milk 30min before meals to decrease your hunger. Also it is excellent as part of your last meal of the day snack  5. Drink lots of water  6. Increase fiber in diet: all bran cereal, salads, popcorn etc  7. Have only one small serving of fruit a day about 1/2 cup (as this is high in sugar)  8. EXERCISE is the bottom line. Without it, you will gain weight even on a low calorie diet. Best if done 2-3X a day as can    Being overweight contributes to high blood pressure and high cholesterol, both of which cause heart attacks, strokes and kidney failure, prediabetes and diabetes, arthritis, and liver disease     You must also decrease your caloric intake and especially decrease the carbs or carbohydrates as these are the most harmful regarding the above health risks           Weight management plan: Discussed healthy diet and exercise guidelines    DISCUSSION     - pt with severe liver disease , long term alcohol abuse , iron deficiency anemia from blood loss ; gerd now with dysphagia ;p wt loss   -needs UGI endoscopy and work up of liver dis to see if is causing the above    Return in about 2 weeks (around 7/16/2020) for liver and GI specialists .      Phone call duration:  35 minutes    Rosanne Cherry MD

## 2020-07-05 NOTE — PATIENT INSTRUCTIONS
Wife will be sure that pt sees GI re the dysphagia   And a liver specialist --as his onconlogist wants    2. colonoscopy due in 09-20    3.  Weight Loss Tips  1. Do not eat after 6 hrs before your expected bedtime  2. Have your heaviest meal for breakfast, a slightly lighter meal at lunch and a snack 6 hrs before bed  3. No sugar/calorie drinks except milk ie no fruit juice, pop, alcohol.  4. Drink milk 30min before meals to decrease your hunger. Also it is excellent as part of your last meal of the day snack  5. Drink lots of water  6. Increase fiber in diet: all bran cereal, salads, popcorn etc  7. Have only one small serving of fruit a day about 1/2 cup (as this is high in sugar)  8. EXERCISE is the bottom line. Without it, you will gain weight even on a low calorie diet. Best if done 2-3X a day as can    Being overweight contributes to high blood pressure and high cholesterol, both of which cause heart attacks, strokes and kidney failure, prediabetes and diabetes, arthritis, and liver disease     You must also decrease your caloric intake and especially decrease the carbs or carbohydrates as these are the most harmful regarding the above health risks

## 2020-07-07 ENCOUNTER — TELEPHONE (OUTPATIENT)
Dept: GASTROENTEROLOGY | Facility: CLINIC | Age: 75
End: 2020-07-07

## 2020-07-07 NOTE — TELEPHONE ENCOUNTER
[facility] Call Center    Phone Message    May a detailed message be left on voicemail: yes     Reason for Call: Other: Per call from Mirela PT has a referral from [facility] to see GI. Per Mirela PT prefers phone visit but I can't pull up a schedule for the providers. Please review and call Mirela at the above #.      Action Taken: Message routed to:  [facility] GI Clinic    Travel Screening: Not Applicable

## 2020-07-07 NOTE — TELEPHONE ENCOUNTER
Contacted wife and patient scheduled on July 27 at 3 pm.     Refused video visit.    New esophageal patient.

## 2020-07-08 NOTE — TELEPHONE ENCOUNTER
RECORDS RECEIVED FROM: Internal    DATE RECEIVED: 7/27/20    NOTES STATUS DETAILS   OFFICE NOTE from referring provider Internal Virtual OV 7/2/20   MEDICATION LIST Internal         COLONOSCOPY  Upper EGD  Internal 7/31/19 2/8/18   PERTINENT LABS Internal    IMAGING (CT, MRI, EGD) Internal CT ABD PELVIS 11/18/18     REFERRAL INFORMATION    Date referral was placed: 7/27/20    Date all records received:    Date records were scanned into EPIC:    Date records were sent to Provider to review:    Date and recommendation received from provider:  LETTER SENT  SCHEDULE APPOINTMENT   Date patient was contacted to schedule: 7/7/20

## 2020-07-09 DIAGNOSIS — D69.3 IDIOPATHIC THROMBOCYTOPENIC PURPURA (H): ICD-10-CM

## 2020-07-22 ENCOUNTER — THERAPY VISIT (OUTPATIENT)
Dept: PHYSICAL THERAPY | Facility: CLINIC | Age: 75
End: 2020-07-22
Payer: COMMERCIAL

## 2020-07-22 DIAGNOSIS — Z96.611 STATUS POST REPLACEMENT OF RIGHT SHOULDER JOINT: Primary | ICD-10-CM

## 2020-07-22 PROCEDURE — 97010 HOT OR COLD PACKS THERAPY: CPT | Mod: GP | Performed by: PHYSICAL THERAPIST

## 2020-07-22 PROCEDURE — 97110 THERAPEUTIC EXERCISES: CPT | Mod: GP | Performed by: PHYSICAL THERAPIST
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
## 2020-07-27 ENCOUNTER — PRE VISIT (OUTPATIENT)
Dept: GASTROENTEROLOGY | Facility: CLINIC | Age: 75
End: 2020-07-27

## 2020-07-27 ENCOUNTER — VIRTUAL VISIT (OUTPATIENT)
Dept: GASTROENTEROLOGY | Facility: CLINIC | Age: 75
End: 2020-07-27
Payer: COMMERCIAL

## 2020-07-27 VITALS — HEIGHT: 66 IN | WEIGHT: 188 LBS | BODY MASS INDEX: 30.22 KG/M2

## 2020-07-27 DIAGNOSIS — I86.4 GASTRIC VARICES: ICD-10-CM

## 2020-07-27 DIAGNOSIS — R13.19 ESOPHAGEAL DYSPHAGIA: Primary | ICD-10-CM

## 2020-07-27 DIAGNOSIS — R43.2 DYSGEUSIA: ICD-10-CM

## 2020-07-27 DIAGNOSIS — R63.4 WEIGHT LOSS: ICD-10-CM

## 2020-07-27 DIAGNOSIS — E83.19 OTHER DISORDERS OF IRON METABOLISM: ICD-10-CM

## 2020-07-27 ASSESSMENT — MIFFLIN-ST. JEOR: SCORE: 1530.51

## 2020-07-27 NOTE — PROGRESS NOTES
"Raymon Ace is a 74 year old male who is being evaluated via a billable telephone visit.      The patient has been notified of following:     \"This telephone visit will be conducted via a call between you and your physician/provider. We have found that certain health care needs can be provided without the need for a physical exam.  This service lets us provide the care you need with a short phone conversation.  If a prescription is necessary we can send it directly to your pharmacy.  If lab work is needed we can place an order for that and you can then stop by our lab to have the test done at a later time.    Telephone visits are billed at different rates depending on your insurance coverage. During this emergency period, for some insurers they may be billed the same as an in-person visit.  Please reach out to your insurance provider with any questions.    If during the course of the call the physician/provider feels a telephone visit is not appropriate, you will not be charged for this service.\"    Patient has given verbal consent for Telephone visit?  Yes    What phone number would you like to be contacted at? [phone]    [facility]    Phone call duration: 15 minutes    Sebastian Monique MD      "

## 2020-07-27 NOTE — NURSING NOTE
"Chief Complaint   Patient presents with     Consult     New esophageal Pt       Vitals:    07/27/20 1429   Weight: 188 lb   Height: 5' 6\"       Body mass index is 30.34 kg/m .     Note:  Pt stated he only takes 9 different medications, but he does not know the names. [facility] when he gets a chance.      Nathan Serrano LPN                        "

## 2020-07-27 NOTE — LETTER
"    7/27/2020         RE: Raymon Ace  [address] [facility] [address]        Dear Colleague,    Thank you for referring your patient, Raymon Ace, to the [facility]. Please see a copy of my visit note below.    Raymon Ace is a 74 year old male who is being evaluated via a billable telephone visit.      Phone call duration: 15 minutes    Sebastian Monique MD          GASTROENTEROLOGY CONSULT  [facility]            Reason for Consultation:     Weight loss, Dysgeusia, Dysphagia           History of Present Illness:   Raymon Ace is a 74 year old male with a history ITP (?? Cirrhosis) PE on DOAC, Alcholism, Alcoholic liver disease and recent onset of solid food dysphagia according to the referring physicians notes.  The patient could not hear over the phone, almost completely.  He could here a few things I said and responded to those. However when asked to \"tell me about his problem repetitively with at least a minute of silence for him to respond, he repeated the phrase \"I cant eat, I eat soup, I gag on food, and so I go back to eating soup\".  He has lost about 15 lbs he said this has been going on, off and on, for a few years.  He was told he needed to have an upper endoscopy and to have his liver checked.  He had an EGD 2 years ago which revealed non-bleeding gastric varices.  He denies any other problem with the exception of feeling sick, losing weight, gagging.  He couldn't tell me whether he did or did not have a problem with food sticking as he ate.      Looking back in May he was seen and had a cbc with a wbc of 18K.  This has not been repeated.  IN addition, he has not had transaxial liver imaging or liver biopsy recently.                Past Medical History:     Past Medical History:   Diagnosis Date     Alcohol abuse since teens 01/15/2015    Still actively drinking     Alcoholic liver damage (H) 1/10/2014     Gastroesophageal reflux disease with esophagitis " 12/6/2016     Gout involving toe, unspecified cause, unspecified chronicity, unspecified laterality 6/2/2016     Heart murmur     heart is positioned farther on left side then typical     Hyperlipidemia 12/17/2015     Hypertension      ITP (idiopathic thrombocytopenic purpura)      Obstructive sleep apnea     does not use CPAP  machine     Pulmonary embolism (H) large RLL w infarctio 4-17 4/18/2017            Past Surgical History:     Past Surgical History:   Procedure Laterality Date     APPENDECTOMY  1956     BACK SURGERY  1992, 1996    trimmed L4-L5, Fusion L4-L5     BONE MARROW BIOPSY, BONE SPECIMEN, NEEDLE/TROCAR  10/24/2012    Procedure: BIOPSY BONE MARROW;  BONE MARROW BIOPSY WITH ASPIRATE (AREVALO ORDERING);  Surgeon: Terri Hickey MD;  Location:  GI     COLONOSCOPY N/A 7/31/2019    Procedure: COLONOSCOPY;  Surgeon: Sebastian Garcia MD;  Location:  GI     ESOPHAGOSCOPY, GASTROSCOPY, DUODENOSCOPY (EGD), COMBINED N/A 2/8/2018    Procedure: COMBINED ESOPHAGOSCOPY, GASTROSCOPY, DUODENOSCOPY (EGD);  EGD;  Surgeon: Terri Crouch MD;  Location:  GI     FACIAL RECONSTRUCTION SURGERY  1965    jaw fracture     HC TOOTH EXTRACTION W/FORCEP  1990s            Previous Endoscopy:     Colonoscopy 2019    Findings:        Hemorrhoids were found on perianal exam.        Multiple small and large-mouthed diverticula were found in the entire        colon.        Retroflexion in the right colon was performed.        The terminal ileum appeared normal.        The exam was otherwise without abnormality on direct and retroflexion        views. No internal hemorrhoids or rectal varices.                                                                                     Moderate Sedation:        Moderate (conscious) sedation was administered by the endoscopy nurse        and supervised by the endoscopist. The following parameters were        monitored: oxygen saturation, heart rate, blood pressure, and  response        to care. Total physician intraservice time was 35 minutes.   Impression:          - External hemorrhoids found on perianal exam, likely                        the source of intermittent bright red blood per rectum.                        - No rectal varices seen. No internal hemorrhoids seen                        on retroflex exam, however these can be missed by                        colonoscopy.                        - Diverticulosis in the entire examined colon.                        - The examined portion of the ileum was normal.                        - The examination was otherwise normal on direct and                        retroflexion views.                        - No specimens collected.         Social History:     Social History     Socioeconomic History     Marital status:      Spouse name: None     Number of children: None     Years of education: None     Highest education level: None   Occupational History     None   Social Needs     Financial resource strain: None     Food insecurity     Worry: None     Inability: None     Transportation needs     Medical: None     Non-medical: None   Tobacco Use     Smoking status: Former Smoker     Packs/day: 1.00     Years: 28.00     Pack years: 28.00     Types: Cigarettes     Start date:      Last attempt to quit: 1982     Years since quittin.8     Smokeless tobacco: Former User   Substance and Sexual Activity     Alcohol use: Yes     Alcohol/week: 5.0 standard drinks     Types: 5 Shots of liquor per week     Comment: 4-5 drinks/day, alcohol use disorder     Drug use: No     Sexual activity: Never     Partners: Female   Lifestyle     Physical activity     Days per week: None     Minutes per session: None     Stress: None   Relationships     Social connections     Talks on phone: None     Gets together: None     Attends Mandaeism service: None     Active member of club or organization: None     Attends meetings of clubs or  organizations: None     Relationship status: None     Intimate partner violence     Fear of current or ex partner: None     Emotionally abused: None     Physically abused: None     Forced sexual activity: None   Other Topics Concern     Parent/sibling w/ CABG, MI or angioplasty before 65F 55M? Yes   Social History Narrative     None            Family History:     Family History   Problem Relation Age of Onset     Unknown/Adopted Mother      Unknown/Adopted Father      Heart Disease Sister      Diabetes No family hx of      Coronary Artery Disease No family hx of      Hypertension No family hx of      Hyperlipidemia No family hx of      Cerebrovascular Disease No family hx of      Breast Cancer No family hx of      Colon Cancer No family hx of      Prostate Cancer No family hx of      Other Cancer No family hx of      Depression No family hx of      Anxiety Disorder No family hx of      Mental Illness No family hx of      Substance Abuse No family hx of      Anesthesia Reaction No family hx of      Asthma No family hx of      Osteoporosis No family hx of      Genetic Disorder No family hx of      Thyroid Disease No family hx of      Obesity No family hx of      No known history of colorectal cancer, liver disease, or inflammatory bowel disease.         Allergies:   Reviewed and edited as appropriate     Allergies   Allergen Reactions     Olmesartan Diarrhea            Medications:     Current Outpatient Medications   Medication Sig Dispense Refill     allopurinol (ZYLOPRIM) 300 MG tablet TAKE 1 TABLET BY MOUTH ONCE DAILY 90 tablet 2     amLODIPine (NORVASC) 5 MG tablet TAKE 1 TABLET BY MOUTH ONCE DAILY . APPOINTMENT REQUIRED FOR FUTURE REFILLS 90 tablet 1     ASPIRIN NOT PRESCRIBED (INTENTIONAL) Please choose reason not prescribed, below 1 each 0     cloNIDine (CATAPRES) 0.3 MG tablet Take 1 tablet (0.3 mg) by mouth 2 times daily 180 tablet 3     eltrombopag (PROMACTA) 75 MG tablet Take 1 tablet (75 mg) by mouth  "daily Administer on an empty stomach, 1 hour before or 2 hours after a meal. 90 tablet 3     folic acid (FOLVITE) 1 MG tablet TAKE 1 TABLET BY MOUTH ONCE DAILY 90 tablet 2     gabapentin (NEURONTIN) 300 MG capsule TAKE ONE CAPSULE BY MOUTH THREE TIMES DAILY 270 capsule 1     Glucos-Chond-Sterol-Fish Oil (GLUCOSAMINE CHONDROITIN PLUS PO) Take 1 tablet by mouth daily       lisinopril (PRINIVIL/ZESTRIL) 40 MG tablet TAKE 1 TABLET BY MOUTH ONCE DAILY 90 tablet 2     omeprazole (PRILOSEC) 40 MG DR capsule TAKE 1 CAPSULE BY MOUTH ONCE DAILY APPOINTMENT  REQUIRED  FOR  FURTHER  REFILLS 90 capsule 3     ondansetron (ZOFRAN ODT) 4 MG ODT tab Take 1-2 tablets (4-8 mg) by mouth every 8 hours as needed for nausea 12 tablet 0     spironolactone (ALDACTONE) 25 MG tablet TAKE 1 TABLET BY MOUTH ONCE DAILY. APPOINTMENT REQUIRED FOR FUTURE REFILLS 90 tablet 2     XARELTO ANTICOAGULANT 10 MG TABS tablet TAKE 1 TABLET BY MOUTH ONCE DAILY WITH  DINNER 30 tablet 11             Review of Systems:     A complete 10 point review of systems was performed and is negative except as noted in the HPI           Physical Exam:   Ht 1.676 m (5' 6\")   Wt 85.3 kg (188 lb)   BMI 30.34 kg/m    Wt:   Wt Readings from Last 2 Encounters:   07/27/20 85.3 kg (188 lb)   05/21/20 82.6 kg (182 lb)      Constitutional: cooperative, pleasant, not dyspneic/diaphoretic, no acute distress           Data:   EGD 2018   Findings:        The examined esophagus was normal.        Moderate portal hypertensive gastropathy was found in the entire        examined stomach.        Type 2 isolated gastric varices (IGV2, varices located in the body,        antrum or around the pylorus) with no bleeding were found in the gastric        antrum. There were no stigmata of recent bleeding.        The examined duodenum was normal.                                                                                     Impression:          - Normal esophagus.                        - " Portal hypertensive gastropathy. He has a long hisotyr                        of daily drinking and risk factors for fatty liver. This                        is likely cirrhosis. Will need evaluation with fibrosis                        scan and US with dopplers.                        - Type 2 isolated gastric varices (IGV2, varices located                        in the body, antrum or around the pylorus), without                        bleeding.                        - Normal examined duodenum.                        - No specimens collected.   Recommendation:      - Refer to a hepatologist at appointment to be scheduled.   Blood Work:    Other procedures:         ASSESSMENT AND RECOMMENDATIONS:   Assessment:  74 year old male with a history of portal hypertension likely related to alcoholic liver disease, elevated wbc, esophageal dysphagia, anorexia, weight loss, possible cirrhosis.  He has an elevated ferritin, iron sat, but per notes has had iron replacement.   Colonoscopy due in September this year.        Recommendations:  EGD and Colonscopy   Repeat CBC, LFT, INR, BMP, SPEP, TTG, IGA,   CT Chest abdomen and pelvis.     Follow up afterwards.        Sebastian Monique MD      Again, thank you for allowing me to participate in the care of your patient.        Sincerely,        Sebastian Monique MD

## 2020-07-28 NOTE — PROGRESS NOTES
"  GASTROENTEROLOGY CONSULT  [facility]            Reason for Consultation:     Weight loss, Dysgeusia, Dysphagia           History of Present Illness:   Raymon Ace is a 74 year old male with a history ITP (?? Cirrhosis) PE on DOAC, Alcholism, Alcoholic liver disease and recent onset of solid food dysphagia according to the referring physicians notes.  The patient could not hear over the phone, almost completely.  He could here a few things I said and responded to those. However when asked to \"tell me about his problem repetitively with at least a minute of silence for him to respond, he repeated the phrase \"I cant eat, I eat soup, I gag on food, and so I go back to eating soup\".  He has lost about 15 lbs he said this has been going on, off and on, for a few years.  He was told he needed to have an upper endoscopy and to have his liver checked.  He had an EGD 2 years ago which revealed non-bleeding gastric varices.  He denies any other problem with the exception of feeling sick, losing weight, gagging.  He couldn't tell me whether he did or did not have a problem with food sticking as he ate.      Looking back in May he was seen and had a cbc with a wbc of 18K.  This has not been repeated.  IN addition, he has not had transaxial liver imaging or liver biopsy recently.                Past Medical History:     Past Medical History:   Diagnosis Date     Alcohol abuse since teens 01/15/2015    Still actively drinking     Alcoholic liver damage (H) 1/10/2014     Gastroesophageal reflux disease with esophagitis 12/6/2016     Gout involving toe, unspecified cause, unspecified chronicity, unspecified laterality 6/2/2016     Heart murmur     heart is positioned farther on left side then typical     Hyperlipidemia 12/17/2015     Hypertension      ITP (idiopathic thrombocytopenic purpura)      Obstructive sleep apnea     does not use CPAP  machine     Pulmonary embolism (H) large RLL w infarctio 4-17 4/18/2017            Past " Surgical History:     Past Surgical History:   Procedure Laterality Date     APPENDECTOMY  1956     BACK SURGERY  1992, 1996    trimmed L4-L5, Fusion L4-L5     BONE MARROW BIOPSY, BONE SPECIMEN, NEEDLE/TROCAR  10/24/2012    Procedure: BIOPSY BONE MARROW;  BONE MARROW BIOPSY WITH ASPIRATE (AREVALO ORDERING);  Surgeon: Terri Hickey MD;  Location:  GI     COLONOSCOPY N/A 7/31/2019    Procedure: COLONOSCOPY;  Surgeon: Sebastian Garcia MD;  Location:  GI     ESOPHAGOSCOPY, GASTROSCOPY, DUODENOSCOPY (EGD), COMBINED N/A 2/8/2018    Procedure: COMBINED ESOPHAGOSCOPY, GASTROSCOPY, DUODENOSCOPY (EGD);  EGD;  Surgeon: Terri Crouch MD;  Location:  GI     FACIAL RECONSTRUCTION SURGERY  1965    jaw fracture     HC TOOTH EXTRACTION W/FORCEP  1990s            Previous Endoscopy:     Colonoscopy 2019    Findings:        Hemorrhoids were found on perianal exam.        Multiple small and large-mouthed diverticula were found in the entire        colon.        Retroflexion in the right colon was performed.        The terminal ileum appeared normal.        The exam was otherwise without abnormality on direct and retroflexion        views. No internal hemorrhoids or rectal varices.                                                                                     Moderate Sedation:        Moderate (conscious) sedation was administered by the endoscopy nurse        and supervised by the endoscopist. The following parameters were        monitored: oxygen saturation, heart rate, blood pressure, and response        to care. Total physician intraservice time was 35 minutes.   Impression:          - External hemorrhoids found on perianal exam, likely                        the source of intermittent bright red blood per rectum.                        - No rectal varices seen. No internal hemorrhoids seen                        on retroflex exam, however these can be missed by                        colonoscopy.                         - Diverticulosis in the entire examined colon.                        - The examined portion of the ileum was normal.                        - The examination was otherwise normal on direct and                        retroflexion views.                        - No specimens collected.         Social History:     Social History     Socioeconomic History     Marital status:      Spouse name: None     Number of children: None     Years of education: None     Highest education level: None   Occupational History     None   Social Needs     Financial resource strain: None     Food insecurity     Worry: None     Inability: None     Transportation needs     Medical: None     Non-medical: None   Tobacco Use     Smoking status: Former Smoker     Packs/day: 1.00     Years: 28.00     Pack years: 28.00     Types: Cigarettes     Start date:      Last attempt to quit: 1982     Years since quittin.8     Smokeless tobacco: Former User   Substance and Sexual Activity     Alcohol use: Yes     Alcohol/week: 5.0 standard drinks     Types: 5 Shots of liquor per week     Comment: 4-5 drinks/day, alcohol use disorder     Drug use: No     Sexual activity: Never     Partners: Female   Lifestyle     Physical activity     Days per week: None     Minutes per session: None     Stress: None   Relationships     Social connections     Talks on phone: None     Gets together: None     Attends Synagogue service: None     Active member of club or organization: None     Attends meetings of clubs or organizations: None     Relationship status: None     Intimate partner violence     Fear of current or ex partner: None     Emotionally abused: None     Physically abused: None     Forced sexual activity: None   Other Topics Concern     Parent/sibling w/ CABG, MI or angioplasty before 65F 55M? Yes   Social History Narrative     None            Family History:     Family History   Problem Relation Age of Onset      Unknown/Adopted Mother      Unknown/Adopted Father      Heart Disease Sister      Diabetes No family hx of      Coronary Artery Disease No family hx of      Hypertension No family hx of      Hyperlipidemia No family hx of      Cerebrovascular Disease No family hx of      Breast Cancer No family hx of      Colon Cancer No family hx of      Prostate Cancer No family hx of      Other Cancer No family hx of      Depression No family hx of      Anxiety Disorder No family hx of      Mental Illness No family hx of      Substance Abuse No family hx of      Anesthesia Reaction No family hx of      Asthma No family hx of      Osteoporosis No family hx of      Genetic Disorder No family hx of      Thyroid Disease No family hx of      Obesity No family hx of      No known history of colorectal cancer, liver disease, or inflammatory bowel disease.         Allergies:   Reviewed and edited as appropriate     Allergies   Allergen Reactions     Olmesartan Diarrhea            Medications:     Current Outpatient Medications   Medication Sig Dispense Refill     allopurinol (ZYLOPRIM) 300 MG tablet TAKE 1 TABLET BY MOUTH ONCE DAILY 90 tablet 2     amLODIPine (NORVASC) 5 MG tablet TAKE 1 TABLET BY MOUTH ONCE DAILY . APPOINTMENT REQUIRED FOR FUTURE REFILLS 90 tablet 1     ASPIRIN NOT PRESCRIBED (INTENTIONAL) Please choose reason not prescribed, below 1 each 0     cloNIDine (CATAPRES) 0.3 MG tablet Take 1 tablet (0.3 mg) by mouth 2 times daily 180 tablet 3     eltrombopag (PROMACTA) 75 MG tablet Take 1 tablet (75 mg) by mouth daily Administer on an empty stomach, 1 hour before or 2 hours after a meal. 90 tablet 3     folic acid (FOLVITE) 1 MG tablet TAKE 1 TABLET BY MOUTH ONCE DAILY 90 tablet 2     gabapentin (NEURONTIN) 300 MG capsule TAKE ONE CAPSULE BY MOUTH THREE TIMES DAILY 270 capsule 1     Glucos-Chond-Sterol-Fish Oil (GLUCOSAMINE CHONDROITIN PLUS PO) Take 1 tablet by mouth daily       lisinopril (PRINIVIL/ZESTRIL) 40 MG tablet TAKE  "1 TABLET BY MOUTH ONCE DAILY 90 tablet 2     omeprazole (PRILOSEC) 40 MG DR capsule TAKE 1 CAPSULE BY MOUTH ONCE DAILY APPOINTMENT  REQUIRED  FOR  FURTHER  REFILLS 90 capsule 3     ondansetron (ZOFRAN ODT) 4 MG ODT tab Take 1-2 tablets (4-8 mg) by mouth every 8 hours as needed for nausea 12 tablet 0     spironolactone (ALDACTONE) 25 MG tablet TAKE 1 TABLET BY MOUTH ONCE DAILY. APPOINTMENT REQUIRED FOR FUTURE REFILLS 90 tablet 2     XARELTO ANTICOAGULANT 10 MG TABS tablet TAKE 1 TABLET BY MOUTH ONCE DAILY WITH  DINNER 30 tablet 11             Review of Systems:     A complete 10 point review of systems was performed and is negative except as noted in the HPI           Physical Exam:   Ht 1.676 m (5' 6\")   Wt 85.3 kg (188 lb)   BMI 30.34 kg/m    Wt:   Wt Readings from Last 2 Encounters:   07/27/20 85.3 kg (188 lb)   05/21/20 82.6 kg (182 lb)      Constitutional: cooperative, pleasant, not dyspneic/diaphoretic, no acute distress           Data:   EGD 2018   Findings:        The examined esophagus was normal.        Moderate portal hypertensive gastropathy was found in the entire        examined stomach.        Type 2 isolated gastric varices (IGV2, varices located in the body,        antrum or around the pylorus) with no bleeding were found in the gastric        antrum. There were no stigmata of recent bleeding.        The examined duodenum was normal.                                                                                     Impression:          - Normal esophagus.                        - Portal hypertensive gastropathy. He has a long hisotyr                        of daily drinking and risk factors for fatty liver. This                        is likely cirrhosis. Will need evaluation with fibrosis                        scan and US with dopplers.                        - Type 2 isolated gastric varices (IGV2, varices located                        in the body, antrum or around the pylorus), without       "                  bleeding.                        - Normal examined duodenum.                        - No specimens collected.   Recommendation:      - Refer to a hepatologist at appointment to be scheduled.   Blood Work:    Other procedures:         ASSESSMENT AND RECOMMENDATIONS:   Assessment:  74 year old male with a history of portal hypertension likely related to alcoholic liver disease, elevated wbc, esophageal dysphagia, anorexia, weight loss, possible cirrhosis.  He has an elevated ferritin, iron sat, but per notes has had iron replacement.   Colonoscopy due in September this year.        Recommendations:  EGD and Colonscopy   Repeat CBC, LFT, INR, BMP, SPEP, TTG, IGA,   CT Chest abdomen and pelvis.     Follow up afterwards.        Sebastian Monique MD

## 2020-08-04 DIAGNOSIS — G63 POLYNEUROPATHY IN OTHER DISEASES CLASSIFIED ELSEWHERE (H): ICD-10-CM

## 2020-08-04 DIAGNOSIS — M54.40 CHRONIC BILATERAL LOW BACK PAIN WITH SCIATICA, SCIATICA LATERALITY UNSPECIFIED: ICD-10-CM

## 2020-08-04 DIAGNOSIS — G89.29 CHRONIC BILATERAL LOW BACK PAIN WITH SCIATICA, SCIATICA LATERALITY UNSPECIFIED: ICD-10-CM

## 2020-08-04 RX ORDER — GABAPENTIN 300 MG/1
CAPSULE ORAL
Qty: 270 CAPSULE | Refills: 0 | Status: SHIPPED | OUTPATIENT
Start: 2020-08-04 | End: 2021-05-12

## 2020-08-04 NOTE — TELEPHONE ENCOUNTER
Patient was called. He has not been getting this medication from anywhere else. He has lost his pill bottle for gabapentin and didn't realize he was not taking it for about 1.5 years, he does not know exactly how long because he takes so many pills. He went to a neurologist today who said pt should be taking this three times daily. Pt realized he needs this for his leg pain and requesting a refill.

## 2020-08-04 NOTE — TELEPHONE ENCOUNTER
Medication hasn't been refilled in 2 years, has he been getting it somewhere else or had a break in taking it?

## 2020-08-04 NOTE — TELEPHONE ENCOUNTER
Controlled Substance Refill Request for: gabapentin (NEURONTIN) 300 MG capsule   Problem List Complete:  No     PROVIDER TO CONSIDER COMPLETION OF PROBLEM LIST AND OVERVIEW/CONTROLLED SUBSTANCE AGREEMENT    Controlled substance agreement:   Encounter-Level CSA:    There are no encounter-level csa.     Patient-Level CSA:    There are no patient-level csa.       Last Urine Drug Screen: No results found for: CDAUT, No results found for: COMDAT, No results found for: THC13, PCP13, COC13, MAMP13, OPI13, AMP13, BZO13, TCA13, MTD13, BAR13, OXY13, PPX13, BUP13     Processing:  Rx to be electronically transmitted to pharmacy by provider      [email or web address]     checked in past 3 months?  Yes 8/4/20

## 2020-08-19 ENCOUNTER — THERAPY VISIT (OUTPATIENT)
Dept: PHYSICAL THERAPY | Facility: CLINIC | Age: 75
End: 2020-08-19
Payer: COMMERCIAL

## 2020-08-19 DIAGNOSIS — M25.511 CHRONIC RIGHT SHOULDER PAIN: Primary | ICD-10-CM

## 2020-08-19 DIAGNOSIS — G89.29 CHRONIC RIGHT SHOULDER PAIN: Primary | ICD-10-CM

## 2020-08-19 PROCEDURE — 97110 THERAPEUTIC EXERCISES: CPT | Mod: GP | Performed by: PHYSICAL THERAPIST

## 2020-08-21 ENCOUNTER — TRANSFERRED RECORDS (OUTPATIENT)
Dept: HEALTH INFORMATION MANAGEMENT | Facility: CLINIC | Age: 75
End: 2020-08-21

## 2020-09-02 ENCOUNTER — THERAPY VISIT (OUTPATIENT)
Dept: PHYSICAL THERAPY | Facility: CLINIC | Age: 75
End: 2020-09-02
Payer: COMMERCIAL

## 2020-09-02 DIAGNOSIS — Z96.611 STATUS POST TOTAL REPLACEMENT OF RIGHT SHOULDER: Primary | ICD-10-CM

## 2020-09-02 PROCEDURE — 97110 THERAPEUTIC EXERCISES: CPT | Mod: GP | Performed by: PHYSICAL THERAPIST

## 2020-09-21 ENCOUNTER — TRANSFERRED RECORDS (OUTPATIENT)
Dept: HEALTH INFORMATION MANAGEMENT | Facility: CLINIC | Age: 75
End: 2020-09-21

## 2020-09-21 LAB
ALT SERPL-CCNC: 22 IU/L
AST SERPL-CCNC: 36 U/L (ref 5–34)
CREAT SERPL-MCNC: 0.81 MG/DL (ref 0.72–1.25)
GFR SERPL CREATININE-BSD FRML MDRD: >60 ML/MIN/1.73M2

## 2020-09-25 ENCOUNTER — THERAPY VISIT (OUTPATIENT)
Dept: PHYSICAL THERAPY | Facility: CLINIC | Age: 75
End: 2020-09-25
Payer: COMMERCIAL

## 2020-09-28 DIAGNOSIS — I10 BENIGN ESSENTIAL HYPERTENSION: ICD-10-CM

## 2020-09-29 RX ORDER — SPIRONOLACTONE 25 MG/1
TABLET ORAL
Qty: 90 TABLET | Refills: 0 | Status: SHIPPED | OUTPATIENT
Start: 2020-09-29 | End: 2020-10-07

## 2020-09-29 NOTE — TELEPHONE ENCOUNTER
Routing refill request to provider for review/approval because:  PCP retired, routing to all providers to review

## 2020-10-01 DIAGNOSIS — I10 BENIGN ESSENTIAL HYPERTENSION: ICD-10-CM

## 2020-10-01 RX ORDER — SPIRONOLACTONE 25 MG/1
TABLET ORAL
Qty: 90 TABLET | Refills: 0 | OUTPATIENT
Start: 2020-10-01

## 2020-10-04 DIAGNOSIS — I10 BENIGN ESSENTIAL HYPERTENSION: ICD-10-CM

## 2020-10-05 ENCOUNTER — OFFICE VISIT (OUTPATIENT)
Dept: FAMILY MEDICINE | Facility: CLINIC | Age: 75
End: 2020-10-05
Payer: COMMERCIAL

## 2020-10-05 VITALS
TEMPERATURE: 99.2 F | WEIGHT: 202.5 LBS | OXYGEN SATURATION: 95 % | HEART RATE: 82 BPM | HEIGHT: 65 IN | DIASTOLIC BLOOD PRESSURE: 72 MMHG | SYSTOLIC BLOOD PRESSURE: 132 MMHG | BODY MASS INDEX: 33.74 KG/M2 | RESPIRATION RATE: 14 BRPM

## 2020-10-05 DIAGNOSIS — Z79.01 CHRONIC ANTICOAGULATION: ICD-10-CM

## 2020-10-05 DIAGNOSIS — Z23 NEED FOR PROPHYLACTIC VACCINATION AND INOCULATION AGAINST INFLUENZA: ICD-10-CM

## 2020-10-05 DIAGNOSIS — Z01.818 PREOP GENERAL PHYSICAL EXAM: Primary | ICD-10-CM

## 2020-10-05 DIAGNOSIS — I27.82 CHRONIC PULMONARY EMBOLISM, UNSPECIFIED PULMONARY EMBOLISM TYPE, UNSPECIFIED WHETHER ACUTE COR PULMONALE PRESENT (H): ICD-10-CM

## 2020-10-05 DIAGNOSIS — Z78.9 ALCOHOL CONSUPTION OF MORE THAN TWO DRINKS PER DAY: ICD-10-CM

## 2020-10-05 DIAGNOSIS — H26.9 CATARACT, UNSPECIFIED CATARACT TYPE, UNSPECIFIED LATERALITY: ICD-10-CM

## 2020-10-05 PROCEDURE — 99214 OFFICE O/P EST MOD 30 MIN: CPT | Mod: 25 | Performed by: INTERNAL MEDICINE

## 2020-10-05 PROCEDURE — G0008 ADMIN INFLUENZA VIRUS VAC: HCPCS | Performed by: INTERNAL MEDICINE

## 2020-10-05 PROCEDURE — 90662 IIV NO PRSV INCREASED AG IM: CPT | Performed by: INTERNAL MEDICINE

## 2020-10-05 ASSESSMENT — MIFFLIN-ST. JEOR: SCORE: 1575.41

## 2020-10-05 NOTE — PROGRESS NOTES
[facility]  [address]  [facility] [address]  Phone: [phone]  Fax: [phone]  Primary Provider: Trista Brand [facility] Amanda  Pre-op Performing Provider: ANDRAE SEVILLA    PREOPERATIVE EVALUATION:  Today's date: 10/5/2020    Raymon Ace is a 75 year old male who presents for a preoperative evaluation.    Surgical Information:  Surgery Details 10/5/2020   Surgery/Procedure: Cataract Extraction, patient is not sure which eye first   Surgery Location: [facility]   Surgeon: Patient does not know   Surgery Date: 10/12/2020   Time of Surgery: TBD   Where patient plans to recover: At home with family     Fax number for surgical facility: [phone]  Type of Anesthesia Anticipated: Local with MAC    Subjective     HPI related to upcoming procedure:     Preop general physical exam  Seeing patient first time. Last seen  in 7/2020   Cataract surgery pre op clearance today.   On Xarelto for Pulm Embolism 4/2017 - No need to stop for cataract surgey as per guideines.   On Eltombopag for ITP       Preop Questions 10/5/2020   1. Have you ever had a heart attack or stroke? No   2. Have you ever had surgery on your heart or blood vessels, such as a stent placement, a coronary artery bypass, or surgery on an artery in your head, neck, heart, or legs? No   3. Do you have chest pain with activity? No   4. Do you have a history of  heart failure? No   5. Do you currently have a cold, bronchitis or symptoms of other infection? No   6. Do you have a cough, shortness of breath, or wheezing? No   7. Do you or anyone in your family have previous history of blood clots? No   8. Do you or does anyone in your family have a serious bleeding problem such as prolonged bleeding following surgeries or cuts? YES    9. Have you ever had problems with anemia or been told to take iron pills? YES    10. Have you had any abnormal blood loss  such as black, tarry or bloody stools? No   11. Have you ever had a blood transfusion? No   Are you willing to have a blood transfusion if it is medically needed before, during, or after your surgery? Yes   13. Have you or any of your relatives ever had problems with anesthesia? YES    14. Do you have sleep apnea, excessive snoring or daytime drowsiness? YES    14a. Do you have a CPAP machine? No   15. Do you have any artifical heart valves or other implanted medical devices like a pacemaker, defibrillator, or continuous glucose monitor? No   16. Do you have artificial joints? No   17. Are you allergic to latex? No       RX monitoring program ([place]) reviewed:  reviewed- no concerns    See problem list for active medical problems.  Problems all longstanding and stable, except as noted/documented.  See ROS for pertinent symptoms related to these conditions.      Review of Systems  CONSTITUTIONAL: NEGATIVE for fever, chills, change in weight  INTEGUMENTARY/SKIN: NEGATIVE for worrisome rashes, moles or lesions  EYES: NEGATIVE for vision changes or irritation  ENT/MOUTH: NEGATIVE for ear, mouth and throat problems  RESP: NEGATIVE for significant cough or SOB  BREAST: NEGATIVE for masses, tenderness or discharge  CV: NEGATIVE for chest pain, palpitations or peripheral edema  GI: NEGATIVE for nausea, abdominal pain, heartburn, or change in bowel habits  : NEGATIVE for frequency, dysuria, or hematuria  MUSCULOSKELETAL: NEGATIVE for significant arthralgias or myalgia  NEURO: NEGATIVE for weakness, dizziness or paresthesias  ENDOCRINE: NEGATIVE for temperature intolerance, skin/hair changes  HEME: NEGATIVE for bleeding problems  PSYCHIATRIC: NEGATIVE for changes in mood or affect    Patient Active Problem List    Diagnosis Date Noted     ITP (idiopathic thrombocytopenic purpura) since 3-13 back surgery       Priority: High     Preop general physical exam 10/05/2020     Priority: Medium     Longitudinal ridging of nail +  spooning of middle ones  12/27/2019     Priority: Medium     Cardiomegaly per 2017 CXR  12/27/2019     Priority: Medium     Major depression in complete remission (H) 12/27/2019     Priority: Medium     CKD (chronic kidney disease) stage 3, GFR 30-59 ml/min 06/07/2019     Priority: Medium     Portal hypertension (H) 06/07/2019     Priority: Medium     Impaired fasting glucose 06/07/2019     Priority: Medium     Fatigue, unspecified type 06/07/2019     Priority: Medium     Screening for prostate cancer 09/14/2018     Priority: Medium     Iron deficiency anemia due to chronic blood loss 06/28/2018     Priority: Medium     Pulmonary embolism (H) large RLL w infarctio 4-17 04/18/2017     Priority: Medium     Gastroesophageal reflux disease with esophagitis 12/06/2016     Priority: Medium     Need for prophylactic vaccination against Streptococcus pneumoniae (pneumococcus) 08/04/2016     Priority: Medium     Gout involving toe, unspecified cause, unspecified chronicity, unspecified laterality 06/02/2016     Priority: Medium     ACP (advance care planning) 05/16/2016     Priority: Medium     Advance Care Planning 5/16/2016: ACP Review of Chart / Resources Provided:  Reviewed chart for advance care plan.  Raymon Ace has no plan or code status on file however states presence of ACP document. Copy requested. Confirmed code status reflects current choices pending receipt of document/advance care plan review.  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 Provider to review       Change in bowel habits since 1-14: diarrhea-thinks better off benicar 07/10/2014     Priority: Medium     Family history of ischemic heart disease 01/17/2014     Priority: Medium     Family history of diabetes mellitus 01/17/2014     Priority: Medium     Class 1 obesity due to excess calories with serious comorbidity and body mass index (BMI) of 31.0 to 31.9 in adult 01/10/2014     Priority: Medium     Glucose intolerance (impaired glucose tolerance)  HgbA!C = 5.4 01/10/2014     Priority: Medium     Back pain since 1988 s/po surgery 1992,1996 and 3-13/ Dr Singh  01/10/2014     Priority: Medium     No CSA on file   8-=22-17 no concerns       Alcoholic liver damage (H) 01/10/2014     Priority: Medium     Diverticulosis of large intestine 01/10/2014     Priority: Medium     Problem list name updated by automated process. Provider to review       Elevated liver enzymes AST  01/10/2014     Priority: Medium     Hypotension 03/16/2013     Priority: Medium     Lumbar spinal stenosis 12/14/2012     Priority: Medium     Steroid-induced hyperglycemia 12/14/2012     Priority: Medium     Although glucose normal on 1/30/13 atr 86       Fatty liver/ 1-14 US 12/14/2012     Priority: Medium     Atherosclerosis 12/14/2012     Priority: Medium     Alcohol consuption of more than two drinks per day 12/14/2012     Priority: Medium     Obstructive sleep apnea syndrome 09/21/2012     Priority: Medium     Does not tolerate CPAP  Problem list name updated by automated process. Provider to review       Restless legs syndrome (RLS) 09/21/2012     Priority: Medium      Polyneuropathy in other diseases classified elsewhere (H) 09/21/2012     Priority: Medium     RX monitoring program ([place]MP) reviewed: 8/4/20 recommend provider review    [place]MP profile:  [email or web address]         Gastroesophageal reflux disease without esophagitis 09/21/2012     Priority: Medium     Preventive measure 01/31/2013     Priority: Low     APRIMA DATA BASE UNDER THE 12/14/12 NOTE  Colonoscopy neg in 5/10; PSA 0.31 in 9/12        Past Medical History:   Diagnosis Date     Alcohol abuse since teens 01/15/2015    Still actively drinking     Alcoholic liver damage (H) 1/10/2014     Gastroesophageal reflux disease with esophagitis 12/6/2016     Gout involving toe, unspecified cause, unspecified chronicity, unspecified laterality 6/2/2016     Heart murmur     heart is positioned farther on left side then typical     Hyperlipidemia 12/17/2015     Hypertension      ITP (idiopathic thrombocytopenic purpura)      Obstructive sleep apnea     does not use CPAP  machine     Pulmonary embolism (H) large RLL w infarctio 4-17 4/18/2017     Past Surgical History:   Procedure Laterality Date     APPENDECTOMY  1956     BACK SURGERY  1992, 1996    trimmed L4-L5, Fusion L4-L5     BONE MARROW BIOPSY, BONE SPECIMEN, NEEDLE/TROCAR  10/24/2012    Procedure: BIOPSY BONE MARROW;  BONE MARROW BIOPSY WITH ASPIRATE (AREVALO ORDERING);  Surgeon: Terri Hickey MD;  Location:  GI     COLONOSCOPY N/A 7/31/2019    Procedure: COLONOSCOPY;  Surgeon: Sebastian Garcia MD;  Location:  GI     ESOPHAGOSCOPY, GASTROSCOPY, DUODENOSCOPY (EGD), COMBINED N/A 2/8/2018    Procedure: COMBINED ESOPHAGOSCOPY, GASTROSCOPY, DUODENOSCOPY (EGD);  EGD;  Surgeon: Terri Crouch MD;  Location:  GI     FACIAL RECONSTRUCTION SURGERY  1965    jaw fracture     HC TOOTH EXTRACTION W/FORCEP  1990s     Current Outpatient Medications   Medication Sig Dispense Refill     allopurinol (ZYLOPRIM) 300 MG tablet Take 1 tablet by  mouth once daily 90 tablet 0     amLODIPine (NORVASC) 5 MG tablet TAKE 1 TABLET BY MOUTH ONCE DAILY . APPOINTMENT REQUIRED FOR FUTURE REFILLS 90 tablet 1     ASPIRIN NOT PRESCRIBED (INTENTIONAL) Please choose reason not prescribed, below 1 each 0     cloNIDine (CATAPRES) 0.3 MG tablet Take 1 tablet (0.3 mg) by mouth 2 times daily 180 tablet 3     eltrombopag (PROMACTA) 75 MG tablet Take 1 tablet (75 mg) by mouth daily Administer on an empty stomach, 1 hour before or 2 hours after a meal. 90 tablet 3     folic acid (FOLVITE) 1 MG tablet TAKE 1 TABLET BY MOUTH ONCE DAILY 90 tablet 2     gabapentin (NEURONTIN) 300 MG capsule TAKE 1 CAPSULE BY MOUTH THREE TIMES DAILY NEEDS TO BE SEEN FOR FURTHER REFILLS 270 capsule 0     lisinopril (PRINIVIL/ZESTRIL) 40 MG tablet TAKE 1 TABLET BY MOUTH ONCE DAILY 90 tablet 2     omeprazole (PRILOSEC) 40 MG DR capsule TAKE 1 CAPSULE BY MOUTH ONCE DAILY APPOINTMENT  REQUIRED  FOR  FURTHER  REFILLS 90 capsule 3     ondansetron (ZOFRAN ODT) 4 MG ODT tab Take 1-2 tablets (4-8 mg) by mouth every 8 hours as needed for nausea 12 tablet 0     spironolactone (ALDACTONE) 25 MG tablet TAKE 1 TABLET BY MOUTH ONCE DAILY . APPOINTMENT REQUIRED FOR FUTURE REFILLS 90 tablet 0     XARELTO ANTICOAGULANT 10 MG TABS tablet TAKE 1 TABLET BY MOUTH ONCE DAILY WITH  DINNER 30 tablet 11       Allergies   Allergen Reactions     Olmesartan Diarrhea        Social History     Tobacco Use     Smoking status: Former Smoker     Packs/day: 1.00     Years: 28.00     Pack years: 28.00     Types: Cigarettes     Start date:      Quit date: 1982     Years since quittin.0     Smokeless tobacco: Former User   Substance Use Topics     Alcohol use: Yes     Alcohol/week: 5.0 standard drinks     Types: 5 Shots of liquor per week     Comment: 4-5 drinks/day, alcohol use disorder     Family History   Problem Relation Age of Onset     Unknown/Adopted Mother      Unknown/Adopted Father      Heart Disease Sister       "Diabetes No family hx of      Coronary Artery Disease No family hx of      Hypertension No family hx of      Hyperlipidemia No family hx of      Cerebrovascular Disease No family hx of      Breast Cancer No family hx of      Colon Cancer No family hx of      Prostate Cancer No family hx of      Other Cancer No family hx of      Depression No family hx of      Anxiety Disorder No family hx of      Mental Illness No family hx of      Substance Abuse No family hx of      Anesthesia Reaction No family hx of      Asthma No family hx of      Osteoporosis No family hx of      Genetic Disorder No family hx of      Thyroid Disease No family hx of      Obesity No family hx of      History   Drug Use No            Objective   /72 (BP Location: Left arm, Patient Position: Sitting, Cuff Size: Adult Regular)   Pulse 82   Temp 99.2  F (37.3  C) (Tympanic)   Resp 14   Ht 1.651 m (5' 5\")   Wt 91.9 kg (202 lb 8 oz)   SpO2 95%   BMI 33.70 kg/m    Physical Exam    GENERAL APPEARANCE: healthy, alert and no distress     EYES: EOMI,  PERRL     HENT: ear canals and TM's normal and nose and mouth without ulcers or lesions     NECK: no adenopathy, no asymmetry, masses, or scars and thyroid normal to palpation     RESP: lungs clear to auscultation - no rales, rhonchi or wheezes     CV: regular rates and rhythm, normal S1 S2, no S3 or S4 and no murmur, click or rub     ABDOMEN:  soft, nontender, no HSM or masses and bowel sounds normal     MS: extremities normal- no gross deformities noted, no evidence of inflammation in joints, FROM in all extremities.     SKIN: no suspicious lesions or rashes     NEURO: Normal strength and tone, sensory exam grossly normal, mentation intact and speech normal     PSYCH: mentation appears normal. and affect normal/bright     LYMPHATICS: No cervical adenopathy    Recent Labs   Lab Test 05/21/20  1303 12/12/19  1422 08/30/19  1218 08/30/19  1218 10/18/18  1231 10/18/18  1231   HGB 12.5* 14.3   < > " 16.6   < > 12.0*    65*   < > 61*   < > 167   INR  --   --   --   --   --  1.10    142   < > 145*   < > 138   POTASSIUM 4.1 3.5   < > 3.9   < > 4.1   CR 0.75 0.67   < > 0.74   < > 1.02   A1C  --   --   --  5.3  --   --     < > = values in this interval not displayed.        PRE-OP Diagnostics:  No labs were ordered during this visit.  No EKG required for low risk surgery (cataract, skin procedure, breast biopsy, etc).         Assessment & Plan   The proposed surgical procedure is considered LOW risk.    REVISED CARDIAC RISK INDEX  The patient has the following serious cardiovascular risks for perioperative complications:  No serious cardiac risks = 0 points    INTERPRETATION: 0 points: Class I (very low risk - 0.4% complication rate)     The patient has the following additional risks and recommendations for perioperative complications:     - Consult Hospitalist / IM to assist with post-op medical management  - Patient does have risk factors of being on Oral Anticoagulation for Pulmonary Embolism, Chronic active Alcoholism and risk of withdrawals.     PULMONARY: Hx of Pulmonary embolism with Xarelto use. New guidelines state no need to hold for low risk surgeries as cataract.      MEDICATION INSTRUCTIONS:  Patient is to take all scheduled medications on the day of surgery    RECOMMENDATION:  APPROVAL GIVEN to proceed with proposed procedure, without further diagnostic evaluation.    Assessment and Plan    1. Preop general physical exam  2. Cataract, unspecified cataract type, unspecified laterality  Clearance given as this is low risk procedure. Records requested from the concerned facility as we do not have any records of the procedure.     3. Alcohol consuption of more than two drinks per day  To abstain from alcohol to prevent Withdrawals.     4. Chronic pulmonary embolism, unspecified pulmonary embolism type, unspecified whether acute cor pulmonale present (H)  5. Chronic anticoagulation  Currently  "on Xarelto, surgery to decide on the risks of this as per new guidelines discussed with the patient on cataract being low risk procedure.     6. Need for prophylactic vaccination and inoculation against influenza  - FLUZONE HIGH DOSE 65+  [[id number]]     Patient Instructions     All labs done in 5/2020 , no need of EKG as low risk procedure.   Please check with your Surgeon that you are on Blood thinner Xarelto and let him be aware of it.     ================================      Preparing for Your Surgery  Getting started  A surgery nurse will call you to review your health history and instructions. They will give you an arrival time based on your scheduled surgery time.  Please be ready to share the following:    Your doctor's clinic name and phone number    Your medical, surgical and anesthesia history    A list of allergies and sensitivities    A list of medicines, including herbal treatments and over-the-counter drugs    Whether the patient has a legal guardian (ask how to send us the papers in advance)  If your child is having surgery, please ask for a copy of Preparing for Your Child's Surgery.    Preparing for surgery    Within 30 days of surgery: Have an exam at your family clinic (primary care clinic), or go to a pre-operative clinic. This exam is called a \"History and Physical,\" or H&P.    At your H&P exam, talk to your care team about all medicines you take. If you need to stop any medicines before surgery, ask when to start taking them again.  ? We do this for your safety. Many medicines can make you bleed too much during surgery. Some change how well surgery (anesthesia) drugs work.    Call your insurance company to see what it will and won't pay for. Ask if they need to pre-approve the surgery. (If no insurance, call [phone].)    Call your surgeon's clinic if there's any change in your health. This includes signs of a cold or flu (sore throat, runny nose, cough, rash, fever). It also includes a " scrape or scratch near the surgery site.    If you have questions on the day of surgery, call your surgery center.  Eating and drinking guidelines  For your safety: Unless your surgeon tells you otherwise, follow the guidelines below.    Eat and drink as usual until 8 hours before surgery. After that, no food or milk.    Drink clear liquids until 2 hours before surgery. These are liquids you can see through, like water, Gatorade and Propel Water. You may also have black coffee and tea (no cream or milk).    Nothing by mouth within 2 hours of surgery. This includes gum, candy and breath mints.    Stop alcohol the midnight before surgery.    If your family clinic tells you to take medicine on the morning of surgery, it's okay to take it with a sip of water.  Preventing infection    Shower or bathe the night before and morning of your surgery. Follow the instructions your clinic gave you. (If no instructions, use regular soap.)    Don't shave or clip hair near your surgery site. This can lead to skin infection.    Don't smoke the morning of surgery. Smoking increases the risk of infection. You may chew nicotine gum up to 2 hours before surgery. A nicotine patch is okay.  ? Note: Some surgeries require you to completely quit smoking and nicotine. Check with your surgeon.    Your care team will make every effort to keep you safe from infection. We will:  ? Clean our hands often with soap and water (or an alcohol-based hand rub).  ? Clean the skin at your surgery site with a special soap that kills germs. We'll also remove hair from the site as needed.  ? Wear special hair covers, masks, gowns and gloves during surgery.  ? Give antibiotic medicine, if prescribed. Not all surgeries need antibiotics.  What to bring on the day of surgery    Photo ID and insurance card    Copy of your health care directive, if you have one    Glasses and hearing aides (bring cases)  ? You can't wear contacts during surgery    Inhaler and eye  drops, if you use them (tell us about these when you arrive)    CPAP machine or breathing device, if you use them    A few personal items, if spending the night    If you have . . .  ? A pacemaker or ICD (cardiac defibrillator): Bring the ID card.  ? An implanted stimulator: Bring the remote control.  ? A legal guardian: Bring a copy of the certified (court-stamped) guardianship papers.  Please remove any jewelry, including body piercings. Leave jewelry and other valuables at home.  If you're going home the day of surgery  Important: If you don't follow the rules below, we must cancel your surgery.     Arrange for someone to drive you home after surgery. You may not drive, take a taxi or take public transportation by yourself (unless you'll have local anesthesia only).    Arrange for a responsible adult to stay with you overnight. If you don't, we may keep you in the hospital overnight, and you may need to pay the costs yourself.  Questions?   If you have any questions for your care team, list them here: _________________________________________________________________________________________________________________________________________________________________________________________________________________________________________________________________________________________________________________________  For informational purposes only. Not to replace the advice of your health care provider. Copyright   4444-3659 [employer] [facility]. All rights reserved. Clinically reviewed by Paola De La Vega MD. [employer] [id number] - REV 07/19.    Preparing for Your Surgery  Getting started  A surgery nurse will call you to review your health history and instructions. They will give you an arrival time based on your scheduled surgery time.  Please be ready to share the following:    Your doctor's clinic name and phone number    Your medical, surgical and anesthesia history    A list of allergies and sensitivities    A list  "of medicines, including herbal treatments and over-the-counter drugs    Whether the patient has a legal guardian (ask how to send us the papers in advance)  If your child is having surgery, please ask for a copy of Preparing for Your Child's Surgery.    Preparing for surgery    Within 30 days of surgery: Have an exam at your family clinic (primary care clinic), or go to a pre-operative clinic. This exam is called a \"History and Physical,\" or H&P.    At your H&P exam, talk to your care team about all medicines you take. If you need to stop any medicines before surgery, ask when to start taking them again.  ? We do this for your safety. Many medicines can make you bleed too much during surgery. Some change how well surgery (anesthesia) drugs work.    Call your insurance company to see what it will and won't pay for. Ask if they need to pre-approve the surgery. (If no insurance, call [phone].)    Call your surgeon's clinic if there's any change in your health. This includes signs of a cold or flu (sore throat, runny nose, cough, rash, fever). It also includes a scrape or scratch near the surgery site.    If you have questions on the day of surgery, call your surgery center.  Eating and drinking guidelines  For your safety: Unless your surgeon tells you otherwise, follow the guidelines below.    Eat and drink as usual until 8 hours before surgery. After that, no food or milk.    Drink clear liquids until 2 hours before surgery. These are liquids you can see through, like water, Gatorade and Propel Water. You may also have black coffee and tea (no cream or milk).    Nothing by mouth within 2 hours of surgery. This includes gum, candy and breath mints.    Stop alcohol the midnight before surgery.    If your family clinic tells you to take medicine on the morning of surgery, it's okay to take it with a sip of water.  Preventing infection    Shower or bathe the night before and morning of your surgery. Follow the " instructions your clinic gave you. (If no instructions, use regular soap.)    Don't shave or clip hair near your surgery site. This can lead to skin infection.    Don't smoke the morning of surgery. Smoking increases the risk of infection. You may chew nicotine gum up to 2 hours before surgery. A nicotine patch is okay.  ? Note: Some surgeries require you to completely quit smoking and nicotine. Check with your surgeon.    Your care team will make every effort to keep you safe from infection. We will:  ? Clean our hands often with soap and water (or an alcohol-based hand rub).  ? Clean the skin at your surgery site with a special soap that kills germs. We'll also remove hair from the site as needed.  ? Wear special hair covers, masks, gowns and gloves during surgery.  ? Give antibiotic medicine, if prescribed. Not all surgeries need antibiotics.  What to bring on the day of surgery    Photo ID and insurance card    Copy of your health care directive, if you have one    Glasses and hearing aides (bring cases)  ? You can't wear contacts during surgery    Inhaler and eye drops, if you use them (tell us about these when you arrive)    CPAP machine or breathing device, if you use them    A few personal items, if spending the night    If you have . . .  ? A pacemaker or ICD (cardiac defibrillator): Bring the ID card.  ? An implanted stimulator: Bring the remote control.  ? A legal guardian: Bring a copy of the certified (court-stamped) guardianship papers.  Please remove any jewelry, including body piercings. Leave jewelry and other valuables at home.  If you're going home the day of surgery  Important: If you don't follow the rules below, we must cancel your surgery.     Arrange for someone to drive you home after surgery. You may not drive, take a taxi or take public transportation by yourself (unless you'll have local anesthesia only).    Arrange for a responsible adult to stay with you overnight. If you don't, we may  keep you in the hospital overnight, and you may need to pay the costs yourself.  Questions?   If you have any questions for your care team, list them here: _________________________________________________________________________________________________________________________________________________________________________________________________________________________________________________________________________________________________________________________  For informational purposes only. Not to replace the advice of your health care provider. Copyright   5045-6033 [facility]. All rights reserved. Clinically reviewed by Paola De La Vega MD. [employer] [id number] - REV 07/19.    ====================    Patient Education     Discharge Instructions for Cataract Surgery  A surgeon removed the cloudy lens in your eye and replaced it with a clear man-made lens. Be sure to have an adult family member or friend drive you home after surgery. Here s what you can expect following surgery and tips for a healthy recovery.  What to expect  It is normal to have the following:    Bruised or bloodshot eye for 7 days    Itching and mild discomfort for several days    Some fluid discharge    Sensitivity to light    Scratchy, sandlike feeling in the eye for 2 weeks    Feeling tired, especially during the first 24 hours  Activity level    Do not drive for 2 days or as instructed by your eye care provider.    Do not drink alcohol for at least 24 hours.    Avoid bending at the waist to  objects or lifting anything heavy for 2 days.    Relax for the first 24 hours after surgery. Watching TV and reading are OK and won t harm your eye.  Eye protection    Do not rub or press on your eye.    Sleep on your back or on your unoperated side for 2 nights.    If instructed, wear a bandage over your eye for 2 days and 2 nights.    If instructed, wear a shield to protect your eye for 2 days and 2 nights.  Using eye drops  You may  be given special eye drops or ointment. Here is one way to use eye drops:    Tilt your head back.    Pull your bottom eyelid down.    Squeeze one drop into your eye. Do not touch your eye with the bottle tip.    Close your eyes for a few seconds.    If you need more than one drop, wait at least 5 minutes before adding the next one.   Call your eye care provider right away if you have any of the following:    Bleeding or discharge from the eye    Sudden worsening of your vision.    Pain that does not improve with the pain medicine that is recommended for you.    Nausea or vomiting    Chills or fever over 100.4 F (39.1 C)   Date Last Reviewed: 10/1/2017    3330-7411 [facility]. [address] [place] [address], [place] [id number]. All rights reserved. This information is not intended as a substitute for professional medical care. Always follow your healthcare professional's instructions.               Return in about 1 month (around 11/5/2020), or if symptoms worsen or fail to improve, for Follow up of last visit.    Beatrice Stephens MD  [facility]    Signed Electronically by: Beatrice Stephens MD    Copy of this evaluation report is provided to requesting physician.    Preop SmartSet    [facility] Preop Guidelines    Revised Cardiac Risk Index

## 2020-10-05 NOTE — PATIENT INSTRUCTIONS
"All labs done in 5/2020 , no need of EKG as low risk procedure.   Please check with your Surgeon that you are on Blood thinner Xarelto and let him be aware of it.     ================================      Preparing for Your Surgery  Getting started  A surgery nurse will call you to review your health history and instructions. They will give you an arrival time based on your scheduled surgery time.  Please be ready to share the following:    Your doctor's clinic name and phone number    Your medical, surgical and anesthesia history    A list of allergies and sensitivities    A list of medicines, including herbal treatments and over-the-counter drugs    Whether the patient has a legal guardian (ask how to send us the papers in advance)  If your child is having surgery, please ask for a copy of Preparing for Your Child's Surgery.    Preparing for surgery    Within 30 days of surgery: Have an exam at your family clinic (primary care clinic), or go to a pre-operative clinic. This exam is called a \"History and Physical,\" or H&P.    At your H&P exam, talk to your care team about all medicines you take. If you need to stop any medicines before surgery, ask when to start taking them again.  ? We do this for your safety. Many medicines can make you bleed too much during surgery. Some change how well surgery (anesthesia) drugs work.    Call your insurance company to see what it will and won't pay for. Ask if they need to pre-approve the surgery. (If no insurance, call [phone].)    Call your surgeon's clinic if there's any change in your health. This includes signs of a cold or flu (sore throat, runny nose, cough, rash, fever). It also includes a scrape or scratch near the surgery site.    If you have questions on the day of surgery, call your surgery center.  Eating and drinking guidelines  For your safety: Unless your surgeon tells you otherwise, follow the guidelines below.    Eat and drink as usual until 8 hours before " surgery. After that, no food or milk.    Drink clear liquids until 2 hours before surgery. These are liquids you can see through, like water, Gatorade and Propel Water. You may also have black coffee and tea (no cream or milk).    Nothing by mouth within 2 hours of surgery. This includes gum, candy and breath mints.    Stop alcohol the midnight before surgery.    If your family clinic tells you to take medicine on the morning of surgery, it's okay to take it with a sip of water.  Preventing infection    Shower or bathe the night before and morning of your surgery. Follow the instructions your clinic gave you. (If no instructions, use regular soap.)    Don't shave or clip hair near your surgery site. This can lead to skin infection.    Don't smoke the morning of surgery. Smoking increases the risk of infection. You may chew nicotine gum up to 2 hours before surgery. A nicotine patch is okay.  ? Note: Some surgeries require you to completely quit smoking and nicotine. Check with your surgeon.    Your care team will make every effort to keep you safe from infection. We will:  ? Clean our hands often with soap and water (or an alcohol-based hand rub).  ? Clean the skin at your surgery site with a special soap that kills germs. We'll also remove hair from the site as needed.  ? Wear special hair covers, masks, gowns and gloves during surgery.  ? Give antibiotic medicine, if prescribed. Not all surgeries need antibiotics.  What to bring on the day of surgery    Photo ID and insurance card    Copy of your health care directive, if you have one    Glasses and hearing aides (bring cases)  ? You can't wear contacts during surgery    Inhaler and eye drops, if you use them (tell us about these when you arrive)    CPAP machine or breathing device, if you use them    A few personal items, if spending the night    If you have . . .  ? A pacemaker or ICD (cardiac defibrillator): Bring the ID card.  ? An implanted stimulator: Bring  the remote control.  ? A legal guardian: Bring a copy of the certified (court-stamped) guardianship papers.  Please remove any jewelry, including body piercings. Leave jewelry and other valuables at home.  If you're going home the day of surgery  Important: If you don't follow the rules below, we must cancel your surgery.     Arrange for someone to drive you home after surgery. You may not drive, take a taxi or take public transportation by yourself (unless you'll have local anesthesia only).    Arrange for a responsible adult to stay with you overnight. If you don't, we may keep you in the hospital overnight, and you may need to pay the costs yourself.  Questions?   If you have any questions for your care team, list them here: _________________________________________________________________________________________________________________________________________________________________________________________________________________________________________________________________________________________________________________________  For informational purposes only. Not to replace the advice of your health care provider. Copyright   4319-5469 [facility]. All rights reserved. Clinically reviewed by Paola De La Vega MD. [employer] [id number] - REV 07/19.    Preparing for Your Surgery  Getting started  A surgery nurse will call you to review your health history and instructions. They will give you an arrival time based on your scheduled surgery time.  Please be ready to share the following:    Your doctor's clinic name and phone number    Your medical, surgical and anesthesia history    A list of allergies and sensitivities    A list of medicines, including herbal treatments and over-the-counter drugs    Whether the patient has a legal guardian (ask how to send us the papers in advance)  If your child is having surgery, please ask for a copy of Preparing for Your Child's Surgery.    Preparing for  "surgery    Within 30 days of surgery: Have an exam at your family clinic (primary care clinic), or go to a pre-operative clinic. This exam is called a \"History and Physical,\" or H&P.    At your H&P exam, talk to your care team about all medicines you take. If you need to stop any medicines before surgery, ask when to start taking them again.  ? We do this for your safety. Many medicines can make you bleed too much during surgery. Some change how well surgery (anesthesia) drugs work.    Call your insurance company to see what it will and won't pay for. Ask if they need to pre-approve the surgery. (If no insurance, call [phone].)    Call your surgeon's clinic if there's any change in your health. This includes signs of a cold or flu (sore throat, runny nose, cough, rash, fever). It also includes a scrape or scratch near the surgery site.    If you have questions on the day of surgery, call your surgery center.  Eating and drinking guidelines  For your safety: Unless your surgeon tells you otherwise, follow the guidelines below.    Eat and drink as usual until 8 hours before surgery. After that, no food or milk.    Drink clear liquids until 2 hours before surgery. These are liquids you can see through, like water, Gatorade and Propel Water. You may also have black coffee and tea (no cream or milk).    Nothing by mouth within 2 hours of surgery. This includes gum, candy and breath mints.    Stop alcohol the midnight before surgery.    If your family clinic tells you to take medicine on the morning of surgery, it's okay to take it with a sip of water.  Preventing infection    Shower or bathe the night before and morning of your surgery. Follow the instructions your clinic gave you. (If no instructions, use regular soap.)    Don't shave or clip hair near your surgery site. This can lead to skin infection.    Don't smoke the morning of surgery. Smoking increases the risk of infection. You may chew nicotine gum up to 2 " hours before surgery. A nicotine patch is okay.  ? Note: Some surgeries require you to completely quit smoking and nicotine. Check with your surgeon.    Your care team will make every effort to keep you safe from infection. We will:  ? Clean our hands often with soap and water (or an alcohol-based hand rub).  ? Clean the skin at your surgery site with a special soap that kills germs. We'll also remove hair from the site as needed.  ? Wear special hair covers, masks, gowns and gloves during surgery.  ? Give antibiotic medicine, if prescribed. Not all surgeries need antibiotics.  What to bring on the day of surgery    Photo ID and insurance card    Copy of your health care directive, if you have one    Glasses and hearing aides (bring cases)  ? You can't wear contacts during surgery    Inhaler and eye drops, if you use them (tell us about these when you arrive)    CPAP machine or breathing device, if you use them    A few personal items, if spending the night    If you have . . .  ? A pacemaker or ICD (cardiac defibrillator): Bring the ID card.  ? An implanted stimulator: Bring the remote control.  ? A legal guardian: Bring a copy of the certified (court-stamped) guardianship papers.  Please remove any jewelry, including body piercings. Leave jewelry and other valuables at home.  If you're going home the day of surgery  Important: If you don't follow the rules below, we must cancel your surgery.     Arrange for someone to drive you home after surgery. You may not drive, take a taxi or take public transportation by yourself (unless you'll have local anesthesia only).    Arrange for a responsible adult to stay with you overnight. If you don't, we may keep you in the hospital overnight, and you may need to pay the costs yourself.  Questions?   If you have any questions for your care team, list them here:  _________________________________________________________________________________________________________________________________________________________________________________________________________________________________________________________________________________________________________________________  For informational purposes only. Not to replace the advice of your health care provider. Copyright   5228-1132 [facility]. All rights reserved. Clinically reviewed by Paola De La Vega MD. [employer] [id number] - REV 07/19.    ====================    Patient Education     Discharge Instructions for Cataract Surgery  A surgeon removed the cloudy lens in your eye and replaced it with a clear man-made lens. Be sure to have an adult family member or friend drive you home after surgery. Here s what you can expect following surgery and tips for a healthy recovery.  What to expect  It is normal to have the following:    Bruised or bloodshot eye for 7 days    Itching and mild discomfort for several days    Some fluid discharge    Sensitivity to light    Scratchy, sandlike feeling in the eye for 2 weeks    Feeling tired, especially during the first 24 hours  Activity level    Do not drive for 2 days or as instructed by your eye care provider.    Do not drink alcohol for at least 24 hours.    Avoid bending at the waist to  objects or lifting anything heavy for 2 days.    Relax for the first 24 hours after surgery. Watching TV and reading are OK and won t harm your eye.  Eye protection    Do not rub or press on your eye.    Sleep on your back or on your unoperated side for 2 nights.    If instructed, wear a bandage over your eye for 2 days and 2 nights.    If instructed, wear a shield to protect your eye for 2 days and 2 nights.  Using eye drops  You may be given special eye drops or ointment. Here is one way to use eye drops:    Tilt your head back.    Pull your bottom eyelid down.    Squeeze one drop into  your eye. Do not touch your eye with the bottle tip.    Close your eyes for a few seconds.    If you need more than one drop, wait at least 5 minutes before adding the next one.   Call your eye care provider right away if you have any of the following:    Bleeding or discharge from the eye    Sudden worsening of your vision.    Pain that does not improve with the pain medicine that is recommended for you.    Nausea or vomiting    Chills or fever over 100.4 F (39.1 C)   Date Last Reviewed: 10/1/2017    8341-4014 [employer]. [address] [place] [address]. All rights reserved. This information is not intended as a substitute for professional medical care. Always follow your healthcare professional's instructions.

## 2020-10-05 NOTE — PROGRESS NOTES
Allergies   Allergen Reactions     Olmesartan Diarrhea        Past Medical History:   Diagnosis Date     Alcohol abuse since teens 01/15/2015    Still actively drinking     Alcoholic liver damage (H) 1/10/2014     Gastroesophageal reflux disease with esophagitis 12/6/2016     Gout involving toe, unspecified cause, unspecified chronicity, unspecified laterality 6/2/2016     Heart murmur     heart is positioned farther on left side then typical     Hyperlipidemia 12/17/2015     Hypertension      ITP (idiopathic thrombocytopenic purpura)      Obstructive sleep apnea     does not use CPAP  machine     Pulmonary embolism (H) large RLL w infarctio 4-17 4/18/2017       Past Surgical History:   Procedure Laterality Date     APPENDECTOMY  1956     BACK SURGERY  1992, 1996    trimmed L4-L5, Fusion L4-L5     BONE MARROW BIOPSY, BONE SPECIMEN, NEEDLE/TROCAR  10/24/2012    Procedure: BIOPSY BONE MARROW;  BONE MARROW BIOPSY WITH ASPIRATE (AREVALO ORDERING);  Surgeon: Terri Hickey MD;  Location:  GI     COLONOSCOPY N/A 7/31/2019    Procedure: COLONOSCOPY;  Surgeon: Sebastian Garcia MD;  Location:  GI     ESOPHAGOSCOPY, GASTROSCOPY, DUODENOSCOPY (EGD), COMBINED N/A 2/8/2018    Procedure: COMBINED ESOPHAGOSCOPY, GASTROSCOPY, DUODENOSCOPY (EGD);  EGD;  Surgeon: Terri Crouch MD;  Location:  GI     FACIAL RECONSTRUCTION SURGERY  1965    jaw fracture     HC TOOTH EXTRACTION W/FORCEP  1990s       Family History   Problem Relation Age of Onset     Unknown/Adopted Mother      Unknown/Adopted Father      Heart Disease Sister      Diabetes No family hx of      Coronary Artery Disease No family hx of      Hypertension No family hx of      Hyperlipidemia No family hx of      Cerebrovascular Disease No family hx of      Breast Cancer No family hx of      Colon Cancer No family hx of      Prostate Cancer No family hx of      Other Cancer No family hx of      Depression No family hx of      Anxiety Disorder No  family hx of      Mental Illness No family hx of      Substance Abuse No family hx of      Anesthesia Reaction No family hx of      Asthma No family hx of      Osteoporosis No family hx of      Genetic Disorder No family hx of      Thyroid Disease No family hx of      Obesity No family hx of        Social History     Tobacco Use     Smoking status: Former Smoker     Packs/day: 1.00     Years: 28.00     Pack years: 28.00     Types: Cigarettes     Start date:      Quit date: 1982     Years since quittin.0     Smokeless tobacco: Former User   Substance Use Topics     Alcohol use: Yes     Alcohol/week: 5.0 standard drinks     Types: 5 Shots of liquor per week     Comment: 4-5 drinks/day, alcohol use disorder        Current Outpatient Medications   Medication     allopurinol (ZYLOPRIM) 300 MG tablet     amLODIPine (NORVASC) 5 MG tablet     ASPIRIN NOT PRESCRIBED (INTENTIONAL)     cloNIDine (CATAPRES) 0.3 MG tablet     eltrombopag (PROMACTA) 75 MG tablet     folic acid (FOLVITE) 1 MG tablet     gabapentin (NEURONTIN) 300 MG capsule     Glucos-Chond-Sterol-Fish Oil (GLUCOSAMINE CHONDROITIN PLUS PO)     lisinopril (PRINIVIL/ZESTRIL) 40 MG tablet     omeprazole (PRILOSEC) 40 MG DR capsule     ondansetron (ZOFRAN ODT) 4 MG ODT tab     spironolactone (ALDACTONE) 25 MG tablet     XARELTO ANTICOAGULANT 10 MG TABS tablet     No current facility-administered medications for this visit.

## 2020-10-05 NOTE — ASSESSMENT & PLAN NOTE
Seeing patient first time. Last seen  in 7/2020   Cataract surgery pre op clearance today.   On Xarelto for Pulm Embolism 4/2017 - No need to stop for cataract surgey as per guideines.   On Eltombopag for ITP

## 2020-10-05 NOTE — PROGRESS NOTES
Allergies   Allergen Reactions     Olmesartan Diarrhea        Past Medical History:   Diagnosis Date     Alcohol abuse since teens 01/15/2015    Still actively drinking     Alcoholic liver damage (H) 1/10/2014     Gastroesophageal reflux disease with esophagitis 12/6/2016     Gout involving toe, unspecified cause, unspecified chronicity, unspecified laterality 6/2/2016     Heart murmur     heart is positioned farther on left side then typical     Hyperlipidemia 12/17/2015     Hypertension      ITP (idiopathic thrombocytopenic purpura)      Obstructive sleep apnea     does not use CPAP  machine     Pulmonary embolism (H) large RLL w infarctio 4-17 4/18/2017       Past Surgical History:   Procedure Laterality Date     APPENDECTOMY  1956     BACK SURGERY  1992, 1996    trimmed L4-L5, Fusion L4-L5     BONE MARROW BIOPSY, BONE SPECIMEN, NEEDLE/TROCAR  10/24/2012    Procedure: BIOPSY BONE MARROW;  BONE MARROW BIOPSY WITH ASPIRATE (AREVALO ORDERING);  Surgeon: Terri Hickey MD;  Location:  GI     COLONOSCOPY N/A 7/31/2019    Procedure: COLONOSCOPY;  Surgeon: Sebastian Garcia MD;  Location:  GI     ESOPHAGOSCOPY, GASTROSCOPY, DUODENOSCOPY (EGD), COMBINED N/A 2/8/2018    Procedure: COMBINED ESOPHAGOSCOPY, GASTROSCOPY, DUODENOSCOPY (EGD);  EGD;  Surgeon: Terri Crouch MD;  Location:  GI     FACIAL RECONSTRUCTION SURGERY  1965    jaw fracture     HC TOOTH EXTRACTION W/FORCEP  1990s       Family History   Problem Relation Age of Onset     Unknown/Adopted Mother      Unknown/Adopted Father      Heart Disease Sister      Diabetes No family hx of      Coronary Artery Disease No family hx of      Hypertension No family hx of      Hyperlipidemia No family hx of      Cerebrovascular Disease No family hx of      Breast Cancer No family hx of      Colon Cancer No family hx of      Prostate Cancer No family hx of      Other Cancer No family hx of      Depression No family hx of      Anxiety Disorder No  family hx of      Mental Illness No family hx of      Substance Abuse No family hx of      Anesthesia Reaction No family hx of      Asthma No family hx of      Osteoporosis No family hx of      Genetic Disorder No family hx of      Thyroid Disease No family hx of      Obesity No family hx of        Social History     Tobacco Use     Smoking status: Former Smoker     Packs/day: 1.00     Years: 28.00     Pack years: 28.00     Types: Cigarettes     Start date:      Quit date: 1982     Years since quittin.0     Smokeless tobacco: Former User   Substance Use Topics     Alcohol use: Yes     Alcohol/week: 5.0 standard drinks     Types: 5 Shots of liquor per week     Comment: 4-5 drinks/day, alcohol use disorder        Current Outpatient Medications   Medication     allopurinol (ZYLOPRIM) 300 MG tablet     amLODIPine (NORVASC) 5 MG tablet     ASPIRIN NOT PRESCRIBED (INTENTIONAL)     cloNIDine (CATAPRES) 0.3 MG tablet     eltrombopag (PROMACTA) 75 MG tablet     folic acid (FOLVITE) 1 MG tablet     gabapentin (NEURONTIN) 300 MG capsule     lisinopril (PRINIVIL/ZESTRIL) 40 MG tablet     omeprazole (PRILOSEC) 40 MG DR capsule     ondansetron (ZOFRAN ODT) 4 MG ODT tab     spironolactone (ALDACTONE) 25 MG tablet     XARELTO ANTICOAGULANT 10 MG TABS tablet     No current facility-administered medications for this visit.

## 2020-10-06 RX ORDER — SPIRONOLACTONE 25 MG/1
TABLET ORAL
Qty: 90 TABLET | Refills: 0 | OUTPATIENT
Start: 2020-10-06

## 2020-10-07 DIAGNOSIS — I10 BENIGN ESSENTIAL HYPERTENSION: ICD-10-CM

## 2020-10-07 RX ORDER — SPIRONOLACTONE 25 MG/1
TABLET ORAL
Qty: 90 TABLET | Refills: 3 | Status: SHIPPED | OUTPATIENT
Start: 2020-10-07 | End: 2021-05-12

## 2020-10-08 ENCOUNTER — THERAPY VISIT (OUTPATIENT)
Dept: PHYSICAL THERAPY | Facility: CLINIC | Age: 75
End: 2020-10-08
Payer: COMMERCIAL

## 2020-10-08 DIAGNOSIS — I10 ESSENTIAL HYPERTENSION: ICD-10-CM

## 2020-10-08 DIAGNOSIS — M99.53 INTERVERTEBRAL DISC STENOSIS OF NEURAL CANAL OF LUMBAR REGION: Primary | ICD-10-CM

## 2020-10-08 PROCEDURE — 97110 THERAPEUTIC EXERCISES: CPT | Mod: GP | Performed by: PHYSICAL THERAPIST

## 2020-10-08 PROCEDURE — 97012 MECHANICAL TRACTION THERAPY: CPT | Mod: GP | Performed by: PHYSICAL THERAPIST

## 2020-10-09 DIAGNOSIS — Z11.59 ENCOUNTER FOR SCREENING FOR OTHER VIRAL DISEASES: Primary | ICD-10-CM

## 2020-10-09 RX ORDER — LISINOPRIL 40 MG/1
TABLET ORAL
Qty: 90 TABLET | Refills: 2 | Status: SHIPPED | OUTPATIENT
Start: 2020-10-09 | End: 2021-05-12

## 2020-10-23 ENCOUNTER — THERAPY VISIT (OUTPATIENT)
Dept: PHYSICAL THERAPY | Facility: CLINIC | Age: 75
End: 2020-10-23
Payer: COMMERCIAL

## 2020-10-23 DIAGNOSIS — G89.29 CHRONIC BILATERAL LOW BACK PAIN WITH LEFT-SIDED SCIATICA: Primary | ICD-10-CM

## 2020-10-23 DIAGNOSIS — M54.42 CHRONIC BILATERAL LOW BACK PAIN WITH LEFT-SIDED SCIATICA: Primary | ICD-10-CM

## 2020-10-23 PROCEDURE — 97012 MECHANICAL TRACTION THERAPY: CPT | Mod: GP | Performed by: PHYSICAL THERAPIST

## 2020-10-23 PROCEDURE — 97110 THERAPEUTIC EXERCISES: CPT | Mod: GP | Performed by: PHYSICAL THERAPIST

## 2020-10-27 ENCOUNTER — OFFICE VISIT (OUTPATIENT)
Dept: FAMILY MEDICINE | Facility: CLINIC | Age: 75
End: 2020-10-27
Payer: COMMERCIAL

## 2020-10-27 VITALS
DIASTOLIC BLOOD PRESSURE: 70 MMHG | HEIGHT: 65 IN | TEMPERATURE: 98.5 F | OXYGEN SATURATION: 96 % | SYSTOLIC BLOOD PRESSURE: 120 MMHG | RESPIRATION RATE: 14 BRPM | BODY MASS INDEX: 34.49 KG/M2 | WEIGHT: 207 LBS | HEART RATE: 77 BPM

## 2020-10-27 DIAGNOSIS — Z01.818 PREOP GENERAL PHYSICAL EXAM: Primary | ICD-10-CM

## 2020-10-27 DIAGNOSIS — K70.30 ALCOHOLIC CIRRHOSIS, UNSPECIFIED WHETHER ASCITES PRESENT (H): ICD-10-CM

## 2020-10-27 DIAGNOSIS — Z79.01 CHRONIC ANTICOAGULATION: ICD-10-CM

## 2020-10-27 DIAGNOSIS — D69.6 THROMBOCYTOPENIA (H): ICD-10-CM

## 2020-10-27 DIAGNOSIS — I86.4 GASTRIC VARICES: ICD-10-CM

## 2020-10-27 DIAGNOSIS — Z86.0100 HISTORY OF COLONIC POLYPS: ICD-10-CM

## 2020-10-27 LAB
BASOPHILS # BLD AUTO: 0.2 10E9/L (ref 0–0.2)
BASOPHILS NFR BLD AUTO: 1.5 %
DIFFERENTIAL METHOD BLD: ABNORMAL
EOSINOPHIL # BLD AUTO: 0.1 10E9/L (ref 0–0.7)
EOSINOPHIL NFR BLD AUTO: 0.6 %
ERYTHROCYTE [DISTWIDTH] IN BLOOD BY AUTOMATED COUNT: 14.6 % (ref 10–15)
HCT VFR BLD AUTO: 40.7 % (ref 40–53)
HGB BLD-MCNC: 13.7 G/DL (ref 13.3–17.7)
INR PPP: 1.48 (ref 0.86–1.14)
LYMPHOCYTES # BLD AUTO: 2.4 10E9/L (ref 0.8–5.3)
LYMPHOCYTES NFR BLD AUTO: 23.8 %
MCH RBC QN AUTO: 30.4 PG (ref 26.5–33)
MCHC RBC AUTO-ENTMCNC: 33.7 G/DL (ref 31.5–36.5)
MCV RBC AUTO: 90 FL (ref 78–100)
MONOCYTES # BLD AUTO: 1 10E9/L (ref 0–1.3)
MONOCYTES NFR BLD AUTO: 9.4 %
NEUTROPHILS # BLD AUTO: 6.7 10E9/L (ref 1.6–8.3)
NEUTROPHILS NFR BLD AUTO: 64.7 %
PLATELET # BLD AUTO: 454 10E9/L (ref 150–450)
RBC # BLD AUTO: 4.51 10E12/L (ref 4.4–5.9)
WBC # BLD AUTO: 10.3 10E9/L (ref 4–11)

## 2020-10-27 PROCEDURE — 80053 COMPREHEN METABOLIC PANEL: CPT | Performed by: INTERNAL MEDICINE

## 2020-10-27 PROCEDURE — 36415 COLL VENOUS BLD VENIPUNCTURE: CPT | Performed by: INTERNAL MEDICINE

## 2020-10-27 PROCEDURE — 93000 ELECTROCARDIOGRAM COMPLETE: CPT | Performed by: INTERNAL MEDICINE

## 2020-10-27 PROCEDURE — 85025 COMPLETE CBC W/AUTO DIFF WBC: CPT | Performed by: INTERNAL MEDICINE

## 2020-10-27 PROCEDURE — 85610 PROTHROMBIN TIME: CPT | Performed by: INTERNAL MEDICINE

## 2020-10-27 PROCEDURE — 99214 OFFICE O/P EST MOD 30 MIN: CPT | Mod: 25 | Performed by: INTERNAL MEDICINE

## 2020-10-27 ASSESSMENT — MIFFLIN-ST. JEOR: SCORE: 1595.83

## 2020-10-27 NOTE — PROGRESS NOTES
[facility]  [address] [address]  Phone: [phone]  Fax: [phone]  Primary Provider: Andrae Sevilla  Pre-op Performing Provider: ANDRAE SEVILLA    PREOPERATIVE EVALUATION:  Today's date: 10/27/2020    Raymon Ace is a 75 year old male who presents for a preoperative evaluation.    Surgical Information:  Surgery/Procedure: Colonoscopy  Surgery Location:  GI  Surgeon: Alonzo Jang DO  Surgery Date: 11/10/2020  Time of Surgery: 1:00  Where patient plans to recover: At home with family  Fax number for surgical facility: Note does not need to be faxed, will be available electronically in Epic. Fax # [phone] per patient    Type of Anesthesia Anticipated: Local with MAC    Subjective     HPI related to upcoming procedure:     Last seen patient for Preop clearance of cataract in 10/2020. Currently has upcoming Endoscopy , On xarelto for PE and RLL infarction in 4/2017 . Multiple risk factors for bleeding as mentioned in Assessment and plan.    Gastric varices  Last EGD in 2018 POSITIVE for Type 2 isolated gastric varices (IGV2, varices located in the body, antrum or around the pylorus), without bleeding.    History of colonic polyps  Reviewed previous Colonoscopy in 2015 and 2019   With Tubular adenoma on polyp resected at that time.      Preop Questions 10/27/2020   1. Have you ever had a heart attack or stroke? No   2. Have you ever had surgery on your heart or blood vessels, such as a stent placement, a coronary artery bypass, or surgery on an artery in your head, neck, heart, or legs? No   3. Do you have chest pain with activity? No   4. Do you have a history of  heart failure? No   5. Do you currently have a cold, bronchitis or symptoms of other infection? No   6. Do you have a cough, shortness of breath, or wheezing? No   7. Do you or anyone in your family have previous history of blood clots? No   8. Do you or does  anyone in your family have a serious bleeding problem such as prolonged bleeding following surgeries or cuts? No   9. Have you ever had problems with anemia or been told to take iron pills? No   10. Have you had any abnormal blood loss such as black, tarry or bloody stools? No   11. Have you ever had a blood transfusion? No   12. Are you willing to have a blood transfusion if it is medically needed before, during, or after your surgery? Yes   13. Have you or any of your relatives ever had problems with anesthesia? YES    14. Do you have sleep apnea, excessive snoring or daytime drowsiness? No   14a. Do you have a CPAP machine? -   15. Do you have any artifical heart valves or other implanted medical devices like a pacemaker, defibrillator, or continuous glucose monitor? No   16. Do you have artificial joints? YES    17. Are you allergic to latex? No       Health Care Directive:  Patient has a Health Care Directive on file    Preoperative Review of :   reviewed - controlled substances reflected in medication list.    Status of Chronic Conditions:  See problem list for active medical problems.  Problems all longstanding and stable, except as noted/documented.  See ROS for pertinent symptoms related to these conditions.      Review of Systems  CONSTITUTIONAL: NEGATIVE for fever, chills, change in weight  INTEGUMENTARY/SKIN: NEGATIVE for worrisome rashes, moles or lesions  EYES: NEGATIVE for vision changes or irritation  ENT/MOUTH: NEGATIVE for ear, mouth and throat problems  RESP: NEGATIVE for significant cough or SOB  BREAST: NEGATIVE for masses, tenderness or discharge  CV: NEGATIVE for chest pain, palpitations or peripheral edema  GI: NEGATIVE for nausea, abdominal pain, heartburn, or change in bowel habits  : NEGATIVE for frequency, dysuria, or hematuria  MUSCULOSKELETAL: NEGATIVE for significant arthralgias or myalgia  NEURO: NEGATIVE for weakness, dizziness or paresthesias  ENDOCRINE: NEGATIVE for  temperature intolerance, skin/hair changes  HEME: NEGATIVE for bleeding problems  PSYCHIATRIC: NEGATIVE for changes in mood or affect    Patient Active Problem List    Diagnosis Date Noted     ITP (idiopathic thrombocytopenic purpura) since 3-13 back surgery       Priority: High     Gastric varices 10/27/2020     Priority: Medium     Alcoholic cirrhosis (H) 10/27/2020     Priority: Medium     Preop general physical exam 10/05/2020     Priority: Medium     Chronic anticoagulation 10/05/2020     Priority: Medium     Longitudinal ridging of nail + spooning of middle ones  12/27/2019     Priority: Medium     Cardiomegaly per 2017 CXR  12/27/2019     Priority: Medium     Major depression in complete remission (H) 12/27/2019     Priority: Medium     CKD (chronic kidney disease) stage 3, GFR 30-59 ml/min 06/07/2019     Priority: Medium     Portal hypertension (H) 06/07/2019     Priority: Medium     Impaired fasting glucose 06/07/2019     Priority: Medium     Fatigue, unspecified type 06/07/2019     Priority: Medium     Screening for prostate cancer 09/14/2018     Priority: Medium     Iron deficiency anemia due to chronic blood loss 06/28/2018     Priority: Medium     Pulmonary embolism (H) large RLL w infarctio 4-17 04/18/2017     Priority: Medium     Gastroesophageal reflux disease with esophagitis 12/06/2016     Priority: Medium     Need for prophylactic vaccination against Streptococcus pneumoniae (pneumococcus) 08/04/2016     Priority: Medium     Gout involving toe, unspecified cause, unspecified chronicity, unspecified laterality 06/02/2016     Priority: Medium     ACP (advance care planning) 05/16/2016     Priority: Medium     Advance Care Planning 5/16/2016: ACP Review of Chart / Resources Provided:  Reviewed chart for advance care plan.  Raymon Ace has no plan or code status on file however states presence of ACP document. Copy requested. Confirmed code status reflects current choices pending receipt of  document/advance care plan review.  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 Provider to review       Change in bowel habits since 1-14: diarrhea-thinks better off benicar 07/10/2014     Priority: Medium     Family history of ischemic heart disease 01/17/2014     Priority: Medium     Family history of diabetes mellitus 01/17/2014     Priority: Medium     Class 1 obesity due to excess calories with serious comorbidity and body mass index (BMI) of 31.0 to 31.9 in adult 01/10/2014     Priority: Medium     Glucose intolerance (impaired glucose tolerance)  HgbA!C = 5.4 01/10/2014     Priority: Medium     Back pain since 1988 s/po surgery 1992,1996 and 3-13/ Dr Singh  01/10/2014     Priority: Medium     No CSA on file   8-=22-17 no concerns       Alcoholic liver damage (H) 01/10/2014     Priority: Medium     Diverticulosis of large intestine 01/10/2014     Priority: Medium     Problem list name updated by automated process. Provider to review       Elevated liver enzymes AST  01/10/2014     Priority: Medium     Hypotension 03/16/2013     Priority: Medium     Lumbar spinal stenosis 12/14/2012      Priority: Medium     Steroid-induced hyperglycemia 12/14/2012     Priority: Medium     Although glucose normal on 1/30/13 atr 86       Fatty liver/ 1-14 US 12/14/2012     Priority: Medium     Atherosclerosis 12/14/2012     Priority: Medium     Alcohol consuption of more than two drinks per day 12/14/2012     Priority: Medium     Obstructive sleep apnea syndrome 09/21/2012     Priority: Medium     Does not tolerate CPAP  Problem list name updated by automated process. Provider to review       Restless legs syndrome (RLS) 09/21/2012     Priority: Medium     Polyneuropathy in other diseases classified elsewhere (H) 09/21/2012     Priority: Medium     RX monitoring program ([place]) reviewed: 8/4/20 recommend provider review    [place] profile:  [email or web address]         Gastroesophageal reflux disease without esophagitis 09/21/2012     Priority: Medium     Preventive measure 01/31/2013     Priority: Low     APRIMA DATA BASE UNDER THE 12/14/12 NOTE  Colonoscopy neg in 5/10; PSA 0.31 in 9/12        Past Medical History:   Diagnosis Date     Alcohol abuse since teens 01/15/2015    Still actively drinking     Alcoholic liver damage (H) 1/10/2014     Gastroesophageal reflux disease with esophagitis 12/6/2016     Gout involving toe, unspecified cause, unspecified chronicity, unspecified laterality 6/2/2016     Heart murmur     heart is positioned farther on left side then typical     Hyperlipidemia 12/17/2015     Hypertension      ITP (idiopathic thrombocytopenic purpura)      Obstructive sleep apnea     does not use CPAP  machine     Pulmonary embolism (H) large RLL w infarctio 4-17 4/18/2017     Past Surgical History:   Procedure Laterality Date     APPENDECTOMY  1956     BACK SURGERY  1992, 1996    trimmed L4-L5, Fusion L4-L5     BONE MARROW BIOPSY, BONE SPECIMEN, NEEDLE/TROCAR  10/24/2012    Procedure: BIOPSY BONE MARROW;  BONE MARROW BIOPSY WITH ASPIRATE (AREVALO ORDERING);  Surgeon: Terri Hickey MD;   Location:  GI     COLONOSCOPY N/A 7/31/2019    Procedure: COLONOSCOPY;  Surgeon: Sebastian Garcia MD;  Location:  GI     ESOPHAGOSCOPY, GASTROSCOPY, DUODENOSCOPY (EGD), COMBINED N/A 2/8/2018    Procedure: COMBINED ESOPHAGOSCOPY, GASTROSCOPY, DUODENOSCOPY (EGD);  EGD;  Surgeon: Terri Crouch MD;  Location:  GI     FACIAL RECONSTRUCTION SURGERY  1965    jaw fracture     HC TOOTH EXTRACTION W/FORCEP  1990s     Current Outpatient Medications   Medication Sig Dispense Refill     allopurinol (ZYLOPRIM) 300 MG tablet Take 1 tablet by mouth once daily 90 tablet 0     amLODIPine (NORVASC) 5 MG tablet TAKE 1 TABLET BY MOUTH ONCE DAILY . APPOINTMENT REQUIRED FOR FUTURE REFILLS 90 tablet 1     ASPIRIN NOT PRESCRIBED (INTENTIONAL) Please choose reason not prescribed, below 1 each 0     cloNIDine (CATAPRES) 0.3 MG tablet Take 1 tablet (0.3 mg) by mouth 2 times daily 180 tablet 3     eltrombopag (PROMACTA) 75 MG tablet Take 1 tablet (75 mg) by mouth daily Administer on an empty stomach, 1 hour before or 2 hours after a meal. 90 tablet 3     folic acid (FOLVITE) 1 MG tablet TAKE 1 TABLET BY MOUTH ONCE DAILY 90 tablet 2     gabapentin (NEURONTIN) 300 MG capsule TAKE 1 CAPSULE BY MOUTH THREE TIMES DAILY NEEDS TO BE SEEN FOR FURTHER REFILLS 270 capsule 0     lisinopril (ZESTRIL) 40 MG tablet Take 1 tablet by mouth once daily 90 tablet 2     omeprazole (PRILOSEC) 40 MG DR capsule TAKE 1 CAPSULE BY MOUTH ONCE DAILY APPOINTMENT  REQUIRED  FOR  FURTHER  REFILLS 90 capsule 3     ondansetron (ZOFRAN ODT) 4 MG ODT tab Take 1-2 tablets (4-8 mg) by mouth every 8 hours as needed for nausea 12 tablet 0     spironolactone (ALDACTONE) 25 MG tablet TAKE 1 TABLET BY MOUTH ONCE DAILY . APPOINTMENT REQUIRED FOR FUTURE REFILLS 90 tablet 3     XARELTO ANTICOAGULANT 10 MG TABS tablet TAKE 1 TABLET BY MOUTH ONCE DAILY WITH  DINNER 30 tablet 11       Allergies   Allergen Reactions     Olmesartan Diarrhea        Social History  "    Tobacco Use     Smoking status: Former Smoker     Packs/day: 1.00     Years: 28.00     Pack years: 28.00     Types: Cigarettes     Start date:      Quit date: 1982     Years since quittin.1     Smokeless tobacco: Former User   Substance Use Topics     Alcohol use: Yes     Alcohol/week: 5.0 standard drinks     Types: 5 Shots of liquor per week     Comment: 4-5 drinks/day, alcohol use disorder     Family History   Problem Relation Age of Onset     Unknown/Adopted Mother      Unknown/Adopted Father      Heart Disease Sister      Diabetes No family hx of      Coronary Artery Disease No family hx of      Hypertension No family hx of      Hyperlipidemia No family hx of      Cerebrovascular Disease No family hx of      Breast Cancer No family hx of      Colon Cancer No family hx of      Prostate Cancer No family hx of      Other Cancer No family hx of      Depression No family hx of      Anxiety Disorder No family hx of      Mental Illness No family hx of      Substance Abuse No family hx of      Anesthesia Reaction No family hx of      Asthma No family hx of      Osteoporosis No family hx of      Genetic Disorder No family hx of      Thyroid Disease No family hx of      Obesity No family hx of      History   Drug Use No         Objective     /70 (BP Location: Right arm, Patient Position: Sitting, Cuff Size: Adult Regular)   Pulse 77   Temp 98.5  F (36.9  C) (Tympanic)   Resp 14   Ht 1.651 m (5' 5\")   Wt 93.9 kg (207 lb)   SpO2 96%   BMI 34.45 kg/m      Physical Exam    GENERAL APPEARANCE: healthy, alert and no distress     EYES: EOMI,  PERRL     HENT: ear canals and TM's normal and nose and mouth without ulcers or lesions     NECK: no adenopathy, no asymmetry, masses, or scars and thyroid normal to palpation     RESP: lungs clear to auscultation - no rales, rhonchi or wheezes     CV: regular rates and rhythm, normal S1 S2, no S3 or S4 and no murmur, click or rub     ABDOMEN:  soft, " nontender, no HSM or masses and bowel sounds normal     MS: extremities normal- no gross deformities noted, no evidence of inflammation in joints, FROM in all extremities.     SKIN: no suspicious lesions or rashes     NEURO: Normal strength and tone, sensory exam grossly normal, mentation intact and speech normal     PSYCH: mentation appears normal. and affect normal/bright     LYMPHATICS: No cervical adenopathy    Recent Labs   Lab Test 05/21/20  1303 12/12/19  1422 08/30/19  1218 08/30/19  1218   HGB 12.5* 14.3   < > 16.6    65*   < > 61*    142   < > 145*   POTASSIUM 4.1 3.5   < > 3.9   CR 0.75 0.67   < > 0.74   A1C  --   --   --  5.3    < > = values in this interval not displayed.        Diagnostics:  Labs pending at this time.  Results will be reviewed when available.   EKG required for Previous abnormal EKG and multiple risk factors for the patient going to be on general anaesthesia and not completed in the last 90 days.     Revised Cardiac Risk Index (RCRI):  The patient has the following serious cardiovascular risks for perioperative complications:   - No serious cardiac risks = 0 points     RCRI Interpretation: 0 points: Class I (very low risk - 0.4% complication rate)       Assessment & Plan   The proposed surgical procedure is considered LOW risk.    Risks and Recommendations:  The patient has the following additional risks and recommendations for perioperative complications:   - Consult Hospitalist / IM to assist with post-op medical management  Cardiovascular:   - EKG done in the office today negative for acute ST changes as compared to the last one in 2019 showing similar changes.  Pulmonary:    - Incentive spirometry post-op   - Consider Respiratory Therapy (Respiratory Care IP Consult) post-op  Obstructive Sleep Apnea: Cannot tolerate CPAP    Anemia/Bleeding/Clotting:    - History of DVT or PE, consider DVT prevention postoperatively   - Anemia and does not require treatment prior to  surgery. Monitor hemoglobin postoperatively   - On Chronic Anticoagulation with Xarelto   - Severe thrombocytopenia with PLTS low at 60 on 9/21/20 Rheumatology work up done. Will repeat labs at this time for knowing the current status.     Medication Instructions:  Patient is to take all scheduled medications on the day of surgery EXCEPT for modifications listed below:   - apixaban (Eliquis), edoxaban (Savaysa), rivaroxaban (Xarelto): Bleeding risk is moderate or high for this procedure AND CrCl  (>=) 50 mL/min. HOLD 2 days before surgery.     RECOMMENDATION:  APPROVAL GIVEN to proceed with proposed procedure, without further diagnostic evaluation.      1. Preop general physical exam  - Comprehensive metabolic panel  - CBC with platelets differential  - EKG 12-lead complete w/read - Clinics  - INR  - EKG 12-lead complete w/read - Clinics    2. Gastric varices  3. Alcoholic cirrhosis, unspecified whether ascites present (H)  4. History of colonic polyps  Patient scheduled for Endoscopy and Colonoscopy, reviewed the GI physician orders on pre-admit note that the procedure might involve Variceal fixation and this gets him into moderate risk for bleeding. Though pt has Hx of PE with RLL infarction which is more than 3 years back, we are planning to hold his OAC 2 days and restarting after 24 hrs with no bridging needed. Discussed with the patient and wife who verbalized understanding on all the risks explained.  - Comprehensive metabolic panel  - INR    5. Chronic anticoagulation  Please see above for holding parameters on Xarelto  - INR    6. Thrombocytopenia (H)  Severe thrombocytopenia with PLTS low at 60 on 9/21/20 Rheumatology work up done. Will repeat labs at this time for knowing the current status.   - CBC with platelets differential     Patient Instructions     Please do the lab work given.   Discussed on high risk of bleeding and will need to hold the Xarelto as planned by the Gastroenterologist 2 days before  "and resume it after 24-48 hrs.     ===============================  Preparing for Your Surgery  Getting started  A surgery nurse will call you to review your health history and instructions. They will give you an arrival time based on your scheduled surgery time.  Please be ready to share the following:    Your doctor's clinic name and phone number    Your medical, surgical and anesthesia history    A list of allergies and sensitivities    A list of medicines, including herbal treatments and over-the-counter drugs    Whether the patient has a legal guardian (ask how to send us the papers in advance)  If your child is having surgery, please ask for a copy of Preparing for Your Child's Surgery.    Preparing for surgery    Within 30 days of surgery: Have an exam at your family clinic (primary care clinic), or go to a pre-operative clinic. This exam is called a \"History and Physical,\" or H&P.    At your H&P exam, talk to your care team about all medicines you take. If you need to stop any medicines before surgery, ask when to start taking them again.  ? We do this for your safety. Many medicines can make you bleed too much during surgery. Some change how well surgery (anesthesia) drugs work.    Call your insurance company to see what it will and won't pay for. Ask if they need to pre-approve the surgery. (If no insurance, call [phone].)    Call your surgeon's clinic if there's any change in your health. This includes signs of a cold or flu (sore throat, runny nose, cough, rash, fever). It also includes a scrape or scratch near the surgery site.    If you have questions on the day of surgery, call your surgery center.  Eating and drinking guidelines  For your safety: Unless your surgeon tells you otherwise, follow the guidelines below.    Eat and drink as usual until 8 hours before surgery. After that, no food or milk.    Drink clear liquids until 2 hours before surgery. These are liquids you can see through, like " water, Gatorade and Propel Water. You may also have black coffee and tea (no cream or milk).    Nothing by mouth within 2 hours of surgery. This includes gum, candy and breath mints.    Stop alcohol the midnight before surgery.    If your family clinic tells you to take medicine on the morning of surgery, it's okay to take it with a sip of water.  Preventing infection    Shower or bathe the night before and morning of your surgery. Follow the instructions your clinic gave you. (If no instructions, use regular soap.)    Don't shave or clip hair near your surgery site. This can lead to skin infection.    Don't smoke the morning of surgery. Smoking increases the risk of infection. You may chew nicotine gum up to 2 hours before surgery. A nicotine patch is okay.  ? Note: Some surgeries require you to completely quit smoking and nicotine. Check with your surgeon.    Your care team will make every effort to keep you safe from infection. We will:  ? Clean our hands often with soap and water (or an alcohol-based hand rub).  ? Clean the skin at your surgery site with a special soap that kills germs. We'll also remove hair from the site as needed.  ? Wear special hair covers, masks, gowns and gloves during surgery.  ? Give antibiotic medicine, if prescribed. Not all surgeries need antibiotics.  What to bring on the day of surgery    Photo ID and insurance card    Copy of your health care directive, if you have one    Glasses and hearing aides (bring cases)  ? You can't wear contacts during surgery    Inhaler and eye drops, if you use them (tell us about these when you arrive)    CPAP machine or breathing device, if you use them    A few personal items, if spending the night    If you have . . .  ? A pacemaker or ICD (cardiac defibrillator): Bring the ID card.  ? An implanted stimulator: Bring the remote control.  ? A legal guardian: Bring a copy of the certified (court-stamped) guardianship papers.  Please remove any jewelry,  including body piercings. Leave jewelry and other valuables at home.  If you're going home the day of surgery  Important: If you don't follow the rules below, we must cancel your surgery.     Arrange for someone to drive you home after surgery. You may not drive, take a taxi or take public transportation by yourself (unless you'll have local anesthesia only).    Arrange for a responsible adult to stay with you overnight. If you don't, we may keep you in the hospital overnight, and you may need to pay the costs yourself.  Questions?   If you have any questions for your care team, list them here: _________________________________________________________________________________________________________________________________________________________________________________________________________________________________________________________________________________________________________________________  For informational purposes only. Not to replace the advice of your health care provider. Copyright   5354-1721 [facility]. All rights reserved. Clinically reviewed by Paola De La Vega MD. [employer] 233641 - REV 07/19.      Return in about 4 weeks (around 11/24/2020), or if symptoms worsen or fail to improve, for Follow up of last visit.    Beatrice Stephens MD  [facility]    Signed Electronically by: Beatrice Stephens MD    Copy of this evaluation report is provided to requesting physician.    Preop SmartSet    [facility] Preop Guidelines    Revised Cardiac Risk Index

## 2020-10-27 NOTE — LETTER
October 29, 2020      Raymon Ace  [address] [facility] [place] [address]        Dear ,    We are writing to inform you of your test results.    Your INR levels are borderline high , please let the physician in hospital know about it.   All your other labs are normal, there might be some highlighted which doesn't have any clinical significance.     Resulted Orders   Comprehensive metabolic panel   Result Value Ref Range    Sodium 142 133 - 144 mmol/L    Potassium 4.1 3.4 - 5.3 mmol/L    Chloride 109 94 - 109 mmol/L    Carbon Dioxide 28 20 - 32 mmol/L    Anion Gap 5 3 - 14 mmol/L    Glucose 92 70 - 99 mg/dL    Urea Nitrogen 10 7 - 30 mg/dL    Creatinine 0.77 0.66 - 1.25 mg/dL    GFR Estimate 88 >60 mL/min/[1.73_m2]      Comment:      Non  GFR Calc  Starting 12/18/2018, serum creatinine based estimated GFR (eGFR) will be   calculated using the Chronic Kidney Disease Epidemiology Collaboration   (CKD-EPI) equation.      GFR Estimate If Black >90 >60 mL/min/[1.73_m2]      Comment:       GFR Calc  Starting 12/18/2018, serum creatinine based estimated GFR (eGFR) will be   calculated using the Chronic Kidney Disease Epidemiology Collaboration   (CKD-EPI) equation.      Calcium 9.1 8.5 - 10.1 mg/dL    Bilirubin Total 1.1 0.2 - 1.3 mg/dL    Albumin 3.5 3.4 - 5.0 g/dL    Protein Total 7.4 6.8 - 8.8 g/dL    Alkaline Phosphatase 63 40 - 150 U/L    ALT 35 0 - 70 U/L    AST 38 0 - 45 U/L   CBC with platelets differential   Result Value Ref Range    WBC 10.3 4.0 - 11.0 10e9/L    RBC Count 4.51 4.4 - 5.9 10e12/L    Hemoglobin 13.7 13.3 - 17.7 g/dL    Hematocrit 40.7 40.0 - 53.0 %    MCV 90 78 - 100 fl    MCH 30.4 26.5 - 33.0 pg    MCHC 33.7 31.5 - 36.5 g/dL    RDW 14.6 10.0 - 15.0 %    Platelet Count 454 (H) 150 - 450 10e9/L    Diff Method Automated Method     % Neutrophils 64.7 %    % Lymphocytes 23.8 %    % Monocytes 9.4 %    % Eosinophils 0.6 %    % Basophils 1.5 %    Absolute  Neutrophil 6.7 1.6 - 8.3 10e9/L    Absolute Lymphocytes 2.4 0.8 - 5.3 10e9/L    Absolute Monocytes 1.0 0.0 - 1.3 10e9/L    Absolute Eosinophils 0.1 0.0 - 0.7 10e9/L    Absolute Basophils 0.2 0.0 - 0.2 10e9/L   INR   Result Value Ref Range    INR 1.48 (H) 0.86 - 1.14       If you have any questions or concerns, please call the clinic at the number listed above.       Sincerely,        Beatrice Stephens MD

## 2020-10-27 NOTE — PATIENT INSTRUCTIONS
"Please do the lab work given.   Discussed on high risk of bleeding and will need to hold the Xarelto as planned by the Gastroenterologist 2 days before and resume it after 24-48 hrs.     ===============================  Preparing for Your Surgery  Getting started  A surgery nurse will call you to review your health history and instructions. They will give you an arrival time based on your scheduled surgery time.  Please be ready to share the following:    Your doctor's clinic name and phone number    Your medical, surgical and anesthesia history    A list of allergies and sensitivities    A list of medicines, including herbal treatments and over-the-counter drugs    Whether the patient has a legal guardian (ask how to send us the papers in advance)  If your child is having surgery, please ask for a copy of Preparing for Your Child's Surgery.    Preparing for surgery    Within 30 days of surgery: Have an exam at your family clinic (primary care clinic), or go to a pre-operative clinic. This exam is called a \"History and Physical,\" or H&P.    At your H&P exam, talk to your care team about all medicines you take. If you need to stop any medicines before surgery, ask when to start taking them again.  ? We do this for your safety. Many medicines can make you bleed too much during surgery. Some change how well surgery (anesthesia) drugs work.    Call your insurance company to see what it will and won't pay for. Ask if they need to pre-approve the surgery. (If no insurance, call [phone].)    Call your surgeon's clinic if there's any change in your health. This includes signs of a cold or flu (sore throat, runny nose, cough, rash, fever). It also includes a scrape or scratch near the surgery site.    If you have questions on the day of surgery, call your surgery center.  Eating and drinking guidelines  For your safety: Unless your surgeon tells you otherwise, follow the guidelines below.    Eat and drink as usual until " 8 hours before surgery. After that, no food or milk.    Drink clear liquids until 2 hours before surgery. These are liquids you can see through, like water, Gatorade and Propel Water. You may also have black coffee and tea (no cream or milk).    Nothing by mouth within 2 hours of surgery. This includes gum, candy and breath mints.    Stop alcohol the midnight before surgery.    If your family clinic tells you to take medicine on the morning of surgery, it's okay to take it with a sip of water.  Preventing infection    Shower or bathe the night before and morning of your surgery. Follow the instructions your clinic gave you. (If no instructions, use regular soap.)    Don't shave or clip hair near your surgery site. This can lead to skin infection.    Don't smoke the morning of surgery. Smoking increases the risk of infection. You may chew nicotine gum up to 2 hours before surgery. A nicotine patch is okay.  ? Note: Some surgeries require you to completely quit smoking and nicotine. Check with your surgeon.    Your care team will make every effort to keep you safe from infection. We will:  ? Clean our hands often with soap and water (or an alcohol-based hand rub).  ? Clean the skin at your surgery site with a special soap that kills germs. We'll also remove hair from the site as needed.  ? Wear special hair covers, masks, gowns and gloves during surgery.  ? Give antibiotic medicine, if prescribed. Not all surgeries need antibiotics.  What to bring on the day of surgery    Photo ID and insurance card    Copy of your health care directive, if you have one    Glasses and hearing aides (bring cases)  ? You can't wear contacts during surgery    Inhaler and eye drops, if you use them (tell us about these when you arrive)    CPAP machine or breathing device, if you use them    A few personal items, if spending the night    If you have . . .  ? A pacemaker or ICD (cardiac defibrillator): Bring the ID card.  ? An implanted  stimulator: Bring the remote control.  ? A legal guardian: Bring a copy of the certified (court-stamped) guardianship papers.  Please remove any jewelry, including body piercings. Leave jewelry and other valuables at home.  If you're going home the day of surgery  Important: If you don't follow the rules below, we must cancel your surgery.     Arrange for someone to drive you home after surgery. You may not drive, take a taxi or take public transportation by yourself (unless you'll have local anesthesia only).    Arrange for a responsible adult to stay with you overnight. If you don't, we may keep you in the hospital overnight, and you may need to pay the costs yourself.  Questions?   If you have any questions for your care team, list them here: _________________________________________________________________________________________________________________________________________________________________________________________________________________________________________________________________________________________________________________________  For informational purposes only. Not to replace the advice of your health care provider. Copyright   7624-9789 [facility]. All rights reserved. Clinically reviewed by Paola De La Vega MD. [employer] 123238 - REV 07/19.

## 2020-10-27 NOTE — ASSESSMENT & PLAN NOTE
Last EGD in 2018 POSITIVE for Type 2 isolated gastric varices (IGV2, varices located in the body, antrum or around the pylorus), without bleeding.

## 2020-10-28 LAB
ALBUMIN SERPL-MCNC: 3.5 G/DL (ref 3.4–5)
ALP SERPL-CCNC: 63 U/L (ref 40–150)
ALT SERPL W P-5'-P-CCNC: 35 U/L (ref 0–70)
ANION GAP SERPL CALCULATED.3IONS-SCNC: 5 MMOL/L (ref 3–14)
AST SERPL W P-5'-P-CCNC: 38 U/L (ref 0–45)
BILIRUB SERPL-MCNC: 1.1 MG/DL (ref 0.2–1.3)
BUN SERPL-MCNC: 10 MG/DL (ref 7–30)
CALCIUM SERPL-MCNC: 9.1 MG/DL (ref 8.5–10.1)
CHLORIDE SERPL-SCNC: 109 MMOL/L (ref 94–109)
CO2 SERPL-SCNC: 28 MMOL/L (ref 20–32)
CREAT SERPL-MCNC: 0.77 MG/DL (ref 0.66–1.25)
GFR SERPL CREATININE-BSD FRML MDRD: 88 ML/MIN/{1.73_M2}
GLUCOSE SERPL-MCNC: 92 MG/DL (ref 70–99)
POTASSIUM SERPL-SCNC: 4.1 MMOL/L (ref 3.4–5.3)
PROT SERPL-MCNC: 7.4 G/DL (ref 6.8–8.8)
SODIUM SERPL-SCNC: 142 MMOL/L (ref 133–144)

## 2020-11-03 DIAGNOSIS — R63.4 WEIGHT LOSS: Primary | ICD-10-CM

## 2020-11-03 RX ORDER — BISACODYL 5 MG/1
10 TABLET, DELAYED RELEASE ORAL DAILY
Qty: 4 TABLET | Refills: 0 | Status: SHIPPED | OUTPATIENT
Start: 2020-11-08 | End: 2020-11-10

## 2020-11-03 NOTE — NURSING NOTE
eRx Dulcolax et Golytely: [facility] - [place], [place] - [address] Anna Rocha RN  [facility]

## 2020-11-06 ENCOUNTER — THERAPY VISIT (OUTPATIENT)
Dept: PHYSICAL THERAPY | Facility: CLINIC | Age: 75
End: 2020-11-06
Payer: COMMERCIAL

## 2020-11-06 DIAGNOSIS — M54.16 LUMBAR RADICULOPATHY: Primary | ICD-10-CM

## 2020-11-06 DIAGNOSIS — Z11.59 ENCOUNTER FOR SCREENING FOR OTHER VIRAL DISEASES: ICD-10-CM

## 2020-11-06 DIAGNOSIS — R29.898 LEG WEAKNESS, BILATERAL: ICD-10-CM

## 2020-11-06 LAB
SARS-COV-2 RNA SPEC QL NAA+PROBE: NOT DETECTED
SPECIMEN SOURCE: NORMAL

## 2020-11-06 PROCEDURE — 97110 THERAPEUTIC EXERCISES: CPT | Mod: GP | Performed by: PHYSICAL THERAPIST

## 2020-11-06 PROCEDURE — U0003 INFECTIOUS AGENT DETECTION BY NUCLEIC ACID (DNA OR RNA); SEVERE ACUTE RESPIRATORY SYNDROME CORONAVIRUS 2 (SARS-COV-2) (CORONAVIRUS DISEASE [COVID-19]), AMPLIFIED PROBE TECHNIQUE, MAKING USE OF HIGH THROUGHPUT TECHNOLOGIES AS DESCRIBED BY CMS-2020-01-R: HCPCS | Performed by: INTERNAL MEDICINE

## 2020-11-06 PROCEDURE — 97012 MECHANICAL TRACTION THERAPY: CPT | Mod: GP | Performed by: PHYSICAL THERAPIST

## 2020-11-06 NOTE — LETTER
[facility] [place] [facility] [place]  [address]  [place] [address]  [phone]    2020    Re: Raymon Ace   :   1945  MRN:  [id number]   REFERRING PHYSICIAN:   [facility] [place]    Date of Initial Evaluation:  10/11/19  Visits:   15  Reason for Referral:     Lumbar radiculopathy  Leg weakness, bilateral    PROGRESS  REPORT    SUBJECTIVE  Raymon notes that he is less painful, and no leg tingling for the past 2 days .   Uses SEC for gait and balance. Walking up to 10-12 minutes now without shuffled gait. Difficult to negotiate stairs and uneven surfaces. Leg tingling has lessened and overall stronger again.    Current Pain level: 1/10   Initial Pain level: 6/10   Changes in function: Yes, see goal flow sheet for change in function   Adverse reactions: None       OBJECTIVE  Continued to advance leg strength and endurance is recommended.   Lumbar traction for pain relief.   Neuro exam is normal. No parathesia.   Lumar ROM is limited in all direction , especially extension.   Next step is to begin hip flexro stretching, lumbar extension and global strengthening.   Patient may be wintering in  [place] for the next few months.       ASSESSMENT/PLAN  Updated problem list and treatment plan: Diagnosis 1:  Lumbar Radiculopathy, lower extremity weakness     STG/LTGs have been met or progress has been made towards goals:  Yes (See Goal flow sheet completed today.)  Assessment of Progress: The patient's condition is improving.  The patient's condition has potential to improve.  Self Management Plans:  Patient has been instructed in a home treatment program.    Recommendations:  This patient would benefit from continued therapy.     Frequency:  2 X week, once daily  Duration:  for 6 weeks    If Patient hermosillo in [place], he is recommended to carry on follow up PT down there.     This patient would benefit from further evaluation.    Thank you for your referral.    INQUIRIES  Therapist: Abad Mortensen, PT,  ATC, Cert. MDT  [facility]  [address]  [place] [address]  Phone: [phone]  Fax: [phone]

## 2020-11-10 ENCOUNTER — ANESTHESIA EVENT (OUTPATIENT)
Dept: GASTROENTEROLOGY | Facility: CLINIC | Age: 75
End: 2020-11-10
Payer: COMMERCIAL

## 2020-11-10 ENCOUNTER — ANESTHESIA (OUTPATIENT)
Dept: GASTROENTEROLOGY | Facility: CLINIC | Age: 75
End: 2020-11-10
Payer: COMMERCIAL

## 2020-11-10 ENCOUNTER — HOSPITAL ENCOUNTER (OUTPATIENT)
Facility: CLINIC | Age: 75
Discharge: HOME | End: 2020-11-10
Attending: INTERNAL MEDICINE | Admitting: INTERNAL MEDICINE
Payer: COMMERCIAL

## 2020-11-10 VITALS
TEMPERATURE: 98.1 F | SYSTOLIC BLOOD PRESSURE: 134 MMHG | BODY MASS INDEX: 33.5 KG/M2 | WEIGHT: 201.3 LBS | OXYGEN SATURATION: 97 % | DIASTOLIC BLOOD PRESSURE: 73 MMHG | HEART RATE: 55 BPM

## 2020-11-10 LAB
COLONOSCOPY: NORMAL
UPPER GI ENDOSCOPY: NORMAL

## 2020-11-10 PROCEDURE — 258N000003 HC RX IP 258 OP 636: Performed by: NURSE ANESTHETIST, CERTIFIED REGISTERED

## 2020-11-10 PROCEDURE — 370N000001 HC ANESTHESIA TECHNICAL FEE, 1ST 30 MIN: Performed by: INTERNAL MEDICINE

## 2020-11-10 PROCEDURE — 250N000009 HC RX 250: Performed by: NURSE ANESTHETIST, CERTIFIED REGISTERED

## 2020-11-10 PROCEDURE — 370N000002 HC ANESTHESIA TECHNICAL FEE, EACH ADDTL 15 MIN: Performed by: INTERNAL MEDICINE

## 2020-11-10 PROCEDURE — 250N000011 HC RX IP 250 OP 636: Performed by: NURSE ANESTHETIST, CERTIFIED REGISTERED

## 2020-11-10 PROCEDURE — 88305 TISSUE EXAM BY PATHOLOGIST: CPT | Mod: TC | Performed by: INTERNAL MEDICINE

## 2020-11-10 PROCEDURE — 45385 COLONOSCOPY W/LESION REMOVAL: CPT | Performed by: INTERNAL MEDICINE

## 2020-11-10 PROCEDURE — 43239 EGD BIOPSY SINGLE/MULTIPLE: CPT | Mod: 51 | Performed by: INTERNAL MEDICINE

## 2020-11-10 PROCEDURE — 45385 COLONOSCOPY W/LESION REMOVAL: CPT | Mod: 33 | Performed by: INTERNAL MEDICINE

## 2020-11-10 PROCEDURE — 88305 TISSUE EXAM BY PATHOLOGIST: CPT | Mod: 26 | Performed by: PATHOLOGY

## 2020-11-10 PROCEDURE — 43239 EGD BIOPSY SINGLE/MULTIPLE: CPT | Performed by: INTERNAL MEDICINE

## 2020-11-10 RX ORDER — ONDANSETRON 2 MG/ML
4 INJECTION INTRAMUSCULAR; INTRAVENOUS EVERY 6 HOURS PRN
Status: DISCONTINUED | OUTPATIENT
Start: 2020-11-10 | End: 2020-11-10 | Stop reason: HOSPADM

## 2020-11-10 RX ORDER — PROPOFOL 10 MG/ML
INJECTION, EMULSION INTRAVENOUS PRN
Status: DISCONTINUED | OUTPATIENT
Start: 2020-11-10 | End: 2020-11-10

## 2020-11-10 RX ORDER — LIDOCAINE 40 MG/G
CREAM TOPICAL
Status: DISCONTINUED | OUTPATIENT
Start: 2020-11-10 | End: 2020-11-10 | Stop reason: HOSPADM

## 2020-11-10 RX ORDER — NALOXONE HYDROCHLORIDE 0.4 MG/ML
.1-.4 INJECTION, SOLUTION INTRAMUSCULAR; INTRAVENOUS; SUBCUTANEOUS
Status: DISCONTINUED | OUTPATIENT
Start: 2020-11-10 | End: 2020-11-10 | Stop reason: HOSPADM

## 2020-11-10 RX ORDER — FLUMAZENIL 0.1 MG/ML
0.2 INJECTION, SOLUTION INTRAVENOUS
Status: DISCONTINUED | OUTPATIENT
Start: 2020-11-10 | End: 2020-11-10 | Stop reason: HOSPADM

## 2020-11-10 RX ORDER — PROPOFOL 10 MG/ML
INJECTION, EMULSION INTRAVENOUS CONTINUOUS PRN
Status: DISCONTINUED | OUTPATIENT
Start: 2020-11-10 | End: 2020-11-10

## 2020-11-10 RX ORDER — ONDANSETRON 4 MG/1
4 TABLET, ORALLY DISINTEGRATING ORAL EVERY 6 HOURS PRN
Status: DISCONTINUED | OUTPATIENT
Start: 2020-11-10 | End: 2020-11-10 | Stop reason: HOSPADM

## 2020-11-10 RX ORDER — ONDANSETRON 2 MG/ML
4 INJECTION INTRAMUSCULAR; INTRAVENOUS
Status: DISCONTINUED | OUTPATIENT
Start: 2020-11-10 | End: 2020-11-10 | Stop reason: HOSPADM

## 2020-11-10 RX ORDER — LIDOCAINE HYDROCHLORIDE 20 MG/ML
INJECTION, SOLUTION INFILTRATION; PERINEURAL PRN
Status: DISCONTINUED | OUTPATIENT
Start: 2020-11-10 | End: 2020-11-10

## 2020-11-10 RX ORDER — GLYCOPYRROLATE 0.2 MG/ML
INJECTION, SOLUTION INTRAMUSCULAR; INTRAVENOUS PRN
Status: DISCONTINUED | OUTPATIENT
Start: 2020-11-10 | End: 2020-11-10

## 2020-11-10 RX ORDER — PROCHLORPERAZINE MALEATE 5 MG
5 TABLET ORAL EVERY 6 HOURS PRN
Status: DISCONTINUED | OUTPATIENT
Start: 2020-11-10 | End: 2020-11-10 | Stop reason: HOSPADM

## 2020-11-10 RX ORDER — SODIUM CHLORIDE, SODIUM LACTATE, POTASSIUM CHLORIDE, CALCIUM CHLORIDE 600; 310; 30; 20 MG/100ML; MG/100ML; MG/100ML; MG/100ML
INJECTION, SOLUTION INTRAVENOUS CONTINUOUS PRN
Status: DISCONTINUED | OUTPATIENT
Start: 2020-11-10 | End: 2020-11-10

## 2020-11-10 RX ADMIN — LIDOCAINE HYDROCHLORIDE 60 MG: 20 INJECTION, SOLUTION INFILTRATION; PERINEURAL at 13:39

## 2020-11-10 RX ADMIN — SODIUM CHLORIDE, POTASSIUM CHLORIDE, SODIUM LACTATE AND CALCIUM CHLORIDE: 600; 310; 30; 20 INJECTION, SOLUTION INTRAVENOUS at 13:32

## 2020-11-10 RX ADMIN — GLYCOPYRROLATE 0.2 MG: 0.2 INJECTION, SOLUTION INTRAMUSCULAR; INTRAVENOUS at 14:02

## 2020-11-10 RX ADMIN — PROPOFOL 50 MG: 10 INJECTION, EMULSION INTRAVENOUS at 13:39

## 2020-11-10 RX ADMIN — PROPOFOL 100 MCG/KG/MIN: 10 INJECTION, EMULSION INTRAVENOUS at 13:39

## 2020-11-10 RX ADMIN — PROPOFOL 20 MG: 10 INJECTION, EMULSION INTRAVENOUS at 14:07

## 2020-11-10 ASSESSMENT — LIFESTYLE VARIABLES: TOBACCO_USE: 1

## 2020-11-10 NOTE — H&P
Raymon MELO Both  [id number]  male  75 year old      Reason for procedure/surgery: dysphagia, and weight loss    Patient Active Problem List   Diagnosis     Obstructive sleep apnea syndrome     Restless legs syndrome (RLS)     Polyneuropathy in other diseases classified elsewhere (H)     Gastroesophageal reflux disease without esophagitis     Lumbar spinal stenosis     Steroid-induced hyperglycemia     Fatty liver/ 1-14      Atherosclerosis     ITP (idiopathic thrombocytopenic purpura) since 3-13 back surgery      Alcohol consuption of more than two drinks per day     Preventive measure     Class 1 obesity due to excess calories with serious comorbidity and body mass index (BMI) of 31.0 to 31.9 in adult     Glucose intolerance (impaired glucose tolerance)  HgbA!C = 5.4     Back pain since 1988 s/po surgery 1992,1996 and 3-13/ Dr Singh      Alcoholic liver damage (H)     Diverticulosis of large intestine     Elevated liver enzymes AST      Family history of ischemic heart disease     Family history of diabetes mellitus     Risk for falls     History of colonic polyps     Change in bowel habits since 1-14: diarrhea-thinks better off benicar     ED (erectile dysfunction)     Alcohol abuse since teens      Seborrheic dermatitis Lt temple      Colon polyp in 2010 and 9-15 -->  rept in 2020     Essential hypertension     Hyperlipidemia     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, unspecified laterality, sequela     ACP (advance care planning)     Heel spur, left     Personal history of tobacco use, presenting hazards to health: [age] y/.o@1ppd=23 pk yr hx      Gout involving toe, unspecified cause, unspecified chronicity, unspecified laterality     Need for prophylactic vaccination against Streptococcus pneumoniae (pneumococcus)     Gastroesophageal reflux disease with esophagitis     Pulmonary embolism (H) large RLL w infarctio 4-17     Hypotension     Iron deficiency anemia due to chronic blood loss      Screening for prostate cancer     CKD (chronic kidney disease) stage 3, GFR 30-59 ml/min     Portal hypertension (H)     Impaired fasting glucose     Fatigue, unspecified type     Longitudinal ridging of nail + spooning of middle ones      Cardiomegaly per 2017 CXR      Major depression in complete remission (H)     Preop general physical exam     Chronic anticoagulation     Gastric varices     Alcoholic cirrhosis (H)       Past Surgical History:    Past Surgical History:   Procedure Laterality Date     APPENDECTOMY  1956     BACK SURGERY  1992, 1996    trimmed L4-L5, Fusion L4-L5     BONE MARROW BIOPSY, BONE SPECIMEN, NEEDLE/TROCAR  10/24/2012    Procedure: BIOPSY BONE MARROW;  BONE MARROW BIOPSY WITH ASPIRATE (AREVALO ORDERING);  Surgeon: Terri Hickey MD;  Location:  GI     COLONOSCOPY N/A 7/31/2019    Procedure: COLONOSCOPY;  Surgeon: Sebastian Garcia MD;  Location:  GI     ESOPHAGOSCOPY, GASTROSCOPY, DUODENOSCOPY (EGD), COMBINED N/A 2/8/2018    Procedure: COMBINED ESOPHAGOSCOPY, GASTROSCOPY, DUODENOSCOPY (EGD);  EGD;  Surgeon: Terri Crouch MD;  Location:  GI     FACIAL RECONSTRUCTION SURGERY  1965    jaw fracture     HC TOOTH EXTRACTION W/FORCEP  1990s       Past Medical History:   Past Medical History:   Diagnosis Date     Alcohol abuse since teens 01/15/2015    Still actively drinking     Alcoholic liver damage (H) 1/10/2014     Gastroesophageal reflux disease with esophagitis 12/6/2016     Gout involving toe, unspecified cause, unspecified chronicity, unspecified laterality 6/2/2016     Heart murmur     heart is positioned farther on left side then typical     Hyperlipidemia 12/17/2015     Hypertension      ITP (idiopathic thrombocytopenic purpura)      Obstructive sleep apnea     does not use CPAP  machine     Pulmonary embolism (H) large RLL w infarctio 4-17 4/18/2017       Social History:   Social History     Tobacco Use     Smoking status: Former Smoker     Packs/day: 1.00      Years: 28.00     Pack years: 28.00     Types: Cigarettes     Start date:      Quit date: 1982     Years since quittin.1     Smokeless tobacco: Former User   Substance Use Topics     Alcohol use: Yes     Alcohol/week: 5.0 standard drinks     Types: 5 Shots of liquor per week     Comment: 4-5 drinks/day, alcohol use disorder       Family History:   Family History   Problem Relation Age of Onset     Unknown/Adopted Mother      Unknown/Adopted Father      Heart Disease Sister      Diabetes No family hx of      Coronary Artery Disease No family hx of      Hypertension No family hx of      Hyperlipidemia No family hx of      Cerebrovascular Disease No family hx of      Breast Cancer No family hx of      Colon Cancer No family hx of      Prostate Cancer No family hx of      Other Cancer No family hx of      Depression No family hx of      Anxiety Disorder No family hx of      Mental Illness No family hx of      Substance Abuse No family hx of      Anesthesia Reaction No family hx of      Asthma No family hx of      Osteoporosis No family hx of      Genetic Disorder No family hx of      Thyroid Disease No family hx of      Obesity No family hx of        Allergies:   Allergies   Allergen Reactions     Olmesartan Diarrhea       Active Medications:   No current outpatient medications on file.       Systemic Review:   CONSTITUTIONAL: NEGATIVE for fever, chills, change in weight  ENT/MOUTH: NEGATIVE for ear, mouth and throat problems  RESP: NEGATIVE for significant cough or SOB  CV: NEGATIVE for chest pain, palpitations or peripheral edema    Physical Examination:   Vital Signs: BP (!) 141/72   Pulse 55   Temp 98.1  F (36.7  C) (Oral)   Resp 19   Wt 91.3 kg (201 lb 4.8 oz)   SpO2 98%   BMI 33.50 kg/m    GENERAL: healthy, alert and no distress  NECK: no adenopathy, no asymmetry, masses, or scars  RESP: lungs clear to auscultation - no rales, rhonchi or wheezes  CV: regular rate and rhythm, normal S1 S2, no  S3 or S4, no murmur, click or rub, no peripheral edema and peripheral pulses strong  ABDOMEN: soft, nontender, no hepatosplenomegaly, no masses and bowel sounds normal  MS: no gross musculoskeletal defects noted, no edema    Plan: Appropriate to proceed as scheduled. He is continuing to drink alcohol. He has poor insight into why he is here and relies heavily on his wife. He has regained the weight that he lost.       Alonzo Jang,   11/10/2020    PCP:  Beatrice Stephens

## 2020-11-10 NOTE — ANESTHESIA POSTPROCEDURE EVALUATION
Anesthesia POST Procedure Evaluation    Patient: Raymon Ace   MRN:     [id number] Gender:   male   Age:    75 year old :      1945        Preoperative Diagnosis: Esophageal dysphagia [R13.10]   Procedure(s):  COLONOSCOPY, FLEXIBLE, WITH LESION REMOVAL USING SNARE  ESOPHAGOGASTRODUODENOSCOPY, WITH BIOPSY   Postop Comments: No value filed.     Anesthesia Type: MAC       Disposition: Outpatient   Postop Pain Control: Uneventful            Sign Out: Well controlled pain   PONV: No   Neuro/Psych: Uneventful            Sign Out: Acceptable/Baseline neuro status   Airway/Respiratory: Uneventful            Sign Out: Acceptable/Baseline resp. status   CV/Hemodynamics: Uneventful            Sign Out: Acceptable CV status   Other NRE: NONE   DID A NON-ROUTINE EVENT OCCUR? No         Last Anesthesia Record Vitals:  CRNA VITALS  11/10/2020 1345 - 11/10/2020 1426      11/10/2020             Pulse:  64    Ht Rate:  64    SpO2:  99 %          Last PACU Vitals:  No vitals data found for the desired time range.        Electronically Signed By: Allen Jacobs MD, November 10, 2020, 2:26 PM

## 2020-11-10 NOTE — ANESTHESIA CARE TRANSFER NOTE
Patient: Raymon Ace    Procedure(s):  COLONOSCOPY, FLEXIBLE, WITH LESION REMOVAL USING SNARE  ESOPHAGOGASTRODUODENOSCOPY, WITH BIOPSY    Diagnosis: Esophageal dysphagia [R13.10]  Diagnosis Additional Information: No value filed.    Anesthesia Type:   MAC     Note:  Airway :Room Air  Patient transferred to:Phase II  Handoff Report: Identifed the Patient, Identified the Reponsible Provider, Reviewed the pertinent medical history, Discussed the surgical course, Reviewed Intra-OP anesthesia mangement and issues during anesthesia, Set expectations for post-procedure period and Allowed opportunity for questions and acknowledgement of understanding      Vitals: (Last set prior to Anesthesia Care Transfer)    CRNA VITALS  11/10/2020 1345 - 11/10/2020 1420      11/10/2020             Pulse:  64    Ht Rate:  64    SpO2:  99 %                Electronically Signed By: SCOTTIE Rizzo CRNA  November 10, 2020  2:20 PM

## 2020-11-10 NOTE — ANESTHESIA PREPROCEDURE EVALUATION
"Anesthesia Pre-Procedure Evaluation    Patient: Raymon Ace   MRN:     [id number] Gender:   male   Age:    75 year old :      1945        Preoperative Diagnosis: Esophageal dysphagia [R13.10]   Procedure(s):  COLONOSCOPY  ESOPHAGOGASTRODUODENOSCOPY (EGD)     LABS:  CBC:   Lab Results   Component Value Date    WBC 10.3 10/27/2020    WBC 18.9 (H) 2020    HGB 13.7 10/27/2020    HGB 12.5 (L) 2020    HCT 40.7 10/27/2020    HCT 38.0 (L) 2020     (H) 10/27/2020     2020     BMP:   Lab Results   Component Value Date     10/27/2020     2020    POTASSIUM 4.1 10/27/2020    POTASSIUM 4.1 2020    CHLORIDE 109 10/27/2020    CHLORIDE 105 2020    CO2 28 10/27/2020    CO2 26 2020    BUN 10 10/27/2020    BUN 7 2020    CR 0.77 10/27/2020    CR 0.81 2020    GLC 92 10/27/2020     (H) 2020     COAGS:   Lab Results   Component Value Date    PTT 28 2017    INR 1.48 (H) 10/27/2020     POC:   Lab Results   Component Value Date     (H) 2019     OTHER:   Lab Results   Component Value Date    A1C 5.3 2019    PHILIPPE 9.1 10/27/2020    ALBUMIN 3.5 10/27/2020    PROTTOTAL 7.4 10/27/2020    ALT 35 10/27/2020    AST 38 10/27/2020    GGT 86 (H) 2016    ALKPHOS 63 10/27/2020    BILITOTAL 1.1 10/27/2020    LIPASE 115 2018    TSH 1.76 2017    CRP <2.9 07/15/2016        Preop Vitals    BP Readings from Last 3 Encounters:   11/10/20 (!) 141/72   10/27/20 120/70   10/05/20 132/72    Pulse Readings from Last 3 Encounters:   11/10/20 55   10/27/20 77   10/05/20 82      Resp Readings from Last 3 Encounters:   11/10/20 19   10/27/20 14   10/05/20 14    SpO2 Readings from Last 3 Encounters:   11/10/20 98%   10/27/20 96%   10/05/20 95%      Temp Readings from Last 1 Encounters:   11/10/20 36.7  C (98.1  F) (Oral)    Ht Readings from Last 1 Encounters:   10/27/20 1.651 m (5' 5\")      Wt Readings from Last 1 " "Encounters:   11/10/20 91.3 kg (201 lb 4.8 oz)    Estimated body mass index is 33.5 kg/m  as calculated from the following:    Height as of 10/27/20: 1.651 m (5' 5\").    Weight as of this encounter: 91.3 kg (201 lb 4.8 oz).     LDA:  Peripheral IV 11/10/20 Right Upper forearm;Other (Comment) (Active)   Site Assessment WDL 11/10/20 1241   Line Status Saline locked 11/10/20 1241   Number of days: 0       Peripheral IV 01/02/20 Left (Active)   Number of days: 313       Peripheral IV 01/09/20 Left (Active)   Number of days: 306        Past Medical History:   Diagnosis Date     Alcohol abuse since teens 01/15/2015    Still actively drinking     Alcoholic liver damage (H) 1/10/2014     Gastroesophageal reflux disease with esophagitis 12/6/2016     Gout involving toe, unspecified cause, unspecified chronicity, unspecified laterality 6/2/2016     Heart murmur     heart is positioned farther on left side then typical     Hyperlipidemia 12/17/2015     Hypertension      ITP (idiopathic thrombocytopenic purpura)      Obstructive sleep apnea     does not use CPAP  machine     Pulmonary embolism (H) large RLL w infarctio 4-17 4/18/2017      Past Surgical History:   Procedure Laterality Date     APPENDECTOMY  1956     BACK SURGERY  1992, 1996    trimmed L4-L5, Fusion L4-L5     BONE MARROW BIOPSY, BONE SPECIMEN, NEEDLE/TROCAR  10/24/2012    Procedure: BIOPSY BONE MARROW;  BONE MARROW BIOPSY WITH ASPIRATE (AREVALO ORDERING);  Surgeon: Terri Hickey MD;  Location:  GI     COLONOSCOPY N/A 7/31/2019    Procedure: COLONOSCOPY;  Surgeon: Sebastian Garcia MD;  Location:  GI     ESOPHAGOSCOPY, GASTROSCOPY, DUODENOSCOPY (EGD), COMBINED N/A 2/8/2018    Procedure: COMBINED ESOPHAGOSCOPY, GASTROSCOPY, DUODENOSCOPY (EGD);  EGD;  Surgeon: Terri Crouch MD;  Location:  GI     FACIAL RECONSTRUCTION SURGERY  1965    jaw fracture     HC TOOTH EXTRACTION W/FORCEP  1990s      Allergies   Allergen Reactions     Olmesartan " Diarrhea        Anesthesia Evaluation     . Pt has had prior anesthetic. Type: MAC and General    No history of anesthetic complications          ROS/MED HX    ENT/Pulmonary:     (+)sleep apnea, tobacco use, Past use , . .    Neurologic: Comment: Polyneuropathy      Cardiovascular: Comment: 2017 ECHO    Left Ventricle  The left ventricle is normal in size. There is normal left ventricular wall  thickness. The transmitral spectral Doppler flow pattern is normal for age.  The visual ejection fraction is estimated at 55-60%.     Right Ventricle  The right ventricle is normal in structure, function and size.    Cardiomegaly - on 2017 CXR    (+) Dyslipidemia, hypertension----. : . . . :. .       METS/Exercise Tolerance:     Hematologic: Comments: ITP    (+) History of blood clots pt is anticoagulated, Anemia, -      Musculoskeletal:         GI/Hepatic: Comment: Alcoholic liver disease with portal HTN    (+) GERD       Renal/Genitourinary:     (+) chronic renal disease, type: CRI,       Endo:     (+) Obesity, .      Psychiatric:     (+) psychiatric history depression      Infectious Disease:         Malignancy:         Other:                         PHYSICAL EXAM:   Mental Status/Neuro: A/A/O   Airway: Facies: Feasible  Mallampati: II  Mouth/Opening: Full  TM distance: > 6 cm  Neck ROM: Full   Respiratory: Auscultation: CTAB     Resp. Rate: Normal     Resp. Effort: Normal      CV: Rhythm: Regular  Rate: Age appropriate  Heart: Normal Sounds  Edema: None   Comments: Some missing teeth                     Assessment:   ASA SCORE: 3    H&P: History and physical reviewed and following examination; no interval change.    NPO Status: NPO Appropriate     Plan:   Anes. Type:  MAC   Pre-Medication: None   Induction:  N/a   Airway: Native Airway   Access/Monitoring: PIV   Maintenance: N/a     Postop Plan:   Postop Pain: None  Postop Sedation/Airway: Not planned  Disposition: Outpatient     PONV Management:    Prevention:  Ondansetron     CONSENT: Direct conversation   Plan and risks discussed with: Patient   Blood Products: Consent Deferred (Minimal Blood Loss)                   Allen Jacobs MD

## 2020-11-12 LAB — COPATH REPORT: NORMAL

## 2020-11-16 NOTE — PROGRESS NOTES
Subjective:  HPI  Physical Exam                    Objective:  System    Physical Exam    General     ROS    Assessment/Plan:    PROGRESS  REPORT        SUBJECTIVE  Raymon notes that he is less painful, and no leg tingling for the past 2 days .     Uses SEC for gait and balance. Walking up to 10-12 minutes now without shuffled gait. Difficult to negotiate stairs and uneven surfaces. Leg tingling has lessened and overall stronger again.      Current Pain level: 1/10   Initial Pain level: 6/10   Changes in function: Yes, see goal flow sheet for change in function   Adverse reactions: None;   ,         OBJECTIVE   Continued to advance leg strength and endurance is recommended.     Lumbar traction for pain relief.     Neuro exam is normal. No parathesia.     Lumar ROM is limited in all direction , especially extension.   Next step is to begin hip flexro stretching, lumbar extension and global strengthening.   Patient may be wintering in  [place] for the next few months.       ASSESSMENT/PLAN  Updated problem list and treatment plan: Diagnosis 1:  Lumbar Radiculopathy, lower extremity weakness     STG/LTGs have been met or progress has been made towards goals:  Yes (See Goal flow sheet completed today.)  Assessment of Progress: The patient's condition is improving.  The patient's condition has potential to improve.  Self Management Plans:  Patient has been instructed in a home treatment program.    Recommendations:  This patient would benefit from continued therapy.     Frequency:  2 X week, once daily  Duration:  for 6 weeks    If Patient hermosillo in [place], he is recommended to carry on follow up PT down there.     This patient would benefit from further evaluation.    Please refer to the daily flowsheet for treatment today, total treatment time and time spent performing 1:1 timed codes.

## 2020-12-19 DIAGNOSIS — F10.10 ALCOHOL ABUSE: ICD-10-CM

## 2020-12-21 RX ORDER — FOLIC ACID 1 MG/1
TABLET ORAL
Qty: 90 TABLET | Refills: 1 | Status: SHIPPED | OUTPATIENT
Start: 2020-12-21 | End: 2021-09-27

## 2021-03-09 ENCOUNTER — TRANSFERRED RECORDS (OUTPATIENT)
Dept: HEALTH INFORMATION MANAGEMENT | Facility: CLINIC | Age: 76
End: 2021-03-09

## 2021-03-21 ENCOUNTER — HEALTH MAINTENANCE LETTER (OUTPATIENT)
Age: 76
End: 2021-03-21

## 2021-03-24 DIAGNOSIS — K21.9 GASTROESOPHAGEAL REFLUX DISEASE WITHOUT ESOPHAGITIS: ICD-10-CM

## 2021-03-24 DIAGNOSIS — M10.9 GOUT INVOLVING TOE, UNSPECIFIED CAUSE, UNSPECIFIED CHRONICITY, UNSPECIFIED LATERALITY: ICD-10-CM

## 2021-03-25 RX ORDER — OMEPRAZOLE 40 MG/1
CAPSULE, DELAYED RELEASE ORAL
Qty: 90 CAPSULE | Refills: 1 | Status: SHIPPED | OUTPATIENT
Start: 2021-03-25 | End: 2021-12-02

## 2021-03-25 RX ORDER — ALLOPURINOL 300 MG/1
1 TABLET ORAL DAILY
Qty: 90 TABLET | Refills: 0 | Status: SHIPPED | OUTPATIENT
Start: 2021-03-25 | End: 2021-09-09

## 2021-03-25 NOTE — TELEPHONE ENCOUNTER
Routing refill request to provider for review/approval because:  Labs not current:    Uric Acid   Date Value Ref Range Status   08/30/2019 1.5 (L) 3.5 - 7.2 mg/dL Final       Brittany Connelly RN

## 2021-03-25 NOTE — TELEPHONE ENCOUNTER
Prescription approved per [facility] Refill Protocol.  Brittany Connelly RN

## 2021-03-28 ENCOUNTER — TELEPHONE (OUTPATIENT)
Dept: FAMILY MEDICINE | Facility: CLINIC | Age: 76
End: 2021-03-28

## 2021-03-28 NOTE — LETTER
March 30, 2021      Raymon Ceballosyoung  [address] [facility] [address]        Dear Raymon,    I care about your health and have reviewed your health plan. I have reviewed your medical conditions, medication list, and lab results and am making recommendations based on this review, to better manage your health.    You are in particular need of attention regarding:  -Annual Wellness VIsit    I am recommending that you:  -schedule a WELLNESS (Physical) APPOINTMENT with me.   I will check fasting labs the same day - nothing to eat except water and meds for 8-10 hours prior.    Here is a list of Health Maintenance topics that are due now or due soon:  Health Maintenance Due   Topic Date Due     URINE DRUG SCREEN  Never done     COVID-19 Vaccine (1) Never done     Zoster (Shingles) Vaccine (2 of 3) 10/16/2014     Cholesterol Lab  06/07/2020     Kidney Microalbumin Urine Test  08/30/2020     Depression Assessment  08/30/2020     Annual Wellness Visit  12/27/2020     FALL RISK ASSESSMENT  12/27/2020       Please call us at [phone] (or use [employer]) to address the above recommendations.     Thank you for trusting [facility] and we appreciate the opportunity to serve you.  We look forward to supporting your healthcare needs in the future.    Healthy Regards,    Beatrice Stephens MD

## 2021-03-28 NOTE — TELEPHONE ENCOUNTER
Please call the patient and schedule AWV which he is due at this time, to further take care of his medical conditions and medications. I have last seen pt in 10/2020.     Thank you,  Beatrice Stephens MD on 3/28/2021 at 5:30 PM

## 2021-03-29 NOTE — TELEPHONE ENCOUNTER
Called and spoke with Raymon's wife and she said that they are in [place] for another month, and when they get back into [place] they will call and make an appointment.    Arlin Drew on 3/29/2021 at 9:28 AM

## 2021-05-12 ENCOUNTER — OFFICE VISIT (OUTPATIENT)
Dept: FAMILY MEDICINE | Facility: CLINIC | Age: 76
End: 2021-05-12
Payer: COMMERCIAL

## 2021-05-12 VITALS
BODY MASS INDEX: 29.52 KG/M2 | HEART RATE: 61 BPM | SYSTOLIC BLOOD PRESSURE: 132 MMHG | OXYGEN SATURATION: 97 % | WEIGHT: 177.4 LBS | TEMPERATURE: 97.7 F | DIASTOLIC BLOOD PRESSURE: 70 MMHG

## 2021-05-12 DIAGNOSIS — K76.6 PORTAL HYPERTENSION (H): ICD-10-CM

## 2021-05-12 DIAGNOSIS — I27.82 CHRONIC PULMONARY EMBOLISM, UNSPECIFIED PULMONARY EMBOLISM TYPE, UNSPECIFIED WHETHER ACUTE COR PULMONALE PRESENT (H): ICD-10-CM

## 2021-05-12 DIAGNOSIS — R73.01 IFG (IMPAIRED FASTING GLUCOSE): ICD-10-CM

## 2021-05-12 DIAGNOSIS — M85.80 AGE-RELATED BONE LOSS: ICD-10-CM

## 2021-05-12 DIAGNOSIS — F32.5 MAJOR DEPRESSION IN COMPLETE REMISSION (H): ICD-10-CM

## 2021-05-12 DIAGNOSIS — Z13.220 SCREENING FOR HYPERLIPIDEMIA: ICD-10-CM

## 2021-05-12 DIAGNOSIS — Z00.00 ENCOUNTER FOR MEDICARE ANNUAL WELLNESS EXAM: Primary | ICD-10-CM

## 2021-05-12 DIAGNOSIS — G89.29 CHRONIC BILATERAL LOW BACK PAIN WITH SCIATICA, SCIATICA LATERALITY UNSPECIFIED: ICD-10-CM

## 2021-05-12 DIAGNOSIS — D69.6 THROMBOCYTOPENIA (H): ICD-10-CM

## 2021-05-12 DIAGNOSIS — F10.24 ALCOHOL DEPENDENCE WITH ALCOHOL-INDUCED MOOD DISORDER (H): ICD-10-CM

## 2021-05-12 DIAGNOSIS — N18.31 STAGE 3A CHRONIC KIDNEY DISEASE (H): ICD-10-CM

## 2021-05-12 DIAGNOSIS — M54.40 CHRONIC BILATERAL LOW BACK PAIN WITH SCIATICA, SCIATICA LATERALITY UNSPECIFIED: ICD-10-CM

## 2021-05-12 DIAGNOSIS — K70.30 ALCOHOLIC CIRRHOSIS, UNSPECIFIED WHETHER ASCITES PRESENT (H): ICD-10-CM

## 2021-05-12 DIAGNOSIS — G63 POLYNEUROPATHY IN OTHER DISEASES CLASSIFIED ELSEWHERE (H): ICD-10-CM

## 2021-05-12 DIAGNOSIS — I95.9 HYPOTENSION, UNSPECIFIED HYPOTENSION TYPE: ICD-10-CM

## 2021-05-12 LAB
ALBUMIN SERPL-MCNC: 3.2 G/DL (ref 3.4–5)
ALP SERPL-CCNC: 110 U/L (ref 40–150)
ALT SERPL W P-5'-P-CCNC: 26 U/L (ref 0–70)
ANION GAP SERPL CALCULATED.3IONS-SCNC: 4 MMOL/L (ref 3–14)
AST SERPL W P-5'-P-CCNC: 23 U/L (ref 0–45)
BASOPHILS # BLD AUTO: 0.1 10E9/L (ref 0–0.2)
BASOPHILS NFR BLD AUTO: 0.5 %
BILIRUB SERPL-MCNC: 1.2 MG/DL (ref 0.2–1.3)
BUN SERPL-MCNC: 8 MG/DL (ref 7–30)
CALCIUM SERPL-MCNC: 9 MG/DL (ref 8.5–10.1)
CHLORIDE SERPL-SCNC: 110 MMOL/L (ref 94–109)
CHOLEST SERPL-MCNC: 107 MG/DL
CO2 SERPL-SCNC: 29 MMOL/L (ref 20–32)
CREAT SERPL-MCNC: 0.96 MG/DL (ref 0.66–1.25)
DIFFERENTIAL METHOD BLD: ABNORMAL
EOSINOPHIL # BLD AUTO: 0.2 10E9/L (ref 0–0.7)
EOSINOPHIL NFR BLD AUTO: 1.5 %
ERYTHROCYTE [DISTWIDTH] IN BLOOD BY AUTOMATED COUNT: 14.4 % (ref 10–15)
GFR SERPL CREATININE-BSD FRML MDRD: 76 ML/MIN/{1.73_M2}
GLUCOSE SERPL-MCNC: 93 MG/DL (ref 70–99)
HBA1C MFR BLD: 4.9 % (ref 0–5.6)
HCT VFR BLD AUTO: 39.5 % (ref 40–53)
HDLC SERPL-MCNC: 48 MG/DL
HGB BLD-MCNC: 13.2 G/DL (ref 13.3–17.7)
LDLC SERPL CALC-MCNC: 46 MG/DL
LYMPHOCYTES # BLD AUTO: 1.3 10E9/L (ref 0.8–5.3)
LYMPHOCYTES NFR BLD AUTO: 12.4 %
MCH RBC QN AUTO: 31.4 PG (ref 26.5–33)
MCHC RBC AUTO-ENTMCNC: 33.4 G/DL (ref 31.5–36.5)
MCV RBC AUTO: 94 FL (ref 78–100)
MONOCYTES # BLD AUTO: 1 10E9/L (ref 0–1.3)
MONOCYTES NFR BLD AUTO: 9.9 %
NEUTROPHILS # BLD AUTO: 7.9 10E9/L (ref 1.6–8.3)
NEUTROPHILS NFR BLD AUTO: 75.7 %
NONHDLC SERPL-MCNC: 59 MG/DL
PLATELET # BLD AUTO: 102 10E9/L (ref 150–450)
POTASSIUM SERPL-SCNC: 4.2 MMOL/L (ref 3.4–5.3)
PROT SERPL-MCNC: 7.2 G/DL (ref 6.8–8.8)
RBC # BLD AUTO: 4.2 10E12/L (ref 4.4–5.9)
SODIUM SERPL-SCNC: 143 MMOL/L (ref 133–144)
TRIGL SERPL-MCNC: 67 MG/DL
TSH SERPL DL<=0.005 MIU/L-ACNC: 0.78 MU/L (ref 0.4–4)
WBC # BLD AUTO: 10.4 10E9/L (ref 4–11)

## 2021-05-12 PROCEDURE — 96127 BRIEF EMOTIONAL/BEHAV ASSMT: CPT | Performed by: INTERNAL MEDICINE

## 2021-05-12 PROCEDURE — 85025 COMPLETE CBC W/AUTO DIFF WBC: CPT | Performed by: INTERNAL MEDICINE

## 2021-05-12 PROCEDURE — 83036 HEMOGLOBIN GLYCOSYLATED A1C: CPT | Performed by: INTERNAL MEDICINE

## 2021-05-12 PROCEDURE — 80053 COMPREHEN METABOLIC PANEL: CPT | Performed by: INTERNAL MEDICINE

## 2021-05-12 PROCEDURE — 36415 COLL VENOUS BLD VENIPUNCTURE: CPT | Performed by: INTERNAL MEDICINE

## 2021-05-12 PROCEDURE — 82306 VITAMIN D 25 HYDROXY: CPT | Performed by: INTERNAL MEDICINE

## 2021-05-12 PROCEDURE — 84443 ASSAY THYROID STIM HORMONE: CPT | Performed by: INTERNAL MEDICINE

## 2021-05-12 PROCEDURE — 80061 LIPID PANEL: CPT | Performed by: INTERNAL MEDICINE

## 2021-05-12 PROCEDURE — 99397 PER PM REEVAL EST PAT 65+ YR: CPT | Performed by: INTERNAL MEDICINE

## 2021-05-12 PROCEDURE — 99214 OFFICE O/P EST MOD 30 MIN: CPT | Mod: 25 | Performed by: INTERNAL MEDICINE

## 2021-05-12 RX ORDER — METOPROLOL SUCCINATE 50 MG/1
50 TABLET, EXTENDED RELEASE ORAL DAILY
Qty: 90 TABLET | Refills: 1 | Status: SHIPPED | OUTPATIENT
Start: 2021-05-12 | End: 2021-10-30

## 2021-05-12 RX ORDER — ALBUTEROL SULFATE 90 UG/1
AEROSOL, METERED RESPIRATORY (INHALATION)
COMMUNITY
Start: 2021-01-10 | End: 2021-09-21

## 2021-05-12 RX ORDER — GABAPENTIN 300 MG/1
CAPSULE ORAL
Qty: 60 CAPSULE | Refills: 0 | Status: SHIPPED | OUTPATIENT
Start: 2021-05-12 | End: 2021-06-30

## 2021-05-12 ASSESSMENT — ANXIETY QUESTIONNAIRES
2. NOT BEING ABLE TO STOP OR CONTROL WORRYING: MORE THAN HALF THE DAYS
7. FEELING AFRAID AS IF SOMETHING AWFUL MIGHT HAPPEN: NOT AT ALL
IF YOU CHECKED OFF ANY PROBLEMS ON THIS QUESTIONNAIRE, HOW DIFFICULT HAVE THESE PROBLEMS MADE IT FOR YOU TO DO YOUR WORK, TAKE CARE OF THINGS AT HOME, OR GET ALONG WITH OTHER PEOPLE: SOMEWHAT DIFFICULT
GAD7 TOTAL SCORE: 7
1. FEELING NERVOUS, ANXIOUS, OR ON EDGE: NOT AT ALL
3. WORRYING TOO MUCH ABOUT DIFFERENT THINGS: MORE THAN HALF THE DAYS
6. BECOMING EASILY ANNOYED OR IRRITABLE: NEARLY EVERY DAY
5. BEING SO RESTLESS THAT IT IS HARD TO SIT STILL: NOT AT ALL

## 2021-05-12 ASSESSMENT — PATIENT HEALTH QUESTIONNAIRE - PHQ9
5. POOR APPETITE OR OVEREATING: NOT AT ALL
SUM OF ALL RESPONSES TO PHQ QUESTIONS 1-9: 15

## 2021-05-12 ASSESSMENT — ACTIVITIES OF DAILY LIVING (ADL): CURRENT_FUNCTION: NO ASSISTANCE NEEDED

## 2021-05-12 NOTE — PROGRESS NOTES
SUBJECTIVE:   Raymon Ace is a 75 year old male who presents for Preventive Visit.    Patient has been advised of split billing requirements and indicates understanding: Yes   Are you in the first 12 months of your Medicare coverage?  Unsure    Healthy Habits:    In general, how would you rate your overall health?  Poor    Frequency of exercise:  1 day/week    Duration of exercise:  15-30 minutes    Taking medications regularly:  Yes    Barriers to taking medications:  None    Medication side effects:  None    Ability to successfully perform activities of daily living:  No assistance needed    Home Safety:  No safety concerns identified    Hearing Impairment:  Difficulty following a conversation in a noisy restaurant or crowded room, feel that people are mumbling or not speaking clearly, difficulty following dialogue in the theater, difficult to understand a speaker at a public meeting or Nondenominational service, need to ask people to speak up or repeat themselves, find that men's voices are easier to understand than woman's, difficulty understanding soft or whispered speech and difficulty understanding speech on the telephone    In the past 6 months, have you been bothered by leaking of urine? Yes    In general, how would you rate your overall mental or emotional health?  Fair      PHQ-2 Total Score:    Additional concerns today:  No    Do you feel safe in your environment? Yes    Have you ever done Advance Care Planning? (For example, a Health Directive, POLST, or a discussion with a medical provider or your loved ones about your wishes): Yes, advance care planning is on file.       Fall risk  Fallen 2 or more times in the past year?: Yes  Any fall with injury in the past year?: No    Cognitive Screening   1) Repeat 3 items (Leader, Season, Table)    2) Clock draw: NORMAL  3) 3 item recall: Recalls 1 object   Results: NORMAL clock, 1-2 items recalled: COGNITIVE IMPAIRMENT LESS LIKELY    Mini-CogTM Copyright CARMENCITA Cancino.  Licensed by the author for use in [facility]; reprinted with permission (walter@[facility]). All rights reserved.      Do you have sleep apnea, excessive snoring or daytime drowsiness?: no    Reviewed and updated as needed this visit by clinical staff  Tobacco  Allergies  Meds  Problems  Med Hx  Surg Hx  Fam Hx  Soc Hx          Reviewed and updated as needed this visit by Provider  Tobacco  Allergies  Meds  Problems  Med Hx  Surg Hx  Fam Hx         Social History     Tobacco Use     Smoking status: Former Smoker     Packs/day: 1.00     Years: 28.00     Pack years: 28.00     Types: Cigarettes     Start date:      Quit date: 1982     Years since quittin.6     Smokeless tobacco: Former User   Substance Use Topics     Alcohol use: Yes     Alcohol/week: 5.0 standard drinks     Types: 5 Shots of liquor per week     Comment: 4-5 drinks/day, alcohol use disorder     If you drink alcohol do you typically have >3 drinks per day or >7 drinks per week? Yes  Alcohol Use 2021   Prescreen: >3 drinks/day or >7 drinks/week? Yes   No flowsheet data found.    Current providers sharing in care for this patient include:   Patient Care Team:  Beatrice Stephens MD as PCP - General (Internal Medicine)  Wayoln Novoa MD as MD (Hematology)  Edel Zuluaga LPN as LPN (Hematology)  Beatrice Stephens MD as Assigned PCP  Waylon Novoa MD as Assigned Cancer Care Provider  Sebastian Monique MD as Assigned Gastroenterology Provider    The following health maintenance items are reviewed in Epic and correct as of today:  Health Maintenance Due   Topic Date Due     URINE DRUG SCREEN  Never done     COVID-19 Vaccine (1) Never done     ZOSTER IMMUNIZATION (2 of 3) 10/16/2014     MICROALBUMIN  2020     FALL RISK ASSESSMENT  2020     Lab work is in process  Labs reviewed in EPIC  Emphasized to get COVID vaccine    Review of Systems  Constitutional, HEENT, cardiovascular, pulmonary, GI,  ", musculoskeletal, neuro, skin, endocrine and psych systems are negative, except as otherwise noted.    OBJECTIVE:   /70 (Cuff Size: Adult Large)   Pulse 61   Temp 97.7  F (36.5  C) (Tympanic)   Wt 80.5 kg (177 lb 6.4 oz)   SpO2 97%   BMI 29.52 kg/m   Estimated body mass index is 29.52 kg/m  as calculated from the following:    Height as of 10/27/20: 1.651 m (5' 5\").    Weight as of this encounter: 80.5 kg (177 lb 6.4 oz).  Physical Exam  GENERAL: healthy, alert and no distress  EYES: Eyes grossly normal to inspection, PERRL and conjunctivae and sclerae normal  HENT: ear canals and TM's normal, nose and mouth without ulcers or lesions  NECK: no adenopathy, no asymmetry, masses, or scars and thyroid normal to palpation  RESP: lungs clear to auscultation - no rales, rhonchi or wheezes  CV: regular rate and rhythm, normal S1 S2, no S3 or S4, no murmur, click or rub, no peripheral edema and peripheral pulses strong  ABDOMEN: soft, nontender, no hepatosplenomegaly, no masses and bowel sounds normal  MS: no gross musculoskeletal defects noted, no edema. Ongoing left lower extremity pain and numbness given his chronic Neuropathic pains.   SKIN: no suspicious lesions or rashes  NEURO: Normal strength and tone, mentation intact and speech normal  PSYCH: mentation appears normal, affect normal/bright    Diagnostic Test Results:  Labs reviewed in Epic    ASSESSMENT / PLAN:     Assessment and Plan  1. Encounter for Medicare annual wellness exam  Last seen pt in 10/2020 for preoperative eval . He is here for follow up on multiple comorbidities - Gastric varices , Alcoholic cirrhosis, unspecified whether ascites present (H) , History of colonic polyps , Chronic OAC for Hx of DVT  , Thrombocytopenia. Due for HM .   - REVIEW OF HEALTH MAINTENANCE PROTOCOL ORDERS  - Lipid panel reflex to direct LDL Fasting  - metoprolol succinate ER (TOPROL-XL) 50 MG 24 hr tablet; Take 1 tablet (50 mg) by mouth daily  Dispense: 90 " tablet; Refill: 1  - MENTAL HEALTH REFERRAL  - Adult; Addiction Medicine Provider; Detox; Alcohol Detox: No Opiates [phone]; We will contact you to schedule the appointment or please call with any questions  - Albumin Random Urine Quantitative with Creat Ratio; Future    2. Screening for hyperlipidemia  - Lipid panel reflex to direct LDL Fasting    3. Alcoholic cirrhosis, unspecified whether ascites present (H)  5. Portal hypertension (H)  - MENTAL HEALTH REFERRAL  - Adult; Addiction Medicine Provider; Detox; Alcohol Detox: No Opiates [phone]; We will contact you to schedule the appointment or please call with any questions  - Comprehensive metabolic panel    4. Thrombocytopenia (H)  - CBC with platelets differential      6. Polyneuropathy in other diseases classified elsewhere (H)  Uncontrolled, pt has not been taking his Gabapentin last fill seen in 8/2020. Will restart at lower dose before titrating it up.  - gabapentin (NEURONTIN) 300 MG capsule; TAKE 1 CAPSULE IN AFTERNOON AND 1 CAPSULE IN THE NIGHT  Dispense: 60 capsule; Refill: 0    7. Chronic pulmonary embolism, unspecified pulmonary embolism type, unspecified whether acute cor pulmonale present (H)  Stable, Continue current Xarelto.     8. Major depression in complete remission (H)  - TSH with free T4 reflex    9. Stage 3a chronic kidney disease  - Albumin Random Urine Quantitative with Creat Ratio; Future    10. Alcohol dependence with alcohol-induced mood disorder (H)  Uncontrolled, pt does take Hard liquor 7 glasses every day. Placed Addiction medicine referral.   - MENTAL HEALTH REFERRAL  - Adult; Addiction Medicine Provider; Detox; Alcohol Detox: No Opiates [phone]; We will contact you to schedule the appointment or please call with any questions  - Albumin Random Urine Quantitative with Creat Ratio; Future    11. Hypotension, unspecified hypotension type  Recent hospitalization and discharge at [facility] [place] , reviewed the hospitalist  note with limited information though pt states that they were following cardiologist . Pt was in low 60's/40s on the BP and was admitted in hospital. Was on Dopamine drip. Underwent Cardiac work up and followed Cardiologist. MERRITT done today in the office. Will await for the records . Medication reconcilation done. Pt not on Lisinopril, Spirinolactone, Clonidine or Amlodipine anymore. Started on Metoprolol . TO continue as reconciled.  Will place cardiology referral once I see the Cardiology records of [place]. Pt understood and agreed with the plan.     12. IFG (impaired fasting glucose)  - Hemoglobin A1c    13. Age-related bone loss  - Vitamin D Deficiency    14. Chronic bilateral low back pain with sciatica, sciatica laterality unspecified  - gabapentin (NEURONTIN) 300 MG capsule; TAKE 1 CAPSULE IN AFTERNOON AND 1 CAPSULE IN THE NIGHT  Dispense: 60 capsule; Refill: 0     Patient Instructions     Please do the lab work as discussed.   Placed referral to Addiction medicine to quit alcohol.     ============================    Patient Education   Personalized Prevention Plan  You are due for the preventive services outlined below.  Your care team is available to assist you in scheduling these services.  If you have already completed any of these items, please share that information with your care team to update in your medical record.  Health Maintenance Due   Topic Date Due     URINE DRUG SCREEN  Never done     ANNUAL REVIEW OF HM ORDERS  Never done     COVID-19 Vaccine (1) Never done     Zoster (Shingles) Vaccine (2 of 3) 10/16/2014     Cholesterol Lab  06/07/2020     Kidney Microalbumin Urine Test  08/30/2020     Depression Assessment  08/30/2020     Annual Wellness Visit  12/27/2020     FALL RISK ASSESSMENT  12/27/2020          Return in about 4 months (around 9/12/2021), or if symptoms worsen or fail to improve, for Follow up of last visit.    Beatrice Stephens MD  [facility]      Patient  "has been advised of split billing requirements and indicates understanding: Yes  COUNSELING:  Reviewed preventive health counseling, as reflected in patient instructions  Special attention given to:       Regular exercise       Healthy diet/nutrition       Vision screening       Hearing screening       Dental care       Bladder control       Fall risk prevention       Aspirin prophylaxis        Alcohol Use        Hepatitis C screening    Estimated body mass index is 29.52 kg/m  as calculated from the following:    Height as of 10/27/20: 1.651 m (5' 5\").    Weight as of this encounter: 80.5 kg (177 lb 6.4 oz).    Weight management plan: Discussed healthy diet and exercise guidelines    He reports that he quit smoking about 38 years ago. His smoking use included cigarettes. He started smoking about 63 years ago. He has a 28.00 pack-year smoking history. He has quit using smokeless tobacco.      Appropriate preventive services were discussed with this patient, including applicable screening as appropriate for cardiovascular disease, diabetes, osteopenia/osteoporosis, and glaucoma.  As appropriate for age/gender, discussed screening for colorectal cancer, prostate cancer, breast cancer, and cervical cancer. Checklist reviewing preventive services available has been given to the patient.    Reviewed patients plan of care and provided an AVS. The Basic Care Plan (routine screening as documented in Health Maintenance) for Raymon meets the Care Plan requirement. This Care Plan has been established and reviewed with the Patient and caregiver ( Wife Taryn ) .    Counseling Resources:  ATP IV Guidelines  Pooled Cohorts Equation Calculator  Breast Cancer Risk Calculator  Breast Cancer: Medication to Reduce Risk  FRAX Risk Assessment  ICSI Preventive Guidelines  Dietary Guidelines for Americans, 2010  [employer]'s MyPlate  ASA Prophylaxis  Lung CA Screening    Beatrice Stephens MD  [facility]    Identified " Health Risks:

## 2021-05-12 NOTE — PATIENT INSTRUCTIONS
Please do the lab work as discussed.   Placed referral to Addiction medicine to quit alcohol.     ============================    Patient Education   Personalized Prevention Plan  You are due for the preventive services outlined below.  Your care team is available to assist you in scheduling these services.  If you have already completed any of these items, please share that information with your care team to update in your medical record.  Health Maintenance Due   Topic Date Due     URINE DRUG SCREEN  Never done     ANNUAL REVIEW OF HM ORDERS  Never done     COVID-19 Vaccine (1) Never done     Zoster (Shingles) Vaccine (2 of 3) 10/16/2014     Cholesterol Lab  06/07/2020     Kidney Microalbumin Urine Test  08/30/2020     Depression Assessment  08/30/2020     Annual Wellness Visit  12/27/2020     FALL RISK ASSESSMENT  12/27/2020

## 2021-05-12 NOTE — Clinical Note
Romulo Wu,   Please look into if this is Initial AWV ? I am not sure.     Thank you,  Beatrice Stephens MD on 5/12/2021 at 5:29 PM

## 2021-05-13 DIAGNOSIS — E55.9 VITAMIN D DEFICIENCY: Primary | ICD-10-CM

## 2021-05-13 LAB — DEPRECATED CALCIDIOL+CALCIFEROL SERPL-MC: 15 UG/L (ref 20–75)

## 2021-05-13 RX ORDER — ERGOCALCIFEROL 1.25 MG/1
50000 CAPSULE, LIQUID FILLED ORAL WEEKLY
Qty: 12 CAPSULE | Refills: 0 | Status: SHIPPED | OUTPATIENT
Start: 2021-05-13 | End: 2021-09-10

## 2021-05-13 ASSESSMENT — ANXIETY QUESTIONNAIRES: GAD7 TOTAL SCORE: 7

## 2021-05-14 ENCOUNTER — TELEPHONE (OUTPATIENT)
Dept: FAMILY MEDICINE | Facility: CLINIC | Age: 76
End: 2021-05-14

## 2021-05-14 DIAGNOSIS — D69.3 IDIOPATHIC THROMBOCYTOPENIC PURPURA (H): ICD-10-CM

## 2021-05-14 DIAGNOSIS — Z79.01 CHRONIC ANTICOAGULATION: ICD-10-CM

## 2021-05-14 DIAGNOSIS — I27.82 CHRONIC PULMONARY EMBOLISM, UNSPECIFIED PULMONARY EMBOLISM TYPE, UNSPECIFIED WHETHER ACUTE COR PULMONALE PRESENT (H): Primary | ICD-10-CM

## 2021-05-14 DIAGNOSIS — D69.6 THROMBOCYTOPENIA (H): ICD-10-CM

## 2021-05-14 NOTE — TELEPHONE ENCOUNTER
----- Message from Beatrice Stephens MD sent at 5/13/2021  9:24 PM CDT -----  Your Vitamin D levels are abnormally low, Sent high dose Vitamin D 50,000 units once a week to your pharmacy for 3 months. After you complete the 3 months course resume back to Over the Counter Vitamin D 2000 units daily.    Your Hemoglobin is boderline low, and platelets remaining low compared to 6 months back when it was normalized and mildly high .Quit alcohol completely and follow up with Hematology.     Please let me know if you have any questions.    Beatrice Stephens MD on 5/13/2021 at 9:22 PM

## 2021-05-14 NOTE — TELEPHONE ENCOUNTER
S/w pt and wife Mirela and gave Dr. Stephens's reply about lab results and prescription for vitamin D high dose sent to [facility] pharmacy.    Wife would like referral for Hematology in [facility] since they are in [place].  Order pended.    Call Mirela at [phone] consent given per pt.    Sonia TATE RN  EP Triage

## 2021-05-14 NOTE — TELEPHONE ENCOUNTER
Result message given from Dr. Stephens. Gave patient phone number of [facility] [phone]. Pt verbalized understanding, all questions answered.     Brittany Connelly RN

## 2021-05-28 ENCOUNTER — PATIENT OUTREACH (OUTPATIENT)
Dept: ONCOLOGY | Facility: CLINIC | Age: 76
End: 2021-05-28

## 2021-05-28 NOTE — PROGRESS NOTES
Received a referral from Dr. Stephens to hematology.  Patient is well established with Dr. Waylon Novoa for several years.    He is due for an annual visit.  I spoke with Raymon and he did not want to schedule at the time, but took my number and will call me back.    He will need to have an office visit prior to obtaining refills.

## 2021-06-10 ENCOUNTER — VIRTUAL VISIT (OUTPATIENT)
Dept: ONCOLOGY | Facility: CLINIC | Age: 76
End: 2021-06-10
Attending: INTERNAL MEDICINE
Payer: COMMERCIAL

## 2021-06-10 DIAGNOSIS — Z86.718 PERSONAL HISTORY OF VENOUS THROMBOSIS AND EMBOLISM: ICD-10-CM

## 2021-06-10 DIAGNOSIS — Z79.01 CHRONIC ANTICOAGULATION: ICD-10-CM

## 2021-06-10 DIAGNOSIS — D69.3 IDIOPATHIC THROMBOCYTOPENIC PURPURA (H): ICD-10-CM

## 2021-06-10 DIAGNOSIS — D69.6 THROMBOCYTOPENIA (H): ICD-10-CM

## 2021-06-10 DIAGNOSIS — Z79.01 LONG TERM CURRENT USE OF ANTICOAGULANT THERAPY: Primary | ICD-10-CM

## 2021-06-10 DIAGNOSIS — D50.0 IRON DEFICIENCY ANEMIA DUE TO CHRONIC BLOOD LOSS: ICD-10-CM

## 2021-06-10 DIAGNOSIS — I27.82 CHRONIC PULMONARY EMBOLISM, UNSPECIFIED PULMONARY EMBOLISM TYPE, UNSPECIFIED WHETHER ACUTE COR PULMONALE PRESENT (H): ICD-10-CM

## 2021-06-10 PROCEDURE — 999N001193 HC VIDEO/TELEPHONE VISIT; NO CHARGE

## 2021-06-10 PROCEDURE — 99214 OFFICE O/P EST MOD 30 MIN: CPT | Mod: 95 | Performed by: INTERNAL MEDICINE

## 2021-06-10 NOTE — PROGRESS NOTES
"Raymon is a 75 year old who is being evaluated via a billable telephone visit.      What phone number would you like to be contacted at? [phone]  How would you like to obtain your AVS? Mail a copy     Vitals - Patient Reported  Weight (Patient Reported): 79.8 kg (176 lb)  Height (Patient Reported): 165.1 cm (5' 5\")  BMI (Based on Pt Reported Ht/Wt): 29.29  Pain Score: Moderate Pain (5)  Pain Loc: Low Back(LEGS, AND SHOULDERS)    Elisabeth SLAUGHTER      "

## 2021-06-10 NOTE — LETTER
"    6/10/2021         RE: Raymon Ace  [address] [facility] [address]        Dear Colleague,    Thank you for referring your patient, Raymon Ace, to the [facility]. Please see a copy of my visit note below.    Raymon is a 75 year old who is being evaluated via a billable telephone visit.      What phone number would you like to be contacted at? [phone]  How would you like to obtain your AVS? Mail a copy     Vitals - Patient Reported  Weight (Patient Reported): 79.8 kg (176 lb)  Height (Patient Reported): 165.1 cm (5' 5\")  BMI (Based on Pt Reported Ht/Wt): 29.29  Pain Score: Moderate Pain (5)  Pain Loc: Low Back(LEGS, AND SHOULDERS)    Elisabeth Delvalle BE        HEMATOLOGY CLINIC VISIT    NOTE:  Due to the ongoing COVID-19 outbreak, this visit was conducted by telephone, with the patient's approval.    Raymon is a 75-year-old male with chronic ITP and a history of unprovoked pulmonary embolism in April 2017 for which he is maintained on long-term anticoagulation.  He has a history of recurrent iron deficiency anemia felt secondary to chronic occult blood loss from portal hypertensive gastropathy and hemorrhoids.  He also has underlying alcoholic liver disease.  He was scheduled today for routine follow-up visit.  Our last visit was in May 2020.    He and his wife again spent the winter in [place].  He was in the hospital twice over the winter with heart issues.  It sounds as though his medications were adjusted and he is now feeling better in that regard.    He remains on Promacta 75 mg daily for chronic ITP.  He is tolerating the medication without any perceived side effects.  He also remains on prophylactic intensity anticoagulation with rivaroxaban.  He denies any bleeding issues aside from easy bruising which is a longstanding issue and not changed.  He denies any blood in his stools and most recently had upper and lower endoscopies performed in November 2020.    He " last received IV iron in January 2020.  He states that he feels tired all the time, and this is his primary complaint again today.  In the past, he has reported feeling better after iron infusion although only temporarily.  Thus, it has not been clear how much he has been symptomatic with regard to iron deficiency.  Iron panel has not been checked in the last year.    Physical exam:  Not done, telephone visit.    Labs:  Most recent labs are from May 12, 2021.  White count 10.4 with a normal differential, hemoglobin 13.2, MCV 94, platelet count 102,000.    Comprehensive metabolic panel showed a creatinine of 0.96, and normal LFTs.  Serum albumin slightly low at 3.2.        ASSESSMENT / PLAN:  1.  Chronic ITP -- Raymon's baseline platelet count has been in the 70,000 to 100,000 range over the last 4 years.  He remains on Promacta 75 mg daily.  He is responsive to pulse dexamethasone and we have used this to increase his platelet count prior to surgeries.  At the present time, his platelet count is within his usual baseline.  Given that he is on long-term anticoagulation, the goal is to maintain his platelet count consistently above 50,000.    2. History of unprovoked pulmonary embolism (April 2017) -- He has done well on long-term anticoagulation with rivaroxaban, which he should continue.  He is on the prophylactic dose of 10 mg daily due to a combination of mild intermittent renal insufficiency and underlying liver disease.  We will make no change in this part of his treatment plan today.    3.  Iron deficiency anemia -- This has been attributed to to chronic occult blood loss from portal hypertensive gastropathy, and hemorrhoids.  He denies any new bleeding symptoms or any obvious signs of GI bleeding, and had upper and lower endoscopies in the fall 2020.  Iron panel has not been reassessed in the last year, so we will place orders for those labs to be drawn at his convenience in the next couple of weeks.  I will  contact him by telephone once we have results.    We we will plan to see him back in the fall, prior to his anticipated return to [place] for the winter.    Total time 30 minutes, including review of medical records and labs, telephone visit, and documentation.        Waylon Novoa MD  Associate Professor of Medicine  Division of Hematology, Oncology, and Transplantation  Director, [facility]                    Again, thank you for allowing me to participate in the care of your patient.        Sincerely,        Waylon Novoa MD

## 2021-06-11 ENCOUNTER — APPOINTMENT (OUTPATIENT)
Dept: CT IMAGING | Facility: CLINIC | Age: 76
End: 2021-06-11
Attending: EMERGENCY MEDICINE
Payer: COMMERCIAL

## 2021-06-11 ENCOUNTER — HOSPITAL ENCOUNTER (EMERGENCY)
Facility: CLINIC | Age: 76
Discharge: HOME | End: 2021-06-11
Attending: EMERGENCY MEDICINE | Admitting: EMERGENCY MEDICINE
Payer: COMMERCIAL

## 2021-06-11 VITALS
RESPIRATION RATE: 18 BRPM | DIASTOLIC BLOOD PRESSURE: 70 MMHG | HEIGHT: 65 IN | WEIGHT: 176 LBS | OXYGEN SATURATION: 96 % | HEART RATE: 63 BPM | TEMPERATURE: 98.5 F | BODY MASS INDEX: 29.32 KG/M2 | SYSTOLIC BLOOD PRESSURE: 109 MMHG
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
## 2021-06-11 DIAGNOSIS — S01.01XA LACERATION OF SCALP, INITIAL ENCOUNTER: ICD-10-CM

## 2021-06-11 DIAGNOSIS — S09.90XA CLOSED HEAD INJURY, INITIAL ENCOUNTER: ICD-10-CM

## 2021-06-11 DIAGNOSIS — F10.10 ALCOHOL ABUSE: ICD-10-CM

## 2021-06-11 PROCEDURE — 70450 CT HEAD/BRAIN W/O DYE: CPT

## 2021-06-11 PROCEDURE — 12002 RPR S/N/AX/GEN/TRNK2.6-7.5CM: CPT | Performed by: EMERGENCY MEDICINE

## 2021-06-11 PROCEDURE — 72125 CT NECK SPINE W/O DYE: CPT

## 2021-06-11 PROCEDURE — 99284 EMERGENCY DEPT VISIT MOD MDM: CPT | Mod: 25 | Performed by: EMERGENCY MEDICINE

## 2021-06-11 ASSESSMENT — MIFFLIN-ST. JEOR: SCORE: 1455.21

## 2021-06-11 NOTE — ED PROVIDER NOTES
History     Chief Complaint   Patient presents with     Fall     Head Injury     HPI  Rayomn Ace is a 75 year old male with a history of alcoholism and chronic daily drinking who presents for evaluation after a fall.  The patient was at home with his significant other, shortly prior to his arrival here he fell backwards, striking his head against the ground.  No loss of consciousness.  He is on Xarelto for prior pulmonary embolism.  He is complaining of mild pain to the back of the head, aching, nonradiating.  Denies chest pain, difficulty breathing, abdominal pain, pelvic pain, or extremity pain.  He has been ambulating since this happened.  He has not take any medicine for the pain.    Allergies:  Allergies   Allergen Reactions     Olmesartan Diarrhea       Problem List:    Patient Active Problem List    Diagnosis Date Noted     ITP (idiopathic thrombocytopenic purpura) since 3-13 back surgery       Priority: High     Gastric varices 10/27/2020     Priority: Medium     Alcoholic cirrhosis (H) 10/27/2020     Priority: Medium     Preop general physical exam 10/05/2020     Priority: Medium     Chronic anticoagulation 10/05/2020     Priority: Medium     Longitudinal ridging of nail + spooning of middle ones  12/27/2019     Priority: Medium     Cardiomegaly per 2017 CXR  12/27/2019     Priority: Medium     Major depression in complete remission (H) 12/27/2019     Priority: Medium     CKD (chronic kidney disease) stage 3, GFR 30-59 ml/min 06/07/2019     Priority: Medium     Portal hypertension (H) 06/07/2019     Priority: Medium     Impaired fasting glucose 06/07/2019     Priority: Medium     Fatigue, unspecified type 06/07/2019     Priority: Medium     Screening for prostate cancer 09/14/2018     Priority: Medium     Iron deficiency anemia due to chronic blood loss 06/28/2018     Priority: Medium     Pulmonary embolism (H) large RLL w infarctio 4-17 04/18/2017     Priority: Medium     Formatting of this note  might be different from the original.  RLL with infarct       Gastroesophageal reflux disease with esophagitis 12/06/2016     Priority: Medium     Need for prophylactic vaccination against Streptococcus pneumoniae (pneumococcus) 08/04/2016     Priority: Medium     Gout involving toe, unspecified cause, unspecified chronicity, unspecified laterality 06/02/2016     Priority: Medium     ACP (advance care planning) 05/16/2016     Priority: Medium     Advance Care Planning 5/16/2016: ACP Review of Chart / Resources Provided:  Reviewed chart for advance care plan.  Raymon Ace has no plan or code status on file however states presence of ACP document. Copy requested. Confirmed code status reflects current choices pending receipt of document/advance care plan review.  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 Provider to review       Change in bowel habits since 1-14: diarrhea-thinks better off benicar 07/10/2014     Priority: Medium     Family history of ischemic heart disease 01/17/2014     Priority: Medium     Family history of diabetes mellitus 01/17/2014     Priority: Medium     Class 1  obesity due to excess calories with serious comorbidity and body mass index (BMI) of 31.0 to 31.9 in adult 01/10/2014     Priority: Medium     Glucose intolerance (impaired glucose tolerance)  HgbA!C = 5.4 01/10/2014     Priority: Medium     Back pain since 1988 s/po surgery 1992,1996 and 3-13/ Dr Singh  01/10/2014     Priority: Medium     No CSA on file   8-=22-17 no concerns       Alcoholic liver damage (H) 01/10/2014     Priority: Medium     Diverticulosis of large intestine 01/10/2014     Priority: Medium     Problem list name updated by automated process. Provider to review       Elevated liver enzymes AST  01/10/2014     Priority: Medium     Hypotension 03/16/2013     Priority: Medium     Lumbar spinal stenosis 12/14/2012     Priority: Medium     Steroid-induced hyperglycemia 12/14/2012     Priority: Medium     Although glucose normal on 1/30/13 atr 86       Fatty liver/ 1-14 US 12/14/2012     Priority: Medium     Atherosclerosis 12/14/2012     Priority: Medium     Alcohol consuption of more than two drinks per day 12/14/2012     Priority: Medium     Obstructive sleep apnea syndrome 09/21/2012     Priority: Medium     Does not tolerate CPAP  Problem list name updated by automated process. Provider to review       Restless legs syndrome (RLS) 09/21/2012     Priority: Medium     Polyneuropathy in other diseases classified elsewhere (H) 09/21/2012     Priority: Medium     RX monitoring program ([place]) reviewed: 8/4/20 recommend provider review    [place] profile:  [email or web address]         Gastroesophageal reflux disease without esophagitis 09/21/2012     Priority: Medium     Preventive measure 01/31/2013     Priority: Low     [facility] BASE UNDER THE 12/14/12 NOTE  Colonoscopy neg in 5/10; PSA 0.31 in 9/12          Past Medical History:    Past Medical History:   Diagnosis Date     Alcohol abuse since teens 01/15/2015     Alcoholic liver damage (H) 1/10/2014     Gastroesophageal reflux disease with  esophagitis 12/6/2016     Gout involving toe, unspecified cause, unspecified chronicity, unspecified laterality 6/2/2016     Heart murmur      Hyperlipidemia 12/17/2015     Hypertension      ITP (idiopathic thrombocytopenic purpura)      Obstructive sleep apnea      Pulmonary embolism (H) large RLL w infarctio 4-17 4/18/2017       Past Surgical History:    Past Surgical History:   Procedure Laterality Date     APPENDECTOMY  1956     BACK SURGERY  1992, 1996    trimmed L4-L5, Fusion L4-L5     BONE MARROW BIOPSY, BONE SPECIMEN, NEEDLE/TROCAR  10/24/2012    Procedure: BIOPSY BONE MARROW;  BONE MARROW BIOPSY WITH ASPIRATE (AREVALO ORDERING);  Surgeon: Terri Hickey MD;  Location:  GI     COLONOSCOPY N/A 7/31/2019    Procedure: COLONOSCOPY;  Surgeon: Sebastian Garcia MD;  Location:  GI     ESOPHAGOSCOPY, GASTROSCOPY, DUODENOSCOPY (EGD), COMBINED N/A 2/8/2018    Procedure: COMBINED ESOPHAGOSCOPY, GASTROSCOPY, DUODENOSCOPY (EGD);  EGD;  Surgeon: Terri Crouch MD;  Location: [facility]     ESOPHAGOSCOPY, GASTROSCOPY, DUODENOSCOPY (EGD), COMBINED N/A 11/10/2020    Procedure: ESOPHAGOGASTRODUODENOSCOPY, WITH BIOPSY;  Surgeon: Alonzo Jang DO;  Location:  GI     FACIAL RECONSTRUCTION SURGERY  1965    jaw fracture     HC TOOTH EXTRACTION W/FORCEP  1990s       Family History:    Family History   Problem Relation Age of Onset     Unknown/Adopted Mother      Unknown/Adopted Father      Heart Disease Sister      Diabetes No family hx of      Coronary Artery Disease No family hx of      Hypertension No family hx of      Hyperlipidemia No family hx of      Cerebrovascular Disease No family hx of      Breast Cancer No family hx of      Colon Cancer No family hx of      Prostate Cancer No family hx of      Other Cancer No family hx of      Depression No family hx of      Anxiety Disorder No family hx of      Mental Illness No family hx of      Substance Abuse No family hx of      Anesthesia Reaction No  "family hx of      Asthma No family hx of      Osteoporosis No family hx of      Genetic Disorder No family hx of      Thyroid Disease No family hx of      Obesity No family hx of        Social History:  Marital Status:   [2]  Social History     Tobacco Use     Smoking status: Former Smoker     Packs/day: 1.00     Years: 28.00     Pack years: 28.00     Types: Cigarettes     Start date:      Quit date: 1982     Years since quittin.7     Smokeless tobacco: Former User   Substance Use Topics     Alcohol use: Yes     Alcohol/week: 5.0 standard drinks     Types: 5 Shots of liquor per week     Comment: 4-5 drinks/day, alcohol use disorder     Drug use: No        Medications:    albuterol (PROAIR HFA/PROVENTIL HFA/VENTOLIN HFA) 108 (90 Base) MCG/ACT inhaler  allopurinol (ZYLOPRIM) 300 MG tablet  ASPIRIN NOT PRESCRIBED (INTENTIONAL)  eltrombopag (PROMACTA) 75 MG tablet  folic acid (FOLVITE) 1 MG tablet  gabapentin (NEURONTIN) 300 MG capsule  metoprolol succinate ER (TOPROL-XL) 50 MG 24 hr tablet  omeprazole (PRILOSEC) 40 MG DR capsule  ondansetron (ZOFRAN ODT) 4 MG ODT tab  rivaroxaban ANTICOAGULANT (XARELTO ANTICOAGULANT) 10 MG TABS tablet  vitamin D2 (ERGOCALCIFEROL) 09546 units (1250 mcg) capsule          Review of Systems  Pertinent positives and negatives listed in the HPI, all other systems reviewed and are negative.    Physical Exam   BP: 135/79  Pulse: 69  Temp: 98.5  F (36.9  C)  Resp: 18  Height: 165.1 cm (5' 5\")  Weight: 79.8 kg (176 lb)  SpO2: 96 %      Physical Exam  /79   Pulse 69   Temp 98.5  F (36.9  C) (Oral)   Resp 18   Ht 1.651 m (5' 5\")   Wt 79.8 kg (176 lb)   SpO2 96%   BMI 29.29 kg/m     GENERAL: Alert, cooperative, mild distress  HEAD: Posterior scalp laceration and abrasion with tenderness.  EYES: Conjunctivae clear, EOM intact. PERLL, Pupils are 3 mm.  HEENT:  Normal external ears, normal TM's without evidence of hemotympanum.  No nasal discharge or evidence of " septal hematoma. No facial instability or asymmetry. No evidence of intraoral trauma.  Intact bite without malalignment.  NECK: Mild midline tenderness, no lateral tenderness.  CHEST:  No crepitus or tenderness with AP or lateral compression  CARDIOVASCULAR : Nml rate, distal pulses are normal and symmetric  LUNGS: No respiratory distress.  GI: Soft, ND, NT.   PELVIS: No crepitus or tenderness with AP or lateral compression  BACK: No step-offs palpated, no tenderness to palpation of thoracic or lumbar spine.  EXTREMITIES: No obvious deformities, no tenderness to palpation.  NEUROLOGICAL : GCS= 15.  Awake, alert, and oriented x 3.  Cranial nerves II-XII grossly intact. Normal muscle strength and tone, sensation to light touch grossly intact in all extremities.  No focal deficits.   SKIN : Normal color, no jaundice or rash.      ED [facility]    -Laceration Repair    Date/Time: 6/11/2021 4:24 AM  Performed by: Manjeet Lorenzo MD  Authorized by: Manjeet Lorenzo MD       ANESTHESIA (see MAR for exact dosages):     Anesthesia method:  Local infiltration    Local anesthetic:  Lidocaine 1% WITH epi  LACERATION DETAILS     Location:  Scalp    Scalp location:  Occipital    Length (cm):  3.2    REPAIR TYPE:     Repair type:  Simple      EXPLORATION:     Hemostasis achieved with:  Epinephrine    Wound exploration: wound explored through full range of motion and entire depth of wound probed and visualized      SKIN REPAIR     Repair method:  Sutures    Suture size:  3-0    Suture material:  Nylon    Suture technique:  Simple interrupted    Number of sutures:  4    APPROXIMATION     Approximation:  Close    POST-PROCEDURE DETAILS     Dressing:  Antibiotic ointment      PROCEDURE   Patient Tolerance:  Patient tolerated the procedure well with no immediate complications                     Critical Care time:  none               Results for orders placed or performed during  the hospital encounter of 06/11/21 (from the past 24 hour(s))   CT Head w/o Contrast    Narrative    EXAM: CT HEAD W/O CONTRAST, CT CERVICAL SPINE W/O CONTRAST  LOCATION: [facility]  DATE/TIME: 6/11/2021 4:03 AM    INDICATION: Head and neck injury  COMPARISON: None.  TECHNIQUE:   1) Routine CT Head without IV contrast. Multiplanar reformats. Dose reduction techniques were used.  2) Routine CT Cervical Spine without IV contrast. Multiplanar reformats. Dose reduction techniques were used.    FINDINGS:   HEAD CT:   INTRACRANIAL CONTENTS: No intracranial hemorrhage, extraaxial collection, or mass effect.  No CT evidence of acute infarct. Moderate presumed chronic small vessel ischemic changes. Moderate generalized volume loss. No hydrocephalus.     VISUALIZED ORBITS/SINUSES/MASTOIDS: No intraorbital abnormality. No paranasal sinus mucosal disease. No middle ear or mastoid effusion.    BONES/SOFT TISSUES: Fracture right zygomatic arch, incompletely visualized; probably chronic.    CERVICAL SPINE CT:   VERTEBRA: Normal vertebral body heights. Mild left cervical curve. No fracture or posttraumatic subluxation.     CANAL/FORAMINA: No canal or neural foraminal stenosis.    PARASPINAL: No extraspinal abnormality. Visualized lung fields are clear.      Impression    IMPRESSION:  HEAD CT:  1.  No acute intracranial process.    CERVICAL SPINE CT:  1.  No CT evidence for acute fracture or post traumatic subluxation.   Cervical spine CT w/o contrast    Narrative    EXAM: CT HEAD W/O CONTRAST, CT CERVICAL SPINE W/O CONTRAST  LOCATION: [facility]  DATE/TIME: 6/11/2021 4:03 AM    INDICATION: Head and neck injury  COMPARISON: None.  TECHNIQUE:   1) Routine CT Head without IV contrast. Multiplanar reformats. Dose reduction techniques were used.  2) Routine CT Cervical Spine without IV contrast. Multiplanar reformats. Dose reduction techniques were used.    FINDINGS:   HEAD CT:   INTRACRANIAL CONTENTS: No  intracranial hemorrhage, extraaxial collection, or mass effect.  No CT evidence of acute infarct. Moderate presumed chronic small vessel ischemic changes. Moderate generalized volume loss. No hydrocephalus.     VISUALIZED ORBITS/SINUSES/MASTOIDS: No intraorbital abnormality. No paranasal sinus mucosal disease. No middle ear or mastoid effusion.    BONES/SOFT TISSUES: Fracture right zygomatic arch, incompletely visualized; probably chronic.    CERVICAL SPINE CT:   VERTEBRA: Normal vertebral body heights. Mild left cervical curve. No fracture or posttraumatic subluxation.     CANAL/FORAMINA: No canal or neural foraminal stenosis.    PARASPINAL: No extraspinal abnormality. Visualized lung fields are clear.      Impression    IMPRESSION:  HEAD CT:  1.  No acute intracranial process.    CERVICAL SPINE CT:  1.  No CT evidence for acute fracture or post traumatic subluxation.       Medications - No data to display    Assessments & Plan (with Medical Decision Making)   75-year-old male who presents for evaluation after a fall from standing.  Vital signs are reassuring.  Laceration repaired as above.  CT of the head and cervical spine obtained, images reviewed independently as well as radiology read reviewed, no signs of intracranial hemorrhage or cervical spine fracture. On recheck he is feeling better and is safe to discharge with instructions to return if he has worsening symptoms or other concerns, otherwise stitches out in 7 to 10 days. I did encourage the patient to quit drinking alcohol and offered him resources for this but the patient declined stating that he would rather follow-up with his primary care physician for this.    I have reviewed the nursing notes.    I have reviewed the findings, diagnosis, plan and need for follow up with the patient.       New Prescriptions    No medications on file       Final diagnoses:   Fall, initial encounter   Laceration of scalp, initial encounter   Closed head injury, initial  encounter   Alcohol abuse       6/11/2021   [facility] EMERGENCY DEPT     Manjeet Lorenzo MD  06/11/21 [id number]

## 2021-06-11 NOTE — ED TRIAGE NOTES
Pt was fell from standing height while standing at the fridge. Patient takes blood thinners, also was drinking whiskey tonight. Patients wife states he is a daily drinker.

## 2021-06-11 NOTE — DISCHARGE INSTRUCTIONS
Please keep the wound clean and dry for 24-48 hours. The affected area should be kept elevated as much as possible. After 24 hours, the dressing may be removed and the wound may be gently cleaned with warm water. You may apply antibiotic ointment as desired. You should return in 7-10 days for suture removal. Please return to the ER or to primary care physician immediately if you develop warmth, redness, swelling, drainage from the wound or have fevers.   Any time the skin is damaged, scar formation is unavoidable. You can minimize the scar by following the instructions listed above, and by preventing infection. The scar will continue to change for at least 1 year, and the final appearance of the scar will not be known until at least that time. One way to minimize scar formation is to apply sun screen to the scar before any sun exposure for 12 months following wound repair, as sunburn or even tanning may cause the scar to become discolored.

## 2021-06-21 ENCOUNTER — HOSPITAL ENCOUNTER (EMERGENCY)
Facility: CLINIC | Age: 76
Discharge: HOME | End: 2021-06-21
Attending: NURSE PRACTITIONER
Payer: COMMERCIAL

## 2021-06-21 VITALS
WEIGHT: 175 LBS | TEMPERATURE: 96.8 F | OXYGEN SATURATION: 99 % | HEART RATE: 109 BPM | BODY MASS INDEX: 29.12 KG/M2 | RESPIRATION RATE: 18 BRPM

## 2021-06-21 DIAGNOSIS — Z48.02 VISIT FOR SUTURE REMOVAL: ICD-10-CM

## 2021-06-21 PROCEDURE — 999N000104 HC STATISTIC NO CHARGE: Performed by: NURSE PRACTITIONER

## 2021-06-22 NOTE — ED PROVIDER NOTES
History     Chief Complaint   Patient presents with     Suture Removal     HPI  Raymon Ace is a 75 year old male who presents for suture removal after having sutures placed on June 11 after a slip and fall injury. Patient denies any complications with the sutures denies any pain or purulent drainage.    Allergies:  Allergies   Allergen Reactions     Olmesartan Diarrhea       Problem List:    Patient Active Problem List    Diagnosis Date Noted     ITP (idiopathic thrombocytopenic purpura) since 3-13 back surgery       Priority: High     Gastric varices 10/27/2020     Priority: Medium     Alcoholic cirrhosis (H) 10/27/2020     Priority: Medium     Preop general physical exam 10/05/2020     Priority: Medium     Chronic anticoagulation 10/05/2020     Priority: Medium     Longitudinal ridging of nail + spooning of middle ones  12/27/2019     Priority: Medium     Cardiomegaly per 2017 CXR  12/27/2019     Priority: Medium     Major depression in complete remission (H) 12/27/2019     Priority: Medium     CKD (chronic kidney disease) stage 3, GFR 30-59 ml/min 06/07/2019     Priority: Medium     Portal hypertension (H) 06/07/2019     Priority: Medium     Impaired fasting glucose 06/07/2019     Priority: Medium     Fatigue, unspecified type 06/07/2019     Priority: Medium     Screening for prostate cancer 09/14/2018     Priority: Medium     Iron deficiency anemia due to chronic blood loss 06/28/2018     Priority: Medium     Pulmonary embolism (H) large RLL w infarctio 4-17 04/18/2017     Priority: Medium     Formatting of this note might be different from the original.  RLL with infarct       Gastroesophageal reflux disease with esophagitis 12/06/2016     Priority: Medium     Need for prophylactic vaccination against Streptococcus pneumoniae (pneumococcus) 08/04/2016     Priority: Medium     Gout involving toe, unspecified cause, unspecified chronicity, unspecified laterality 06/02/2016     Priority: Medium     ACP  (advance care planning) 05/16/2016     Priority: Medium     Advance Care Planning 5/16/2016: ACP Review of Chart / Resources Provided:  Reviewed chart for advance care plan.  Raymon Ace has no plan or code status on file however states presence of ACP document. Copy requested. Confirmed code status reflects current choices pending receipt of document/advance care plan review.  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 Provider to review       Change in bowel habits since 1-14: diarrhea-thinks better off benicar 07/10/2014     Priority: Medium     Family history of ischemic heart disease 01/17/2014     Priority: Medium     Family history of diabetes mellitus 01/17/2014     Priority: Medium     Class 1 obesity due to excess calories with serious comorbidity and body mass index (BMI) of 31.0 to 31.9 in adult 01/10/2014     Priority: Medium     Glucose intolerance (impaired glucose tolerance)  HgbA!C = 5.4 01/10/2014     Priority: Medium     Back pain since 1988 s/po surgery 1992,1996 and 3-13/ Dr Singh  01/10/2014     Priority: Medium     No CSA on file   8-=22-17 no concerns        Alcoholic liver damage (H) 01/10/2014     Priority: Medium     Diverticulosis of large intestine 01/10/2014     Priority: Medium     Problem list name updated by automated process. Provider to review       Elevated liver enzymes AST  01/10/2014     Priority: Medium     Hypotension 03/16/2013     Priority: Medium     Lumbar spinal stenosis 12/14/2012     Priority: Medium     Steroid-induced hyperglycemia 12/14/2012     Priority: Medium     Although glucose normal on 1/30/13 atr 86       Fatty liver/ 1-14 US 12/14/2012     Priority: Medium     Atherosclerosis 12/14/2012     Priority: Medium     Alcohol consuption of more than two drinks per day 12/14/2012     Priority: Medium     Obstructive sleep apnea syndrome 09/21/2012     Priority: Medium     Does not tolerate CPAP  Problem list name updated by automated process. Provider to review       Restless legs syndrome (RLS) 09/21/2012     Priority: Medium     Polyneuropathy in other diseases classified elsewhere (H) 09/21/2012     Priority: Medium     RX monitoring program ([place]) reviewed: 8/4/20 recommend provider review    [place]:  [email or web address]         Gastroesophageal reflux disease without esophagitis 09/21/2012     Priority: Medium     Preventive measure 01/31/2013     Priority: Low     APRIMA DATA BASE UNDER THE 12/14/12 NOTE  Colonoscopy neg in 5/10; PSA 0.31 in 9/12          Past Medical History:    Past Medical History:   Diagnosis Date     Alcohol abuse since teens 01/15/2015     Alcoholic liver damage (H) 1/10/2014     Gastroesophageal reflux disease with esophagitis 12/6/2016     Gout involving toe, unspecified cause, unspecified chronicity, unspecified laterality 6/2/2016     Heart murmur      Hyperlipidemia 12/17/2015     Hypertension      ITP (idiopathic thrombocytopenic purpura)      Obstructive sleep apnea      Pulmonary embolism (H) large RLL w infarctio 4-17 4/18/2017       Past Surgical History:    Past Surgical History:    Procedure Laterality Date     APPENDECTOMY  1956     BACK SURGERY  1992, 1996    trimmed L4-L5, Fusion L4-L5     BONE MARROW BIOPSY, BONE SPECIMEN, NEEDLE/TROCAR  10/24/2012    Procedure: BIOPSY BONE MARROW;  BONE MARROW BIOPSY WITH ASPIRATE (AREVALO ORDERING);  Surgeon: Terri Hickey MD;  Location:  GI     COLONOSCOPY N/A 7/31/2019    Procedure: COLONOSCOPY;  Surgeon: Sebastian Garcia MD;  Location:  GI     ESOPHAGOSCOPY, GASTROSCOPY, DUODENOSCOPY (EGD), COMBINED N/A 2/8/2018    Procedure: COMBINED ESOPHAGOSCOPY, GASTROSCOPY, DUODENOSCOPY (EGD);  EGD;  Surgeon: Terri Crouch MD;  Location:  GI     ESOPHAGOSCOPY, GASTROSCOPY, DUODENOSCOPY (EGD), COMBINED N/A 11/10/2020    Procedure: ESOPHAGOGASTRODUODENOSCOPY, WITH BIOPSY;  Surgeon: Alonzo Jang DO;  Location:  GI     FACIAL RECONSTRUCTION SURGERY  1965    jaw fracture     HC TOOTH EXTRACTION W/FORCEP  1990s       Family History:    Family History   Problem Relation Age of Onset     Unknown/Adopted Mother      Unknown/Adopted Father      Heart Disease Sister      Diabetes No family hx of      Coronary Artery Disease No family hx of      Hypertension No family hx of      Hyperlipidemia No family hx of      Cerebrovascular Disease No family hx of      Breast Cancer No family hx of      Colon Cancer No family hx of      Prostate Cancer No family hx of      Other Cancer No family hx of      Depression No family hx of      Anxiety Disorder No family hx of      Mental Illness No family hx of      Substance Abuse No family hx of      Anesthesia Reaction No family hx of      Asthma No family hx of      Osteoporosis No family hx of      Genetic Disorder No family hx of      Thyroid Disease No family hx of      Obesity No family hx of        Social History:  Marital Status:   [2]  Social History     Tobacco Use     Smoking status: Former Smoker     Packs/day: 1.00     Years: 28.00     Pack years: 28.00     Types: Cigarettes      Start date:      Quit date: 1982     Years since quittin.7     Smokeless tobacco: Former User   Substance Use Topics     Alcohol use: Yes     Alcohol/week: 5.0 standard drinks     Types: 5 Shots of liquor per week     Comment: 4-5 drinks/day, alcohol use disorder     Drug use: No        Medications:    albuterol (PROAIR HFA/PROVENTIL HFA/VENTOLIN HFA) 108 (90 Base) MCG/ACT inhaler  allopurinol (ZYLOPRIM) 300 MG tablet  ASPIRIN NOT PRESCRIBED (INTENTIONAL)  eltrombopag (PROMACTA) 75 MG tablet  folic acid (FOLVITE) 1 MG tablet  gabapentin (NEURONTIN) 300 MG capsule  metoprolol succinate ER (TOPROL-XL) 50 MG 24 hr tablet  omeprazole (PRILOSEC) 40 MG DR capsule  ondansetron (ZOFRAN ODT) 4 MG ODT tab  rivaroxaban ANTICOAGULANT (XARELTO ANTICOAGULANT) 10 MG TABS tablet  vitamin D2 (ERGOCALCIFEROL) 34621 units (1250 mcg) capsule          Review of Systems  As mentioned above in the history present illness. All other systems were reviewed and are negative.    Physical Exam   Pulse: 109  Temp: 96.8  F (36  C)  Resp: 18  Weight: 79.4 kg (175 lb)  SpO2: 99 %      Physical Exam  Vitals signs and nursing note reviewed.   Constitutional:       General: He is not in acute distress.     Appearance: He is well-developed. He is not toxic-appearing or diaphoretic.   HENT:      Head: Normocephalic. Laceration present.        Right Ear: External ear normal.      Left Ear: External ear normal.      Nose: Nose normal.   Eyes:      General:         Right eye: No discharge.         Left eye: No discharge.      Conjunctiva/sclera: Conjunctivae normal.   Cardiovascular:      Rate and Rhythm: Normal rate and regular rhythm.      Heart sounds: Normal heart sounds. No murmur. No friction rub. No gallop.    Pulmonary:      Effort: Pulmonary effort is normal.      Breath sounds: Normal breath sounds. No stridor. No wheezing.   Skin:     General: Skin is warm.      Findings: No rash.   Neurological:      Mental Status: He is alert and  oriented to person, place, and time.         ED Course        Procedures    No results found for this or any previous visit (from the past 24 hour(s)).    Medications - No data to display    Assessments & Plan (with Medical Decision Making)  75-year-old male presenting to urgent care for suture removal. On exam patient has 3 noticed sutures in his scalp area with a scab. Area seems well-healed. Sutures are well approximated. There is no surrounding erythema, fluctuant mass. Sutures removed by nursing staff. Reassessed patient. Wound still appears to be healed well. Patient discharged in stable condition.     I have reviewed the nursing notes.    I have reviewed the findings, diagnosis, plan and need for follow up with the patient.    Discharge Medication List as of 6/21/2021  3:53 PM          Final diagnoses:   Visit for suture removal       6/21/2021   [facility] EMERGENCY DEPT     Charlotte Ruvalcaba, SCOTTIE CNP  06/21/21 [id number]

## 2021-06-23 DIAGNOSIS — D50.0 IRON DEFICIENCY ANEMIA DUE TO CHRONIC BLOOD LOSS: ICD-10-CM

## 2021-06-23 DIAGNOSIS — Z79.01 LONG TERM CURRENT USE OF ANTICOAGULANT THERAPY: ICD-10-CM

## 2021-06-23 DIAGNOSIS — D69.3 IDIOPATHIC THROMBOCYTOPENIC PURPURA (H): ICD-10-CM

## 2021-06-23 DIAGNOSIS — Z86.718 PERSONAL HISTORY OF VENOUS THROMBOSIS AND EMBOLISM: ICD-10-CM

## 2021-06-23 LAB
ALBUMIN SERPL-MCNC: 3.2 G/DL (ref 3.4–5)
ALP SERPL-CCNC: 99 U/L (ref 40–150)
ALT SERPL W P-5'-P-CCNC: 24 U/L (ref 0–70)
ANION GAP SERPL CALCULATED.3IONS-SCNC: 5 MMOL/L (ref 3–14)
AST SERPL W P-5'-P-CCNC: 33 U/L (ref 0–45)
BASOPHILS # BLD AUTO: 0 10E9/L (ref 0–0.2)
BASOPHILS NFR BLD AUTO: 0.2 %
BILIRUB SERPL-MCNC: 0.9 MG/DL (ref 0.2–1.3)
BUN SERPL-MCNC: 11 MG/DL (ref 7–30)
CALCIUM SERPL-MCNC: 8.8 MG/DL (ref 8.5–10.1)
CHLORIDE SERPL-SCNC: 112 MMOL/L (ref 94–109)
CO2 SERPL-SCNC: 28 MMOL/L (ref 20–32)
CREAT SERPL-MCNC: 0.82 MG/DL (ref 0.66–1.25)
DIFFERENTIAL METHOD BLD: ABNORMAL
EOSINOPHIL # BLD AUTO: 0.2 10E9/L (ref 0–0.7)
EOSINOPHIL NFR BLD AUTO: 1.3 %
ERYTHROCYTE [DISTWIDTH] IN BLOOD BY AUTOMATED COUNT: 14.5 % (ref 10–15)
FERRITIN SERPL-MCNC: 22 NG/ML (ref 26–388)
GFR SERPL CREATININE-BSD FRML MDRD: 86 ML/MIN/{1.73_M2}
GLUCOSE SERPL-MCNC: 93 MG/DL (ref 70–99)
HCT VFR BLD AUTO: 38.9 % (ref 40–53)
HGB BLD-MCNC: 12.8 G/DL (ref 13.3–17.7)
IRON SATN MFR SERPL: 47 % (ref 15–46)
IRON SERPL-MCNC: 170 UG/DL (ref 35–180)
LYMPHOCYTES # BLD AUTO: 1.6 10E9/L (ref 0.8–5.3)
LYMPHOCYTES NFR BLD AUTO: 13.4 %
MCH RBC QN AUTO: 30.8 PG (ref 26.5–33)
MCHC RBC AUTO-ENTMCNC: 32.9 G/DL (ref 31.5–36.5)
MCV RBC AUTO: 94 FL (ref 78–100)
MONOCYTES # BLD AUTO: 1 10E9/L (ref 0–1.3)
MONOCYTES NFR BLD AUTO: 8 %
NEUTROPHILS # BLD AUTO: 9.4 10E9/L (ref 1.6–8.3)
NEUTROPHILS NFR BLD AUTO: 77.1 %
PLATELET # BLD AUTO: 116 10E9/L (ref 150–450)
POTASSIUM SERPL-SCNC: 4.1 MMOL/L (ref 3.4–5.3)
PROT SERPL-MCNC: 7 G/DL (ref 6.8–8.8)
RBC # BLD AUTO: 4.15 10E12/L (ref 4.4–5.9)
SODIUM SERPL-SCNC: 145 MMOL/L (ref 133–144)
TIBC SERPL-MCNC: 362 UG/DL (ref 240–430)
WBC # BLD AUTO: 12.2 10E9/L (ref 4–11)

## 2021-06-23 PROCEDURE — 82728 ASSAY OF FERRITIN: CPT | Performed by: INTERNAL MEDICINE

## 2021-06-23 PROCEDURE — 85025 COMPLETE CBC W/AUTO DIFF WBC: CPT | Performed by: INTERNAL MEDICINE

## 2021-06-23 PROCEDURE — 83540 ASSAY OF IRON: CPT | Performed by: INTERNAL MEDICINE

## 2021-06-23 PROCEDURE — 83550 IRON BINDING TEST: CPT | Performed by: INTERNAL MEDICINE

## 2021-06-23 PROCEDURE — 80053 COMPREHEN METABOLIC PANEL: CPT | Performed by: INTERNAL MEDICINE

## 2021-06-23 PROCEDURE — 36415 COLL VENOUS BLD VENIPUNCTURE: CPT | Performed by: INTERNAL MEDICINE

## 2021-06-28 ENCOUNTER — THERAPY VISIT (OUTPATIENT)
Dept: PHYSICAL THERAPY | Facility: CLINIC | Age: 76
End: 2021-06-28
Payer: COMMERCIAL

## 2021-06-28 DIAGNOSIS — M54.42 CHRONIC BILATERAL LOW BACK PAIN WITH BILATERAL SCIATICA: ICD-10-CM

## 2021-06-28 DIAGNOSIS — G89.29 CHRONIC RIGHT SHOULDER PAIN: ICD-10-CM

## 2021-06-28 DIAGNOSIS — M25.511 CHRONIC RIGHT SHOULDER PAIN: ICD-10-CM

## 2021-06-28 DIAGNOSIS — G89.29 CHRONIC BILATERAL LOW BACK PAIN WITH BILATERAL SCIATICA: ICD-10-CM

## 2021-06-28 DIAGNOSIS — R26.89 POOR BALANCE: ICD-10-CM

## 2021-06-28 DIAGNOSIS — M62.81 GENERALIZED MUSCLE WEAKNESS: Primary | ICD-10-CM

## 2021-06-28 DIAGNOSIS — M54.41 CHRONIC BILATERAL LOW BACK PAIN WITH BILATERAL SCIATICA: ICD-10-CM

## 2021-06-28 PROCEDURE — 97161 PT EVAL LOW COMPLEX 20 MIN: CPT | Mod: GP | Performed by: PHYSICAL THERAPIST

## 2021-06-28 PROCEDURE — 97110 THERAPEUTIC EXERCISES: CPT | Mod: GP | Performed by: PHYSICAL THERAPIST

## 2021-06-28 NOTE — LETTER
"DEPARTMENT OF HEALTH AND HUMAN SERVICES  CENTERS FOR MEDICARE & MEDICAID SERVICES    PLAN/UPDATED PLAN OF PROGRESS FOR OUTPATIENT REHABILITATION          PATIENTS NAME:  Raymon Ace     : 1945    PROVIDER NUMBER:    [id number]    [id number]      PROVIDER NAME: [facility]    MEDICAL RECORD NUMBER: [id number]     START OF CARE DATE:  SOC Date: 21   TYPE:  PT    PRIMARY/TREATMENT DIAGNOSIS: (Pertinent Medical Diagnosis)     Generalized muscle weakness  Chronic right shoulder pain  Poor balance  Chronic bilateral low back pain with bilateral sciatica    VISITS FROM START OF CARE:  Rxs Used: 1     Physical Therapy Initial Evaluation    Subjective:  The history is provided by the patient. No  was used.     Therapist Generated HPI Evaluation  Problem details: \" Raymon\" is a 74 yo male referred to physical therapy for multiple issues, including low back pain, leg weakness, bilateral knee pain, bilateral leg numbness/tingling, night pain,  poor balance and R shoulder pain.   Raymon has been attempting to exercise at home with a portable, seated leg bike/ergometer that he is able to sit in a regular chair. He rides for 5 minute/day.   Patient worsens sx's when standing/walking.   Improved sx's when allowed to sit or lay down.   Functionally he is unable to walk without assisted device, in which he uses a SEC today at the clinic.  Global tolerance to walking is up to 2-3 minutes before he needs to sit again due to leg weakness, tingling and pain.   Currently prescribed Gabapentin that provides some relief of bilateral leg sx's.   Raymon travels to the clinic today that required 40-60 minutes of travel time. He is unable drive himself, he is reliant to his wife for transportation.     DOI:   Start of Physical Therapy Care Date: 2021.     Type of problem:  Lumbar (bilateral leg and R shoulder).  This is a chronic " condition.  Condition occurred with:  Degenerative joint disease.  Where condition occurred: for unknown reasons.  Patient reports pain:  Mid lumbar spine, lower lumbar spine, lumbar spine left and lumbar spine right.  Pain is described as shooting and is constant.  Pain radiates to:  Thigh left, thigh right, lower leg right, foot left, foot right and lower leg left. Pain is the same all the time and worse during the night.  Since onset symptoms are gradually worsening.  Associated symptoms:  Loss of motion/stiffness, loss of strength, numbness, tingling, loss of motion and loss of balance. Symptoms are exacerbated by twisting, standing, walking, lifting and carrying  Relieved by: sitting/laying down   Special tests included:  EMG.  Previous treatment includes physical therapy. There was mild improvement following previous treatment.  Work activity restrictions: retired   Barriers include:  Requires assistance with ADL's, transportation and stairs.    Objective:  Standing Alignment:    Cervical/Thoracic:  Forward head, thoracic kyphosis increased and Dowager's hump  Shoulder/UE:  Rounded shoulders, depressed scapula R and protracted scapula R  Knee:  Genu valgus R and genu valgus L    Gait:    Gait Type:  Antalgic   Weight Bearing Status:  WBAT   Assistive Devices:  Cane  Flexibility/Screens:   Positive screens:  Cervical; Lumbar; Knee and Shoulder    Neurological: He is alert. He has normal strength. No cranial nerve deficit or sensory deficit.   Reflex Scores:       Patellar reflexes are 2+ on the right side and 2+ on the left side.       Achilles reflexes are 2+ on the right side and 2+ on the left side.    Lumbar/SI Evaluation  ROM:    AROM Lumbar:   Flexion:          Bending forward , finger tips to knees, no effect tingling, end range low back pain , no worse with reps   Ext:                    Significant limitation, ERP extension at 10-20 degrees from neutral , increased and worsens low back and leg sx's  with repetition.    Side Bend:        Left:  Limited ROM and ERP with low back pain and referred to L leg     Right:  Limited ROM and ERP with low back pain and referred to R leg  Rotation:           Left:     Right:   Side Glide:        Left:     Right:         Lumbar Myotomes:  normal    Lumbar DTR's:    L4 (Quad):  Left:  2   Right:  2  S1 (Achilles):  Left:  2   Right:  2    Cord Signs:  normal    Lumbar Dermtomes:    L4 Left:  Normal-light touch       L4 Right:  Normal-light touch  L5 Left:  Normal-light touch     L5 Right:  Normal-light touch    Neural Tension/Mobility:  Lumbar:  Not assessed      Lumbar Palpation:  not assessed    Assessment/Plan:    Patient is a 75 year old male with lumbar, both sides shoulder, both sides knee and poor balance  complaints.    Patient has the following significant findings with corresponding treatment plan.                Diagnosis 1:  Low back pain with radiculopathy, probable stenosis    Diagnosis 2:  R shoulder pain, probable capsulitis and possible RTC tear  Diagnosis 3:  Poor Balance, significant lower extremity weakness     Diagnosis 4:  Bilateral knee pain       Therapy Evaluation Codes:   1) History comprised of:   Personal factors that impact the plan of care:      None.    Comorbidity factors that impact the plan of care are:      Diabetes, High blood pressure, Numbness/tingling, Osteoarthritis and Weakness.     Medications impacting care: Pain and Gabapentin .  2) Examination of Body Systems comprised of:   Body structures and functions that impact the plan of care:      Cervical spine, Knee, Lumbar spine, Shoulder and global deconditioned/weakness.   Activity limitations that impact the plan of care are:      Bending, Driving, Dressing, Lifting, Stairs, Standing, Walking and Sleeping.  3) Clinical presentation characteristics are:   Evolving/Changing.  4) Decision-Making    High complexity using standardized patient assessment instrument and/or measureable  "assessment of functional outcome.    Cumulative Therapy Evaluation is: High complexity.  Previous and current functional limitations:  (See Goal Flow Sheet for this information)    Short term and Long term goals: (See Goal Flow Sheet for this information)   Communication ability:  Patient appears to be able to clearly communicate and understand verbal and written communication and follow directions correctly.  Treatment Explanation - The following has been discussed with the patient:   RX ordered/plan of care  Anticipated outcomes  Possible risks and side effects  This patient would benefit from PT intervention to resume normal activities.   Rehab potential is fair.    Frequency:  2 X week, once daily  Duration:  for 2 months  Discharge Plan:  Independent in home treatment program.  Reach maximal therapeutic benefit.    Rehab Plans/Recommendation: Global conditioning, balance. Patient really would benefit from seeing a therapist closer to his home. Address global conditioning, balance. He does have a very basic HEP set up for him to adhere to. In the past, the greater that exercise at home, less likely he adhered.     Caregiver Signature/Credentials _____________________________ Date ________       Treating Provider: Abad Mortensen PT ATC cert MDT      I have reviewed and certified the need for these services and plan of treatment while under my care.        PHYSICIAN'S SIGNATURE:   _________________________________________  Date___________   Didier Singh MD    Certification period:  Beginning of Cert date period: 06/28/21 to  End of Cert period date: 09/25/21     Functional Level Progress Report: Please see attached \"Goal Flow sheet for Functional level.\"    ____X____ Continue Services or       ________ DC Services                Service dates: From  SOC Date: 06/28/21 date to present                         "

## 2021-06-30 DIAGNOSIS — G63 POLYNEUROPATHY IN OTHER DISEASES CLASSIFIED ELSEWHERE (H): ICD-10-CM

## 2021-06-30 DIAGNOSIS — M54.40 CHRONIC BILATERAL LOW BACK PAIN WITH SCIATICA, SCIATICA LATERALITY UNSPECIFIED: ICD-10-CM

## 2021-06-30 DIAGNOSIS — G89.29 CHRONIC BILATERAL LOW BACK PAIN WITH SCIATICA, SCIATICA LATERALITY UNSPECIFIED: ICD-10-CM

## 2021-06-30 RX ORDER — GABAPENTIN 300 MG/1
CAPSULE ORAL
Qty: 60 CAPSULE | Refills: 0 | Status: SHIPPED | OUTPATIENT
Start: 2021-06-30 | End: 2021-09-10

## 2021-06-30 NOTE — TELEPHONE ENCOUNTER
Routing refill request to provider for review/approval because:  Drug not on the [facility] refill protocol     Sonia TATE RN  EP Triage

## 2021-07-15 ENCOUNTER — TRANSFERRED RECORDS (OUTPATIENT)
Dept: HEALTH INFORMATION MANAGEMENT | Facility: CLINIC | Age: 76
End: 2021-07-15
Payer: COMMERCIAL

## 2021-07-16 NOTE — PROGRESS NOTES
"Physical Therapy Initial Evaluation  Subjective:  The history is provided by the patient. No  was used.   Therapist Generated HPI Evaluation  Problem details: \" Raymon\" is a 76 yo male referred to physical therapy for multiple issues, including low back pain, leg weakness, bilateral knee pain, bilateral leg numbness/tingling, night pain,  poor balance and R shoulder pain.     Raymon has been attempting to exercise at home with a portable, seated leg bike/ergometer that he is able to sit in a regular chair. He rides for 5 minute/day.     Patient worsens sx's when standing/walking.     Improved sx's when allowed to sit or lay down.     Functionally he is unable to walk without assisted device, in which he uses a SEC today at the clinic.     Global tolerance to walking is up to 2-3 minutes before he needs to sit again due to leg weakness, tingling and pain.     Currently prescribed Gabapentin that provides some relief of bilateral leg sx's.     Raymon travels to the clinic today that required 40-60 minutes of travel time. He is unable drive himself, he is reliant to his wife for transportation.     DOI: 1 June, 2021  Start of Physical Therapy Care Date: June 28, 2021.                     .         Type of problem:  Lumbar (bilateral leg and R shoulder).    This is a chronic condition.  Condition occurred with:  Degenerative joint disease.  Where condition occurred: for unknown reasons.  Patient reports pain:  Mid lumbar spine, lower lumbar spine, lumbar spine left and lumbar spine right.  Pain is described as shooting and is constant.  Pain radiates to:  Thigh left, thigh right, lower leg right, foot left, foot right and lower leg left. Pain is the same all the time and worse during the night.  Since onset symptoms are gradually worsening.  Associated symptoms:  Loss of motion/stiffness, loss of strength, numbness, tingling, loss of motion and loss of balance. Symptoms are exacerbated by twisting, " standing, walking, lifting and carrying  Relieved by: sitting/laying down   Special tests included:  EMG.  Previous treatment includes physical therapy. There was mild improvement following previous treatment.  Work activity restrictions: retired   Barriers include:  Requires assistance with ADL's, transportation and stairs.                        Objective:  Standing Alignment:    Cervical/Thoracic:  Forward head, thoracic kyphosis increased and Dowager's hump  Shoulder/UE:  Rounded shoulders, depressed scapula R and protracted scapula R        Knee:  Genu valgus R and genu valgus L      Gait:    Gait Type:  Antalgic   Weight Bearing Status:  WBAT   Assistive Devices:  Cane      Flexibility/Screens:   Positive screens:  Cervical; Lumbar; Knee and Shoulder          Neurological: He is alert. He has normal strength. No cranial nerve deficit or sensory deficit.   Reflex Scores:       Patellar reflexes are 2+ on the right side and 2+ on the left side.       Achilles reflexes are 2+ on the right side and 2+ on the left side.           Lumbar/SI Evaluation  ROM:    AROM Lumbar:   Flexion:          Bending forward , finger tips to knees, no effect tingling, end range low back pain , no worse with reps   Ext:                    Significant limitation, ERP extension at 10-20 degrees from neutral , increased and worsens low back and leg sx's with repetition.    Side Bend:        Left:  Limited ROM and ERP with low back pain and referred to L leg     Right:  Limited ROM and ERP with low back pain and referred to R leg  Rotation:           Left:     Right:   Side Glide:        Left:     Right:           Lumbar Myotomes:  normal            Lumbar DTR's:    L4 (Quad):  Left:  2   Right:  2  S1 (Achilles):  Left:  2   Right:  2  Cord Signs:  normal    Lumbar Dermtomes:            L4 Left:  Normal-light touch       L4 Right:  Normal-light touch  L5 Left:  Normal-light touch     L5 Right:  Normal-light touch    Neural  Tension/Mobility:  Lumbar:  Not assessed        Lumbar Palpation:  not assessed                                                         General     ROS    Assessment/Plan:    Patient is a 75 year old male with lumbar, both sides shoulder, both sides knee and poor balance  complaints.    Patient has the following significant findings with corresponding treatment plan.                Diagnosis 1:  Low back pain with radiculopathy, probable stenosis    Diagnosis 2:  R shoulder pain, probable capsulitis and possible RTC tear  Diagnosis 3:  Poor Balance, significant lower extremity weakness     Diagnosis 4:  Bilateral knee pain       Therapy Evaluation Codes:   1) History comprised of:   Personal factors that impact the plan of care:      None.    Comorbidity factors that impact the plan of care are:      Diabetes, High blood pressure, Numbness/tingling, Osteoarthritis and Weakness.     Medications impacting care: Pain and Gabapentin .  2) Examination of Body Systems comprised of:   Body structures and functions that impact the plan of care:      Cervical spine, Knee, Lumbar spine, Shoulder and global deconditioned/weakness.   Activity limitations that impact the plan of care are:      Bending, Driving, Dressing, Lifting, Stairs, Standing, Walking and Sleeping.  3) Clinical presentation characteristics are:   Evolving/Changing.  4) Decision-Making    High complexity using standardized patient assessment instrument and/or measureable assessment of functional outcome.  Cumulative Therapy Evaluation is: High complexity.    Previous and current functional limitations:  (See Goal Flow Sheet for this information)    Short term and Long term goals: (See Goal Flow Sheet for this information)     Communication ability:  Patient appears to be able to clearly communicate and understand verbal and written communication and follow directions correctly.  Treatment Explanation - The following has been discussed with the patient:   RX  ordered/plan of care  Anticipated outcomes  Possible risks and side effects  This patient would benefit from PT intervention to resume normal activities.   Rehab potential is fair.    Frequency:  2 X week, once daily  Duration:  for 2 months  Discharge Plan:  Independent in home treatment program.  Reach maximal therapeutic benefit.    Rehab Plans/Recommendation: Global conditioning, balance. Patient really would benefit from seeing a therapist closer to his home. Address global conditioning, balance. He does have a very basic HEP set up for him to adhere to. In the past, the greater that exercise at home, less likely he adhered.     Please refer to the daily flowsheet for treatment today, total treatment time and time spent performing 1:1 timed codes.

## 2021-08-16 DIAGNOSIS — D50.0 IRON DEFICIENCY ANEMIA DUE TO CHRONIC BLOOD LOSS: Primary | ICD-10-CM

## 2021-08-16 RX ORDER — DIPHENHYDRAMINE HYDROCHLORIDE 50 MG/ML
50 INJECTION INTRAMUSCULAR; INTRAVENOUS
Status: CANCELLED
Start: 2021-08-16

## 2021-08-16 RX ORDER — EPINEPHRINE 1 MG/ML
0.3 INJECTION, SOLUTION INTRAMUSCULAR; SUBCUTANEOUS EVERY 5 MIN PRN
Status: CANCELLED | OUTPATIENT
Start: 2021-08-16

## 2021-08-16 RX ORDER — MEPERIDINE HYDROCHLORIDE 25 MG/ML
25 INJECTION INTRAMUSCULAR; INTRAVENOUS; SUBCUTANEOUS EVERY 30 MIN PRN
Status: CANCELLED | OUTPATIENT
Start: 2021-08-16

## 2021-08-16 RX ORDER — HEPARIN SODIUM,PORCINE 10 UNIT/ML
5 VIAL (ML) INTRAVENOUS
Status: CANCELLED | OUTPATIENT
Start: 2021-08-16

## 2021-08-16 RX ORDER — HEPARIN SODIUM (PORCINE) LOCK FLUSH IV SOLN 100 UNIT/ML 100 UNIT/ML
5 SOLUTION INTRAVENOUS
Status: CANCELLED | OUTPATIENT
Start: 2021-08-16

## 2021-08-16 RX ORDER — ALBUTEROL SULFATE 90 UG/1
1-2 AEROSOL, METERED RESPIRATORY (INHALATION)
Status: CANCELLED
Start: 2021-08-16

## 2021-08-16 RX ORDER — METHYLPREDNISOLONE SODIUM SUCCINATE 125 MG/2ML
125 INJECTION, POWDER, LYOPHILIZED, FOR SOLUTION INTRAMUSCULAR; INTRAVENOUS
Status: CANCELLED
Start: 2021-08-16

## 2021-08-16 RX ORDER — ALBUTEROL SULFATE 0.83 MG/ML
2.5 SOLUTION RESPIRATORY (INHALATION)
Status: CANCELLED | OUTPATIENT
Start: 2021-08-16

## 2021-08-16 RX ORDER — NALOXONE HYDROCHLORIDE 0.4 MG/ML
0.2 INJECTION, SOLUTION INTRAMUSCULAR; INTRAVENOUS; SUBCUTANEOUS
Status: CANCELLED | OUTPATIENT
Start: 2021-08-16

## 2021-08-31 ENCOUNTER — INFUSION THERAPY VISIT (OUTPATIENT)
Dept: INFUSION THERAPY | Facility: CLINIC | Age: 76
End: 2021-08-31
Attending: INTERNAL MEDICINE
Payer: COMMERCIAL

## 2021-08-31 ENCOUNTER — APPOINTMENT (OUTPATIENT)
Dept: LAB | Facility: CLINIC | Age: 76
End: 2021-08-31
Payer: COMMERCIAL

## 2021-08-31 VITALS — DIASTOLIC BLOOD PRESSURE: 71 MMHG | SYSTOLIC BLOOD PRESSURE: 140 MMHG | TEMPERATURE: 97.9 F | HEART RATE: 59 BPM

## 2021-08-31 DIAGNOSIS — D69.3 IDIOPATHIC THROMBOCYTOPENIC PURPURA (H): ICD-10-CM

## 2021-08-31 DIAGNOSIS — D50.0 IRON DEFICIENCY ANEMIA DUE TO CHRONIC BLOOD LOSS: Primary | ICD-10-CM

## 2021-08-31 DIAGNOSIS — Z86.718 PERSONAL HISTORY OF VENOUS THROMBOSIS AND EMBOLISM: ICD-10-CM

## 2021-08-31 DIAGNOSIS — Z79.01 LONG TERM CURRENT USE OF ANTICOAGULANT THERAPY: ICD-10-CM

## 2021-08-31 PROCEDURE — 250N000011 HC RX IP 250 OP 636: Performed by: INTERNAL MEDICINE

## 2021-08-31 PROCEDURE — 258N000003 HC RX IP 258 OP 636: Performed by: INTERNAL MEDICINE

## 2021-08-31 PROCEDURE — 96374 THER/PROPH/DIAG INJ IV PUSH: CPT

## 2021-08-31 RX ORDER — NALOXONE HYDROCHLORIDE 0.4 MG/ML
0.2 INJECTION, SOLUTION INTRAMUSCULAR; INTRAVENOUS; SUBCUTANEOUS
Status: CANCELLED | OUTPATIENT
Start: 2021-09-07

## 2021-08-31 RX ORDER — EPINEPHRINE 1 MG/ML
0.3 INJECTION, SOLUTION, CONCENTRATE INTRAVENOUS EVERY 5 MIN PRN
Status: CANCELLED | OUTPATIENT
Start: 2021-09-07

## 2021-08-31 RX ORDER — DIPHENHYDRAMINE HYDROCHLORIDE 50 MG/ML
50 INJECTION INTRAMUSCULAR; INTRAVENOUS
Status: CANCELLED
Start: 2021-09-07

## 2021-08-31 RX ORDER — MEPERIDINE HYDROCHLORIDE 25 MG/ML
25 INJECTION INTRAMUSCULAR; INTRAVENOUS; SUBCUTANEOUS EVERY 30 MIN PRN
Status: CANCELLED | OUTPATIENT
Start: 2021-09-07

## 2021-08-31 RX ORDER — ALBUTEROL SULFATE 90 UG/1
1-2 AEROSOL, METERED RESPIRATORY (INHALATION)
Status: CANCELLED
Start: 2021-09-07

## 2021-08-31 RX ORDER — ALBUTEROL SULFATE 0.83 MG/ML
2.5 SOLUTION RESPIRATORY (INHALATION)
Status: CANCELLED | OUTPATIENT
Start: 2021-09-07

## 2021-08-31 RX ORDER — METHYLPREDNISOLONE SODIUM SUCCINATE 125 MG/2ML
125 INJECTION, POWDER, LYOPHILIZED, FOR SOLUTION INTRAMUSCULAR; INTRAVENOUS
Status: CANCELLED
Start: 2021-09-07

## 2021-08-31 RX ADMIN — SODIUM CHLORIDE 250 ML: 9 INJECTION, SOLUTION INTRAVENOUS at 13:24

## 2021-08-31 RX ADMIN — FERRIC CARBOXYMALTOSE INJECTION 750 MG: 50 INJECTION, SOLUTION INTRAVENOUS at 13:25

## 2021-08-31 NOTE — PROGRESS NOTES
Infusion Nursing Note:  Raymon Ace presents today for Injectafer.    Patient seen by provider today: No   present during visit today: Not Applicable.    Note: N/A.      Intravenous Access:  Peripheral IV placed.    Treatment Conditions:  Not Applicable.      Post Infusion Assessment:  Patient tolerated infusion without incident.  Patient observed for 15 minutes post injectafer as pt did not wish to remain longer.  Blood return noted pre and post infusion.  Site patent and intact, free from redness, edema or discomfort.  No evidence of extravasations.  Access discontinued per protocol.       Discharge Plan:   Patient discharged in stable condition accompanied by: self.  Departure Mode: Ambulatory.      Tisha Hale RN

## 2021-09-05 ENCOUNTER — HEALTH MAINTENANCE LETTER (OUTPATIENT)
Age: 76
End: 2021-09-05

## 2021-09-08 DIAGNOSIS — M10.9 GOUT INVOLVING TOE, UNSPECIFIED CAUSE, UNSPECIFIED CHRONICITY, UNSPECIFIED LATERALITY: ICD-10-CM

## 2021-09-08 NOTE — LETTER
September 15, 2021      Raymon Ace  [address]  [id number]        Dear Raymon,       Our records indicates that it is time for you to be seen for an office visit with your provider.    Please call our office at [phone] to schedule an appointment for med check with lab work .     Please disregard this notice if you have already made an appointment.    If you are no longer a [facility] patient; Please contact us and let us know that as well. You will also need to the let the pharmacy know the name of your current Provider so that they can send future request to them.       Sincerely,    [facility]

## 2021-09-09 ENCOUNTER — INFUSION THERAPY VISIT (OUTPATIENT)
Dept: INFUSION THERAPY | Facility: CLINIC | Age: 76
End: 2021-09-09
Attending: INTERNAL MEDICINE
Payer: COMMERCIAL

## 2021-09-09 VITALS
SYSTOLIC BLOOD PRESSURE: 132 MMHG | RESPIRATION RATE: 18 BRPM | DIASTOLIC BLOOD PRESSURE: 68 MMHG | TEMPERATURE: 98.1 F | HEART RATE: 60 BPM

## 2021-09-09 DIAGNOSIS — M54.40 CHRONIC BILATERAL LOW BACK PAIN WITH SCIATICA, SCIATICA LATERALITY UNSPECIFIED: ICD-10-CM

## 2021-09-09 DIAGNOSIS — E55.9 VITAMIN D DEFICIENCY: ICD-10-CM

## 2021-09-09 DIAGNOSIS — G89.29 CHRONIC BILATERAL LOW BACK PAIN WITH SCIATICA, SCIATICA LATERALITY UNSPECIFIED: ICD-10-CM

## 2021-09-09 DIAGNOSIS — D50.0 IRON DEFICIENCY ANEMIA DUE TO CHRONIC BLOOD LOSS: Primary | ICD-10-CM

## 2021-09-09 DIAGNOSIS — G63 POLYNEUROPATHY IN OTHER DISEASES CLASSIFIED ELSEWHERE (H): ICD-10-CM

## 2021-09-09 PROCEDURE — 258N000003 HC RX IP 258 OP 636: Performed by: INTERNAL MEDICINE

## 2021-09-09 PROCEDURE — 250N000011 HC RX IP 250 OP 636: Performed by: INTERNAL MEDICINE

## 2021-09-09 PROCEDURE — 96365 THER/PROPH/DIAG IV INF INIT: CPT

## 2021-09-09 RX ORDER — ALLOPURINOL 300 MG/1
1 TABLET ORAL DAILY
Qty: 90 TABLET | Refills: 0 | Status: SHIPPED | OUTPATIENT
Start: 2021-09-09 | End: 2022-01-18

## 2021-09-09 RX ORDER — DIPHENHYDRAMINE HYDROCHLORIDE 50 MG/ML
50 INJECTION INTRAMUSCULAR; INTRAVENOUS
Status: CANCELLED
Start: 2021-09-14

## 2021-09-09 RX ORDER — ALBUTEROL SULFATE 90 UG/1
1-2 AEROSOL, METERED RESPIRATORY (INHALATION)
Status: CANCELLED
Start: 2021-09-14

## 2021-09-09 RX ORDER — METHYLPREDNISOLONE SODIUM SUCCINATE 125 MG/2ML
125 INJECTION, POWDER, LYOPHILIZED, FOR SOLUTION INTRAMUSCULAR; INTRAVENOUS
Status: CANCELLED
Start: 2021-09-14

## 2021-09-09 RX ORDER — MEPERIDINE HYDROCHLORIDE 25 MG/ML
25 INJECTION INTRAMUSCULAR; INTRAVENOUS; SUBCUTANEOUS EVERY 30 MIN PRN
Status: CANCELLED | OUTPATIENT
Start: 2021-09-14

## 2021-09-09 RX ORDER — ALBUTEROL SULFATE 0.83 MG/ML
2.5 SOLUTION RESPIRATORY (INHALATION)
Status: CANCELLED | OUTPATIENT
Start: 2021-09-14

## 2021-09-09 RX ORDER — EPINEPHRINE 1 MG/ML
0.3 INJECTION, SOLUTION, CONCENTRATE INTRAVENOUS EVERY 5 MIN PRN
Status: CANCELLED | OUTPATIENT
Start: 2021-09-14

## 2021-09-09 RX ORDER — NALOXONE HYDROCHLORIDE 0.4 MG/ML
0.2 INJECTION, SOLUTION INTRAMUSCULAR; INTRAVENOUS; SUBCUTANEOUS
Status: CANCELLED | OUTPATIENT
Start: 2021-09-14

## 2021-09-09 RX ADMIN — SODIUM CHLORIDE 250 ML: 9 INJECTION, SOLUTION INTRAVENOUS at 13:05

## 2021-09-09 RX ADMIN — FERRIC CARBOXYMALTOSE INJECTION 750 MG: 50 INJECTION, SOLUTION INTRAVENOUS at 13:13

## 2021-09-09 ASSESSMENT — PAIN SCALES - GENERAL: PAINLEVEL: MODERATE PAIN (4)

## 2021-09-09 NOTE — TELEPHONE ENCOUNTER
Routing refill request to provider for review/approval because:  Labs not current:  Uric Acid last done 8/30/19    Sonia TATE RN  EP Triage

## 2021-09-09 NOTE — PROGRESS NOTES
Infusion Nursing Note:  Raymon Ace presents today for Injectafer.    Patient seen by provider today: No   present during visit today: Not Applicable.    Note: N/A.    Intravenous Access:  Peripheral IV placed.    Treatment Conditions:  Not Applicable.    Post Infusion Assessment:  Patient tolerated infusion without incident.  Patient observed for 15 minutes post injectafer despite per protocol.  Site patent and intact, free from redness, edema or discomfort.  No evidence of extravasations.  Access discontinued per protocol.     Discharge Plan:   Discharge instructions reviewed with: Patient.  Patient and/or family verbalized understanding of discharge instructions and all questions answered.  AVS to patient via [employer].  Patient will return as 10/21/2021 for video visit with Dr. Novoa for next appointment.   Patient discharged in stable condition accompanied by: self and wife.  Departure Mode: Ambulatory.    Ariana Dillon RN

## 2021-09-10 RX ORDER — GABAPENTIN 300 MG/1
CAPSULE ORAL
Qty: 60 CAPSULE | Refills: 0 | Status: SHIPPED | OUTPATIENT
Start: 2021-09-10 | End: 2021-10-21

## 2021-09-10 RX ORDER — ERGOCALCIFEROL 1.25 MG/1
50000 CAPSULE, LIQUID FILLED ORAL WEEKLY
Qty: 12 CAPSULE | Refills: 0 | Status: SHIPPED | OUTPATIENT
Start: 2021-09-10 | End: 2022-04-25

## 2021-09-16 ENCOUNTER — HOSPITAL ENCOUNTER (EMERGENCY)
Facility: CLINIC | Age: 76
Discharge: HOME | End: 2021-09-16
Attending: FAMILY MEDICINE | Admitting: FAMILY MEDICINE
Payer: COMMERCIAL

## 2021-09-16 ENCOUNTER — APPOINTMENT (OUTPATIENT)
Dept: GENERAL RADIOLOGY | Facility: CLINIC | Age: 76
End: 2021-09-16
Attending: FAMILY MEDICINE
Payer: COMMERCIAL

## 2021-09-16 ENCOUNTER — APPOINTMENT (OUTPATIENT)
Dept: CT IMAGING | Facility: CLINIC | Age: 76
End: 2021-09-16
Attending: FAMILY MEDICINE
Payer: COMMERCIAL

## 2021-09-16 VITALS
HEART RATE: 65 BPM | OXYGEN SATURATION: 96 % | TEMPERATURE: 98 F | SYSTOLIC BLOOD PRESSURE: 148 MMHG | RESPIRATION RATE: 21 BRPM | DIASTOLIC BLOOD PRESSURE: 135 MMHG

## 2021-09-16 DIAGNOSIS — R07.89 NON-CARDIAC CHEST PAIN: ICD-10-CM

## 2021-09-16 DIAGNOSIS — K21.9 GASTROESOPHAGEAL REFLUX DISEASE WITHOUT ESOPHAGITIS: ICD-10-CM

## 2021-09-16 DIAGNOSIS — K70.30 ALCOHOLIC CIRRHOSIS, UNSPECIFIED WHETHER ASCITES PRESENT (H): ICD-10-CM

## 2021-09-16 LAB
ALBUMIN SERPL-MCNC: 3.2 G/DL (ref 3.4–5)
ALP SERPL-CCNC: 132 U/L (ref 40–150)
ALT SERPL W P-5'-P-CCNC: 36 U/L (ref 0–70)
ANION GAP SERPL CALCULATED.3IONS-SCNC: 5 MMOL/L (ref 3–14)
APTT PPP: 21 SECONDS (ref 22–38)
AST SERPL W P-5'-P-CCNC: 46 U/L (ref 0–45)
BASOPHILS # BLD AUTO: 0.1 10E3/UL (ref 0–0.2)
BASOPHILS NFR BLD AUTO: 1 %
BILIRUB SERPL-MCNC: 1.2 MG/DL (ref 0.2–1.3)
BUN SERPL-MCNC: 7 MG/DL (ref 7–30)
CALCIUM SERPL-MCNC: 8.4 MG/DL (ref 8.5–10.1)
CHLORIDE BLD-SCNC: 114 MMOL/L (ref 94–109)
CO2 SERPL-SCNC: 26 MMOL/L (ref 20–32)
CREAT SERPL-MCNC: 0.8 MG/DL (ref 0.66–1.25)
D DIMER PPP FEU-MCNC: 0.4 UG/ML FEU (ref 0–0.5)
EOSINOPHIL # BLD AUTO: 0.2 10E3/UL (ref 0–0.7)
EOSINOPHIL NFR BLD AUTO: 2 %
ERYTHROCYTE [DISTWIDTH] IN BLOOD BY AUTOMATED COUNT: 19.1 % (ref 10–15)
GFR SERPL CREATININE-BSD FRML MDRD: 86 ML/MIN/1.73M2
GLUCOSE BLD-MCNC: 101 MG/DL (ref 70–99)
HCT VFR BLD AUTO: 42.5 % (ref 40–53)
HGB BLD-MCNC: 14.3 G/DL (ref 13.3–17.7)
HOLD SPECIMEN: NORMAL
IMM GRANULOCYTES # BLD: 0.1 10E3/UL
IMM GRANULOCYTES NFR BLD: 1 %
INR PPP: 1.49 (ref 0.85–1.15)
LIPASE SERPL-CCNC: 110 U/L (ref 73–393)
LYMPHOCYTES # BLD AUTO: 1 10E3/UL (ref 0.8–5.3)
LYMPHOCYTES NFR BLD AUTO: 13 %
MAGNESIUM SERPL-MCNC: 1.7 MG/DL (ref 1.6–2.3)
MCH RBC QN AUTO: 30.4 PG (ref 26.5–33)
MCHC RBC AUTO-ENTMCNC: 33.6 G/DL (ref 31.5–36.5)
MCV RBC AUTO: 90 FL (ref 78–100)
MONOCYTES # BLD AUTO: 0.7 10E3/UL (ref 0–1.3)
MONOCYTES NFR BLD AUTO: 9 %
NEUTROPHILS # BLD AUTO: 5.9 10E3/UL (ref 1.6–8.3)
NEUTROPHILS NFR BLD AUTO: 74 %
NRBC # BLD AUTO: 0 10E3/UL
NRBC BLD AUTO-RTO: 0 /100
PLATELET # BLD AUTO: 68 10E3/UL (ref 150–450)
POTASSIUM BLD-SCNC: 3.9 MMOL/L (ref 3.4–5.3)
PROT SERPL-MCNC: 7.4 G/DL (ref 6.8–8.8)
RBC # BLD AUTO: 4.71 10E6/UL (ref 4.4–5.9)
SODIUM SERPL-SCNC: 145 MMOL/L (ref 133–144)
TROPONIN I SERPL-MCNC: <0.015 UG/L (ref 0–0.04)
TROPONIN I SERPL-MCNC: <0.015 UG/L (ref 0–0.04)
WBC # BLD AUTO: 8 10E3/UL (ref 4–11)

## 2021-09-16 PROCEDURE — 82040 ASSAY OF SERUM ALBUMIN: CPT | Performed by: FAMILY MEDICINE

## 2021-09-16 PROCEDURE — 71250 CT THORAX DX C-: CPT

## 2021-09-16 PROCEDURE — 84484 ASSAY OF TROPONIN QUANT: CPT | Mod: 91 | Performed by: FAMILY MEDICINE

## 2021-09-16 PROCEDURE — 71045 X-RAY EXAM CHEST 1 VIEW: CPT

## 2021-09-16 PROCEDURE — 93010 ELECTROCARDIOGRAM REPORT: CPT | Mod: 76 | Performed by: FAMILY MEDICINE

## 2021-09-16 PROCEDURE — 85610 PROTHROMBIN TIME: CPT | Performed by: FAMILY MEDICINE

## 2021-09-16 PROCEDURE — 85730 THROMBOPLASTIN TIME PARTIAL: CPT | Performed by: FAMILY MEDICINE

## 2021-09-16 PROCEDURE — 83690 ASSAY OF LIPASE: CPT | Performed by: FAMILY MEDICINE

## 2021-09-16 PROCEDURE — 99285 EMERGENCY DEPT VISIT HI MDM: CPT | Mod: 25

## 2021-09-16 PROCEDURE — 93005 ELECTROCARDIOGRAM TRACING: CPT

## 2021-09-16 PROCEDURE — 99285 EMERGENCY DEPT VISIT HI MDM: CPT | Mod: 25 | Performed by: FAMILY MEDICINE

## 2021-09-16 PROCEDURE — 36415 COLL VENOUS BLD VENIPUNCTURE: CPT | Performed by: FAMILY MEDICINE

## 2021-09-16 PROCEDURE — 93010 ELECTROCARDIOGRAM REPORT: CPT | Performed by: FAMILY MEDICINE

## 2021-09-16 PROCEDURE — 84484 ASSAY OF TROPONIN QUANT: CPT | Performed by: FAMILY MEDICINE

## 2021-09-16 PROCEDURE — 85379 FIBRIN DEGRADATION QUANT: CPT | Performed by: FAMILY MEDICINE

## 2021-09-16 PROCEDURE — 83735 ASSAY OF MAGNESIUM: CPT | Performed by: FAMILY MEDICINE

## 2021-09-16 PROCEDURE — 250N000013 HC RX MED GY IP 250 OP 250 PS 637: Performed by: FAMILY MEDICINE

## 2021-09-16 PROCEDURE — 93005 ELECTROCARDIOGRAM TRACING: CPT | Mod: 76

## 2021-09-16 PROCEDURE — 85025 COMPLETE CBC W/AUTO DIFF WBC: CPT | Performed by: FAMILY MEDICINE

## 2021-09-16 RX ORDER — MAGNESIUM OXIDE 400 MG/1
800 TABLET ORAL ONCE
Status: COMPLETED | OUTPATIENT
Start: 2021-09-16 | End: 2021-09-16

## 2021-09-16 RX ORDER — NITROGLYCERIN 0.4 MG/1
0.4 TABLET SUBLINGUAL EVERY 5 MIN PRN
Status: DISCONTINUED | OUTPATIENT
Start: 2021-09-16 | End: 2021-09-16 | Stop reason: HOSPADM

## 2021-09-16 RX ORDER — FOLIC ACID 1 MG/1
1 TABLET ORAL ONCE
Status: COMPLETED | OUTPATIENT
Start: 2021-09-16 | End: 2021-09-16

## 2021-09-16 RX ORDER — LANOLIN ALCOHOL/MO/W.PET/CERES
100 CREAM (GRAM) TOPICAL ONCE
Status: COMPLETED | OUTPATIENT
Start: 2021-09-16 | End: 2021-09-16

## 2021-09-16 RX ORDER — ASPIRIN 81 MG/1
324 TABLET, CHEWABLE ORAL ONCE
Status: COMPLETED | OUTPATIENT
Start: 2021-09-16 | End: 2021-09-16

## 2021-09-16 RX ORDER — MAGNESIUM OXIDE 400 MG/1
800 TABLET ORAL ONCE
Status: DISCONTINUED | OUTPATIENT
Start: 2021-09-16 | End: 2021-09-16

## 2021-09-16 RX ORDER — MULTIVITAMIN,THERAPEUTIC
1 TABLET ORAL ONCE
Status: COMPLETED | OUTPATIENT
Start: 2021-09-16 | End: 2021-09-16

## 2021-09-16 RX ORDER — MAGNESIUM OXIDE 400 MG/1
400 TABLET ORAL DAILY
Status: DISCONTINUED | OUTPATIENT
Start: 2021-09-16 | End: 2021-09-16

## 2021-09-16 RX ORDER — LIDOCAINE 4 G/G
1 PATCH TOPICAL
Status: DISCONTINUED | OUTPATIENT
Start: 2021-09-16 | End: 2021-09-16 | Stop reason: HOSPADM

## 2021-09-16 RX ORDER — OXYCODONE HYDROCHLORIDE 5 MG/1
5 TABLET ORAL EVERY 6 HOURS PRN
Qty: 6 TABLET | Refills: 0 | Status: SHIPPED | OUTPATIENT
Start: 2021-09-16 | End: 2022-12-02

## 2021-09-16 RX ORDER — SUCRALFATE ORAL 1 G/10ML
1 SUSPENSION ORAL ONCE
Status: COMPLETED | OUTPATIENT
Start: 2021-09-16 | End: 2021-09-16

## 2021-09-16 RX ADMIN — SUCRALFATE 1 G: 1 SUSPENSION ORAL at 16:40

## 2021-09-16 RX ADMIN — LIDOCAINE 1 PATCH: 246 PATCH TOPICAL at 19:39

## 2021-09-16 RX ADMIN — ASPIRIN 81 MG CHEWABLE TABLET 324 MG: 81 TABLET CHEWABLE at 15:26

## 2021-09-16 RX ADMIN — MAGNESIUM OXIDE 800 MG: 400 TABLET ORAL at 15:27

## 2021-09-16 RX ADMIN — MULTIVITAMIN TABLET 1 TABLET: TABLET at 15:28

## 2021-09-16 RX ADMIN — FOLIC ACID 1 MG: 1 TABLET ORAL at 15:28

## 2021-09-16 RX ADMIN — THIAMINE HCL TAB 100 MG 100 MG: 100 TAB at 15:28

## 2021-09-16 ASSESSMENT — ENCOUNTER SYMPTOMS
SHORTNESS OF BREATH: 1
BLOOD IN STOOL: 0
HEADACHES: 0
VOMITING: 0
DYSURIA: 0
FEVER: 0
WHEEZING: 0
CHILLS: 0
ABDOMINAL PAIN: 0
PALPITATIONS: 0
FREQUENCY: 0
SORE THROAT: 0
DIARRHEA: 0
NAUSEA: 0
CONSTIPATION: 0
SINUS PRESSURE: 0
COUGH: 0
DIAPHORESIS: 0

## 2021-09-16 NOTE — ED NOTES
Pt states hx of low platelets and htn - here with left sided chest pain radiating to left arm that he awoke this morning with. Movement makes the pain worse. States fall to left side about a week or so ago - was not seen at that time. Pain is 6-7/10 at rest

## 2021-09-16 NOTE — ED TRIAGE NOTES
"C/o Left sided CP, radiates into arm and back.  Pt has cardiac hx, states that first MI was \"silent\".  Pt describes pain as a sharp stabbing pain.  "

## 2021-09-16 NOTE — ED NOTES
Pt has bruising to left upper arm - states fell a week or so ago. Patient has cane and is quite unsteady on feet

## 2021-09-16 NOTE — ED PROVIDER NOTES
History     Chief Complaint   Patient presents with     Chest Pain     HPI  Raymon Ace is a 76 year old male who presents with a history of alcohol abuse with heavy use daily goes to the [employer] for hard liquor drinks per day, obstructive sleep apnea, polyneuropathy, apparent coronary disease although I do not have records for this.  Tells me that he was diagnosed with this while in [place] several months ago.  Cirrhosis, gastric varices, portal hypertension, pulmonary embolism with right lower lobe infarction April 2017.    This morning he was at home had just woken up at around 8 AM.  Pain in the left chest and left scapular region radiating into his left arm.  Worse with deep breathing.  Sense of dyspnea.  No nausea or vomiting.  No sweats.      Allergies:  Allergies   Allergen Reactions     Olmesartan Diarrhea       Problem List:    Patient Active Problem List    Diagnosis Date Noted     ITP (idiopathic thrombocytopenic purpura) since 3-13 back surgery       Priority: High     Gastric varices 10/27/2020     Priority: Medium     Alcoholic cirrhosis (H) 10/27/2020     Priority: Medium     Preop general physical exam 10/05/2020     Priority: Medium     Chronic anticoagulation 10/05/2020     Priority: Medium     Longitudinal ridging of nail + spooning of middle ones  12/27/2019     Priority: Medium     Cardiomegaly per 2017 CXR  12/27/2019     Priority: Medium     Major depression in complete remission (H) 12/27/2019     Priority: Medium     CKD (chronic kidney disease) stage 3, GFR 30-59 ml/min 06/07/2019     Priority: Medium     Portal hypertension (H) 06/07/2019     Priority: Medium     Impaired fasting glucose 06/07/2019     Priority: Medium     Fatigue, unspecified type 06/07/2019     Priority: Medium     Screening for prostate cancer 09/14/2018     Priority: Medium     Iron deficiency anemia due to chronic blood loss 06/28/2018     Priority: Medium     Pulmonary embolism (H) large RLL w infarctio  4-17 04/18/2017     Priority: Medium     Formatting of this note might be different from the original.  RLL with infarct       Gastroesophageal reflux disease with esophagitis 12/06/2016     Priority: Medium     Need for prophylactic vaccination against Streptococcus pneumoniae (pneumococcus) 08/04/2016     Priority: Medium     Gout involving toe, unspecified cause, unspecified chronicity, unspecified laterality 06/02/2016     Priority: Medium     ACP (advance care planning) 05/16/2016     Priority: Medium     Advance Care Planning 5/16/2016: ACP Review of Chart / Resources Provided:  Reviewed chart for advance care plan.  Raymon Ace has no plan or code status on file however states presence of ACP document. Copy requested. Confirmed code status reflects current choices pending receipt of document/advance care plan review.  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 Provider to review       Change in bowel habits since 1-14: diarrhea-thinks better off benicar 07/10/2014     Priority: Medium     Family history of ischemic heart disease 01/17/2014     Priority: Medium     Family history of  diabetes mellitus 01/17/2014     Priority: Medium     Class 1 obesity due to excess calories with serious comorbidity and body mass index (BMI) of 31.0 to 31.9 in adult 01/10/2014     Priority: Medium     Glucose intolerance (impaired glucose tolerance)  HgbA!C = 5.4 01/10/2014     Priority: Medium     Back pain since 1988 s/po surgery 1992,1996 and 3-13/ Dr Singh  01/10/2014     Priority: Medium     No CSA on file   8-=22-17 no concerns       Alcoholic liver damage (H) 01/10/2014     Priority: Medium     Diverticulosis of large intestine 01/10/2014     Priority: Medium     Problem list name updated by automated process. Provider to review       Elevated liver enzymes AST  01/10/2014     Priority: Medium     Hypotension 03/16/2013     Priority: Medium     Lumbar spinal stenosis 12/14/2012     Priority: Medium     Steroid-induced hyperglycemia 12/14/2012     Priority: Medium     Although glucose normal on 1/30/13 atr 86       Fatty liver/ 1-14 US 12/14/2012     Priority: Medium     Atherosclerosis 12/14/2012     Priority: Medium     Alcohol consuption of more than two drinks per day 12/14/2012     Priority: Medium     Obstructive sleep apnea syndrome 09/21/2012     Priority: Medium     Does not tolerate CPAP  Problem list name updated by automated process. Provider to review       Restless legs syndrome (RLS) 09/21/2012     Priority: Medium     Polyneuropathy in other diseases classified elsewhere (H) 09/21/2012     Priority: Medium     RX monitoring program ([place]) reviewed: 8/4/20 recommend provider review    [place] profile:  [email or web address]         Gastroesophageal reflux disease without esophagitis 09/21/2012     Priority: Medium     Preventive measure 01/31/2013     Priority: Low     APRIMA DATA BASE UNDER THE 12/14/12 NOTE  Colonoscopy neg in 5/10; PSA 0.31 in 9/12          Past Medical History:    Past Medical History:   Diagnosis Date     Alcohol abuse since teens 01/15/2015     Alcoholic liver  damage (H) 1/10/2014     Gastroesophageal reflux disease with esophagitis 12/6/2016     Gout involving toe, unspecified cause, unspecified chronicity, unspecified laterality 6/2/2016     Heart murmur      Hyperlipidemia 12/17/2015     Hypertension      ITP (idiopathic thrombocytopenic purpura)      Obstructive sleep apnea      Pulmonary embolism (H) large RLL w infarctio 4-17 4/18/2017       Past Surgical History:    Past Surgical History:   Procedure Laterality Date     APPENDECTOMY  1956     BACK SURGERY  1992, 1996    trimmed L4-L5, Fusion L4-L5     BONE MARROW BIOPSY, BONE SPECIMEN, NEEDLE/TROCAR  10/24/2012    Procedure: BIOPSY BONE MARROW;  BONE MARROW BIOPSY WITH ASPIRATE (AREVALO ORDERING);  Surgeon: Terri Hickey MD;  Location:  GI     COLONOSCOPY N/A 7/31/2019    Procedure: COLONOSCOPY;  Surgeon: Sebastian Garcia MD;  Location:  GI     ESOPHAGOSCOPY, GASTROSCOPY, DUODENOSCOPY (EGD), COMBINED N/A 2/8/2018    Procedure: COMBINED ESOPHAGOSCOPY, GASTROSCOPY, DUODENOSCOPY (EGD);  EGD;  Surgeon: Terri Crouch MD;  Location:  GI     ESOPHAGOSCOPY, GASTROSCOPY, DUODENOSCOPY (EGD), COMBINED N/A 11/10/2020    Procedure: ESOPHAGOGASTRODUODENOSCOPY, WITH BIOPSY;  Surgeon: Alonzo Jang DO;  Location:  GI     FACIAL RECONSTRUCTION SURGERY  1965    jaw fracture     HC TOOTH EXTRACTION W/FORCEP  1990s       Family History:    Family History   Problem Relation Age of Onset     Unknown/Adopted Mother      Unknown/Adopted Father      Heart Disease Sister      Diabetes No family hx of      Coronary Artery Disease No family hx of      Hypertension No family hx of      Hyperlipidemia No family hx of      Cerebrovascular Disease No family hx of      Breast Cancer No family hx of      Colon Cancer No family hx of      Prostate Cancer No family hx of      Other Cancer No family hx of      Depression No family hx of      Anxiety Disorder No family hx of      Mental Illness No family hx of       Substance Abuse No family hx of      Anesthesia Reaction No family hx of      Asthma No family hx of      Osteoporosis No family hx of      Genetic Disorder No family hx of      Thyroid Disease No family hx of      Obesity No family hx of        Social History:  Marital Status:   [2]  Social History     Tobacco Use     Smoking status: Former Smoker     Packs/day: 1.00     Years: 28.00     Pack years: 28.00     Types: Cigarettes     Start date:      Quit date: 1982     Years since quittin.0     Smokeless tobacco: Former User   Substance Use Topics     Alcohol use: Yes     Alcohol/week: 5.0 standard drinks     Types: 5 Shots of liquor per week     Comment: 4-5 drinks/day, alcohol use disorder     Drug use: No        Medications:    albuterol (PROAIR HFA/PROVENTIL HFA/VENTOLIN HFA) 108 (90 Base) MCG/ACT inhaler  allopurinol (ZYLOPRIM) 300 MG tablet  ASPIRIN NOT PRESCRIBED (INTENTIONAL)  eltrombopag (PROMACTA) 75 MG tablet  folic acid (FOLVITE) 1 MG tablet  gabapentin (NEURONTIN) 300 MG capsule  metoprolol succinate ER (TOPROL-XL) 50 MG 24 hr tablet  omeprazole (PRILOSEC) 40 MG DR capsule  ondansetron (ZOFRAN ODT) 4 MG ODT tab  rivaroxaban ANTICOAGULANT (XARELTO ANTICOAGULANT) 10 MG TABS tablet  vitamin D2 (ERGOCALCIFEROL) 17503 units (1250 mcg) capsule          Review of Systems   Constitutional: Negative for chills, diaphoresis and fever.   HENT: Negative for ear pain, sinus pressure and sore throat.    Eyes: Negative for visual disturbance.   Respiratory: Positive for shortness of breath. Negative for cough and wheezing.    Cardiovascular: Positive for chest pain. Negative for palpitations.   Gastrointestinal: Negative for abdominal pain, blood in stool, constipation, diarrhea, nausea and vomiting.   Genitourinary: Negative for dysuria, frequency and urgency.   Skin: Negative for rash.   Neurological: Negative for headaches.   All other systems reviewed and are negative.      Physical Exam   BP:  (!) 147/91  Pulse: 77  Temp: 98  F (36.7  C)  Resp: 16  SpO2: 96 %      Physical Exam  Constitutional:       General: He is in acute distress.      Appearance: He is not diaphoretic.   Eyes:      Conjunctiva/sclera: Conjunctivae normal.   Cardiovascular:      Rate and Rhythm: Normal rate and regular rhythm.      Heart sounds: No murmur heard.     Pulmonary:      Effort: Pulmonary effort is normal. No respiratory distress.      Breath sounds: Normal breath sounds. No stridor. No wheezing or rhonchi.   Chest:      Chest wall: Tenderness present.   Abdominal:      General: Abdomen is flat. Bowel sounds are normal. There is no distension.      Palpations: There is no mass.      Tenderness: There is no abdominal tenderness. There is no guarding.   Musculoskeletal:      Right lower leg: No edema.      Left lower leg: No edema.   Skin:     Coloration: Skin is not pale.      Findings: No rash.   Neurological:      General: No focal deficit present.      Mental Status: He is alert.      Motor: No weakness.       Radial pulses symmetric.    ED Course        Procedures                 EKG Interpretation:      Interpreted by Elvis Esqueda MD  EKG done at 1441 hrs. demonstrates a sinus rhythm 63 bpm leftward axis.  No ST change.  T wave flattening or inversion in almost all of the precordial leads and in leads III and aVF.  There is poor R progression across the precordium.  No Q waves.  Normal intervals.  Normal conduction.  No ectopy.  Impression sinus rhythm 63 bpm likely prior anterior septal MI.  T wave inversion or flattening particularly in leads V1 and V2.           EKG Interpretation:      Interpreted by Elvis Esqueda MD    EKG done at 1630 hrs. demonstrates a sinus rhythm at 59 bpm leftward axis.  There is no ST change.  T wave inversion in V1 through V5.  Poor R progression throughout the precordium.  No Q waves.  Normal intervals.  Normal conduction.  No ectopy.  Impression sinus rhythm 59 bpm same T wave  inversion/flattening across the precordium seen on the first EKG as well as poor R progression likely prior anterior septal MI.  No other change.  No change from prior EKG.           EKG Interpretation:      Interpreted by Elvis Esqueda MD  KG done at 1757 hrs. demonstrates a sinus rhythm 62 bpm with a leftward axis and no ST change for T wave flattening/inversion in the precordial leads.  Poor R progression V1 through V6 and no Q waves.  Normal intervals.  Normal conduction.  No ectopy.  Impression sinus rhythm 62 bpm same poor R progression and T wave inversion/flattening throughout the precordial leads.  No change from prior EKG.      Critical Care time:  none               Results for orders placed or performed during the hospital encounter of 09/16/21 (from the past 24 hour(s))   [facility] Draw    Narrative    The following orders were created for panel order [facility].  Procedure                               Abnormality         Status                     ---------                               -----------         ------                     Extra Blue Top Tube[[id number]]                              Final result               Extra Red Top Tube[[id number]]                               Final result               Extra Green Top (Lithium...[[id number]]                                                 Extra Green Top (Lithium...[[id number]]                      Final result               Extra Purple Top Tube[[id number]]                            Final result                 Please view results for these tests on the individual orders.   Extra Blue Top Tube   Result Value Ref Range    Hold Specimen JIC    Extra Red Top Tube   Result Value Ref Range    Hold Specimen JIC    Extra Green Top (Lithium Heparin) Tube   Result Value Ref Range    Hold Specimen JIC    Extra Purple Top Tube   Result Value Ref Range    Hold Specimen JIC    CBC with platelets differential    Narrative    The following orders were created for  panel order CBC with platelets differential.  Procedure                               Abnormality         Status                     ---------                               -----------         ------                     CBC with platelets and d...[[id number]]  Abnormal            Final result                 Please view results for these tests on the individual orders.   Comprehensive metabolic panel   Result Value Ref Range    Sodium 145 (H) 133 - 144 mmol/L    Potassium 3.9 3.4 - 5.3 mmol/L    Chloride 114 (H) 94 - 109 mmol/L    Carbon Dioxide (CO2) 26 20 - 32 mmol/L    Anion Gap 5 3 - 14 mmol/L    Urea Nitrogen 7 7 - 30 mg/dL    Creatinine 0.80 0.66 - 1.25 mg/dL    Calcium 8.4 (L) 8.5 - 10.1 mg/dL    Glucose 101 (H) 70 - 99 mg/dL    Alkaline Phosphatase 132 40 - 150 U/L    AST 46 (H) 0 - 45 U/L    ALT 36 0 - 70 U/L    Protein Total 7.4 6.8 - 8.8 g/dL    Albumin 3.2 (L) 3.4 - 5.0 g/dL    Bilirubin Total 1.2 0.2 - 1.3 mg/dL    GFR Estimate 86 >60 mL/min/1.73m2   Lipase   Result Value Ref Range    Lipase 110 73 - 393 U/L   Troponin I   Result Value Ref Range    Troponin I <0.015 0.000 - 0.045 ug/L   D dimer quantitative   Result Value Ref Range    D-Dimer Quantitative 0.40 0.00 - 0.50 ug/mL FEU    Narrative    This D-dimer assay is intended for use in conjunction with a clinical pretest probability assessment model to exclude pulmonary embolism (PE) and deep venous thrombosis (DVT) in outpatients suspected of PE or DVT. The cut-off value is 0.50 ug/mL FEU.   INR   Result Value Ref Range    INR 1.49 (H) 0.85 - 1.15   Partial thromboplastin time   Result Value Ref Range    aPTT 21 (L) 22 - 38 Seconds   Magnesium   Result Value Ref Range    Magnesium 1.7 1.6 - 2.3 mg/dL   CBC with platelets and differential   Result Value Ref Range    WBC Count 8.0 4.0 - 11.0 10e3/uL    RBC Count 4.71 4.40 - 5.90 10e6/uL    Hemoglobin 14.3 13.3 - 17.7 g/dL    Hematocrit 42.5 40.0 - 53.0 %    MCV 90 78 - 100 fL    MCH 30.4 26.5 - 33.0 pg     MCHC 33.6 31.5 - 36.5 g/dL    RDW 19.1 (H) 10.0 - 15.0 %    Platelet Count 68 (L) 150 - 450 10e3/uL    % Neutrophils 74 %    % Lymphocytes 13 %    % Monocytes 9 %    % Eosinophils 2 %    % Basophils 1 %    % Immature Granulocytes 1 %    NRBCs per 100 WBC 0 <1 /100    Absolute Neutrophils 5.9 1.6 - 8.3 10e3/uL    Absolute Lymphocytes 1.0 0.8 - 5.3 10e3/uL    Absolute Monocytes 0.7 0.0 - 1.3 10e3/uL    Absolute Eosinophils 0.2 0.0 - 0.7 10e3/uL    Absolute Basophils 0.1 0.0 - 0.2 10e3/uL    Absolute Immature Granulocytes 0.1 (H) <=0.0 10e3/uL    Absolute NRBCs 0.0 10e3/uL   CBC with platelets, differential *Canceled*    Narrative    The following orders were created for panel order CBC with platelets, differential.  Procedure                               Abnormality         Status                     ---------                               -----------         ------                       Please view results for these tests on the individual orders.   [facility] Draw    Narrative    The following orders were created for panel order [facility] Draw.  Procedure                               Abnormality         Status                     ---------                               -----------         ------                     Extra Blue Top Tube[[id number]]                              Final result               Extra Red Top Tube[[id number]]                               Final result               Extra Green Top (Lithium...[[id number]]                      Final result               Extra Green Top (Lithium...[[id number]]                      Final result               Extra Purple Top Tube[[id number]]                            Final result                 Please view results for these tests on the individual orders.   Extra Blue Top Tube   Result Value Ref Range    Hold Specimen JIC    Extra Red Top Tube   Result Value Ref Range    Hold Specimen JIC    Extra Green Top (Lithium Heparin) Tube   Result Value Ref Range    Hold  Specimen JIC    Extra Green Top (Lithium Heparin) Tube   Result Value Ref Range    Hold Specimen JIC    Extra Purple Top Tube   Result Value Ref Range    Hold Specimen JIC    XR Chest Port 1 View    Narrative    CHEST PORTABLE ONE VIEW   9/16/2021 4:07 PM     HISTORY: Chest pain, shortness of breath.    COMPARISON: Chest x-ray 9/20/2019.      Impression    IMPRESSION: Portable chest. Right lung is well-expanded and clear.  Retrocardiac opacity obscures the left hemidiaphragm, likely left  lower lobe atelectasis or effusion. Heart is enlarged. No  pneumothorax. No evidence of right pleural fluid.    KARI JOHN MD         SYSTEM ID:  [id number]   Chest CT w/o contrast    Narrative    EXAM: CT CHEST W/O CONTRAST  LOCATION: [facility]  DATE/TIME: 9/16/2021 4:49 PM    INDICATION: Traumatic injury with multiple falls. Chest pain and ecchymosis.  COMPARISON: 2 view chest 09/21/2020 and chest CTA 10/03/2017  TECHNIQUE: CT chest without IV contrast. Multiplanar reformats were obtained. Dose reduction techniques were used.  CONTRAST: None.    FINDINGS:   LUNGS AND PLEURA: Scattered upper and lower lung subpleural reticulation unchanged, without subpleural honeycombing and bronchiectasis. Mild left lower lobe left lower lobe subsegmental atelectasis versus scar unchanged. Mild left lower lobe bronchial   wall thickening, volume loss and subsegmental atelectasis versus scar unchanged. No focal consolidation nor pleural effusion. Central airways are patent.    MEDIASTINUM/AXILLAE: Leftward deviation of the heart unchanged. No mediastinal mass/adenopathy nor pericardial effusion. Stable mild cardiomegaly. The thoracic aorta is normal in size.    CORONARY ARTERY CALCIFICATION: Moderate.    UPPER ABDOMEN: Negative.    MUSCULOSKELETAL: No evidence of fracture. No suspicious osseous lesion. Right shoulder arthroplasty redemonstrated.      Impression    IMPRESSION:   1.  No evidence of traumatic visceral  injury in the chest. No evidence of hematoma nor fracture.  2.  Chronic left basilar subsegmental atelectasis versus scar unchanged.     Troponin I   Result Value Ref Range    Troponin I <0.015 0.000 - 0.045 ug/L       Medications   nitroGLYcerin (NITROSTAT) sublingual tablet 0.4 mg (has no administration in time range)   aspirin (ASA) chewable tablet 324 mg (has no administration in time range)   multivitamin, therapeutic (THERA-VIT) tablet 1 tablet (has no administration in time range)   folic acid (FOLVITE) tablet 1 mg (has no administration in time range)   thiamine (B-1) tablet 100 mg (has no administration in time range)   magnesium oxide (MAG-OX) tablet 800 mg (has no administration in time range)       Assessments & Plan (with Medical Decision Making)     MDM: Raymon Ace is a 76 year old male presenting with anterior chest pain radiating into the left arm worse with exertion and deep breathing.  History of PE and coronary disease.  EKG consistent with prior anterior septal MI.  Onset more than 6 hours ago.  Reassuring vitals on presentation and not hypoxic.  Previously immunized against Covid and no obvious Covid symptoms.  Will evaluate for underlying coronary syndrome PE.  Referred pain from upper abdomen is possible.  Also with cirrhosis and continued heavy alcohol use.  lipase.  Comprehensive panel.  CBC    Testing is reassuring with serial troponin, serial EKG, initial chest x-ray followed by CT chest without contrast.  Negative D-dimer.  The chest wall is tender.  No current rash.  Shingles could do this and we discussed monitoring for rash.     We will treat with symptomatic management.  Precautions given for return.  Additional recommendations as below.  Close interval follow-up.        I have reviewed the nursing notes.    I have reviewed the findings, diagnosis, plan and need for follow up with the patient.       New Prescriptions    No medications on file       Final diagnoses:   Non-cardiac  chest pain - NO serious findings on extensive evaluation.  unclear cause of pain.  this may be chest wall.  may be radiatying from the upper abdomen.  recheck within the next few days in clinic.  return here for wrosening.  stay on prilosec.  take tylenol for pain 650 mg.  take oxycodone for breakthrough pain.   Alcoholic cirrhosis, unspecified whether ascites present (H) - please stop alcohol - we can help get you to detox and treatment.  the  alcohol will kill you.   return immed for blood in the stool, black stools. consider upper endoscopy.   Gastroesophageal reflux disease without esophagitis       9/16/2021   [facility] EMERGENCY DEPT     Elvis Esqueda MD  09/16/21 [id number]

## 2021-09-17 NOTE — DISCHARGE INSTRUCTIONS
ICD-10-CM    1. Non-cardiac chest pain  R07.89     NO serious findings on extensive evaluation.  unclear cause of pain.  this may be chest wall.  may be radiatying from the upper abdomen.  recheck within the next few days in clinic.  return here for wrosening.  stay on prilosec.  take tylenol for pain 650 mg.  take oxycodone for breakthrough pain.   2. Alcoholic cirrhosis, unspecified whether ascites present (H)  K70.30     please stop alcohol - we can help get you to detox and treatment.  the  alcohol will kill you.   return immed for blood in the stool, black stools. consider upper endoscopy.   3. Gastroesophageal reflux disease without esophagitis  K21.9

## 2021-09-21 ENCOUNTER — OFFICE VISIT (OUTPATIENT)
Dept: FAMILY MEDICINE | Facility: CLINIC | Age: 76
End: 2021-09-21
Payer: COMMERCIAL

## 2021-09-21 VITALS
OXYGEN SATURATION: 97 % | WEIGHT: 195.2 LBS | SYSTOLIC BLOOD PRESSURE: 126 MMHG | TEMPERATURE: 97.8 F | BODY MASS INDEX: 32.48 KG/M2 | HEART RATE: 70 BPM | DIASTOLIC BLOOD PRESSURE: 80 MMHG

## 2021-09-21 DIAGNOSIS — K70.9 ALCOHOLIC LIVER DAMAGE (H): ICD-10-CM

## 2021-09-21 DIAGNOSIS — F10.10 ALCOHOL ABUSE: ICD-10-CM

## 2021-09-21 DIAGNOSIS — R29.6 RECURRENT FALLS: ICD-10-CM

## 2021-09-21 DIAGNOSIS — K70.30 ALCOHOLIC CIRRHOSIS, UNSPECIFIED WHETHER ASCITES PRESENT (H): ICD-10-CM

## 2021-09-21 DIAGNOSIS — Z23 NEED FOR PROPHYLACTIC VACCINATION AND INOCULATION AGAINST INFLUENZA: ICD-10-CM

## 2021-09-21 DIAGNOSIS — E87.0 HYPERNATREMIA: ICD-10-CM

## 2021-09-21 DIAGNOSIS — R07.89 ATYPICAL CHEST PAIN: Primary | ICD-10-CM

## 2021-09-21 DIAGNOSIS — D69.3 IDIOPATHIC THROMBOCYTOPENIC PURPURA (H): ICD-10-CM

## 2021-09-21 DIAGNOSIS — I51.7 CARDIOMEGALY: ICD-10-CM

## 2021-09-21 DIAGNOSIS — Z78.9 ALCOHOL CONSUPTION OF MORE THAN TWO DRINKS PER DAY: ICD-10-CM

## 2021-09-21 PROCEDURE — 90662 IIV NO PRSV INCREASED AG IM: CPT | Performed by: INTERNAL MEDICINE

## 2021-09-21 PROCEDURE — 99214 OFFICE O/P EST MOD 30 MIN: CPT | Mod: 25 | Performed by: INTERNAL MEDICINE

## 2021-09-21 PROCEDURE — G0008 ADMIN INFLUENZA VIRUS VAC: HCPCS | Performed by: INTERNAL MEDICINE

## 2021-09-21 ASSESSMENT — PATIENT HEALTH QUESTIONNAIRE - PHQ9
10. IF YOU CHECKED OFF ANY PROBLEMS, HOW DIFFICULT HAVE THESE PROBLEMS MADE IT FOR YOU TO DO YOUR WORK, TAKE CARE OF THINGS AT HOME, OR GET ALONG WITH OTHER PEOPLE: VERY DIFFICULT
SUM OF ALL RESPONSES TO PHQ QUESTIONS 1-9: 20
SUM OF ALL RESPONSES TO PHQ QUESTIONS 1-9: 20

## 2021-09-21 ASSESSMENT — PAIN SCALES - GENERAL: PAINLEVEL: SEVERE PAIN (6)

## 2021-09-21 NOTE — PROGRESS NOTES
Assessment and Plan  1. Atypical chest pain  2. Recurrent falls  3. Alcoholic cirrhosis, unspecified whether ascites present (H)  4. Alcohol abuse since teens   5. Alcoholic liver damage (H)  6. Alcohol consuption of more than two drinks per day  7. Cardiomegaly per 2017 CXR   8. Hypernatremia  Recurrnt ER visits with laceration of scalp in 6/2021 , Chest pain on 9/16/21-  ACS ruled out and dischared home. CT chest was done. GIven alcohol cirrhosis and chronic active alcoholism, inpatient rehab was offered which he refused. EGD needs to be emphasized. Lasbs showing hypernatremia , mildly elevated AST and does have 2x/Falls every week.   - Discussed more than 20 minutes only to  him to quit alcohol due to above comorbidities which he finally agreed for the Addiction medicine Mental health services.   - MENTAL HEALTH REFERRAL  - Adult; Addiction Medicine Provider; Detox; Alcohol Detox: No Opiates [phone]; We will contact you to schedule the appointment or please call with any questions; Future  - Basic metabolic panel  (Ca, Cl, CO2, Creat, Gluc, K, Na, BUN); Future    8. ITP (idiopathic thrombocytopenic purpura) since 3-13 back surgery   Uncontrolled, Pt was seen recently by Hemon[facility] for ITP and decision of OAC given pt Hx of DVTs. GIven pt liver disease, he is recommended to continue current Xarelto . PLTS worsened to 68 which was even worser at 30s in the remote past.  - CBC with platelets; Future    9. Need for prophylactic vaccination and inoculation against influenza  - INFLUENZA, QUAD, HIGH DOSE, PF, 65YR + (FLUZONE HD)       Patient Instructions   As discussed , you will need to quit alcohol which is causing all the complications of recurrent falls on your current Xarelto causing risks of Hemorrhage given recent head injury and Chest injury due to alcohol. Thi swill also help in preventing worsening of your liver failure and low platelet count.     Please make LAB only appointment for CBC check  in 1 week since this is recently checked 4 days back.   Please keep up appointment with Addiction medicine on the referral given.     ======================      Patient Education     Alcohol Withdrawal  Alcohol withdrawal usually begins after prolonged heavy drinking, and then you suddenly stop drinking, or cut down on your alcohol use. It is not one thing, but is a complex combination of signs and symptoms that generally occur to together and define a particular problem or condition.    Alcohol withdrawal is potentially life-threatening, and is a medical emergency.    It can start as early as a couple of hours after your last drink, or may take 1 to 3 days to develop.    It can last from days to a week or more.    It can worsen very quickly.  Signs and symptoms  There are several stages of alcohol withdrawal, although they overlap, as do their signs and symptoms. In the earlier stages, it most commonly includes:    Anxiety    Shakiness    Nausea and vomiting    Sweating    Insomnia    Headaches    Fever    Mood swings, irritability, agitation, restlessness  Delirium tremens (DTs)  DTs are a severe and life-threatening complication. If it happens, it usually begins about 3 to 5 days after your last drink. It is potentially life threatening. DTs are characterized by:    Sudden and severe mental or nervous system changes    Uncontrollable tremors    Severe disorientation, confusion, hallucinations    Heart racing, or irregular heartbeat    High blood pressure    Seizures    Possible coma and death  Home care    You will need plenty of rest and fluids over the next several days. Eat regular meals and drink plenty of fluids to prevent dehydration. Do not drink any more alcohol. During this time, it is best that you stay with family or friends who can help and support you. You can also admit yourself to a residential detox program.    Do not drive until all symptoms are gone and you are feeling better. if you've had a  seizure, don't drive until you have been examined by a doctor.    If you were given sedative medication to reduce your symptoms, do not take it more often than prescribed and never take it with alcohol.  Follow-up care  Once you have gone through the withdrawal symptoms, you have fought half of the moralez. To avoid the risk of returning to your previous drinking pattern, it is essential that you get follow-up support and treatment.    Alcoholics Anonymous offers support through a self-help fellowship. There are no dues or fees. Search the internet or go to www.aa.org to find a local meeting place.    [employer] offers support to families of alcohol users. Go to www.al-anon.org    [employer] [place] On Alcoholism And Drug Dependence at 813-549-2911 or www.ncadd.org    Residential alcohol detox programs are available. Search the internet for drug abuse and treatment centers in your area.  Call 911  Call 911 if any of these occur:    Seizure    Trouble breathing or slow, irregular breathing    Chest pain    Sudden weakness on one side of the body or sudden trouble speaking    Heavy bleeding or vomiting blood    Very drowsy or trouble awakening    Fainting or loss of consciousness    Rapid heart rate  When to seek medical advice  Call your healthcare provider right away if any of these occur:    Severe shakiness    Hallucinations    Fever over 100.4  F (38.0  C)    Headache, confusion, extreme drowsiness, inability to awaken    Increasing upper abdominal pain    Repeated vomiting  Biju last reviewed this educational content on 6/1/2018 2000-2021 [employer]. All rights reserved. This information is not intended as a substitute for professional medical care. Always follow your healthcare professional's instructions.             Return in about 3 months (around 12/21/2021), or if symptoms worsen or fail to improve, for Follow up of last visit.    Beatrice Stephens MD  [facility]  ANDRIY Ennis is a 76 year old who presents for the following health issues      HPI     ED/UC Followup:    Facility:  [facility] emergency  Date of visit: 9-16-21  Reason for visit: chest pain  Current Status: pain under armpit, patient did fall 9-15-21     Last seen pt on 5/12/21 for AWV        Allergies   Allergen Reactions     Olmesartan Diarrhea        Past Medical History:   Diagnosis Date     Alcohol abuse since teens 01/15/2015    Still actively drinking     Alcoholic liver damage (H) 1/10/2014     Gastroesophageal reflux disease with esophagitis 12/6/2016     Gout involving toe, unspecified cause, unspecified chronicity, unspecified laterality 6/2/2016     Heart murmur     heart is positioned farther on left side then typical     Hyperlipidemia 12/17/2015     Hypertension      ITP (idiopathic thrombocytopenic purpura)      Obstructive sleep apnea     does not use CPAP  machine     Pulmonary embolism (H) large RLL w infarctio 4-17 4/18/2017       Past Surgical History:   Procedure Laterality Date     APPENDECTOMY  1956     BACK SURGERY  1992, 1996    trimmed L4-L5, Fusion L4-L5     BONE MARROW BIOPSY, BONE SPECIMEN, NEEDLE/TROCAR  10/24/2012    Procedure: BIOPSY BONE MARROW;  BONE MARROW BIOPSY WITH ASPIRATE (AREVALO ORDERING);  Surgeon: Terri Hickey MD;  Location:  GI     COLONOSCOPY N/A 7/31/2019    Procedure: COLONOSCOPY;  Surgeon: Sebastian Garcia MD;  Location:  GI     ESOPHAGOSCOPY, GASTROSCOPY, DUODENOSCOPY (EGD), COMBINED N/A 2/8/2018    Procedure: COMBINED ESOPHAGOSCOPY, GASTROSCOPY, DUODENOSCOPY (EGD);  EGD;  Surgeon: Terri Crouch MD;  Location:  GI     ESOPHAGOSCOPY, GASTROSCOPY, DUODENOSCOPY (EGD), COMBINED N/A 11/10/2020    Procedure: ESOPHAGOGASTRODUODENOSCOPY, WITH BIOPSY;  Surgeon: Alonzo Jang DO;  Location:  GI     FACIAL RECONSTRUCTION SURGERY  1965    jaw fracture     HC TOOTH EXTRACTION W/FORCEP  1990s       Family  History   Problem Relation Age of Onset     Unknown/Adopted Mother      Unknown/Adopted Father      Heart Disease Sister      Diabetes No family hx of      Coronary Artery Disease No family hx of      Hypertension No family hx of      Hyperlipidemia No family hx of      Cerebrovascular Disease No family hx of      Breast Cancer No family hx of      Colon Cancer No family hx of      Prostate Cancer No family hx of      Other Cancer No family hx of      Depression No family hx of      Anxiety Disorder No family hx of      Mental Illness No family hx of      Substance Abuse No family hx of      Anesthesia Reaction No family hx of      Asthma No family hx of      Osteoporosis No family hx of      Genetic Disorder No family hx of      Thyroid Disease No family hx of      Obesity No family hx of        Social History     Tobacco Use     Smoking status: Former Smoker     Packs/day: 1.00     Years: 28.00     Pack years: 28.00     Types: Cigarettes     Start date:      Quit date: 1982     Years since quittin.0     Smokeless tobacco: Former User   Substance Use Topics     Alcohol use: Yes     Alcohol/week: 5.0 standard drinks     Types: 5 Shots of liquor per week     Comment: 4-5 drinks/day, alcohol use disorder        Current Outpatient Medications   Medication     allopurinol (ZYLOPRIM) 300 MG tablet     ASPIRIN NOT PRESCRIBED (INTENTIONAL)     eltrombopag (PROMACTA) 75 MG tablet     folic acid (FOLVITE) 1 MG tablet     gabapentin (NEURONTIN) 300 MG capsule     metoprolol succinate ER (TOPROL-XL) 50 MG 24 hr tablet     omeprazole (PRILOSEC) 40 MG DR capsule     ondansetron (ZOFRAN ODT) 4 MG ODT tab     oxyCODONE (ROXICODONE) 5 MG tablet     rivaroxaban ANTICOAGULANT (XARELTO ANTICOAGULANT) 10 MG TABS tablet     vitamin D2 (ERGOCALCIFEROL) 98459 units (1250 mcg) capsule     No current facility-administered medications for this visit.        Review of Systems   Constitutional, HEENT, cardiovascular, pulmonary,  GI, , musculoskeletal, neuro, skin, endocrine and psych systems are negative, except as otherwise noted.      Objective    /80 (Cuff Size: Adult Large)   Pulse 70   Temp 97.8  F (36.6  C) (Tympanic)   Wt 88.5 kg (195 lb 3.2 oz)   SpO2 97%   BMI 32.48 kg/m    Body mass index is 32.48 kg/m .  Physical Exam   GENERAL: healthy, alert and no distress  NECK: no adenopathy, no asymmetry, masses, or scars and thyroid normal to palpation  RESP: lungs clear to auscultation - no rales, rhonchi or wheezes  CV: regular rate and rhythm, normal S1 S2, no S3 or S4, no murmur, click or rub, no peripheral edema and peripheral pulses strong  ABDOMEN: soft, nontender, no hepatosplenomegaly, no masses and bowel sounds normal  MS: no gross musculoskeletal defects noted, no edema . Does have Ataxia and ambulates with cane.       Answers for HPI/ROS submitted by the patient on 9/21/2021  If you checked off any problems, how difficult have these problems made it for you to do your work, take care of things at home, or get along with other people?: Very difficult  PHQ9 TOTAL SCORE: 20

## 2021-09-21 NOTE — PATIENT INSTRUCTIONS
As discussed , you will need to quit alcohol which is causing all the complications of recurrent falls on your current Xarelto causing risks of Hemorrhage given recent head injury and Chest injury due to alcohol. Thi swill also help in preventing worsening of your liver failure and low platelet count.     Please make LAB only appointment for CBC check in 1 week since this is recently checked 4 days back.   Please keep up appointment with Addiction medicine on the referral given.     ======================      Patient Education     Alcohol Withdrawal  Alcohol withdrawal usually begins after prolonged heavy drinking, and then you suddenly stop drinking, or cut down on your alcohol use. It is not one thing, but is a complex combination of signs and symptoms that generally occur to together and define a particular problem or condition.    Alcohol withdrawal is potentially life-threatening, and is a medical emergency.    It can start as early as a couple of hours after your last drink, or may take 1 to 3 days to develop.    It can last from days to a week or more.    It can worsen very quickly.  Signs and symptoms  There are several stages of alcohol withdrawal, although they overlap, as do their signs and symptoms. In the earlier stages, it most commonly includes:    Anxiety    Shakiness    Nausea and vomiting    Sweating    Insomnia    Headaches    Fever    Mood swings, irritability, agitation, restlessness  Delirium tremens (DTs)  DTs are a severe and life-threatening complication. If it happens, it usually begins about 3 to 5 days after your last drink. It is potentially life threatening. DTs are characterized by:    Sudden and severe mental or nervous system changes    Uncontrollable tremors    Severe disorientation, confusion, hallucinations    Heart racing, or irregular heartbeat    High blood pressure    Seizures    Possible coma and death  Home care    You will need plenty of rest and fluids over the next  several days. Eat regular meals and drink plenty of fluids to prevent dehydration. Do not drink any more alcohol. During this time, it is best that you stay with family or friends who can help and support you. You can also admit yourself to a residential detox program.    Do not drive until all symptoms are gone and you are feeling better. if you've had a seizure, don't drive until you have been examined by a doctor.    If you were given sedative medication to reduce your symptoms, do not take it more often than prescribed and never take it with alcohol.  Follow-up care  Once you have gone through the withdrawal symptoms, you have fought half of the moralez. To avoid the risk of returning to your previous drinking pattern, it is essential that you get follow-up support and treatment.    Alcoholics Anonymous offers support through a self-help fellowship. There are no dues or fees. Search the internet or go to www.aa.org to find a local meeting place.    [place] family groups offers support to families of alcohol users. Go to www.al-anon.org    National [place] On Alcoholism And Drug Dependence at 269-741-8198 or www.ncadd.org    Residential alcohol detox programs are available. Search the internet for drug abuse and treatment centers in your area.  Call 911  Call 911 if any of these occur:    Seizure    Trouble breathing or slow, irregular breathing    Chest pain    Sudden weakness on one side of the body or sudden trouble speaking    Heavy bleeding or vomiting blood    Very drowsy or trouble awakening    Fainting or loss of consciousness    Rapid heart rate  When to seek medical advice  Call your healthcare provider right away if any of these occur:    Severe shakiness    Hallucinations    Fever over 100.4  F (38.0  C)    Headache, confusion, extreme drowsiness, inability to awaken    Increasing upper abdominal pain    Repeated vomiting  Biju last reviewed this educational content on 6/1/2018 2000-2021 [employer]. All rights reserved. This information is not intended as a substitute for professional medical care. Always follow your healthcare professional's instructions.

## 2021-09-27 DIAGNOSIS — F10.10 ALCOHOL ABUSE: ICD-10-CM

## 2021-09-28 ENCOUNTER — LAB (OUTPATIENT)
Dept: LAB | Facility: CLINIC | Age: 76
End: 2021-09-28
Payer: COMMERCIAL

## 2021-09-28 DIAGNOSIS — E87.0 HYPERNATREMIA: ICD-10-CM

## 2021-09-28 DIAGNOSIS — D69.3 IDIOPATHIC THROMBOCYTOPENIC PURPURA (H): ICD-10-CM

## 2021-09-28 LAB
ERYTHROCYTE [DISTWIDTH] IN BLOOD BY AUTOMATED COUNT: 20.6 % (ref 10–15)
HCT VFR BLD AUTO: 45.7 % (ref 40–53)
HGB BLD-MCNC: 15.1 G/DL (ref 13.3–17.7)
MCH RBC QN AUTO: 30.7 PG (ref 26.5–33)
MCHC RBC AUTO-ENTMCNC: 33 G/DL (ref 31.5–36.5)
MCV RBC AUTO: 93 FL (ref 78–100)
PLATELET # BLD AUTO: 78 10E3/UL (ref 150–450)
RBC # BLD AUTO: 4.92 10E6/UL (ref 4.4–5.9)
WBC # BLD AUTO: 7.1 10E3/UL (ref 4–11)

## 2021-09-28 PROCEDURE — 85027 COMPLETE CBC AUTOMATED: CPT

## 2021-09-28 PROCEDURE — 36415 COLL VENOUS BLD VENIPUNCTURE: CPT

## 2021-09-28 PROCEDURE — 80048 BASIC METABOLIC PNL TOTAL CA: CPT

## 2021-09-29 LAB
ANION GAP SERPL CALCULATED.3IONS-SCNC: 7 MMOL/L (ref 3–14)
BUN SERPL-MCNC: 6 MG/DL (ref 7–30)
CALCIUM SERPL-MCNC: 8.6 MG/DL (ref 8.5–10.1)
CHLORIDE BLD-SCNC: 110 MMOL/L (ref 94–109)
CO2 SERPL-SCNC: 28 MMOL/L (ref 20–32)
CREAT SERPL-MCNC: 0.9 MG/DL (ref 0.66–1.25)
GFR SERPL CREATININE-BSD FRML MDRD: 82 ML/MIN/1.73M2
GLUCOSE BLD-MCNC: 100 MG/DL (ref 70–99)
POTASSIUM BLD-SCNC: 3.6 MMOL/L (ref 3.4–5.3)
SODIUM SERPL-SCNC: 145 MMOL/L (ref 133–144)

## 2021-09-29 RX ORDER — FOLIC ACID 1 MG/1
1000 TABLET ORAL DAILY
Qty: 90 TABLET | Refills: 1 | Status: SHIPPED | OUTPATIENT
Start: 2021-09-29 | End: 2022-06-13

## 2021-09-29 NOTE — TELEPHONE ENCOUNTER
Routing refill request to provider for review/approval because:  Routing to PCP to review     Stanley Farah RN  [facility] Triage Nurse

## 2021-10-14 ENCOUNTER — LAB (OUTPATIENT)
Dept: LAB | Facility: CLINIC | Age: 76
End: 2021-10-14
Payer: COMMERCIAL

## 2021-10-14 DIAGNOSIS — D50.0 IRON DEFICIENCY ANEMIA DUE TO CHRONIC BLOOD LOSS: ICD-10-CM

## 2021-10-14 DIAGNOSIS — Z79.01 LONG TERM CURRENT USE OF ANTICOAGULANT THERAPY: ICD-10-CM

## 2021-10-14 DIAGNOSIS — Z86.718 PERSONAL HISTORY OF VENOUS THROMBOSIS AND EMBOLISM: ICD-10-CM

## 2021-10-14 DIAGNOSIS — D69.3 IDIOPATHIC THROMBOCYTOPENIC PURPURA (H): ICD-10-CM

## 2021-10-14 LAB
ALBUMIN SERPL-MCNC: 3.2 G/DL (ref 3.4–5)
ALP SERPL-CCNC: 145 U/L (ref 40–150)
ALT SERPL W P-5'-P-CCNC: 50 U/L (ref 0–70)
ANION GAP SERPL CALCULATED.3IONS-SCNC: 4 MMOL/L (ref 3–14)
AST SERPL W P-5'-P-CCNC: 47 U/L (ref 0–45)
BASOPHILS # BLD AUTO: 0 10E3/UL (ref 0–0.2)
BASOPHILS NFR BLD AUTO: 0 %
BILIRUB SERPL-MCNC: 1.4 MG/DL (ref 0.2–1.3)
BUN SERPL-MCNC: 7 MG/DL (ref 7–30)
CALCIUM SERPL-MCNC: 8.4 MG/DL (ref 8.5–10.1)
CHLORIDE BLD-SCNC: 111 MMOL/L (ref 94–109)
CO2 SERPL-SCNC: 28 MMOL/L (ref 20–32)
CREAT SERPL-MCNC: 0.8 MG/DL (ref 0.66–1.25)
EOSINOPHIL # BLD AUTO: 0.2 10E3/UL (ref 0–0.7)
EOSINOPHIL NFR BLD AUTO: 3 %
ERYTHROCYTE [DISTWIDTH] IN BLOOD BY AUTOMATED COUNT: 18.8 % (ref 10–15)
FERRITIN SERPL-MCNC: 268 NG/ML (ref 26–388)
GFR SERPL CREATININE-BSD FRML MDRD: 86 ML/MIN/1.73M2
GLUCOSE BLD-MCNC: 109 MG/DL (ref 70–99)
HCT VFR BLD AUTO: 47.8 % (ref 40–53)
HGB BLD-MCNC: 16 G/DL (ref 13.3–17.7)
IRON SATN MFR SERPL: 92 % (ref 15–46)
IRON SERPL-MCNC: 341 UG/DL (ref 35–180)
LYMPHOCYTES # BLD AUTO: 1.1 10E3/UL (ref 0.8–5.3)
LYMPHOCYTES NFR BLD AUTO: 16 %
MCH RBC QN AUTO: 31.9 PG (ref 26.5–33)
MCHC RBC AUTO-ENTMCNC: 33.5 G/DL (ref 31.5–36.5)
MCV RBC AUTO: 95 FL (ref 78–100)
MONOCYTES # BLD AUTO: 0.8 10E3/UL (ref 0–1.3)
MONOCYTES NFR BLD AUTO: 11 %
NEUTROPHILS # BLD AUTO: 4.9 10E3/UL (ref 1.6–8.3)
NEUTROPHILS NFR BLD AUTO: 70 %
PLATELET # BLD AUTO: 101 10E3/UL (ref 150–450)
POTASSIUM BLD-SCNC: 4.3 MMOL/L (ref 3.4–5.3)
PROT SERPL-MCNC: 7.4 G/DL (ref 6.8–8.8)
RBC # BLD AUTO: 5.02 10E6/UL (ref 4.4–5.9)
SODIUM SERPL-SCNC: 143 MMOL/L (ref 133–144)
TIBC SERPL-MCNC: 372 UG/DL (ref 240–430)
WBC # BLD AUTO: 7 10E3/UL (ref 4–11)

## 2021-10-14 PROCEDURE — 80053 COMPREHEN METABOLIC PANEL: CPT

## 2021-10-14 PROCEDURE — 36415 COLL VENOUS BLD VENIPUNCTURE: CPT

## 2021-10-14 PROCEDURE — 82728 ASSAY OF FERRITIN: CPT

## 2021-10-14 PROCEDURE — 85025 COMPLETE CBC W/AUTO DIFF WBC: CPT

## 2021-10-14 PROCEDURE — 83550 IRON BINDING TEST: CPT

## 2021-10-19 PROBLEM — F32.9 MAJOR DEPRESSION: Status: ACTIVE | Noted: 2019-12-27

## 2021-10-21 ENCOUNTER — LAB (OUTPATIENT)
Dept: LAB | Facility: CLINIC | Age: 76
End: 2021-10-21

## 2021-10-21 ENCOUNTER — VIRTUAL VISIT (OUTPATIENT)
Dept: ONCOLOGY | Facility: CLINIC | Age: 76
End: 2021-10-21
Attending: INTERNAL MEDICINE
Payer: COMMERCIAL

## 2021-10-21 ENCOUNTER — OFFICE VISIT (OUTPATIENT)
Dept: NEUROLOGY | Facility: CLINIC | Age: 76
End: 2021-10-21
Payer: COMMERCIAL

## 2021-10-21 ENCOUNTER — TELEPHONE (OUTPATIENT)
Dept: PALLIATIVE MEDICINE | Facility: CLINIC | Age: 76
End: 2021-10-21

## 2021-10-21 VITALS
HEART RATE: 72 BPM | DIASTOLIC BLOOD PRESSURE: 84 MMHG | SYSTOLIC BLOOD PRESSURE: 141 MMHG | BODY MASS INDEX: 32.78 KG/M2 | HEIGHT: 66 IN | WEIGHT: 204 LBS

## 2021-10-21 DIAGNOSIS — G89.29 CHRONIC BILATERAL LOW BACK PAIN WITH SCIATICA, SCIATICA LATERALITY UNSPECIFIED: ICD-10-CM

## 2021-10-21 DIAGNOSIS — Z79.01 LONG TERM CURRENT USE OF ANTICOAGULANT THERAPY: ICD-10-CM

## 2021-10-21 DIAGNOSIS — D50.0 IRON DEFICIENCY ANEMIA DUE TO CHRONIC BLOOD LOSS: Primary | ICD-10-CM

## 2021-10-21 DIAGNOSIS — Z20.822 ENCOUNTER FOR LABORATORY TESTING FOR COVID-19 VIRUS: Primary | ICD-10-CM

## 2021-10-21 DIAGNOSIS — R29.898 LEG WEAKNESS, BILATERAL: ICD-10-CM

## 2021-10-21 DIAGNOSIS — M54.40 CHRONIC BILATERAL LOW BACK PAIN WITH SCIATICA, SCIATICA LATERALITY UNSPECIFIED: ICD-10-CM

## 2021-10-21 DIAGNOSIS — Z86.718 PERSONAL HISTORY OF VENOUS THROMBOSIS AND EMBOLISM: ICD-10-CM

## 2021-10-21 DIAGNOSIS — D69.3 IDIOPATHIC THROMBOCYTOPENIC PURPURA (H): ICD-10-CM

## 2021-10-21 DIAGNOSIS — M54.16 LUMBAR RADICULOPATHY: Primary | ICD-10-CM

## 2021-10-21 DIAGNOSIS — M54.16 LUMBAR RADICULOPATHY: ICD-10-CM

## 2021-10-21 DIAGNOSIS — F45.8 NEUROPATHIC PRURITUS: ICD-10-CM

## 2021-10-21 LAB — CK SERPL-CCNC: 63 U/L (ref 30–300)

## 2021-10-21 PROCEDURE — 99442 PR PHYSICIAN TELEPHONE EVALUATION 11-20 MIN: CPT | Performed by: INTERNAL MEDICINE

## 2021-10-21 PROCEDURE — 36415 COLL VENOUS BLD VENIPUNCTURE: CPT

## 2021-10-21 PROCEDURE — 99204 OFFICE O/P NEW MOD 45 MIN: CPT | Performed by: PSYCHIATRY & NEUROLOGY

## 2021-10-21 PROCEDURE — 999N001193 HC VIDEO/TELEPHONE VISIT; NO CHARGE

## 2021-10-21 PROCEDURE — 82550 ASSAY OF CK (CPK): CPT

## 2021-10-21 RX ORDER — GABAPENTIN 300 MG/1
600 CAPSULE ORAL 3 TIMES DAILY
Qty: 180 CAPSULE | Refills: 3 | Status: SHIPPED | OUTPATIENT
Start: 2021-10-21 | End: 2022-01-17

## 2021-10-21 ASSESSMENT — MIFFLIN-ST. JEOR: SCORE: 1593.09

## 2021-10-21 NOTE — PROGRESS NOTES
Raymon is a 76 year old who is being evaluated via a billable video visit.      How would you like to obtain your AVS? Tellyhart  If the video visit is dropped, the invitation should be resent by: Text to cell phone: [phone]  Will anyone else be joining your video visit? Wife, ludy

## 2021-10-21 NOTE — LETTER
10/21/2021         RE: Raymon Ace  [address] [facility] [address]        Dear Colleague,    Thank you for referring your patient, Raymon Ace, to the [facility] NEUROLOG[facility]IN[facility] [place]. Please see a copy of my visit note below.    INITIAL NEUROLOGY CONSULTATION    DATE OF VISIT: 10/21/2021  MRN: [id number]  PATIENT NAME: Raymon Ace  YOB: 1945    REFERRING PROVIDER: No ref. provider found    Chief Complaint   Patient presents with     Leg weakness and numbness       SUBJECTIVE:                                                      HPI:   Raymon Ace is a 76 year old male whom I have been asked to see in consultation for weakness/numbness.  He also follows in rheumatology.     Raymon had an EMG completed in July of this year.  This showed evidence of left S1 radiculopathy with signs of both acute and chronic denervation and old right and left L5 radiculopathy without signs of ongoing or recent denervation.  No evidence of lumbosacral plexopathy or generalized neuropathy.  He also had an MRI completed in June of this year, which showed interbody fusions at L5-S1, L4-L5 and L3-L4, with solid facet joint fusion on the right at L3-4 and no stenosis or impingement.  This study also showed L2-L3 disc degeneration and mild L1-L2 degeneration without stenosis or impingement.    Raymon is here with his wife. Symptoms started a couple of years ago. He is not sure where the pain/discomfort started, but it feels like it affects his entire legs.  He does have some numbness as well.  He describes an itching sensation which he scratches and then creates lesions on his skin.  He has not tried any over-the-counter creams or ointments for this.  They tell me that his spine surgeon Dr. Singh recommended that he see neurology based on the findings on the EMG.    He is on gabapentin 300mg BID, the same dose for quite some time.  He is not sure if this is helpful with his pain. He is not  experiencing any side effects, except he comments that he is tired all the time.      He clarifies that he has had 5 back surgeries, the most recent 3 by Dr. Singh and he has been told everything is intact.  No further surgeries are recommended.  They have not talked about doing injections in the lower back.    He has not noticed any problems with use of his hands, weakness or numbness/tingling.  He has not had any neck surgeries.  He is not having problems controlling his bladder or bowels.    He has trouble getting in and out of chairs, going up and down stairs.  He uses a cane to ambulate for balance.    He admits to drinking several alcoholic beverages daily.  He drinks at the lesion almost every day and then has some beverages at home as well for life.  He has been drinking a lot for quite some time.  He is not diabetic.  Recent AST was mildly elevated at 47.    Past Medical History:   Diagnosis Date     Alcohol abuse since teens 01/15/2015    Still actively drinking     Alcoholic liver damage (H) 1/10/2014     Gastroesophageal reflux disease with esophagitis 12/6/2016     Gout involving toe, unspecified cause, unspecified chronicity, unspecified laterality 6/2/2016     Heart murmur     heart is positioned farther on left side then typical     Hyperlipidemia 12/17/2015     Hypertension      ITP (idiopathic thrombocytopenic purpura)      Obstructive sleep apnea     does not use CPAP  machine     Pulmonary embolism (H) large RLL w infarctio 4-17 4/18/2017     Past Surgical History:   Procedure Laterality Date     APPENDECTOMY  1956     BACK SURGERY  1992, 1996    trimmed L4-L5, Fusion L4-L5     BONE MARROW BIOPSY, BONE SPECIMEN, NEEDLE/TROCAR  10/24/2012    Procedure: BIOPSY BONE MARROW;  BONE MARROW BIOPSY WITH ASPIRATE (AREVALO ORDERING);  Surgeon: Terri Hickey MD;  Location:  GI     COLONOSCOPY N/A 7/31/2019    Procedure: COLONOSCOPY;  Surgeon: Sebastian Garcia MD;  Location:  GI      ESOPHAGOSCOPY, GASTROSCOPY, DUODENOSCOPY (EGD), COMBINED N/A 2/8/2018    Procedure: COMBINED ESOPHAGOSCOPY, GASTROSCOPY, DUODENOSCOPY (EGD);  EGD;  Surgeon: Terri Crouch MD;  Location:  GI     ESOPHAGOSCOPY, GASTROSCOPY, DUODENOSCOPY (EGD), COMBINED N/A 11/10/2020    Procedure: ESOPHAGOGASTRODUODENOSCOPY, WITH BIOPSY;  Surgeon: Alonzo Jang DO;  Location:  GI     FACIAL RECONSTRUCTION SURGERY  1965    jaw fracture     HC TOOTH EXTRACTION W/FORCEP  1990s       allopurinol (ZYLOPRIM) 300 MG tablet, Take 1 tablet (300 mg) by mouth daily  eltrombopag (PROMACTA) 75 MG tablet, Take 1 tablet (75 mg) by mouth daily Administer on an empty stomach, 1 hour before or 2 hours after a meal.  folic acid (FOLVITE) 1 MG tablet, Take 1 tablet (1,000 mcg) by mouth daily  metoprolol succinate ER (TOPROL-XL) 50 MG 24 hr tablet, Take 1 tablet (50 mg) by mouth daily  omeprazole (PRILOSEC) 40 MG DR capsule, TAKE 1 CAPSULE BY MOUTH ONCE DAILY.  rivaroxaban ANTICOAGULANT (XARELTO ANTICOAGULANT) 10 MG TABS tablet, TAKE 1 TABLET BY MOUTH ONCE DAILY WITH  DINNER  vitamin D2 (ERGOCALCIFEROL) 97537 units (1250 mcg) capsule, Take 1 capsule (50,000 Units) by mouth once a week  ASPIRIN NOT PRESCRIBED (INTENTIONAL), Please choose reason not prescribed, below (Patient not taking: Reported on 10/21/2021)  ondansetron (ZOFRAN ODT) 4 MG ODT tab, Take 1-2 tablets (4-8 mg) by mouth every 8 hours as needed for nausea (Patient not taking: Reported on 10/21/2021)  oxyCODONE (ROXICODONE) 5 MG tablet, Take 1 tablet (5 mg) by mouth every 6 hours as needed (Patient not taking: Reported on 10/21/2021)    No current facility-administered medications on file prior to visit.    Allergies   Allergen Reactions     Olmesartan Diarrhea        Problem (# of Occurrences) Relation (Name,Age of Onset)    Heart Disease (1) Sister    Unknown/Adopted (2) Mother, Father       Negative family history of: Diabetes, Coronary Artery Disease, Hypertension,  "Hyperlipidemia, Cerebrovascular Disease, Breast Cancer, Colon Cancer, Prostate Cancer, Other Cancer, Depression, Anxiety Disorder, Mental Illness, Substance Abuse, Anesthesia Reaction, Asthma, Osteoporosis, Genetic Disorder, Thyroid Disease, Obesity        Social History     Tobacco Use     Smoking status: Former Smoker     Packs/day: 1.00     Years: 28.00     Pack years: 28.00     Types: Cigarettes     Start date:      Quit date: 1982     Years since quittin.0     Smokeless tobacco: Former User   Substance Use Topics     Alcohol use: Yes     Alcohol/week: 5.0 standard drinks     Types: 5 Shots of liquor per week     Comment: 4-5 drinks/day, alcohol use disorder     Drug use: No       REVIEW OF SYSTEMS:                                                      10-point review of systems is negative except as mentioned above in HPI.     EXAM:                                                      Physical Exam:   Vitals: BP (!) 141/84 (BP Location: Right arm)   Pulse 72   Ht 1.676 m (5' 6\")   Wt 92.5 kg (204 lb)   BMI 32.93 kg/m    BMI= Body mass index is 32.93 kg/m .  GENERAL: NAD.  HEENT: NC/AT.   CV: RRR. S1, S2.   NECK: No bruits.  PULM: Non-labored breathing.   Neurologic:  MENTAL STATUS: Alert, attentive. Speech is fluent. Normal comprehension.  Normal concentration. Adequate fund of knowledge.   CRANIAL NERVES: Discs flat. Visual fields intact to confrontation. Pupils equally, round and reactive to light. Facial sensation and movement normal. EOM full. Hearing intact to conversation. Sternocleidomastoids and trapezius strength intact. Palate moves symmetrically. Tongue midline.  MOTOR: Slight weakness with hip flexion bilaterally, otherwise his strength appears normal. Tone normal.  Thighs appear atrophic.  DTRs: Intact and symmetric in biceps, BR.  Cannot elicit patellar or Achilles reflexes.  Babinski equivocal bilaterally.   SENSATION: Normal light touch and pinprick in hands, pinprick reportedly " decreased at the knees. Intact proprioception at great toes. Vibration: Normal at both ankles.   COORDINATION: Normal finger nose finger. Finger tapping normal.   STATION AND GAIT: Romberg negative. Good postural reflexes. Casual gait is slightly antalgic.  Right hand-dominant.    Relevant Data:  Image and EMG reports reviewed by me.  The images are not available for personal review.      ASSESSMENT and PLAN:                                                      Assessment:       ICD-10-CM    1. Lumbar radiculopathy  M54.16 Pain Management Referral     CK total   2. Neuropathic pruritus  F45.8 Adult Dermatology Referral   3. Chronic bilateral low back pain with sciatica, sciatica laterality unspecified  M54.40 gabapentin (NEURONTIN) 300 MG capsule    G89.29 CK total   4. Leg weakness, bilateral  R29.898         Mr. Db carter 76-year-old man here to establish care for leg weakness/paresthesias.  I reviewed the recent diagnostic studies, which really point to radicular cause for his symptoms.  He does not have evidence of a generalized polyneuropathy or plexopathy EMG.  It seems like pain and pruritus are his biggest problems, holding him back from being fully mobile, so he is possibly decompensated.  Objectively, his strength exam is almost entirely normal.  He does have decreased reflexes in the lower extremities, which I attribute to his prior lumbar surgeries.  We will try symptomatic management by increasing his gabapentin and then I am sending him for consultation with the pain clinic for possible epidural injections.  I am hopeful that once his pain is under control, will be able to be more active and noticed improvement of his strength.    I am also thinking of sending him to physical therapy if need be once his pain is under control.    I would like to see Raymon polanco in clinic in a few months.  He understands and agrees with the plan.    Plan:  -- Labs: Muscle enzyme CK.  We will notify you of the  results.  -- For the leg discomfort, I recommend we increase her gabapentin to 600mg (2 capsules) twice daily for 1 week, and then increase further by adding a midday third dose (also 600mg).  -- I also recommend you see a Pain specialist for epidural injections. I think this will help calm the pinched nerves and relieve some of your pain.  -- You could try hydrocortisone cream on the legs for the itching. If this does not help with the itching sensation, I recommend consultation with Dermatology.  I have put in the referral in case you need this.   -- Try cutting back on your alcohol intake as we discussed.  -- Return to Neurology in 3-4 months.     Total Time: 45 minutes were spent with the patient and in chart review/documentation (as described above in Assessment and Plan) /coordinating the care on date of service.    Maria Espino MD  Neurology    CC: Beatrice Stephens MD    Dragon software used in the dictation of this note.        Again, thank you for allowing me to participate in the care of your patient.        Sincerely,        Maria Espino MD

## 2021-10-21 NOTE — LETTER
10/21/2021         RE: Raymon Ace  [address] [facility] [address]        Dear Colleague,    Thank you for referring your patient, Raymon Ace, to the [facility]. Please see a copy of my visit note below.    Raymon is a 76 year old who is being evaluated via a billable video visit.      How would you like to obtain your AVS? MyChart  If the video visit is dropped, the invitation should be resent by: Text to cell phone: [phone]  Will anyone else be joining your video visit? Wife, ludy         HEMATOLOGY CLINIC VISIT    NOTE:  Due to the ongoing COVID-19 outbreak, this visit was conducted by telephone, with the patient's approval.  Note that the visit was initially scheduled be done by video, but converted to telephone at the patient's request.      Raymon is a 76-year-old male with chronic ITP and a history of unprovoked pulmonary embolism in April 2017 for which he is maintained on long-term anticoagulation.  He has a history of recurrent iron deficiency anemia felt secondary to chronic occult blood loss from portal hypertensive gastropathy and hemorrhoids.  He also has underlying alcoholic liver disease.  He was scheduled today for routine follow-up visit.     He continues to struggle with back pain and lower extremity weakness.  He has had several back surgeries.  He was seen by a neurologist earlier today with regard to the lower extremity issues.  Please see that note for details.    He remains on Promacta 75 mg daily for chronic ITP.  He is tolerating the medication without any perceived side effects.  He also remains on prophylactic intensity anticoagulation with rivaroxaban.  He denies any bleeding issues aside from easy bruising which seems to be less of an issue recently and has been in the past.  He denies any blood in his stools and most recently had upper and lower endoscopies performed in November 2020.    He last received IV iron in late August / early  September 2021.  Prior to that he was treated in January 2020.  He states that he feels tired all the time, and this is his primary complaint again today.  He does not feel much different after the iron infusions.    They are planning to spend part of the winter in [place] again.  They will be back in [place] in late December through January, and then return to [place] again in April.      Physical exam:  Not done, telephone visit.    Labs:  Most recent labs are from October 14 , 2021.  White count 7.0 with a normal differential, hemoglobin 16.0, MCV 95, platelet count 101,000.    Comprehensive metabolic panel showed a creatinine of 0.8.  LFTs are remarkable for mildly elevated total bilirubin of 1.4 and a mildly elevated AST of 47.  Serum albumin slightly low at 3.2.    Iron panel shows a serum ferritin of 268.      ASSESSMENT / PLAN:  1.  Chronic ITP -- Raymon's baseline platelet count has been in the 70,000 to 100,000 range over the last 5 years.  He remains on Promacta 75 mg daily.  He is responsive to pulse dexamethasone and we have used this to increase his platelet count prior to surgeries.  At the present time, his platelet count is within his usual baseline.  Given that he is on long-term anticoagulation, the goal is to maintain his platelet count consistently above 50,000.    2. History of unprovoked pulmonary embolism (April 2017) -- He has done well on long-term anticoagulation with rivaroxaban, which he should continue.  He is on the prophylactic dose of 10 mg daily due to a combination of mild intermittent renal insufficiency and underlying liver disease.  We will make no change in this part of his treatment plan today.    3.  Iron deficiency anemia -- This has been attributed to to chronic occult blood loss from portal hypertensive gastropathy, and hemorrhoids.  He denies any new bleeding symptoms or any obvious signs of GI bleeding, and had upper and lower endoscopies in the fall 2020.  He received  IV iron again in late August / early September and is currently iron replete.      We will have labs checked when he is back in [place] around the holidays, and plan for return clinic visit in April or May.  In the meantime, he will call with any new questions or concerns.    Total time 30 minutes, including review of medical records and labs, telephone visit (20 minutes), and documentation.      Waylon Novoa MD  Professor of Medicine  Division of Hematology, Oncology, and Transplantation  Director, [facility]                    Again, thank you for allowing me to participate in the care of your patient.        Sincerely,        Waylon Novoa MD

## 2021-10-21 NOTE — PROGRESS NOTES
HEMATOLOGY CLINIC VISIT    NOTE:  Due to the ongoing COVID-19 outbreak, this visit was conducted by telephone, with the patient's approval.  Note that the visit was initially scheduled be done by video, but converted to telephone at the patient's request.      Raymon is a 76-year-old male with chronic ITP and a history of unprovoked pulmonary embolism in April 2017 for which he is maintained on long-term anticoagulation.  He has a history of recurrent iron deficiency anemia felt secondary to chronic occult blood loss from portal hypertensive gastropathy and hemorrhoids.  He also has underlying alcoholic liver disease.  He was scheduled today for routine follow-up visit.     He continues to struggle with back pain and lower extremity weakness.  He has had several back surgeries.  He was seen by a neurologist earlier today with regard to the lower extremity issues.  Please see that note for details.    He remains on Promacta 75 mg daily for chronic ITP.  He is tolerating the medication without any perceived side effects.  He also remains on prophylactic intensity anticoagulation with rivaroxaban.  He denies any bleeding issues aside from easy bruising which seems to be less of an issue recently and has been in the past.  He denies any blood in his stools and most recently had upper and lower endoscopies performed in November 2020.    He last received IV iron in late August / early September 2021.  Prior to that he was treated in January 2020.  He states that he feels tired all the time, and this is his primary complaint again today.  He does not feel much different after the iron infusions.    They are planning to spend part of the winter in [place] again.  They will be back in [place] in late December through January, and then return to [place] again in April.      Physical exam:  Not done, telephone visit.    Labs:  Most recent labs are from October 14 , 2021.  White count 7.0 with a normal differential, hemoglobin  16.0, MCV 95, platelet count 101,000.    Comprehensive metabolic panel showed a creatinine of 0.8.  LFTs are remarkable for mildly elevated total bilirubin of 1.4 and a mildly elevated AST of 47.  Serum albumin slightly low at 3.2.    Iron panel shows a serum ferritin of 268.      ASSESSMENT / PLAN:  1.  Chronic ITP -- Raymon's baseline platelet count has been in the 70,000 to 100,000 range over the last 5 years.  He remains on Promacta 75 mg daily.  He is responsive to pulse dexamethasone and we have used this to increase his platelet count prior to surgeries.  At the present time, his platelet count is within his usual baseline.  Given that he is on long-term anticoagulation, the goal is to maintain his platelet count consistently above 50,000.    2. History of unprovoked pulmonary embolism (April 2017) -- He has done well on long-term anticoagulation with rivaroxaban, which he should continue.  He is on the prophylactic dose of 10 mg daily due to a combination of mild intermittent renal insufficiency and underlying liver disease.  We will make no change in this part of his treatment plan today.    3.  Iron deficiency anemia -- This has been attributed to to chronic occult blood loss from portal hypertensive gastropathy, and hemorrhoids.  He denies any new bleeding symptoms or any obvious signs of GI bleeding, and had upper and lower endoscopies in the fall 2020.  He received IV iron again in late August / early September and is currently iron replete.      We will have labs checked when he is back in [place] around the holidays, and plan for return clinic visit in April or May.  In the meantime, he will call with any new questions or concerns.    Total time 30 minutes, including review of medical records and labs, telephone visit (20 minutes), and documentation.      Waylon Novoa MD  Professor of Medicine  Division of Hematology, Oncology, and Transplantation  Director, [facility]

## 2021-10-21 NOTE — NURSING NOTE
Chief Complaint   Patient presents with     Leg weakness and numbness   Raymon is here today with his wife.  Angelina Zamora RN on 10/21/2021 at 2:23 PM

## 2021-10-21 NOTE — TELEPHONE ENCOUNTER
Order received for Injection to be Determined by Pain Management Specialist.    Routing to review.    Zhane BUTLER    [facility]

## 2021-10-21 NOTE — PROGRESS NOTES
INITIAL NEUROLOGY CONSULTATION    DATE OF VISIT: 10/21/2021  MRN: [id number]  PATIENT NAME: Raymon Ace  YOB: 1945    REFERRING PROVIDER: No ref. provider found    Chief Complaint   Patient presents with     Leg weakness and numbness       SUBJECTIVE:                                                      HPI:   Raymon Ace is a 76 year old male whom I have been asked to see in consultation for weakness/numbness.  He also follows in rheumatology.     Raymon had an EMG completed in July of this year.  This showed evidence of left S1 radiculopathy with signs of both acute and chronic denervation and old right and left L5 radiculopathy without signs of ongoing or recent denervation.  No evidence of lumbosacral plexopathy or generalized neuropathy.  He also had an MRI completed in June of this year, which showed interbody fusions at L5-S1, L4-L5 and L3-L4, with solid facet joint fusion on the right at L3-4 and no stenosis or impingement.  This study also showed L2-L3 disc degeneration and mild L1-L2 degeneration without stenosis or impingement.    Raymon is here with his wife. Symptoms started a couple of years ago. He is not sure where the pain/discomfort started, but it feels like it affects his entire legs.  He does have some numbness as well.  He describes an itching sensation which he scratches and then creates lesions on his skin.  He has not tried any over-the-counter creams or ointments for this.  They tell me that his spine surgeon Dr. Singh recommended that he see neurology based on the findings on the EMG.    He is on gabapentin 300mg BID, the same dose for quite some time.  He is not sure if this is helpful with his pain. He is not experiencing any side effects, except he comments that he is tired all the time.      He clarifies that he has had 5 back surgeries, the most recent 3 by Dr. Singh and he has been told everything is intact.  No further surgeries are recommended.  They have not  talked about doing injections in the lower back.    He has not noticed any problems with use of his hands, weakness or numbness/tingling.  He has not had any neck surgeries.  He is not having problems controlling his bladder or bowels.    He has trouble getting in and out of chairs, going up and down stairs.  He uses a cane to ambulate for balance.    He admits to drinking several alcoholic beverages daily.  He drinks at the lesion almost every day and then has some beverages at home as well for life.  He has been drinking a lot for quite some time.  He is not diabetic.  Recent AST was mildly elevated at 47.    Past Medical History:   Diagnosis Date     Alcohol abuse since teens 01/15/2015    Still actively drinking     Alcoholic liver damage (H) 1/10/2014     Gastroesophageal reflux disease with esophagitis 12/6/2016     Gout involving toe, unspecified cause, unspecified chronicity, unspecified laterality 6/2/2016     Heart murmur     heart is positioned farther on left side then typical     Hyperlipidemia 12/17/2015     Hypertension      ITP (idiopathic thrombocytopenic purpura)      Obstructive sleep apnea     does not use CPAP  machine     Pulmonary embolism (H) large RLL w infarctio 4-17 4/18/2017     Past Surgical History:   Procedure Laterality Date     APPENDECTOMY  1956     BACK SURGERY  1992, 1996    trimmed L4-L5, Fusion L4-L5     BONE MARROW BIOPSY, BONE SPECIMEN, NEEDLE/TROCAR  10/24/2012    Procedure: BIOPSY BONE MARROW;  BONE MARROW BIOPSY WITH ASPIRATE (AREVALO ORDERING);  Surgeon: Terri Hickey MD;  Location:  GI     COLONOSCOPY N/A 7/31/2019    Procedure: COLONOSCOPY;  Surgeon: Sebastian Garcia MD;  Location:  GI     ESOPHAGOSCOPY, GASTROSCOPY, DUODENOSCOPY (EGD), COMBINED N/A 2/8/2018    Procedure: COMBINED ESOPHAGOSCOPY, GASTROSCOPY, DUODENOSCOPY (EGD);  EGD;  Surgeon: Terri Crouch MD;  Location:  GI     ESOPHAGOSCOPY, GASTROSCOPY, DUODENOSCOPY (EGD), COMBINED N/A  11/10/2020    Procedure: ESOPHAGOGASTRODUODENOSCOPY, WITH BIOPSY;  Surgeon: Alonzo Jang DO;  Location:  GI     FACIAL RECONSTRUCTION SURGERY  1965    jaw fracture     HC TOOTH EXTRACTION W/FORCEP  1990s       allopurinol (ZYLOPRIM) 300 MG tablet, Take 1 tablet (300 mg) by mouth daily  eltrombopag (PROMACTA) 75 MG tablet, Take 1 tablet (75 mg) by mouth daily Administer on an empty stomach, 1 hour before or 2 hours after a meal.  folic acid (FOLVITE) 1 MG tablet, Take 1 tablet (1,000 mcg) by mouth daily  metoprolol succinate ER (TOPROL-XL) 50 MG 24 hr tablet, Take 1 tablet (50 mg) by mouth daily  omeprazole (PRILOSEC) 40 MG DR capsule, TAKE 1 CAPSULE BY MOUTH ONCE DAILY.  rivaroxaban ANTICOAGULANT (XARELTO ANTICOAGULANT) 10 MG TABS tablet, TAKE 1 TABLET BY MOUTH ONCE DAILY WITH  DINNER  vitamin D2 (ERGOCALCIFEROL) 77148 units (1250 mcg) capsule, Take 1 capsule (50,000 Units) by mouth once a week  ASPIRIN NOT PRESCRIBED (INTENTIONAL), Please choose reason not prescribed, below (Patient not taking: Reported on 10/21/2021)  ondansetron (ZOFRAN ODT) 4 MG ODT tab, Take 1-2 tablets (4-8 mg) by mouth every 8 hours as needed for nausea (Patient not taking: Reported on 10/21/2021)  oxyCODONE (ROXICODONE) 5 MG tablet, Take 1 tablet (5 mg) by mouth every 6 hours as needed (Patient not taking: Reported on 10/21/2021)    No current facility-administered medications on file prior to visit.    Allergies   Allergen Reactions     Olmesartan Diarrhea        Problem (# of Occurrences) Relation (Name,Age of Onset)    Heart Disease (1) Sister    Unknown/Adopted (2) Mother, Father       Negative family history of: Diabetes, Coronary Artery Disease, Hypertension, Hyperlipidemia, Cerebrovascular Disease, Breast Cancer, Colon Cancer, Prostate Cancer, Other Cancer, Depression, Anxiety Disorder, Mental Illness, Substance Abuse, Anesthesia Reaction, Asthma, Osteoporosis, Genetic Disorder, Thyroid Disease, Obesity        Social History  "    Tobacco Use     Smoking status: Former Smoker     Packs/day: 1.00     Years: 28.00     Pack years: 28.00     Types: Cigarettes     Start date:      Quit date: 1982     Years since quittin.0     Smokeless tobacco: Former User   Substance Use Topics     Alcohol use: Yes     Alcohol/week: 5.0 standard drinks     Types: 5 Shots of liquor per week     Comment: 4-5 drinks/day, alcohol use disorder     Drug use: No       REVIEW OF SYSTEMS:                                                      10-point review of systems is negative except as mentioned above in HPI.     EXAM:                                                      Physical Exam:   Vitals: BP (!) 141/84 (BP Location: Right arm)   Pulse 72   Ht 1.676 m (5' 6\")   Wt 92.5 kg (204 lb)   BMI 32.93 kg/m    BMI= Body mass index is 32.93 kg/m .  GENERAL: NAD.  HEENT: NC/AT.   CV: RRR. S1, S2.   NECK: No bruits.  PULM: Non-labored breathing.   Neurologic:  MENTAL STATUS: Alert, attentive. Speech is fluent. Normal comprehension.  Normal concentration. Adequate fund of knowledge.   CRANIAL NERVES: Discs flat. Visual fields intact to confrontation. Pupils equally, round and reactive to light. Facial sensation and movement normal. EOM full. Hearing intact to conversation. Sternocleidomastoids and trapezius strength intact. Palate moves symmetrically. Tongue midline.  MOTOR: Slight weakness with hip flexion bilaterally, otherwise his strength appears normal. Tone normal.  Thighs appear atrophic.  DTRs: Intact and symmetric in biceps, BR.  Cannot elicit patellar or Achilles reflexes.  Babinski equivocal bilaterally.   SENSATION: Normal light touch and pinprick in hands, pinprick reportedly decreased at the knees. Intact proprioception at great toes. Vibration: Normal at both ankles.   COORDINATION: Normal finger nose finger. Finger tapping normal.   STATION AND GAIT: Romberg negative. Good postural reflexes. Casual gait is slightly antalgic.  Right " hand-dominant.    Relevant Data:  Image and EMG reports reviewed by me.  The images are not available for personal review.      ASSESSMENT and PLAN:                                                      Assessment:       ICD-10-CM    1. Lumbar radiculopathy  M54.16 Pain Management Referral     CK total   2. Neuropathic pruritus  F45.8 Adult Dermatology Referral   3. Chronic bilateral low back pain with sciatica, sciatica laterality unspecified  M54.40 gabapentin (NEURONTIN) 300 MG capsule    G89.29 CK total   4. Leg weakness, bilateral  R29.898         Mr. Db carter 76-year-old man here to establish care for leg weakness/paresthesias.  I reviewed the recent diagnostic studies, which really point to radicular cause for his symptoms.  He does not have evidence of a generalized polyneuropathy or plexopathy EMG.  It seems like pain and pruritus are his biggest problems, holding him back from being fully mobile, so he is possibly decompensated.  Objectively, his strength exam is almost entirely normal.  He does have decreased reflexes in the lower extremities, which I attribute to his prior lumbar surgeries.  We will try symptomatic management by increasing his gabapentin and then I am sending him for consultation with the pain clinic for possible epidural injections.  I am hopeful that once his pain is under control, will be able to be more active and noticed improvement of his strength.    I am also thinking of sending him to physical therapy if need be once his pain is under control.    I would like to see Raymon polanco in clinic in a few months.  He understands and agrees with the plan.    Plan:  -- Labs: Muscle enzyme CK.  We will notify you of the results.  -- For the leg discomfort, I recommend we increase her gabapentin to 600mg (2 capsules) twice daily for 1 week, and then increase further by adding a midday third dose (also 600mg).  -- I also recommend you see a Pain specialist for epidural injections. I  think this will help calm the pinched nerves and relieve some of your pain.  -- You could try hydrocortisone cream on the legs for the itching. If this does not help with the itching sensation, I recommend consultation with Dermatology.  I have put in the referral in case you need this.   -- Try cutting back on your alcohol intake as we discussed.  -- Return to Neurology in 3-4 months.     Total Time: 45 minutes were spent with the patient and in chart review/documentation (as described above in Assessment and Plan) /coordinating the care on date of service.    Maria Espino MD  Neurology    CC: Beatrice Stephens MD    Dragon software used in the dictation of this note.

## 2021-10-21 NOTE — PATIENT INSTRUCTIONS
Plan:  -- Labs: Muscle enzyme CK.  We will notify you of the results.  -- For the leg discomfort, I recommend we increase her gabapentin to 600 mg (2 capsules) twice daily for 1 week, and then increase further by adding a midday third dose (also 600mg).  -- I also recommend you see a Pain specialist for epidural injections. I think this will help calm the pinched nerves and relieve some of your pain.  -- You could try hydrocortisone cream on the legs for the itching. If this does not help with the itching sensation, I recommend consultation with Dermatology.  I have put in the referral in case you need this.   -- Try cutting back on your alcohol intake as we discussed.  -- Return to Neurology in 3-4 months.

## 2021-10-22 ENCOUNTER — TELEPHONE (OUTPATIENT)
Dept: NEUROLOGY | Facility: CLINIC | Age: 76
End: 2021-10-22

## 2021-10-22 NOTE — TELEPHONE ENCOUNTER
Raymon informed per Dr. Espino that his muscle enzyme level came back normal.  Raymon does not have questions at this time.  I asked him to let us know if he needs anything.  Angelina Zamora RN on 10/22/2021 at 2:17 PM

## 2021-10-22 NOTE — TELEPHONE ENCOUNTER
Routing to provider as FYI. Imaging to be in system shortly.       C2C with spouse, reached on alt #. Confirms MRI x2 at [facility] 2020 and 2021.      Will facilitate imaging push to PACS.    Lynette GOULD RN Care Coordinator  [facility]

## 2021-10-22 NOTE — TELEPHONE ENCOUNTER
----- Message from Maria Espino MD sent at 10/22/2021  9:47 AM CDT -----  Please let Raymon know that his muscle enzyme level came back normal.    Thank you,Dr. Espino

## 2021-10-22 NOTE — TELEPHONE ENCOUNTER
I do not see any recent Lumbar MRIs in our system or in care everywhere. Please ask patient where they had their last MRI and when it was completed. If not within the last 3 years, would recommend getting an updated MRI as they've had multiple level lumbar decompression/fusion surgeries.    Roque Jamison DO  [facility]

## 2021-10-25 NOTE — TELEPHONE ENCOUNTER
Imaging reviewed on [facility] website. Recommend a caudal shubham.    Roque Jamison [facility]

## 2021-10-26 NOTE — TELEPHONE ENCOUNTER
Patient is requesting to schedule an injection with the [facility].     This would require the patient to hold:               Rivaroxagan (Xarelto)     Stop 3 days prior to procedure    Restart 24 hours after procedure    We are requesting your approval to hold the medication for this time frame.    Please keep call open and route back to the PAIN NURSE [[id number]] pool, Pt will be scheduled by Pain clinic after hold approved.    Routed to Waylon Novoa MD, Hematology    Loren, RN-BSN  [facility]

## 2021-10-26 NOTE — TELEPHONE ENCOUNTER
Screening Questions for Radiology Injections:    Injection to be done at which interventional clinic site? [facility]    If [facility] location, tell patient that this procedure requires a COVID-19 lab test be done within 4 days of the procedure. Would you still like to move forward with scheduling the procedure?  YES:    If YES, let patient know that someone will call them to schedule the COVID-19 test and that they will only receive a call back if the result is positive. Route to nursing to enter order.     Instruct patient to arrive as directed prior to the scheduled appointment time:    [place] minutes before      [place]: 30 minutes before; if IV needed 1 hour before     Procedure ordered by Moraima    Procedure ordered? caudal shubham      Transforaminal Cervical SHUBHAM -  [place] does NOT have providers that do these- [facility] and [facility] do have providers that do    As a reminder, receiving steroids can decrease your body's ability to fight infection.   Would you still like to move forward with scheduling the injection?  Yes    What insurance would patient like us to bill for this procedure? [facility] Medicare      Worker's comp or MVA (motor vehicle accident) -Any injection DO NOT SCHEDULE and route to Margo Lu.      [employer] insurance - For SI joint injections, DO NOT SCHEDULE and route Margo Lu.       ALL BCBS, [employer] and [employer]-Route to Margo for review DO NOT SCHEDULE      IF SCHEDULING IN [place] AND NEEDS A PA, IT IS OKAY TO SCHEDULE. [place] HANDLES THEIR OWN PA'S AFTER THE PATIENT IS SCHEDULED. PLEASE SCHEDULE AT LEAST 1 WEEK OUT SO A PA CAN BE OBTAINED.    Any chance of pregnancy? Not Applicable   If YES, do NOT schedule and route to RN pool    Is an  needed? No     Patient has a drive home? (mandatory) YES: INFORMED    Is patient taking any blood thinners (That is: Coumadin, Warfarin, Jantoven, Pradaxa Xarelto, Eliquis, Edoxaban, Enoxaparin, Lovenox,  Heparin, Arixtra, Fondaparinux, or Fragmin? OR Antiplatelet medication such as Plavix, Brilinta, or Effient? )? Yes - xarelto   If hold needed, do NOT schedule, route to RN pool     Is patient taking any aspirin products (includes Excedrin and Fiorinal)? No     If more than 325mg/day, OK to schedule; Instruct pt to decrease to less than 325 mg for 7 days AND route to RN pool    For CERVICAL procedures, hold all aspirin products for 6 days.     Tell pt that if aspirin product is not held for 6 days, the procedure WILL BE cancelled.      Does the patient have a bleeding or clotting disorder? No     If YES, okay to schedule AND route to RN nurse pool    For any patients with platelet count <100, must be forwarded to provider    Any allergies to contrast dye, iodine, shellfish, or numbing and steroid medications? No    If YES, add allergy information to appointment notes AND route to the RN pool     If ABUNDIO and Contrast Dye / Iodine Allergy? DO NOT SCHEDULE, route to RN pool    Allergies: Olmesartan     Is patient diabetic?  No  If YES, instruct them to bring their glucometer.    Does patient have an active infection or treated for one within the past week? No     Is patient currently taking any antibiotics?  No     For patients on chronic, preventative, or prophylactic antibiotics, procedures may be scheduled.     For patients on antibiotics for active or recent infection:antibiotic course must have been completed for 4 days    Is patient currently taking any steroid medications? (i.e. Prednisone, Medrol)  No     For patients on steroid medications, course must have been completed for 4 days    Is patient actively being treated for cancer or immunocompromised? No  If YES, do NOT schedule and route to RN pool     Are you able to get on and off an exam table with minimal or no assistance? Yes  If NO, do NOT schedule and route to RN pool    Are you able to roll over and lay on your stomach with minimal or no assistance?  Yes  If NO, do NOT schedule and route to RN pool     Has the patient had a flu shot or any other vaccinations within 7 days before or after the procedure.  No     Have you recently had a COVID vaccine or have plans to get it in the near future? No    If yes, explain that for the vaccine to work best they need to:       wait 1 week before and 1 week after getting Vaccine #1    wait 1 week before and 2 weeks after getting Vaccine #2    wait 1 week before and 2 weeks after getting Vaccine BOOSTER    If patient has concerns about the timing, send to RN pool     Does patient have an MRI/CT?  YES: 2021  Check Procedure Scheduling Grid to see if required.      Was the MRI done within the last 3 years?  Yes    If yes, where was the MRI done i.e.[facility], [facility], [place], [facility] etc? [facility]      If no, do not schedule and route to RN pool    If MRI was not done at [place], [facility] or [facility] do NOT schedule and route to RN pool.      If pt has an imaging disc, the injection MAY be scheduled but pt has to bring disc to appt.     If they show up without the disc the injection cannot be done    Procedure Specific Instructions:      If celiac plexus block, informed patient NPO for 6 hours and that it is okay to take medications with sips of water, especially blood pressure medications  Not Applicable         If this is for a cervical procedure, informed patient that aspirin needs to be held for 6 days.   Not Applicable      If IV needed:    Do not schedule procedures requiring IV placement in the first appointment of the day or first appointment after lunch. Do NOT schedule at 0745, 0815 or 1245.     Instructed pt to arrive 30 minutes early for IV start if required. (Check Procedure Scheduling Grid)  Not Applicable    Reminders:      If you are started on any steroids or antibiotics between now and your appointment, you must contact us because the procedure may need to be cancelled.  Yes      For all  procedures except radiofrequency ablations (RFAs) and spinal cord stimulator (SCS) trials, informed patient:    IV sedation is not provided for this procedure.  If you feel that an oral anti-anxiety medication is needed, you can discuss this further with your referring provider or primary care provider.  The Pain Clinic provider will discuss specifics of what the procedure includes at your appointment.  Most procedures last 10-20 minutes.  We use numbing medications to help with any discomfort during the procedure.  Not Applicable      For patients 85 or older we recommend having an adult stay w/ them for the remainder of the day.       Does the patient have any questions?  NO  Zhane Bell  [facility]

## 2021-10-27 NOTE — TELEPHONE ENCOUNTER
Okay to hold anticoagulation for your procedure, per your standard protocol.     MTR    Message text      Per Dr. Novoa    Called pt. CTC on file for wife   Scheduled 11/4/21   Injection scheduled for:  [place]  Xarelto hold starts on:  11/1/2021  Was pt informed to contact his INR clinic once scheduled: Not Applicable  Is lovenox bridging needed?  NO  INR order in?: Not Applicable  Lab appointment done?  Not Applicable  Injection intake questions reviewed?  YES  Infection/antibiotic information reviewed?  YES  Location confirmed: :Yes [place]   Is pt arriving early?:No  COVID test order in? Yes 10/31 expected  Pt notified that COVID test needs to be done w/in 4 days of the procedure: Yes , ordered   If pt getting outside [place] test the results can be faxed to: [facility] radiology #:[phone] attn Dr. Garvey    Has pt had COVID vaccine?  :Yes >14 days  Booster? No  (same as initial vaccine: 7days before steroid and 14 days after)    It is recommended that the?scheduling?of elective steroid injections (for joint pain, tendinopathies, and spine procedures) should be  by at least one week before or after receiving the 1st COVID vaccine and at least 2 weeks after receiving the 2nd COVID vaccine in order to allow for one's body to fully respond to the vaccine.     Shakira Ann RN, BSN, CMSRN  RN Care Coordinator  [facility]

## 2021-10-29 DIAGNOSIS — Z00.00 ENCOUNTER FOR MEDICARE ANNUAL WELLNESS EXAM: ICD-10-CM

## 2021-10-29 NOTE — TELEPHONE ENCOUNTER
Failed protocol.  please route to  team if patient needs an appointment     Ladonna CORTEZRN BSN  [facility]  [phone]

## 2021-10-30 RX ORDER — METOPROLOL SUCCINATE 50 MG/1
50 TABLET, EXTENDED RELEASE ORAL DAILY
Qty: 90 TABLET | Refills: 1 | Status: SHIPPED | OUTPATIENT
Start: 2021-10-30 | End: 2022-03-23

## 2021-10-31 ENCOUNTER — LAB (OUTPATIENT)
Dept: FAMILY MEDICINE | Facility: CLINIC | Age: 76
End: 2021-10-31
Payer: COMMERCIAL

## 2021-10-31 DIAGNOSIS — Z20.822 ENCOUNTER FOR LABORATORY TESTING FOR COVID-19 VIRUS: ICD-10-CM

## 2021-10-31 PROCEDURE — U0005 INFEC AGEN DETEC AMPLI PROBE: HCPCS

## 2021-10-31 PROCEDURE — U0003 INFECTIOUS AGENT DETECTION BY NUCLEIC ACID (DNA OR RNA); SEVERE ACUTE RESPIRATORY SYNDROME CORONAVIRUS 2 (SARS-COV-2) (CORONAVIRUS DISEASE [COVID-19]), AMPLIFIED PROBE TECHNIQUE, MAKING USE OF HIGH THROUGHPUT TECHNOLOGIES AS DESCRIBED BY CMS-2020-01-R: HCPCS

## 2021-11-01 LAB — SARS-COV-2 RNA RESP QL NAA+PROBE: NEGATIVE

## 2021-11-03 ENCOUNTER — THERAPY VISIT (OUTPATIENT)
Dept: PHYSICAL THERAPY | Facility: CLINIC | Age: 76
End: 2021-11-03
Payer: COMMERCIAL

## 2021-11-03 DIAGNOSIS — M62.81 GENERALIZED MUSCLE WEAKNESS: Primary | ICD-10-CM

## 2021-11-03 DIAGNOSIS — R26.89 POOR BALANCE: ICD-10-CM

## 2021-11-03 DIAGNOSIS — R29.898 LEG WEAKNESS, BILATERAL: ICD-10-CM

## 2021-11-03 PROCEDURE — 97110 THERAPEUTIC EXERCISES: CPT | Mod: GP | Performed by: PHYSICAL THERAPIST

## 2021-11-04 ENCOUNTER — HOSPITAL ENCOUNTER (OUTPATIENT)
Dept: PALLIATIVE MEDICINE | Facility: CLINIC | Age: 76
Discharge: HOME | End: 2021-11-04
Attending: INTERNAL MEDICINE | Admitting: STUDENT IN AN ORGANIZED HEALTH CARE EDUCATION/TRAINING PROGRAM
Payer: COMMERCIAL

## 2021-11-04 VITALS
DIASTOLIC BLOOD PRESSURE: 78 MMHG | RESPIRATION RATE: 16 BRPM | HEART RATE: 71 BPM | SYSTOLIC BLOOD PRESSURE: 142 MMHG | TEMPERATURE: 98 F

## 2021-11-04 DIAGNOSIS — M54.16 LUMBAR RADICULOPATHY: ICD-10-CM

## 2021-11-04 PROCEDURE — 250N000011 HC RX IP 250 OP 636: Performed by: STUDENT IN AN ORGANIZED HEALTH CARE EDUCATION/TRAINING PROGRAM

## 2021-11-04 PROCEDURE — 62323 NJX INTERLAMINAR LMBR/SAC: CPT

## 2021-11-04 PROCEDURE — 255N000002 HC RX 255 OP 636: Performed by: STUDENT IN AN ORGANIZED HEALTH CARE EDUCATION/TRAINING PROGRAM

## 2021-11-04 PROCEDURE — 250N000009 HC RX 250: Performed by: STUDENT IN AN ORGANIZED HEALTH CARE EDUCATION/TRAINING PROGRAM

## 2021-11-04 PROCEDURE — 62323 NJX INTERLAMINAR LMBR/SAC: CPT | Performed by: STUDENT IN AN ORGANIZED HEALTH CARE EDUCATION/TRAINING PROGRAM

## 2021-11-04 RX ORDER — LIDOCAINE HYDROCHLORIDE 10 MG/ML
5 INJECTION, SOLUTION EPIDURAL; INFILTRATION; INTRACAUDAL; PERINEURAL ONCE
Status: COMPLETED | OUTPATIENT
Start: 2021-11-04 | End: 2021-11-04

## 2021-11-04 RX ORDER — BUPIVACAINE HYDROCHLORIDE 2.5 MG/ML
3 INJECTION, SOLUTION EPIDURAL; INFILTRATION; INTRACAUDAL ONCE
Status: COMPLETED | OUTPATIENT
Start: 2021-11-04 | End: 2021-11-04

## 2021-11-04 RX ORDER — IOPAMIDOL 408 MG/ML
10 INJECTION, SOLUTION INTRATHECAL ONCE
Status: COMPLETED | OUTPATIENT
Start: 2021-11-04 | End: 2021-11-04

## 2021-11-04 RX ORDER — TRIAMCINOLONE ACETONIDE 40 MG/ML
40 INJECTION, SUSPENSION INTRA-ARTICULAR; INTRAMUSCULAR ONCE
Status: COMPLETED | OUTPATIENT
Start: 2021-11-04 | End: 2021-11-04

## 2021-11-04 RX ADMIN — IOPAMIDOL 3 ML: 408 INJECTION, SOLUTION INTRATHECAL at 14:16

## 2021-11-04 RX ADMIN — LIDOCAINE HYDROCHLORIDE 1 ML: 10 INJECTION, SOLUTION EPIDURAL; INFILTRATION; INTRACAUDAL; PERINEURAL at 14:16

## 2021-11-04 RX ADMIN — BUPIVACAINE HYDROCHLORIDE 2 ML: 2.5 INJECTION, SOLUTION EPIDURAL; INFILTRATION; INTRACAUDAL at 14:16

## 2021-11-04 RX ADMIN — TRIAMCINOLONE ACETONIDE 40 MG: 40 INJECTION, SUSPENSION INTRA-ARTICULAR; INTRAMUSCULAR at 14:16

## 2021-11-04 NOTE — DISCHARGE INSTRUCTIONS
[facility]   Procedure Discharge Instructions    Today you saw:   Dr. Abigail Garvey    You had an: Caudal  Epidural steroid injection     Medications used:  Lidocaine   Bupivacaine  Kenalog   IsoVue  Normal saline              Be cautious when walking. Numbness and/or weakness in the lower extremities may occur for up to 6-8 hours after the procedure due to effect of the local anesthetic    Do not drive for 6 hours. The effect of the local anesthetic could slow your reflexes.     You may resume your regular activities after 24 hours    Avoid strenuous activity for the first 24 hours    You may shower, however avoid swimming, tub baths or hot tubs for 24 hours following your procedure    You may have a mild to moderate increase in pain for several days following the injection.    It may take up to 14 days for the steroid medication to start working although you may feel the effect as early as a few days after the procedure.       You may use ice packs for 10-15 minutes, 3 to 4 times a day at the injection site for comfort    Do not use heat to painful areas for 6 to 8 hours. This will give the local anesthetic time to wear off and prevent you from accidentally burning your skin.     Unless you have been directed to avoid the use of anti-inflammatory medications (NSAIDS), you may use medications such as ibuprofen, Aleve or Tylenol for pain control if needed.     Possible side effects of steroids that you may experience include flushing, elevated blood pressure, increased appetite, mild headaches and restlessness.  All of these symptoms will get better with time.    If you experience any of the following, call the Pain Clinic during work hours (Mon-Friday 8-4:30 pm) at [phone] or the Provider Line after hours at [phone]:  -Fever over 100 degree F  -Swelling, bleeding, redness, drainage, warmth at the injection site  -Progressive weakness or numbness in your legs  -Loss of  bowel or bladder function  -Unusual new onset of pain that is not improving

## 2021-11-04 NOTE — PROGRESS NOTES
Pre-procedure Intake  If YES to any questions or NO to having a   Please complete laminated checklist and leave on the computer keyboard for Provider, verbally inform provider if able.      For SCS Trial, RFA's or any sedation procedure: NA    Are you taking any any blood thinners such as Coumadin, Warfarin, Jantoven, Pradaxa Xarelto, Eliquis, Edoxaban, Enoxaparin, Lovenox, Heparin, Arixtra, Fondaparinux, or Fragmin? OR Antiplatelet medication such as Plavix, Brilinta, or Effient?   Yes -   Other blood thinners     If yes, when did you take your last dose? Xarelto. Not taken for 3 days; 10/31    Do you take aspirin?  No    If cervical procedure, have you held aspirin for 6 days?   NA    Do you have any allergies to contrast dye, iodine, steroid and/or numbing medications?  NO    Are you currently taking antibiotics or have an active infection?  NO    Have you had a fever/elevated temperature within the past week? NO    Are you currently taking oral steroids? NO    Do you have a ? Yes    Are you pregnant or breastfeeding?  Not Applicable    Have you received the COVID-19 vaccine? Yes    If yes, was it your 1st, 2nd or only dose needed? 2nd    Date of most recent vaccine: 7/2021    Notify provider and RNs if systolic BP >170, diastolic BP >100, P >100 or O2 sats < 90%

## 2021-11-04 NOTE — PROGRESS NOTES
Pre procedure Diagnosis: Lumbar radiculopathy,   Post procedure Diagnosis: Same  Procedure performed: caudal epidural steroid injection  Anesthesia: local  Complications: none  Operators: Abigail Garvey MD     Indications:   Raymon Ace is a 76 year old male referred by Maria Borrego for caudal epidural steroid injection.  They have a history of bilateral leg pain.  Exam shows WC for mobility and they have tried conservative treatment including medications.    MRI was done on 6/8/2021 which showed   INTERPRETATION: Segmentation and Alignment: 5 lumbar-type vertebrae with mild reversal the upper lumbar lordosis. Interbody fusions are seen at L5-S1, L4-5 and L3-4 instrumented posterior spinal fusions at L3-4 and L2-3.    Sacrum and Sacroiliac joints: Upper sacrum and sacroiliac joints unremarkable.    L5-S1 and L4-5: Solid-appearing cage-assisted interbody fusions with sentinel fusion masses seen anteriorly at each level and no residual stenosis or impingement.    L3-4: Solid-appearing interbody fusion with solid facet joint fusion on the right and posterior instrumentation obscuring dorsolateral graft on the left. No residual stenosis or impingement.    L2-3: Moderate disc degeneration with retrolisthesis and metal artifact partially obscuring posterior graft. No posterior fusion masses seen. No residual central or foraminal stenosis.    L1-2: Mild spondylosis with normal dorsal disc margins, normal facet joints and no stenosis or impingement.    T12-L1: Normal intervertebral disc and facet joints. No stenosis or impingement.    Conus: Normal signal intensity within the distal conus medullaris. No intradural mass or arachnoidal adhesions.    Osseous and paraspinous structures: Normal signal intensity within the vertebral marrow spaces. No fracture or avulsion. No destructive bone lesion and no paraspinous mass.    CONCLUSION:    1. Solid interbody fusions at L5-S1, L4-5 and L3-4 with solid facet  joint fusion on the right at L3-4 and no stenosis or impingement.    2. L2-3 disc degeneration with instrumented posterior spinal fusions indeterminate on MRI. If clinically indicated, thin section CT would be useful for further characterization.    3. Mild L1-2 disc degeneration without stenosis or impingement.    4. Comparison with 5/25/2019 shows no significant interval change.    Options/alternatives, benefits and risks were discussed with the patient including bleeding, infection, tissue trauma, numbness, weakness, paralysis, spinal cord injury, radiation exposure, headache, reaction to medications including seizure, and effects on the bladder from local anesthetic.  Questions were answered to his satisfaction and he agrees to proceed. Voluntary informed consent was obtained and signed.     Vitals were reviewed: Yes  BP (!) 142/78 (BP Location: Left arm)   Pulse 71   Temp 98  F (36.7  C) (Tympanic)   Resp 16   Allergies were reviewed:  Yes   Medications were reviewed:  Yes   Pre-procedure pain score: 5 in the back, 8 in the legs/10  Post-procedure pain score 4 in the back, 8 in the legs/10    Procedure:  After obtaining signed informed consent, patient was brought into the procedure suite and was placed in a prone position on the procedure table.   A Pause for the Cause was performed.  Patient was prepped and draped in the usual sterile fashion.     The sacral hiatus and cornua were palpated.  Under lateral fluoroscopic guidance sacral hiatus was identified.  3 ml of lidocaine 1% was then injected at the needle entry point to anesthetize the skin. Then a 22 gauge 3.5 inch needle was inserted into sacral hiatus utilizing fluoroscopic guidance.  Aspiration was negative for blood or CSF.  Needle placement was verified by injecting Isovue 200 3 ml utilizing real time fluoroscopy that showed good needle placement and adequate spread in the lower sacral epidural space without intravascular or intrathecal uptake.   7ml Omnipaque-300 was wasted.    Then, after repeated negative aspiration, a combination of Kenalog 40 mg, 3 ml of  0.25% Bupivicaine, diluted with 4 ml of normal saline for a total injectate volume of 8 ml was injected in a slow and incremental fashion and the needle was withdrawn.  The injection site was cleaned and sterile dressing applied.     The patient tolerated the procedure well without complications and was taken to the recovery area for continued observation.  They were subsequently discharged to home in good condition after meeting discharge criteria.      Images were saved to PACS.    Post-procedure pain score: 4 in the back, 8 in the legs/10  Follow-up includes:   -f/u with referring provider    Abigail Garvey MD  [facility]

## 2021-11-07 NOTE — PROGRESS NOTES
[facility]    OUTPATIENT Physical Therapy ORTHOPEDIC EVALUATION  PLAN OF TREATMENT FOR OUTPATIENT REHABILITATION  (COMPLETE FOR INITIAL CLAIMS ONLY)  Patient's Last Name, First Name, M.I.  YOB: 1945  Raymon Ace    Provider s Name:  [facility]   Medical Record No.  [id number]   Start of Care Date:  06/28/21   Onset Date:   06/01/21   Type:     _X__PT   ___OT Medical Diagnosis:  No diagnosis found.     Treatment Diagnosis:  global l/e weakness         Goals:     11/07/21 0500   Body Part   Goals listed below are for global weakness    Goal #1   Goal #1 ambulation   Previous Functional Level No restrictions   Current Functional Level Minutes patient can walk   Performance Level 2-3 minutes with SEC    STG Target Performance Minutes patient will be able to walk   Performance Level 10-12   Rationale for safe household ambulation;for safe outdoor household ambulation;for safe community ambulation   Due Date 12/03/21   If goal not met, Why? no change, patient not seen in PT for 5 months     LTG Target Performance Minutes patient will be able to  walk   Performance Level 20+ minutes    Rationale for safe household ambulation;for safe outdoor household ambulation;for safe community ambulation;to promote a healthy and active lifestyle   Due Date 12/31/21       Therapy Frequency:     Predicted Duration of Therapy Intervention:       Mitul Mortensen, PT                 I CERTIFY THE NEED FOR THESE SERVICES FURNISHED UNDER        THIS PLAN OF TREATMENT AND WHILE UNDER MY CARE     (Physician attestation of this document indicates review and certification of the therapy plan).                       Certification Date From:  09/26/21   Certification Date To:  12/24/21    Referring Provider:  Didier Singh    Initial Assessment        See Epic Evaluation SOC Date:  06/28/21

## 2021-11-07 NOTE — PROGRESS NOTES
Subjective:  HPI  Physical Exam                    Objective:  System    Physical Exam    General     ROS    Assessment/Plan:    PROGRESS  REPORT    Progress reporting period is from 6/28/2021 to 11/4/2021. 2 visits.     SUBJECTIVE  Raymon returns to physical therapy today with very slow shuffle gait, assisted with SEC, poor balance, reduced conditioning, lower extremity weakness and fear of falling.     He has not attended formal intervention for nearly 5 months    Patient has regressed since last seen 6/28/2021.     He has not attended PT in any fashion whether in clinic or home.     He notes that he rides his stationary bike 3x/day for 2or 3 minutes as an extent to his exercise.    He has difficulty walking greater than 2-3 minutes, stairs and uneven surfaces. He fatigues quickly.     He has also lost function of his shoulder to roughly 90 degrees. Patient is globally deconditioned.        Current Pain level: 8/10   Initial Pain level: 8/10       OBJECTIVE   Lower extremity strength 2/5, globally. Quads especially weak possibly less than 2/5 strength.      Cannot sit to stand without moderate assist.     Unable to walk without assist.     Shuffles gait with toe out gait.     In my assessment, Patient is unsafe to walk independent.     Patient may best benefit more frequency to skilled care close to his home , where he is able to attend regularly at 2-3x/week for at least  8 weeks. Possible lumbar injection     He typically travels to [place] for the winter . At this point, he really should be on a formal program either here in [place] or [place].     Follow up with Dr. Roger for consult and directive is highly recommended.       ASSESSMENT/PLAN  Updated problem list and treatment plan: Diagnosis 1:  Global decondition    Diagnosis 2:  Chronic Back pain      Diagnosis 3:  R shoulder pain      STG/LTGs have been met or progress has been made towards goals:  None  Assessment of Progress: The patient's condition is  unchanged.  The patient has had set backs in their progress.  Self Management Plans:  Patient has been instructed in a home treatment program.    Recommendations:  This patient would benefit from further evaluation.    Please refer to the daily flowsheet for treatment today, total treatment time and time spent performing 1:1 timed codes.

## 2021-12-02 DIAGNOSIS — K21.9 GASTROESOPHAGEAL REFLUX DISEASE WITHOUT ESOPHAGITIS: ICD-10-CM

## 2021-12-02 RX ORDER — OMEPRAZOLE 40 MG/1
CAPSULE, DELAYED RELEASE ORAL
Qty: 90 CAPSULE | Refills: 2 | Status: SHIPPED | OUTPATIENT
Start: 2021-12-02 | End: 2023-01-09

## 2021-12-02 NOTE — TELEPHONE ENCOUNTER
Prescription approved per [facility] Refill Protocol.  Petra BANERJEE RN  [facility]

## 2022-01-17 ENCOUNTER — OFFICE VISIT (OUTPATIENT)
Dept: NEUROLOGY | Facility: CLINIC | Age: 77
End: 2022-01-17
Payer: COMMERCIAL

## 2022-01-17 VITALS
HEIGHT: 66 IN | WEIGHT: 205 LBS | SYSTOLIC BLOOD PRESSURE: 147 MMHG | BODY MASS INDEX: 32.95 KG/M2 | HEART RATE: 65 BPM | DIASTOLIC BLOOD PRESSURE: 82 MMHG

## 2022-01-17 DIAGNOSIS — M10.9 GOUT INVOLVING TOE, UNSPECIFIED CAUSE, UNSPECIFIED CHRONICITY, UNSPECIFIED LATERALITY: ICD-10-CM

## 2022-01-17 DIAGNOSIS — N39.41 URGE INCONTINENCE OF URINE: ICD-10-CM

## 2022-01-17 DIAGNOSIS — M54.40 CHRONIC BILATERAL LOW BACK PAIN WITH SCIATICA, SCIATICA LATERALITY UNSPECIFIED: ICD-10-CM

## 2022-01-17 DIAGNOSIS — M25.561 CHRONIC PAIN OF BOTH KNEES: ICD-10-CM

## 2022-01-17 DIAGNOSIS — G89.29 CHRONIC BILATERAL LOW BACK PAIN WITH SCIATICA, SCIATICA LATERALITY UNSPECIFIED: ICD-10-CM

## 2022-01-17 DIAGNOSIS — M54.16 LUMBAR RADICULOPATHY: Primary | ICD-10-CM

## 2022-01-17 DIAGNOSIS — G89.29 CHRONIC PAIN OF BOTH KNEES: ICD-10-CM

## 2022-01-17 DIAGNOSIS — M25.562 CHRONIC PAIN OF BOTH KNEES: ICD-10-CM

## 2022-01-17 DIAGNOSIS — R29.898 BILATERAL LEG WEAKNESS: ICD-10-CM

## 2022-01-17 DIAGNOSIS — Z98.890 STATUS POST LUMBAR SURGERY: ICD-10-CM

## 2022-01-17 PROCEDURE — 99214 OFFICE O/P EST MOD 30 MIN: CPT | Performed by: PSYCHIATRY & NEUROLOGY

## 2022-01-17 RX ORDER — GABAPENTIN 300 MG/1
900 CAPSULE ORAL 3 TIMES DAILY
Qty: 270 CAPSULE | Refills: 3 | Status: SHIPPED | OUTPATIENT
Start: 2022-01-17 | End: 2022-09-26

## 2022-01-17 RX ORDER — VITAMIN B COMPLEX
25 TABLET ORAL DAILY
COMMUNITY
End: 2023-07-12 | Stop reason: DRUGHIGH

## 2022-01-17 ASSESSMENT — MIFFLIN-ST. JEOR: SCORE: 1597.62

## 2022-01-17 NOTE — LETTER
1/17/2022         RE: Raymon Ace  [address] [facility] [address]        Dear Colleague,    Thank you for referring your patient, Raymon Ace, to the [facility] [place]. Please see a copy of my visit note below.    ESTABLISHED PATIENT NEUROLOGY NOTE    DATE OF VISIT: 1/17/2022  MRN: [id number]  PATIENT NAME: Raymon Ace  YOB: 1945    Chief Complaint   Patient presents with     Follow Up     leg weakness      SUBJECTIVE:                                                      HISTORY OF PRESENT ILLNESS:  Raymon is here for follow up regarding leg weakness.    History as previously documented by me (10.21.21):  He also follows in rheumatology.      Raymon had an EMG completed in July of this year.  This showed evidence of left S1 radiculopathy with signs of both acute and chronic denervation and old right and left L5 radiculopathy without signs of ongoing or recent denervation.  No evidence of lumbosacral plexopathy or generalized neuropathy.  He also had an MRI completed in June of this year, which showed interbody fusions at L5-S1, L4-L5 and L3-L4, with solid facet joint fusion on the right at L3-4 and no stenosis or impingement.  This study also showed L2-L3 disc degeneration and mild L1-L2 degeneration without stenosis or impingement.     Raymon is here with his wife. Symptoms started a couple of years ago. He is not sure where the pain/discomfort started, but it feels like it affects his entire legs.  He does have some numbness as well.  He describes an itching sensation which he scratches and then creates lesions on his skin.  He has not tried any over-the-counter creams or ointments for this.  They tell me that his spine surgeon Dr. Singh recommended that he see neurology based on the findings on the EMG.     He is on gabapentin 300mg BID, the same dose for quite some time.  He is not sure if this is helpful with his pain. He is not experiencing any side  effects, except he comments that he is tired all the time.       He clarifies that he has had 5 back surgeries, the most recent 3 by Dr. Singh and he has been told everything is intact.  No further surgeries are recommended.  They have not talked about doing injections in the lower back.     He has not noticed any problems with use of his hands, weakness or numbness/tingling.  He has not had any neck surgeries.  He is not having problems controlling his bladder or bowels.     He has trouble getting in and out of chairs, going up and down stairs.  He uses a cane to ambulate for balance.     He admits to drinking several alcoholic beverages daily.  He drinks at the lesion almost every day and then has some beverages at home as well for life.  He has been drinking a lot for quite some time.  He is not diabetic.  Recent AST was mildly elevated at 47.    CK level was normal at 63.  No evidence of myopathy on previous EMG.  I recommended we titrate his gabapentin dose to 600mg 3 times daily.  I also referred Raymon to the pain clinic for consideration regarding epidural injections.  We talked about hydrocortisone for the itching, and dermatology if needed.  We also discussed cutting back on alcohol intake.  Per chart review, the patient saw Dr. Garvey for injections in November, 2021.    He has a stationary bike which he rides twice daily. He has trouble going down stairs, and mentions that it takes him 6 to 7 minutes to get down 13 steps in their home.  [place] also down below.  He asks about whether I could write a prescription for chair/stair lift.  The increased gabapentin helped some for a while, per his wife.  He says that his numbness is from the waist down.  He does have some problems with urgency of bladder and bowel function.    Raymon says he is having some problems with knee pain as well.      I ask about alcohol intake and he says that he is still drinking every  "day.  Typically he has 3 drinks with his friends at the [facility] and then a couple of drinks when he gets home.    CURRENT MEDICATIONS:   allopurinol (ZYLOPRIM) 300 MG tablet, Take 1 tablet (300 mg) by mouth daily  eltrombopag (PROMACTA) 75 MG tablet, Take 1 tablet (75 mg) by mouth daily Administer on an empty stomach, 1 hour before or 2 hours after a meal.  folic acid (FOLVITE) 1 MG tablet, Take 1 tablet (1,000 mcg) by mouth daily  HEMP OIL OR EXTRACT OR OTHER CBD CANNABINOID, NOT MEDICAL CANNABIS,, CBD softgels- 1 daily  metoprolol succinate ER (TOPROL-XL) 50 MG 24 hr tablet, Take 1 tablet (50 mg) by mouth daily  omeprazole (PRILOSEC) 40 MG DR capsule, TAKE 1 CAPSULE BY MOUTH ONCE DAILY.  rivaroxaban ANTICOAGULANT (XARELTO ANTICOAGULANT) 10 MG TABS tablet, TAKE 1 TABLET BY MOUTH ONCE DAILY WITH  DINNER  vitamin D2 (ERGOCALCIFEROL) 08345 units (1250 mcg) capsule, Take 1 capsule (50,000 Units) by mouth once a week  Vitamin D3 (CHOLECALCIFEROL) 25 mcg (1000 units) tablet, Take by mouth daily (1,000IU) 1 capsule daily  ASPIRIN NOT PRESCRIBED (INTENTIONAL), Please choose reason not prescribed, below (Patient not taking: Reported on 10/21/2021)  ondansetron (ZOFRAN ODT) 4 MG ODT tab, Take 1-2 tablets (4-8 mg) by mouth every 8 hours as needed for nausea (Patient not taking: Reported on 1/17/2022)  oxyCODONE (ROXICODONE) 5 MG tablet, Take 1 tablet (5 mg) by mouth every 6 hours as needed (Patient not taking: Reported on 10/21/2021)    No current facility-administered medications on file prior to visit.      RECENT DIAGNOSTIC STUDIES:   Labs: No results found for any visits on 01/17/22.    REVIEW OF SYSTEMS:                                                      10-point review of systems is negative except as mentioned above in HPI.    EXAM:                                                      Physical Exam:   Vitals: BP (!) 147/82 (BP Location: Right arm, Patient Position: Sitting)   Pulse 65   Ht 1.676 m (5' 6\")   Wt 93 " kg (205 lb)   BMI 33.09 kg/m    BMI= Body mass index is 33.09 kg/m .  GENERAL: NAD.  HEENT: NC/AT.  CV: RRR. S1, S2.   NECK: No bruits.  PULM: Non-labored breathing.   Neurologic:  MENTAL STATUS: Alert, attentive. Speech is fluent. Normal comprehension.  Normal concentration. Adequate fund of knowledge.   CRANIAL NERVES: Discs flat. Visual fields intact to confrontation. Pupils equally, round and reactive to light. Facial sensation and movement normal. EOM full. Hearing intact to conversation. Trapezius strength intact. Palate moves symmetrically. Tongue midline.  MOTOR: Slight weakness with hip flexion bilaterally, and AB/adduction at the hips.  Otherwise his strength appears normal. Tone normal.  DTRs: Intact and symmetric in biceps, BR.  Cannot elicit patellar or Achilles reflexes.  Babinski equivocal bilaterally.   SENSATION: Normal light touch and pinprick in hands, pinprick reportedly decreased at the knees. Intact proprioception at great toes. Vibration: Normal at both ankles.   COORDINATION: Normal finger nose finger. Finger tapping normal.   STATION AND GAIT: Romberg negative. Good postural reflexes. Casual gait is slightly antalgic.    Right hand-dominant.    ASSESSMENT and PLAN:                                                      Assessment:    ICD-10-CM    1. Lumbar radiculopathy  M54.16 Miscellaneous Order for DME - ONLY FOR DME     MR Lumbar Spine w/o & w Contrast     MR Thoracic Spine w/o & w Contrast   2. Bilateral leg weakness  R29.898 Miscellaneous Order for DME - ONLY FOR DME     MR Lumbar Spine w/o & w Contrast     Orthopedic  Referral   3. Status post lumbar surgery  Z98.890 MR Lumbar Spine w/o & w Contrast   4. Urge incontinence of urine  N39.41 MR Lumbar Spine w/o & w Contrast     MR Thoracic Spine w/o & w Contrast   5. Chronic pain of both knees  M25.561 Orthopedic  Referral    M25.562     G89.29    6. Chronic bilateral low back pain with sciatica, sciatica laterality  unspecified  M54.40 gabapentin (NEURONTIN) 300 MG capsule    G89.29         Mr. Ace is a pleasant 76-year-old man here for follow-up regarding lumbar radiculopathy.  Findings on imaging and electrodiagnostic studies did not really explain his clinical symptoms.  His exam is also mildly abnormal, there is no clear evidence of myelopathy or myopathy on exam thus far.  I would like to do updated lumbar imaging and some thoracic imaging given the incontinence.  The knees do seem to be causing some of the trouble so I am referring him on to orthopedics for this.  I also recommend an increase in the gabapentin once again, to reach 900mg 3 times daily.  I would like to see Raymon back in clinic again in a few months.  He expressed understanding and agreement with the plan.    Raymon to follow up with Primary Care provider regarding elevated blood pressure.    Plan:  -- I would like to do updated lumbar spine imaging and thoracic spine imaging.  We will notify you of the results.  -- I have also put in a referral for Orthopedics to talk to them about your knees.  -- Increase the gabapentin to 3 capsules (900mg), 3 times daily.  -- Keep up the good work with trying to stay active.  -- I have also put in a request for coverage of a stair lift for you.  -- Return to Neurology clinic in 3-4 months.    Total Time: 30 minutes were spent with the patient and in chart review/documentation (as described above in Assessment and Plan)/coordinating the care on date of service.    Maria Espino MD  Neurology    Dragon software used in the dictation of this note.                        Again, thank you for allowing me to participate in the care of your patient.        Sincerely,        Maria Espino MD

## 2022-01-17 NOTE — PATIENT INSTRUCTIONS
Plan:  -- I would like to do updated lumbar spine imaging and thoracic spine imaging.  We will notify you of the results.  -- I have also put in a referral for Orthopedics to talk to them about your knees.  -- Increase the gabapentin to 3 capsules (900 mg), 3 times daily.  -- Keep up the good work with trying to stay active.  -- I have also put in a request for coverage of a stair lift for you.  -- Return to Neurology clinic in 3-4 months.

## 2022-01-17 NOTE — NURSING NOTE
Chief Complaint   Patient presents with     Follow Up     leg weakness      Cristobal Christianson CMA@ on 1/17/2022 at 1:57 PM

## 2022-01-17 NOTE — PROGRESS NOTES
ESTABLISHED PATIENT NEUROLOGY NOTE    DATE OF VISIT: 1/17/2022  MRN: [id number]  PATIENT NAME: Raymon Ace  YOB: 1945    Chief Complaint   Patient presents with     Follow Up     leg weakness      SUBJECTIVE:                                                      HISTORY OF PRESENT ILLNESS:  Raymon is here for follow up regarding leg weakness.    History as previously documented by me (10.21.21):  He also follows in rheumatology.      Raymon had an EMG completed in July of this year.  This showed evidence of left S1 radiculopathy with signs of both acute and chronic denervation and old right and left L5 radiculopathy without signs of ongoing or recent denervation.  No evidence of lumbosacral plexopathy or generalized neuropathy.  He also had an MRI completed in June of this year, which showed interbody fusions at L5-S1, L4-L5 and L3-L4, with solid facet joint fusion on the right at L3-4 and no stenosis or impingement.  This study also showed L2-L3 disc degeneration and mild L1-L2 degeneration without stenosis or impingement.     Raymon is here with his wife. Symptoms started a couple of years ago. He is not sure where the pain/discomfort started, but it feels like it affects his entire legs.  He does have some numbness as well.  He describes an itching sensation which he scratches and then creates lesions on his skin.  He has not tried any over-the-counter creams or ointments for this.  They tell me that his spine surgeon Dr. Singh recommended that he see neurology based on the findings on the EMG.     He is on gabapentin 300mg BID, the same dose for quite some time.  He is not sure if this is helpful with his pain. He is not experiencing any side effects, except he comments that he is tired all the time.       He clarifies that he has had 5 back surgeries, the most recent 3 by Dr. Singh and he has been told everything is intact.  No further surgeries are recommended.  They have not talked about doing  injections in the lower back.     He has not noticed any problems with use of his hands, weakness or numbness/tingling.  He has not had any neck surgeries.  He is not having problems controlling his bladder or bowels.     He has trouble getting in and out of chairs, going up and down stairs.  He uses a cane to ambulate for balance.     He admits to drinking several alcoholic beverages daily.  He drinks at the lesion almost every day and then has some beverages at home as well for life.  He has been drinking a lot for quite some time.  He is not diabetic.  Recent AST was mildly elevated at 47.    CK level was normal at 63.  No evidence of myopathy on previous EMG.  I recommended we titrate his gabapentin dose to 600mg 3 times daily.  I also referred Raymon to the pain clinic for consideration regarding epidural injections.  We talked about hydrocortisone for the itching, and dermatology if needed.  We also discussed cutting back on alcohol intake.  Per chart review, the patient saw Dr. Garvey for injections in November, 2021.    He has a stationary bike which he rides twice daily. He has trouble going down stairs, and mentions that it takes him 6 to 7 minutes to get down 13 steps in their home.  [place] also down below.  He asks about whether I could write a prescription for chair/stair lift.  The increased gabapentin helped some for a while, per his wife.  He says that his numbness is from the waist down.  He does have some problems with urgency of bladder and bowel function.    Raymon says he is having some problems with knee pain as well.      I ask about alcohol intake and he says that he is still drinking every day.  Typically he has 3 drinks with his friends at the [facility] and then a couple of drinks when he gets home.    CURRENT MEDICATIONS:   allopurinol (ZYLOPRIM) 300 MG tablet, Take 1 tablet (300 mg) by mouth daily  eltrombopag (PROMACTA) 75 MG tablet, Take 1  "tablet (75 mg) by mouth daily Administer on an empty stomach, 1 hour before or 2 hours after a meal.  folic acid (FOLVITE) 1 MG tablet, Take 1 tablet (1,000 mcg) by mouth daily  HEMP OIL OR EXTRACT OR OTHER CBD CANNABINOID, NOT MEDICAL CANNABIS,, CBD softgels- 1 daily  metoprolol succinate ER (TOPROL-XL) 50 MG 24 hr tablet, Take 1 tablet (50 mg) by mouth daily  omeprazole (PRILOSEC) 40 MG DR capsule, TAKE 1 CAPSULE BY MOUTH ONCE DAILY.  rivaroxaban ANTICOAGULANT (XARELTO ANTICOAGULANT) 10 MG TABS tablet, TAKE 1 TABLET BY MOUTH ONCE DAILY WITH  DINNER  vitamin D2 (ERGOCALCIFEROL) 34193 units (1250 mcg) capsule, Take 1 capsule (50,000 Units) by mouth once a week  Vitamin D3 (CHOLECALCIFEROL) 25 mcg (1000 units) tablet, Take by mouth daily (1,000IU) 1 capsule daily  ASPIRIN NOT PRESCRIBED (INTENTIONAL), Please choose reason not prescribed, below (Patient not taking: Reported on 10/21/2021)  ondansetron (ZOFRAN ODT) 4 MG ODT tab, Take 1-2 tablets (4-8 mg) by mouth every 8 hours as needed for nausea (Patient not taking: Reported on 1/17/2022)  oxyCODONE (ROXICODONE) 5 MG tablet, Take 1 tablet (5 mg) by mouth every 6 hours as needed (Patient not taking: Reported on 10/21/2021)    No current facility-administered medications on file prior to visit.      RECENT DIAGNOSTIC STUDIES:   Labs: No results found for any visits on 01/17/22.    REVIEW OF SYSTEMS:                                                      10-point review of systems is negative except as mentioned above in HPI.    EXAM:                                                      Physical Exam:   Vitals: BP (!) 147/82 (BP Location: Right arm, Patient Position: Sitting)   Pulse 65   Ht 1.676 m (5' 6\")   Wt 93 kg (205 lb)   BMI 33.09 kg/m    BMI= Body mass index is 33.09 kg/m .  GENERAL: NAD.  HEENT: NC/AT.  CV: RRR. S1, S2.   NECK: No bruits.  PULM: Non-labored breathing.   Neurologic:  MENTAL STATUS: Alert, attentive. Speech is fluent. Normal comprehension.  " Normal concentration. Adequate fund of knowledge.   CRANIAL NERVES: Discs flat. Visual fields intact to confrontation. Pupils equally, round and reactive to light. Facial sensation and movement normal. EOM full. Hearing intact to conversation. Trapezius strength intact. Palate moves symmetrically. Tongue midline.  MOTOR: Slight weakness with hip flexion bilaterally, and AB/adduction at the hips.  Otherwise his strength appears normal. Tone normal.  DTRs: Intact and symmetric in biceps, BR.  Cannot elicit patellar or Achilles reflexes.  Babinski equivocal bilaterally.   SENSATION: Normal light touch and pinprick in hands, pinprick reportedly decreased at the knees. Intact proprioception at great toes. Vibration: Normal at both ankles.   COORDINATION: Normal finger nose finger. Finger tapping normal.   STATION AND GAIT: Romberg negative. Good postural reflexes. Casual gait is slightly antalgic.    Right hand-dominant.    ASSESSMENT and PLAN:                                                      Assessment:    ICD-10-CM    1. Lumbar radiculopathy  M54.16 Miscellaneous Order for DME - ONLY FOR DME     MR Lumbar Spine w/o & w Contrast     MR Thoracic Spine w/o & w Contrast   2. Bilateral leg weakness  R29.898 Miscellaneous Order for DME - ONLY FOR DME     MR Lumbar Spine w/o & w Contrast     Orthopedic  Referral   3. Status post lumbar surgery  Z98.890 MR Lumbar Spine w/o & w Contrast   4. Urge incontinence of urine  N39.41 MR Lumbar Spine w/o & w Contrast     MR Thoracic Spine w/o & w Contrast   5. Chronic pain of both knees  M25.561 Orthopedic  Referral    M25.562     G89.29    6. Chronic bilateral low back pain with sciatica, sciatica laterality unspecified  M54.40 gabapentin (NEURONTIN) 300 MG capsule    G89.29         Mr. Ace is a pleasant 76-year-old man here for follow-up regarding lumbar radiculopathy.  Findings on imaging and electrodiagnostic studies did not really explain his clinical  symptoms.  His exam is also mildly abnormal, there is no clear evidence of myelopathy or myopathy on exam thus far.  I would like to do updated lumbar imaging and some thoracic imaging given the incontinence.  The knees do seem to be causing some of the trouble so I am referring him on to orthopedics for this.  I also recommend an increase in the gabapentin once again, to reach 900mg 3 times daily.  I would like to see Raymon back in clinic again in a few months.  He expressed understanding and agreement with the plan.    Raymon to follow up with Primary Care provider regarding elevated blood pressure.    Plan:  -- I would like to do updated lumbar spine imaging and thoracic spine imaging.  We will notify you of the results.  -- I have also put in a referral for Orthopedics to talk to them about your knees.  -- Increase the gabapentin to 3 capsules (900mg), 3 times daily.  -- Keep up the good work with trying to stay active.  -- I have also put in a request for coverage of a stair lift for you.  -- Return to Neurology clinic in 3-4 months.    Total Time: 30 minutes were spent with the patient and in chart review/documentation (as described above in Assessment and Plan)/coordinating the care on date of service.    Maria Espino MD  Neurology    Dragon software used in the dictation of this note.

## 2022-01-18 RX ORDER — ALLOPURINOL 300 MG/1
1 TABLET ORAL DAILY
Qty: 90 TABLET | Refills: 0 | Status: SHIPPED | OUTPATIENT
Start: 2022-01-18 | End: 2022-05-26

## 2022-01-18 NOTE — TELEPHONE ENCOUNTER
Failed protocol.  please route to  team if patient needs an appointment     Ladonna CORTEZRN BSN  [facility]  [phone]

## 2022-01-21 ENCOUNTER — OFFICE VISIT (OUTPATIENT)
Dept: ORTHOPEDICS | Facility: CLINIC | Age: 77
End: 2022-01-21
Attending: PSYCHIATRY & NEUROLOGY
Payer: COMMERCIAL

## 2022-01-21 ENCOUNTER — ANCILLARY PROCEDURE (OUTPATIENT)
Dept: GENERAL RADIOLOGY | Facility: CLINIC | Age: 77
End: 2022-01-21
Attending: PEDIATRICS
Payer: COMMERCIAL

## 2022-01-21 VITALS
WEIGHT: 204 LBS | BODY MASS INDEX: 32.78 KG/M2 | DIASTOLIC BLOOD PRESSURE: 82 MMHG | HEIGHT: 66 IN | SYSTOLIC BLOOD PRESSURE: 122 MMHG

## 2022-01-21 DIAGNOSIS — M17.0 PRIMARY OSTEOARTHRITIS OF BOTH KNEES: ICD-10-CM

## 2022-01-21 PROCEDURE — 99203 OFFICE O/P NEW LOW 30 MIN: CPT | Performed by: PEDIATRICS

## 2022-01-21 PROCEDURE — 73562 X-RAY EXAM OF KNEE 3: CPT | Mod: LT | Performed by: RADIOLOGY

## 2022-01-21 ASSESSMENT — MIFFLIN-ST. JEOR: SCORE: 1593.09

## 2022-01-21 NOTE — PATIENT INSTRUCTIONS
Discussed nature of degenerative arthrosis of the knee. Discussed symptom treatment with over-the-counter medications, ice or heat, topical treatments, and rest if needed. Discussed use of sleeve or wrap for comfort. Discussed benefits of exercise and physical therapy. Discussed injection therapy. Also briefly discussed future consideration of referral to orthopedic surgery for further evaluation and discussion of arthroplasty.    Plan:  - Today's Plan of Care:  Referral for US guided injections bilateral knees  Rehab: Physical Therapy: [place] [facility] - [phone]  Discussed activity considerations and other supportive care including Ice/Heat, OTC and other topical medications as needed.    -We also discussed other future treatment options:  Referral to Orthopedic Surgery  Hyaluronic Acid Injections (call first, requires insurance approval)    Follow Up: as needed    If you have any further questions for your physician or physician s care team you can call [phone] and use option 3 to leave a voice message. Calls received during business hours will be returned same day.

## 2022-01-21 NOTE — PROGRESS NOTES
ASSESSMENT & PLAN    Raymon was seen today for pain and pain.    Diagnoses and all orders for this visit:    Primary osteoarthritis of both knees  -     Orthopedic  Referral  -     XR Knee Bilateral 3 vw  -     VY PT and Hand Referral; Future  -     Orthopedic  Referral; Future      This issue is chronic and Unchanged.    Discussed nature of degenerative arthrosis of the knee. Discussed symptom treatment with over-the-counter medications, ice or heat, topical treatments, and rest if needed. Discussed use of sleeve or wrap for comfort. Discussed benefits of exercise and physical therapy. Discussed injection therapy. Also briefly discussed future consideration of referral to orthopedic surgery for further evaluation and discussion of arthroplasty.    Plan:  - Today's Plan of Care:  Referral for US guided injections bilateral knees  Rehab: Physical Therapy: [place] - [phone]  Discussed activity considerations and other supportive care including Ice/Heat, OTC and other topical medications as needed.    -We also discussed other future treatment options:  Referral to Orthopedic Surgery  Hyaluronic Acid Injections (call first, requires insurance approval)    Follow Up: as needed    Concerning signs and symptoms were reviewed.  The patient expressed understanding of this management plan and all questions were answered at this time.    Thanks for the opportunity to participate in the care of this patient, I will keep you updated on their progress.    CC: Maria Muniz MD [facility]  Sports Medicine Physician  [facility]      -----  Chief Complaint   Patient presents with     Right Knee - Pain     Left Knee - Pain       SUBJECTIVE  Raymon Ace is a/an 76 year old male who is seen in consultation at the request of  Maria Borrego M.D. for evaluation of bilateral knee pain.     The patient is seen with their  "wife.    Onset: 2 years(s) ago. Reports insidious onset without acute precipitating event.  Location of Pain: bilateral knee pain; anterior joint line  Worsened by: walking, standing, sit to stand, stairs, in/out of car   Better with: resting, avoiding activity  Treatments tried: rest/activity avoidance, home exercises and physical therapy (1 visits)  Associated symptoms: warmth, weakness of bilateral knees and feeling of instability    Orthopedic/Surgical history: Yes, patient has neuropathy in lower extremities  Social History/Occupation: retired    No family history pertinent to patient's problem today.    REVIEW OF SYSTEMS:  Review of Systems  Skin: no bruising, no swelling  Musculoskeletal: as above  Neurologic: yes numbness, paresthesias - due to neuropathy  Remainder of review of systems is negative including constitutional, CV, pulmonary, GI, except as noted in HPI or medical history.    OBJECTIVE:  /82   Ht 1.676 m (5' 6\")   Wt 92.5 kg (204 lb)   BMI 32.93 kg/m     General: healthy, alert and in no distress  HEENT: no scleral icterus or conjunctival erythema  Skin: no suspicious lesions or rash. No jaundice.  CV: distal perfusion intact  Resp: normal respiratory effort without conversational dyspnea   Psych: normal mood and affect  Gait: antalgic, gain  Neuro: Normal light sensory exam of lower extremity    Bilateral Knee exam  *exam in chair, patient couldn't ambulate to exam table    Inspection:      mild effusion bilateral    Patella:      Crepitus noted in the patellofemoral joint bilateral    Tender:      medial patellar border bilateral       lateral patellar border bilateral    Non Tender:      remainder of knee area bilateral    Knee ROM:      0-110 bilateral    Strength:      5-/5 with knee extension bilateral    Special Tests:     Stable to varus and valgus stress    Gait:      Antalgic with cane    Neurovascular:      2+ peripheral pulses bilaterally and brisk capillary refill       " sensation grossly intact    RADIOLOGY:  I independently ordered, visualized and reviewed these images with the patient  3 XR views of bilateral knees reviewed: no acute bony abnormality, severe PF degenerative change, mild medial and lateral degenerative change with chondrocalcinosis  - will follow official read      Review of the result(s) of each unique test - XR

## 2022-01-21 NOTE — LETTER
1/21/2022         RE: Raymon Ace  [address] [facility] [address]        Dear Colleague,    Thank you for referring your patient, Raymon Ace, to the [facility] [place]. Please see a copy of my visit note below.    ASSESSMENT & PLAN    Raymon was seen today for pain and pain.    Diagnoses and all orders for this visit:    Primary osteoarthritis of both knees  -     Orthopedic  Referral  -     XR Knee Bilateral 3 vw  -     VY PT and Hand Referral; Future  -     Orthopedic  Referral; Future      This issue is chronic and Unchanged.    Discussed nature of degenerative arthrosis of the knee. Discussed symptom treatment with over-the-counter medications, ice or heat, topical treatments, and rest if needed. Discussed use of sleeve or wrap for comfort. Discussed benefits of exercise and physical therapy. Discussed injection therapy. Also briefly discussed future consideration of referral to orthopedic surgery for further evaluation and discussion of arthroplasty.    Plan:  - Today's Plan of Care:  Referral for US guided injections bilateral knees  Rehab: Physical Therapy: [place] [facility] - [phone]  Discussed activity considerations and other supportive care including Ice/Heat, OTC and other topical medications as needed.    -We also discussed other future treatment options:  Referral to Orthopedic Surgery  Hyaluronic Acid Injections (call first, requires insurance approval)    Follow Up: as needed    Concerning signs and symptoms were reviewed.  The patient expressed understanding of this management plan and all questions were answered at this time.    Thanks for the opportunity to participate in the care of this patient, I will keep you updated on their progress.    CC: Maria Muniz MD [facility]  Sports Medicine Physician  [facility]      -----  Chief Complaint   Patient presents with  "    Right Knee - Pain     Left Knee - Pain       SUBJECTIVE  Raymon Ace is a/an 76 year old male who is seen in consultation at the request of  Maria Borrego M.D. for evaluation of bilateral knee pain.     The patient is seen with their wife.    Onset: 2 years(s) ago. Reports insidious onset without acute precipitating event.  Location of Pain: bilateral knee pain; anterior joint line  Worsened by: walking, standing, sit to stand, stairs, in/out of car   Better with: resting, avoiding activity  Treatments tried: rest/activity avoidance, home exercises and physical therapy (1 visits)  Associated symptoms: warmth, weakness of bilateral knees and feeling of instability    Orthopedic/Surgical history: Yes, patient has neuropathy in lower extremities  Social History/Occupation: retired    No family history pertinent to patient's problem today.    REVIEW OF SYSTEMS:  Review of Systems  Skin: no bruising, no swelling  Musculoskeletal: as above  Neurologic: yes numbness, paresthesias - due to neuropathy  Remainder of review of systems is negative including constitutional, CV, pulmonary, GI, except as noted in HPI or medical history.    OBJECTIVE:  /82   Ht 1.676 m (5' 6\")   Wt 92.5 kg (204 lb)   BMI 32.93 kg/m     General: healthy, alert and in no distress  HEENT: no scleral icterus or conjunctival erythema  Skin: no suspicious lesions or rash. No jaundice.  CV: distal perfusion intact  Resp: normal respiratory effort without conversational dyspnea   Psych: normal mood and affect  Gait: antalgic, gain  Neuro: Normal light sensory exam of lower extremity    Bilateral Knee exam  *exam in chair, patient couldn't ambulate to exam table    Inspection:      mild effusion bilateral    Patella:      Crepitus noted in the patellofemoral joint bilateral    Tender:      medial patellar border bilateral       lateral patellar border bilateral    Non Tender:      remainder of knee area bilateral    Knee ROM:     "  0-110 bilateral    Strength:      5-/5 with knee extension bilateral    Special Tests:     Stable to varus and valgus stress    Gait:      Antalgic with cane    Neurovascular:      2+ peripheral pulses bilaterally and brisk capillary refill       sensation grossly intact    RADIOLOGY:  I independently ordered, visualized and reviewed these images with the patient  3 XR views of bilateral knees reviewed: no acute bony abnormality, severe PF degenerative change, mild medial and lateral degenerative change with chondrocalcinosis  - will follow official read      Review of the result(s) of each unique test - XR             Again, thank you for allowing me to participate in the care of your patient.        Sincerely,        Zhane Muniz MD

## 2022-02-01 ENCOUNTER — OFFICE VISIT (OUTPATIENT)
Dept: ORTHOPEDICS | Facility: CLINIC | Age: 77
End: 2022-02-01
Payer: COMMERCIAL

## 2022-02-01 VITALS — BODY MASS INDEX: 32.78 KG/M2 | WEIGHT: 204 LBS | HEIGHT: 66 IN

## 2022-02-01 DIAGNOSIS — M17.0 PRIMARY OSTEOARTHRITIS OF BOTH KNEES: ICD-10-CM

## 2022-02-01 PROCEDURE — 20611 DRAIN/INJ JOINT/BURSA W/US: CPT | Mod: 50 | Performed by: FAMILY MEDICINE

## 2022-02-01 RX ORDER — TRIAMCINOLONE ACETONIDE 40 MG/ML
40 INJECTION, SUSPENSION INTRA-ARTICULAR; INTRAMUSCULAR
Status: SHIPPED | OUTPATIENT
Start: 2022-02-01

## 2022-02-01 RX ORDER — ROPIVACAINE HYDROCHLORIDE 5 MG/ML
3 INJECTION, SOLUTION EPIDURAL; INFILTRATION; PERINEURAL
Status: SHIPPED | OUTPATIENT
Start: 2022-02-01

## 2022-02-01 RX ADMIN — ROPIVACAINE HYDROCHLORIDE 3 ML: 5 INJECTION, SOLUTION EPIDURAL; INFILTRATION; PERINEURAL at 16:20

## 2022-02-01 RX ADMIN — TRIAMCINOLONE ACETONIDE 40 MG: 40 INJECTION, SUSPENSION INTRA-ARTICULAR; INTRAMUSCULAR at 16:20

## 2022-02-01 ASSESSMENT — MIFFLIN-ST. JEOR: SCORE: 1593.09

## 2022-02-01 NOTE — PROGRESS NOTES
Raymon Ace  :  1945  DOS: 2022  MRN: [id number]    Sports Medicine Clinic Procedure    Ultrasound Guided Bilateral Intra-Articular Knee Injection, +/- Aspiration    Clinical History: Gradual onset of chronic bilateral knee pain over the past 2+ years.   Most pain with transitioning from sit to stand and bending knee.    Diagnosis:   1. Primary osteoarthritis of both knees      Referring Physician: Zhane Muniz MD  Large Joint Injection/Arthocentesis: bilateral knee    Date/Time: 2022 4:20 PM  Performed by: Elvis Colby DO  Authorized by: Elvis Colby DO     Indications:  Pain and osteoarthritis  Needle Size:  22 G  Guidance: ultrasound    Approach:  Superolateral  Location:  Knee  Laterality:  Bilateral      Medications (Right):  40 mg triamcinolone 40 MG/ML; 3 mL ropivacaine 5 MG/ML  Medications (Left):  40 mg triamcinolone 40 MG/ML; 3 mL ropivacaine 5 MG/ML  Outcome:  Tolerated well, no immediate complications  Procedure discussed: discussed risks, benefits, and alternatives    Consent Given by:  Patient  Timeout: timeout called immediately prior to procedure    Prep: patient was prepped and draped in usual sterile fashion          Impression:  Successful Bilateral intra-articular knee injection.    Plan:  Follow up as directed with Dr Muniz  Expectations and goals of CSI reviewed  Often 2-3 days for steroid effect, and can take up to two weeks for maximum effect  We discussed modified progressive pain-free activity as tolerated  Do not overuse in first two weeks if feeling better due to concern for vulnerability while steroid is working  Supportive care reviewed  All questions were answered today  Contact us with additional questions or concerns  Signs and sx of concern reviewed      Elvis Colby DO, CAQ  Primary Care Sports Medicine  [facility]

## 2022-02-01 NOTE — LETTER
2022         RE: Raymon Ace  [address] [facility] [address]        Dear Colleague,    Thank you for referring your patient, Raymon Ace, to the [facility] [place]. Please see a copy of my visit note below.    Raymon Ace  :  1945  DOS: 2022  MRN: [id number]    Sports Medicine Clinic Procedure    Ultrasound Guided Bilateral Intra-Articular Knee Injection, +/- Aspiration    Clinical History: Gradual onset of chronic bilateral knee pain over the past 2+ years.   Most pain with transitioning from sit to stand and bending knee.    Diagnosis:   1. Primary osteoarthritis of both knees      Referring Physician: Zhane Muniz MD  Large Joint Injection/Arthocentesis: bilateral knee    Date/Time: 2022 4:20 PM  Performed by: Elvis Colby DO  Authorized by: Elvis Colby DO     Indications:  Pain and osteoarthritis  Needle Size:  22 G  Guidance: ultrasound    Approach:  Superolateral  Location:  Knee  Laterality:  Bilateral      Medications (Right):  40 mg triamcinolone 40 MG/ML; 3 mL ropivacaine 5 MG/ML  Medications (Left):  40 mg triamcinolone 40 MG/ML; 3 mL ropivacaine 5 MG/ML  Outcome:  Tolerated well, no immediate complications  Procedure discussed: discussed risks, benefits, and alternatives    Consent Given by:  Patient  Timeout: timeout called immediately prior to procedure    Prep: patient was prepped and draped in usual sterile fashion          Impression:  Successful Bilateral intra-articular knee injection.    Plan:  Follow up as directed with Dr Muniz  Expectations and goals of CSI reviewed  Often 2-3 days for steroid effect, and can take up to two weeks for maximum effect  We discussed modified progressive pain-free activity as tolerated  Do not overuse in first two weeks if feeling better due to concern for vulnerability while steroid is working  Supportive care reviewed  All questions were answered today  Contact  us with additional questions or concerns  Signs and sx of concern reviewed      Elvis Colby DO, CAMADELYN  Primary Care Sports Medicine  [facility]         Again, thank you for allowing me to participate in the care of your patient.        Sincerely,        Elvis Colby DO

## 2022-02-03 ENCOUNTER — HOSPITAL ENCOUNTER (OUTPATIENT)
Dept: MRI IMAGING | Facility: HOSPITAL | Age: 77
End: 2022-02-03
Attending: PSYCHIATRY & NEUROLOGY
Payer: COMMERCIAL

## 2022-02-03 DIAGNOSIS — N39.41 URGE INCONTINENCE OF URINE: ICD-10-CM

## 2022-02-03 DIAGNOSIS — M54.16 LUMBAR RADICULOPATHY: ICD-10-CM

## 2022-02-03 DIAGNOSIS — Z98.890 STATUS POST LUMBAR SURGERY: ICD-10-CM

## 2022-02-03 DIAGNOSIS — R29.898 BILATERAL LEG WEAKNESS: ICD-10-CM

## 2022-02-03 PROCEDURE — 72158 MRI LUMBAR SPINE W/O & W/DYE: CPT

## 2022-02-03 PROCEDURE — 72157 MRI CHEST SPINE W/O & W/DYE: CPT

## 2022-02-03 PROCEDURE — 255N000002 HC RX 255 OP 636: Performed by: PSYCHIATRY & NEUROLOGY

## 2022-02-03 PROCEDURE — A9585 GADOBUTROL INJECTION: HCPCS | Performed by: PSYCHIATRY & NEUROLOGY

## 2022-02-03 RX ORDER — GADOBUTROL 604.72 MG/ML
9 INJECTION INTRAVENOUS ONCE
Status: COMPLETED | OUTPATIENT
Start: 2022-02-03 | End: 2022-02-03

## 2022-02-03 RX ADMIN — GADOBUTROL 9 ML: 604.72 INJECTION INTRAVENOUS at 07:50

## 2022-02-08 ENCOUNTER — TELEPHONE (OUTPATIENT)
Dept: NEUROLOGY | Facility: CLINIC | Age: 77
End: 2022-02-08
Payer: COMMERCIAL

## 2022-02-08 NOTE — TELEPHONE ENCOUNTER
Spoke with pt's wife, Mirela, to relay pt's  MRI results. She had no questions or concerns at this point.     Tory Montiel RN on 2/8/2022 at 2:22 PM

## 2022-02-08 NOTE — RESULT ENCOUNTER NOTE
Please let Raymon know that his spine imaging shows some degenerative changes, generally mild.  There are also changes related to his prior surgeries.  No explanation for his leg weakness.    Follow-up with me in May as planned.  I see that he has been able to get into sports medicine to, so hopefully he is feeling better from a knee standpoint.    Thank you,Dr. Espino

## 2022-02-28 DIAGNOSIS — E55.9 VITAMIN D DEFICIENCY: ICD-10-CM

## 2022-02-28 RX ORDER — ERGOCALCIFEROL 1.25 MG/1
CAPSULE, LIQUID FILLED ORAL
Qty: 12 CAPSULE | Refills: 0 | OUTPATIENT
Start: 2022-02-28

## 2022-02-28 RX ORDER — CHOLECALCIFEROL (VITAMIN D3) 50 MCG
1 TABLET ORAL DAILY
Qty: 90 TABLET | Refills: 1 | Status: SHIPPED | OUTPATIENT
Start: 2022-02-28 | End: 2022-11-15

## 2022-02-28 NOTE — TELEPHONE ENCOUNTER
Routing refill request to provider for review/approval because:  Drug not on the [facility] refill protocol   Romana Roe RN

## 2022-03-01 ENCOUNTER — THERAPY VISIT (OUTPATIENT)
Dept: PHYSICAL THERAPY | Facility: CLINIC | Age: 77
End: 2022-03-01
Payer: COMMERCIAL

## 2022-03-01 DIAGNOSIS — G89.29 CHRONIC PAIN OF RIGHT KNEE: ICD-10-CM

## 2022-03-01 DIAGNOSIS — M25.562 CHRONIC PAIN OF LEFT KNEE: ICD-10-CM

## 2022-03-01 DIAGNOSIS — G89.29 CHRONIC PAIN OF LEFT KNEE: ICD-10-CM

## 2022-03-01 DIAGNOSIS — R26.89 POOR BALANCE: ICD-10-CM

## 2022-03-01 DIAGNOSIS — M25.561 CHRONIC PAIN OF RIGHT KNEE: ICD-10-CM

## 2022-03-01 DIAGNOSIS — R29.898 LEG WEAKNESS, BILATERAL: Primary | ICD-10-CM

## 2022-03-01 PROCEDURE — 97161 PT EVAL LOW COMPLEX 20 MIN: CPT | Mod: GP | Performed by: PHYSICAL THERAPIST

## 2022-03-01 PROCEDURE — 97110 THERAPEUTIC EXERCISES: CPT | Mod: GP | Performed by: PHYSICAL THERAPIST

## 2022-03-01 ASSESSMENT — ACTIVITIES OF DAILY LIVING (ADL)
WALK: ACTIVITY IS VERY DIFFICULT
KNEEL ON THE FRONT OF YOUR KNEE: I AM UNABLE TO DO THE ACTIVITY
STIFFNESS: THE SYMPTOM AFFECTS MY ACTIVITY SEVERELY
GIVING WAY, BUCKLING OR SHIFTING OF KNEE: THE SYMPTOM PREVENTS ME FROM ALL DAILY ACTIVITIES
SWELLING: I DO NOT HAVE THE SYMPTOM
GO DOWN STAIRS: ACTIVITY IS VERY DIFFICULT
SQUAT: I AM UNABLE TO DO THE ACTIVITY
KNEE_ACTIVITY_OF_DAILY_LIVING_SUM: 16
LIMPING: THE SYMPTOM PREVENTS ME FROM ALL DAILY ACTIVITIES
SIT WITH YOUR KNEE BENT: ACTIVITY IS MINIMALLY DIFFICULT
PAIN: THE SYMPTOM AFFECTS MY ACTIVITY SEVERELY
RISE FROM A CHAIR: ACTIVITY IS VERY DIFFICULT
HOW_WOULD_YOU_RATE_THE_CURRENT_FUNCTION_OF_YOUR_KNEE_DURING_YOUR_USUAL_DAILY_ACTIVITIES_ON_A_SCALE_FROM_0_TO_100_WITH_100_BEING_YOUR_LEVEL_OF_KNEE_FUNCTION_PRIOR_TO_YOUR_INJURY_AND_0_BEING_THE_INABILITY_TO_PERFORM_ANY_OF_YOUR_USUAL_DAILY_ACTIVITIES?: 10
HOW_WOULD_YOU_RATE_THE_OVERALL_FUNCTION_OF_YOUR_KNEE_DURING_YOUR_USUAL_DAILY_ACTIVITIES?: SEVERELY ABNORMAL
AS_A_RESULT_OF_YOUR_KNEE_INJURY,_HOW_WOULD_YOU_RATE_YOUR_CURRENT_LEVEL_OF_DAILY_ACTIVITY?: SEVERELY ABNORMAL
GO UP STAIRS: ACTIVITY IS VERY DIFFICULT
STAND: ACTIVITY IS VERY DIFFICULT
RAW_SCORE: 16
WEAKNESS: THE SYMPTOM PREVENTS ME FROM ALL DAILY ACTIVITIES
KNEE_ACTIVITY_OF_DAILY_LIVING_SCORE: 22.86

## 2022-03-01 NOTE — TELEPHONE ENCOUNTER
Pt has completed high dose Vitamin D already when last checked in 5/2021 and OK to resume to OTC vitamin D 2000 units daily - Sent to pharmacy today. ( To avoid vitamin D toxicity )     Schedule AWV which he is due in 5/2022 for recheck on yearly labs and further recommendations.      Thank you,  Beatrice Stephens MD on 2/28/2022 at 6:27 PM

## 2022-03-01 NOTE — TELEPHONE ENCOUNTER
Patient Contact     Called pt and relayed message from Dr. Stephens, see below. He and his wife stated understanding. They wanted to look at their schedule before scheduling physical in May. They know they can schedule via Mychart or calling the clinic.     Petra BANERJEE RN  [facility]

## 2022-03-03 NOTE — PROGRESS NOTES
"Physical Therapy Initial Evaluation  Subjective:  The history is provided by the patient and the spouse. No  was used.   Therapist Generated HPI Evaluation  Problem details: \" Raymon \" is a pleasant 75 yo male referred to PT with chief complaint of bilateral knee pain, weakness, and poor balance for the past 2+ years.     Raymon has been dealing with leg weakness, low back pain, and  R shoulder dysfunction that has reduced his overall activity, function and has effected his balance and global strength.     He walks with assist of a SEC. Difficult sit to stand, walking greater than 3-5 minutes, stairs, and transitional  movement. He no longer drives his car and requires assist with day to day life.     Raymon underwent bilateral knee injection within the past week without change of pain or function at this point.  .         Type of problem:  Bilateral knees.    This is a chronic condition.  Condition occurred with:  Insidious onset and degenerative joint disease.  Where condition occurred: for unknown reasons.  Patient reports pain:  In the joint, lateral and medial.  Pain is described as sharp and is intermittent (7/10 ).  Pain radiates to:  Knee. Pain is the same all the time.  Since onset symptoms are gradually worsening.  Associated symptoms:  Catching and loss of strength. Symptoms are exacerbated by ascending stairs, bending/squatting, descending stairs, weight bearing, transfers, standing and walking  and relieved by rest.  Special tests included:  X-ray.  Previous treatment includes physical therapy. There was mild (patient is hit and miss with self care prescribed. He does not follow through at home despite equipment to use ad past instruction. ) improvement following previous treatment.  Work activity restrictions: retired.  Barriers include:  None as reported by patient.           Knee Activity of Daily Living Score: 22.86            Objective:  Standing Alignment:    Cervical/Thoracic:  " Forward head  Shoulder/UE:  Rounded shoulders              Gait:    Gait Type:  Antalgic   Weight Bearing Status:  WBAT   Assistive Devices:  Cane      Flexibility/Screens:   Positive screens:  Lumbar and Shoulder          Neurological: He has normal motor skills and normal sensation.                                                   Knee Evaluation:  ROM:  Strength wnl knee: quad strength 3-/5 leg extension, hip/glute and core weakness. Global deconditioned , including aerobic capacity.               Special Tests: Not Assessed      Palpation:    Left knee tenderness present at:  Medial Joint Line; Lateral Joint Line and Patellar Tendon  Right knee tenderness present at:  Medial Joint Line; Lateral Joint Line and Patellar Tendon      Functional Testing:          Quad:    Single Leg Squat:  Left:       Right:        Bilateral Leg Squat:  20-30 degree knee bend  Significant loss of control, hip substitution, femoral IR, excessive anterior knee excursion and decreased hip/trunk flexion              General     ROS    Assessment/Plan:    Patient is a 76 year old male with both sides knee complaints.    Patient has the following significant findings with corresponding treatment plan.                Diagnosis 1:  Bilateral knee OA, Pain       Therapy Evaluation Codes:   1) History comprised of:   Personal factors that impact the plan of care:      None.    Comorbidity factors that impact the plan of care are:      High blood pressure, Osteoarthritis and Weakness.     Medications impacting care: High blood pressure and Pain.  2) Examination of Body Systems comprised of:   Body structures and functions that impact the plan of care:      Knee.   Activity limitations that impact the plan of care are:      Bending, Driving, Dressing, Lifting, Sitting, Squatting/kneeling, Stairs, Standing and Walking.  3) Clinical presentation characteristics are:   Evolving/Changing.  4) Decision-Making    Moderate complexity using  standardized patient assessment instrument and/or measureable assessment of functional outcome.  Cumulative Therapy Evaluation is: Moderate complexity.    Previous and current functional limitations:  (See Goal Flow Sheet for this information)    Short term and Long term goals: (See Goal Flow Sheet for this information)     Communication ability:  Patient appears to be able to clearly communicate and understand verbal and written communication and follow directions correctly.  Treatment Explanation - The following has been discussed with the patient:   RX ordered/plan of care  Anticipated outcomes  Possible risks and side effects  This patient would benefit from PT intervention to resume normal activities.   Rehab potential is fair.    Frequency: 1 X week, once daily  Duration:  for 12 weeks  Discharge Plan:  Independent in home treatment program.  Reach maximal therapeutic benefit.    Recommend weekly frequency to rehab followed by HEP follow up. Consider rehab close to patient home to enable consistent frequency to rehab and HEP.          Please refer to the daily flowsheet for treatment today, total treatment time and time spent performing 1:1 timed codes.

## 2022-03-03 NOTE — PROGRESS NOTES
[facility]    OUTPATIENT Physical Therapy ORTHOPEDIC EVALUATION  PLAN OF TREATMENT FOR OUTPATIENT REHABILITATION  (COMPLETE FOR INITIAL CLAIMS ONLY)  Patient's Last Name, First Name, M.I.  YOB: 1945  Raymon Ace    Provider s Name:  [facility]   Medical Record No.  [id number]   Start of Care Date:  03/01/22   Onset Date:       Type:     _X__PT   ___OT Medical Diagnosis:    Encounter Diagnoses   Name Primary?     Leg weakness, bilateral Yes     Poor balance      Chronic pain of right knee      Chronic pain of left knee         Treatment Diagnosis:  Bilateral knee arthritis, pain         Goals:     03/01/22 0500   Body Part   Goals listed below are for Knee Pain    Goal #1   Goal #1 ambulation   Previous Functional Level No restrictions   Current Functional Level Minutes patient can walk   Performance Level 3 minutes   STG Target Performance Minutes patient will be able to walk   Performance Level 8-10 minutes   Rationale for safe household ambulation;for safe outdoor household ambulation;for safe community ambulation   Due Date 04/12/22    LTG Target Performance Minutes patient will be able to  walk   Performance Level 15-20 minutes    Rationale for safe household ambulation;for safe outdoor household ambulation;for safe community ambulation   Due Date 05/31/22       Therapy Frequency:  1x/week   Predicted Duration of Therapy Intervention:  12 weeks     Mitul Mortensen, PT                 I CERTIFY THE NEED FOR THESE SERVICES FURNISHED UNDER        THIS PLAN OF TREATMENT AND WHILE UNDER MY CARE     (Physician attestation of this document indicates review and certification of the therapy plan).                       Certification Date From:  03/01/22   Certification Date To:  03/31/22    Referring Provider:  Zhane Muniz    Initial Assessment        See Epic  Evaluation SOC Date: 03/01/22

## 2022-03-09 ENCOUNTER — THERAPY VISIT (OUTPATIENT)
Dept: PHYSICAL THERAPY | Facility: CLINIC | Age: 77
End: 2022-03-09
Payer: COMMERCIAL

## 2022-03-09 DIAGNOSIS — R26.89 POOR BALANCE: ICD-10-CM

## 2022-03-09 DIAGNOSIS — R29.898 LEG WEAKNESS, BILATERAL: Primary | ICD-10-CM

## 2022-03-09 PROCEDURE — 97110 THERAPEUTIC EXERCISES: CPT | Mod: GP | Performed by: PHYSICAL THERAPIST

## 2022-03-11 DIAGNOSIS — E55.9 VITAMIN D DEFICIENCY: ICD-10-CM

## 2022-03-14 RX ORDER — ERGOCALCIFEROL 1.25 MG/1
CAPSULE, LIQUID FILLED ORAL
Qty: 12 CAPSULE | Refills: 0 | OUTPATIENT
Start: 2022-03-14

## 2022-03-15 ENCOUNTER — THERAPY VISIT (OUTPATIENT)
Dept: PHYSICAL THERAPY | Facility: CLINIC | Age: 77
End: 2022-03-15
Payer: COMMERCIAL

## 2022-03-15 DIAGNOSIS — R29.898 LEG WEAKNESS, BILATERAL: Primary | ICD-10-CM

## 2022-03-15 DIAGNOSIS — R26.89 POOR BALANCE: ICD-10-CM

## 2022-03-15 PROCEDURE — 97110 THERAPEUTIC EXERCISES: CPT | Mod: GP | Performed by: PHYSICAL THERAPIST

## 2022-03-21 DIAGNOSIS — Z00.00 ENCOUNTER FOR MEDICARE ANNUAL WELLNESS EXAM: ICD-10-CM

## 2022-03-21 NOTE — TELEPHONE ENCOUNTER
Last Written Prescription Date:  10/30/2021  Last Fill Quantity: 90,  # refills: 1   Last office visit: 9/21/2021 with prescribing provider:  Dr Stephens   Future Office Visit:   Next 5 appointments (look out 90 days)    May 17, 2022  1:20 PM  (Arrive by 1:05 PM)  Return Visit with Maria Espino MD  [facility] [place] ([facility] [place] ) [address] [place] [id number] [phone]

## 2022-03-22 ENCOUNTER — THERAPY VISIT (OUTPATIENT)
Dept: PHYSICAL THERAPY | Facility: CLINIC | Age: 77
End: 2022-03-22
Payer: COMMERCIAL

## 2022-03-22 DIAGNOSIS — R29.898 LEG WEAKNESS, BILATERAL: Primary | ICD-10-CM

## 2022-03-22 DIAGNOSIS — R26.89 POOR BALANCE: ICD-10-CM

## 2022-03-22 PROCEDURE — 97110 THERAPEUTIC EXERCISES: CPT | Mod: GP | Performed by: PHYSICAL THERAPIST

## 2022-03-22 PROCEDURE — 97112 NEUROMUSCULAR REEDUCATION: CPT | Mod: GP | Performed by: PHYSICAL THERAPIST

## 2022-03-23 RX ORDER — METOPROLOL SUCCINATE 50 MG/1
50 TABLET, EXTENDED RELEASE ORAL DAILY
Qty: 90 TABLET | Refills: 1 | Status: SHIPPED | OUTPATIENT
Start: 2022-03-23 | End: 2022-06-14

## 2022-03-24 DIAGNOSIS — E55.9 VITAMIN D DEFICIENCY: ICD-10-CM

## 2022-03-25 RX ORDER — ERGOCALCIFEROL 1.25 MG/1
CAPSULE, LIQUID FILLED ORAL
Qty: 12 CAPSULE | Refills: 0 | OUTPATIENT
Start: 2022-03-25

## 2022-03-25 NOTE — TELEPHONE ENCOUNTER
This refill request is a duplicate previously sent to pharmacy or currently in review.  Refused medication to pharmacy as duplicate.   Romana Roe RN

## 2022-03-29 ENCOUNTER — THERAPY VISIT (OUTPATIENT)
Dept: PHYSICAL THERAPY | Facility: CLINIC | Age: 77
End: 2022-03-29
Payer: COMMERCIAL

## 2022-03-29 DIAGNOSIS — M25.562 CHRONIC PAIN OF LEFT KNEE: ICD-10-CM

## 2022-03-29 DIAGNOSIS — G89.29 CHRONIC PAIN OF LEFT KNEE: ICD-10-CM

## 2022-03-29 DIAGNOSIS — G89.29 CHRONIC PAIN OF RIGHT KNEE: ICD-10-CM

## 2022-03-29 DIAGNOSIS — R29.898 LEG WEAKNESS, BILATERAL: Primary | ICD-10-CM

## 2022-03-29 DIAGNOSIS — M25.561 CHRONIC PAIN OF RIGHT KNEE: ICD-10-CM

## 2022-03-29 DIAGNOSIS — R26.89 POOR BALANCE: ICD-10-CM

## 2022-03-29 PROCEDURE — 97112 NEUROMUSCULAR REEDUCATION: CPT | Mod: GP | Performed by: PHYSICAL THERAPIST

## 2022-03-29 PROCEDURE — 97110 THERAPEUTIC EXERCISES: CPT | Mod: GP | Performed by: PHYSICAL THERAPIST

## 2022-04-06 ENCOUNTER — LAB (OUTPATIENT)
Dept: LAB | Facility: CLINIC | Age: 77
End: 2022-04-06
Payer: COMMERCIAL

## 2022-04-06 DIAGNOSIS — D50.0 IRON DEFICIENCY ANEMIA DUE TO CHRONIC BLOOD LOSS: ICD-10-CM

## 2022-04-06 DIAGNOSIS — Z79.01 LONG TERM CURRENT USE OF ANTICOAGULANT THERAPY: ICD-10-CM

## 2022-04-06 DIAGNOSIS — Z86.718 PERSONAL HISTORY OF VENOUS THROMBOSIS AND EMBOLISM: ICD-10-CM

## 2022-04-06 DIAGNOSIS — D69.3 IDIOPATHIC THROMBOCYTOPENIC PURPURA (H): ICD-10-CM

## 2022-04-06 LAB
ALBUMIN SERPL-MCNC: 3.7 G/DL (ref 3.4–5)
ALP SERPL-CCNC: 99 U/L (ref 40–150)
ALT SERPL W P-5'-P-CCNC: 44 U/L (ref 0–70)
ANION GAP SERPL CALCULATED.3IONS-SCNC: 6 MMOL/L (ref 3–14)
AST SERPL W P-5'-P-CCNC: 40 U/L (ref 0–45)
BASOPHILS # BLD AUTO: 0.1 10E3/UL (ref 0–0.2)
BASOPHILS NFR BLD AUTO: 1 %
BILIRUB SERPL-MCNC: 1.7 MG/DL (ref 0.2–1.3)
BUN SERPL-MCNC: 8 MG/DL (ref 7–30)
CALCIUM SERPL-MCNC: 9.2 MG/DL (ref 8.5–10.1)
CHLORIDE BLD-SCNC: 108 MMOL/L (ref 94–109)
CO2 SERPL-SCNC: 29 MMOL/L (ref 20–32)
CREAT SERPL-MCNC: 0.89 MG/DL (ref 0.66–1.25)
EOSINOPHIL # BLD AUTO: 0.3 10E3/UL (ref 0–0.7)
EOSINOPHIL NFR BLD AUTO: 3 %
ERYTHROCYTE [DISTWIDTH] IN BLOOD BY AUTOMATED COUNT: 14.9 % (ref 10–15)
FERRITIN SERPL-MCNC: 117 NG/ML (ref 26–388)
GFR SERPL CREATININE-BSD FRML MDRD: 88 ML/MIN/1.73M2
GLUCOSE BLD-MCNC: 103 MG/DL (ref 70–99)
HCT VFR BLD AUTO: 45 % (ref 40–53)
HGB BLD-MCNC: 15.5 G/DL (ref 13.3–17.7)
IMM GRANULOCYTES # BLD: 0.2 10E3/UL
IMM GRANULOCYTES NFR BLD: 2 %
LYMPHOCYTES # BLD AUTO: 1.6 10E3/UL (ref 0.8–5.3)
LYMPHOCYTES NFR BLD AUTO: 19 %
MCH RBC QN AUTO: 33.9 PG (ref 26.5–33)
MCHC RBC AUTO-ENTMCNC: 34.4 G/DL (ref 31.5–36.5)
MCV RBC AUTO: 99 FL (ref 78–100)
MONOCYTES # BLD AUTO: 0.9 10E3/UL (ref 0–1.3)
MONOCYTES NFR BLD AUTO: 11 %
NEUTROPHILS # BLD AUTO: 5.3 10E3/UL (ref 1.6–8.3)
NEUTROPHILS NFR BLD AUTO: 64 %
NRBC # BLD AUTO: 0 10E3/UL
NRBC BLD AUTO-RTO: 0 /100
PLATELET # BLD AUTO: 67 10E3/UL (ref 150–450)
POTASSIUM BLD-SCNC: 3.6 MMOL/L (ref 3.4–5.3)
PROT SERPL-MCNC: 7.3 G/DL (ref 6.8–8.8)
RBC # BLD AUTO: 4.57 10E6/UL (ref 4.4–5.9)
SODIUM SERPL-SCNC: 143 MMOL/L (ref 133–144)
WBC # BLD AUTO: 8.2 10E3/UL (ref 4–11)

## 2022-04-06 PROCEDURE — 82728 ASSAY OF FERRITIN: CPT

## 2022-04-06 PROCEDURE — 80053 COMPREHEN METABOLIC PANEL: CPT

## 2022-04-06 PROCEDURE — 85025 COMPLETE CBC W/AUTO DIFF WBC: CPT

## 2022-04-06 PROCEDURE — 36415 COLL VENOUS BLD VENIPUNCTURE: CPT

## 2022-04-07 ENCOUNTER — VIRTUAL VISIT (OUTPATIENT)
Dept: ONCOLOGY | Facility: CLINIC | Age: 77
End: 2022-04-07
Attending: INTERNAL MEDICINE
Payer: COMMERCIAL

## 2022-04-07 DIAGNOSIS — Z86.718 PERSONAL HISTORY OF VENOUS THROMBOSIS AND EMBOLISM: ICD-10-CM

## 2022-04-07 DIAGNOSIS — D50.0 IRON DEFICIENCY ANEMIA DUE TO CHRONIC BLOOD LOSS: ICD-10-CM

## 2022-04-07 DIAGNOSIS — Z79.01 LONG TERM CURRENT USE OF ANTICOAGULANT THERAPY: ICD-10-CM

## 2022-04-07 DIAGNOSIS — D69.3 IDIOPATHIC THROMBOCYTOPENIC PURPURA (H): Primary | ICD-10-CM

## 2022-04-07 PROCEDURE — 99215 OFFICE O/P EST HI 40 MIN: CPT | Mod: 95 | Performed by: INTERNAL MEDICINE

## 2022-04-07 NOTE — PROGRESS NOTES
HEMATOLOGY CLINIC VISIT    NOTE:  Due to the ongoing COVID-19 outbreak, this visit was conducted by telephone, with the patient's approval.       Raymon is a 76-year-old male with chronic ITP and a history of unprovoked pulmonary embolism in April 2017 for which he is maintained on long-term anticoagulation.  He has a history of recurrent iron deficiency anemia felt secondary to chronic occult blood loss from portal hypertensive gastropathy and hemorrhoids.  He also has underlying alcoholic liver disease.  He was scheduled today for routine follow-up visit.     He continues to struggle with back pain and lower extremity weakness.  He has had several back surgeries.  Currently undergoing physical therapy.  Has been seen by neurology and has follow-up with them again in May.  He also has osteoarthritis of both knees and is contemplating knee replacement.  Recently had injections which were of little benefit.    He remains on Promacta 75 mg daily for chronic ITP.  He is tolerating the medication without any perceived side effects.  He also remains on prophylactic intensity anticoagulation with rivaroxaban.  He denies any bleeding issues.  He denies any blood in his stools and most recently had upper and lower endoscopies performed in November 2020.    He last received IV iron in late August / early September 2021.  Prior to that he was treated in January 2020.  He has longstanding fatigue which is likely multifactorial, as it does not seem to really change after iron infusions.     Physical exam:  Not done, telephone visit.    Labs:  Most recent labs are from yesterday.  Serum electrolytes are normal.  Creatinine 0.89.  LFTs normal aside from a mildly elevated total bilirubin of 1.7.  This is not a new finding.  Ferritin is 117.    White count 8.2, hemoglobin 15.5, platelet count 67,000.  Leukocyte differential normal.        ASSESSMENT / PLAN:  1.  Chronic ITP -- Raymon's baseline platelet count has been in the 70,000  to 100,000 range over the last 5 years.  He remains on Promacta 75 mg daily.  He is responsive to pulse dexamethasone and we have used this to increase his platelet count prior to surgeries.  At the present time, his platelet count is within his usual baseline.  Given that he is on long-term anticoagulation, the goal is to maintain his platelet count consistently above 50,000.  Note that there may also be a component of thrombocytopenia related to his underlying liver disease.    2. History of unprovoked pulmonary embolism (April 2017) -- He has done well on long-term anticoagulation with rivaroxaban, which he should continue.  He is on the prophylactic dose of 10 mg daily due to a combination of mild intermittent renal insufficiency and underlying liver disease.  We will make no change in this part of his treatment plan today.    3.  Iron deficiency anemia -- This has been attributed to to chronic occult blood loss from portal hypertensive gastropathy, and hemorrhoids.  He denies any new bleeding symptoms or any obvious signs of GI bleeding, and had upper and lower endoscopies in the fall 2020.  He received IV iron again in late August / early September 2021 and is currently iron replete.      Plan to see him back with repeat labs in about 4 months.      Total time 40 minutes, including review of medical records and labs, telephone visit (30 minutes), and documentation.      Waylon Novoa MD  Professor of Medicine  Division of Hematology, Oncology, and Transplantation  Director, [facility]

## 2022-04-07 NOTE — PROGRESS NOTES
Raymon is a 76 year old who is being evaluated via a billable telephone visit.      What phone number would you like to be contacted at? [phone]

## 2022-04-07 NOTE — LETTER
4/7/2022     RE: Raymon Ace  [address] [facility] [address]    Dear Colleague,    Thank you for referring your patient, Raymon Ace, to the [facility]. Please see a copy of my visit note below.    Raymon is a 76 year old who is being evaluated via a billable telephone visit.      What phone number would you like to be contacted at? [phone]        HEMATOLOGY CLINIC VISIT    NOTE:  Due to the ongoing COVID-19 outbreak, this visit was conducted by telephone, with the patient's approval.     Raymon is a 76-year-old male with chronic ITP and a history of unprovoked pulmonary embolism in April 2017 for which he is maintained on long-term anticoagulation.  He has a history of recurrent iron deficiency anemia felt secondary to chronic occult blood loss from portal hypertensive gastropathy and hemorrhoids.  He also has underlying alcoholic liver disease.  He was scheduled today for routine follow-up visit.     He continues to struggle with back pain and lower extremity weakness.  He has had several back surgeries.  Currently undergoing physical therapy.  Has been seen by neurology and has follow-up with them again in May.  He also has osteoarthritis of both knees and is contemplating knee replacement.  Recently had injections which were of little benefit.    He remains on Promacta 75 mg daily for chronic ITP.  He is tolerating the medication without any perceived side effects.  He also remains on prophylactic intensity anticoagulation with rivaroxaban.  He denies any bleeding issues.  He denies any blood in his stools and most recently had upper and lower endoscopies performed in November 2020.    He last received IV iron in late August / early September 2021.  Prior to that he was treated in January 2020.  He has longstanding fatigue which is likely multifactorial, as it does not seem to really change after iron infusions.       Physical exam:  Not done, telephone  visit.    Labs:  Most recent labs are from yesterday.  Serum electrolytes are normal.  Creatinine 0.89.  LFTs normal aside from a mildly elevated total bilirubin of 1.7.  This is not a new finding.  Ferritin is 117.    White count 8.2, hemoglobin 15.5, platelet count 67,000.  Leukocyte differential normal.    ASSESSMENT / PLAN:  1.  Chronic ITP -- Raymon's baseline platelet count has been in the 70,000 to 100,000 range over the last 5 years.  He remains on Promacta 75 mg daily.  He is responsive to pulse dexamethasone and we have used this to increase his platelet count prior to surgeries.  At the present time, his platelet count is within his usual baseline.  Given that he is on long-term anticoagulation, the goal is to maintain his platelet count consistently above 50,000.  Note that there may also be a component of thrombocytopenia related to his underlying liver disease.    2. History of unprovoked pulmonary embolism (April 2017) -- He has done well on long-term anticoagulation with rivaroxaban, which he should continue.  He is on the prophylactic dose of 10 mg daily due to a combination of mild intermittent renal insufficiency and underlying liver disease.  We will make no change in this part of his treatment plan today.    3.  Iron deficiency anemia -- This has been attributed to to chronic occult blood loss from portal hypertensive gastropathy, and hemorrhoids.  He denies any new bleeding symptoms or any obvious signs of GI bleeding, and had upper and lower endoscopies in the fall 2020.  He received IV iron again in late August / early September 2021 and is currently iron replete.    Plan to see him back with repeat labs in about 4 months.      Total time 40 minutes, including review of medical records and labs, telephone visit (30 minutes), and documentation.      Waylon Novoa MD  Professor of Medicine  Division of Hematology, Oncology, and Transplantation  Director, [facility]

## 2022-04-12 ENCOUNTER — THERAPY VISIT (OUTPATIENT)
Dept: PHYSICAL THERAPY | Facility: CLINIC | Age: 77
End: 2022-04-12
Payer: COMMERCIAL

## 2022-04-12 DIAGNOSIS — R26.89 POOR BALANCE: ICD-10-CM

## 2022-04-12 DIAGNOSIS — R29.898 LEG WEAKNESS, BILATERAL: Primary | ICD-10-CM

## 2022-04-12 PROCEDURE — 97110 THERAPEUTIC EXERCISES: CPT | Mod: GP | Performed by: PHYSICAL THERAPIST

## 2022-04-12 PROCEDURE — 97112 NEUROMUSCULAR REEDUCATION: CPT | Mod: GP | Performed by: PHYSICAL THERAPIST

## 2022-04-12 NOTE — PROGRESS NOTES
Subjective:  HPI  Physical Exam                    Objective:  System    Physical Exam    General     ROS    Assessment/Plan:    PROGRESS  REPORT    Progress reporting period is from 3/01/22 to 4/12/2022.     SUBJECTIVE  Raymon shuffles gait into clinic today. Uses SEC. Continues to complain of pain at bilateral joint line.and poor balance.    He is improved with overall gait endurance  to 5-8 minutes versus 2-3 minutes uinitially, also  sit to stand independently without assist now.      He requires assist with reciprocating stairs, walking with SEC.   He does not drive.       Current Pain level: 3/10   Initial Pain level: 8/10   Changes in function: Yes, see goal flow sheet for change in function         OBJECTIVE   ABle to increase endurance to biking, Leg press strength and able to step up to 2-3 inches which he could not do initially.     Stay the course building strength to hips, legs and endurance. USe of BFR assist fatigue without stressing his joint.     Follow up PT 1x/week x 8 weeks.      ASSESSMENT/PLAN  Updated problem list and treatment plan: Diagnosis 1:  Bilateral lower leg weakness, poor balance     STG/LTGs have been met or progress has been made towards goals:  Yes (See Goal flow sheet completed today.)  Assessment of Progress: The patient's condition has potential to improve.  Self Management Plans:  Patient has been instructed in a home treatment program.    Recommendations:  This patient would benefit from continued therapy.     Frequency:  1 X week, once daily  Duration:  for 12 weeks        Please refer to the daily flowsheet for treatment today, total treatment time and time spent performing 1:1 timed codes.

## 2022-04-12 NOTE — PROGRESS NOTES
[facility]    OUTPATIENT Physical Therapy ORTHOPEDIC EVALUATION  PLAN OF TREATMENT FOR OUTPATIENT REHABILITATION  (COMPLETE FOR INITIAL CLAIMS ONLY)  Patient's Last Name, First Name, M.I.  YOB: 1945  Raymon Ace    Provider s Name:  [facility]   Medical Record No.  [id number]   Start of Care Date:  03/01/22   Onset Date:       Type:     _X__PT   ___OT Medical Diagnosis:    Encounter Diagnoses   Name Primary?    Leg weakness, bilateral Yes    Poor balance         Treatment Diagnosis:  Bilateral knee arthritis, pain         Goals:     04/12/22 0500   Body Part   Goals listed below are for Knee Pain    Goal #1   Goal #1 ambulation   Previous Functional Level No restrictions   Current Functional Level Minutes patient can walk   Performance Level 6-10 minutes    STG Target Performance Minutes patient will be able to walk   Performance Level 8-10 minutes   Rationale for safe household ambulation;for safe outdoor household ambulation;for safe community ambulation   Due Date 04/12/22    LTG Target Performance Minutes patient will be able to  walk   Performance Level 15-20 minutes    Rationale for safe household ambulation;for safe outdoor household ambulation;for safe community ambulation   Due Date 05/31/22   Goal #2   Goal #2 stairs   Previous Functional Level Ascend/descend stairs   Performance Level full step   Current Functional Level Ascend stairs;one step at a time   Performance Level 1-2 inch step   STG Target Performance Ascend stairs;one step at a time   Performance Level full step   Rationale to enter/leave the house safely;to reach upper level of home safely   Due Date 05/20/22   LTG Target Performance Ascend/descend stairs;one step at a time;with railing   Performance Level full step   Rationale for safe community access to buildings;for safe community  ambulaton;to reach upper level of home safely;to enter/leave the house safely   Due Date 09/12/22       Therapy Frequency:  1x/week   Predicted Duration of Therapy Intervention:  12 weeks     Mitul Mortensen, PT                 I CERTIFY THE NEED FOR THESE SERVICES FURNISHED UNDER        THIS PLAN OF TREATMENT AND WHILE UNDER MY CARE     (Physician attestation of this document indicates review and certification of the therapy plan).                     Certification Date From:  04/01/22   Certification Date To:  07/10/22    Referring Provider:  Zhane Muniz    Initial Assessment        See Epic Evaluation SOC Date: 03/01/22

## 2022-04-20 ENCOUNTER — TELEPHONE (OUTPATIENT)
Dept: FAMILY MEDICINE | Facility: CLINIC | Age: 77
End: 2022-04-20
Payer: COMMERCIAL

## 2022-04-20 DIAGNOSIS — E55.9 VITAMIN D DEFICIENCY: ICD-10-CM

## 2022-04-20 RX ORDER — ERGOCALCIFEROL 1.25 MG/1
CAPSULE, LIQUID FILLED ORAL
Qty: 12 CAPSULE | Refills: 0 | OUTPATIENT
Start: 2022-04-20

## 2022-04-21 NOTE — TELEPHONE ENCOUNTER
Pt has completed high dose Vitamin D already and OK to resume to OTC vitamin D 2000 units daily. ( To avoid vitamin D toxicity )      Thank you,  Beatrice Stephens MD on 4/20/2022

## 2022-04-24 DIAGNOSIS — E55.9 VITAMIN D DEFICIENCY: ICD-10-CM

## 2022-04-25 RX ORDER — ERGOCALCIFEROL 1.25 MG/1
CAPSULE, LIQUID FILLED ORAL
Qty: 12 CAPSULE | Refills: 0 | Status: SHIPPED | OUTPATIENT
Start: 2022-04-25 | End: 2023-07-12 | Stop reason: DRUGHIGH

## 2022-04-27 NOTE — TELEPHONE ENCOUNTER
In review of encounter with doing research to reach out to contact patient regarding your message I noted that the medication WAS refilled.      Please advise with new/additional follow up recommendations for this for patient outreach.    Pili GUERRERO - Registered Nurse  [facility]  Acute and Diagnostic Services

## 2022-04-28 ENCOUNTER — LAB (OUTPATIENT)
Dept: LAB | Facility: CLINIC | Age: 77
End: 2022-04-28
Payer: COMMERCIAL

## 2022-04-28 DIAGNOSIS — Z86.718 PERSONAL HISTORY OF VENOUS THROMBOSIS AND EMBOLISM: ICD-10-CM

## 2022-04-28 DIAGNOSIS — E78.5 HYPERLIPIDEMIA: ICD-10-CM

## 2022-04-28 DIAGNOSIS — Z79.01 LONG TERM CURRENT USE OF ANTICOAGULANT THERAPY: ICD-10-CM

## 2022-04-28 DIAGNOSIS — D69.3 IDIOPATHIC THROMBOCYTOPENIC PURPURA (H): ICD-10-CM

## 2022-04-28 DIAGNOSIS — Z13.220 SCREENING FOR HYPERLIPIDEMIA: ICD-10-CM

## 2022-04-28 DIAGNOSIS — D50.0 IRON DEFICIENCY ANEMIA DUE TO CHRONIC BLOOD LOSS: ICD-10-CM

## 2022-04-28 LAB
ALBUMIN SERPL-MCNC: 3.6 G/DL (ref 3.4–5)
ALP SERPL-CCNC: 97 U/L (ref 40–150)
ALT SERPL W P-5'-P-CCNC: 40 U/L (ref 0–70)
ANION GAP SERPL CALCULATED.3IONS-SCNC: 5 MMOL/L (ref 3–14)
AST SERPL W P-5'-P-CCNC: 43 U/L (ref 0–45)
BASOPHILS # BLD AUTO: 0.1 10E3/UL (ref 0–0.2)
BASOPHILS NFR BLD AUTO: 1 %
BILIRUB SERPL-MCNC: 1.6 MG/DL (ref 0.2–1.3)
BUN SERPL-MCNC: 9 MG/DL (ref 7–30)
CALCIUM SERPL-MCNC: 8.9 MG/DL (ref 8.5–10.1)
CHLORIDE BLD-SCNC: 110 MMOL/L (ref 94–109)
CHOLEST SERPL-MCNC: 190 MG/DL
CO2 SERPL-SCNC: 27 MMOL/L (ref 20–32)
CREAT SERPL-MCNC: 0.87 MG/DL (ref 0.66–1.25)
EOSINOPHIL # BLD AUTO: 0.2 10E3/UL (ref 0–0.7)
EOSINOPHIL NFR BLD AUTO: 3 %
ERYTHROCYTE [DISTWIDTH] IN BLOOD BY AUTOMATED COUNT: 14.7 % (ref 10–15)
FASTING STATUS PATIENT QL REPORTED: YES
FERRITIN SERPL-MCNC: 38 NG/ML (ref 26–388)
GFR SERPL CREATININE-BSD FRML MDRD: 89 ML/MIN/1.73M2
GLUCOSE BLD-MCNC: 104 MG/DL (ref 70–99)
HCT VFR BLD AUTO: 43.2 % (ref 40–53)
HDLC SERPL-MCNC: 49 MG/DL
HGB BLD-MCNC: 14.8 G/DL (ref 13.3–17.7)
IMM GRANULOCYTES # BLD: 0.1 10E3/UL
IMM GRANULOCYTES NFR BLD: 2 %
LDLC SERPL CALC-MCNC: 114 MG/DL
LYMPHOCYTES # BLD AUTO: 1.1 10E3/UL (ref 0.8–5.3)
LYMPHOCYTES NFR BLD AUTO: 16 %
MCH RBC QN AUTO: 33.8 PG (ref 26.5–33)
MCHC RBC AUTO-ENTMCNC: 34.3 G/DL (ref 31.5–36.5)
MCV RBC AUTO: 99 FL (ref 78–100)
MONOCYTES # BLD AUTO: 0.8 10E3/UL (ref 0–1.3)
MONOCYTES NFR BLD AUTO: 12 %
NEUTROPHILS # BLD AUTO: 4.6 10E3/UL (ref 1.6–8.3)
NEUTROPHILS NFR BLD AUTO: 66 %
NONHDLC SERPL-MCNC: 141 MG/DL
NRBC # BLD AUTO: 0 10E3/UL
NRBC BLD AUTO-RTO: 0 /100
PLATELET # BLD AUTO: 67 10E3/UL (ref 150–450)
POTASSIUM BLD-SCNC: 4.1 MMOL/L (ref 3.4–5.3)
PROT SERPL-MCNC: 7.4 G/DL (ref 6.8–8.8)
RBC # BLD AUTO: 4.38 10E6/UL (ref 4.4–5.9)
SODIUM SERPL-SCNC: 142 MMOL/L (ref 133–144)
TRIGL SERPL-MCNC: 134 MG/DL
WBC # BLD AUTO: 6.8 10E3/UL (ref 4–11)

## 2022-04-28 PROCEDURE — 85025 COMPLETE CBC W/AUTO DIFF WBC: CPT

## 2022-04-28 PROCEDURE — 82728 ASSAY OF FERRITIN: CPT

## 2022-04-28 PROCEDURE — 36415 COLL VENOUS BLD VENIPUNCTURE: CPT

## 2022-04-28 PROCEDURE — 80061 LIPID PANEL: CPT

## 2022-04-28 PROCEDURE — 80053 COMPREHEN METABOLIC PANEL: CPT

## 2022-04-28 NOTE — TELEPHONE ENCOUNTER
No need for this refill. Pt no need to  this refill. OK to take OTC Vit.D 2000 units daily.     Beatrice Stephens MD on 4/27/2022

## 2022-04-28 NOTE — TELEPHONE ENCOUNTER
Wife states that the patient was sleeping at time of call.  Spoke with wife ERIBERTO Dietz on file. Gave message to the patient's wife for the patient to take OTC vitamin D 2000 international unit(s) daily. Patient done with high dose.     Advised to have the patient himself call back if questions.  Terri Wheat RN

## 2022-05-05 ENCOUNTER — THERAPY VISIT (OUTPATIENT)
Dept: PHYSICAL THERAPY | Facility: CLINIC | Age: 77
End: 2022-05-05
Payer: COMMERCIAL

## 2022-05-05 DIAGNOSIS — R26.89 POOR BALANCE: ICD-10-CM

## 2022-05-05 DIAGNOSIS — R29.898 LEG WEAKNESS, BILATERAL: Primary | ICD-10-CM

## 2022-05-05 PROCEDURE — 97110 THERAPEUTIC EXERCISES: CPT | Mod: GP | Performed by: PHYSICAL THERAPIST

## 2022-05-05 PROCEDURE — 97112 NEUROMUSCULAR REEDUCATION: CPT | Mod: GP | Performed by: PHYSICAL THERAPIST

## 2022-05-11 ENCOUNTER — THERAPY VISIT (OUTPATIENT)
Dept: PHYSICAL THERAPY | Facility: CLINIC | Age: 77
End: 2022-05-11
Payer: COMMERCIAL

## 2022-05-11 DIAGNOSIS — R26.89 POOR BALANCE: ICD-10-CM

## 2022-05-11 DIAGNOSIS — R29.898 LEG WEAKNESS, BILATERAL: Primary | ICD-10-CM

## 2022-05-11 DIAGNOSIS — G89.29 CHRONIC PAIN OF RIGHT KNEE: ICD-10-CM

## 2022-05-11 DIAGNOSIS — M25.561 CHRONIC PAIN OF RIGHT KNEE: ICD-10-CM

## 2022-05-11 PROCEDURE — 97110 THERAPEUTIC EXERCISES: CPT | Mod: GP | Performed by: PHYSICAL THERAPIST

## 2022-05-11 PROCEDURE — 97112 NEUROMUSCULAR REEDUCATION: CPT | Mod: GP | Performed by: PHYSICAL THERAPIST

## 2022-05-11 ASSESSMENT — ACTIVITIES OF DAILY LIVING (ADL)
HOW_WOULD_YOU_RATE_THE_OVERALL_FUNCTION_OF_YOUR_KNEE_DURING_YOUR_USUAL_DAILY_ACTIVITIES?: ABNORMAL
LIMPING: THE SYMPTOM AFFECTS MY ACTIVITY MODERATELY
STAND: ACTIVITY IS SOMEWHAT DIFFICULT
KNEEL ON THE FRONT OF YOUR KNEE: I AM UNABLE TO DO THE ACTIVITY
PAIN: THE SYMPTOM AFFECTS MY ACTIVITY MODERATELY
GO UP STAIRS: ACTIVITY IS FAIRLY DIFFICULT
SQUAT: I AM UNABLE TO DO THE ACTIVITY
SIT WITH YOUR KNEE BENT: ACTIVITY IS MINIMALLY DIFFICULT
SWELLING: I DO NOT HAVE THE SYMPTOM
GIVING WAY, BUCKLING OR SHIFTING OF KNEE: I DO NOT HAVE THE SYMPTOM
RAW_SCORE: 32
WEAKNESS: THE SYMPTOM AFFECTS MY ACTIVITY MODERATELY
STIFFNESS: THE SYMPTOM AFFECTS MY ACTIVITY MODERATELY
GO DOWN STAIRS: ACTIVITY IS VERY DIFFICULT
KNEE_ACTIVITY_OF_DAILY_LIVING_SUM: 32
KNEE_ACTIVITY_OF_DAILY_LIVING_SCORE: 45.71
AS_A_RESULT_OF_YOUR_KNEE_INJURY,_HOW_WOULD_YOU_RATE_YOUR_CURRENT_LEVEL_OF_DAILY_ACTIVITY?: ABNORMAL
RISE FROM A CHAIR: ACTIVITY IS FAIRLY DIFFICULT
WALK: ACTIVITY IS FAIRLY DIFFICULT
HOW_WOULD_YOU_RATE_THE_CURRENT_FUNCTION_OF_YOUR_KNEE_DURING_YOUR_USUAL_DAILY_ACTIVITIES_ON_A_SCALE_FROM_0_TO_100_WITH_100_BEING_YOUR_LEVEL_OF_KNEE_FUNCTION_PRIOR_TO_YOUR_INJURY_AND_0_BEING_THE_INABILITY_TO_PERFORM_ANY_OF_YOUR_USUAL_DAILY_ACTIVITIES?: 30

## 2022-05-11 NOTE — PROGRESS NOTES
Subjective:  HPI  Physical Exam       Knee Activity of Daily Living Score: 45.71            Objective:  System    Physical Exam    General     ROS    Assessment/Plan:    PROGRESS  REPORT        SUBJECTIVE   Raymon has been seen for a total of 6-8 visits of therapy ( 1x/week) for the past 6 weeks, gilberto connecting with his MD.     He is required to walking with assit of a SEC.     He has a shuffling, forward body unstable gait.      Chief complaint of knee pain, poor balance and global leg weakness.     Minimal  change of pain, function or strength acording to Raymon.     R knee is most sore and limited compared to L knee seems to have made a difference in function and strength.     R knee is crepitous and pan ful with most all activity.     Difficult sit to stand, balance and shuffling gait with assist of SEC.     ADLS score has improved from 22/100 to 40/100.     Pain inhibits function and strength progression.     Current Pain level: 4/10   Initial Pain level: 8/10   Changes in function: Yes, see goal flow sheet for change in function   Adverse reactions: None;   ,         OBJECTIVE  Bilateral Knee ROM is full. Functionally limited with ability to sit to stand , stairs.     Knee ROM is near full.     Very crepitous R > L .     Requires moderate assist to sit to stand, does complete independent yet..     Stair reciprocation tolerance to 2-3 inch block.     Patient seems to be slightly regressing or at best maintening current level of function.     Recommend 20-30 minutes of rehab exercise and biking at home every day.     MD appointment set up for May 17, 2022.     ASSESSMENT/PLAN  Updated problem list and treatment plan: Diagnosis 1:  Bilateral knee OA/pain       STG/LTGs have been met or progress has been made towards goals:  None  Assessment of Progress: The patient's progress has plateaued.  The patient's condition is unchanged.  Self Management Plans:  Patient has been instructed in a home treatment  program.    Recommendations:  This patient would benefit from further evaluation.    discontinue PT unless directed by MD otherwise.     Please refer to the daily flowsheet for treatment today, total treatment time and time spent performing 1:1 timed codes.

## 2022-05-17 ENCOUNTER — OFFICE VISIT (OUTPATIENT)
Dept: NEUROLOGY | Facility: CLINIC | Age: 77
End: 2022-05-17
Payer: COMMERCIAL

## 2022-05-17 ENCOUNTER — TELEPHONE (OUTPATIENT)
Dept: FAMILY MEDICINE | Facility: CLINIC | Age: 77
End: 2022-05-17

## 2022-05-17 VITALS
WEIGHT: 215.2 LBS | DIASTOLIC BLOOD PRESSURE: 99 MMHG | BODY MASS INDEX: 34.58 KG/M2 | HEIGHT: 66 IN | SYSTOLIC BLOOD PRESSURE: 143 MMHG | HEART RATE: 96 BPM

## 2022-05-17 DIAGNOSIS — R53.1 SUBJECTIVE WEAKNESS: ICD-10-CM

## 2022-05-17 DIAGNOSIS — R25.8 BRADYKINESIA: Primary | ICD-10-CM

## 2022-05-17 DIAGNOSIS — M17.0 OSTEOARTHRITIS OF BOTH KNEES, UNSPECIFIED OSTEOARTHRITIS TYPE: ICD-10-CM

## 2022-05-17 PROCEDURE — 99214 OFFICE O/P EST MOD 30 MIN: CPT | Performed by: PSYCHIATRY & NEUROLOGY

## 2022-05-17 RX ORDER — CARBIDOPA AND LEVODOPA 25; 100 MG/1; MG/1
1 TABLET ORAL 3 TIMES DAILY
Qty: 90 TABLET | Refills: 2 | Status: SHIPPED | OUTPATIENT
Start: 2022-05-17 | End: 2022-09-26

## 2022-05-17 NOTE — NURSING NOTE
Chief Complaint   Patient presents with     Extremity Weakness     Follow up- legs are getting worse     Patient is here with spouse Mirela Jain -Dell CAMPO@ on 5/17/2022 at 1:00 PM

## 2022-05-17 NOTE — PATIENT INSTRUCTIONS
Plan:  -- Referral to Orthopedic surgery for options with regards to the knees.  -- I would like to do a trial of carbidopa/levodopa (25/100mg): 1 tablet 3 times daily.  This would be to help with movement initiation and fluidity.  Feel free to let me know how you are feeling after a few weeks on the medication.  -- No clear explanation for your gait difficulties on thoracic or lumbar imaging.  -- Return to Neurology clinic in 3-4 months.

## 2022-05-17 NOTE — PROGRESS NOTES
ESTABLISHED PATIENT NEUROLOGY NOTE    DATE OF VISIT: 5/17/2022  MRN: [id number]  PATIENT NAME: Raymon Ace  YOB: 1945    Chief Complaint   Patient presents with     Extremity Weakness     Follow up- legs are getting worse     SUBJECTIVE:                                                      HISTORY OF PRESENT ILLNESS:  Raymon is here for follow up regarding leg weakness.  He has history of lumbar fusions.  He describes some numbness and paresthesias to me.  He did find them to be painful.  EMG was completed in July 2021 and showed evidence of left S1 radiculopathy with signs of both acute and chronic denervation and old right and left L5 radiculopathies.  No evidence of lumbosacral plexopathy.  He has been on gabapentin for pain management.  When we met previously he admitted to drinking several alcoholic beverages daily.  CK has been normal.  I recommended increasing his gabapentin to 600mg 3 times daily and cutting back on alcohol.  After his last visit we did updated lumbar imaging and thoracic imaging which did not show any significant findings.  I also referred him to orthopedics for problems he was having with his knees.  He had a recent PT evaluation and at that time and revealed shuffling gait. He has been doing physical therapy on and off     He did get injections in his knees and this did not help. He says he has a lot of pain in the knees and this limits his mobility to a degree.  He he also endorses some problems with initiating movements, getting up out of chairs.  His steps are short, in part he says because he is cautious.  He had a fall about a month ago and hurt his left elbow.  No radiating numbness or pain down into the hand.  He continues to have pain from the belly down.  He is not sure if the gabapentin is helping.  He has to use a cane all the time now.  He is still doing some physical therapy which Raymon's wife who is here with him says has been somewhat helpful per therapist.   Raymon himself does not notice any improvement of his strength.    CURRENT MEDICATIONS:   allopurinol (ZYLOPRIM) 300 MG tablet, Take 1 tablet (300 mg) by mouth daily  eltrombopag (PROMACTA) 75 MG tablet, Take 1 tablet (75 mg) by mouth daily Administer on an empty stomach, 1 hour before or 2 hours after a meal.  folic acid (FOLVITE) 1 MG tablet, Take 1 tablet (1,000 mcg) by mouth daily  gabapentin (NEURONTIN) 300 MG capsule, Take 3 capsules (900 mg) by mouth 3 times daily  metoprolol succinate ER (TOPROL-XL) 50 MG 24 hr tablet, Take 1 tablet (50 mg) by mouth daily  omeprazole (PRILOSEC) 40 MG DR capsule, TAKE 1 CAPSULE BY MOUTH ONCE DAILY.  rivaroxaban ANTICOAGULANT (XARELTO ANTICOAGULANT) 10 MG TABS tablet, TAKE 1 TABLET BY MOUTH ONCE DAILY WITH  DINNER  vitamin D2 (ERGOCALCIFEROL) 89647 units (1250 mcg) capsule, Take 1 capsule by mouth once a week  ASPIRIN NOT PRESCRIBED (INTENTIONAL), Please choose reason not prescribed, below (Patient not taking: No sig reported)  HEMP OIL OR EXTRACT OR OTHER CBD CANNABINOID, NOT MEDICAL CANNABIS,, CBD softgels- 1 daily (Patient not taking: Reported on 5/17/2022)  ondansetron (ZOFRAN ODT) 4 MG ODT tab, Take 1-2 tablets (4-8 mg) by mouth every 8 hours as needed for nausea (Patient not taking: No sig reported)  oxyCODONE (ROXICODONE) 5 MG tablet, Take 1 tablet (5 mg) by mouth every 6 hours as needed (Patient not taking: No sig reported)  Vitamin D3 (CHOLECALCIFEROL) 25 mcg (1000 units) tablet, Take by mouth daily (1,000IU) 1 capsule daily (Patient not taking: Reported on 5/17/2022)  vitamin D3 (CHOLECALCIFEROL) 50 mcg (2000 units) tablet, Take 1 tablet (50 mcg) by mouth daily (Patient not taking: Reported on 5/17/2022)    ropivacaine (NAROPIN) injection 3 mL  ropivacaine (NAROPIN) injection 3 mL  triamcinolone (KENALOG-40) injection 40 mg  triamcinolone (KENALOG-40) injection 40 mg        RECENT DIAGNOSTIC STUDIES:   Labs: No results found for any visits on 05/17/22.    Imaging:  "  MRI Thoracic and Lumbar Spine (2.3.22):  IMPRESSION:  MRI Thoracic Spine:  1.  Generally mild degenerative changes in the thoracic spine. Disc herniation at T9-T10 causes mild spinal canal stenosis. No spinal cord compression or spinal cord signal abnormality.  2.  No compression deformity or edema to indicate recent thoracic spine injury.     MRI Lumbar Spine:  1.  Extensive postoperative changes with a combination of posterior spinal and interbody fusion extending from L2 to S1. Central canal is adequate at these levels. Hardware related artifact distorts evaluation of the neural foramina at the L2-L3 level   but there is probably at least mild to moderate narrowing bilaterally.  2.  The level above the fused segment, L1-L2 demonstrates mild disc and facet degeneration that results in moderate spinal canal and mild foraminal narrowing.    XR Knees (1.21.22):  IMPRESSION: Moderate-severe bilateral patellofemoral compartment knee  osteoarthritis, left worse than right. Mild bilateral medial  compartment knee joint space narrowing. Bilateral chondrocalcinosis.  No acute knee fracture or dislocation. No significant knee joint  effusion. Arterial calcification. No significant anterior knee soft  tissue swelling.    Imaging reviewed independently by me.  Agree with radiology read.    REVIEW OF SYSTEMS:                                                      10-point review of systems is negative except as mentioned above in HPI.    EXAM:                                                      Physical Exam:   Vitals: BP (!) 143/99 (BP Location: Right arm, Patient Position: Sitting)   Pulse 96   Ht 1.676 m (5' 6\")   Wt 97.6 kg (215 lb 3.2 oz)   BMI 34.73 kg/m    BMI= Body mass index is 34.73 kg/m .  GENERAL: NAD.  HEENT: NC/AT.  CV: RRR. S1, S2.   NECK: No bruits.  PULM: Non-labored breathing.   Neurologic:  MENTAL STATUS: Alert, attentive. Speech is fluent. Normal comprehension.  Normal concentration. Adequate fund of " knowledge.   CRANIAL NERVES: Discs flat. Visual fields intact to confrontation. Pupils equally, round and reactive to light. Facial sensation and movement normal. EOM full. Hearing intact to conversation. Trapezius strength intact. Palate moves symmetrically. Tongue midline.  MOTOR: Slight weakness with adduction at the hips (this is an improvement).  Otherwise his strength appears normal. Tone normal.  DTRs: Intact and symmetric in biceps, BR.  Cannot elicit patellar or Achilles reflexes.  Babinski equivocal bilaterally.   SENSATION: Normal light touch and pinprick . intact proprioception at great toes. Vibration: Normal at both ankles.   COORDINATION: Normal finger nose finger. Finger tapping normal.   STATION AND GAIT: Romberg positive.  Posture is stooped.  Relies on cane.  Casual gait is slightly antalgic, shuffling with multistep turn.    Right hand-dominant.    ASSESSMENT and PLAN:                                                      Assessment:    ICD-10-CM    1. Bradykinesia  R25.8 carbidopa-levodopa (SINEMET)  MG tablet   2. Osteoarthritis of both knees, unspecified osteoarthritis type  M17.0 Orthopedic  Referral        Mr. Ace is a pleasant 76-year-old man here for follow-up regarding subjective leg weakness, history of lumbar radiculopathy.  Updated imaging has not provided an explanation for his feelings of weakness.  In addition his exam actually looks somewhat improved from a strength standpoint.  The big issue right now seems to be that he is having a lot of trouble with arthritic pain in the knees.  I am referring him back to orthopedics for management since the injections through the sports clinic did not work.  Does have some evidence of parkinsonism on exam today so we talked about doing a trial of carbidopa/levodopa to see if this helps from a gait standpoint.  Potential side effects were reviewed with the patient.  I would like to see Raymon back in clinic again in a few  months.  He expressed understanding and agreement with the plan.    Plan:  -- I would like to do a trial of carbidopa/levodopa (25/100mg): 1 tablet 3 times daily.  This would be to help with movement initiation and fluidity.  Feel free to let me know how you are feeling after a few weeks on the medication.  -- No clear explanation for your gait difficulties on thoracic or lumbar imaging.  -- Return to Neurology clinic in 3-4 months.    Total Time: 30 minutes were spent with the patient and in chart review/documentation (as described above in Assessment and Plan)/coordinating the care on date of service.    Maria Espino MD  Neurology    Dragon software used in the dictation of this note.

## 2022-05-17 NOTE — LETTER
5/17/2022         RE: Raymon Ace  [address] [facility] [address]        Dear Colleague,    Thank you for referring your patient, Raymon Ace, to the [facility] [place]. Please see a copy of my visit note below.    ESTABLISHED PATIENT NEUROLOGY NOTE    DATE OF VISIT: 5/17/2022  MRN: [id number]  PATIENT NAME: Raymon Ace  YOB: 1945    Chief Complaint   Patient presents with     Extremity Weakness     Follow up- legs are getting worse     SUBJECTIVE:                                                      HISTORY OF PRESENT ILLNESS:  Raymon is here for follow up regarding leg weakness.  He has history of lumbar fusions.  He describes some numbness and paresthesias to me.  He did find them to be painful.  EMG was completed in July 2021 and showed evidence of left S1 radiculopathy with signs of both acute and chronic denervation and old right and left L5 radiculopathies.  No evidence of lumbosacral plexopathy.  He has been on gabapentin for pain management.  When we met previously he admitted to drinking several alcoholic beverages daily.  CK has been normal.  I recommended increasing his gabapentin to 600mg 3 times daily and cutting back on alcohol.  After his last visit we did updated lumbar imaging and thoracic imaging which did not show any significant findings.  I also referred him to orthopedics for problems he was having with his knees.  He had a recent PT evaluation and at that time and revealed shuffling gait. He has been doing physical therapy on and off     He did get injections in his knees and this did not help. He says he has a lot of pain in the knees and this limits his mobility to a degree.  He he also endorses some problems with initiating movements, getting up out of chairs.  His steps are short, in part he says because he is cautious.  He had a fall about a month ago and hurt his left elbow.  No radiating numbness or pain down into the hand.   He continues to have pain from the belly down.  He is not sure if the gabapentin is helping.  He has to use a cane all the time now.  He is still doing some physical therapy which Raymon's wife who is here with him says has been somewhat helpful per therapist.  Raymon himself does not notice any improvement of his strength.    CURRENT MEDICATIONS:   allopurinol (ZYLOPRIM) 300 MG tablet, Take 1 tablet (300 mg) by mouth daily  eltrombopag (PROMACTA) 75 MG tablet, Take 1 tablet (75 mg) by mouth daily Administer on an empty stomach, 1 hour before or 2 hours after a meal.  folic acid (FOLVITE) 1 MG tablet, Take 1 tablet (1,000 mcg) by mouth daily  gabapentin (NEURONTIN) 300 MG capsule, Take 3 capsules (900 mg) by mouth 3 times daily  metoprolol succinate ER (TOPROL-XL) 50 MG 24 hr tablet, Take 1 tablet (50 mg) by mouth daily  omeprazole (PRILOSEC) 40 MG DR capsule, TAKE 1 CAPSULE BY MOUTH ONCE DAILY.  rivaroxaban ANTICOAGULANT (XARELTO ANTICOAGULANT) 10 MG TABS tablet, TAKE 1 TABLET BY MOUTH ONCE DAILY WITH  DINNER  vitamin D2 (ERGOCALCIFEROL) 22243 units (1250 mcg) capsule, Take 1 capsule by mouth once a week  ASPIRIN NOT PRESCRIBED (INTENTIONAL), Please choose reason not prescribed, below (Patient not taking: No sig reported)  HEMP OIL OR EXTRACT OR OTHER CBD CANNABINOID, NOT MEDICAL CANNABIS,, CBD softgels- 1 daily (Patient not taking: Reported on 5/17/2022)  ondansetron (ZOFRAN ODT) 4 MG ODT tab, Take 1-2 tablets (4-8 mg) by mouth every 8 hours as needed for nausea (Patient not taking: No sig reported)  oxyCODONE (ROXICODONE) 5 MG tablet, Take 1 tablet (5 mg) by mouth every 6 hours as needed (Patient not taking: No sig reported)  Vitamin D3 (CHOLECALCIFEROL) 25 mcg (1000 units) tablet, Take by mouth daily (1,000IU) 1 capsule daily (Patient not taking: Reported on 5/17/2022)  vitamin D3 (CHOLECALCIFEROL) 50 mcg (2000 units) tablet, Take 1 tablet (50 mcg) by mouth daily (Patient not taking: Reported on  "5/17/2022)    ropivacaine (NAROPIN) injection 3 mL  ropivacaine (NAROPIN) injection 3 mL  triamcinolone (KENALOG-40) injection 40 mg  triamcinolone (KENALOG-40) injection 40 mg        RECENT DIAGNOSTIC STUDIES:   Labs: No results found for any visits on 05/17/22.    Imaging:   MRI Thoracic and Lumbar Spine (2.3.22):  IMPRESSION:  MRI Thoracic Spine:  1.  Generally mild degenerative changes in the thoracic spine. Disc herniation at T9-T10 causes mild spinal canal stenosis. No spinal cord compression or spinal cord signal abnormality.  2.  No compression deformity or edema to indicate recent thoracic spine injury.     MRI Lumbar Spine:  1.  Extensive postoperative changes with a combination of posterior spinal and interbody fusion extending from L2 to S1. Central canal is adequate at these levels. Hardware related artifact distorts evaluation of the neural foramina at the L2-L3 level   but there is probably at least mild to moderate narrowing bilaterally.  2.  The level above the fused segment, L1-L2 demonstrates mild disc and facet degeneration that results in moderate spinal canal and mild foraminal narrowing.    XR Knees (1.21.22):  IMPRESSION: Moderate-severe bilateral patellofemoral compartment knee  osteoarthritis, left worse than right. Mild bilateral medial  compartment knee joint space narrowing. Bilateral chondrocalcinosis.  No acute knee fracture or dislocation. No significant knee joint  effusion. Arterial calcification. No significant anterior knee soft  tissue swelling.    Imaging reviewed independently by me.  Agree with radiology read.    REVIEW OF SYSTEMS:                                                      10-point review of systems is negative except as mentioned above in HPI.    EXAM:                                                      Physical Exam:   Vitals: BP (!) 143/99 (BP Location: Right arm, Patient Position: Sitting)   Pulse 96   Ht 1.676 m (5' 6\")   Wt 97.6 kg (215 lb 3.2 oz)   BMI " 34.73 kg/m    BMI= Body mass index is 34.73 kg/m .  GENERAL: NAD.  HEENT: NC/AT.  CV: RRR. S1, S2.   NECK: No bruits.  PULM: Non-labored breathing.   Neurologic:  MENTAL STATUS: Alert, attentive. Speech is fluent. Normal comprehension.  Normal concentration. Adequate fund of knowledge.   CRANIAL NERVES: Discs flat. Visual fields intact to confrontation. Pupils equally, round and reactive to light. Facial sensation and movement normal. EOM full. Hearing intact to conversation. Trapezius strength intact. Palate moves symmetrically. Tongue midline.  MOTOR: Slight weakness with adduction at the hips (this is an improvement).  Otherwise his strength appears normal. Tone normal.  DTRs: Intact and symmetric in biceps, BR.  Cannot elicit patellar or Achilles reflexes.  Babinski equivocal bilaterally.   SENSATION: Normal light touch and pinprick . intact proprioception at great toes. Vibration: Normal at both ankles.   COORDINATION: Normal finger nose finger. Finger tapping normal.   STATION AND GAIT: Romberg positive.  Posture is stooped.  Relies on cane.  Casual gait is slightly antalgic, shuffling with multistep turn.    Right hand-dominant.    ASSESSMENT and PLAN:                                                      Assessment:    ICD-10-CM    1. Bradykinesia  R25.8 carbidopa-levodopa (SINEMET)  MG tablet   2. Osteoarthritis of both knees, unspecified osteoarthritis type  M17.0 Orthopedic  Referral        Mr. Ace is a pleasant 76-year-old man here for follow-up regarding subjective leg weakness, history of lumbar radiculopathy.  Updated imaging has not provided an explanation for his feelings of weakness.  In addition his exam actually looks somewhat improved from a strength standpoint.  The big issue right now seems to be that he is having a lot of trouble with arthritic pain in the knees.  I am referring him back to orthopedics for management since the injections through the sports clinic did not  work.  Does have some evidence of parkinsonism on exam today so we talked about doing a trial of carbidopa/levodopa to see if this helps from a gait standpoint.  Potential side effects were reviewed with the patient.  I would like to see Raymon polanco in clinic again in a few months.  He expressed understanding and agreement with the plan.    Plan:  -- I would like to do a trial of carbidopa/levodopa (25/100mg): 1 tablet 3 times daily.  This would be to help with movement initiation and fluidity.  Feel free to let me know how you are feeling after a few weeks on the medication.  -- No clear explanation for your gait difficulties on thoracic or lumbar imaging.  -- Return to Neurology clinic in 3-4 months.    Total Time: 30 minutes were spent with the patient and in chart review/documentation (as described above in Assessment and Plan)/coordinating the care on date of service.    Maria Espino MD  Neurology    Dragon software used in the dictation of this note.                        Again, thank you for allowing me to participate in the care of your patient.        Sincerely,        Maria Espino MD

## 2022-05-18 NOTE — TELEPHONE ENCOUNTER
Pt has multiple medical problems, not seen after 9/2021.     Please call the patient and schedule AWV which he is due at this time, to further take care of his medical conditions and medications.     Thank you,  Beatrice Stephens MD on 5/17/2022 at 8:26 PM

## 2022-05-24 DIAGNOSIS — M10.9 GOUT INVOLVING TOE, UNSPECIFIED CAUSE, UNSPECIFIED CHRONICITY, UNSPECIFIED LATERALITY: ICD-10-CM

## 2022-05-26 RX ORDER — ALLOPURINOL 300 MG/1
1 TABLET ORAL DAILY
Qty: 90 TABLET | Refills: 0 | Status: SHIPPED | OUTPATIENT
Start: 2022-05-26 | End: 2022-10-13

## 2022-05-26 NOTE — TELEPHONE ENCOUNTER
Routing refill request to provider for review/approval because:  Labs not current:    Uric Acid   Date Value Ref Range Status   08/30/2019 1.5 (L) 3.5 - 7.2 mg/dL Final     Argentina Lorenzo RN

## 2022-06-12 ENCOUNTER — HEALTH MAINTENANCE LETTER (OUTPATIENT)
Age: 77
End: 2022-06-12

## 2022-06-14 DIAGNOSIS — Z00.00 ENCOUNTER FOR MEDICARE ANNUAL WELLNESS EXAM: ICD-10-CM

## 2022-06-14 RX ORDER — METOPROLOL SUCCINATE 50 MG/1
50 TABLET, EXTENDED RELEASE ORAL DAILY
Qty: 90 TABLET | Refills: 1 | Status: SHIPPED | OUTPATIENT
Start: 2022-06-14 | End: 2023-01-10

## 2022-06-14 NOTE — TELEPHONE ENCOUNTER
Routing refill request to provider for review/approval because:  Failed BP protocol    Sonia TATE RN  [facility]  [phone]

## 2022-06-24 ENCOUNTER — HOSPITAL ENCOUNTER (OUTPATIENT)
Dept: MRI IMAGING | Facility: CLINIC | Age: 77
Discharge: HOME | End: 2022-06-24
Attending: ORTHOPAEDIC SURGERY | Admitting: ORTHOPAEDIC SURGERY
Payer: COMMERCIAL

## 2022-06-24 DIAGNOSIS — D69.3 IDIOPATHIC THROMBOCYTOPENIC PURPURA (H): ICD-10-CM

## 2022-06-24 DIAGNOSIS — M25.569 KNEE PAIN: ICD-10-CM

## 2022-06-24 PROCEDURE — 73721 MRI JNT OF LWR EXTRE W/O DYE: CPT | Mod: RT

## 2022-06-24 PROCEDURE — 73721 MRI JNT OF LWR EXTRE W/O DYE: CPT | Mod: 26 | Performed by: RADIOLOGY

## 2022-06-24 NOTE — TELEPHONE ENCOUNTER
Promacta Refill   Last prescribing provider: Dr Novoa     Last clinic visit date: 4/7/22 Dr Novoa     Any missed appointments or no-shows since last clinic visit?: No     Recommendations for requested medication (if none, N/A): Copied from chart note 4/7/22 Dr Novoa   He remains on Promacta 75 mg daily for chronic ITP. He is tolerating the medication without any perceived side effects. He also remains on prophylactic intensity anticoagulation with rivaroxaban. He denies any bleeding issues. He denies any blood in his stools and most recently had upper and lower endoscopies performed in November 2020.      Next clinic visit date: 7/28/22 Dr Novoa     Any other pertinent information (if none, N/A): N/A

## 2022-07-03 DIAGNOSIS — D69.3 IDIOPATHIC THROMBOCYTOPENIC PURPURA (H): ICD-10-CM

## 2022-07-03 DIAGNOSIS — D50.0 IRON DEFICIENCY ANEMIA DUE TO CHRONIC BLOOD LOSS: ICD-10-CM

## 2022-07-03 DIAGNOSIS — Z86.718 PERSONAL HISTORY OF VENOUS THROMBOSIS AND EMBOLISM: ICD-10-CM

## 2022-07-05 RX ORDER — RIVAROXABAN 10 MG/1
TABLET, FILM COATED ORAL
Qty: 30 TABLET | Refills: 8 | Status: SHIPPED | OUTPATIENT
Start: 2022-07-05 | End: 2023-08-09

## 2022-07-26 ENCOUNTER — LAB (OUTPATIENT)
Dept: LAB | Facility: CLINIC | Age: 77
End: 2022-07-26
Payer: COMMERCIAL

## 2022-07-26 DIAGNOSIS — D69.3 IDIOPATHIC THROMBOCYTOPENIC PURPURA (H): ICD-10-CM

## 2022-07-26 DIAGNOSIS — Z86.718 PERSONAL HISTORY OF VENOUS THROMBOSIS AND EMBOLISM: ICD-10-CM

## 2022-07-26 DIAGNOSIS — Z79.01 LONG TERM CURRENT USE OF ANTICOAGULANT THERAPY: ICD-10-CM

## 2022-07-26 DIAGNOSIS — D50.0 IRON DEFICIENCY ANEMIA DUE TO CHRONIC BLOOD LOSS: ICD-10-CM

## 2022-07-26 LAB
ALBUMIN SERPL-MCNC: 3.5 G/DL (ref 3.4–5)
ALP SERPL-CCNC: 85 U/L (ref 40–150)
ALT SERPL W P-5'-P-CCNC: 58 U/L (ref 0–70)
ANION GAP SERPL CALCULATED.3IONS-SCNC: 4 MMOL/L (ref 3–14)
AST SERPL W P-5'-P-CCNC: 71 U/L (ref 0–45)
BASOPHILS # BLD AUTO: 0.1 10E3/UL (ref 0–0.2)
BASOPHILS NFR BLD AUTO: 1 %
BILIRUB SERPL-MCNC: 1.7 MG/DL (ref 0.2–1.3)
BUN SERPL-MCNC: 8 MG/DL (ref 7–30)
CALCIUM SERPL-MCNC: 9.1 MG/DL (ref 8.5–10.1)
CHLORIDE BLD-SCNC: 111 MMOL/L (ref 94–109)
CO2 SERPL-SCNC: 26 MMOL/L (ref 20–32)
CREAT SERPL-MCNC: 0.92 MG/DL (ref 0.66–1.25)
EOSINOPHIL # BLD AUTO: 0.2 10E3/UL (ref 0–0.7)
EOSINOPHIL NFR BLD AUTO: 3 %
ERYTHROCYTE [DISTWIDTH] IN BLOOD BY AUTOMATED COUNT: 15.2 % (ref 10–15)
FERRITIN SERPL-MCNC: 66 NG/ML (ref 26–388)
GFR SERPL CREATININE-BSD FRML MDRD: 86 ML/MIN/1.73M2
GLUCOSE BLD-MCNC: 98 MG/DL (ref 70–99)
HCT VFR BLD AUTO: 43.5 % (ref 40–53)
HGB BLD-MCNC: 14.2 G/DL (ref 13.3–17.7)
IMM GRANULOCYTES # BLD: 0.2 10E3/UL
IMM GRANULOCYTES NFR BLD: 2 %
LYMPHOCYTES # BLD AUTO: 1.5 10E3/UL (ref 0.8–5.3)
LYMPHOCYTES NFR BLD AUTO: 20 %
MCH RBC QN AUTO: 31.1 PG (ref 26.5–33)
MCHC RBC AUTO-ENTMCNC: 32.6 G/DL (ref 31.5–36.5)
MCV RBC AUTO: 95 FL (ref 78–100)
MONOCYTES # BLD AUTO: 0.9 10E3/UL (ref 0–1.3)
MONOCYTES NFR BLD AUTO: 12 %
NEUTROPHILS # BLD AUTO: 4.6 10E3/UL (ref 1.6–8.3)
NEUTROPHILS NFR BLD AUTO: 62 %
NRBC # BLD AUTO: 0 10E3/UL
NRBC BLD AUTO-RTO: 0 /100
PLATELET # BLD AUTO: 92 10E3/UL (ref 150–450)
POTASSIUM BLD-SCNC: 4.1 MMOL/L (ref 3.4–5.3)
PROT SERPL-MCNC: 7.3 G/DL (ref 6.8–8.8)
RBC # BLD AUTO: 4.57 10E6/UL (ref 4.4–5.9)
SODIUM SERPL-SCNC: 141 MMOL/L (ref 133–144)
WBC # BLD AUTO: 7.5 10E3/UL (ref 4–11)

## 2022-07-26 PROCEDURE — 85025 COMPLETE CBC W/AUTO DIFF WBC: CPT

## 2022-07-26 PROCEDURE — 80053 COMPREHEN METABOLIC PANEL: CPT

## 2022-07-26 PROCEDURE — 36415 COLL VENOUS BLD VENIPUNCTURE: CPT

## 2022-07-26 PROCEDURE — 82728 ASSAY OF FERRITIN: CPT

## 2022-07-28 ENCOUNTER — VIRTUAL VISIT (OUTPATIENT)
Dept: ONCOLOGY | Facility: CLINIC | Age: 77
End: 2022-07-28
Attending: INTERNAL MEDICINE
Payer: COMMERCIAL

## 2022-07-28 DIAGNOSIS — D50.0 IRON DEFICIENCY ANEMIA DUE TO CHRONIC BLOOD LOSS: ICD-10-CM

## 2022-07-28 DIAGNOSIS — D69.3 IDIOPATHIC THROMBOCYTOPENIC PURPURA (H): Primary | ICD-10-CM

## 2022-07-28 DIAGNOSIS — Z86.718 PERSONAL HISTORY OF VENOUS THROMBOSIS AND EMBOLISM: ICD-10-CM

## 2022-07-28 DIAGNOSIS — Z79.01 LONG TERM CURRENT USE OF ANTICOAGULANT THERAPY: ICD-10-CM

## 2022-07-28 PROCEDURE — 99214 OFFICE O/P EST MOD 30 MIN: CPT | Mod: 95 | Performed by: INTERNAL MEDICINE

## 2022-07-28 NOTE — PROGRESS NOTES
Raymon is a 76 year old who is being evaluated via a billable telephone visit.      What phone number would you like to be contacted at? [phone]  How would you like to obtain your AVS? Mail a copy      Maricel Juares VF

## 2022-07-28 NOTE — PROGRESS NOTES
HEMATOLOGY CLINIC VISIT    NOTE:  Due to the ongoing COVID-19 outbreak, this visit was conducted by telephone, with the patient's approval.       Raymon is a 76-year-old male with chronic ITP and a history of unprovoked pulmonary embolism in April 2017 for which he is maintained on long-term anticoagulation.  He has a history of recurrent iron deficiency anemia felt secondary to chronic occult blood loss from portal hypertensive gastropathy and hemorrhoids.  He also has underlying alcoholic liver disease.  He was scheduled today for routine follow-up visit.     He has a long history of back pain and lower extremity weakness.  He has had several back surgeries.  Currently his biggest issue seems to be osteoarthritis of both knees.  He is contemplating right knee replacement later this summer or early this fall.  He has been seeing an orthopedic surgeon through [facility].    He remains on Promacta 75 mg daily for chronic ITP.  He is tolerating the medication without any perceived side effects.  He also remains on prophylactic intensity anticoagulation with rivaroxaban.  He denies any bleeding issues.  He denies any blood in his stools and most recently had upper and lower endoscopies performed in November 2020.    He last received IV iron in late August / early September 2021.  Prior to that he was treated in January 2020.     Physical exam:  Not done, telephone visit.    Labs:  Creatinine 0.92.  LFTs for a mildly elevated total bilirubin of 1.7.  This is not a new finding.  AST mildly elevated at 71, ALT 58.  Ferritin 66.    White count 7.5, hemoglobin 14.2, MCV 95, platelet count 95,000.  Leukocyte differential normal.        ASSESSMENT / PLAN:  1.  Chronic ITP -- Raymon's baseline platelet count has been in the 70,000 to 100,000 range over the last several years.  He remains on Promacta 75 mg daily.  He is responsive to pulse dexamethasone and we have used this to increase his platelet count prior to  surgeries.  At the present time, his platelet count is within his usual baseline.  Given that he is on long-term anticoagulation, the goal is to maintain his platelet count consistently above 50,000.  Note that there may also be a component of thrombocytopenia related to his underlying liver disease.    2. History of unprovoked pulmonary embolism (April 2017) -- He has done well on long-term anticoagulation with rivaroxaban, and we will make no change in this part of his care plan.  He is on the prophylactic dose of 10 mg daily due to a combination of mild intermittent renal insufficiency and underlying liver disease.     3.  h/o iron deficiency anemia -- This has been attributed to to chronic occult blood loss from portal hypertensive gastropathy, and hemorrhoids.  He denies any new bleeding symptoms or any obvious signs of GI bleeding, and had upper and lower endoscopies in the fall 2020.  He received IV iron in late August / early September 2021 and is currently iron replete.  We will continue to monitor.      Plan to see him back with repeat labs in about 4 months.  He will let me know if a date for knee replacement surgery is set.      Total time 40 minutes, including review of medical records and labs, telephone visit (30 minutes), and documentation.      Waylon Novoa MD  Professor of Medicine  Division of Hematology, Oncology, and Transplantation  Director, [facility]

## 2022-07-28 NOTE — LETTER
7/28/2022         RE: Raymon Ace  [address] [facility] [address]        Dear Colleague,    Thank you for referring your patient, Raymon Ace, to the [facility]. Please see a copy of my visit note below.    HEMATOLOGY CLINIC VISIT    NOTE:  Due to the ongoing COVID-19 outbreak, this visit was conducted by telephone, with the patient's approval.       Raymon is a 76-year-old male with chronic ITP and a history of unprovoked pulmonary embolism in April 2017 for which he is maintained on long-term anticoagulation.  He has a history of recurrent iron deficiency anemia felt secondary to chronic occult blood loss from portal hypertensive gastropathy and hemorrhoids.  He also has underlying alcoholic liver disease.  He was scheduled today for routine follow-up visit.     He has a long history of back pain and lower extremity weakness.  He has had several back surgeries.  Currently his biggest issue seems to be osteoarthritis of both knees.  He is contemplating right knee replacement later this summer or early this fall.  He has been seeing an orthopedic surgeon through [facility].    He remains on Promacta 75 mg daily for chronic ITP.  He is tolerating the medication without any perceived side effects.  He also remains on prophylactic intensity anticoagulation with rivaroxaban.  He denies any bleeding issues.  He denies any blood in his stools and most recently had upper and lower endoscopies performed in November 2020.    He last received IV iron in late August / early September 2021.  Prior to that he was treated in January 2020.     Physical exam:  Not done, telephone visit.    Labs:  Creatinine 0.92.  LFTs for a mildly elevated total bilirubin of 1.7.  This is not a new finding.  AST mildly elevated at 71, ALT 58.  Ferritin 66.    White count 7.5, hemoglobin 14.2, MCV 95, platelet count 95,000.  Leukocyte differential normal.        ASSESSMENT / PLAN:  1.   Chronic ITP -- Raymon's baseline platelet count has been in the 70,000 to 100,000 range over the last several years.  He remains on Promacta 75 mg daily.  He is responsive to pulse dexamethasone and we have used this to increase his platelet count prior to surgeries.  At the present time, his platelet count is within his usual baseline.  Given that he is on long-term anticoagulation, the goal is to maintain his platelet count consistently above 50,000.  Note that there may also be a component of thrombocytopenia related to his underlying liver disease.    2. History of unprovoked pulmonary embolism (April 2017) -- He has done well on long-term anticoagulation with rivaroxaban, and we will make no change in this part of his care plan.  He is on the prophylactic dose of 10 mg daily due to a combination of mild intermittent renal insufficiency and underlying liver disease.     3.  h/o iron deficiency anemia -- This has been attributed to to chronic occult blood loss from portal hypertensive gastropathy, and hemorrhoids.  He denies any new bleeding symptoms or any obvious signs of GI bleeding, and had upper and lower endoscopies in the fall 2020.  He received IV iron in late August / early September 2021 and is currently iron replete.  We will continue to monitor.      Plan to see him back with repeat labs in about 4 months.  He will let me know if a date for knee replacement surgery is set.      Total time 40 minutes, including review of medical records and labs, telephone visit (30 minutes), and documentation.      Waylon Novoa MD  Professor of Medicine  Division of Hematology, Oncology, and Transplantation  Director, [facility]

## 2022-09-26 ENCOUNTER — OFFICE VISIT (OUTPATIENT)
Dept: NEUROLOGY | Facility: CLINIC | Age: 77
End: 2022-09-26
Payer: COMMERCIAL

## 2022-09-26 VITALS
WEIGHT: 217 LBS | BODY MASS INDEX: 35.02 KG/M2 | DIASTOLIC BLOOD PRESSURE: 86 MMHG | SYSTOLIC BLOOD PRESSURE: 148 MMHG | HEART RATE: 64 BPM

## 2022-09-26 DIAGNOSIS — G89.29 CHRONIC BILATERAL LOW BACK PAIN WITH SCIATICA, SCIATICA LATERALITY UNSPECIFIED: ICD-10-CM

## 2022-09-26 DIAGNOSIS — M54.40 CHRONIC BILATERAL LOW BACK PAIN WITH SCIATICA, SCIATICA LATERALITY UNSPECIFIED: ICD-10-CM

## 2022-09-26 DIAGNOSIS — M15.9 OSTEOARTHRITIS OF MULTIPLE JOINTS, UNSPECIFIED OSTEOARTHRITIS TYPE: ICD-10-CM

## 2022-09-26 DIAGNOSIS — R25.8 BRADYKINESIA: ICD-10-CM

## 2022-09-26 DIAGNOSIS — M79.2 NEUROPATHIC PAIN: Primary | ICD-10-CM

## 2022-09-26 PROCEDURE — 99214 OFFICE O/P EST MOD 30 MIN: CPT | Performed by: PSYCHIATRY & NEUROLOGY

## 2022-09-26 RX ORDER — CARBIDOPA AND LEVODOPA 25; 100 MG/1; MG/1
1 TABLET ORAL 3 TIMES DAILY
Qty: 90 TABLET | Refills: 5 | Status: SHIPPED | OUTPATIENT
Start: 2022-09-26 | End: 2023-12-28

## 2022-09-26 RX ORDER — GABAPENTIN 300 MG/1
1200 CAPSULE ORAL 3 TIMES DAILY
Qty: 360 CAPSULE | Refills: 5 | Status: SHIPPED | OUTPATIENT
Start: 2022-09-26 | End: 2023-05-02

## 2022-09-26 NOTE — PROGRESS NOTES
ESTABLISHED PATIENT NEUROLOGY NOTE    DATE OF VISIT: 9/26/2022  MRN: [id number]  PATIENT NAME: Raymon Ace  YOB: 1945    Chief Complaint   Patient presents with     Neurologic Problem     4 mo follow-up   Leg weakness     SUBJECTIVE:                                                      HISTORY OF PRESENT ILLNESS:  Raymon is here for follow up regarding leg weakness.    History as previously documented by me (5.17.22):  He has history of lumbar fusions.  He describes some numbness and paresthesias to me.  He did find them to be painful.  EMG was completed in July 2021 and showed evidence of left S1 radiculopathy with signs of both acute and chronic denervation and old right and left L5 radiculopathies.  No evidence of lumbosacral plexopathy.  He has been on gabapentin for pain management.  When we met previously he admitted to drinking several alcoholic beverages daily.  CK has been normal.  I recommended increasing his gabapentin to 600mg 3 times daily and cutting back on alcohol.  After his last visit we did updated lumbar imaging and thoracic imaging which did not show any significant findings.  I also referred him to orthopedics for problems he was having with his knees.  He had a recent PT evaluation and at that time and revealed shuffling gait. He has been doing physical therapy on and off      He did get injections in his knees and this did not help. He says he has a lot of pain in the knees and this limits his mobility to a degree.  He he also endorses some problems with initiating movements, getting up out of chairs.  His steps are short, in part he says because he is cautious.  He had a fall about a month ago and hurt his left elbow.  No radiating numbness or pain down into the hand.  He continues to have pain from the belly down.  He is not sure if the gabapentin is helping.  He has to use a cane all the time now.  He is still doing some physical therapy which Raymon's wife who is here with  him says has been somewhat helpful per therapist.  Raymon himself does not notice any improvement of his strength.    I noted some evidence of parkinsonism on his exam so we decided to do a trial of carbidopa/levodopa.  Necessary to let me know how he responded to the carbidopa/levodopa after a few weeks.  I have not heard from the patient since our previous visit.    Raymon is here with his wife.  He feels like the pain in his legs is worse.  He will be having right knee surgery in December.  Because of his pain he has not been very active and has gained some weight.  He had one trip and fall a couple of weeks ago.     He is taking the gabapentin: 900mg TID.  He does not necessarily notice if he misses a dose.  He is not sure if the carbidopa/levodopa has made any difference in terms of his gait.  His wife says that he wiggles his legs less in the evenings since starting this medication.  He denies side effects on the gabapentin or the carbidopa/levodopa.  He has trouble just getting into bed and uses handrail to help with this.  He says his arms get sore at times but he thinks this is just because he has to use them so much in place of his legs.  He does endorse some urinary incontinence.    CURRENT MEDICATIONS:   allopurinol (ZYLOPRIM) 300 MG tablet, Take 1 tablet (300 mg) by mouth daily  eltrombopag (PROMACTA) 75 MG tablet, Take 1 tablet (75 mg) by mouth daily Administer on an empty stomach, 1 hour before or 2 hours after a meal.  folic acid (FOLVITE) 1 MG tablet, Take 1 tablet by mouth once daily  metoprolol succinate ER (TOPROL XL) 50 MG 24 hr tablet, Take 1 tablet (50 mg) by mouth daily  omeprazole (PRILOSEC) 40 MG DR capsule, TAKE 1 CAPSULE BY MOUTH ONCE DAILY.  vitamin D2 (ERGOCALCIFEROL) 97628 units (1250 mcg) capsule, Take 1 capsule by mouth once a week  Vitamin D3 (CHOLECALCIFEROL) 25 mcg (1000 units) tablet, Take by mouth daily (1,000IU) 1 capsule daily  vitamin D3 (CHOLECALCIFEROL) 50 mcg (2000 units)  tablet, Take 1 tablet (50 mcg) by mouth daily  XARELTO ANTICOAGULANT 10 MG TABS tablet, TAKE 1 TABLET BY MOUTH ONCE DAILY WITH SUPPER  ASPIRIN NOT PRESCRIBED (INTENTIONAL), Please choose reason not prescribed, below (Patient not taking: No sig reported)  HEMP OIL OR EXTRACT OR OTHER CBD CANNABINOID, NOT MEDICAL CANNABIS,, CBD softgels- 1 daily (Patient not taking: No sig reported)  ondansetron (ZOFRAN ODT) 4 MG ODT tab, Take 1-2 tablets (4-8 mg) by mouth every 8 hours as needed for nausea (Patient not taking: No sig reported)  oxyCODONE (ROXICODONE) 5 MG tablet, Take 1 tablet (5 mg) by mouth every 6 hours as needed (Patient not taking: No sig reported)    ropivacaine (NAROPIN) injection 3 mL  ropivacaine (NAROPIN) injection 3 mL  triamcinolone (KENALOG-40) injection 40 mg  triamcinolone (KENALOG-40) injection 40 mg        RECENT DIAGNOSTIC STUDIES:   Labs: No results found for any visits on 09/26/22.    Imaging:  XR Right knee (6.24.22):  IMPRESSION:   1. Total loss of articular cartilage over the median ridge and the  medial facet of the patella and total loss of articular cartilage over  the medial aspect of the femoral trochlea consistent with severe  degenerative changes in the patellofemoral joint.  2. No clear evidence of a tear of the menisci. Mild chondromalacia as  noted above medial femoral condyle and lateral femoral condyle.  3. Multiloculated presumed ganglion cyst superficial and deep to the  capsule posteriorly. This is posterior to the posterior horn of the  medial meniscus and posterior cruciate ligament.  4. Slightly attenuated appearance of the anterior cruciate ligament  which may represent change from remote injury. A few fibers are  thought to be intact.  5. Small knee effusion with evidence of synovitis.    Imaging reviewed independently by me.  Agree with radiology read.    REVIEW OF SYSTEMS:                                                      10-point review of systems is negative except as  mentioned above in HPI.    EXAM:                                                      Physical Exam:   Vitals: BP (!) 148/86   Pulse 64   Wt 98.4 kg (217 lb)   BMI 35.02 kg/m    BMI= Body mass index is 35.02 kg/m .  GENERAL: NAD.  HEENT: NC/AT.  CV: RRR. S1, S2.   NECK: No bruits.  PULM: Non-labored breathing.   Neurologic:  MENTAL STATUS: Alert, attentive. Speech is fluent. Normal comprehension.  Normal concentration. Adequate fund of knowledge.   CRANIAL NERVES: Visual fields intact to confrontation. Pupils equally, round and reactive to light. Facial sensation and movement normal. EOM full. Hearing intact to conversation. Trapezius strength intact. Palate moves symmetrically. Tongue midline.  MOTOR: Slight weakness with adduction at the hips.  Otherwise his strength appears normal. Tone normal.  DTRs: Intact and symmetric in biceps, BR.  Cannot elicit patellar or Achilles reflexes.  Babinski equivocal bilaterally.   SENSATION: Normal light touch and pinprick.  He has trouble with judging toe movements.  Vibration: Decreased at both ankles.   COORDINATION: Normal fine motor movements of the hands.   STATION AND GAIT: He needs both arms to stand up.  Romberg positive.  Posture is stooped.  Relies on cane.  Casual gait is slightly antalgic, short steps.  Possibly slightly better stride length compared to previous exam.  Right hand-dominant.    ASSESSMENT and PLAN:                                                      Assessment:    ICD-10-CM    1. Neuropathic pain  M79.2    2. Bradykinesia  R25.8 carbidopa-levodopa (SINEMET)  MG tablet   3. Chronic bilateral low back pain with sciatica, sciatica laterality unspecified  M54.40 gabapentin (NEURONTIN) 300 MG capsule    G89.29    4. Osteoarthritis of multiple joints, unspecified osteoarthritis type  M15.9         Mr. Ace is a pleasant 77-year-old man with chronic problems regarding subjective leg weakness.  He does have history of lumbar radiculopathy but no  explanation for any weakness on his imaging.  He continues to have trouble with arthritic pain in the knees and will be having right knee replacement surgery in December.  I am hopeful that this will help from a mobility standpoint so we can have him do some therapy and conditioning.  Right now pain is getting in the way of this.  For now, we will try increasing the gabapentin to the full 1200 mg 3 times daily dose and continue the carbidopa/levodopa.  I would like to see Raymon after his knee surgery.  He and his wife expressed understanding and agreement with the plan.    Plan:  -- Let's try increasing the gabapentin to 1200mg (4 capsules), 3 times daily.  I will update your prescription for the pharmacy.  -- Continue the carbidopa/levodopa: 1 tablet 3 times daily.  -- I would like to see you back in clinic after your knee surgery. I am hopeful that this procedure will help get you back on your feet.    Total Time: 30 minutes were spent with the patient and in chart review/documentation (as described above in Assessment and Plan)/coordinating the care on date of service.    Maria Espino MD  Neurology    Dragon software used in the dictation of this note.

## 2022-09-26 NOTE — NURSING NOTE
"Raymon Ace is a 77 year old male who presents for:  Chief Complaint   Patient presents with     Neurologic Problem     4 mo follow-up   Leg weakness        Initial Vitals:  BP (!) 148/86   Pulse 64   Wt 98.4 kg (217 lb)   BMI 35.02 kg/m   Estimated body mass index is 35.02 kg/m  as calculated from the following:    Height as of 5/17/22: 1.676 m (5' 6\").    Weight as of this encounter: 98.4 kg (217 lb).. Body surface area is 2.14 meters squared. BP completed using cuff size: wrist cuff    Nursing Comments: Raymon to follow up with Primary Care provider regarding elevated blood pressure.      Cristobal Bergman  "

## 2022-09-26 NOTE — LETTER
9/26/2022         RE: Raymon Ace  [address] [facility] [id number]        Dear Colleague,    Thank you for referring your patient, Raymon Ace, to the [facility] [place]. Please see a copy of my visit note below.    ESTABLISHED PATIENT NEUROLOGY NOTE    DATE OF VISIT: 9/26/2022  MRN: [id number]  PATIENT NAME: Raymon Ace  YOB: 1945    Chief Complaint   Patient presents with     Neurologic Problem     4 mo follow-up   Leg weakness     SUBJECTIVE:                                                      HISTORY OF PRESENT ILLNESS:  Raymon is here for follow up regarding leg weakness.    History as previously documented by me (5.17.22):  He has history of lumbar fusions.  He describes some numbness and paresthesias to me.  He did find them to be painful.  EMG was completed in July 2021 and showed evidence of left S1 radiculopathy with signs of both acute and chronic denervation and old right and left L5 radiculopathies.  No evidence of lumbosacral plexopathy.  He has been on gabapentin for pain management.  When we met previously he admitted to drinking several alcoholic beverages daily.  CK has been normal.  I recommended increasing his gabapentin to 600mg 3 times daily and cutting back on alcohol.  After his last visit we did updated lumbar imaging and thoracic imaging which did not show any significant findings.  I also referred him to orthopedics for problems he was having with his knees.  He had a recent PT evaluation and at that time and revealed shuffling gait. He has been doing physical therapy on and off      He did get injections in his knees and this did not help. He says he has a lot of pain in the knees and this limits his mobility to a degree.  He he also endorses some problems with initiating movements, getting up out of chairs.  His steps are short, in part he says because he is cautious.  He had a fall about a month ago and hurt his left elbow.   No radiating numbness or pain down into the hand.  He continues to have pain from the belly down.  He is not sure if the gabapentin is helping.  He has to use a cane all the time now.  He is still doing some physical therapy which Raymon's wife who is here with him says has been somewhat helpful per therapist.  Raymon himself does not notice any improvement of his strength.    I noted some evidence of parkinsonism on his exam so we decided to do a trial of carbidopa/levodopa.  Necessary to let me know how he responded to the carbidopa/levodopa after a few weeks.  I have not heard from the patient since our previous visit.    Raymon is here with his wife.  He feels like the pain in his legs is worse.  He will be having right knee surgery in December.  Because of his pain he has not been very active and has gained some weight.  He had one trip and fall a couple of weeks ago.     He is taking the gabapentin: 900mg TID.  He does not necessarily notice if he misses a dose.  He is not sure if the carbidopa/levodopa has made any difference in terms of his gait.  His wife says that he wiggles his legs less in the evenings since starting this medication.  He denies side effects on the gabapentin or the carbidopa/levodopa.  He has trouble just getting into bed and uses handrail to help with this.  He says his arms get sore at times but he thinks this is just because he has to use them so much in place of his legs.  He does endorse some urinary incontinence.    CURRENT MEDICATIONS:   allopurinol (ZYLOPRIM) 300 MG tablet, Take 1 tablet (300 mg) by mouth daily  eltrombopag (PROMACTA) 75 MG tablet, Take 1 tablet (75 mg) by mouth daily Administer on an empty stomach, 1 hour before or 2 hours after a meal.  folic acid (FOLVITE) 1 MG tablet, Take 1 tablet by mouth once daily  metoprolol succinate ER (TOPROL XL) 50 MG 24 hr tablet, Take 1 tablet (50 mg) by mouth daily  omeprazole (PRILOSEC) 40 MG DR capsule, TAKE 1 CAPSULE BY MOUTH ONCE  DAILY.  vitamin D2 (ERGOCALCIFEROL) 59180 units (1250 mcg) capsule, Take 1 capsule by mouth once a week  Vitamin D3 (CHOLECALCIFEROL) 25 mcg (1000 units) tablet, Take by mouth daily (1,000IU) 1 capsule daily  vitamin D3 (CHOLECALCIFEROL) 50 mcg (2000 units) tablet, Take 1 tablet (50 mcg) by mouth daily  XARELTO ANTICOAGULANT 10 MG TABS tablet, TAKE 1 TABLET BY MOUTH ONCE DAILY WITH SUPPER  ASPIRIN NOT PRESCRIBED (INTENTIONAL), Please choose reason not prescribed, below (Patient not taking: No sig reported)  HEMP OIL OR EXTRACT OR OTHER CBD CANNABINOID, NOT MEDICAL CANNABIS,, CBD softgels- 1 daily (Patient not taking: No sig reported)  ondansetron (ZOFRAN ODT) 4 MG ODT tab, Take 1-2 tablets (4-8 mg) by mouth every 8 hours as needed for nausea (Patient not taking: No sig reported)  oxyCODONE (ROXICODONE) 5 MG tablet, Take 1 tablet (5 mg) by mouth every 6 hours as needed (Patient not taking: No sig reported)    ropivacaine (NAROPIN) injection 3 mL  ropivacaine (NAROPIN) injection 3 mL  triamcinolone (KENALOG-40) injection 40 mg  triamcinolone (KENALOG-40) injection 40 mg        RECENT DIAGNOSTIC STUDIES:   Labs: No results found for any visits on 09/26/22.    Imaging:  XR Right knee (6.24.22):  IMPRESSION:   1. Total loss of articular cartilage over the median ridge and the  medial facet of the patella and total loss of articular cartilage over  the medial aspect of the femoral trochlea consistent with severe  degenerative changes in the patellofemoral joint.  2. No clear evidence of a tear of the menisci. Mild chondromalacia as  noted above medial femoral condyle and lateral femoral condyle.  3. Multiloculated presumed ganglion cyst superficial and deep to the  capsule posteriorly. This is posterior to the posterior horn of the  medial meniscus and posterior cruciate ligament.  4. Slightly attenuated appearance of the anterior cruciate ligament  which may represent change from remote injury. A few fibers are  thought  to be intact.  5. Small knee effusion with evidence of synovitis.    Imaging reviewed independently by me.  Agree with radiology read.    REVIEW OF SYSTEMS:                                                      10-point review of systems is negative except as mentioned above in HPI.    EXAM:                                                      Physical Exam:   Vitals: BP (!) 148/86   Pulse 64   Wt 98.4 kg (217 lb)   BMI 35.02 kg/m    BMI= Body mass index is 35.02 kg/m .  GENERAL: NAD.  HEENT: NC/AT.  CV: RRR. S1, S2.   NECK: No bruits.  PULM: Non-labored breathing.   Neurologic:  MENTAL STATUS: Alert, attentive. Speech is fluent. Normal comprehension.  Normal concentration. Adequate fund of knowledge.   CRANIAL NERVES: Visual fields intact to confrontation. Pupils equally, round and reactive to light. Facial sensation and movement normal. EOM full. Hearing intact to conversation. Trapezius strength intact. Palate moves symmetrically. Tongue midline.  MOTOR: Slight weakness with adduction at the hips.  Otherwise his strength appears normal. Tone normal.  DTRs: Intact and symmetric in biceps, BR.  Cannot elicit patellar or Achilles reflexes.  Babinski equivocal bilaterally.   SENSATION: Normal light touch and pinprick.  He has trouble with judging toe movements.  Vibration: Decreased at both ankles.   COORDINATION: Normal fine motor movements of the hands.   STATION AND GAIT: He needs both arms to stand up.  Romberg positive.  Posture is stooped.  Relies on cane.  Casual gait is slightly antalgic, short steps.  Possibly slightly better stride length compared to previous exam.  Right hand-dominant.    ASSESSMENT and PLAN:                                                      Assessment:    ICD-10-CM    1. Neuropathic pain  M79.2    2. Bradykinesia  R25.8 carbidopa-levodopa (SINEMET)  MG tablet   3. Chronic bilateral low back pain with sciatica, sciatica laterality unspecified  M54.40 gabapentin (NEURONTIN) 300  MG capsule    G89.29    4. Osteoarthritis of multiple joints, unspecified osteoarthritis type  M15.9         Mr. Ace is a pleasant 77-year-old man with chronic problems regarding subjective leg weakness.  He does have history of lumbar radiculopathy but no explanation for any weakness on his imaging.  He continues to have trouble with arthritic pain in the knees and will be having right knee replacement surgery in December.  I am hopeful that this will help from a mobility standpoint so we can have him do some therapy and conditioning.  Right now pain is getting in the way of this.  For now, we will try increasing the gabapentin to the full 1200 mg 3 times daily dose and continue the carbidopa/levodopa.  I would like to see Raymon after his knee surgery.  He and his wife expressed understanding and agreement with the plan.    Plan:  -- Let's try increasing the gabapentin to 1200mg (4 capsules), 3 times daily.  I will update your prescription for the pharmacy.  -- Continue the carbidopa/levodopa: 1 tablet 3 times daily.  -- I would like to see you back in clinic after your knee surgery. I am hopeful that this procedure will help get you back on your feet.    Total Time: 30 minutes were spent with the patient and in chart review/documentation (as described above in Assessment and Plan)/coordinating the care on date of service.    Maria Espino MD  Neurology    Dragon software used in the dictation of this note.                        Again, thank you for allowing me to participate in the care of your patient.        Sincerely,        Maria Espino MD

## 2022-09-26 NOTE — PATIENT INSTRUCTIONS
Plan:  -- Let's try increasing the gabapentin to 1200mg (4 capsules), 3 times daily.  I will update your prescription for the pharmacy.  -- Continue the carbidopa/levodopa: 1 tablet 3 times daily.  -- I would like to see you back in clinic after your knee surgery. I am hopeful that this procedure will help get you back on your feet.

## 2022-10-10 DIAGNOSIS — M10.9 GOUT INVOLVING TOE, UNSPECIFIED CAUSE, UNSPECIFIED CHRONICITY, UNSPECIFIED LATERALITY: ICD-10-CM

## 2022-10-13 RX ORDER — ALLOPURINOL 300 MG/1
TABLET ORAL
Qty: 90 TABLET | Refills: 0 | Status: SHIPPED | OUTPATIENT
Start: 2022-10-13 | End: 2023-03-27

## 2022-10-13 NOTE — TELEPHONE ENCOUNTER
Routing refill request to provider for review/approval because:  Labs out of range:    Uric Acid   Date Value Ref Range Status   08/30/2019 1.5 (L) 3.5 - 7.2 mg/dL Final      Has upcoming appointment 11/29/2022.     Abigail Avery RN  [facility] Triage

## 2022-10-17 ENCOUNTER — HOSPITAL ENCOUNTER (OUTPATIENT)
Facility: CLINIC | Age: 77
End: 2022-10-17
Attending: ORTHOPAEDIC SURGERY | Admitting: ORTHOPAEDIC SURGERY
Payer: COMMERCIAL

## 2022-10-23 ENCOUNTER — HEALTH MAINTENANCE LETTER (OUTPATIENT)
Age: 77
End: 2022-10-23

## 2022-10-28 DIAGNOSIS — F10.10 ALCOHOL ABUSE: ICD-10-CM

## 2022-10-31 RX ORDER — FOLIC ACID 1 MG/1
TABLET ORAL
Qty: 90 TABLET | Refills: 0 | Status: SHIPPED | OUTPATIENT
Start: 2022-10-31 | End: 2023-04-21

## 2022-10-31 NOTE — TELEPHONE ENCOUNTER
Medication is being filled for 1 time refill only due to:  Patient needs to be seen because. Scheduled with Dr. Stephens. Terri Wheat RN

## 2022-11-30 DIAGNOSIS — E55.9 VITAMIN D DEFICIENCY: ICD-10-CM

## 2022-12-02 ENCOUNTER — OFFICE VISIT (OUTPATIENT)
Dept: FAMILY MEDICINE | Facility: CLINIC | Age: 77
End: 2022-12-02
Payer: COMMERCIAL

## 2022-12-02 ENCOUNTER — ANCILLARY PROCEDURE (OUTPATIENT)
Dept: GENERAL RADIOLOGY | Facility: CLINIC | Age: 77
End: 2022-12-02
Attending: INTERNAL MEDICINE
Payer: COMMERCIAL

## 2022-12-02 VITALS
SYSTOLIC BLOOD PRESSURE: 138 MMHG | BODY MASS INDEX: 35.22 KG/M2 | HEART RATE: 64 BPM | DIASTOLIC BLOOD PRESSURE: 88 MMHG | RESPIRATION RATE: 14 BRPM | OXYGEN SATURATION: 96 % | TEMPERATURE: 98.4 F | WEIGHT: 218.2 LBS

## 2022-12-02 DIAGNOSIS — K70.9 ALCOHOLIC LIVER DAMAGE (H): ICD-10-CM

## 2022-12-02 DIAGNOSIS — M17.11 PRIMARY OSTEOARTHRITIS OF RIGHT KNEE: ICD-10-CM

## 2022-12-02 DIAGNOSIS — D69.3 IDIOPATHIC THROMBOCYTOPENIC PURPURA (H): ICD-10-CM

## 2022-12-02 DIAGNOSIS — I10 ESSENTIAL HYPERTENSION: ICD-10-CM

## 2022-12-02 DIAGNOSIS — I27.82 CHRONIC PULMONARY EMBOLISM, UNSPECIFIED PULMONARY EMBOLISM TYPE, UNSPECIFIED WHETHER ACUTE COR PULMONALE PRESENT (H): ICD-10-CM

## 2022-12-02 DIAGNOSIS — G63 POLYNEUROPATHY IN OTHER DISEASES CLASSIFIED ELSEWHERE (H): ICD-10-CM

## 2022-12-02 DIAGNOSIS — Z79.01 CHRONIC ANTICOAGULATION: ICD-10-CM

## 2022-12-02 DIAGNOSIS — Z01.818 PREOP GENERAL PHYSICAL EXAM: Primary | ICD-10-CM

## 2022-12-02 DIAGNOSIS — R94.31 ABNORMAL ELECTROCARDIOGRAM: ICD-10-CM

## 2022-12-02 DIAGNOSIS — K76.6 PORTAL HYPERTENSION (H): ICD-10-CM

## 2022-12-02 DIAGNOSIS — F10.20 ALCOHOL DEPENDENCE, CONTINUOUS (H): ICD-10-CM

## 2022-12-02 DIAGNOSIS — I51.7 CARDIOMEGALY: ICD-10-CM

## 2022-12-02 DIAGNOSIS — D69.6 THROMBOCYTOPENIA (H): ICD-10-CM

## 2022-12-02 DIAGNOSIS — I86.4 GASTRIC VARICES: ICD-10-CM

## 2022-12-02 DIAGNOSIS — K70.30 ALCOHOLIC CIRRHOSIS, UNSPECIFIED WHETHER ASCITES PRESENT (H): ICD-10-CM

## 2022-12-02 DIAGNOSIS — Z01.818 PREOP GENERAL PHYSICAL EXAM: ICD-10-CM

## 2022-12-02 DIAGNOSIS — G47.33 OBSTRUCTIVE SLEEP APNEA SYNDROME: ICD-10-CM

## 2022-12-02 DIAGNOSIS — N18.31 STAGE 3A CHRONIC KIDNEY DISEASE (H): ICD-10-CM

## 2022-12-02 DIAGNOSIS — F32.5 MAJOR DEPRESSIVE DISORDER IN FULL REMISSION, UNSPECIFIED WHETHER RECURRENT (H): ICD-10-CM

## 2022-12-02 DIAGNOSIS — E66.01 MORBID OBESITY (H): ICD-10-CM

## 2022-12-02 LAB
ERYTHROCYTE [DISTWIDTH] IN BLOOD BY AUTOMATED COUNT: 15.9 % (ref 10–15)
HCT VFR BLD AUTO: 44.1 % (ref 40–53)
HGB BLD-MCNC: 14 G/DL (ref 13.3–17.7)
MCH RBC QN AUTO: 31.4 PG (ref 26.5–33)
MCHC RBC AUTO-ENTMCNC: 31.7 G/DL (ref 31.5–36.5)
MCV RBC AUTO: 99 FL (ref 78–100)
PLATELET # BLD AUTO: 85 10E3/UL (ref 150–450)
RBC # BLD AUTO: 4.46 10E6/UL (ref 4.4–5.9)
WBC # BLD AUTO: 8.1 10E3/UL (ref 4–11)

## 2022-12-02 PROCEDURE — 36415 COLL VENOUS BLD VENIPUNCTURE: CPT | Performed by: INTERNAL MEDICINE

## 2022-12-02 PROCEDURE — 93000 ELECTROCARDIOGRAM COMPLETE: CPT | Performed by: INTERNAL MEDICINE

## 2022-12-02 PROCEDURE — 80053 COMPREHEN METABOLIC PANEL: CPT | Performed by: INTERNAL MEDICINE

## 2022-12-02 PROCEDURE — 90471 IMMUNIZATION ADMIN: CPT | Performed by: INTERNAL MEDICINE

## 2022-12-02 PROCEDURE — 71046 X-RAY EXAM CHEST 2 VIEWS: CPT | Mod: TC | Performed by: RADIOLOGY

## 2022-12-02 PROCEDURE — 82043 UR ALBUMIN QUANTITATIVE: CPT | Performed by: INTERNAL MEDICINE

## 2022-12-02 PROCEDURE — 99215 OFFICE O/P EST HI 40 MIN: CPT | Mod: 25 | Performed by: INTERNAL MEDICINE

## 2022-12-02 PROCEDURE — 90750 HZV VACC RECOMBINANT IM: CPT | Performed by: INTERNAL MEDICINE

## 2022-12-02 PROCEDURE — 85027 COMPLETE CBC AUTOMATED: CPT | Performed by: INTERNAL MEDICINE

## 2022-12-02 RX ORDER — MULTIVIT-MIN/IRON/FOLIC ACID/K 18-600-40
CAPSULE ORAL
Qty: 90 CAPSULE | Refills: 0 | Status: SHIPPED | OUTPATIENT
Start: 2022-12-02 | End: 2024-01-12

## 2022-12-02 ASSESSMENT — PATIENT HEALTH QUESTIONNAIRE - PHQ9
SUM OF ALL RESPONSES TO PHQ QUESTIONS 1-9: 13
SUM OF ALL RESPONSES TO PHQ QUESTIONS 1-9: 13
10. IF YOU CHECKED OFF ANY PROBLEMS, HOW DIFFICULT HAVE THESE PROBLEMS MADE IT FOR YOU TO DO YOUR WORK, TAKE CARE OF THINGS AT HOME, OR GET ALONG WITH OTHER PEOPLE: SOMEWHAT DIFFICULT

## 2022-12-02 NOTE — PATIENT INSTRUCTIONS
Will need clearance from Hemoncology for holding your Xarelto given the risk factors of your low platelets as well for this Surgical clearance.       Will do EKG , Chest X ray , Labs and update you .     ================================================================          At your visit today, we discussed your risk for falls and preventive options.    Fall Prevention  Falls often occur due to slipping, tripping or losing your balance. Millions of people fall every year and injure themselves. Here are ways to reduce your risk of falling again.   Think about your fall, was there anything that caused your fall that can be fixed, removed, or replaced?  Make your home safe by keeping walkways clear of objects you may trip over, such as electric cords.  Use non-slip pads under rugs. Don't use area rugs or small throw rugs.  Use non-slip mats in bathtubs and showers.  Install handrails and lights on staircases. The handrails should be on both sides of the stairs.  Don't walk in poorly lit areas.  Don't stand on chairs or wobbly ladders.  Use caution when reaching overhead or looking upward. This position can cause a loss of balance.  Be sure your shoes fit properly, have non-slip bottoms and are in good condition.   Wear shoes both inside and out. Don't go barefoot or wear slippers.  Be cautious when going up and down stairs, curbs, and when walking on uneven sidewalks.  If your balance is poor, consider using a cane or walker.  If your fall was related to alcohol use, stop or limit alcohol intake.   If your fall was related to use of sleeping medicines, talk to your healthcare provider about this. You may need to reduce your dosage at bedtime if you awaken during the night to go to the bathroom.    To reduce the need for nighttime bathroom trips:  Don't drink fluids for several hours before going to bed  Empty your bladder before going to bed  Men can keep a urinal at the bedside  Stay as active as you can. Balance,  flexibility, strength, and endurance all come from exercise. They all play a role in preventing falls. Ask your healthcare provider which types of activity are right for you.  Get your vision checked on a regular basis.  If you have pets, know where they are before you stand up or walk so you don't trip over them.  Use night lights.  Go over all your medicines with a pharmacist or other healthcare provider to see if any of them could make you more likely to fall.  [employer] last reviewed this educational content on 2018-2021 [employer]. All rights reserved. This information is not intended as a substitute for professional medical care. Always follow your healthcare professional's instructions.        Preparing for Your Surgery  Getting started  A nurse will call you to review your health history and instructions. They will give you an arrival time based on your scheduled surgery time. Please be ready to share:  Your doctor's clinic name and phone number  Your medical, surgical, and anesthesia history  A list of allergies and sensitivities  A list of medicines, including herbal treatments and over-the-counter drugs  Whether the patient has a legal guardian (ask how to send us the papers in advance)  Please tell us if you're pregnant--or if there's any chance you might be pregnant. Some surgeries may injure a fetus (unborn baby), so they require a pregnancy test. Surgeries that are safe for a fetus don't always need a test, and you can choose whether to have one.   If you have a child who's having surgery, please ask for a copy of Preparing for Your Child's Surgery.    Preparing for surgery  Within 10 to 30 days of surgery: Have a pre-op exam (sometimes called an H&P, or History and Physical). This can be done at a clinic or pre-operative center.  If you're having a , you may not need this exam. Talk to your care team.  At your pre-op exam, talk to your care team about all medicines  you take. If you need to stop any medicines before surgery, ask when to start taking them again.  We do this for your safety. Many medicines can make you bleed too much during surgery. Some change how well surgery (anesthesia) drugs work.  Call your insurance company to let them know you're having surgery. (If you don't have insurance, call [phone].)  Call your clinic if there's any change in your health. This includes signs of a cold or flu (sore throat, runny nose, cough, rash, fever). It also includes a scrape or scratch near the surgery site.  If you have questions on the day of surgery, call your hospital or surgery center.  COVID testing  You may need to be tested for COVID-19 before having surgery. If so, we will give you instructions (or click here).  Eating and drinking guidelines  For your safety: Unless your surgeon tells you otherwise, follow the guidelines below.  Eat and drink as usual until 8 hours before you arrive for surgery. After that, no food or milk.  Drink clear liquids until 2 hours before you arrive. These are liquids you can see through, like water, Gatorade, and Propel Water. They also include plain black coffee and tea (no cream or milk), candy, and breath mints. You can spit out gum when you arrive.  If you drink alcohol: Stop drinking it the night before surgery.  If your care team tells you to take medicine on the morning of surgery, it's okay to take it with a sip of water.  Preventing infection  Shower or bathe the night before and morning of your surgery. Follow the instructions your clinic gave you. (If no instructions, use regular soap.)  Don't shave or clip hair near your surgery site. We'll remove the hair if needed.  Don't smoke or vape the morning of surgery. You may chew nicotine gum up to 2 hours before surgery. A nicotine patch is okay.  Note: Some surgeries require you to completely quit smoking and nicotine. Check with your surgeon.  Your care team will make every  effort to keep you safe from infection. We will:  Clean our hands often with soap and water (or an alcohol-based hand rub).  Clean the skin at your surgery site with a special soap that kills germs.  Give you a special gown to keep you warm. (Cold raises the risk of infection.)  Wear special hair covers, masks, gowns and gloves during surgery.  Give antibiotic medicine, if prescribed. Not all surgeries need antibiotics.  What to bring on the day of surgery  Photo ID and insurance card  Copy of your health care directive, if you have one  Glasses and hearing aids (bring cases)  You can't wear contacts during surgery  Inhaler and eye drops, if you use them (tell us about these when you arrive)  CPAP machine or breathing device, if you use them  A few personal items, if spending the night  If you have . . .  A pacemaker, ICD (cardiac defibrillator) or other implant: Bring the ID card.  An implanted stimulator: Bring the remote control.  A legal guardian: Bring a copy of the certified (court-stamped) guardianship papers.  Please remove any jewelry, including body piercings. Leave jewelry and other valuables at home.  If you're going home the day of surgery  You must have a responsible adult drive you home. They should stay with you overnight as well.  If you don't have someone to stay with you, and you aren't safe to go home alone, we may keep you overnight. Insurance often won't pay for this.  After surgery  If it's hard to control your pain or you need more pain medicine, please call your surgeon's office.  Questions?   If you have any questions for your care team, list them here: _________________________________________________________________________________________________________________________________________________________________________ ____________________________________ ____________________________________ ____________________________________  For informational purposes only. Not to replace the advice of  your health care provider. Copyright   ,  [facility]. All rights reserved. Clinically reviewed by Paola De La Vega MD. [employer] [id number] - REV 10/22.    Preparing for Your Surgery  Getting started  A nurse will call you to review your health history and instructions. They will give you an arrival time based on your scheduled surgery time. Please be ready to share:  Your doctor's clinic name and phone number  Your medical, surgical, and anesthesia history  A list of allergies and sensitivities  A list of medicines, including herbal treatments and over-the-counter drugs  Whether the patient has a legal guardian (ask how to send us the papers in advance)  Please tell us if you're pregnant--or if there's any chance you might be pregnant. Some surgeries may injure a fetus (unborn baby), so they require a pregnancy test. Surgeries that are safe for a fetus don't always need a test, and you can choose whether to have one.   If you have a child who's having surgery, please ask for a copy of Preparing for Your Child's Surgery.    Preparing for surgery  Within 10 to 30 days of surgery: Have a pre-op exam (sometimes called an H&P, or History and Physical). This can be done at a clinic or pre-operative center.  If you're having a , you may not need this exam. Talk to your care team.  At your pre-op exam, talk to your care team about all medicines you take. If you need to stop any medicines before surgery, ask when to start taking them again.  We do this for your safety. Many medicines can make you bleed too much during surgery. Some change how well surgery (anesthesia) drugs work.  Call your insurance company to let them know you're having surgery. (If you don't have insurance, call [phone].)  Call your clinic if there's any change in your health. This includes signs of a cold or flu (sore throat, runny nose, cough, rash, fever). It also includes a scrape or scratch near the surgery site.  If you  have questions on the day of surgery, call your hospital or surgery center.  COVID testing  You may need to be tested for COVID-19 before having surgery. If so, we will give you instructions (or click here).  Eating and drinking guidelines  For your safety: Unless your surgeon tells you otherwise, follow the guidelines below.  Eat and drink as usual until 8 hours before you arrive for surgery. After that, no food or milk.  Drink clear liquids until 2 hours before you arrive. These are liquids you can see through, like water, Gatorade, and Propel Water. They also include plain black coffee and tea (no cream or milk), candy, and breath mints. You can spit out gum when you arrive.  If you drink alcohol: Stop drinking it the night before surgery.  If your care team tells you to take medicine on the morning of surgery, it's okay to take it with a sip of water.  Preventing infection  Shower or bathe the night before and morning of your surgery. Follow the instructions your clinic gave you. (If no instructions, use regular soap.)  Don't shave or clip hair near your surgery site. We'll remove the hair if needed.  Don't smoke or vape the morning of surgery. You may chew nicotine gum up to 2 hours before surgery. A nicotine patch is okay.  Note: Some surgeries require you to completely quit smoking and nicotine. Check with your surgeon.  Your care team will make every effort to keep you safe from infection. We will:  Clean our hands often with soap and water (or an alcohol-based hand rub).  Clean the skin at your surgery site with a special soap that kills germs.  Give you a special gown to keep you warm. (Cold raises the risk of infection.)  Wear special hair covers, masks, gowns and gloves during surgery.  Give antibiotic medicine, if prescribed. Not all surgeries need antibiotics.  What to bring on the day of surgery  Photo ID and insurance card  Copy of your health care directive, if you have one  Glasses and hearing aids  (bring cases)  You can't wear contacts during surgery  Inhaler and eye drops, if you use them (tell us about these when you arrive)  CPAP machine or breathing device, if you use them  A few personal items, if spending the night  If you have . . .  A pacemaker, ICD (cardiac defibrillator) or other implant: Bring the ID card.  An implanted stimulator: Bring the remote control.  A legal guardian: Bring a copy of the certified (court-stamped) guardianship papers.  Please remove any jewelry, including body piercings. Leave jewelry and other valuables at home.  If you're going home the day of surgery  You must have a responsible adult drive you home. They should stay with you overnight as well.  If you don't have someone to stay with you, and you aren't safe to go home alone, we may keep you overnight. Insurance often won't pay for this.  After surgery  If it's hard to control your pain or you need more pain medicine, please call your surgeon's office.  Questions?   If you have any questions for your care team, list them here: _________________________________________________________________________________________________________________________________________________________________________ ____________________________________ ____________________________________ ____________________________________  For informational purposes only. Not to replace the advice of your health care provider. Copyright   2003, 2019 [facility]. All rights reserved. Clinically reviewed by Paola De La Vega MD. [employer] [id number] - REV 10/22.

## 2022-12-02 NOTE — TELEPHONE ENCOUNTER
Prescription approved per [facility] Refill Protocol.  Abigail Avery RN  [facility]

## 2022-12-02 NOTE — PROGRESS NOTES
[address] [address]  Phone: [phone]  Primary Provider: Andrae Sevilla  Pre-op Performing Provider: ANDRAE SEVILLA      PREOPERATIVE EVALUATION:  Today's date: 12/2/2022    Raymon Ace is a 77 year old male who presents for a preoperative evaluation.    Surgical Information:  Surgery/Procedure: Right total knee Arthroplasty   Surgery Location: [facility]   Surgeon: Dr. Den Duffy   Surgery Date: 12/13/2022  Time of Surgery: 7:30 AM  Where patient plans to recover: At home with family  Fax number for surgical facility: Note does not need to be faxed, will be available electronically in Epic.    Type of Anesthesia Anticipated: to be determined    Assessment & Plan     The proposed surgical procedure is considered INTERMEDIATE risk.    Assessment and Plan  1. Preop general physical exam  2. Primary osteoarthritis of right knee  Pt is here for preop clearance for this intermediate risk surgery with multiple risk factors as mentioned below.  Last seen pt in 9/2021 for Atypical chest pain, falls , Alcohol abuse, Alcoholic Cirrhosis and was referred to Addiction medicine at that time. {t didnt keep up any follow up visist with PCP since then. Does have ITP with low PLTS but on OAC given risks of DVTs Hx. He is here for Preoperative clearance of TOTAL Knee Arthroplasty for Rt Knee OA under general anaesthesia.   - Last labs in 7/2022 with elevated LFTs , PLTs ow at 92 improving . Will need Clearance from Oncology given his PLTs , OAC for PE.  Last EKG in 9/2021 and CXR stating cardiomegaly. Last Echo in 2019 showing 55% LVEF . WIll need recheck of labs & EKG - Echo ordered.  UPDATE - Echocardiogram is normal as per cardiology read. Does show mild Aortic root dilatation at 3.6 cm. Will need follow up. Will approve for clearance of your upcoming Hip procedure >> Once Hematology clears you, please keep up  "your appointment with Hematology .  UPDATE - Please see Hematology staff reply below on this clearance.   - EKG 12-lead complete w/read - Clinics  - XR Chest 2 Views; Future  - Comprehensive metabolic panel (BMP + Alb, Alk Phos, ALT, AST, Total. Bili, TP); Future  - CBC with platelets; Future  - Echocardiogram Complete; Future  - Comprehensive metabolic panel (BMP + Alb, Alk Phos, ALT, AST, Total. Bili, TP)  - CBC with platelets    3. Idiopathic thrombocytopenic purpura (H)  4. Thrombocytopenia (H)  Chronic problem, remaining stable with last PLTS at 90s. Currently on Eltrombopag ( Promacta ) as managed by Oncology. Will contact Hemoncology Dr. Loza on staff message for his inputs on hematology clearance for this procedure - given risk factors mentioned and on OAC  UPDATE - Received reply of Clearance from Hematology as below  ====================  Received: Today  = > 12/12/2022  Waylon Novoa MD Namballa, Lavanya, MD  Cc: Shayla Méndez RN Sorry, I am attending on the inpatient consult service and just saw your message now.     \" If the surgeon is okay doing the procedure with his current platelet count (~80,000), then from my perspective all we need to do is be sure he holds his Xarelto 24 hours prior to the surgery.  He should resume Xarelto (10 mg) as soon as possible postoperatively, at the discretion of the surgeon.  Ideally this would be within 24 to 48 hours at the latest.     If the surgeon feels that he needs a higher platelet count we could try platelet transfusion, but given that he has ITP, he is unlikely to respond normally to platelet transfusions.  Other options would be to use steroids or IVIG, but we would need much more advance notice for those options to be put into place. \"     If you have further questions or need to discuss, please page me directly at [phone].       Waylon.   ====================================    5. 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 Currently on Sinemet for RLS and also on Gabapentin to help neuropathy.     6. Chronic pulmonary embolism, unspecified pulmonary embolism type, unspecified whether acute cor pulmonale present (H)  7. Chronic anticoagulation  Chronic problem, given pt currently on Xarelto will need to hold it for his TKA surgery. Will check with Hematology on staff messaging and take their approval for clearance given pt risk factors of low PLTS and on OAC.   Pt needs to holds his Xarelto 24 hours prior to the surgery.  He should resume Xarelto (10 mg) as soon as possible postoperatively, at the discretion of the surgeon.  Ideally this would be within 24 to 48 hours at the latest.        8. Morbid obesity (H)  9. Alcohol dependence, continuous (H)  10. Alcoholic liver damage (H)  11. Alcoholic cirrhosis, unspecified whether ascites present (H)  Ongoing problem, Uncontrolled. Pt does endorses that he consumes 3 glasses of Whiskey everyday and cannot be able to stop it. Does have strong risk factor of Withdrawal in his perioperative period.     12. Stage 3a chronic kidney disease (H)  - Albumin Random Urine Quantitative with Creat Ratio; Future  - Albumin Random Urine Quantitative with Creat Ratio    13. Portal hypertension (H)  14. Gastric varices  Chronic problems, denies any visible GI bleed at this time.     15. Essential hypertension  Borderline elevated on my recheck of BP with current Metoprolol 50 mg daily.     16. Major depressive disorder in full remission, unspecified whether recurrent (H)  Stable on current Lexapro.     17. Abnormal electrocardiogram  18. Cardiomegaly per 2017 CXR   EKG does show diffuse ST-T changes on Anterolateral leads and his risk factors of Continuous Alcoholism, past CXR positive for   - Echocardiogram Complete; Future    19. Obstructive sleep apnea syndrome  Non compliant with CPAP as he cannot tolerate it.        Patient  Instructions     Will need clearance from Hemoncology for holding your Xarelto given the risk factors of your low platelets as well for this Surgical clearance.       Will do EKG , Chest X ray , Labs and update you .     ================================================================          At your visit today, we discussed your risk for falls and preventive options.    Fall Prevention  Falls often occur due to slipping, tripping or losing your balance. Millions of people fall every year and injure themselves. Here are ways to reduce your risk of falling again.   1. Think about your fall, was there anything that caused your fall that can be fixed, removed, or replaced?  2. Make your home safe by keeping walkways clear of objects you may trip over, such as electric cords.  3. Use non-slip pads under rugs. Don't use area rugs or small throw rugs.  4. Use non-slip mats in bathtubs and showers.  5. Install handrails and lights on staircases. The handrails should be on both sides of the stairs.  6. Don't walk in poorly lit areas.  7. Don't stand on chairs or wobbly ladders.  8. Use caution when reaching overhead or looking upward. This position can cause a loss of balance.  9. Be sure your shoes fit properly, have non-slip bottoms and are in good condition.   10. Wear shoes both inside and out. Don't go barefoot or wear slippers.  11. Be cautious when going up and down stairs, curbs, and when walking on uneven sidewalks.  12. If your balance is poor, consider using a cane or walker.  13. If your fall was related to alcohol use, stop or limit alcohol intake.   14. If your fall was related to use of sleeping medicines, talk to your healthcare provider about this. You may need to reduce your dosage at bedtime if you awaken during the night to go to the bathroom.    15. To reduce the need for nighttime bathroom trips:  ? Don't drink fluids for several hours before going to bed  ? Empty your bladder before going to  bed  ? Men can keep a urinal at the bedside  16. Stay as active as you can. Balance, flexibility, strength, and endurance all come from exercise. They all play a role in preventing falls. Ask your healthcare provider which types of activity are right for you.  17. Get your vision checked on a regular basis.  18. If you have pets, know where they are before you stand up or walk so you don't trip over them.  19. Use night lights.  20. Go over all your medicines with a pharmacist or other healthcare provider to see if any of them could make you more likely to fall.  [employer] last reviewed this educational content on 2018-2021 [employer]. All rights reserved. This information is not intended as a substitute for professional medical care. Always follow your healthcare professional's instructions.        Preparing for Your Surgery  Getting started  A nurse will call you to review your health history and instructions. They will give you an arrival time based on your scheduled surgery time. Please be ready to share:    Your doctor's clinic name and phone number    Your medical, surgical, and anesthesia history    A list of allergies and sensitivities    A list of medicines, including herbal treatments and over-the-counter drugs    Whether the patient has a legal guardian (ask how to send us the papers in advance)  Please tell us if you're pregnant--or if there's any chance you might be pregnant. Some surgeries may injure a fetus (unborn baby), so they require a pregnancy test. Surgeries that are safe for a fetus don't always need a test, and you can choose whether to have one.   If you have a child who's having surgery, please ask for a copy of Preparing for Your Child's Surgery.    Preparing for surgery  1. Within 10 to 30 days of surgery: Have a pre-op exam (sometimes called an H&P, or History and Physical). This can be done at a clinic or pre-operative center.  ? If you're having a ,  you may not need this exam. Talk to your care team.  2. At your pre-op exam, talk to your care team about all medicines you take. If you need to stop any medicines before surgery, ask when to start taking them again.  ? We do this for your safety. Many medicines can make you bleed too much during surgery. Some change how well surgery (anesthesia) drugs work.  3. Call your insurance company to let them know you're having surgery. (If you don't have insurance, call [phone].)  4. Call your clinic if there's any change in your health. This includes signs of a cold or flu (sore throat, runny nose, cough, rash, fever). It also includes a scrape or scratch near the surgery site.  5. If you have questions on the day of surgery, call your hospital or surgery center.  COVID testing  You may need to be tested for COVID-19 before having surgery. If so, we will give you instructions (or click here).  Eating and drinking guidelines  For your safety: Unless your surgeon tells you otherwise, follow the guidelines below.    Eat and drink as usual until 8 hours before you arrive for surgery. After that, no food or milk.    Drink clear liquids until 2 hours before you arrive. These are liquids you can see through, like water, Gatorade, and Propel Water. They also include plain black coffee and tea (no cream or milk), candy, and breath mints. You can spit out gum when you arrive.    If you drink alcohol: Stop drinking it the night before surgery.    If your care team tells you to take medicine on the morning of surgery, it's okay to take it with a sip of water.  Preventing infection  1. Shower or bathe the night before and morning of your surgery. Follow the instructions your clinic gave you. (If no instructions, use regular soap.)  2. Don't shave or clip hair near your surgery site. We'll remove the hair if needed.  3. Don't smoke or vape the morning of surgery. You may chew nicotine gum up to 2 hours before surgery. A nicotine  patch is okay.  ? Note: Some surgeries require you to completely quit smoking and nicotine. Check with your surgeon.  4. Your care team will make every effort to keep you safe from infection. We will:  ? Clean our hands often with soap and water (or an alcohol-based hand rub).  ? Clean the skin at your surgery site with a special soap that kills germs.  ? Give you a special gown to keep you warm. (Cold raises the risk of infection.)  ? Wear special hair covers, masks, gowns and gloves during surgery.  ? Give antibiotic medicine, if prescribed. Not all surgeries need antibiotics.  What to bring on the day of surgery  1. Photo ID and insurance card  2. Copy of your health care directive, if you have one  3. Glasses and hearing aids (bring cases)  ? You can't wear contacts during surgery  4. Inhaler and eye drops, if you use them (tell us about these when you arrive)  5. CPAP machine or breathing device, if you use them  6. A few personal items, if spending the night  7. If you have . . .  ? A pacemaker, ICD (cardiac defibrillator) or other implant: Bring the ID card.  ? An implanted stimulator: Bring the remote control.  ? A legal guardian: Bring a copy of the certified (court-stamped) guardianship papers.  Please remove any jewelry, including body piercings. Leave jewelry and other valuables at home.  If you're going home the day of surgery    You must have a responsible adult drive you home. They should stay with you overnight as well.    If you don't have someone to stay with you, and you aren't safe to go home alone, we may keep you overnight. Insurance often won't pay for this.  After surgery  If it's hard to control your pain or you need more pain medicine, please call your surgeon's office.  Questions?   If you have any questions for your care team, list them here:  _________________________________________________________________________________________________________________________________________________________________________ ____________________________________ ____________________________________ ____________________________________  For informational purposes only. Not to replace the advice of your health care provider. Copyright   2019 [facility]. All rights reserved. Clinically reviewed by Paola De La Vega MD. [employer] [id number] - REV 10/22.    Preparing for Your Surgery  Getting started  A nurse will call you to review your health history and instructions. They will give you an arrival time based on your scheduled surgery time. Please be ready to share:    Your doctor's clinic name and phone number    Your medical, surgical, and anesthesia history    A list of allergies and sensitivities    A list of medicines, including herbal treatments and over-the-counter drugs    Whether the patient has a legal guardian (ask how to send us the papers in advance)  Please tell us if you're pregnant--or if there's any chance you might be pregnant. Some surgeries may injure a fetus (unborn baby), so they require a pregnancy test. Surgeries that are safe for a fetus don't always need a test, and you can choose whether to have one.   If you have a child who's having surgery, please ask for a copy of Preparing for Your Child's Surgery.    Preparing for surgery  1. Within 10 to 30 days of surgery: Have a pre-op exam (sometimes called an H&P, or History and Physical). This can be done at a clinic or pre-operative center.  ? If you're having a , you may not need this exam. Talk to your care team.  2. At your pre-op exam, talk to your care team about all medicines you take. If you need to stop any medicines before surgery, ask when to start taking them again.  ? We do this for your safety. Many medicines can make you bleed too much during surgery. Some change how  well surgery (anesthesia) drugs work.  3. Call your insurance company to let them know you're having surgery. (If you don't have insurance, call [phone].)  4. Call your clinic if there's any change in your health. This includes signs of a cold or flu (sore throat, runny nose, cough, rash, fever). It also includes a scrape or scratch near the surgery site.  5. If you have questions on the day of surgery, call your hospital or surgery center.  COVID testing  You may need to be tested for COVID-19 before having surgery. If so, we will give you instructions (or click here).  Eating and drinking guidelines  For your safety: Unless your surgeon tells you otherwise, follow the guidelines below.    Eat and drink as usual until 8 hours before you arrive for surgery. After that, no food or milk.    Drink clear liquids until 2 hours before you arrive. These are liquids you can see through, like water, Gatorade, and Propel Water. They also include plain black coffee and tea (no cream or milk), candy, and breath mints. You can spit out gum when you arrive.    If you drink alcohol: Stop drinking it the night before surgery.    If your care team tells you to take medicine on the morning of surgery, it's okay to take it with a sip of water.  Preventing infection  1. Shower or bathe the night before and morning of your surgery. Follow the instructions your clinic gave you. (If no instructions, use regular soap.)  2. Don't shave or clip hair near your surgery site. We'll remove the hair if needed.  3. Don't smoke or vape the morning of surgery. You may chew nicotine gum up to 2 hours before surgery. A nicotine patch is okay.  ? Note: Some surgeries require you to completely quit smoking and nicotine. Check with your surgeon.  4. Your care team will make every effort to keep you safe from infection. We will:  ? Clean our hands often with soap and water (or an alcohol-based hand rub).  ? Clean the skin at your surgery site with a  special soap that kills germs.  ? Give you a special gown to keep you warm. (Cold raises the risk of infection.)  ? Wear special hair covers, masks, gowns and gloves during surgery.  ? Give antibiotic medicine, if prescribed. Not all surgeries need antibiotics.  What to bring on the day of surgery  1. Photo ID and insurance card  2. Copy of your health care directive, if you have one  3. Glasses and hearing aids (bring cases)  ? You can't wear contacts during surgery  4. Inhaler and eye drops, if you use them (tell us about these when you arrive)  5. CPAP machine or breathing device, if you use them  6. A few personal items, if spending the night  7. If you have . . .  ? A pacemaker, ICD (cardiac defibrillator) or other implant: Bring the ID card.  ? An implanted stimulator: Bring the remote control.  ? A legal guardian: Bring a copy of the certified (court-stamped) guardianship papers.  Please remove any jewelry, including body piercings. Leave jewelry and other valuables at home.  If you're going home the day of surgery    You must have a responsible adult drive you home. They should stay with you overnight as well.    If you don't have someone to stay with you, and you aren't safe to go home alone, we may keep you overnight. Insurance often won't pay for this.  After surgery  If it's hard to control your pain or you need more pain medicine, please call your surgeon's office.  Questions?   If you have any questions for your care team, list them here: _________________________________________________________________________________________________________________________________________________________________________ ____________________________________ ____________________________________ ____________________________________  For informational purposes only. Not to replace the advice of your health care provider. Copyright   2003, 2019 [facility]. All rights reserved. Clinically reviewed by Paola  MD Ceferino. [employer] [id number] - REV 10/22.      Return in about 4 weeks (around 12/30/2022), or if symptoms worsen or fail to improve, for Annual Wellness Exam.    Beatrice Stephens MD  [facility]         Risks and Recommendations:  The patient has the following additional risks and recommendations for perioperative complications:   - Consult Hospitalist / IM to assist with post-op medical management  Cardiovascular:   - Abnormal EKG , STAT Echocardiogram ordered , Does have CXR positive for Cardiomegaly in the past .   Pulmonary:    - Incentive spirometry post-op   - Consider Respiratory Therapy (Respiratory Care IP Consult) post-op   - Former Smoking, AKRL but cannot tolerate CPAP  Anemia/Bleeding/Clotting:    - Significant bleeding history   - History of DVT or PE, consider DVT prevention postoperatively   - Anemia and does not require treatment prior to surgery. Monitor hemoglobin postoperatively  Social and Substance:    - Alcohol abuse and risk of withdrawal  Infection:    - Patient has a history of MRSA   - Poor understanding and [place] of patient with Wife Mirela helping most of the times in conversation.     Medication Instructions:  Patient is to take all scheduled medications on the day of surgery EXCEPT for modifications listed below:   - apixaban (Eliquis), edoxaban (Savaysa), rivaroxaban (Xarelto): Bleeding risk is moderate or high for this procedure AND CrCl  (>=) 50 mL/min. HOLD 2 days before surgery >> and resume back post operatively within 24-48 hrs. .    - Beta Blockers: Continue taking on the day of surgery.   - Antiepileptics: Continue without modification.   - Parkinson's medications: Continue without modification.    RECOMMENDATION:  APPROVAL GIVEN ( As per conditions applied by Hematologist recommendations per my conversation as mentioned above >>  If Surgeon is OK with platelet counts of = 80 its at his discretion but given his ITP he may not respond to Platelet  transfusions and will need Steroids/ IVIG infusions which will need advanced notice for them to be in place )  to proceed with proposed procedure, without further diagnostic evaluation.    Review of external notes as documented above   Independent interpretation of a test performed by another physician/other qualified health care professional (not separately reported) - EKG    65 minutes spent on the date of the encounter doing chart review, review of outside records, review of test results, interpretation of tests, patient visit, documentation, discussion with other provider(s) and discussion with family         Subjective     HPI related to upcoming procedure:     Preop Questions 12/2/2022   1. Have you ever had a heart attack or stroke? YES    2. Have you ever had surgery on your heart or blood vessels, such as a stent placement, a coronary artery bypass, or surgery on an artery in your head, neck, heart, or legs? No   3. Do you have chest pain with activity? No   4. Do you have a history of  heart failure? No   5. Do you currently have a cold, bronchitis or symptoms of other infection? No   6. Do you have a cough, shortness of breath, or wheezing? No   7. Do you or anyone in your family have previous history of blood clots? YES    8. Do you or does anyone in your family have a serious bleeding problem such as prolonged bleeding following surgeries or cuts? No   9. Have you ever had problems with anemia or been told to take iron pills? YES    10. Have you had any abnormal blood loss such as black, tarry or bloody stools? No   11. Have you ever had a blood transfusion? UNKNOWN    12. Are you willing to have a blood transfusion if it is medically needed before, during, or after your surgery? Yes   13. Have you or any of your relatives ever had problems with anesthesia? YES    14. Do you have sleep apnea, excessive snoring or daytime drowsiness? YES    14a. Do you have a CPAP machine? Yes   15. Do you have any  artifical heart valves or other implanted medical devices like a pacemaker, defibrillator, or continuous glucose monitor? No   16. Do you have artificial joints? YES    17. Are you allergic to latex? No       Health Care Directive:  Patient has a Health Care Directive on file      Preoperative Review of :   reviewed - controlled substances reflected in medication list.    Status of Chronic Conditions:  See problem list for active medical problems.  Problems all longstanding and stable, except as noted/documented.  See ROS for pertinent symptoms related to these conditions.      Review of Systems  CONSTITUTIONAL: NEGATIVE for fever, chills, change in weight  INTEGUMENTARY/SKIN: NEGATIVE for worrisome rashes, moles or lesions  EYES: NEGATIVE for vision changes or irritation  ENT/MOUTH: NEGATIVE for ear, mouth and throat problems  RESP: NEGATIVE for significant cough or SOB  CV: NEGATIVE for chest pain, palpitations or peripheral edema  GI: NEGATIVE for nausea, abdominal pain, heartburn, or change in bowel habits  : NEGATIVE for frequency, dysuria, or hematuria  MUSCULOSKELETAL: NEGATIVE for significant arthralgias or myalgia  NEURO: NEGATIVE for weakness, dizziness or paresthesias  ENDOCRINE: NEGATIVE for temperature intolerance, skin/hair changes  HEME: NEGATIVE for bleeding problems  PSYCHIATRIC: NEGATIVE for changes in mood or affect    Patient Active Problem List    Diagnosis Date Noted     ITP (idiopathic thrombocytopenic purpura) since 3-13 back surgery       Priority: High     Morbid obesity (H) 12/02/2022     Priority: Medium     Major depressive disorder in full remission, unspecified whether recurrent (H) 12/02/2022     Priority: Medium     Gastric varices 10/27/2020     Priority: Medium     Alcoholic cirrhosis (H) 10/27/2020     Priority: Medium     Preop general physical exam 10/05/2020     Priority: Medium     Chronic anticoagulation 10/05/2020     Priority: Medium     Longitudinal ridging of nail  + spooning of middle ones  12/27/2019     Priority: Medium     Cardiomegaly per 2017 CXR  12/27/2019     Priority: Medium     Major depression in complete remission (H) 12/27/2019     Priority: Medium     CKD (chronic kidney disease) stage 3, GFR 30-59 ml/min (H) 06/07/2019     Priority: Medium     Portal hypertension (H) 06/07/2019     Priority: Medium     Impaired fasting glucose 06/07/2019     Priority: Medium     Fatigue, unspecified type 06/07/2019     Priority: Medium     Screening for prostate cancer 09/14/2018     Priority: Medium     Iron deficiency anemia due to chronic blood loss 06/28/2018     Priority: Medium     Pulmonary embolism (H) large RLL w infarctio 4-17 04/18/2017     Priority: Medium     Formatting of this note might be different from the original.  RLL with infarct       Gastroesophageal reflux disease with esophagitis 12/06/2016     Priority: Medium     Need for prophylactic vaccination against Streptococcus pneumoniae (pneumococcus) 08/04/2016     Priority: Medium     Gout involving toe, unspecified cause, unspecified chronicity, unspecified laterality 06/02/2016     Priority: Medium     ACP (advance care planning) 05/16/2016     Priority: Medium     Advance Care Planning 5/16/2016: ACP Review of Chart / Resources Provided:  Reviewed chart for advance care plan.  Raymon Ace has no plan or code status on file however states presence of ACP document. Copy requested. Confirmed code status reflects current choices pending receipt of document/advance care plan review.  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 Provider to review       Change in bowel habits since 1-14: diarrhea-thinks better off benicar 07/10/2014     Priority: Medium     Family history of ischemic heart disease 01/17/2014     Priority: Medium     Family history of diabetes mellitus 01/17/2014     Priority: Medium     Class 1 obesity due to excess calories with serious comorbidity and body mass index (BMI) of 31.0 to 31.9 in adult 01/10/2014     Priority: Medium     Glucose intolerance (impaired glucose tolerance)  HgbA!C = 5.4 01/10/2014     Priority: Medium     Back pain since 1988 s/po surgery 1992,1996 and 3-13/ Dr Singh  01/10/2014     Priority: Medium     No CSA on file   8-=22-17 no concerns       Alcoholic liver damage (H) 01/10/2014     Priority: Medium     Diverticulosis of large intestine 01/10/2014     Priority: Medium     Problem list name updated by automated process. Provider to review       Elevated liver enzymes AST  01/10/2014     Priority: Medium     Hypotension 03/16/2013     Priority: Medium     Lumbar spinal stenosis 12/14/2012     Priority: Medium     Steroid-induced hyperglycemia 12/14/2012     Priority: Medium     Although glucose normal on 1/30/13 atr 86       Fatty liver/ 1-14 US 12/14/2012     Priority: Medium     Atherosclerosis 12/14/2012     Priority: Medium     Alcohol dependence, continuous (H) 12/14/2012     Priority: Medium     Obstructive sleep apnea syndrome 09/21/2012     Priority: Medium     Does not tolerate CPAP  Problem list name updated by automated process.  Provider to review       Restless legs syndrome (RLS) 09/21/2012     Priority: Medium     Polyneuropathy in other diseases classified elsewhere (H) 09/21/2012     Priority: Medium     RX monitoring program ([place]) reviewed: 8/4/20 recommend provider review    [place] profile:  [email or web address]         Gastroesophageal reflux disease without esophagitis 09/21/2012     Priority: Medium     Preventive measure 01/31/2013     Priority: Low     APRIMA DATA BASE UNDER THE 12/14/12 NOTE  Colonoscopy neg in 5/10; PSA 0.31 in 9/12        Past Medical History:   Diagnosis Date     Alcohol abuse since teens 01/15/2015    Still actively drinking     Alcoholic liver damage (H) 1/10/2014     Gastroesophageal reflux disease with esophagitis 12/6/2016     Gout involving toe, unspecified cause, unspecified chronicity, unspecified laterality 6/2/2016     Heart murmur     heart is positioned farther on left side then typical     Hyperlipidemia 12/17/2015     Hypertension      ITP (idiopathic thrombocytopenic purpura)      Obstructive sleep apnea     does not use CPAP  machine     Pulmonary embolism (H) large RLL w infarctio 4-17 4/18/2017     Past Surgical History:   Procedure Laterality Date     APPENDECTOMY  1956     BACK SURGERY  1992, 1996    trimmed L4-L5, Fusion L4-L5     BONE MARROW BIOPSY, BONE SPECIMEN, NEEDLE/TROCAR  10/24/2012    Procedure: BIOPSY BONE MARROW;  BONE MARROW BIOPSY WITH ASPIRATE (AREVALO ORDERING);  Surgeon: Terri Hickey MD;  Location:  GI     COLONOSCOPY N/A 7/31/2019    Procedure: COLONOSCOPY;  Surgeon: Sebastian Garcia MD;  Location: [facility]     ESOPHAGOSCOPY, GASTROSCOPY, DUODENOSCOPY (EGD), COMBINED N/A 2/8/2018    Procedure: COMBINED ESOPHAGOSCOPY, GASTROSCOPY, DUODENOSCOPY (EGD);  EGD;  Surgeon: Terri Crouch MD;  Location:  GI     ESOPHAGOSCOPY, GASTROSCOPY, DUODENOSCOPY (EGD), COMBINED N/A 11/10/2020    Procedure: ESOPHAGOGASTRODUODENOSCOPY, WITH BIOPSY;  Surgeon:  Alonzo Jang DO;  Location:  GI     FACIAL RECONSTRUCTION SURGERY      jaw fracture     HC TOOTH EXTRACTION W/FORCEP       Current Outpatient Medications   Medication Sig Dispense Refill     allopurinol (ZYLOPRIM) 300 MG tablet Take 1 tablet by mouth once daily 90 tablet 0     carbidopa-levodopa (SINEMET)  MG tablet Take 1 tablet by mouth 3 times daily 90 tablet 5     eltrombopag (PROMACTA) 75 MG tablet Take 1 tablet (75 mg) by mouth daily Administer on an empty stomach, 1 hour before or 2 hours after a meal. 30 tablet 11     folic acid (FOLVITE) 1 MG tablet Take 1 tablet by mouth once daily 90 tablet 0     gabapentin (NEURONTIN) 300 MG capsule Take 4 capsules (1,200 mg) by mouth 3 times daily 360 capsule 5     metoprolol succinate ER (TOPROL XL) 50 MG 24 hr tablet Take 1 tablet (50 mg) by mouth daily 90 tablet 1     omeprazole (PRILOSEC) 40 MG DR capsule TAKE 1 CAPSULE BY MOUTH ONCE DAILY. 90 capsule 2     vitamin D2 (ERGOCALCIFEROL) 12626 units (1250 mcg) capsule Take 1 capsule by mouth once a week 12 capsule 0     Vitamin D3 (CHOLECALCIFEROL) 25 mcg (1000 units) tablet Take by mouth daily (1,000IU) 1 capsule daily       XARELTO ANTICOAGULANT 10 MG TABS tablet TAKE 1 TABLET BY MOUTH ONCE DAILY WITH SUPPER 30 tablet 8     ASPIRIN NOT PRESCRIBED (INTENTIONAL) Please choose reason not prescribed, below (Patient not taking: Reported on 10/21/2021) 1 each 0     Cholecalciferol (VITAMIN D) 50 MCG (2000 UT) CAPS Take 1 capsule by mouth once daily 90 capsule 0     ondansetron (ZOFRAN ODT) 4 MG ODT tab Take 1-2 tablets (4-8 mg) by mouth every 8 hours as needed for nausea (Patient not taking: Reported on 2022) 12 tablet 0       Allergies   Allergen Reactions     Olmesartan Diarrhea        Social History     Tobacco Use     Smoking status: Former     Packs/day: 1.00     Years: 28.00     Pack years: 28.00     Types: Cigarettes     Start date:      Quit date: 1982     Years since quittin.2      Smokeless tobacco: Former   Substance Use Topics     Alcohol use: Yes     Alcohol/week: 5.0 standard drinks     Types: 5 Shots of liquor per week     Comment: 4-5 drinks/day, alcohol use disorder     Family History   Problem Relation Age of Onset     Unknown/Adopted Mother      Unknown/Adopted Father      Heart Disease Sister      Diabetes No family hx of      Coronary Artery Disease No family hx of      Hypertension No family hx of      Hyperlipidemia No family hx of      Cerebrovascular Disease No family hx of      Breast Cancer No family hx of      Colon Cancer No family hx of      Prostate Cancer No family hx of      Other Cancer No family hx of      Depression No family hx of      Anxiety Disorder No family hx of      Mental Illness No family hx of      Substance Abuse No family hx of      Anesthesia Reaction No family hx of      Asthma No family hx of      Osteoporosis No family hx of      Genetic Disorder No family hx of      Thyroid Disease No family hx of      Obesity No family hx of      History   Drug Use No         Objective     /88   Pulse 64   Temp 98.4  F (36.9  C) (Oral)   Resp 14   Wt 99 kg (218 lb 3.2 oz)   SpO2 96%   BMI 35.22 kg/m      Physical Exam    GENERAL APPEARANCE: healthy, alert and no distress     EYES: EOMI,  PERRL     HENT: ear canals and TM's normal and nose and mouth without ulcers or lesions     NECK: no adenopathy, no asymmetry, masses, or scars and thyroid normal to palpation     RESP: lungs clear to auscultation - no rales, rhonchi or wheezes     CV: regular rates and rhythm, normal S1 S2, no S3 or S4 and no murmur, click or rub     ABDOMEN:  soft, nontender, no HSM or masses and bowel sounds normal     MS: extremities normal- no gross deformities noted, no evidence of inflammation in joints, FROM in all extremities.     SKIN: no suspicious lesions or rashes     NEURO: Normal strength and tone, sensory exam grossly normal, mentation intact and speech normal      PSYCH: mentation appears normal. and affect normal/bright     LYMPHATICS: No cervical adenopathy    Recent Labs   Lab Test 07/26/22  1317 04/28/22  1559 09/28/21  1320 09/16/21  1442 06/23/21  1311 05/12/21  1217   HGB 14.2 14.8   < > 14.3   < > 13.2*   PLT 92* 67*   < > 68*   < > 102*   INR  --   --   --  1.49*  --   --     142   < > 145*   < > 143   POTASSIUM 4.1 4.1   < > 3.9   < > 4.2   CR 0.92 0.87   < > 0.80   < > 0.96   A1C  --   --   --   --   --  4.9    < > = values in this interval not displayed.        Diagnostics:  Labs pending at this time.  Results will be reviewed when available.  No results found for this or any previous visit (from the past 720 hour(s)).   EKG required for Risk factors, Cardiomrgaly in the past CXR , Intermediate risk surgery , General anaesthesia and not completed in the last 90 days.     Revised Cardiac Risk Index (RCRI):  The patient has the following serious cardiovascular risks for perioperative complications:   - No serious cardiac risks = 0 points     RCRI Interpretation: 0 points: Class I (very low risk - 0.4% complication rate)           Signed Electronically by: Beatrice Stephens MD  Copy of this evaluation report is provided to requesting physician.      Answers for HPI/ROS submitted by the patient on 12/2/2022  If you checked off any problems, how difficult have these problems made it for you to do your work, take care of things at home, or get along with other people?: Somewhat difficult  PHQ9 TOTAL SCORE: 13

## 2022-12-03 LAB
ALBUMIN SERPL BCG-MCNC: 4.3 G/DL (ref 3.5–5.2)
ALP SERPL-CCNC: 73 U/L (ref 40–129)
ALT SERPL W P-5'-P-CCNC: 48 U/L (ref 10–50)
ANION GAP SERPL CALCULATED.3IONS-SCNC: 17 MMOL/L (ref 7–15)
AST SERPL W P-5'-P-CCNC: 72 U/L (ref 10–50)
BILIRUB SERPL-MCNC: 0.9 MG/DL
BUN SERPL-MCNC: 8.7 MG/DL (ref 8–23)
CALCIUM SERPL-MCNC: 9.6 MG/DL (ref 8.8–10.2)
CHLORIDE SERPL-SCNC: 106 MMOL/L (ref 98–107)
CREAT SERPL-MCNC: 0.98 MG/DL (ref 0.67–1.17)
CREAT UR-MCNC: 226 MG/DL
DEPRECATED HCO3 PLAS-SCNC: 23 MMOL/L (ref 22–29)
GFR SERPL CREATININE-BSD FRML MDRD: 79 ML/MIN/1.73M2
GLUCOSE SERPL-MCNC: 100 MG/DL (ref 70–99)
MICROALBUMIN UR-MCNC: <12 MG/L
MICROALBUMIN/CREAT UR: NORMAL MG/G{CREAT}
POTASSIUM SERPL-SCNC: 4.5 MMOL/L (ref 3.4–5.3)
PROT SERPL-MCNC: 7.2 G/DL (ref 6.4–8.3)
SODIUM SERPL-SCNC: 146 MMOL/L (ref 136–145)

## 2022-12-04 NOTE — RESULT ENCOUNTER NOTE
Your X ray is positive for cardiomegaly / mild increase in size of the heart. Will need to await for your Echocardiogram results before deciding if you need Cardiology clearance for this too.     Please let me know if you have any questions.  Beatrice Stephens MD on 12/3/2022

## 2022-12-05 ENCOUNTER — TELEPHONE (OUTPATIENT)
Dept: FAMILY MEDICINE | Facility: CLINIC | Age: 77
End: 2022-12-05

## 2022-12-05 NOTE — TELEPHONE ENCOUNTER
----- Message from Beatrice Stephens MD sent at 12/3/2022 10:15 PM CST -----  Your X ray is positive for cardiomegaly / mild increase in size of the heart. Will need to await for your Echocardiogram results before deciding if you need Cardiology clearance for this too.     Please let me know if you have any questions.  Beatrice Stephens MD on 12/3/2022

## 2022-12-05 NOTE — TELEPHONE ENCOUNTER
S/w pt and updated him with PCP result note below. No further questions at this time.    Mandi CONTRERAS RN  EP Triage

## 2022-12-05 NOTE — TELEPHONE ENCOUNTER
S/w wife Mirela, CTC, and updated her with PCP results below. Mirela verbalized understanding. No further questions at this time.    Mandi CONTRERAS RN  EP Triage

## 2022-12-05 NOTE — TELEPHONE ENCOUNTER
----- Message from Beatrice Stephens MD sent at 12/3/2022  5:01 PM CST -----  Your Platelet counts are remaining low as in the past. Await for recommendations from your Oncologist for clearance to your surgery.     Your Sodium levels are mildly high , will need recheck in your follow up visits.     Thank you,  Beatrice Stephens MD on 12/3/2022 at 5:01 PM

## 2022-12-06 RX ORDER — CEFAZOLIN SODIUM/WATER 2 G/20 ML
2 SYRINGE (ML) INTRAVENOUS SEE ADMIN INSTRUCTIONS
Status: CANCELLED | OUTPATIENT
Start: 2022-12-06

## 2022-12-06 RX ORDER — TRANEXAMIC ACID 650 MG/1
1950 TABLET ORAL ONCE
Status: CANCELLED | OUTPATIENT
Start: 2022-12-06 | End: 2022-12-06

## 2022-12-06 RX ORDER — CEFAZOLIN SODIUM/WATER 2 G/20 ML
2 SYRINGE (ML) INTRAVENOUS
Status: CANCELLED | OUTPATIENT
Start: 2022-12-06

## 2022-12-07 ENCOUNTER — ANESTHESIA EVENT (OUTPATIENT)
Dept: SURGERY | Facility: CLINIC | Age: 77
End: 2022-12-07
Payer: COMMERCIAL

## 2022-12-07 RX ORDER — MAGNESIUM SULFATE HEPTAHYDRATE 40 MG/ML
2 INJECTION, SOLUTION INTRAVENOUS ONCE
Status: CANCELLED | OUTPATIENT
Start: 2022-12-07 | End: 2022-12-07

## 2022-12-07 RX ORDER — LIDOCAINE 40 MG/G
CREAM TOPICAL
Status: CANCELLED | OUTPATIENT
Start: 2022-12-07

## 2022-12-07 RX ORDER — SODIUM CHLORIDE, SODIUM LACTATE, POTASSIUM CHLORIDE, CALCIUM CHLORIDE 600; 310; 30; 20 MG/100ML; MG/100ML; MG/100ML; MG/100ML
INJECTION, SOLUTION INTRAVENOUS CONTINUOUS
Status: CANCELLED | OUTPATIENT
Start: 2022-12-07

## 2022-12-07 RX ORDER — ACETAMINOPHEN 325 MG/1
975 TABLET ORAL ONCE
Status: CANCELLED | OUTPATIENT
Start: 2022-12-07 | End: 2022-12-07

## 2022-12-07 RX ORDER — GABAPENTIN 100 MG/1
100 CAPSULE ORAL
Status: CANCELLED | OUTPATIENT
Start: 2022-12-07

## 2022-12-07 ASSESSMENT — LIFESTYLE VARIABLES: TOBACCO_USE: 1

## 2022-12-07 NOTE — ANESTHESIA PREPROCEDURE EVALUATION
Anesthesia Pre-Procedure Evaluation    Patient: Raymon Ace   MRN: [id number] : 1945        Procedure : Procedure(s):  TOTAL Knee Arthroplasty          Past Medical History:   Diagnosis Date     Alcohol abuse since teens 01/15/2015    Still actively drinking     Alcoholic liver damage (H) 1/10/2014     Gastroesophageal reflux disease with esophagitis 2016     Gout involving toe, unspecified cause, unspecified chronicity, unspecified laterality 2016     Heart murmur     heart is positioned farther on left side then typical     Hyperlipidemia 2015     Hypertension      ITP (idiopathic thrombocytopenic purpura)      Obstructive sleep apnea     does not use CPAP  machine     Pulmonary embolism (H) large RLL w infarctio -17 2017      Past Surgical History:   Procedure Laterality Date     APPENDECTOMY       BACK SURGERY  ,     trimmed L4-L5, Fusion L4-L5     BONE MARROW BIOPSY, BONE SPECIMEN, NEEDLE/TROCAR  10/24/2012    Procedure: BIOPSY BONE MARROW;  BONE MARROW BIOPSY WITH ASPIRATE (AREVALO ORDERING);  Surgeon: Terri Hickey MD;  Location:  GI     COLONOSCOPY N/A 2019    Procedure: COLONOSCOPY;  Surgeon: Sebastian Garcia MD;  Location:  GI     ESOPHAGOSCOPY, GASTROSCOPY, DUODENOSCOPY (EGD), COMBINED N/A 2018    Procedure: COMBINED ESOPHAGOSCOPY, GASTROSCOPY, DUODENOSCOPY (EGD);  EGD;  Surgeon: Terri Crouch MD;  [facility]     ESOPHAGOSCOPY, GASTROSCOPY, DUODENOSCOPY (EGD), COMBINED N/A 11/10/2020    Procedure: ESOPHAGOGASTRODUODENOSCOPY, WITH BIOPSY;  Surgeon: Alonzo Jang DO;  Location:  GI     FACIAL RECONSTRUCTION SURGERY      jaw fracture     HC TOOTH EXTRACTION W/FORCEP        Allergies   Allergen Reactions     Olmesartan Diarrhea      Social History     Tobacco Use     Smoking status: Former     Packs/day: 1.00     Years: 28.00     Pack years: 28.00     Types: Cigarettes     Start date:      Quit date:  1982     Years since quittin.2     Smokeless tobacco: Former   Substance Use Topics     Alcohol use: Yes     Alcohol/week: 5.0 standard drinks     Types: 5 Shots of liquor per week     Comment: 4-5 drinks/day, alcohol use disorder      Wt Readings from Last 1 Encounters:   22 99 kg (218 lb 3.2 oz)        Anesthesia Evaluation   Pt has had prior anesthetic. Type: General.        ROS/MED HX  ENT/Pulmonary: Comment: Hx PE RLL w/ infarction 2017      (+) sleep apnea, tobacco use, Past use,     Neurologic: Comment: RLS      (+) peripheral neuropathy,     Cardiovascular: Comment: Cardiomegaly      (+) Dyslipidemia hypertension-----valvular problems/murmurs Previous cardiac testing   Echo: Date: Results:    Stress Test: Date: Results:    ECG Reviewed: Date: 12/3/22 Results:  Sinus Rhythm   Low voltage in precordial leads.    -Left axis -anterior fascicular block.    - Negative T-waves  -Possible  Anterior  ischemia.     ABNORMAL     Cath: Date: Results:      METS/Exercise Tolerance:     Hematologic:     (+) anemia,     Musculoskeletal: Comment: S/P L4-5 fusion   Spinal stenosis    (+) arthritis,     GI/Hepatic: Comment: ITP  Portal HTN  Alcoholic cirrhosis      (+) GERD, Inflammatory bowel disease, liver disease,     Renal/Genitourinary:     (+) renal disease, type: CRI,     Endo:     (+) Obesity,     Psychiatric/Substance Use:     (+) psychiatric history depression alcohol abuse     Infectious Disease:     (+) MRSA (7/10/16 Face),     Malignancy:       Other:      (+) , H/O Chronic Pain,           OUTSIDE LABS:  CBC:   Lab Results   Component Value Date    WBC 8.1 2022    WBC 7.5 2022    HGB 14.0 2022    HGB 14.2 2022    HCT 44.1 2022    HCT 43.5 2022    PLT 85 (L) 2022    PLT 92 (L) 2022     BMP:   Lab Results   Component Value Date     (H) 2022     2022    POTASSIUM 4.5 2022    POTASSIUM 4.1 2022    CHLORIDE 106  12/02/2022    CHLORIDE 111 (H) 07/26/2022    CO2 23 12/02/2022    CO2 26 07/26/2022    BUN 8.7 12/02/2022    BUN 8 07/26/2022    CR 0.98 12/02/2022    CR 0.92 07/26/2022     (H) 12/02/2022    GLC 98 07/26/2022     COAGS:   Lab Results   Component Value Date    PTT 21 (L) 09/16/2021    INR 1.49 (H) 09/16/2021     POC:   Lab Results   Component Value Date     (H) 09/20/2019     HEPATIC:   Lab Results   Component Value Date    ALBUMIN 4.3 12/02/2022    PROTTOTAL 7.2 12/02/2022    ALT 48 12/02/2022    AST 72 (H) 12/02/2022    GGT 86 (H) 12/06/2016    ALKPHOS 73 12/02/2022    BILITOTAL 0.9 12/02/2022     OTHER:   Lab Results   Component Value Date    A1C 4.9 05/12/2021    PHILIPPE 9.6 12/02/2022    MAG 1.7 09/16/2021    LIPASE 110 09/16/2021    TSH 0.78 05/12/2021    CRP <2.9 07/15/2016               SCOTTIE Esquivel CRNA

## 2022-12-08 ENCOUNTER — TELEPHONE (OUTPATIENT)
Dept: FAMILY MEDICINE | Facility: CLINIC | Age: 77
End: 2022-12-08

## 2022-12-08 NOTE — TELEPHONE ENCOUNTER
[facility] calling and the  patients needs addendum  on pre op for upcoming surgery on 12/13/2022.    He has completed all other testing including labs.     Please add that the patient is cleared for surgery  on the pre op.     Abigail Avery RN  [facility]

## 2022-12-09 ENCOUNTER — HOSPITAL ENCOUNTER (OUTPATIENT)
Dept: CARDIOLOGY | Facility: CLINIC | Age: 77
Discharge: HOME | End: 2022-12-09
Attending: INTERNAL MEDICINE | Admitting: INTERNAL MEDICINE
Payer: COMMERCIAL

## 2022-12-09 DIAGNOSIS — R94.31 ABNORMAL ELECTROCARDIOGRAM: ICD-10-CM

## 2022-12-09 DIAGNOSIS — Z01.818 PREOP GENERAL PHYSICAL EXAM: ICD-10-CM

## 2022-12-09 PROCEDURE — 93306 TTE W/DOPPLER COMPLETE: CPT

## 2022-12-09 PROCEDURE — 93306 TTE W/DOPPLER COMPLETE: CPT | Mod: 26 | Performed by: INTERNAL MEDICINE

## 2022-12-09 NOTE — PROVIDER NOTIFICATION
Pt had wife do all the talking. Will bring a walker. Plans on going home the next day. She states he is Solomon but wears not hearing aids.

## 2022-12-10 NOTE — TELEPHONE ENCOUNTER
Please see my progress notes - He has low platelets and on OAC         Hematology unable to see him till 12/22 for this.     Thank you,  Beatrice Stephens MD on 12/10/2022 at 1:30 PM

## 2022-12-12 ENCOUNTER — TELEPHONE (OUTPATIENT)
Dept: FAMILY MEDICINE | Facility: CLINIC | Age: 77
End: 2022-12-12

## 2022-12-12 ENCOUNTER — PATIENT OUTREACH (OUTPATIENT)
Dept: ONCOLOGY | Facility: CLINIC | Age: 77
End: 2022-12-12

## 2022-12-12 NOTE — TELEPHONE ENCOUNTER
This nurse reached out and spoke to Alice CH with [facility] and relayed the message from Dr. Stephens, see below. She stated understanding but does state they have already spoke to the pt and have decided to cancel the procedure tomorrow. She will still relay this information to Dr. Duffy. Alice states once she speaks to the doctor she will reach out to the pt to discuss.     Petra BANERJEE RN  [facility]

## 2022-12-12 NOTE — TELEPHONE ENCOUNTER
Please call the patient and wife and let them know that per my conversation/discussion with hematologist he is okay with the surgery as per discretion of surgeon decision to undertake the surgery with his low platelets of 80 if surgeon is comfortable.    If surgeon wants official clearance from hematologist --  does not have an appointment till 12/20/2022 and he may need to reschedule surgery at that time instead of tomorrow.    Depending on this note I have addended the progress notes with documentation and cleared for the surgery but again patient and the surgeon has to have shared decision to undertake the surgery with all the risk factors mentioned in my documentation.    Pt needs to he holds his Xarelto 24 hours prior to the surgery.  He should resume Xarelto (10 mg) as soon as possible postoperatively, at the discretion of the surgeon.  Ideally this would be within 24 to 48 hours at the latest.            Please let me know if any questions.  Beatrice Stephens MD on 12/12/2022 at 2:02 PM

## 2022-12-12 NOTE — TELEPHONE ENCOUNTER
Called patient and spoke to patient's wife (CTC on file). Provider's message was relayed to patient's wife and she stated understanding.    Called the surgery center and scheduling and also informed them of the provider's message.     Jigna Pan RN  [facility] Triage Team

## 2022-12-12 NOTE — TELEPHONE ENCOUNTER
Called patient and spoke to patient's wife (CTC on file). Relayed provider's message to wife and she stated understanding.    Jigna Pan RN  [facility] Triage Team

## 2022-12-12 NOTE — TELEPHONE ENCOUNTER
Spoke with JING Jenkins with [facility] regarding this patient. Patient is scheduled for a total knee replacement 12/13/2022 and there is confusion whether the surgery has been canceled or not.       Routing to PCP for review. Does patient need results from hematology first before surgery or will patient proceed with surgery tomorrow?     Can we leave a detailed message on this number? YES  Phone number patient can be reached at: Other phone number: JING Jenkins [facility], [phone].    Justice L. Phoenix, RN  [facility] Triage

## 2022-12-12 NOTE — PROGRESS NOTES
"  [facility]: Cancer Care Short Note                                                                                          Incoming Call: RNCC received VM from patient's spouse, Mirela, regarding hematology clearance from Dr. Novoa before surgery tomorrow.     Outgoing Call: RNCC called patient's spouse back. Spouse stated that Dr. Novoa will need to provide clearance for surgery tomorrow based on lab results from 12/2/22.     RNCC sent urgent IB to providers and also urgent text message to Dr. Novoa. Will update spouse with response once received.       12:25- response from Dr. Novoa- \"If the surgeon is okay doing the procedure with his current platelet count (~80,000), then from my perspective all we need to do is be sure he holds his Xarelto 24 hours prior to the surgery.  He should resume Xarelto (10 mg) as soon as possible postoperatively, at the discretion of the surgeon.  Ideally this would be within 24 to 48 hours at the latest.     If the surgeon feels that he needs a higher platelet count we could try platelet transfusion, but given that he has ITP, he is unlikely to respond normally to platelet transfusions.  Other options would be to use steroids or IVIG, but we would need much more advance notice for those options to be put into place.\"    Edit- RNCC called patient's wife to update her on provider's responses and she informed me that the surgeon, Dr. Duffy called them wanting to reschedule surgery for when platelets are increased.       Shayla Méndez, BSN, RN  RN Care Coordinator   [phone]    "

## 2022-12-12 NOTE — TELEPHONE ENCOUNTER
----- Message from Beatrice Stephens MD sent at 12/10/2022  2:24 AM CST -----  Your Echocardiogram is normal as per cardiology read. Does show mild Aortic root dilatation at 3.6 cm. Will need follow up. Given normal Echo and asymptomatic at this time, will approve for clearance of your upcoming Hip procedure.     Thank you,  Beatrice Stephens MD on 12/10/2022       Beatrice Stephens MD   12/10/2022  2:31 AM CST Back to Top      CORRECTION - Will approve for clearance of your upcoming Hip procedure >> Once Hematology clears you, please keep up your appointment with Hematology .

## 2022-12-13 ENCOUNTER — ANESTHESIA (OUTPATIENT)
Dept: SURGERY | Facility: CLINIC | Age: 77
End: 2022-12-13
Payer: COMMERCIAL

## 2022-12-20 ENCOUNTER — LAB (OUTPATIENT)
Dept: LAB | Facility: CLINIC | Age: 77
End: 2022-12-20
Payer: COMMERCIAL

## 2022-12-20 DIAGNOSIS — Z86.718 PERSONAL HISTORY OF VENOUS THROMBOSIS AND EMBOLISM: ICD-10-CM

## 2022-12-20 DIAGNOSIS — D50.0 IRON DEFICIENCY ANEMIA DUE TO CHRONIC BLOOD LOSS: ICD-10-CM

## 2022-12-20 DIAGNOSIS — D69.3 IDIOPATHIC THROMBOCYTOPENIC PURPURA (H): ICD-10-CM

## 2022-12-20 DIAGNOSIS — Z79.01 LONG TERM CURRENT USE OF ANTICOAGULANT THERAPY: ICD-10-CM

## 2022-12-20 LAB
ALBUMIN SERPL BCG-MCNC: 3.7 G/DL (ref 3.5–5.2)
ALP SERPL-CCNC: 83 U/L (ref 40–129)
ALT SERPL W P-5'-P-CCNC: 59 U/L (ref 10–50)
ANION GAP SERPL CALCULATED.3IONS-SCNC: 8 MMOL/L (ref 7–15)
AST SERPL W P-5'-P-CCNC: 73 U/L (ref 10–50)
BASOPHILS # BLD MANUAL: 0.1 10E3/UL (ref 0–0.2)
BASOPHILS NFR BLD MANUAL: 1 %
BILIRUB SERPL-MCNC: 0.7 MG/DL
BUN SERPL-MCNC: 6.8 MG/DL (ref 8–23)
CALCIUM SERPL-MCNC: 9.4 MG/DL (ref 8.8–10.2)
CHLORIDE SERPL-SCNC: 105 MMOL/L (ref 98–107)
CREAT SERPL-MCNC: 0.86 MG/DL (ref 0.67–1.17)
DEPRECATED HCO3 PLAS-SCNC: 32 MMOL/L (ref 22–29)
EOSINOPHIL # BLD MANUAL: 0 10E3/UL (ref 0–0.7)
EOSINOPHIL NFR BLD MANUAL: 0 %
ERYTHROCYTE [DISTWIDTH] IN BLOOD BY AUTOMATED COUNT: 16.3 % (ref 10–15)
FERRITIN SERPL-MCNC: 47 NG/ML (ref 31–409)
GFR SERPL CREATININE-BSD FRML MDRD: 89 ML/MIN/1.73M2
GLUCOSE SERPL-MCNC: 118 MG/DL (ref 70–99)
HCT VFR BLD AUTO: 41.6 % (ref 40–53)
HGB BLD-MCNC: 13.4 G/DL (ref 13.3–17.7)
IRON BINDING CAPACITY (ROCHE): 657 UG/DL (ref 240–430)
IRON SATN MFR SERPL: 64 % (ref 15–46)
IRON SERPL-MCNC: 422 UG/DL (ref 61–157)
LYMPHOCYTES # BLD MANUAL: 2.1 10E3/UL (ref 0.8–5.3)
LYMPHOCYTES NFR BLD MANUAL: 25 %
MCH RBC QN AUTO: 30.6 PG (ref 26.5–33)
MCHC RBC AUTO-ENTMCNC: 32.2 G/DL (ref 31.5–36.5)
MCV RBC AUTO: 95 FL (ref 78–100)
MONOCYTES # BLD MANUAL: 0.4 10E3/UL (ref 0–1.3)
MONOCYTES NFR BLD MANUAL: 5 %
NEUTROPHILS # BLD MANUAL: 5.7 10E3/UL (ref 1.6–8.3)
NEUTROPHILS NFR BLD MANUAL: 69 %
NRBC # BLD AUTO: 0 10E3/UL
NRBC BLD AUTO-RTO: 0 /100
PLAT MORPH BLD: NORMAL
PLATELET # BLD AUTO: 81 10E3/UL (ref 150–450)
POTASSIUM SERPL-SCNC: 4.5 MMOL/L (ref 3.4–5.3)
PROT SERPL-MCNC: 6.8 G/DL (ref 6.4–8.3)
RBC # BLD AUTO: 4.38 10E6/UL (ref 4.4–5.9)
RBC MORPH BLD: NORMAL
SODIUM SERPL-SCNC: 145 MMOL/L (ref 136–145)
WBC # BLD AUTO: 8.2 10E3/UL (ref 4–11)

## 2022-12-20 PROCEDURE — 83540 ASSAY OF IRON: CPT

## 2022-12-20 PROCEDURE — 36415 COLL VENOUS BLD VENIPUNCTURE: CPT

## 2022-12-20 PROCEDURE — 85007 BL SMEAR W/DIFF WBC COUNT: CPT

## 2022-12-20 PROCEDURE — 80053 COMPREHEN METABOLIC PANEL: CPT

## 2022-12-20 PROCEDURE — 82728 ASSAY OF FERRITIN: CPT

## 2022-12-20 PROCEDURE — 85027 COMPLETE CBC AUTOMATED: CPT

## 2022-12-20 PROCEDURE — 83550 IRON BINDING TEST: CPT

## 2022-12-22 ENCOUNTER — VIRTUAL VISIT (OUTPATIENT)
Dept: ONCOLOGY | Facility: CLINIC | Age: 77
End: 2022-12-22
Payer: COMMERCIAL

## 2022-12-22 DIAGNOSIS — D50.0 IRON DEFICIENCY ANEMIA DUE TO CHRONIC BLOOD LOSS: ICD-10-CM

## 2022-12-22 DIAGNOSIS — Z79.01 LONG TERM CURRENT USE OF ANTICOAGULANT THERAPY: ICD-10-CM

## 2022-12-22 DIAGNOSIS — Z79.01 CHRONIC ANTICOAGULATION: ICD-10-CM

## 2022-12-22 DIAGNOSIS — D69.3 IDIOPATHIC THROMBOCYTOPENIC PURPURA (H): Primary | ICD-10-CM

## 2022-12-22 DIAGNOSIS — Z86.718 PERSONAL HISTORY OF VENOUS THROMBOSIS AND EMBOLISM: ICD-10-CM

## 2022-12-22 PROCEDURE — 99215 OFFICE O/P EST HI 40 MIN: CPT | Mod: 95 | Performed by: INTERNAL MEDICINE

## 2022-12-22 PROCEDURE — G0463 HOSPITAL OUTPT CLINIC VISIT: HCPCS | Mod: PN,TEL | Performed by: INTERNAL MEDICINE

## 2022-12-22 NOTE — NURSING NOTE
Patient declined individual allergy and medication review by support staff because pt states nothing has changed since last reviewed.    Vidhya BAKER

## 2022-12-22 NOTE — PROGRESS NOTES
Raymon is a 77 year old who is being evaluated via a billable telephone visit.      What phone number would you like to be contacted at? [phone]  How would you like to obtain your AVS? Mail a copy  Distant Location (provider location):  On-site      Vidhya BAKER

## 2022-12-22 NOTE — PROGRESS NOTES
HEMATOLOGY CLINIC VISIT    NOTE:  Due to the ongoing COVID-19 outbreak, this visit was conducted by telephone, with the patient's approval.       Raymon is a 77-year-old male with chronic ITP and a history of unprovoked pulmonary embolism in April 2017 for which he is maintained on long-term anticoagulation.  He has a history of recurrent iron deficiency anemia felt secondary to chronic occult blood loss from portal hypertensive gastropathy and hemorrhoids.  He also has underlying alcoholic liver disease.  He was scheduled today for routine follow-up visit.  He is also in need of formal clearance from us prior to right knee arthroplasty.    He remains on Promacta 75 mg daily for chronic ITP.  He is tolerating the medication without any perceived side effects.  He also remains on prophylactic intensity anticoagulation with rivaroxaban (10 mg daily).  He denies any new bleeding issues.  He denies any blood in his stools and most recently had upper and lower endoscopies performed in November 2020.    He last received IV iron in late August / early September 2021.  Prior to that he was treated in January 2020.    He reports no new issues or concerns today.  He is eager to have the knee replacement done.  They are still hoping to make it down to [place] for part of the winter.    Physical exam:  Not done, telephone visit.    Labs:  Most recent labs were obtained on 12/20/2022.  Serum creatinine 0.86.  LFTs remarkable for slightly elevated AST and ALT.  These are not new.  Other LFTs are normal.  Serum ferritin 47.    CBC: White count 8.2 (normal differential), hemoglobin 13.4, platelet count 81,000.        ASSESSMENT / PLAN:  1.  Chronic ITP -- Raymon's baseline platelet count has been in the 70,000 to 100,000 range over the last several years.  He remains on Promacta 75 mg daily.  He is responsive to pulse dexamethasone and we have used this to increase his platelet count prior to surgeries.  At the present time, his  platelet count is within his usual baseline.  Given that he is on long-term anticoagulation, the goal is to maintain his platelet count consistently above 50,000.  Note that there may also be a component of thrombocytopenia related to his underlying liver disease.    If his orthopedic surgeon requires a higher platelet count in order to perform the knee arthroplasty, our options are to treat him with steroids (we would generally use pulse dexamethasone) or IVIG.  He is known to be steroid responsive, but this is generally something we try to avoid prior to joint replacement surgery because of issues related to wound healing and/or infection.  Raymon has never had IVIG, but the majority of adults with ITP do respond to this.    I will reach out to his orthopedic surgeon and request that they notify us once a date for surgery has been set.  We will need to check his platelet count about 10 days prior to that time so that if the count is below goal, we have time to treat him with either dexamethasone or IVIG.  These treatments generally result in an increase in platelet count within a few days, which would be expected to last approximately 2 to 3 weeks.    2. History of unprovoked pulmonary embolism (April 2017) -- He has done well on long-term anticoagulation with rivaroxaban, and we will make no change in this part of his care plan.  He is on the prophylactic dose of 10 mg daily due to a combination of mild intermittent renal insufficiency (not currently an issue) and underlying liver disease.     For the upcoming knee surgery, he will need to hold 2 doses of rivaroxaban (he takes it in the evening), as well as hold the dose on the day of surgery.  Assuming all goes well, he could resume it the day following surgery.    3.  h/o iron deficiency anemia -- This has been attributed to to chronic occult blood loss from portal hypertensive gastropathy, and hemorrhoids.  He denies any new bleeding symptoms or any obvious  signs of GI bleeding, and had upper and lower endoscopies in the fall 2020.  He received IV iron in late August / early September 2021 and is currently iron replete.  We will continue to monitor.      Plan to see him back with repeat labs in about 4 months.        Total time 40 minutes, including review of medical records and labs, telephone visit (30 minutes), and documentation.      Waylon Novoa MD  Professor of Medicine  Division of Hematology, Oncology, and Transplantation  Director, [facility]

## 2023-01-06 DIAGNOSIS — K21.9 GASTROESOPHAGEAL REFLUX DISEASE WITHOUT ESOPHAGITIS: ICD-10-CM

## 2023-01-09 DIAGNOSIS — Z00.00 ENCOUNTER FOR MEDICARE ANNUAL WELLNESS EXAM: ICD-10-CM

## 2023-01-09 DIAGNOSIS — K21.9 GASTROESOPHAGEAL REFLUX DISEASE WITHOUT ESOPHAGITIS: ICD-10-CM

## 2023-01-09 RX ORDER — OMEPRAZOLE 40 MG/1
CAPSULE, DELAYED RELEASE ORAL
Qty: 90 CAPSULE | Refills: 0 | Status: SHIPPED | OUTPATIENT
Start: 2023-01-09 | End: 2024-01-23

## 2023-01-10 ENCOUNTER — TELEPHONE (OUTPATIENT)
Dept: HEMATOLOGY | Facility: CLINIC | Age: 78
End: 2023-01-10
Payer: COMMERCIAL

## 2023-01-10 RX ORDER — METOPROLOL SUCCINATE 50 MG/1
50 TABLET, EXTENDED RELEASE ORAL DAILY
Qty: 90 TABLET | Refills: 1 | Status: SHIPPED | OUTPATIENT
Start: 2023-01-10 | End: 2023-07-10

## 2023-01-10 RX ORDER — METOPROLOL SUCCINATE 50 MG/1
TABLET, EXTENDED RELEASE ORAL
Qty: 90 TABLET | Refills: 0 | OUTPATIENT
Start: 2023-01-10

## 2023-01-10 RX ORDER — OMEPRAZOLE 40 MG/1
CAPSULE, DELAYED RELEASE ORAL
Qty: 90 CAPSULE | Refills: 0 | OUTPATIENT
Start: 2023-01-10

## 2023-01-11 NOTE — TELEPHONE ENCOUNTER
[facility] Prior Authorization Team   Phone: [phone]  Fax: [phone]    PA Initiation    Medication: Promacta  Insurance Company: [employer] ([facility]) - Phone [phone] Fax [phone]  Pharmacy Filling the Rx: [facility] - [place], [place] - [address]  Filling Pharmacy Phone: [phone]  Filling Pharmacy Fax: [phone]  Start Date: 1/10/2023

## 2023-01-13 NOTE — TELEPHONE ENCOUNTER
[facility] Prior Authorization Team   Phone: [phone]  Fax: [phone]    PRIOR AUTHORIZATION DENIED    Medication: Promacta    Denial Date: 1/10/2023    Denial Rational: Platelet count needs to be less 30,000/mcL.        Appeal Information: Promacta PA was denied because his platelet count is 81,000 and they will only approve if it's 30,000 or less. If you to appeal, an urgent request is best done over the phone and Dr. Novoa can call [phone]. If you want me to submit an appeal via fax, turnaround time is 72 hours. I can submit it on your behalf, please provide me letter of medical necessity and any supporting documents to support your case. My fax line is [phone].

## 2023-01-23 NOTE — TELEPHONE ENCOUNTER
Mirela (wife) called about reason for denial for Promacta. Mirela is wanting to hear from care team about plan for Promacta.     Please call back on Mirela's cell phone: [phone]    Routed high priority to care team to follow up with patient.

## 2023-01-23 NOTE — TELEPHONE ENCOUNTER
Medication Appeal Initiation    We have initiated an appeal for the requested medication:  Medication: Promacta Appeal pending  Appeal Start Date:  1/23/2023  Insurance Company:    Comments:   I called and was told to fax an appeal    Appeal faxed to [facility] [phone]    Thank you,    Le Vences  Oncology Pharmacy Liaison II  [email or web address]  Phone: [phone]  Fax: [phone]

## 2023-01-24 NOTE — TELEPHONE ENCOUNTER
Appeal not received yet. Call back after 48 hours per [facility] @ [phone].    Thank you,    oJnathan Vences  Oncology Pharmacy Liaison II  [email or web address]  Phone: [phone]  Fax: [phone]

## 2023-01-25 NOTE — TELEPHONE ENCOUNTER
Per Madelin at [phone] (have to transfer to correct dept)  the appeal lettr has not been received but we did over the phone and submitted it to the appeals dept.     Thank you,    Jonathan Vences  Oncology Pharmacy Liaison II  [email or web address]  Phone: [phone]  Fax: [phone]

## 2023-01-25 NOTE — TELEPHONE ENCOUNTER
[facility] sent a fax saying the PA is canceled! I refaxed the appeal to [phone].    Thank you,    Le Vences  Oncology Pharmacy Liaison II  [email or web address]  Phone: [phone]  Fax: [phone]

## 2023-03-13 ENCOUNTER — PATIENT OUTREACH (OUTPATIENT)
Dept: ONCOLOGY | Facility: CLINIC | Age: 78
End: 2023-03-13
Payer: COMMERCIAL

## 2023-03-13 DIAGNOSIS — D50.0 IRON DEFICIENCY ANEMIA DUE TO CHRONIC BLOOD LOSS: Primary | ICD-10-CM

## 2023-03-13 NOTE — TELEPHONE ENCOUNTER
[facility]: Hematology                                                                                        3.13.2023  Attempted to reach patient at both contact numbers listed in the Chart.   will be out of the office on 4/13/2023 and appointment needs to be rescheduled.    Provider requesting in person if patient is agreeable, unless he can do actual video and not telephone.    Labs are need either 45 minutes prior to appointment or 1-7 prior to appointment       Edel Zuluaga LPN  Hematology Care Coordination  [phone]    4.19.2023  Spoke with Spouse,  Appointment will be in person with labs at [facility] just prior

## 2023-03-13 NOTE — LETTER
Raymon MELO Located within [facility]  [address] [place] [id number]        Dear Raymon.      Dr. Waylon Novoa will be out of the office on April 13, 2023.  Your appointment for that day has been cancelled and will need to be rescheduled.    We have attempted to reach you at [phone] and [phone] and were unable to leave a message.     is back to seeing patients in person.     You will need labs prior to your appointment. You can have them the same day 45 minutes prior to the appointment, or a few days prior as well.  You have a current lab appointment on 4/10/2023 in [place], you may want to reschedule that based on when your follow up with  will be.     Please call us to reschedule at [phone]      Thank you    PRITI Hollingsworth  Nurse Coordinator  Dr. Waylon Novoa's office   Hematology  [phone]

## 2023-03-25 DIAGNOSIS — M10.9 GOUT INVOLVING TOE, UNSPECIFIED CAUSE, UNSPECIFIED CHRONICITY, UNSPECIFIED LATERALITY: ICD-10-CM

## 2023-03-27 RX ORDER — ALLOPURINOL 300 MG/1
TABLET ORAL
Qty: 90 TABLET | Refills: 0 | Status: SHIPPED | OUTPATIENT
Start: 2023-03-27 | End: 2023-04-21

## 2023-03-27 NOTE — TELEPHONE ENCOUNTER
Please call the patient and schedule AWV with me, which patient is due at this time, to further take care of the medical conditions and medications.OK to use same day slot / double book near another virtual slot in 2-3 weeks.     Thank you,  Beatrice Stephens MD on 3/27/2023

## 2023-04-18 ENCOUNTER — TELEPHONE (OUTPATIENT)
Dept: FAMILY MEDICINE | Facility: CLINIC | Age: 78
End: 2023-04-18

## 2023-04-18 ENCOUNTER — TRANSFERRED RECORDS (OUTPATIENT)
Dept: HEALTH INFORMATION MANAGEMENT | Facility: CLINIC | Age: 78
End: 2023-04-18
Payer: COMMERCIAL

## 2023-04-18 NOTE — TELEPHONE ENCOUNTER
Patient Quality Outreach    Patient is due for the following:   Physical Annual Wellness Visit    Next Steps:   Schedule a Annual Wellness Visit      Type of outreach:    Sent [employer] message.         Physical Annual Wellness Visit is done.        Questions for provider review:    None     Frances Lujan MA

## 2023-04-18 NOTE — LETTER
April 18, 2023      Raymon Ceballosyoung  [address] [facility] [address]        Dear Raymon,    I care about your health and have reviewed your health plan. I have reviewed your medical conditions, medication list, and lab results and am making recommendations based on this review, to better manage your health.    You are in particular need of attention regarding:  -Wellness (Physical) Visit     I am recommending that you:  -schedule a WELLNESS (Physical) APPOINTMENT with me.   I will check fasting labs the same day - nothing to eat except water and meds for 8-10 hours prior.    Here is a list of Health Maintenance topics that are due now or due soon:  Health Maintenance Due   Topic Date Due    COVID-19 Vaccine (4 - Booster for Pfizer series) 03/27/2022    Annual Wellness Visit  05/12/2022    Flu Vaccine (1) 09/01/2022    Zoster (Shingles) Vaccine (3 of 3) 01/27/2023    Cholesterol Lab  04/28/2023       Please call us at [phone] (or use [facility]) to address the above recommendations.     Thank you for trusting [facility] and we appreciate the opportunity to serve you.  We look forward to supporting your healthcare needs in the future.    Healthy Regards,    Beatrice Stephens MD

## 2023-04-20 ENCOUNTER — ONCOLOGY VISIT (OUTPATIENT)
Dept: ONCOLOGY | Facility: CLINIC | Age: 78
End: 2023-04-20
Attending: INTERNAL MEDICINE
Payer: COMMERCIAL

## 2023-04-20 ENCOUNTER — LAB (OUTPATIENT)
Dept: LAB | Facility: CLINIC | Age: 78
End: 2023-04-20
Payer: COMMERCIAL

## 2023-04-20 VITALS
BODY MASS INDEX: 31.34 KG/M2 | DIASTOLIC BLOOD PRESSURE: 85 MMHG | HEIGHT: 66 IN | WEIGHT: 195 LBS | OXYGEN SATURATION: 98 % | SYSTOLIC BLOOD PRESSURE: 157 MMHG | TEMPERATURE: 98.3 F | RESPIRATION RATE: 16 BRPM | HEART RATE: 81 BPM

## 2023-04-20 DIAGNOSIS — D50.0 IRON DEFICIENCY ANEMIA DUE TO CHRONIC BLOOD LOSS: ICD-10-CM

## 2023-04-20 DIAGNOSIS — D69.3 IDIOPATHIC THROMBOCYTOPENIC PURPURA (H): ICD-10-CM

## 2023-04-20 DIAGNOSIS — E78.5 HYPERLIPIDEMIA: Primary | ICD-10-CM

## 2023-04-20 DIAGNOSIS — Z79.01 CHRONIC ANTICOAGULATION: ICD-10-CM

## 2023-04-20 DIAGNOSIS — Z86.718 PERSONAL HISTORY OF VENOUS THROMBOSIS AND EMBOLISM: ICD-10-CM

## 2023-04-20 DIAGNOSIS — D69.3 IDIOPATHIC THROMBOCYTOPENIC PURPURA (H): Primary | ICD-10-CM

## 2023-04-20 DIAGNOSIS — Z79.01 LONG TERM CURRENT USE OF ANTICOAGULANT THERAPY: ICD-10-CM

## 2023-04-20 LAB
ALBUMIN SERPL BCG-MCNC: 4 G/DL (ref 3.5–5.2)
ALP SERPL-CCNC: 75 U/L (ref 40–129)
ALT SERPL W P-5'-P-CCNC: 19 U/L (ref 10–50)
ANION GAP SERPL CALCULATED.3IONS-SCNC: 8 MMOL/L (ref 7–15)
AST SERPL W P-5'-P-CCNC: 29 U/L (ref 10–50)
BASOPHILS # BLD AUTO: 0 10E3/UL (ref 0–0.2)
BASOPHILS NFR BLD AUTO: 0 %
BILIRUB SERPL-MCNC: 0.6 MG/DL
BUN SERPL-MCNC: 13.4 MG/DL (ref 8–23)
CALCIUM SERPL-MCNC: 9.7 MG/DL (ref 8.8–10.2)
CHLORIDE SERPL-SCNC: 104 MMOL/L (ref 98–107)
CHOLEST SERPL-MCNC: 177 MG/DL
CREAT SERPL-MCNC: 0.84 MG/DL (ref 0.67–1.17)
DEPRECATED HCO3 PLAS-SCNC: 29 MMOL/L (ref 22–29)
EOSINOPHIL # BLD AUTO: 0 10E3/UL (ref 0–0.7)
EOSINOPHIL NFR BLD AUTO: 0 %
ERYTHROCYTE [DISTWIDTH] IN BLOOD BY AUTOMATED COUNT: 18.1 % (ref 10–15)
FERRITIN SERPL-MCNC: 75 NG/ML (ref 31–409)
GFR SERPL CREATININE-BSD FRML MDRD: 89 ML/MIN/1.73M2
GLUCOSE SERPL-MCNC: 114 MG/DL (ref 70–99)
HCT VFR BLD AUTO: 41 % (ref 40–53)
HDLC SERPL-MCNC: 43 MG/DL
HGB BLD-MCNC: 13.9 G/DL (ref 13.3–17.7)
IMM GRANULOCYTES # BLD: 0.2 10E3/UL
IMM GRANULOCYTES NFR BLD: 1 %
IRON BINDING CAPACITY (ROCHE): 740 UG/DL (ref 240–430)
IRON SATN MFR SERPL: 54 % (ref 15–46)
IRON SERPL-MCNC: 403 UG/DL (ref 61–157)
LDLC SERPL CALC-MCNC: 116 MG/DL
LYMPHOCYTES # BLD AUTO: 0.7 10E3/UL (ref 0.8–5.3)
LYMPHOCYTES NFR BLD AUTO: 4 %
MCH RBC QN AUTO: 32.6 PG (ref 26.5–33)
MCHC RBC AUTO-ENTMCNC: 33.9 G/DL (ref 31.5–36.5)
MCV RBC AUTO: 96 FL (ref 78–100)
MONOCYTES # BLD AUTO: 1 10E3/UL (ref 0–1.3)
MONOCYTES NFR BLD AUTO: 6 %
NEUTROPHILS # BLD AUTO: 14.5 10E3/UL (ref 1.6–8.3)
NEUTROPHILS NFR BLD AUTO: 89 %
NONHDLC SERPL-MCNC: 134 MG/DL
NRBC # BLD AUTO: 0 10E3/UL
NRBC BLD AUTO-RTO: 0 /100
PLATELET # BLD AUTO: 108 10E3/UL (ref 150–450)
POTASSIUM SERPL-SCNC: 4.3 MMOL/L (ref 3.4–5.3)
PROT SERPL-MCNC: 7.2 G/DL (ref 6.4–8.3)
RBC # BLD AUTO: 4.27 10E6/UL (ref 4.4–5.9)
SODIUM SERPL-SCNC: 141 MMOL/L (ref 136–145)
TRIGL SERPL-MCNC: 92 MG/DL
WBC # BLD AUTO: 16.5 10E3/UL (ref 4–11)

## 2023-04-20 PROCEDURE — 83550 IRON BINDING TEST: CPT | Performed by: PATHOLOGY

## 2023-04-20 PROCEDURE — 82728 ASSAY OF FERRITIN: CPT | Performed by: PATHOLOGY

## 2023-04-20 PROCEDURE — 83540 ASSAY OF IRON: CPT | Performed by: PATHOLOGY

## 2023-04-20 PROCEDURE — 80061 LIPID PANEL: CPT | Performed by: PATHOLOGY

## 2023-04-20 PROCEDURE — 36415 COLL VENOUS BLD VENIPUNCTURE: CPT | Performed by: PATHOLOGY

## 2023-04-20 PROCEDURE — 99215 OFFICE O/P EST HI 40 MIN: CPT | Performed by: INTERNAL MEDICINE

## 2023-04-20 PROCEDURE — 85025 COMPLETE CBC W/AUTO DIFF WBC: CPT | Performed by: PATHOLOGY

## 2023-04-20 PROCEDURE — 80053 COMPREHEN METABOLIC PANEL: CPT | Performed by: PATHOLOGY

## 2023-04-20 PROCEDURE — G0463 HOSPITAL OUTPT CLINIC VISIT: HCPCS | Performed by: INTERNAL MEDICINE

## 2023-04-20 ASSESSMENT — PAIN SCALES - GENERAL: PAINLEVEL: MODERATE PAIN (5)

## 2023-04-20 NOTE — PROGRESS NOTES
HEMATOLOGY CLINIC VISIT    Raymon is a 77-year-old male with chronic ITP and a history of unprovoked pulmonary embolism in April 2017 for which he is maintained on long-term anticoagulation.  He has a history of recurrent iron deficiency anemia felt secondary to chronic occult blood loss from portal hypertensive gastropathy and hemorrhoids.  He also has underlying alcoholic liver disease.  He was scheduled today for routine follow-up visit.      He is accompanied today by his wife.  They recently returned from a few weeks in [place].  Overall he feels well aside from weakness and neuropathy in his legs and bilateral knee pain for which he had been planning knee replacement last fall.  Apparently the surgeon was hesitant to proceed until he improved his muscle strength and also had the neuropathy further evaluated.    He remains on Promacta 75 mg daily for chronic ITP.  He is tolerating the medication without any perceived side effects.  He also remains on prophylactic intensity anticoagulation with rivaroxaban (10 mg daily).  He denies any bruising, epistaxis, dental bleeding, or blood in the urine or stool.     He last received IV iron in late August / early September 2021.  Prior to that he was treated in January 2020.      Physical exam:  He looks well.  He is afebrile, blood pressure 157/85, pulse 81 and regular.  Respirations unlabored.  O2 saturation 98% on room air.  He is not pale or jaundiced.  No scleral icterus.  Lungs are clear.  RRR S1S2, no murmur.  No lower extremity edema.    Labs:  Serum electrolytes normal, creatinine 0.84, LFTs normal.  Ferritin is 75.  White count 16.5 with 89% neutrophils, 4% lymphocytes, 6% monocytes.  Hemoglobin 13.9, MCV 96.  Platelets 108,000.        ASSESSMENT / PLAN:  1.  Chronic ITP -- Raymon's baseline platelet count has been in the 70,000 to 100,000 range over the last several years.  He remains on Promacta 75 mg daily.  He is responsive to pulse dexamethasone and we have  used this to increase his platelet count prior to surgeries.  He has not been treated with IVIG, so we do not know how well he responds to that.  At the present time, his platelet count is within his usual baseline.  Given that he is on long-term anticoagulation, the goal is to maintain his platelet count consistently above 50,000.  Note that there may also be a component of thrombocytopenia related to his underlying liver disease.    2. History of unprovoked pulmonary embolism (April 2017) -- He has done well on long-term anticoagulation with rivaroxaban, and we will make no change in this part of his care plan.  He is on the prophylactic dose of 10 mg daily due to a combination of mild intermittent renal insufficiency (not currently an issue) and underlying liver disease.     3.  h/o iron deficiency anemia -- This has been attributed to to chronic occult blood loss from portal hypertensive gastropathy, and hemorrhoids.  He denies any new bleeding symptoms or any obvious signs of GI bleeding, and had upper and lower endoscopies in the fall 2020.  He received IV iron in late August / early September 2021 and is currently iron replete.  We will continue to monitor.      Plan to see him back with repeat labs in early October.        Total time 40 minutes, including review of medical records and labs, clinic visit, and documentation.      Waylon Novoa MD  Professor of Medicine  Division of Hematology, Oncology, and Transplantation  Director, [facility]

## 2023-04-20 NOTE — NURSING NOTE
"Oncology Rooming Note    April 20, 2023 4:44 PM   Raymon Ace is a 77 year old male who presents for:    Chief Complaint   Patient presents with     Oncology Clinic Visit     [facility] RETURN - IRON DEFICIENCY ANEMIA     Initial Vitals: BP (!) 157/85 (BP Location: Right arm, Patient Position: Chair, Cuff Size: Adult Large)   Pulse 81   Temp 98.3  F (36.8  C)   Resp 16   Ht 1.676 m (5' 5.98\")   Wt 88.5 kg (195 lb)   SpO2 98%   BMI 31.49 kg/m   Estimated body mass index is 31.49 kg/m  as calculated from the following:    Height as of this encounter: 1.676 m (5' 5.98\").    Weight as of this encounter: 88.5 kg (195 lb). Body surface area is 2.03 meters squared.  Moderate Pain (5) Comment: Data Unavailable   No LMP for male patient.  Allergies reviewed: Yes  Medications reviewed: Yes    Medications: Medication refills not needed today.  Pharmacy name entered into [employer]:    [id number] - [place], [place] - [address] [facility] [address] [facility] - [place], [place] - [address] [place], [place] [address]PRITI            "

## 2023-04-20 NOTE — LETTER
4/20/2023         RE: Raymon Ace  [address] [facility] [address]        Dear Colleague,    Thank you for referring your patient, Raymon Ace, to the [facility]. Please see a copy of my visit note below.    HEMATOLOGY CLINIC VISIT    Raymon is a 77-year-old male with chronic ITP and a history of unprovoked pulmonary embolism in April 2017 for which he is maintained on long-term anticoagulation.  He has a history of recurrent iron deficiency anemia felt secondary to chronic occult blood loss from portal hypertensive gastropathy and hemorrhoids.  He also has underlying alcoholic liver disease.  He was scheduled today for routine follow-up visit.      He is accompanied today by his wife.  They recently returned from a few weeks in [place].  Overall he feels well aside from weakness and neuropathy in his legs and bilateral knee pain for which he had been planning knee replacement last fall.  Apparently the surgeon was hesitant to proceed until he improved his muscle strength and also had the neuropathy further evaluated.    He remains on Promacta 75 mg daily for chronic ITP.  He is tolerating the medication without any perceived side effects.  He also remains on prophylactic intensity anticoagulation with rivaroxaban (10 mg daily).  He denies any bruising, epistaxis, dental bleeding, or blood in the urine or stool.     He last received IV iron in late August / early September 2021.  Prior to that he was treated in January 2020.      Physical exam:  He looks well.  He is afebrile, blood pressure 157/85, pulse 81 and regular.  Respirations unlabored.  O2 saturation 98% on room air.  He is not pale or jaundiced.  No scleral icterus.  Lungs are clear.  RRR S1S2, no murmur.  No lower extremity edema.    Labs:  Serum electrolytes normal, creatinine 0.84, LFTs normal.  Ferritin is 75.  White count 16.5 with 89% neutrophils, 4% lymphocytes, 6% monocytes.  Hemoglobin 13.9, MCV 96.   Platelets 108,000.        ASSESSMENT / PLAN:  1.  Chronic ITP -- Raymon's baseline platelet count has been in the 70,000 to 100,000 range over the last several years.  He remains on Promacta 75 mg daily.  He is responsive to pulse dexamethasone and we have used this to increase his platelet count prior to surgeries.  He has not been treated with IVIG, so we do not know how well he responds to that.  At the present time, his platelet count is within his usual baseline.  Given that he is on long-term anticoagulation, the goal is to maintain his platelet count consistently above 50,000.  Note that there may also be a component of thrombocytopenia related to his underlying liver disease.    2. History of unprovoked pulmonary embolism (April 2017) -- He has done well on long-term anticoagulation with rivaroxaban, and we will make no change in this part of his care plan.  He is on the prophylactic dose of 10 mg daily due to a combination of mild intermittent renal insufficiency (not currently an issue) and underlying liver disease.     3.  h/o iron deficiency anemia -- This has been attributed to to chronic occult blood loss from portal hypertensive gastropathy, and hemorrhoids.  He denies any new bleeding symptoms or any obvious signs of GI bleeding, and had upper and lower endoscopies in the fall 2020.  He received IV iron in late August / early September 2021 and is currently iron replete.  We will continue to monitor.      Plan to see him back with repeat labs in early October.        Total time 40 minutes, including review of medical records and labs, clinic visit, and documentation.      Waylon Novoa MD  Professor of Medicine  Division of Hematology, Oncology, and Transplantation  Director, [facility]

## 2023-04-21 DIAGNOSIS — F10.10 ALCOHOL ABUSE: ICD-10-CM

## 2023-04-21 DIAGNOSIS — M10.9 GOUT INVOLVING TOE, UNSPECIFIED CAUSE, UNSPECIFIED CHRONICITY, UNSPECIFIED LATERALITY: ICD-10-CM

## 2023-04-21 RX ORDER — FOLIC ACID 1 MG/1
TABLET ORAL
Qty: 90 TABLET | Refills: 1 | Status: SHIPPED | OUTPATIENT
Start: 2023-04-21

## 2023-04-21 RX ORDER — ALLOPURINOL 300 MG/1
TABLET ORAL
Qty: 90 TABLET | Refills: 0 | Status: SHIPPED | OUTPATIENT
Start: 2023-04-21 | End: 2023-06-21

## 2023-04-22 NOTE — TELEPHONE ENCOUNTER
Refill done.    Please call the patient and schedule AWV with me, which patient is due at this time, to further take care of the medical conditions and medications.OK to use same day slot / double book near another virtual slot in 3-4 weeks.     Thank you,  Beatrice Stephens MD on 4/21/2023

## 2023-04-24 ENCOUNTER — TELEPHONE (OUTPATIENT)
Dept: FAMILY MEDICINE | Facility: CLINIC | Age: 78
End: 2023-04-24
Payer: COMMERCIAL

## 2023-04-24 NOTE — TELEPHONE ENCOUNTER
----- Message from Beatrice Stephens MD sent at 4/24/2023 12:09 AM CDT -----  Your cholesterol levels are borderline high, follow diet regulations of avoiding red meat for improvement.  Cannot give you any cholesterol medications given your liver damage.    Please make an annual wellness visit at the earliest for further recommendations, due for follow-up with PCP at this time.    Request to schedule AWV at the earliest - OK to use same day slot .     Thank you,  Beatrice Stephens MD on 4/24/2023

## 2023-04-24 NOTE — TELEPHONE ENCOUNTER
Called and spoke to pt's wife, consent to communicate on file.     Relayed provider result note and recommendations.     Assisted in scheduling appointment per provider recommendations. Had no further questions at this time.     Appointments in Next Year    May 02, 2023  3:30 PM  (Arrive by 3:10 PM)  Provider Visit with Beatrice Stephens MD  [facility] ([facility] ) [phone]        Alec Gaffney RN

## 2023-04-26 DIAGNOSIS — K76.6 PORTAL HYPERTENSION (H): ICD-10-CM

## 2023-04-26 DIAGNOSIS — N18.31 STAGE 3A CHRONIC KIDNEY DISEASE (H): ICD-10-CM

## 2023-04-26 DIAGNOSIS — K70.9 ALCOHOLIC LIVER DAMAGE (H): Primary | ICD-10-CM

## 2023-04-26 DIAGNOSIS — Z00.00 ENCOUNTER FOR MEDICARE ANNUAL WELLNESS EXAM: ICD-10-CM

## 2023-04-26 DIAGNOSIS — K70.30 ALCOHOLIC CIRRHOSIS, UNSPECIFIED WHETHER ASCITES PRESENT (H): ICD-10-CM

## 2023-04-27 RX ORDER — METOPROLOL SUCCINATE 50 MG/1
50 TABLET, EXTENDED RELEASE ORAL DAILY
Qty: 90 TABLET | Refills: 1 | OUTPATIENT
Start: 2023-04-27

## 2023-05-02 ENCOUNTER — OFFICE VISIT (OUTPATIENT)
Dept: FAMILY MEDICINE | Facility: CLINIC | Age: 78
End: 2023-05-02
Payer: COMMERCIAL

## 2023-05-02 VITALS
RESPIRATION RATE: 20 BRPM | HEIGHT: 66 IN | DIASTOLIC BLOOD PRESSURE: 82 MMHG | SYSTOLIC BLOOD PRESSURE: 136 MMHG | WEIGHT: 195 LBS | OXYGEN SATURATION: 96 % | TEMPERATURE: 97.4 F | BODY MASS INDEX: 31.34 KG/M2 | HEART RATE: 110 BPM

## 2023-05-02 DIAGNOSIS — F10.20 ALCOHOL DEPENDENCE, CONTINUOUS (H): ICD-10-CM

## 2023-05-02 DIAGNOSIS — G89.29 CHRONIC BILATERAL LOW BACK PAIN WITH SCIATICA, SCIATICA LATERALITY UNSPECIFIED: ICD-10-CM

## 2023-05-02 DIAGNOSIS — F32.5 MAJOR DEPRESSIVE DISORDER IN FULL REMISSION, UNSPECIFIED WHETHER RECURRENT (H): ICD-10-CM

## 2023-05-02 DIAGNOSIS — G63 POLYNEUROPATHY IN OTHER DISEASES CLASSIFIED ELSEWHERE (H): ICD-10-CM

## 2023-05-02 DIAGNOSIS — Z00.00 ENCOUNTER FOR MEDICARE ANNUAL WELLNESS EXAM: Primary | ICD-10-CM

## 2023-05-02 DIAGNOSIS — I27.82 CHRONIC PULMONARY EMBOLISM, UNSPECIFIED PULMONARY EMBOLISM TYPE, UNSPECIFIED WHETHER ACUTE COR PULMONALE PRESENT (H): ICD-10-CM

## 2023-05-02 DIAGNOSIS — Z23 HIGH PRIORITY FOR 2019-NCOV VACCINE: ICD-10-CM

## 2023-05-02 DIAGNOSIS — K76.6 PORTAL HYPERTENSION (H): ICD-10-CM

## 2023-05-02 DIAGNOSIS — M17.0 PRIMARY OSTEOARTHRITIS OF BOTH KNEES: ICD-10-CM

## 2023-05-02 DIAGNOSIS — N18.31 STAGE 3A CHRONIC KIDNEY DISEASE (H): ICD-10-CM

## 2023-05-02 DIAGNOSIS — M54.40 CHRONIC BILATERAL LOW BACK PAIN WITH SCIATICA, SCIATICA LATERALITY UNSPECIFIED: ICD-10-CM

## 2023-05-02 DIAGNOSIS — K70.30 ALCOHOLIC CIRRHOSIS, UNSPECIFIED WHETHER ASCITES PRESENT (H): ICD-10-CM

## 2023-05-02 PROBLEM — E66.01 MORBID OBESITY (H): Status: RESOLVED | Noted: 2022-12-02 | Resolved: 2023-05-02

## 2023-05-02 PROCEDURE — 99215 OFFICE O/P EST HI 40 MIN: CPT | Mod: 25 | Performed by: INTERNAL MEDICINE

## 2023-05-02 PROCEDURE — 91312 COVID-19 BIVALENT 12+ (PFIZER): CPT | Performed by: INTERNAL MEDICINE

## 2023-05-02 PROCEDURE — 0124A COVID-19 BIVALENT 12+ (PFIZER): CPT | Performed by: INTERNAL MEDICINE

## 2023-05-02 PROCEDURE — G0439 PPPS, SUBSEQ VISIT: HCPCS | Performed by: INTERNAL MEDICINE

## 2023-05-02 RX ORDER — GABAPENTIN 300 MG/1
900 CAPSULE ORAL 3 TIMES DAILY
Qty: 360 CAPSULE | Refills: 5 | Status: SHIPPED | OUTPATIENT
Start: 2023-05-02 | End: 2024-03-01

## 2023-05-02 ASSESSMENT — ENCOUNTER SYMPTOMS
NAUSEA: 0
CONSTIPATION: 0
HEMATOCHEZIA: 0
SHORTNESS OF BREATH: 0
DIARRHEA: 1
DYSURIA: 0
PALPITATIONS: 0
WEAKNESS: 0
SORE THROAT: 0
ABDOMINAL PAIN: 0
NERVOUS/ANXIOUS: 0
HEARTBURN: 0
EYE PAIN: 0
COUGH: 0
FREQUENCY: 1
HEMATURIA: 0
ARTHRALGIAS: 1
PARESTHESIAS: 0
CHILLS: 0
DIZZINESS: 0
MYALGIAS: 1
FEVER: 0
JOINT SWELLING: 0
HEADACHES: 0

## 2023-05-02 ASSESSMENT — PATIENT HEALTH QUESTIONNAIRE - PHQ9
SUM OF ALL RESPONSES TO PHQ QUESTIONS 1-9: 14
10. IF YOU CHECKED OFF ANY PROBLEMS, HOW DIFFICULT HAVE THESE PROBLEMS MADE IT FOR YOU TO DO YOUR WORK, TAKE CARE OF THINGS AT HOME, OR GET ALONG WITH OTHER PEOPLE: VERY DIFFICULT
SUM OF ALL RESPONSES TO PHQ QUESTIONS 1-9: 14

## 2023-05-02 ASSESSMENT — PAIN SCALES - GENERAL: PAINLEVEL: EXTREME PAIN (9)

## 2023-05-02 ASSESSMENT — ACTIVITIES OF DAILY LIVING (ADL): CURRENT_FUNCTION: NO ASSISTANCE NEEDED

## 2023-05-02 NOTE — PATIENT INSTRUCTIONS
As discussed since we already checked all the labs needed recently along with your Oncology labs. >> Please continue current medications.     Cut down further on alcohol from current 3 drinks >> 1 drink a day for improvement of your heart rate which is high inspite of current Metoprolol     Placed physical therapy referral for your Knee joint guanakito as requested by Orthopedics for muscle strengthening of the Knee joint for the Surgery to take place.     I went ahead and sent in Knee braces for your Knee pain.     ===============================================      Patient Education   Personalized Prevention Plan  You are due for the preventive services outlined below.  Your care team is available to assist you in scheduling these services.  If you have already completed any of these items, please share that information with your care team to update in your medical record.  Health Maintenance Due   Topic Date Due    COVID-19 Vaccine (4 - Booster for Pfizer series) 03/27/2022    Annual Wellness Visit  05/12/2022    Flu Vaccine (1) 09/01/2022    Zoster (Shingles) Vaccine (2 of 2) 01/27/2023     Your Health Risk Assessment indicates you feel you are not in good health    A healthy lifestyle helps keep the body fit and the mind alert. It helps protect you from disease, helps you fight disease, and helps prevent chronic disease (disease that doesn't go away) from getting worse. This is important as you get older and begin to notice twinges in muscles and joints and a decline in the strength and stamina you once took for granted. A healthy lifestyle includes good healthcare, good nutrition, weight control, recreation, and regular exercise. Avoid harmful substances and do what you can to keep safe. Another part of a healthy lifestyle is stay mentally active and socially involved.    Good healthcare   Have a wellness visit every year.   If you have new symptoms, let us know right away. Don't wait until the next checkup.    Take medicines exactly as prescribed and keep your medicines in a safe place. Tell us if your medicine causes problems.   Healthy diet and weight control   Eat 3 or 4 small, nutritious, low-fat, high-fiber meals a day. Include a variety of fruits, vegetables, and whole-grain foods.   Make sure you get enough calcium in your diet. Calcium, vitamin D, and exercise help prevent osteoporosis (bone thinning).   If you live alone, try eating with others when you can. That way you get a good meal and have company while you eat it.   Try to keep a healthy weight. If you eat more calories than your body uses for energy, it will be stored as fat and you will gain weight.     Recreation   Recreation is not limited to sports and team events. It includes any activity that provides relaxation, interest, enjoyment, and exercise. Recreation provides an outlet for physical, mental, and social energy. It can give a sense of worth and achievement. It can help you stay healthy.    Mental Exercise and Social Involvement  Mental and emotional health is as important as physical health. Keep in touch with friends and family. Stay as active as possible. Continue to learn and challenge yourself.   Things you can do to stay mentally active are:  Learn something new, like a foreign language or musical instrument.   Play SCRABBLE or do crossword puzzles. If you cannot find people to play these games with you at home, you can play them with others on your computer through the Internet.   Join a games club--anything from card games to chess or checkers or lawn bowling.   Start a new hobby.   Go back to school.   Volunteer.   Read.   Keep up with world events.   Patient Education   Alcohol Use     Many people can enjoy a glass of wine or beer without any negative consequences to their health. According to the Centers for Disease Control and Prevention (CDC), having one or fewer drinks per day for women and two or fewer per day for men is  considered moderate drinking.     When people drink more than moderately, it can become concerning. Excessive drinking is defined as consuming 15 drinks or more per week for men and 8 drinks or more per week for women. There are various health problems associated with excessive drinking, which include:  Damage to vital organs like the heart, brain, liver and pancreas  Harm to the digestive tract  Weaken the immune system  Higher risk for heart disease and cancer    There are many resources available to people who are addicted to alcohol. A counselor or other health care provider can give you support. So can a , , or rabbi who is trained in substance abuse counseling. Friends and family may also help once you are connected with professionals.           Understanding USDA MyPlate  The USDA has guidelines to help you make healthy food choices. These are called MyPlate. MyPlate shows the food groups that make up healthy meals using the image of a place setting. Before you eat, think about the healthiest choices for what to put on your plate or in your cup or bowl. To learn more about building a healthy plate, visit www.choosemyplate.gov.     The food groups  Fruits. Any fruit or 100% fruit juice counts as part of the Fruit Group. Fruits may be fresh, canned, frozen, or dried, and may be whole, cut-up, or pureed. Make 1/2 of your plate fruits and vegetables.  Vegetables. Any vegetable or 100% vegetable juice counts as a member of the Vegetable Group. Vegetables may be fresh, frozen, canned, or dried. They can be served raw or cooked and may be whole, cut-up, or mashed. Make 1/2 of your plate fruits and vegetables.  Grains. All foods made from grains are part of the Grains Group. These include wheat, rice, oats, cornmeal, and barley. Grains are often used to make foods such as bread, pasta, oatmeal, cereal, tortillas, and grits. Grains should be no more than 1/4 of your plate. At least half of your grains  should be whole grains.  Protein. This group includes meat, poultry, seafood, beans and peas, eggs, processed soy products (such as tofu), nuts (including nut butters), and seeds. Make protein choices no more than 1/4 of your plate. Meat and poultry choices should be lean or low fat.  Dairy. The Dairy Group includes all fluid milk products and foods made from milk that contain calcium, such as yogurt and cheese. (Foods that have little calcium, such as cream, butter, and cream cheese, are not part of this group.) Most dairy choices should be low-fat or fat-free.  Oils. Oils aren't a food group, but they do contain essential nutrients. However it's important to watch your intake of oils. These are fats that are liquid at room temperature. They include canola, corn, olive, soybean, vegetable, and sunflower oil. Foods that are mainly oil include mayonnaise, certain salad dressings, and soft margarines. You likely already get your daily oil allowance from the foods you eat.  Things to limit  Eating healthy also means limiting these things in your diet:  Salt (sodium). Many processed foods have a lot of sodium. To keep sodium intake down, eat fresh vegetables, meats, poultry, and seafood when possible. Purchase low-sodium, reduced-sodium, or no-salt-added food products at the store. And don't add salt to your meals at home. Instead, season them with herbs and spices such as dill, oregano, cumin, and paprika. Or try adding flavor with lemon or lime zest and juice.  Saturated fat. Saturated fats are most often found in animal products such as beef, pork, and chicken. They are often solid at room temperature, such as butter. To reduce your saturated fat intake, choose leaner cuts of meat and poultry. And try healthier cooking methods such as grilling, broiling, roasting, or baking. For a simple lower-fat swap, use plain nonfat yogurt instead of mayonnaise when making potato salad or macaroni salad.  Added sugars. These are  sugars added to foods. They are in foods such as ice cream, candy, soda, fruit drinks, sports drinks, energy drinks, cookies, pastries, jams, and syrups. Cut down on added sugars by sharing sweet treats with a family member or friend. You can also choose fruit for dessert, and drink water or other unsweetened beverages.  StayWell last reviewed this educational content on 6/1/2020 2000-2022 [employer]. All rights reserved. This information is not intended as a substitute for professional medical care. Always follow your healthcare professional's instructions.          Signs of Hearing Loss  Hearing loss is a problem shared by many people. In fact, it's one of the most common health problems, particularly as people age. Most people aged 65 and older have some hearing loss. By age 80, almost everyone does. Hearing loss often occurs slowly over the years. So, you may not realize your hearing has gotten worse.   When sudden hearing loss occurs, it's important to contact your healthcare provider right away. Your provider will do a medical exam and a hearing exam as soon as possible. This is to help find the cause and type of your sudden hearing loss. Based on your diagnosis, your healthcare provider will discuss possible treatments.      Hearing much better with one ear can be a sign of hearing loss.     Have your hearing checked  Call your healthcare provider if you:   Have to strain to hear normal conversation  Have to watch other people s faces very carefully to follow what they re saying  Need to ask people to repeat what they ve said  Often misunderstand what people are saying  Turn the volume of the television or radio up so high that others complain  Feel that people are mumbling when they re talking to you  Find that the effort to hear leaves you feeling tired and irritated  Notice, when using the phone, that you hear better with one ear than the other  Stay[employer] last reviewed this educational  content on 6/1/2022 2000-2022 [employer]. All rights reserved. This information is not intended as a substitute for professional medical care. Always follow your healthcare professional's instructions.          Urinary Incontinence (Male)  We understand that gender is a spectrum. We may use gendered terms to talk about anatomy and health risk. Please use this sheet in a way that works best for you and your provider as you talk about your care.     Urinary incontinence means not being able to control the release of urine from the bladder.   Causes  Common causes of urinary incontinence in men include:  Infection  Certain medicines  Aging  Poor pelvic muscle tone  Bladder spasms  Obesity  Enlarged prostate  Trouble urinating and fully emptying the bladder (urinary retention)  Other things that can cause incontinence are:   Nervous system diseases  Diabetes  Sleep apnea  Urinary tract infections  Prostate surgery  Pelvic injury  Constipation and smoking have also been identified as risk factors.   Symptoms  Urge incontinence (overactive bladder). This is a sudden urge to urinate. It occurs even though there may not be much urine in the bladder. The need to urinate often during the night is common. It's due to overactive bladder muscles.  Stress incontinence. This is urine leakage that you can't control. It can occur with sneezing, coughing, and other actions that put stress on the bladder.    Treatment  Treatment depends on what is causing the condition. Bladder infections are treated with antibiotics. Urinary retention is treated with a bladder catheter.   Home care  Follow these guidelines when caring for yourself at home:  Don't have any foods and drinks that may irritate the bladder. This includes:  Chocolate  Alcohol  Caffeine  Carbonated drinks  Acidic fruits and juices  Limit fluids to 6 to 8 cups a day.  Lose weight if you are overweight. This may reduce your symptoms.  If advised, do regular  pelvic muscle-strengthening exercises such as Kegel exercises.  If needed, wear absorbent pads to catch urine. Change the pads often. This is for good hygiene and to prevent skin and bladder infections.  Bathe daily for good hygiene.  If an antibiotic was prescribed to treat a bladder infection, take it until it's finished. Keep taking it even if you are feeling better. This is to make sure your infection has cleared.  If a catheter was left in place, keep bacteria from getting into the collection bag. Don't disconnect the catheter from the collection bag.  Use a leg band to secure the catheter drainage tube, so it does not pull on the catheter. Drain the collection bag when it becomes full. To do this, use the drain spout at the bottom of the bag. Don't disconnect the bag from the catheter.  Don't pull on or try to remove a catheter. The catheter must be removed by a healthcare provider.  If you smoke, stop. Ask your provider for help if you can't do this on your own.  Follow-up care  Follow up with your healthcare provider, or as advised.  When to get medical advice  Call your healthcare provider right away if any of these occur:   Fever over 100.4 F (38 C), or as directed by your provider  Bladder pain or fullness  Belly swelling, nausea, or vomiting  Back pain  Weakness, dizziness, or fainting  If a catheter was left in place, return if:  The catheter falls out  The catheter stops draining for 6 hours  Your urine gets cloudy or smells bad  [employer] last reviewed this educational content on 6/1/2022 2000-2022 [employer]. All rights reserved. This information is not intended as a substitute for professional medical care. Always follow your healthcare professional's instructions.        Your Health Risk Assessment indicates you feel you are not in good emotional health.    Recreation   Recreation is not limited to sports and team events. It includes any activity that provides relaxation, interest,  "enjoyment, and exercise. Recreation provides an outlet for physical, mental, and social energy. It can give a sense of worth and achievement. It can help you stay healthy.    Mental Exercise and Social Involvement  Mental and emotional health is as important as physical health. Keep in touch with friends and family. Stay as active as possible. Continue to learn and challenge yourself.   Things you can do to stay mentally active are:  Learn something new, like a foreign language or musical instrument.   Play SCRABBLE or do crossword puzzles. If you cannot find people to play these games with you at home, you can play them with others on your computer through the Internet.   Join a games club--anything from card games to chess or checkers or lawn bowling.   Start a new hobby.   Go back to school.   Volunteer.   Read.   Keep up with world events.    Depression and Suicide in Older Adults  Nearly 2 million older adults in the [place] have some type of depression. Some of them even take their own lives. Yet depression among older adults is often ignored. Learning about the warning signs of depression may help spare a loved one needless pain. You may also save a life.   What is depression?  Depression is a common and serious illness. It affects the way you think and feel. It is not a normal part of aging. It is not a sign of weakness, a character flaw, or something you can \"snap out of.\" Most people with depression need treatment to get better. The most common symptom is a feeling of deep sadness. People who are depressed also may seem tired and listless. And nothing seems to give them pleasure. It s normal to grieve or be sad sometimes. But sadness lessens or passes with time. Depression rarely goes away or improves on its own. Other symptoms of depression are:   Sleeping more or less than normal  Eating more or less than normal  Having headaches, stomachaches, or other pains that don t go away  Feeling nervous,  empty,  or " worthless  Crying a lot  Thinking or talking about suicide or death  Loss of interest in activities previously enjoyed  Social isolation  Feeling confused or forgetful  What causes it?  The causes of depression aren t fully known. But it is thought to result from a complex blend of these factors:   Biochemistry. Certain chemicals in the brain play a role.  Genes. Depression does run in families.  Life stress. Life stresses can also trigger depression in some people. Older adults often face many stressors. These may include isolation, the death of friends or a spouse, health problems, and financial concerns.  Chronic health conditions. This includes diabetes, heart disease, or cancer. These can cause symptoms of depression. Medicine side effects can cause changes in thoughts and behaviors.  Giving support    Depressed people often may not want to ask for help. When they do, they may be ignored. Or they may get the wrong treatment. You can help by showing parents and older friends love and support. If they seem depressed, don t lecture the person or ignore the symptoms. Don't discount the symptoms as a  normal  part of aging. They are not. Get involved, listen, and show interest and support.   Help them understand that depression is a treatable illness. Tell them you can help them find the right treatment. Offer to go to their healthcare provider's appointment with them for support when the symptoms are discussed. With their approval, contact a local mental health center, social service agency, or hospital about services.   Helping at healthcare visits  You can be an advocate for them at healthcare appointments. Many older adults have chronic illnesses. Many of these can cause symptoms of depression. Medicine side effects can change thoughts and behaviors.   You can help make sure that the healthcare provider looks at all of these factors. They should refer your family member or friend to a mental healthcare provider  when needed. In some cases, untreated depression can lead to a misdiagnosis. A person may be diagnosed with a brain disorder such as dementia. If the healthcare provider does not take the issue of depression seriously, help your family member or friend find another provider.   Asking about self-harm thoughts  If you think an older person you care about could be suicidal, ask,  Have you thought about suicide?  Most people will tell you the truth. If they say yes, they may already have a plan for how and when they will attempt it. Find out as much as you can. The more detailed the plan, and the easier it is to carry out, the more danger the person is in right now. Tell the person you are there for them and you do not want them to get harmed. Don't wait to get help for the person. Call the person's healthcare provider, local hospital, or emergency services.   Call 988 in a crisis   Never leave the person alone. A person who is actively suicidal needs crisis care right away. They need constant supervision. Never leave the person out of sight. Call 988 Tell the crisis counselor you need help for a person who is thinking about suicide. The counselor will help you get the right level of crisis help. You may be advised to take the person to the nearest emergency room.   The National Suicide Prevention Lifeline is available at 988, or 904-596-NDWT (404-528-9409), or www.suicidepreventionlifeline.org. When you call or text 988, you will be connected to trained counselors who are part of the National Suicide Prevention Lifeline network. An online chat option is also available. Lifeline is free and available 24/7.   To learn more  National Suicide Prevention Lifeline at www.suicidepreventionlifeline.org  or 698-166-QDCS (595-640-5709)  National [place] on Mental Illness at www.sumi.org  or 050-874-MZAD (535-283-6592)  Mental [facility] at www.nmha.org  or 516-013-1855  [place] of Mental Health at  www.[facility].nih.gov  or 171-932-5629    [employer]iju last reviewed this educational content on 7/1/2022 2000-2022 [employer]. All rights reserved. This information is not intended as a substitute for professional medical care. Always follow your healthcare professional's instructions.          Preventing Falls at Home  A person can fall for many reasons. Older adults may fall because reaction time slows down as we age. Your muscles and joints may get stiff, weak, or less flexible because of illness, medicines, or a physical condition.   Other health problems that make falls more likely include:   Arthritis  Dizziness or lightheadedness when you stand up (orthostatic hypotension)  History of a stroke  Dizziness  Anemia  Certain medicines taken for mental illness or to control blood pressure.  Problems with balance or gait  Bladder or urinary problems  History of falling  Changes in vision (vision impairment)  Changes in thinking skills and memory (cognitive impairment)  Injuries from a fall can include serious injuries such as broken bones, dislocated joints, internal bleeding and cuts. Injuries like these can limit your independence.   Prevention tips  To help prevent falls and fall-related injuries, follow the tips below.    Floors  To make floors safer:   Put nonskid pads under area rugs.  Remove small rugs.  Replace worn floor coverings.  Tack carpets firmly to each step on carpeted stairs. Put nonskid strips on the edges of uncarpeted stairs.  Keep floors and stairs free of clutter and cords.  Arrange furniture so there are clear pathways.  Clean up any spills right away.  Bathrooms    To make bathrooms safer:   Install grab bars in the tub or shower.  Apply nonskid strips or put a nonskid rubber mat in the tub or shower.  Sit on a bath chair to bathe.  Use bathmats with nonskid backing.  Lighting  To improve visibility in your home:    Keep a flashlight in each room. Or put a lamp next to the bed  within easy reach.  Put nightlights in the bedrooms, hallways, kitchen, and bathrooms.  Make sure all stairways have good lighting.  Take your time when going up and down stairs.  Put handrails on both sides of stairs and in walkways for more support. To prevent injury to your wrist or arm, don t use handrails to pull yourself up.  Install grab bars to pull yourself up.  Move or rearrange items that you use often. This will make them easier to find or reach.  Look at your home to find any safety hazards. Especially look at doorways, walkways, and the driveway. Remove or repair any safety problems that you find.  Other changes to make  Look around to find any safety hazards. Look closely at doorways, walkways, and the driveway. Remove or repair any safety problems that you find.  Wear shoes that fit well.  Take your time when going up and down stairs.  Put handrails on both sides of stairs and in walkways for more support. To prevent injury to your wrist or arm, don t use handrails to pull yourself up.  Install grab bars wherever needed to pull yourself up.  Arrange items that you use often. This will make them easier to find or reach.    [employer] last reviewed this educational content on 3/1/2020    9959-8331 [employer]. All rights reserved. This information is not intended as a substitute for professional medical care. Always follow your healthcare professional's instructions.

## 2023-05-02 NOTE — PROGRESS NOTES
"SUBJECTIVE:   Raymon is a 77 year old who presents for Preventive Visit.        5/2/2023     3:28 PM   Additional Questions   Roomed by Cheyenne   Accompanied by Wife- Taryn     Patient has been advised of split billing requirements and indicates understanding: Yes  Are you in the first 12 months of your Medicare coverage?  No    Healthy Habits:     In general, how would you rate your overall health?  Poor    Frequency of exercise:  2-3 days/week    Duration of exercise:  N/A    Do you usually eat at least 4 servings of fruit and vegetables a day, include whole grains    & fiber and avoid regularly eating high fat or \"junk\" foods?  No    Taking medications regularly:  Yes    Medication side effects:  None    Ability to successfully perform activities of daily living:  No assistance needed    Home Safety:  No safety concerns identified    Hearing Impairment:  Difficulty following a conversation in a noisy restaurant or crowded room, feel that people are mumbling or not speaking clearly and need to ask people to speak up or repeat themselves    In the past 6 months, have you been bothered by leaking of urine? Yes    In general, how would you rate your overall mental or emotional health?  Poor      PHQ-2 Total Score: 2    Additional concerns today:  No      Have you ever done Advance Care Planning? (For example, a Health Directive, POLST, or a discussion with a medical provider or your loved ones about your wishes): Yes, patient states has an Advance Care Planning document and will bring a copy to the clinic.      Hearing Acuity:Pt able to [place] without any difficulty    Fall risk  Fallen 2 or more times in the past year?: Yes  Any fall with injury in the past year?: Yes  Timed Up and Go Test (>13.5 is fall risk; contact physician) : 47.29    Cognitive Screening   1) Repeat 3 items (Leader, Season, Table)    2) Clock draw: ABNORMAL unable to place the numbers   3) 3 item recall: Recalls 2 objects   Results: ABNORMAL " clock, 1-2 items recalled: PROBABLE COGNITIVE IMPAIRMENT, **INFORM PROVIDER**    Mini-CogTM Copyright CARMENCITA Cancino. Licensed by the author for use in [facility]; reprinted with permission (walter@[email or web address]). All rights reserved.      Do you have sleep apnea, excessive snoring or daytime drowsiness?: no    Reviewed and updated as needed this visit by clinical staff   Tobacco  Allergies  Meds  Problems  Med Hx  Surg Hx  Fam Hx          Reviewed and updated as needed this visit by Provider   Tobacco  Allergies  Meds  Problems  Med Hx  Surg Hx  Fam Hx         Social History     Tobacco Use     Smoking status: Former     Packs/day: 1.00     Years: 28.00     Pack years: 28.00     Types: Cigarettes     Start date:      Quit date: 1982     Years since quittin.6     Smokeless tobacco: Former   Vaping Use     Vaping status: Never Used   Substance Use Topics     Alcohol use: Yes     Alcohol/week: 5.0 standard drinks of alcohol     Types: 5 Shots of liquor per week     Comment: 4-5 drinks/day, alcohol use disorder             2023     3:36 PM   Alcohol Use   Prescreen: >3 drinks/day or >7 drinks/week? Yes   AUDIT SCORE  9         2023     3:36 PM   AUDIT - Alcohol Use Disorders Identification Test - Reproduced from the World Health Organization Audit 2001 (Second Edition)   1.  How often do you have a drink containing alcohol? 4 or more times a week   2.  How many drinks containing alcohol do you have on a typical day when you are drinking? 3 or 4   3.  How often do you have five or more drinks on one occasion? Never   4.  How often during the last year have you found that you were not able to stop drinking once you had started? Never   5.  How often during the last year have you failed to do what was normally expected of you because of drinking? Never   6.  How often during the last year have you needed a first drink in the morning to get yourself going after a heavy drinking session?  Never   7.  How often during the last year have you had a feeling of guilt or remorse after drinking? Never   8.  How often during the last year have you been unable to remember what happened the night before because of your drinking? Never   9.  Have you or someone else been injured because of your drinking? No   10. Has a relative, friend, doctor or other health care worker been concerned about your drinking or suggested you cut down? Yes, during the last year   TOTAL SCORE 9     Do you have a current opioid prescription? No  Do you use any other controlled substances or medications that are not prescribed by a provider? Alcohol      Current providers sharing in care for this patient include:   Patient Care Team:  Beatrice Stephens MD as PCP - General (Internal Medicine)  Waylon Novoa MD as MD (Hematology)  Edel Zuluaga LPN as LPN (Hematology)  Waylon Novoa MD as Assigned Cancer Care Provider  Beatrice Stephens MD as Assigned PCP  Maria Watt MD as Assigned Neuroscience Provider  Ida Sequeira PA-C as Physician Assistant (Dermatology)  Maria Watt MD as Referring Physician (Neurology)  Edel Zuluaga LPN as Specialty Care Coordinator (Hematology)  Elvis Colby DO as Assigned Musculoskeletal Provider    The following health maintenance items are reviewed in Epic and correct as of today:  Health Maintenance   Topic Date Due     MEDICARE ANNUAL WELLNESS VISIT  05/12/2022     INFLUENZA VACCINE (1) 09/01/2022     ZOSTER IMMUNIZATION (2 of 2) 01/27/2023     MICROALBUMIN  12/02/2023     ANNUAL REVIEW OF HM ORDERS  12/02/2023     BMP  04/20/2024     LIPID  04/20/2024     HEMOGLOBIN  04/20/2024     FALL RISK ASSESSMENT  05/02/2024     PHQ-9  05/02/2024     ADVANCE CARE PLANNING  05/02/2028     DTAP/TDAP/TD IMMUNIZATION (3 - Td or Tdap) 06/07/2029     HEPATITIS C SCREENING  Completed     DEPRESSION ACTION PLAN  Completed     Pneumococcal  "Vaccine: 65+ Years  Completed     URINALYSIS  Completed     HEPATITIS B IMMUNIZATION  Completed     COVID-19 Vaccine  Completed     IPV IMMUNIZATION  Aged Out     MENINGITIS IMMUNIZATION  Aged Out     URINE DRUG SCREEN  Discontinued     COLORECTAL CANCER SCREENING  Discontinued     Lab work is in process  Labs reviewed in EPIC    Review of Systems   Constitutional: Negative for chills and fever.   HENT: Positive for hearing loss. Negative for congestion, ear pain and sore throat.    Eyes: Negative for pain and visual disturbance.   Respiratory: Negative for cough and shortness of breath.    Cardiovascular: Negative for chest pain, palpitations and peripheral edema.   Gastrointestinal: Positive for diarrhea. Negative for abdominal pain, constipation, heartburn, hematochezia and nausea.   Genitourinary: Positive for frequency, impotence and urgency. Negative for dysuria, genital sores and hematuria.   Musculoskeletal: Positive for arthralgias and myalgias. Negative for joint swelling.   Skin: Negative for rash.   Neurological: Negative for dizziness, weakness, headaches and paresthesias.   Psychiatric/Behavioral: Negative for mood changes. The patient is not nervous/anxious.      Constitutional, HEENT, cardiovascular, pulmonary, GI, , musculoskeletal, neuro, skin, endocrine and psych systems are negative, except as otherwise noted.    OBJECTIVE:   /82 (BP Location: Left arm, Patient Position: Sitting, Cuff Size: Adult Large)   Pulse 110   Temp 97.4  F (36.3  C) (Tympanic)   Resp 20   Ht 1.673 m (5' 5.85\")   Wt 88.5 kg (195 lb)   SpO2 96%   BMI 31.62 kg/m   Estimated body mass index is 31.62 kg/m  as calculated from the following:    Height as of this encounter: 1.673 m (5' 5.85\").    Weight as of this encounter: 88.5 kg (195 lb).  Physical Exam  GENERAL: healthy, alert and no distress  EYES: Eyes grossly normal to inspection, PERRL and conjunctivae and sclerae normal  HENT: ear canals and TM's normal, " nose and mouth without ulcers or lesions  NECK: no adenopathy, no asymmetry, masses, or scars and thyroid normal to palpation  RESP: lungs clear to auscultation - no rales, rhonchi or wheezes  CV: regular rate and rhythm, normal S1 S2, no S3 or S4, no murmur, click or rub, no peripheral edema and peripheral pulses strong  ABDOMEN: soft, nontender, no hepatosplenomegaly, no masses and bowel sounds normal  MS: no gross musculoskeletal defects noted, no edema  SKIN: no suspicious lesions or rashes  NEURO: Normal strength and tone, mentation intact and speech normal  PSYCH: mentation appears normal, affect normal/bright    Diagnostic Test Results:  Labs reviewed in Epic    ASSESSMENT / PLAN:       Assessment and Plan      1. Encounter for Medicare annual wellness exam  Patient was last seen by me in December 2022 for preoperative clearance of right hip replacement surgery at that time, patient has multiple medical concerns and risk factors including -Pt is here for preop clearance for this intermediate risk surgery with multiple risk factors as mentioned below.  Last seen pt in 9/2021 .   - On the lab works were done before the patient came to this visit including the lipid panel which was added to the heme oncology labs being done 10 days back.  Discussed on all the lab results reports with the patient and the wife.  - gabapentin (NEURONTIN) 300 MG capsule; Take 3 capsules (900 mg) by mouth 3 times daily  Dispense: 360 capsule; Refill: 5  - COVID-19 BIVALENT 12+ (PFIZER)    2. Alcoholic cirrhosis, unspecified whether ascites present (H)  3. Portal hypertension (H)  Ongoing problem, discussed on quitting alcohol for improvement.  Recent lab work done which is remaining stable at baseline.    4. Stage 3a chronic kidney disease (H)  Stable, continue to avoid nephrotoxins.    5. Chronic pulmonary embolism, unspecified pulmonary embolism type, unspecified whether acute cor pulmonale present (H)  Chronic problem, continue  current Xarelto as managed by his hematology.    6. Alcohol dependence, continuous (H)  Ongoing problem, uncontrolled.  Patient states that he has been cutting down his alcohol consumption which is much better at this time but still remaining Currently on 3 drinks of Mix of Whisley and Vodka daily as he endorses.  Discussed on quitting alcohol completely or at least cut down to 1 drink daily for prevention of his ongoing myopathy and also neuropathy.    7. Major depressive disorder in full remission, unspecified whether recurrent (H)  Uncontrolled, PHQ-9 score at 14.  Patient currently on gabapentin which is helping in controlling this.  Discussed on quitting alcohol for improvement.  Patient was previously offered on SSRIs and also psychiatry referrals which he refused at that time.    8. Polyneuropathy in other diseases classified elsewhere (H)  9. Chronic bilateral low back pain with sciatica, sciatica laterality unspecified  Ongoing problem, in spite of gabapentin 900 mg 3 times daily at this time.  Discussed on quitting alcohol completely which is also contributing to his neuropathy pains, does have instability when he walks because of the numbness ongoing.  Uses walker for ambulation.  Fall risk prevention given.  - gabapentin (NEURONTIN) 300 MG capsule; Take 3 capsules (900 mg) by mouth 3 times daily  Dispense: 360 capsule; Refill: 5    10. High priority for 2019-nCoV vaccine  - COVID-19 BIVALENT 12+ (PFIZER)    11. Primary osteoarthritis of both knees  Ongoing problem, Uncontrolled. Pt was supposed to the Rt Knee replacement surgery which got postponed as the Surgeon wanted the Physical therapy and muscle strengthening done given the muscle weakness which may hinder his recovery after TKA. He does have upcoming Neurology appointment for the same. Shared decision for Physical therapy referral as opted by the patient for the referral to be placed today.  Continue to follow recommendations of [facility]  - Dr. [facility] who will be planning his knee replacement surgery after being seen by neurology and physical therapy at least for next 3 months.  - Physical Therapy Referral; Future  - Knee Supplies Order Knee Sleeve/Brace; Bilateral; Open    Discussed most part of this appointment on patient's ongoing muscle weakness and neuropathic pains which is causing his instability and will be needing to fix them before considering a knee replacement surgery as mentioned above.  We will start off on physical therapy at the earliest to get this initiated.    Wife Mirela was in the room, answered all the questions and discussed on the management of care which both patient and wife understood and agreed with the plan.      Over 40 minutes spent outside the preventive visit ( 20 minutes )  on reviewing patient chart,  face to face encounter, greater than 50% time spent with plan/cordination of care and documentation as above in my A/P.           Patient Instructions     As discussed since we already checked all the labs needed recently along with your Oncology labs. >> Please continue current medications.     Cut down further on alcohol from current 3 drinks >> 1 drink a day for improvement of your heart rate which is high inspite of current Metoprolol     Placed physical therapy referral for your Knee joint guanakito as requested by Orthopedics for muscle strengthening of the Knee joint for the Surgery to take place.     I went ahead and sent in Knee braces for your Knee pain.     ===============================================      Patient Education  Personalized Prevention Plan  You are due for the preventive services outlined below.  Your care team is available to assist you in scheduling these services.  If you have already completed any of these items, please share that information with your care team to update in your medical record.  Health Maintenance Due   Topic Date Due     COVID-19 Vaccine (4 - Booster for  Pfizer series) 03/27/2022     Annual Wellness Visit  05/12/2022     Flu Vaccine (1) 09/01/2022     Zoster (Shingles) Vaccine (2 of 2) 01/27/2023     Your Health Risk Assessment indicates you feel you are not in good health    A healthy lifestyle helps keep the body fit and the mind alert. It helps protect you from disease, helps you fight disease, and helps prevent chronic disease (disease that doesn't go away) from getting worse. This is important as you get older and begin to notice twinges in muscles and joints and a decline in the strength and stamina you once took for granted. A healthy lifestyle includes good healthcare, good nutrition, weight control, recreation, and regular exercise. Avoid harmful substances and do what you can to keep safe. Another part of a healthy lifestyle is stay mentally active and socially involved.    Good healthcare     Have a wellness visit every year.     If you have new symptoms, let us know right away. Don't wait until the next checkup.     Take medicines exactly as prescribed and keep your medicines in a safe place. Tell us if your medicine causes problems.   Healthy diet and weight control     Eat 3 or 4 small, nutritious, low-fat, high-fiber meals a day. Include a variety of fruits, vegetables, and whole-grain foods.     Make sure you get enough calcium in your diet. Calcium, vitamin D, and exercise help prevent osteoporosis (bone thinning).     If you live alone, try eating with others when you can. That way you get a good meal and have company while you eat it.     Try to keep a healthy weight. If you eat more calories than your body uses for energy, it will be stored as fat and you will gain weight.     Recreation   Recreation is not limited to sports and team events. It includes any activity that provides relaxation, interest, enjoyment, and exercise. Recreation provides an outlet for physical, mental, and social energy. It can give a sense of worth and achievement. It  can help you stay healthy.    Mental Exercise and Social Involvement  Mental and emotional health is as important as physical health. Keep in touch with friends and family. Stay as active as possible. Continue to learn and challenge yourself.   Things you can do to stay mentally active are:    Learn something new, like a foreign language or musical instrument.     Play SCRABBLE or do crossword puzzles. If you cannot find people to play these games with you at home, you can play them with others on your computer through the Internet.     Join a games club--anything from card games to chess or checkers or lawn bowling.     Start a new hobby.     Go back to school.     Volunteer.     Read.   Keep up with world events.   Patient Education  Alcohol Use     Many people can enjoy a glass of wine or beer without any negative consequences to their health. According to the Centers for Disease Control and Prevention (CDC), having one or fewer drinks per day for women and two or fewer per day for men is considered moderate drinking.     When people drink more than moderately, it can become concerning. Excessive drinking is defined as consuming 15 drinks or more per week for men and 8 drinks or more per week for women. There are various health problems associated with excessive drinking, which include:    Damage to vital organs like the heart, brain, liver and pancreas    Harm to the digestive tract    Weaken the immune system    Higher risk for heart disease and cancer    There are many resources available to people who are addicted to alcohol. A counselor or other health care provider can give you support. So can a , , or rabbi who is trained in substance abuse counseling. Friends and family may also help once you are connected with professionals.           Understanding USDA MyPlate  The USDA has guidelines to help you make healthy food choices. These are called MyPlate. MyPlate shows the food groups that make  up healthy meals using the image of a place setting. Before you eat, think about the healthiest choices for what to put on your plate or in your cup or bowl. To learn more about building a healthy plate, visit www.choosemyplate.gov.     The food groups    Fruits. Any fruit or 100% fruit juice counts as part of the Fruit Group. Fruits may be fresh, canned, frozen, or dried, and may be whole, cut-up, or pureed. Make 1/2 of your plate fruits and vegetables.    Vegetables. Any vegetable or 100% vegetable juice counts as a member of the Vegetable Group. Vegetables may be fresh, frozen, canned, or dried. They can be served raw or cooked and may be whole, cut-up, or mashed. Make 1/2 of your plate fruits and vegetables.    Grains. All foods made from grains are part of the Grains Group. These include wheat, rice, oats, cornmeal, and barley. Grains are often used to make foods such as bread, pasta, oatmeal, cereal, tortillas, and grits. Grains should be no more than 1/4 of your plate. At least half of your grains should be whole grains.    Protein. This group includes meat, poultry, seafood, beans and peas, eggs, processed soy products (such as tofu), nuts (including nut butters), and seeds. Make protein choices no more than 1/4 of your plate. Meat and poultry choices should be lean or low fat.    Dairy. The Dairy Group includes all fluid milk products and foods made from milk that contain calcium, such as yogurt and cheese. (Foods that have little calcium, such as cream, butter, and cream cheese, are not part of this group.) Most dairy choices should be low-fat or fat-free.    Oils. Oils aren't a food group, but they do contain essential nutrients. However it's important to watch your intake of oils. These are fats that are liquid at room temperature. They include canola, corn, olive, soybean, vegetable, and sunflower oil. Foods that are mainly oil include mayonnaise, certain salad dressings, and soft margarines. You likely  already get your daily oil allowance from the foods you eat.  Things to limit  Eating healthy also means limiting these things in your diet:    Salt (sodium). Many processed foods have a lot of sodium. To keep sodium intake down, eat fresh vegetables, meats, poultry, and seafood when possible. Purchase low-sodium, reduced-sodium, or no-salt-added food products at the store. And don't add salt to your meals at home. Instead, season them with herbs and spices such as dill, oregano, cumin, and paprika. Or try adding flavor with lemon or lime zest and juice.    Saturated fat. Saturated fats are most often found in animal products such as beef, pork, and chicken. They are often solid at room temperature, such as butter. To reduce your saturated fat intake, choose leaner cuts of meat and poultry. And try healthier cooking methods such as grilling, broiling, roasting, or baking. For a simple lower-fat swap, use plain nonfat yogurt instead of mayonnaise when making potato salad or macaroni salad.    Added sugars. These are sugars added to foods. They are in foods such as ice cream, candy, soda, fruit drinks, sports drinks, energy drinks, cookies, pastries, jams, and syrups. Cut down on added sugars by sharing sweet treats with a family member or friend. You can also choose fruit for dessert, and drink water or other unsweetened beverages.  [facility] last reviewed this educational content on 6/1/2020 2000-2022 [employer]. All rights reserved. This information is not intended as a substitute for professional medical care. Always follow your healthcare professional's instructions.          Signs of Hearing Loss  Hearing loss is a problem shared by many people. In fact, it's one of the most common health problems, particularly as people age. Most people aged 65 and older have some hearing loss. By age 80, almost everyone does. Hearing loss often occurs slowly over the years. So, you may not realize your hearing  has gotten worse.   When sudden hearing loss occurs, it's important to contact your healthcare provider right away. Your provider will do a medical exam and a hearing exam as soon as possible. This is to help find the cause and type of your sudden hearing loss. Based on your diagnosis, your healthcare provider will discuss possible treatments.      Hearing much better with one ear can be a sign of hearing loss.     Have your hearing checked  Call your healthcare provider if you:     Have to strain to hear normal conversation    Have to watch other people s faces very carefully to follow what they re saying    Need to ask people to repeat what they ve said    Often misunderstand what people are saying    Turn the volume of the television or radio up so high that others complain    Feel that people are mumbling when they re talking to you    Find that the effort to hear leaves you feeling tired and irritated    Notice, when using the phone, that you hear better with one ear than the other  [employer] last reviewed this educational content on 6/1/2022 2000-2022 [employer]. All rights reserved. This information is not intended as a substitute for professional medical care. Always follow your healthcare professional's instructions.          Urinary Incontinence (Male)  We understand that gender is a spectrum. We may use gendered terms to talk about anatomy and health risk. Please use this sheet in a way that works best for you and your provider as you talk about your care.     Urinary incontinence means not being able to control the release of urine from the bladder.   Causes  Common causes of urinary incontinence in men include:    Infection    Certain medicines    Aging    Poor pelvic muscle tone    Bladder spasms    Obesity    Enlarged prostate    Trouble urinating and fully emptying the bladder (urinary retention)  Other things that can cause incontinence are:     Nervous system  diseases    Diabetes    Sleep apnea    Urinary tract infections    Prostate surgery    Pelvic injury  Constipation and smoking have also been identified as risk factors.   Symptoms    Urge incontinence (overactive bladder). This is a sudden urge to urinate. It occurs even though there may not be much urine in the bladder. The need to urinate often during the night is common. It's due to overactive bladder muscles.    Stress incontinence. This is urine leakage that you can't control. It can occur with sneezing, coughing, and other actions that put stress on the bladder.    Treatment  Treatment depends on what is causing the condition. Bladder infections are treated with antibiotics. Urinary retention is treated with a bladder catheter.   Home care  Follow these guidelines when caring for yourself at home:    Don't have any foods and drinks that may irritate the bladder. This includes:  ? Chocolate  ? Alcohol  ? Caffeine  ? Carbonated drinks  ? Acidic fruits and juices    Limit fluids to 6 to 8 cups a day.    Lose weight if you are overweight. This may reduce your symptoms.    If advised, do regular pelvic muscle-strengthening exercises such as Kegel exercises.    If needed, wear absorbent pads to catch urine. Change the pads often. This is for good hygiene and to prevent skin and bladder infections.    Bathe daily for good hygiene.    If an antibiotic was prescribed to treat a bladder infection, take it until it's finished. Keep taking it even if you are feeling better. This is to make sure your infection has cleared.    If a catheter was left in place, keep bacteria from getting into the collection bag. Don't disconnect the catheter from the collection bag.    Use a leg band to secure the catheter drainage tube, so it does not pull on the catheter. Drain the collection bag when it becomes full. To do this, use the drain spout at the bottom of the bag. Don't disconnect the bag from the catheter.    Don't pull on or  try to remove a catheter. The catheter must be removed by a healthcare provider.    If you smoke, stop. Ask your provider for help if you can't do this on your own.  Follow-up care  Follow up with your healthcare provider, or as advised.  When to get medical advice  Call your healthcare provider right away if any of these occur:     Fever over 100.4 F (38 C), or as directed by your provider    Bladder pain or fullness    Belly swelling, nausea, or vomiting    Back pain    Weakness, dizziness, or fainting    If a catheter was left in place, return if:  ? The catheter falls out  ? The catheter stops draining for 6 hours  ? Your urine gets cloudy or smells bad  [employer] last reviewed this educational content on 6/1/2022 2000-2022 [employer]. All rights reserved. This information is not intended as a substitute for professional medical care. Always follow your healthcare professional's instructions.        Your Health Risk Assessment indicates you feel you are not in good emotional health.    Recreation   Recreation is not limited to sports and team events. It includes any activity that provides relaxation, interest, enjoyment, and exercise. Recreation provides an outlet for physical, mental, and social energy. It can give a sense of worth and achievement. It can help you stay healthy.    Mental Exercise and Social Involvement  Mental and emotional health is as important as physical health. Keep in touch with friends and family. Stay as active as possible. Continue to learn and challenge yourself.   Things you can do to stay mentally active are:    Learn something new, like a foreign language or musical instrument.     Play SCRABBLE or do crossword puzzles. If you cannot find people to play these games with you at home, you can play them with others on your computer through the Internet.     Join a games club--anything from card games to chess or checkers or lawn bowling.     Start a new hobby.     Go  "back to school.     Volunteer.     Read.   Keep up with world events.    Depression and Suicide in Older Adults  Nearly 2 million older adults in the [place] have some type of depression. Some of them even take their own lives. Yet depression among older adults is often ignored. Learning about the warning signs of depression may help spare a loved one needless pain. You may also save a life.   What is depression?  Depression is a common and serious illness. It affects the way you think and feel. It is not a normal part of aging. It is not a sign of weakness, a character flaw, or something you can \"snap out of.\" Most people with depression need treatment to get better. The most common symptom is a feeling of deep sadness. People who are depressed also may seem tired and listless. And nothing seems to give them pleasure. It s normal to grieve or be sad sometimes. But sadness lessens or passes with time. Depression rarely goes away or improves on its own. Other symptoms of depression are:     Sleeping more or less than normal    Eating more or less than normal    Having headaches, stomachaches, or other pains that don t go away    Feeling nervous,  empty,  or worthless    Crying a lot    Thinking or talking about suicide or death    Loss of interest in activities previously enjoyed    Social isolation    Feeling confused or forgetful  What causes it?  The causes of depression aren t fully known. But it is thought to result from a complex blend of these factors:     Biochemistry. Certain chemicals in the brain play a role.    Genes. Depression does run in families.    Life stress. Life stresses can also trigger depression in some people. Older adults often face many stressors. These may include isolation, the death of friends or a spouse, health problems, and financial concerns.    Chronic health conditions. This includes diabetes, heart disease, or cancer. These can cause symptoms of depression. Medicine side effects can " cause changes in thoughts and behaviors.  Giving support    Depressed people often may not want to ask for help. When they do, they may be ignored. Or they may get the wrong treatment. You can help by showing parents and older friends love and support. If they seem depressed, don t lecture the person or ignore the symptoms. Don't discount the symptoms as a  normal  part of aging. They are not. Get involved, listen, and show interest and support.   Help them understand that depression is a treatable illness. Tell them you can help them find the right treatment. Offer to go to their healthcare provider's appointment with them for support when the symptoms are discussed. With their approval, contact a local mental health center, social service agency, or hospital about services.   Helping at healthcare visits  You can be an advocate for them at healthcare appointments. Many older adults have chronic illnesses. Many of these can cause symptoms of depression. Medicine side effects can change thoughts and behaviors.   You can help make sure that the healthcare provider looks at all of these factors. They should refer your family member or friend to a mental healthcare provider when needed. In some cases, untreated depression can lead to a misdiagnosis. A person may be diagnosed with a brain disorder such as dementia. If the healthcare provider does not take the issue of depression seriously, help your family member or friend find another provider.   Asking about self-harm thoughts  If you think an older person you care about could be suicidal, ask,  Have you thought about suicide?  Most people will tell you the truth. If they say yes, they may already have a plan for how and when they will attempt it. Find out as much as you can. The more detailed the plan, and the easier it is to carry out, the more danger the person is in right now. Tell the person you are there for them and you do not want them to get harmed. Don't  wait to get help for the person. Call the person's healthcare provider, local hospital, or emergency services.   Call 988 in a crisis   Never leave the person alone. A person who is actively suicidal needs crisis care right away. They need constant supervision. Never leave the person out of sight. Call 988 Tell the crisis counselor you need help for a person who is thinking about suicide. The counselor will help you get the right level of crisis help. You may be advised to take the person to the nearest emergency room.   The National Suicide Prevention Lifeline is available at 988, or 677-941-WTYV (001-020-6594), or www.suicideHelp.comline.org. When you call or text 988, you will be connected to trained counselors who are part of the National Suicide Prevention Lifeline network. An online chat option is also available. Lifeline is free and available 24/7.   To learn more    National Suicide Prevention Lifeline at www.suicideQyuki.org  or 499-297-RTTS (800-569-5544)    National [place] on Mental Illness at www.sumi.org  or 172-220-ZWZW (221-087-0087)    Mental Health [facility] at www.[facility].org  or 312-414-0789    National [place] of Mental Health at www.nimh.nih.gov  or 004-660-9207    [employer] last reviewed this educational content on 7/1/2022 2000-2022 [employer]. All rights reserved. This information is not intended as a substitute for professional medical care. Always follow your healthcare professional's instructions.          Preventing Falls at Home  A person can fall for many reasons. Older adults may fall because reaction time slows down as we age. Your muscles and joints may get stiff, weak, or less flexible because of illness, medicines, or a physical condition.   Other health problems that make falls more likely include:     Arthritis    Dizziness or lightheadedness when you stand up (orthostatic hypotension)    History of a stroke    Dizziness    Anemia    Certain  medicines taken for mental illness or to control blood pressure.    Problems with balance or gait    Bladder or urinary problems    History of falling    Changes in vision (vision impairment)    Changes in thinking skills and memory (cognitive impairment)  Injuries from a fall can include serious injuries such as broken bones, dislocated joints, internal bleeding and cuts. Injuries like these can limit your independence.   Prevention tips  To help prevent falls and fall-related injuries, follow the tips below.    Floors  To make floors safer:     Put nonskid pads under area rugs.    Remove small rugs.    Replace worn floor coverings.    Tack carpets firmly to each step on carpeted stairs. Put nonskid strips on the edges of uncarpeted stairs.    Keep floors and stairs free of clutter and cords.    Arrange furniture so there are clear pathways.    Clean up any spills right away.  Bathrooms    To make bathrooms safer:     Install grab bars in the tub or shower.    Apply nonskid strips or put a nonskid rubber mat in the tub or shower.    Sit on a bath chair to bathe.    Use bathmats with nonskid backing.  Lighting  To improve visibility in your home:      Keep a flashlight in each room. Or put a lamp next to the bed within easy reach.    Put nightlights in the bedrooms, hallways, kitchen, and bathrooms.    Make sure all stairways have good lighting.    Take your time when going up and down stairs.    Put handrails on both sides of stairs and in walkways for more support. To prevent injury to your wrist or arm, don t use handrails to pull yourself up.    Install grab bars to pull yourself up.    Move or rearrange items that you use often. This will make them easier to find or reach.    Look at your home to find any safety hazards. Especially look at doorways, walkways, and the driveway. Remove or repair any safety problems that you find.  Other changes to make    Look around to find any safety hazards. Look closely at  "doorways, walkways, and the driveway. Remove or repair any safety problems that you find.    Wear shoes that fit well.    Take your time when going up and down stairs.    Put handrails on both sides of stairs and in walkways for more support. To prevent injury to your wrist or arm, don t use handrails to pull yourself up.    Install grab bars wherever needed to pull yourself up.    Arrange items that you use often. This will make them easier to find or reach.    [facility] last reviewed this educational content on 3/1/2020    2950-4811 [employer]. All rights reserved. This information is not intended as a substitute for professional medical care. Always follow your healthcare professional's instructions.             Return in about 6 months (around 11/2/2023), or if symptoms worsen or fail to improve, for If symptoms persist, Follow up of last visit.    Beatrice Stephens MD  [facility] [place]      Patient has been advised of split billing requirements and indicates understanding: Yes      COUNSELING:  Reviewed preventive health counseling, as reflected in patient instructions  Special attention given to:       Regular exercise       Healthy diet/nutrition       Vision screening       Hearing screening       Dental care       Bladder control       Fall risk prevention       Immunizations    Vaccinated for: Covid-19             Alcohol Use        Prostate cancer screening      BMI:   Estimated body mass index is 31.62 kg/m  as calculated from the following:    Height as of this encounter: 1.673 m (5' 5.85\").    Weight as of this encounter: 88.5 kg (195 lb).   Weight management plan: Discussed healthy diet and exercise guidelines      He reports that he quit smoking about 40 years ago. His smoking use included cigarettes. He started smoking about 65 years ago. He has a 28.00 pack-year smoking history. He has quit using smokeless tobacco.      Appropriate preventive services were " discussed with this patient, including applicable screening as appropriate for cardiovascular disease, diabetes, osteopenia/osteoporosis, and glaucoma.  As appropriate for age/gender, discussed screening for colorectal cancer, prostate cancer, breast cancer, and cervical cancer. Checklist reviewing preventive services available has been given to the patient.    Reviewed patients plan of care and provided an AVS. The Basic Care Plan (routine screening as documented in Health Maintenance) for Raymon meets the Care Plan requirement. This Care Plan has been established and reviewed with the Patient and spouse.          Beatrice Stephens MD  [facility] [place]    Identified Health Risks:    I have reviewed Opioid Use Disorder and Substance Use Disorder risk factors and made any needed referrals.     Answers for HPI/ROS submitted by the patient on 5/2/2023  If you checked off any problems, how difficult have these problems made it for you to do your work, take care of things at home, or get along with other people?: Very difficult  PHQ9 TOTAL SCORE: 14

## 2023-05-02 NOTE — PROGRESS NOTES
"    The patient was provided with suggestions to help him develop a healthy physical lifestyle.  The patient reports that he drinks more than one alcoholic drink per day and sometimes engages in binge or excessive drinking. He was counseled and given information about possible harmful effects of excessive alcohol intake as well as where to get help for alcohol problems.  The patient was counseled and encouraged to consider modifying their diet and eating habits. He was provided with information on recommended healthy diet options.  The patient was provided with written information regarding signs of hearing loss.  Information on urinary incontinence and treatment options given to patient.  The patient was provided with suggestions to help him develop a healthy emotional lifestyle.  The patient s PHQ-9 score is consistent with moderate depression. He was provided with information regarding depression and was advised to schedule a follow up appointment in weeks to further address this issue.  He is at risk for falling and has been provided with information to reduce the risk of falling at home.  Answers for HPI/ROS submitted by the patient on 5/2/2023  If you checked off any problems, how difficult have these problems made it for you to do your work, take care of things at home, or get along with other people?: Very difficult  PHQ9 TOTAL SCORE: 14  In general, how would you rate your overall physical health?: poor  Frequency of exercise:: 2-3 days/week  Do you usually eat at least 4 servings of fruit and vegetables a day, include whole grains & fiber, and avoid regularly eating high fat or \"junk\" foods? : No  Taking medications regularly:: Yes  Medication side effects:: None  Activities of Daily Living: no assistance needed  Home safety: no safety concerns identified  Hearing Impairment:: difficulty following a conversation in a noisy restaurant or crowded room, feel that people are mumbling or not speaking clearly, " need to ask people to speak up or repeat themselves  In the past 6 months, have you been bothered by leaking of urine?: Yes  abdominal pain: No  Blood in stool: No  Blood in urine: No  chest pain: No  chills: No  congestion: No  constipation: No  cough: No  diarrhea: Yes  dizziness: No  ear pain: No  eye pain: No  nervous/anxious: No  fever: No  frequency: Yes  genital sores: No  headaches: No  hearing loss: Yes  heartburn: No  arthralgias: Yes  joint swelling: No  peripheral edema: No  mood changes: No  myalgias: Yes  nausea: No  dysuria: No  palpitations: No  Skin sensation changes: No  sore throat: No  urgency: Yes  rash: No  shortness of breath: No  visual disturbance: No  weakness: No  impotence: Yes  In general, how would you rate your overall mental or emotional health?: poor  Additional concerns today:: No  Duration of exercise:: N/A

## 2023-05-22 ENCOUNTER — THERAPY VISIT (OUTPATIENT)
Dept: PHYSICAL THERAPY | Facility: CLINIC | Age: 78
End: 2023-05-22
Attending: INTERNAL MEDICINE
Payer: COMMERCIAL

## 2023-05-22 DIAGNOSIS — M17.0 PRIMARY OSTEOARTHRITIS OF BOTH KNEES: ICD-10-CM

## 2023-05-22 PROCEDURE — 97110 THERAPEUTIC EXERCISES: CPT | Mod: GP

## 2023-05-22 PROCEDURE — 97161 PT EVAL LOW COMPLEX 20 MIN: CPT | Mod: GP

## 2023-05-22 NOTE — PROGRESS NOTES
"PHYSICAL THERAPY EVALUATION  Type of Visit: Evaluation    See electronic medical record for Abuse and Falls Screening details.    Subjective      Presenting condition or subjective complaint: Leg weakness - bilateral knee pain and neuropathy  Date of onset: 05/22/23    Relevant medical history: Bladder or bowel problems; Overweight; polyneuropathy both legs, alcoholic liver damage/alcoholic cirrhosis, hypertension, PE, RLS, CKD, anemia, depression   Dates & types of surgery: Notes history of 5 prior back surgergies     Pt presents for new patient evaluation of chronic bilateral knee pain. Reports \"both knees are shot.\" Has done therapy in the past but it did not help. Pt is planning to have TKA on both sides, right one first, but Orthopedics recommended he complete PT for strengthening first. Pt has neuropathy in both legs which affects his ability to walk and has caused him a lot of imbalance/problems with falling. Presents in wheelchair today with a SEC, sometimes uses power scooter but did not bring it today. Has a walker as well but does not use it often, states he essentially leaves the house once a day to go to the [facility], other than this does not walk much, wife assists with most things at home.     Prior diagnostic imaging/testing results: MRI     MRI R knee  IMPRESSION:   1. Total loss of articular cartilage over the median ridge and the  medial facet of the patella and total loss of articular cartilage over  the medial aspect of the femoral trochlea consistent with severe  degenerative changes in the patellofemoral joint.  2. No clear evidence of a tear of the menisci. Mild chondromalacia as  noted above medial femoral condyle and lateral femoral condyle.  3. Multiloculated presumed ganglion cyst superficial and deep to the  capsule posteriorly. This is posterior to the posterior horn of the  medial meniscus and posterior cruciate ligament.  4. Slightly attenuated appearance of the anterior cruciate " ligament  which may represent change from remote injury. A few fibers are  thought to be intact.  5. Small knee effusion with evidence of synovitis.    Prior therapy history for the same diagnosis, illness or injury: Yes PT in the past; did not find it helpful    Prior Level of Function   Transfers: Assistive equipment and person  Ambulation: Assistive equipment and person  ADL: Assistive equipment and person    Living Environment  Social support: With a significant other or spouse   Type of home: House   Stairs to enter the home: Yes 3 Is there a railing: Yes   Ramp:     Stairs inside the home: Yes 13 Is there a railing: Yes   Help at home: Self Cares (home health aide/personal care attendant, family, etc); Home management tasks (cooking, cleaning)  Equipment owned: Straight Cane; Walker; Power wheelchair or scooter; Grab bars; Bath bench     Employment: Not Applicable    Hobbies/Interests:  Pt declined to answer     Patient goals for therapy: Walking, standing, strengthen legs to get surgery      Objective   KNEE EVALUATION  PAIN: Pain Level at Rest: 5/10  Pain Level with Use: 10/10  Pain Location: knee  Pain Quality: Aching, Sharp and Shooting  Pain Frequency: constant  Pain is Worst: daytime  Pain is Exacerbated By: Walking, stairs, STS transfers   Pain is Relieved By: rest  INTEGUMENTARY (edema, incisions): WNL  POSTURE: Sitting Posture: Rounded shoulders, Forward head, Lordosis decreased, Thoracic kyphosis increased  GAIT:   Assistive Device(s): Cane (single end), Wheelchair (manual)  Gait Deviations: pt unable to take more than a few steps in room to complete transfer to mat table; needs assist of one person and cueing  Balance/Proprioception: Pt very unsteady on feet with standing/taking a few small steps/completing transfer. Unable to stand independently for more than 1 minute.   ROM:   (Degrees) Left AROM Left PROM  Right AROM Right PROM   Knee Flexion 116*  118*    Knee Extension -5*  -1*    Strength:    Pain: - none + mild ++ moderate +++ severe  Strength Scale: 0-5/5 Left Right   Knee Flexion 4- 4-   Knee Extension 4 4        Hip flexion: 4+/5 B  Hip ABD: 3+/5 B  Hip ext: pt unable to lie prone to fully assess but unable to complete glut bridge due to weakness  Ankle DF: 4-/5 B  Ankle PF: 4-/5 B   FLEXIBILITY: Tightness of both hamstrings  Special Tests: Special testing not assessed due to recent MRI  FUNCTIONAL TESTS: Pt needing mod A x 1 with STS, stand pivot and supine<>sit transfers today  PALPATION: Mild TTP To medial and lateral joint lines both knees; sensation intact to light touch of both lower legs throughout    Assessment & Plan   CLINICAL IMPRESSIONS   Medical Diagnosis: Primary osteoarthritis of both knees (M17.0)    Treatment Diagnosis: Bilateral knee pain   Impression/Assessment: Patient is a 77 year old male with bilateral knee pain complaints.  The following significant findings have been identified: Pain, Decreased ROM/flexibility, Decreased joint mobility, Decreased strength, Impaired balance, Decreased proprioception, Impaired sensation, Impaired gait, Impaired muscle performance, Decreased activity tolerance, Impaired posture and Instability. These impairments interfere with their ability to perform self care tasks, household chores, household mobility and community mobility as compared to previous level of function.     Clinical Decision Making (Complexity):   Clinical Presentation: Stable/Uncomplicated  Clinical Presentation Rationale: based on medical and personal factors listed in PT evaluation  Clinical Decision Making (Complexity): Low complexity    PLAN OF CARE  Treatment Interventions:  Modalities: Biofeedback, E-stim  Interventions: Gait Training, Manual Therapy, Neuromuscular Re-education, Therapeutic Activity, Therapeutic Exercise, Self-Care/Home Management    Long Term Goals     PT Goal 1  Goal Identifier: STG  Goal Description: Pt will be able to perform STS transfers with least  restrictive assistive devices and < 5/10 knee pain.  Rationale: to maximize safety and independence with performance of ADLs and functional tasks  Target Date: 06/26/23  PT Goal 2  Goal Identifier: STG  Goal Description: Pt will be able to stand with least restrictive assistive device for 10 min or greater.  Rationale: to maximize safety and independence with performance of ADLs and functional tasks  Target Date: 06/26/23  PT Goal 3  Goal Identifier: LTG  Goal Description: Pt will be able to ambulate 50 feet or greater with least restrictive assistive device.  Rationale: to maximize safety and independence within the home  Target Date: 07/31/23  PT Goal 4  Goal Identifier: LTG  Goal Description: Pt will be independent with HEP for self-management of symptoms and to maximize strength prior to TKA.  Target Date: 07/31/23      Frequency of Treatment: 1x/week  Duration of Treatment: 10 weeks    Education Assessment:   Learner/Method: Patient;Listening;Demonstration;Pictures/Video;No Barriers to Learning;Reading    Risks and benefits of evaluation/treatment have been explained.   Patient/Family/caregiver agrees with Plan of Care.     Evaluation Time:     PT Eval, Low Complexity Minutes (95568): 10  Signing Clinician: Karma Gr, PT    [facility]                                                                                   OUTPATIENT PHYSICAL THERAPY      PLAN OF TREATMENT FOR OUTPATIENT REHABILITATION   Patient's Last Name, First Name, Raymon Foster YOB: 1945   Provider's Name   [facility]   Medical Record No.  [id number]     Onset Date: 05/22/23  Start of Care Date: 05/22/23     Medical Diagnosis:  Primary osteoarthritis of both knees (M17.0)      PT Treatment Diagnosis:  Bilateral knee pain Plan of Treatment  Frequency/Duration: 1x/week/ 10 weeks    Certification date from 05/22/23 to 07/31/23         See note for plan of  treatment details and functional goals     Karma Gr, PT                         I CERTIFY THE NEED FOR THESE SERVICES FURNISHED UNDER        THIS PLAN OF TREATMENT AND WHILE UNDER MY CARE     (Physician attestation of this document indicates review and certification of the therapy plan).                Referring Provider:  Beatrice Stephens    Initial Assessment  See Epic Evaluation- Start of Care Date: 05/22/23

## 2023-05-31 ENCOUNTER — THERAPY VISIT (OUTPATIENT)
Dept: PHYSICAL THERAPY | Facility: CLINIC | Age: 78
End: 2023-05-31
Attending: INTERNAL MEDICINE
Payer: COMMERCIAL

## 2023-05-31 DIAGNOSIS — M17.0 PRIMARY OSTEOARTHRITIS OF BOTH KNEES: Primary | ICD-10-CM

## 2023-05-31 PROCEDURE — 97110 THERAPEUTIC EXERCISES: CPT | Mod: GP

## 2023-06-07 ENCOUNTER — THERAPY VISIT (OUTPATIENT)
Dept: PHYSICAL THERAPY | Facility: CLINIC | Age: 78
End: 2023-06-07
Attending: INTERNAL MEDICINE
Payer: COMMERCIAL

## 2023-06-07 DIAGNOSIS — M17.0 PRIMARY OSTEOARTHRITIS OF BOTH KNEES: Primary | ICD-10-CM

## 2023-06-07 PROCEDURE — 97110 THERAPEUTIC EXERCISES: CPT | Mod: GP | Performed by: PHYSICAL THERAPIST

## 2023-06-19 ENCOUNTER — THERAPY VISIT (OUTPATIENT)
Dept: PHYSICAL THERAPY | Facility: CLINIC | Age: 78
End: 2023-06-19
Attending: INTERNAL MEDICINE
Payer: COMMERCIAL

## 2023-06-19 DIAGNOSIS — M17.0 PRIMARY OSTEOARTHRITIS OF BOTH KNEES: Primary | ICD-10-CM

## 2023-06-19 PROCEDURE — 97110 THERAPEUTIC EXERCISES: CPT | Mod: GP

## 2023-06-21 DIAGNOSIS — M10.9 GOUT INVOLVING TOE, UNSPECIFIED CAUSE, UNSPECIFIED CHRONICITY, UNSPECIFIED LATERALITY: ICD-10-CM

## 2023-06-21 RX ORDER — ALLOPURINOL 300 MG/1
TABLET ORAL
Qty: 90 TABLET | Refills: 0 | Status: SHIPPED | OUTPATIENT
Start: 2023-06-21 | End: 2024-07-05

## 2023-06-26 ENCOUNTER — THERAPY VISIT (OUTPATIENT)
Dept: PHYSICAL THERAPY | Facility: CLINIC | Age: 78
End: 2023-06-26
Attending: INTERNAL MEDICINE
Payer: COMMERCIAL

## 2023-06-26 DIAGNOSIS — M17.0 PRIMARY OSTEOARTHRITIS OF BOTH KNEES: Primary | ICD-10-CM

## 2023-06-26 PROCEDURE — 97110 THERAPEUTIC EXERCISES: CPT | Mod: GP

## 2023-07-06 DIAGNOSIS — D69.3 IDIOPATHIC THROMBOCYTOPENIC PURPURA (H): ICD-10-CM

## 2023-07-06 RX ORDER — ELTROMBOPAG OLAMINE 75 MG/1
TABLET, FILM COATED ORAL
Qty: 30 TABLET | Refills: 11 | Status: SHIPPED | OUTPATIENT
Start: 2023-07-06 | End: 2024-07-19

## 2023-07-06 NOTE — TELEPHONE ENCOUNTER
Medication: Promacta 75 mg tablet    Last prescribing provider:     Last clinic visit date: 04/20/23 w/    Any missed appointments or no-shows since last clinic visit?: No    Recommendations for requested medication (if none, N/A): Copied from last OV note: Raymon's baseline platelet count has been in the 70,000 to 100,000 range over the last several years.  He remains on Promacta 75 mg daily.      Next clinic visit date: 09/28/23 w/    Any other pertinent information (if none, N/A): N/A    Pended and Routed to Provider.

## 2023-07-10 ENCOUNTER — THERAPY VISIT (OUTPATIENT)
Dept: PHYSICAL THERAPY | Facility: CLINIC | Age: 78
End: 2023-07-10
Attending: INTERNAL MEDICINE
Payer: COMMERCIAL

## 2023-07-10 DIAGNOSIS — M17.0 PRIMARY OSTEOARTHRITIS OF BOTH KNEES: Primary | ICD-10-CM

## 2023-07-10 DIAGNOSIS — Z00.00 ENCOUNTER FOR MEDICARE ANNUAL WELLNESS EXAM: ICD-10-CM

## 2023-07-10 PROCEDURE — 97110 THERAPEUTIC EXERCISES: CPT | Mod: GP

## 2023-07-10 RX ORDER — METOPROLOL SUCCINATE 50 MG/1
TABLET, EXTENDED RELEASE ORAL
Qty: 90 TABLET | Refills: 0 | Status: SHIPPED | OUTPATIENT
Start: 2023-07-10

## 2023-07-12 ENCOUNTER — OFFICE VISIT (OUTPATIENT)
Dept: FAMILY MEDICINE | Facility: CLINIC | Age: 78
End: 2023-07-12
Payer: COMMERCIAL

## 2023-07-12 VITALS
HEIGHT: 66 IN | TEMPERATURE: 98 F | OXYGEN SATURATION: 95 % | RESPIRATION RATE: 20 BRPM | HEART RATE: 75 BPM | BODY MASS INDEX: 29.25 KG/M2 | WEIGHT: 182 LBS | SYSTOLIC BLOOD PRESSURE: 110 MMHG | DIASTOLIC BLOOD PRESSURE: 78 MMHG

## 2023-07-12 DIAGNOSIS — R41.3 MEMORY LOSS: ICD-10-CM

## 2023-07-12 DIAGNOSIS — K70.9 ALCOHOLIC LIVER DISEASE (H): Primary | ICD-10-CM

## 2023-07-12 LAB
ALBUMIN SERPL BCG-MCNC: 3.9 G/DL (ref 3.5–5.2)
ALP SERPL-CCNC: 98 U/L (ref 40–129)
ALT SERPL W P-5'-P-CCNC: 26 U/L (ref 0–70)
ANION GAP SERPL CALCULATED.3IONS-SCNC: 12 MMOL/L (ref 7–15)
AST SERPL W P-5'-P-CCNC: 41 U/L (ref 0–45)
BILIRUB SERPL-MCNC: 0.6 MG/DL
BUN SERPL-MCNC: 6.4 MG/DL (ref 8–23)
CALCIUM SERPL-MCNC: 9.7 MG/DL (ref 8.8–10.2)
CHLORIDE SERPL-SCNC: 106 MMOL/L (ref 98–107)
CREAT SERPL-MCNC: 0.85 MG/DL (ref 0.67–1.17)
DEPRECATED HCO3 PLAS-SCNC: 26 MMOL/L (ref 22–29)
ERYTHROCYTE [DISTWIDTH] IN BLOOD BY AUTOMATED COUNT: 18.8 % (ref 10–15)
GFR SERPL CREATININE-BSD FRML MDRD: 89 ML/MIN/1.73M2
GLUCOSE SERPL-MCNC: 106 MG/DL (ref 70–99)
HCT VFR BLD AUTO: 39 % (ref 40–53)
HGB BLD-MCNC: 13 G/DL (ref 13.3–17.7)
INR PPP: 1.08 (ref 0.85–1.15)
IRON SERPL-MCNC: 449 UG/DL (ref 61–157)
MCH RBC QN AUTO: 34.6 PG (ref 26.5–33)
MCHC RBC AUTO-ENTMCNC: 33.3 G/DL (ref 31.5–36.5)
MCV RBC AUTO: 104 FL (ref 78–100)
PLATELET # BLD AUTO: 123 10E3/UL (ref 150–450)
POTASSIUM SERPL-SCNC: 4.5 MMOL/L (ref 3.4–5.3)
PROT SERPL-MCNC: 7.1 G/DL (ref 6.4–8.3)
RBC # BLD AUTO: 3.76 10E6/UL (ref 4.4–5.9)
SODIUM SERPL-SCNC: 144 MMOL/L (ref 136–145)
TSH SERPL DL<=0.005 MIU/L-ACNC: 1.77 UIU/ML (ref 0.3–4.2)
VIT B12 SERPL-MCNC: 897 PG/ML (ref 232–1245)
WBC # BLD AUTO: 9.5 10E3/UL (ref 4–11)

## 2023-07-12 PROCEDURE — 83540 ASSAY OF IRON: CPT | Performed by: NURSE PRACTITIONER

## 2023-07-12 PROCEDURE — 99214 OFFICE O/P EST MOD 30 MIN: CPT | Performed by: NURSE PRACTITIONER

## 2023-07-12 PROCEDURE — 85027 COMPLETE CBC AUTOMATED: CPT | Performed by: NURSE PRACTITIONER

## 2023-07-12 PROCEDURE — 82607 VITAMIN B-12: CPT | Performed by: NURSE PRACTITIONER

## 2023-07-12 PROCEDURE — 36415 COLL VENOUS BLD VENIPUNCTURE: CPT | Performed by: NURSE PRACTITIONER

## 2023-07-12 PROCEDURE — 85610 PROTHROMBIN TIME: CPT | Performed by: NURSE PRACTITIONER

## 2023-07-12 PROCEDURE — 84443 ASSAY THYROID STIM HORMONE: CPT | Performed by: NURSE PRACTITIONER

## 2023-07-12 PROCEDURE — 80053 COMPREHEN METABOLIC PANEL: CPT | Performed by: NURSE PRACTITIONER

## 2023-07-12 ASSESSMENT — PAIN SCALES - GENERAL: PAINLEVEL: MODERATE PAIN (4)

## 2023-07-12 ASSESSMENT — PATIENT HEALTH QUESTIONNAIRE - PHQ9
SUM OF ALL RESPONSES TO PHQ QUESTIONS 1-9: 22
SUM OF ALL RESPONSES TO PHQ QUESTIONS 1-9: 22
10. IF YOU CHECKED OFF ANY PROBLEMS, HOW DIFFICULT HAVE THESE PROBLEMS MADE IT FOR YOU TO DO YOUR WORK, TAKE CARE OF THINGS AT HOME, OR GET ALONG WITH OTHER PEOPLE: VERY DIFFICULT

## 2023-07-12 ASSESSMENT — ENCOUNTER SYMPTOMS: FATIGUE: 1

## 2023-07-12 NOTE — PROGRESS NOTES
Assessment & Plan     Alcoholic liver disease (H)  It is possible that patient's symptoms are related to his alcoholic liver disease and his ongoing alcohol consumption.  Discussed with patient that there is no safe amount of alcohol that he can consume at this time.  Strongly encouraged patient to quit.  Offered chemical dependency, he declined.  Recommend lab work today to further evaluate liver function.  Patient needs liver ultrasound for HCC surveillance.  May need consultation with hepatology.  - CBC with platelets; Future  - Comprehensive metabolic panel (BMP + Alb, Alk Phos, ALT, AST, Total. Bili, TP); Future  - INR; Future  - Iron; Future  - US Abdomen Limited; Future  - Vitamin B12; Future  - TSH with free T4 reflex; Future  - CBC with platelets  - Comprehensive metabolic panel (BMP + Alb, Alk Phos, ALT, AST, Total. Bili, TP)  - INR  - Iron  - Vitamin B12  - TSH with free T4 reflex    Memory loss  Etiology unclear.  Concern for dementia versus alcohol related.  Head CT was obtained during last hospitalization 2 months ago.  We will add additional lab work for vitamin B12 deficiencies and thyroid disease.  Also recommend neuropsych eval.  - CBC with platelets; Future  - Comprehensive metabolic panel (BMP + Alb, Alk Phos, ALT, AST, Total. Bili, TP); Future  - INR; Future  - Iron; Future  - US Abdomen Limited; Future  - Vitamin B12; Future  - TSH with free T4 reflex; Future  - Adult Neuropsychology Referral; Future  - CBC with platelets  - Comprehensive metabolic panel (BMP + Alb, Alk Phos, ALT, AST, Total. Bili, TP)  - INR  - Iron  - Vitamin B12  - TSH with free T4 reflex      The risks, benefits and treatment options of prescribed medications or other treatments have been discussed with the patient. The patient verbalized their understanding and should call or follow up if no improvement or if they develop further problems.    SCOTTIE Dumont [facility]  TIM Ennis is a 77 year old, presenting for the following health issues:  Fatigue (Sleeping a lot and decreased appetite.) and Patient Request (Is looking for new PCP; discussed this provider has a closed practice, would be provider decision.)        7/12/2023    11:07 AM   Additional Questions   Roomed by Celsa MARTINEZ   Accompanied by spouse- Mirela  Silverio medication- nervive, nerve health dietary supplement     Fatigue  Associated symptoms include fatigue.   History of Present Illness       Reason for visit:  Not eating sleeping to much  no energy  Symptom onset:  More than a month  Symptoms include:  Sleeps all day; weight loss; not eating;  wife reporting ever since he had covid he hasn't been feeling good.  Symptom intensity:  Severe  Symptom progression:  Worsening    He eats 0-1 servings of fruits and vegetables daily.He consumes 4 sweetened beverage(s) daily.He exercises with enough effort to increase his heart rate 9 or less minutes per day.  He exercises with enough effort to increase his heart rate 3 or less days per week.   He is taking medications regularly.    Today's PHQ-9         PHQ-9 Total Score: 22    PHQ-9 Q9 Thoughts of better off dead/self-harm past 2 weeks :   Not at all    How difficult have these problems made it for you to do your work, take care of things at home, or get along with other people: Very difficult     Patient is a poor historian.  Wife is with today in clinic and answers questions when patient cannot.  He has had declining health over the last 2 years.  She correlates this with his COVID infection.  He was recently hospitalized after a fall at home.  He is chronic alcoholism and continues to drink.  Has been diagnosed with alcoholic liver disease.  Has not seen a hepatologist.  Patient continues to drink at a bar every day and then again at home in the evenings.  It was difficult to quantify how much he is drinking on a daily basis as he gave different  answers than his wife did.  He does not believe his drinking is a problem and does not want to quit.  He reports poor appetite and weight loss.  States that food does not taste good anymore.  He reportedly sleeps well at night and then sleeps throughout the day.  He complains of pain in both legs from neuropathy.  His previous PCP had prescribed him gabapentin 3 times daily.  He is only taking gabapentin 1 time per day.  Wife states this is because he sleeps all the time and does not remember to wake up and take his pills.  I noted that on his wellness exam in May he had cognitive impairment on his cognitive screening.  Patient denies any memory loss or confusion.  However wife states that she has noticed a decline in his cognition.  He had a head CT during the recent hospitalization that showed age-related changes.  They have not had any work-up for his memory loss.  It does not appear he has had any imaging for his liver disease either.      Wt Readings from Last 4 Encounters:   07/12/23 82.6 kg (182 lb)   05/02/23 88.5 kg (195 lb)   04/20/23 88.5 kg (195 lb)   12/02/22 99 kg (218 lb 3.2 oz)         Six Item Cognitive Impairment Test   (6CIT):      What year is it?                               Correct - 0 points    What month is it?                               Correct - 0 points      Give the patient an address to remember with five components:   Lucian Borjas ( first and last name - 2 components)   [address]  (number and name of street - 2 components)   [place] ( city - 1 component)      About what time is it (within the hour)? Correct - 0 points    Count backwards from 20 to 1:   Correct - 0 points    Say the months of the year in reverse: One error - 2 points    Repeat the address phrase:   All wrong - 10 points    Total 6CIT Score:      12/28    Interpretation: The 6CIT uses an inverse score and questions are weighted to produce a total out of 28. Scores of 0-7 are considered normal and 8 or more  "significant.    Advantages The test has high sensitivity without compromising specificity even in mild dementia. It is easy to translate linguistically and culturally.  Disadvantages The main disadvantage is in the scoring and weighting of the test, which is initially confusing, however computer models have simplified this greatly.    Probability Statistics: At the 7/8 cut off: Overall figures sensitivity 90% specificity 100%, in mild dementia sensitivity = 78% , specificity = 100%    Copyright 2000 [facility], [facility]. Courtesy of Dr. Douglas Turner                Review of Systems   Constitutional: Positive for fatigue.      Constitutional, HEENT, cardiovascular, pulmonary, GI, , musculoskeletal, neuro, skin, endocrine and psych systems are negative, except as otherwise noted.      Objective    /78 (BP Location: Right arm, Cuff Size: Adult Large)   Pulse 75   Temp 98  F (36.7  C) (Tympanic)   Resp 20   Ht 1.676 m (5' 6\")   Wt 82.6 kg (182 lb)   SpO2 95%   BMI 29.38 kg/m    Body mass index is 29.38 kg/m .  Physical Exam   GENERAL: healthy, alert and no distress  NECK: no adenopathy, no asymmetry, masses, or scars and thyroid normal to palpation  RESP: lungs clear to auscultation - no rales, rhonchi or wheezes  CV: regular rate and rhythm, normal S1 S2, no S3 or S4, no murmur, click or rub, no peripheral edema and peripheral pulses strong  ABDOMEN: soft, nontender, no hepatosplenomegaly, no masses and bowel sounds normal  MS: no gross musculoskeletal defects noted, no edema                    "

## 2023-07-17 ENCOUNTER — THERAPY VISIT (OUTPATIENT)
Dept: PHYSICAL THERAPY | Facility: CLINIC | Age: 78
End: 2023-07-17
Attending: INTERNAL MEDICINE
Payer: COMMERCIAL

## 2023-07-17 DIAGNOSIS — M17.0 PRIMARY OSTEOARTHRITIS OF BOTH KNEES: Primary | ICD-10-CM

## 2023-07-17 PROCEDURE — 97110 THERAPEUTIC EXERCISES: CPT | Mod: GP

## 2023-07-18 ENCOUNTER — HOSPITAL ENCOUNTER (OUTPATIENT)
Dept: ULTRASOUND IMAGING | Facility: CLINIC | Age: 78
Discharge: HOME | End: 2023-07-18
Attending: NURSE PRACTITIONER | Admitting: NURSE PRACTITIONER
Payer: COMMERCIAL

## 2023-07-18 DIAGNOSIS — K70.9 ALCOHOLIC LIVER DISEASE (H): ICD-10-CM

## 2023-07-18 DIAGNOSIS — R41.3 MEMORY LOSS: ICD-10-CM

## 2023-07-18 PROCEDURE — 76705 ECHO EXAM OF ABDOMEN: CPT

## 2023-07-19 DIAGNOSIS — K76.89 LIVER CYST: Primary | ICD-10-CM

## 2023-07-25 ENCOUNTER — HOSPITAL ENCOUNTER (OUTPATIENT)
Dept: MRI IMAGING | Facility: CLINIC | Age: 78
Discharge: HOME | End: 2023-07-25
Attending: NURSE PRACTITIONER | Admitting: NURSE PRACTITIONER
Payer: COMMERCIAL

## 2023-07-25 DIAGNOSIS — K76.89 LIVER CYST: ICD-10-CM

## 2023-07-25 PROCEDURE — 255N000002 HC RX 255 OP 636: Performed by: NURSE PRACTITIONER

## 2023-07-25 PROCEDURE — 74183 MRI ABD W/O CNTR FLWD CNTR: CPT

## 2023-07-25 PROCEDURE — 258N000003 HC RX IP 258 OP 636: Performed by: NURSE PRACTITIONER

## 2023-07-25 PROCEDURE — A9585 GADOBUTROL INJECTION: HCPCS | Performed by: NURSE PRACTITIONER

## 2023-07-25 RX ORDER — GADOBUTROL 604.72 MG/ML
8 INJECTION INTRAVENOUS ONCE
Status: COMPLETED | OUTPATIENT
Start: 2023-07-25 | End: 2023-07-25

## 2023-07-25 RX ADMIN — GADOBUTROL 8 ML: 604.72 INJECTION INTRAVENOUS at 16:04

## 2023-07-25 RX ADMIN — SODIUM CHLORIDE 50 ML: 9 INJECTION, SOLUTION INTRAVENOUS at 16:05

## 2023-07-28 ENCOUNTER — THERAPY VISIT (OUTPATIENT)
Dept: PHYSICAL THERAPY | Facility: CLINIC | Age: 78
End: 2023-07-28
Attending: INTERNAL MEDICINE
Payer: COMMERCIAL

## 2023-07-28 DIAGNOSIS — M17.0 PRIMARY OSTEOARTHRITIS OF BOTH KNEES: Primary | ICD-10-CM

## 2023-07-28 PROCEDURE — 97110 THERAPEUTIC EXERCISES: CPT | Mod: GP

## 2023-07-28 NOTE — PROGRESS NOTES
"   07/28/23 0500   Appointment Info   Signing clinician's name / credentials Karma Gr, PT, DPT   Visits Used 8/10 planned   Medical Diagnosis Primary osteoarthritis of both knees (M17.0)   PT Tx Diagnosis Bilateral knee pain   Quick Adds Certification   Progress Note/Certification   Start of Care Date 05/22/23   Onset of illness/injury or Date of Surgery 05/22/23   Therapy Frequency 1x/week   Predicted Duration 1 more week   Certification date from 07/28/23   Certification date to 08/04/23   Progress Note Due Date 08/04/23   Progress Note Completed Date 07/28/23   PT Goal 1   Goal Identifier STG   Goal Description Pt will be able to perform STS transfers with least restrictive assistive devices and < 5/10 knee pain.   Rationale to maximize safety and independence with performance of ADLs and functional tasks   Goal Progress Progressing - states it is variable, has balance issues with this as well   Target Date 08/04/23   PT Goal 2   Goal Identifier STG   Goal Description Pt will be able to stand with least restrictive assistive device for 10 min or greater.   Rationale to maximize safety and independence with performance of ADLs and functional tasks   Goal Progress 2-5 minutes 7/28/23   Target Date 08/04/23   PT Goal 3   Goal Identifier LTG   Goal Description Pt will be able to ambulate 500 feet or greater with least restrictive assistive device.   Rationale to maximize safety and independence within the home   Target Date 08/04/23   Goal Progress Progressing - 50 feet w/walker   PT Goal 4   Goal Identifier LTG   Goal Description Pt will be independent with HEP for self-management of symptoms and to maximize strength prior to TKA.   Target Date 08/04/23   Goal Progress Progressing - unclear compliance   Subjective Report   Subjective Report Pt reports things are \"terrible\" and he is in \"bad shape.\" Pt unsure if he has planned Ortho follow up but states he is open to suggestions. Does not necessarily feel " things are getting any better thus far.   Objective Measures   Objective Measures Objective Measure 1;Objective Measure 2;Objective Measure 3;Objective Measure 4   Objective Measure 1   Objective Measure TUG (4WW)   Details 45.40 sec   Objective Measure 2   Objective Measure 30 second STS (4WW and arm rests)   Details 2.5 reps   Objective Measure 3   Objective Measure Knee AROM   Details R: 0-122*; L 0-125*   Objective Measure 4   Objective Measure Knee strength   Details Knee extension 4/5 B (R pain); knee flexion 4+/5 B   Therapeutic Procedure/Exercise   Therapeutic Procedures: strength, endurance, ROM, flexibillity minutes (38472) 29   Ther Proc 1 - Details Recumbent bike seat 8 level 1.0 x 5 min. Supine SLR x 5 B, hooklying bridge x 10 B. STS from chair using arm rests to push x 3 reps in front of walker. Time spent updating and reviewing HEP, goals and plan of care for therapy, discussing plan going forward wtih patient and wife.   Skilled Intervention Strength ex for lower extremities, general conditioning   Patient Response/Progress Pt with heavy fatigue and moderate knee pain throughout   Plan   Home program PTRX - papers   Updates to plan of care Discussed progress to date w/pt at this time, recertification done today due to dates ending for plan of care. Pt does not feel he has made much progress with knee pain/general deconditioning thus far. Unclear participation in HEP as well. Discussed recommendation to see Orthopedics for knee pain for follow up for next steps, as well as PCP or Ortho for concerns related to low back. Pt has one visit pre scheduled for next week, will extend POC through that point and then likely d/c. Pt and wife in agreement with plan.   Plan for next session Likely d/c from therapy due to plateau in progress   Total Session Time   Timed Code Treatment Minutes 29   Total Treatment Time (sum of timed and untimed services) 29         [facility]                                                                                    OUTPATIENT PHYSICAL THERAPY    PLAN OF TREATMENT FOR OUTPATIENT REHABILITATION   Patient's Last Name, First Name, Raymon Foster YOB: 1945   Provider's Name   [facility]   Medical Record No.  [id number]     Onset Date: 05/22/23  Start of Care Date: 05/22/23     Medical Diagnosis:  Primary osteoarthritis of both knees (M17.0)      PT Treatment Diagnosis:  Bilateral knee pain Plan of Treatment  Frequency/Duration: 1x/week/ 1 more week    Certification date from 07/28/23 to 08/04/23         See note for plan of treatment details and functional goals     Karma Gr, PT                         I CERTIFY THE NEED FOR THESE SERVICES FURNISHED UNDER        THIS PLAN OF TREATMENT AND WHILE UNDER MY CARE     (Physician attestation of this document indicates review and certification of the therapy plan).              Referring Provider:  Beatrice Stephens    Initial Assessment  See Epic Evaluation- Start of Care Date: 05/22/23    PLAN  Continue therapy per current plan of care. Likely discharge next session.     Beginning/End Dates of Progress Note Reporting Period:  07/28/23 to 08/04/2023    Referring Provider:  Beatrice Stephens

## 2023-08-04 ENCOUNTER — THERAPY VISIT (OUTPATIENT)
Dept: PHYSICAL THERAPY | Facility: CLINIC | Age: 78
End: 2023-08-04
Attending: INTERNAL MEDICINE
Payer: COMMERCIAL

## 2023-08-04 DIAGNOSIS — M17.0 PRIMARY OSTEOARTHRITIS OF BOTH KNEES: Primary | ICD-10-CM

## 2023-08-04 PROCEDURE — 97110 THERAPEUTIC EXERCISES: CPT | Mod: GP

## 2023-08-04 NOTE — PROGRESS NOTES
"   08/04/23 0500   Appointment Info   Signing clinician's name / credentials Karma Gr, PT, DPT   Visits Used 9/10 planned   Medical Diagnosis Primary osteoarthritis of both knees (M17.0)   PT Tx Diagnosis Bilateral knee pain   Quick Adds Certification   Progress Note/Certification   Start of Care Date 05/22/23   Onset of illness/injury or Date of Surgery 05/22/23   Therapy Frequency 1x/week   Predicted Duration 1 more week   Certification date from 07/28/23   Certification date to 08/04/23   Progress Note Due Date 08/04/23   Progress Note Completed Date 07/28/23   PT Goal 1   Goal Identifier STG   Goal Description Pt will be able to perform STS transfers with least restrictive assistive devices and < 5/10 knee pain.   Rationale to maximize safety and independence with performance of ADLs and functional tasks   Goal Progress Not met   Target Date 08/04/23   PT Goal 2   Goal Identifier STG   Goal Description Pt will be able to stand with least restrictive assistive device for 10 min or greater.   Rationale to maximize safety and independence with performance of ADLs and functional tasks   Goal Progress 2-5 minutes 8/4/23   Target Date 08/04/23   PT Goal 3   Goal Identifier LTG   Goal Description Pt will be able to ambulate 500 feet or greater with least restrictive assistive device.   Rationale to maximize safety and independence within the home   Goal Progress 50 feet w/walker   Target Date 08/04/23   PT Goal 4   Goal Identifier LTG   Goal Description Pt will be independent with HEP for self-management of symptoms and to maximize strength prior to TKA.   Goal Progress Not met   Target Date 08/04/23   Subjective Report   Subjective Report Pt reports he is \"getting worse and worse.\" Reports knee pain, calf pain, back pain, issues with vision and hearing. HEP going \"okay\" unsure if it is helping. Reports one recent fall when trying to pet his dog. Notes he does not walk \"as much as he should.\"   Objective " Measures   Objective Measures Objective Measure 1;Objective Measure 2;Objective Measure 3;Objective Measure 4   Objective Measure 1   Objective Measure TUG (4WW)   Details 45.40 sec   Objective Measure 2   Objective Measure 30 second STS (4WW and arm rests)   Details 2.5 reps   Objective Measure 3   Objective Measure Knee AROM   Details R: 0-122*; L 0-125*   Objective Measure 4   Objective Measure Knee strength   Details Knee extension 4/5 B (R pain); knee flexion 4+/5 B   Therapeutic Procedure/Exercise   Therapeutic Procedures: strength, endurance, ROM, flexibillity minutes (08693) 28   Ther Proc 1 - Details Recumbent bike seat 7 level 1.0 x 5 min. Supine SLR x 7 B, hooklying bridge x 10 B. STS from slightly elevated mat table using arm rests to push x 5 reps in front of walker. Seated march x 10 B. Seated LAQ x 10 B. Standing heel raise and HS curls x 10 ea B at 4WW.   Skilled Intervention Strength ex for lower extremities, general conditioning   Patient Response/Progress Heavy fatigue, moderate knee pain throughout, pt with very low activity tolerance, several rest breaks needed   Plan   Home program PTRX - papers   Updates to plan of care Discharge from PT due to plateau in progress. Pt does not feel he has made much progress with knee pain/general deconditioning thus far. Unclear participation in HEP as well. Discussed recommendation to see Orthopedics for knee pain for follow up for next steps, as well as PCP or Ortho for concerns related to low back. Also discussed continued importance of walking and HEP 3-4x/week. Pt and wife in agreement with plan.   Total Session Time   Timed Code Treatment Minutes 28   Total Treatment Time (sum of timed and untimed services) 28       DISCHARGE  Reason for Discharge: No further expectation of progress.    Discharge Plan: Patient to continue home program and walking program.     Referring Provider:  Beatrice Stephens

## 2023-08-08 DIAGNOSIS — Z86.718 PERSONAL HISTORY OF VENOUS THROMBOSIS AND EMBOLISM: ICD-10-CM

## 2023-08-08 DIAGNOSIS — D69.3 IDIOPATHIC THROMBOCYTOPENIC PURPURA (H): ICD-10-CM

## 2023-08-08 DIAGNOSIS — D50.0 IRON DEFICIENCY ANEMIA DUE TO CHRONIC BLOOD LOSS: ICD-10-CM

## 2023-08-08 NOTE — TELEPHONE ENCOUNTER
Xarelto Refill   Last prescribing provider: Dr Novoa     Last clinic visit date: 4/20/23  Dr Novoa     Recommendations for requested medication (if none, N/A): Copied from chart note   4/20/23  Dr Novoa     2. History of unprovoked pulmonary embolism (April 2017) -- He has done well on long-term anticoagulation with rivaroxaban, and we will make no change in this part of his care plan.        Any other pertinent information (if none, N/A): N/A    Refilled: Y/N, if NO, why?

## 2023-08-09 RX ORDER — RIVAROXABAN 10 MG/1
TABLET, FILM COATED ORAL
Qty: 30 TABLET | Refills: 0 | Status: SHIPPED | OUTPATIENT
Start: 2023-08-09 | End: 2023-10-14

## 2023-09-28 ENCOUNTER — ONCOLOGY VISIT (OUTPATIENT)
Dept: ONCOLOGY | Facility: CLINIC | Age: 78
End: 2023-09-28
Attending: INTERNAL MEDICINE
Payer: COMMERCIAL

## 2023-09-28 ENCOUNTER — LAB (OUTPATIENT)
Dept: LAB | Facility: CLINIC | Age: 78
End: 2023-09-28
Attending: INTERNAL MEDICINE
Payer: COMMERCIAL

## 2023-09-28 VITALS
TEMPERATURE: 98.2 F | WEIGHT: 179 LBS | BODY MASS INDEX: 28.89 KG/M2 | HEART RATE: 65 BPM | OXYGEN SATURATION: 98 % | SYSTOLIC BLOOD PRESSURE: 120 MMHG | DIASTOLIC BLOOD PRESSURE: 76 MMHG

## 2023-09-28 DIAGNOSIS — D69.3 IDIOPATHIC THROMBOCYTOPENIC PURPURA (H): ICD-10-CM

## 2023-09-28 DIAGNOSIS — Z86.718 PERSONAL HISTORY OF VENOUS THROMBOSIS AND EMBOLISM: ICD-10-CM

## 2023-09-28 DIAGNOSIS — Z79.01 LONG TERM CURRENT USE OF ANTICOAGULANT THERAPY: ICD-10-CM

## 2023-09-28 DIAGNOSIS — D50.0 IRON DEFICIENCY ANEMIA DUE TO CHRONIC BLOOD LOSS: ICD-10-CM

## 2023-09-28 DIAGNOSIS — R19.7 DIARRHEA, UNSPECIFIED TYPE: Primary | ICD-10-CM

## 2023-09-28 LAB
ALBUMIN SERPL BCG-MCNC: 3.5 G/DL (ref 3.5–5.2)
ALP SERPL-CCNC: 94 U/L (ref 40–129)
ALT SERPL W P-5'-P-CCNC: 16 U/L (ref 0–70)
ANION GAP SERPL CALCULATED.3IONS-SCNC: 9 MMOL/L (ref 7–15)
AST SERPL W P-5'-P-CCNC: 30 U/L (ref 0–45)
BASOPHILS # BLD AUTO: 0.1 10E3/UL (ref 0–0.2)
BASOPHILS NFR BLD AUTO: 1 %
BILIRUB SERPL-MCNC: 0.6 MG/DL
BUN SERPL-MCNC: 8.1 MG/DL (ref 8–23)
CALCIUM SERPL-MCNC: 9.3 MG/DL (ref 8.8–10.2)
CHLORIDE SERPL-SCNC: 107 MMOL/L (ref 98–107)
CREAT SERPL-MCNC: 0.88 MG/DL (ref 0.67–1.17)
EGFRCR SERPLBLD CKD-EPI 2021: 87 ML/MIN/1.73M2
EOSINOPHIL # BLD AUTO: 0.1 10E3/UL (ref 0–0.7)
EOSINOPHIL NFR BLD AUTO: 1 %
ERYTHROCYTE [DISTWIDTH] IN BLOOD BY AUTOMATED COUNT: 20.5 % (ref 10–15)
GLUCOSE SERPL-MCNC: 102 MG/DL (ref 70–99)
HCO3 SERPL-SCNC: 28 MMOL/L (ref 22–29)
HCT VFR BLD AUTO: 39.4 % (ref 40–53)
HGB BLD-MCNC: 13 G/DL (ref 13.3–17.7)
IMM GRANULOCYTES # BLD: 0.2 10E3/UL
IMM GRANULOCYTES NFR BLD: 2 %
LYMPHOCYTES # BLD AUTO: 1.5 10E3/UL (ref 0.8–5.3)
LYMPHOCYTES NFR BLD AUTO: 16 %
MCH RBC QN AUTO: 32.4 PG (ref 26.5–33)
MCHC RBC AUTO-ENTMCNC: 33 G/DL (ref 31.5–36.5)
MCV RBC AUTO: 98 FL (ref 78–100)
MONOCYTES # BLD AUTO: 0.8 10E3/UL (ref 0–1.3)
MONOCYTES NFR BLD AUTO: 8 %
NEUTROPHILS # BLD AUTO: 7 10E3/UL (ref 1.6–8.3)
NEUTROPHILS NFR BLD AUTO: 72 %
NRBC # BLD AUTO: 0 10E3/UL
NRBC BLD AUTO-RTO: 0 /100
PLATELET # BLD AUTO: 117 10E3/UL (ref 150–450)
POTASSIUM SERPL-SCNC: 4.5 MMOL/L (ref 3.4–5.3)
PROT SERPL-MCNC: 6.6 G/DL (ref 6.4–8.3)
RBC # BLD AUTO: 4.01 10E6/UL (ref 4.4–5.9)
SODIUM SERPL-SCNC: 144 MMOL/L (ref 135–145)
WBC # BLD AUTO: 9.7 10E3/UL (ref 4–11)

## 2023-09-28 PROCEDURE — G0463 HOSPITAL OUTPT CLINIC VISIT: HCPCS | Performed by: INTERNAL MEDICINE

## 2023-09-28 PROCEDURE — 36415 COLL VENOUS BLD VENIPUNCTURE: CPT | Performed by: PATHOLOGY

## 2023-09-28 PROCEDURE — 99215 OFFICE O/P EST HI 40 MIN: CPT | Performed by: INTERNAL MEDICINE

## 2023-09-28 PROCEDURE — 80053 COMPREHEN METABOLIC PANEL: CPT | Performed by: PATHOLOGY

## 2023-09-28 PROCEDURE — 85025 COMPLETE CBC W/AUTO DIFF WBC: CPT | Performed by: PATHOLOGY

## 2023-09-28 ASSESSMENT — PAIN SCALES - GENERAL: PAINLEVEL: SEVERE PAIN (7)

## 2023-09-28 NOTE — NURSING NOTE
"Oncology Rooming Note    September 28, 2023 2:06 PM   Raymon Ace is a 78 year old male who presents for:    Chief Complaint   Patient presents with    Oncology Clinic Visit     Idiopathic thrombocytopenic purpura      Initial Vitals: Pulse 65   SpO2 98%  Estimated body mass index is 29.38 kg/m  as calculated from the following:    Height as of 7/12/23: 1.676 m (5' 6\").    Weight as of 7/12/23: 82.6 kg (182 lb). There is no height or weight on file to calculate BSA.  Severe Pain (7) Comment: Data Unavailable   No LMP for male patient.  Allergies reviewed: Yes  Medications reviewed: Yes    Medications: Medication refills not needed today.  Pharmacy name entered into [employer]:    [facility] - [place], [place] - [address] [facility] - [place], [place] - [address] [place] [address] [facility] - [place], [place] - [address]    Clinical concerns: Experiencing more diarrhea and nothing tastes good     Radha Rico              "

## 2023-09-28 NOTE — LETTER
9/28/2023         RE: Raymon Ace  [address] [facility] [id number]        Dear Colleague,    Thank you for referring your patient, Raymon Ace, to the [facility]. Please see a copy of my visit note below.    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  He has a history of recurrent iron deficiency anemia felt secondary to chronic occult blood loss from portal hypertensive gastropathy and hemorrhoids.  He also has underlying alcoholic liver disease.  He was scheduled today for routine follow-up visit.      He is accompanied today by his wife, as usual.  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  However, he was felt to be a poor candidate for surgery given his muscle weakness.    He remains on Promacta 75 mg daily for chronic ITP.  He is tolerating the medication without any perceived side effects.  He also remains on prophylactic intensity anticoagulation with rivaroxaban (10 mg daily).  He denies any bruising, epistaxis, dental bleeding, or blood in the urine or stool.     He last received IV iron in late August / early September 2021.  Prior to that he was treated in January 2020.    His main concern today is of diarrhea and poor appetite.  He reports diarrhea has been an ongoing issue for the last 2 months.  He reports that his stools range in frequency from once daily to several episodes daily.  He has had some episodes of fecal incontinence.  He denies any black tarry stools or blood in his stools.  No abdominal pain or fevers.  He has had occasional episodes of nausea but this has not been a consistent problem.  Many of the foods he typically enjoys simply do not taste good to him and thus his oral intake has  decreased.  He has lost about 16 pounds over the last 5 months.  He is taking in 3 Ensure shakes daily without difficulty, however.    There have been no recent changes to his medications.  He has not traveled out of state or consumed untreated water.  He continues to drink alcohol regularly.  Difficult to clarify the exact amount, but probably at least several drinks each day.  This has been very longstanding and he has been disinterested in quitting.  Reports his alcohol intake has remained unchanged over the last several months.    Physical exam:  He looks less well than his usual baseline, but he is in no acute distress.  He is afebrile, blood pressure 120/76, pulse 65 and regular.  Respirations unlabored.  O2 saturation 98% on room air.  Weight is 179 pounds.  This is decreased 16 pounds from April 2023.  Review of his weights over the last 2 years show that his weight has fluctuated up and down quite a bit, but his current weight is at the low end of the range in which it has fluctuated over the last 2 years.    He is not pale or jaundiced.  No scleral icterus.  Lungs are clear.  RRR S1S2, no murmur.  No lower extremity edema.  He has normal active bowel sounds.  His abdomen is nondistended, soft, and nontender.  No palpable hepatosplenomegaly although he was examined in the sitting position due to difficulty transferring to the exam table secondary to his neuropathy and lower extremity weakness.    Labs:  Serum electrolytes are normal, creatinine 0.88, LFTs are also normal.  White count 9.7 with a normal differential, hemoglobin 13.0, MCV 98, platelets 117,000.  Overall his CBC parameters are within his baseline ranges.      ASSESSMENT / PLAN:  1.  Chronic ITP -- Raymon's baseline platelet count has been in the 70,000 to 100,000 range over the last several years.  He remains on Promacta 75 mg daily.  He is responsive to pulse dexamethasone and we have used this to increase his platelet count prior to  surgeries.  He has not been treated with IVIG, so we do not know how well he responds to that.  At the present time, his platelet count is within his usual baseline.  Given that he is on long-term anticoagulation, the goal is to maintain his platelet count consistently above 50,000.  Note that there may also be a component of thrombocytopenia related to his underlying liver disease.    2. History of unprovoked pulmonary embolism (April 2017) -- He has done well on long-term anticoagulation with rivaroxaban.  He is on the prophylactic dose of 10 mg daily due to a combination of mild intermittent renal insufficiency (not currently an issue) and underlying liver disease.     3.  h/o iron deficiency anemia -- This has been attributed to to chronic occult blood loss from portal hypertensive gastropathy, and hemorrhoids.  He denies any new bleeding symptoms or any obvious signs of GI bleeding, and had upper and lower endoscopies in the fall 2020.  He received IV iron in late August / early September 2021 and was iron replete when last assessed in April 2023.  We will continue to monitor.    4.  GI issues -- He reports persistent diarrhea over the last 2 months along with a 16 pound weight loss due to poor appetite.  As outlined above, his weight has fluctuated up and down quite a bit over the last couple of years and he is not outside of the range in which it has been during that time.  The cause of his persistent diarrhea is unclear.  We will refer him to GI for further evaluation.  In the meantime I suggested he try over-the-counter antimotility agents.  It is possible that his GI issues are related to his longstanding alcohol use.  This has been discussed many times by different physicians and he has consistently declined referrals for chemical dependency treatment and has expressed no interest in reducing his alcohol use.      Return visit in 4 months.      Total time 40 minutes, including review of medical records  and labs, clinic visit, and documentation.      Waylon Novoa MD  Professor of Medicine  Division of Hematology, Oncology, and Transplantation  Director, [facility]

## 2023-09-29 NOTE — PROGRESS NOTES
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  He has a history of recurrent iron deficiency anemia felt secondary to chronic occult blood loss from portal hypertensive gastropathy and hemorrhoids.  He also has underlying alcoholic liver disease.  He was scheduled today for routine follow-up visit.      He is accompanied today by his wife, as usual.  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  However, he was felt to be a poor candidate for surgery given his muscle weakness.    He remains on Promacta 75 mg daily for chronic ITP.  He is tolerating the medication without any perceived side effects.  He also remains on prophylactic intensity anticoagulation with rivaroxaban (10 mg daily).  He denies any bruising, epistaxis, dental bleeding, or blood in the urine or stool.     He last received IV iron in late August / early September 2021.  Prior to that he was treated in January 2020.    His main concern today is of diarrhea and poor appetite.  He reports diarrhea has been an ongoing issue for the last 2 months.  He reports that his stools range in frequency from once daily to several episodes daily.  He has had some episodes of fecal incontinence.  He denies any black tarry stools or blood in his stools.  No abdominal pain or fevers.  He has had occasional episodes of nausea but this has not been a consistent problem.  Many of the foods he typically enjoys simply do not taste good to him and thus his oral intake has decreased.  He has lost about 16 pounds over the last 5 months.  He is taking in 3 Ensure shakes daily without difficulty, however.    There have been no recent changes to his medications.  He has not traveled out of state or consumed untreated water.  He  continues to drink alcohol regularly.  Difficult to clarify the exact amount, but probably at least several drinks each day.  This has been very longstanding and he has been disinterested in quitting.  Reports his alcohol intake has remained unchanged over the last several months.    Physical exam:  He looks less well than his usual baseline, but he is in no acute distress.  He is afebrile, blood pressure 120/76, pulse 65 and regular.  Respirations unlabored.  O2 saturation 98% on room air.  Weight is 179 pounds.  This is decreased 16 pounds from April 2023.  Review of his weights over the last 2 years show that his weight has fluctuated up and down quite a bit, but his current weight is at the low end of the range in which it has fluctuated over the last 2 years.    He is not pale or jaundiced.  No scleral icterus.  Lungs are clear.  RRR S1S2, no murmur.  No lower extremity edema.  He has normal active bowel sounds.  His abdomen is nondistended, soft, and nontender.  No palpable hepatosplenomegaly although he was examined in the sitting position due to difficulty transferring to the exam table secondary to his neuropathy and lower extremity weakness.    Labs:  Serum electrolytes are normal, creatinine 0.88, LFTs are also normal.  White count 9.7 with a normal differential, hemoglobin 13.0, MCV 98, platelets 117,000.  Overall his CBC parameters are within his baseline ranges.      ASSESSMENT / PLAN:  1.  Chronic ITP -- Raymon's baseline platelet count has been in the 70,000 to 100,000 range over the last several years.  He remains on Promacta 75 mg daily.  He is responsive to pulse dexamethasone and we have used this to increase his platelet count prior to surgeries.  He has not been treated with IVIG, so we do not know how well he responds to that.  At the present time, his platelet count is within his usual baseline.  Given that he is on long-term anticoagulation, the goal is to maintain his platelet count  consistently above 50,000.  Note that there may also be a component of thrombocytopenia related to his underlying liver disease.    2. History of unprovoked pulmonary embolism (April 2017) -- He has done well on long-term anticoagulation with rivaroxaban.  He is on the prophylactic dose of 10 mg daily due to a combination of mild intermittent renal insufficiency (not currently an issue) and underlying liver disease.     3.  h/o iron deficiency anemia -- This has been attributed to to chronic occult blood loss from portal hypertensive gastropathy, and hemorrhoids.  He denies any new bleeding symptoms or any obvious signs of GI bleeding, and had upper and lower endoscopies in the fall 2020.  He received IV iron in late August / early September 2021 and was iron replete when last assessed in April 2023.  We will continue to monitor.    4.  GI issues -- He reports persistent diarrhea over the last 2 months along with a 16 pound weight loss due to poor appetite.  As outlined above, his weight has fluctuated up and down quite a bit over the last couple of years and he is not outside of the range in which it has been during that time.  The cause of his persistent diarrhea is unclear.  We will refer him to GI for further evaluation.  In the meantime I suggested he try over-the-counter antimotility agents.  It is possible that his GI issues are related to his longstanding alcohol use.  This has been discussed many times by different physicians and he has consistently declined referrals for chemical dependency treatment and has expressed no interest in reducing his alcohol use.      Return visit in 4 months.      Total time 40 minutes, including review of medical records and labs, clinic visit, and documentation.      Waylon Novoa MD  Professor of Medicine  Division of Hematology, Oncology, and Transplantation  Director, [facility]

## 2023-10-03 NOTE — TELEPHONE ENCOUNTER
REFERRAL INFORMATION:  Referring Provider:  Waylon Novoa MD   Referring Clinic:  [facility]   Reason for Visit/Diagnosis: Diarrhea, unspecified type      FUTURE VISIT INFORMATION:  Appointment Date: 10/9/2023  Appointment Time:      NOTES STATUS DETAILS   OFFICE NOTE from Referring Provider Internal 9/28/2023 ONC visit with NARENDRA Novoa   OFFICE NOTE from Other Specialist Internal 7/12/2023 OV with FAROOQ Monae   HOSPITAL DISCHARGE SUMMARY/  ED VISITS N/A    OPERATIVE REPORT N/A    MEDICATION LIST Internal         ENDOSCOPY  Internal 11/10/2020, 2/8/2018   COLONOSCOPY Internal  11/10/2020, 7/31/2019   PATHOLOGY REPORTS (RELATED) N/A    IMAGING (CT, MRI, EGD, MRCP, Small Bowel Follow Through/SBT, MR/CT Enterography) Internal 7/25/2023 MR Liver  7/18/2023 US ABD

## 2023-10-09 ENCOUNTER — PRE VISIT (OUTPATIENT)
Dept: GASTROENTEROLOGY | Facility: CLINIC | Age: 78
End: 2023-10-09

## 2023-10-09 ENCOUNTER — OFFICE VISIT (OUTPATIENT)
Dept: GASTROENTEROLOGY | Facility: CLINIC | Age: 78
End: 2023-10-09
Attending: INTERNAL MEDICINE
Payer: COMMERCIAL

## 2023-10-09 VITALS
HEART RATE: 104 BPM | HEIGHT: 66 IN | WEIGHT: 178 LBS | BODY MASS INDEX: 28.61 KG/M2 | SYSTOLIC BLOOD PRESSURE: 118 MMHG | DIASTOLIC BLOOD PRESSURE: 80 MMHG

## 2023-10-09 DIAGNOSIS — F10.10 ETOH ABUSE: ICD-10-CM

## 2023-10-09 DIAGNOSIS — R15.2 INCONTINENCE OF FECES WITH FECAL URGENCY: Primary | ICD-10-CM

## 2023-10-09 DIAGNOSIS — R63.0 ANOREXIA: ICD-10-CM

## 2023-10-09 DIAGNOSIS — R63.4 WEIGHT LOSS: ICD-10-CM

## 2023-10-09 DIAGNOSIS — R15.9 INCONTINENCE OF FECES WITH FECAL URGENCY: Primary | ICD-10-CM

## 2023-10-09 DIAGNOSIS — R19.7 DIARRHEA, UNSPECIFIED TYPE: ICD-10-CM

## 2023-10-09 PROCEDURE — 87493 C DIFF AMPLIFIED PROBE: CPT | Performed by: NURSE PRACTITIONER

## 2023-10-09 PROCEDURE — 99204 OFFICE O/P NEW MOD 45 MIN: CPT | Performed by: NURSE PRACTITIONER

## 2023-10-09 ASSESSMENT — PAIN SCALES - GENERAL: PAINLEVEL: SEVERE PAIN (7)

## 2023-10-09 NOTE — PROGRESS NOTES
"Gastroenterology CLINIC VISIT, NEW PATIENT    CC/REFERRING PROVIDER: Waylon Novoa  REASON FOR CONSULTATION: diarrhea    HPI: 78 year old male was referred  to GI clinic for consult on diarrhea, poor appetite, and weight loss. Reported occasional nausea and fecal incontinence. Has been having \"explosive\" stools 4-5 times a day and sometimes, at night. Light brown in color. Complains of fecal urgency.  Said that he feels nauseated at times, but does not throw up, \"I just spit it out\". \"Food does not taste good anymore\", have been consuming carbs mainly. Reported gradual onset and worsening of symptoms. Said that for last 5 months his symptoms have been really bothersome.  He is here today with his significant other. She verbalized concerns that the patient is getting more weak and tired. \"He sleeps a lot\". She bought him Ensure and encourages to consume it daily. Patient  admits to poor physical activity tolerance. Had about 5 falls in the past few months. Patient said that his legs are weak and they hurt and give out.  He is on Promacta 75 mg daily for chronic ITP. He is also on rivaroxaban. History of recurrent iron deficiency anemia felt secondary to chronic occult blood loss from portal hypertensive gastropathy and hemorrhoids. He also has underlying alcoholic  fatty liver disease, not interested in CD treatment or reduction of alcohol use. Said that he has about 3 drinks a day. Does not smoke, quit about 45 years ago. Denies illicit drug use. Hx of appendectomy and back surgeries.  Family hx significant for alcoholism, pancreatic cancer in father, and lung cancer in sister.    PREVIOUS ENDOSCOPY:  11/10/2020 EGD  Findings:       The Z-line was regular and was found 41 cm from the incisors.        Normal mucosa was found in the entire esophagus. This was biopsied with        a cold forceps for histology. Verification of patient identification for        the specimen was done. Estimated blood loss: none. " Biopsies performed in        distal and proximal esophagus. No varices identified.        The distal esophagus was mildly tortuous.        There was mild resistance passing the scope across the GEJ at the level        of what appeared to be a paraesophageal hernia        There were polypoid appearing lesions that were not biopsied. Given a        history of gastric varices would only biopsy after ultrasound probe        rules out varix. 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     11/10/20 Colonoscopy  Findings:       The perianal and digital rectal examinations were normal.        The terminal ileum appeared normal.        A 7 mm polyp was found in the transverse colon. The polyp was sessile.        The polyp was removed with a cold snare. Resection and retrieval were        complete. Verification of patient identification for the specimen was        done. Estimated blood loss: none.        Scattered small-mouthed diverticula were found in the sigmoid colon.   FINAL DIAGNOSIS:   A. Distal Esophagus, Biopsy:   Squamous esophageal mucosa with no intraepithelial eosinophils or other   abnormality   B. Proximal Esophagus, Biopsy:   Squamous esophageal mucosa with no intraepithelial eosinophils or other   abnormality   C. Transverse Colon, Polyp:   Sessile serrated polyp; negative for dysplasia or malignancy     PERTINENT STUDIES Reviewed in EMR  7/25/23 MRI of liver  IMPRESSION:  1. 1.6 cm simple liver cyst with no suspicious enhancing internal  components.  2. Hepatic steatosis.  3. Mild splenomegaly.      ROS: 10pt ROS performed and otherwise negative.    PAST MEDICAL HISTORY:  Past Medical History:   Diagnosis Date    Alcohol abuse since teens 01/15/2015    Still actively drinking    Alcoholic liver damage (H24) 1/10/2014    Gastroesophageal reflux disease with esophagitis  12/6/2016    Gout involving toe, unspecified cause, unspecified chronicity, unspecified laterality 6/2/2016    Heart murmur     heart is positioned farther on left side then typical    Hyperlipidemia 12/17/2015    Hypertension     ITP (idiopathic thrombocytopenic purpura)     Obstructive sleep apnea     does not use CPAP  machine    Pulmonary embolism (H) large RLL w infarctio 4-17 4/18/2017       PREVIOUS ABDOMINAL/GYNECOLOGIC SURGERIES:    Past Surgical History:   Procedure Laterality Date    APPENDECTOMY  1956    BACK SURGERY  1992, 1996    trimmed L4-L5, Fusion L4-L5    BONE MARROW BIOPSY, BONE SPECIMEN, NEEDLE/TROCAR  10/24/2012    Procedure: BIOPSY BONE MARROW;  BONE MARROW BIOPSY WITH ASPIRATE (AREVALO ORDERING);  Surgeon: Terri Hickey MD;  Location:  GI    COLONOSCOPY N/A 7/31/2019    Procedure: COLONOSCOPY;  Surgeon: Sebastian Garcia MD;  Location:  GI    ESOPHAGOSCOPY, GASTROSCOPY, DUODENOSCOPY (EGD), COMBINED N/A 2/8/2018    Procedure: COMBINED ESOPHAGOSCOPY, GASTROSCOPY, DUODENOSCOPY (EGD);  EGD;  Surgeon: Terri Crouch MD;  Location:  GI    ESOPHAGOSCOPY, GASTROSCOPY, DUODENOSCOPY (EGD), COMBINED N/A 11/10/2020    Procedure: ESOPHAGOGASTRODUODENOSCOPY, WITH BIOPSY;  Surgeon: Alonzo Jang DO;  Location:  GI    FACIAL RECONSTRUCTION SURGERY  1965    jaw fracture    HC TOOTH EXTRACTION W/FORCEP  1990s         PERTINENT MEDICATIONS:  Current Outpatient Medications   Medication Sig Dispense Refill    allopurinol (ZYLOPRIM) 300 MG tablet Take 1 tablet by mouth once daily 90 tablet 0    bismuth subsalicylate (PEPTO BISMOL) 262 MG/15ML suspension Take 15 mLs by mouth every 6 hours as needed for indigestion or diarrhea (as needed for diarrhea. Please collect stool sample first) 354 mL 1    Cholecalciferol (VITAMIN D) 50 MCG (2000 UT) CAPS Take 1 capsule by mouth once daily 90 capsule 0    folic acid (FOLVITE) 1 MG tablet Take 1 tablet by mouth once daily 90 tablet 1     gabapentin (NEURONTIN) 300 MG capsule Take 3 capsules (900 mg) by mouth 3 times daily 360 capsule 5    metoprolol succinate ER (TOPROL XL) 50 MG 24 hr tablet Take 1 tablet by mouth once daily 90 tablet 0    omeprazole (PRILOSEC) 40 MG DR capsule Take 1 capsule by mouth once daily 90 capsule 0    PROMACTA 75 MG tablet TAKE ONE TABLET BY MOUTH ONCE DAILY. TAKE ON AN EMPTY STOMACH (ONE HOUR BEFORE OR TWO HOURS AFTER A MEAL) 30 tablet 11    XARELTO ANTICOAGULANT 10 MG TABS tablet TAKE 1 TABLET BY MOUTH ONCE DAILY WITH SUPPER 30 tablet 0    ASPIRIN NOT PRESCRIBED (INTENTIONAL) Please choose reason not prescribed, below (Patient not taking: Reported on 10/9/2023) 1 each 0    carbidopa-levodopa (SINEMET)  MG tablet Take 1 tablet by mouth 3 times daily (Patient not taking: Reported on 2023) 90 tablet 5         SOCIAL HISTORY:  Social History     Socioeconomic History    Marital status:      Spouse name: Not on file    Number of children: Not on file    Years of education: Not on file    Highest education level: Not on file   Occupational History    Not on file   Tobacco Use    Smoking status: Former     Packs/day: 1.00     Years: 28.00     Pack years: 28.00     Types: Cigarettes     Start date:      Quit date: 1982     Years since quittin.0    Smokeless tobacco: Former   Vaping Use    Vaping Use: Never used   Substance and Sexual Activity    Alcohol use: Yes     Alcohol/week: 5.0 standard drinks of alcohol     Types: 5 Shots of liquor per week     Comment: 4-5 drinks/day, alcohol use disorder    Drug use: No    Sexual activity: Never     Partners: Female   Other Topics Concern    Parent/sibling w/ CABG, MI or angioplasty before 65F 55M? Yes   Social History Narrative    Not on file     Social Determinants of Health     Financial Resource Strain: Not on file   Food Insecurity: Not on file   Transportation Needs: Not on file   Physical Activity: Not on file   Stress: Not on file   Social  "Connections: Not on file   Interpersonal Safety: Not on file   Housing Stability: Not on file       FAMILY HISTORY:  Denies colon/panc/esophageal/other GI CA, no other Nation or other HPS-related [place]. No IBD/celiac, no other AI/liver/thyroid disease.    Family History   Problem Relation Age of Onset    Unknown/Adopted Mother     Unknown/Adopted Father     Heart Disease Sister     Diabetes No family hx of     Coronary Artery Disease No family hx of     Hypertension No family hx of     Hyperlipidemia No family hx of     Cerebrovascular Disease No family hx of     Breast Cancer No family hx of     Colon Cancer No family hx of     Prostate Cancer No family hx of     Other Cancer No family hx of     Depression No family hx of     Anxiety Disorder No family hx of     Mental Illness No family hx of     Substance Abuse No family hx of     Anesthesia Reaction No family hx of     Asthma No family hx of     Osteoporosis No family hx of     Genetic Disorder No family hx of     Thyroid Disease No family hx of     Obesity No family hx of        PHYSICAL EXAMINATION:  Vitals reviewed  /80 (BP Location: Right arm, Patient Position: Sitting, Cuff Size: Adult Regular)   Pulse 104   Ht 1.676 m (5' 6\")   Wt 80.7 kg (178 lb)   BMI 28.73 kg/m      General: Patient appears ill;  Needed assistance of 2 to get to exam table. Ambulates with a walker.  Skin: No visualized rash or lesions on visualized skin. Questionable mild jaundice  HEENT:    EOMI, no erythema, sclera icterus or discharge noted.  Mouth mucosa intact, pink, moist  No cervical or supraclavicular lymphadenopathy. Thyroid gland not enlarged.  Resp: breathing comfortably without accessory muscle usage, speaking in full sentences, no cough. Lung sounds clear  Card: Regular and rhythmic S1 and S2. No gallop or rub. No murmur.  No LE edema.  Abdomen: Active bowel sounds X 4 quadrants. Soft to palpation. Mild tenderness in epigastrium and LUQ. No guarding or rebound " tenderness. Lemon's sign negative. Unable to assess for organomegaly due to larger body habitus.  Neurologic: No apparent tremors, facial movements symmetric. Hard of hearing but able to communicate appropriately  Psych: affect normal, alert and oriented      ASSESSMENT/PLAN:    ICD-10-CM    1. Incontinence of feces with fecal urgency  R15.9 Elastase Fecal    R15.2 Calprotectin Feces     Enteric Bacteria and Virus Panel by [facility] Stool     C. difficile Toxin B PCR with reflex to C. difficile Antigen and Toxins A/B EIA     CT Abdomen Pelvis w/o & w Contrast     Adult GI  Referral - Procedure Only     Elastase Fecal     Calprotectin Feces     Enteric Bacteria and Virus Panel by [facility]IMBER[facility] Stool     C. difficile Toxin B PCR with reflex to C. difficile Antigen and Toxins A/B EIA      2. Diarrhea, unspecified type  R19.7 Adult GI  Referral - Consult Only     Elastase Fecal     Calprotectin Feces     Enteric Bacteria and Virus Panel by [facility]BER[facility] Stool     C. difficile Toxin B PCR with reflex to C. difficile Antigen and Toxins A/B EIA     CT Abdomen Pelvis w/o & w Contrast     Adult GI  Referral - Procedure Only     bismuth subsalicylate (PEPTO BISMOL) 262 MG/15ML suspension     Elastase Fecal     Calprotectin Feces     Enteric Bacteria and Virus Panel by [facility] Stool     C. difficile Toxin B PCR with reflex to C. difficile Antigen and Toxins A/B EIA      3. ETOH abuse  F10.10 CT Abdomen Pelvis w/o & w Contrast     Adult GI  Referral - Procedure Only      4. Weight loss  R63.4 CT Abdomen Pelvis w/o & w Contrast     Adult GI  Referral - Procedure Only      5. Anorexia  R63.0 CT Abdomen Pelvis w/o & w Contrast     Adult GI  Referral - Procedure Only         Pleasant 78 year old male with complex medical history, was referred to GI clinic for evaluation of gradual progression of nausea, weight loss, poor appetite, and diarrhea. Over the past 5-6 months, the patient developed fecal urgency  and incontinence. He cannot name any triggers, but admits to continuous alcohol abuse.Per his girlfriend's report, the patient appears to become more weak and tires. Reported 4-5 falls in the past few months. Weight loss from 225 to 178 lbs over the past year.  Patient's main concern is diarrhea with fecal incontinence. No melena or hematochezia.  CT scan of abdomen from 2018 showed colonic diverticulosis. Recent MRI of the liver was suggestive for hepatic steatosis and 1.6 cm liver cyst. Noted mild splenomegaly.  Patient had EGD and colonoscopy in 2020, which found tortuous esophagus with questionable paraesophageal hernia, and varices in stomach and duodenum. There was a sessile serrated 7 mm polyp in transverse colon.  Reviewed recent lab work that showed mild chronic anemia and thrombocytopenia. Normal CMO, TSH, and vit B12. Patient has been followed by oncology on chronic ITP.  He is on Promacta 75 mg daily and also on rivaroxaban. According to [employer], there is a risk for diarrhea as a side effect in 9-21 % of patients taking Promacta, and 4-9% for nausea.Patient has been on the medication for some time; moreover, benefits of therapy overweigh risks. Therefore, no changes to medications are recommended.  We reviewed other possible causes of diarrhea. I strongly suspect malabsorption or exocrine pancreatic insuffiencey as etiology of patient's symptoms. Education on health risks and harm from alcohol use was provided.  Recommended the following:  - Abdominal/pelvis CT scan.  - Upper GI endoscopy;  - Stool studies;  - Pepto-bismol or imodium as needed for diarrhea if negative C.diff screening.  - GI soft diet with higher calorie intake. Small frequent meals.  - Abstinence from alcohol or CD treatment. Patient's girlfriend mentioned that he was able to abstain alcohol for 9 months in the past.    Patient verbalized understanding and appreciation of care provided. Stated that all of the questions were answered  to her/his satisfaction.  RTC in 2 months    Thank you for this consultation. It was a pleasure to participate in the care of this patient; please contact us with any further questions.    SCOTTIE Bahena, JESSICA  [facility]  Gastroenterology Department  [place], [place]    This note was created with Dragon voice recognition software, and while reviewed for accuracy, inadvertent minor typographic errors may occur. Please contact the provider if you have any questions.

## 2023-10-09 NOTE — PATIENT INSTRUCTIONS
It was a pleasure taking care of you today.  I've included a brief summary of our discussion and care plan from today's visit below.  Please review this information with your primary care provider.  ______________________________________________________________________    My recommendations are summarized as follows:    I placed an order for stool studies.  Please  container and collect a stool sample.  I will send a [employer] message with the results.    2.  I also placed an order for CT scan of abdomen.  You will be contacted to make an appointment for the study.  This could be done in [place], [place].    3.  Upper GI endoscopy was ordered as well.  Plan to have a study done at [place], [place].    4.  Use Pepto-Bismol as needed for diarrhea.  If your stool studies are negative for infection, you can start taking loperamide (Imodium).    Return to GI Clinic in 2 months to review your progress.    ______________________________________________________________________    DIARRHEA  Diarrhea is characterized by frequent (more than three) watery to loose stools in a 24-h period. Diarrhea can be classified as acute or chronic.  Acute diarrhea is usually caused by an infection from a bacteria, virus, or parasite, which may be present in animal and human fecal matter or in contaminated food, milk, or water. Symptoms may persist for 1-2 days with or without serious consequences.  Prolonged diarrhea that lasts for a month or longer is chronic. A number of diseases and conditions can cause diarrhea, including:  Viruses. Viruses that can cause diarrhea include [place] virus (also known as norovirus), enteric adenoviruses, astrovirus, cytomegalovirus and viral hepatitis. Rotavirus is a common cause of acute childhood diarrhea. The virus that causes coronavirus disease 2019 (COVID-19) also has been associated with gastrointestinal symptoms, including nausea, vomiting and diarrhea.  Bacteria and parasites. Exposure  to certain bacteria, such as E. coli or parasites through contaminated food or water, leads to diarrhea. When traveling in developing countries, diarrhea caused by bacteria and parasites is often called traveler's diarrhea. Clostridioides difficile (also known as C. diff) is another type of bacterium that causes diarrhea, and it can occur after a course of antibiotics or during a hospitalization.  Medicines. Many medicines, such as antibiotics, can cause diarrhea. Antibiotics get rid of infections by killing bad bacteria, but they also kill good bacteria. This disturbs the natural balance of bacteria in your intestines, leading to diarrhea or an infection such as C. diff. Other drugs that cause diarrhea are anti-cancer drugs and antacids with magnesium.  Lactose intolerance. Lactose is a sugar found in milk and other dairy products. People who have trouble digesting lactose have diarrhea after eating dairy products. Lactose intolerance can increase with age because levels of the enzyme that helps digest lactose drop as you get older.  Fructose. Fructose is a sugar found naturally in fruits and honey. It's sometimes added as a sweetener to certain beverages. Fructose can lead to diarrhea in people who have trouble digesting it.  Artificial sweeteners. Sorbitol, erythritol and mannitol -- artificial sweeteners are nonabsorbable sugars found in chewing gum and other sugar-free products -- can cause diarrhea in some otherwise healthy people.  Surgery. Partial intestine or gallbladder removal surgeries can sometimes cause diarrhea.  Other digestive disorders. Chronic diarrhea has a number of other causes, such as IBS, Crohn's disease, ulcerative colitis, celiac disease, microscopic colitis, pancreatic insufficiency, and small intestinal bacterial overgrowth (SIBO).    Nutritional therapy for diarrhea is aimed at replacing fluids and electrolytes through consumption of beverages, such as water, broths,Pedialyte, or sports  drinks, and eliminating the cause of diarrhea (contaminated foods). Juices and carbonated beverages should be avoided or diluted since they are often hyperosmolar and would otherwise aggravate the diarrhea. Avoid caffeine and alcohol.    The BRAT diet is an acronym that stands for:  Bananas. Starch in the fruit can help absorb water in your colon, which works to firm up your stool. Plus, it s rich in potassium, a key electrolyte you re losing with diarrhea.  Rice. Select white rice over brown rice in this case, as it s easier on your stomach.  Applesauce. Look to grab unsweetened applesauce to cut down on sugar.  Toast. Try to use the more easily digestible white bread instead of whole grain bread.    Add semisolid and low-fiber foods gradually as your bowel movements return to normal. Try soda crackers, toast, boiled eggs, oatmeal,boiled or baked potatoes, or baked chicken without skin. Don't eat certain foods such as dairy products, fatty foods, some fruits (citrus, pineapples, figs, grapes), raw veggies, onions, high-fiber foods or highly seasoned foods for a few days. Sugary sweet items (including those sweetened with artificial ingredients such as aspartame, erythritol and sorbitol) could also trigger your diarrhea.     If you're an adult, see your doctor if:  Your diarrhea persists beyond two days with no improvement.  You become dehydrated.  You have severe abdominal or rectal pain.  You have bloody or black stools.  You have a fever above 102 F (39 C).     ______________________________________________________________________    Who do I call with any questions after my visit?  Please be in touch if there are any further questions that arise following today's visit.  There are multiple ways to contact your gastroenterology care team.      During business hours, you may reach a Gastroenterology nurse at [phone], option 3.     To schedule or reschedule an appointment, please call [phone].   To  schedule your imaging studies (CT, MRI, ultrasound)  call [phone] (or toll-free # [phone])  To schedule your lab work at [facility], please call [phone]    You can always send a secure message through [employer].  [employer] messages are answered by your nurse or doctor typically within 24 hours.  Please allow extra time on weekends and holidays.      For urgent/emergent questions after business hours, you may reach the on-call GI Fellow by contacting the [facility]  at [phone].    In order for your refill to be processed in a timely fashion, it is your responsibility to ensure you follow the recommendations from your provider regarding your laboratory studies and follow up appointments.       How will I get the results of any tests ordered?    You will receive all of your results.  If you have signed up for [email or web address], any tests ordered at your visit will be available to you after your physician reviews them.  Typically this takes 1-2 weeks.  If there are urgent results that require a change in your care plan, your physician or nurse will call you to discuss the next steps.   What is [employer]?  [employer]one is a secure way for you to access all of your healthcare records from the [facility].  It is a web based computer program, so you can sign on to it from any location.  It also allows you to send secure messages to your care team.  I recommend signing up for [employer] access if you have not already done so and are comfortable with using a computer.    How to I schedule a follow-up visit?  If you did not schedule a follow-up visit today, please call [phone] to schedule a follow-up office visit.      Sincerely,  JESSICA Bahena,  [facility],  Division of Gastroenterology   ([facility])

## 2023-10-09 NOTE — LETTER
"    10/9/2023         RE: Raymon Ace  [address]  [facility] [address]        Dear Colleague,    Thank you for referring your patient, Raymon Ace, to the [facility]. Please see a copy of my visit note below.    Gastroenterology CLINIC VISIT, NEW PATIENT    CC/REFERRING PROVIDER: Wayoln Novoa  REASON FOR CONSULTATION: diarrhea    HPI: 78 year old male was referred  to GI clinic for consult on diarrhea, poor appetite, and weight loss. Reported occasional nausea and fecal incontinence. Has been having \"explosive\" stools 4-5 times a day and sometimes, at night. Light brown in color. Complains of fecal urgency.  Said that he feels nauseated at times, but does not throw up, \"I just spit it out\". \"Food does not taste good anymore\", have been consuming carbs mainly. Reported gradual onset and worsening of symptoms. Said that for last 5 months his symptoms have been really bothersome.  He is here today with his significant other. She verbalized concerns that the patient is getting more weak and tired. \"He sleeps a lot\". She bought him Ensure and encourages to consume it daily. Patient  admits to poor physical activity tolerance. Had about 5 falls in the past few months. Patient said that his legs are weak and they hurt and give out.  He is on Promacta 75 mg daily for chronic ITP. He is also on rivaroxaban. History of recurrent iron deficiency anemia felt secondary to chronic occult blood loss from portal hypertensive gastropathy and hemorrhoids. He also has underlying alcoholic  fatty liver disease, not interested in CD treatment or reduction of alcohol use. Said that he has about 3 drinks a day. Does not smoke, quit about 45 years ago. Denies illicit drug use. Hx of appendectomy and back surgeries.  Family hx significant for alcoholism, pancreatic cancer in father, and lung cancer in sister.    PREVIOUS ENDOSCOPY:  11/10/2020 EGD  Findings:       The Z-line was regular and was " found 41 cm from the incisors.        Normal mucosa was found in the entire esophagus. This was biopsied with        a cold forceps for histology. Verification of patient identification for        the specimen was done. Estimated blood loss: none. Biopsies performed in        distal and proximal esophagus. No varices identified.        The distal esophagus was mildly tortuous.        There was mild resistance passing the scope across the GEJ at the level        of what appeared to be a paraesophageal hernia        There were polypoid appearing lesions that were not biopsied. Given a        history of gastric varices would only biopsy after ultrasound probe        rules out varix. 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     11/10/20 Colonoscopy  Findings:       The perianal and digital rectal examinations were normal.        The terminal ileum appeared normal.        A 7 mm polyp was found in the transverse colon. The polyp was sessile.        The polyp was removed with a cold snare. Resection and retrieval were        complete. Verification of patient identification for the specimen was        done. Estimated blood loss: none.        Scattered small-mouthed diverticula were found in the sigmoid colon.   FINAL DIAGNOSIS:   A. Distal Esophagus, Biopsy:   Squamous esophageal mucosa with no intraepithelial eosinophils or other   abnormality   B. Proximal Esophagus, Biopsy:   Squamous esophageal mucosa with no intraepithelial eosinophils or other   abnormality   C. Transverse Colon, Polyp:   Sessile serrated polyp; negative for dysplasia or malignancy     PERTINENT STUDIES Reviewed in EMR  7/25/23 MRI of liver  IMPRESSION:  1. 1.6 cm simple liver cyst with no suspicious enhancing internal  components.  2. Hepatic steatosis.  3. Mild splenomegaly.      ROS: 10pt ROS performed and  otherwise negative.    PAST MEDICAL HISTORY:  Past Medical History:   Diagnosis Date     Alcohol abuse since teens 01/15/2015    Still actively drinking     Alcoholic liver damage (H24) 1/10/2014     Gastroesophageal reflux disease with esophagitis 12/6/2016     Gout involving toe, unspecified cause, unspecified chronicity, unspecified laterality 6/2/2016     Heart murmur     heart is positioned farther on left side then typical     Hyperlipidemia 12/17/2015     Hypertension      ITP (idiopathic thrombocytopenic purpura)      Obstructive sleep apnea     does not use CPAP  machine     Pulmonary embolism (H) large RLL w infarctio 4-17 4/18/2017       PREVIOUS ABDOMINAL/GYNECOLOGIC SURGERIES:    Past Surgical History:   Procedure Laterality Date     APPENDECTOMY  1956     BACK SURGERY  1992, 1996    trimmed L4-L5, Fusion L4-L5     BONE MARROW BIOPSY, BONE SPECIMEN, NEEDLE/TROCAR  10/24/2012    Procedure: BIOPSY BONE MARROW;  BONE MARROW BIOPSY WITH ASPIRATE (AREVALO ORDERING);  Surgeon: Terri Hickey MD;  Location:  GI     COLONOSCOPY N/A 7/31/2019    Procedure: COLONOSCOPY;  Surgeon: Sebastian Garcia MD;  Location:  GI     ESOPHAGOSCOPY, GASTROSCOPY, DUODENOSCOPY (EGD), COMBINED N/A 2/8/2018    Procedure: COMBINED ESOPHAGOSCOPY, GASTROSCOPY, DUODENOSCOPY (EGD);  EGD;  Surgeon: Terri Crouch MD;  Location: [facility]     ESOPHAGOSCOPY, GASTROSCOPY, DUODENOSCOPY (EGD), COMBINED N/A 11/10/2020    Procedure: ESOPHAGOGASTRODUODENOSCOPY, WITH BIOPSY;  Surgeon: Alonzo Jang DO;  Location:  GI     FACIAL RECONSTRUCTION SURGERY  1965    jaw fracture     HC TOOTH EXTRACTION W/FORCEP  1990s         PERTINENT MEDICATIONS:  Current Outpatient Medications   Medication Sig Dispense Refill     allopurinol (ZYLOPRIM) 300 MG tablet Take 1 tablet by mouth once daily 90 tablet 0     bismuth subsalicylate (PEPTO BISMOL) 262 MG/15ML suspension Take 15 mLs by mouth every 6 hours as needed for indigestion or  diarrhea (as needed for diarrhea. Please collect stool sample first) 354 mL 1     Cholecalciferol (VITAMIN D) 50 MCG ( UT) CAPS Take 1 capsule by mouth once daily 90 capsule 0     folic acid (FOLVITE) 1 MG tablet Take 1 tablet by mouth once daily 90 tablet 1     gabapentin (NEURONTIN) 300 MG capsule Take 3 capsules (900 mg) by mouth 3 times daily 360 capsule 5     metoprolol succinate ER (TOPROL XL) 50 MG 24 hr tablet Take 1 tablet by mouth once daily 90 tablet 0     omeprazole (PRILOSEC) 40 MG DR capsule Take 1 capsule by mouth once daily 90 capsule 0     PROMACTA 75 MG tablet TAKE ONE TABLET BY MOUTH ONCE DAILY. TAKE ON AN EMPTY STOMACH (ONE HOUR BEFORE OR TWO HOURS AFTER A MEAL) 30 tablet 11     XARELTO ANTICOAGULANT 10 MG TABS tablet TAKE 1 TABLET BY MOUTH ONCE DAILY WITH SUPPER 30 tablet 0     ASPIRIN NOT PRESCRIBED (INTENTIONAL) Please choose reason not prescribed, below (Patient not taking: Reported on 10/9/2023) 1 each 0     carbidopa-levodopa (SINEMET)  MG tablet Take 1 tablet by mouth 3 times daily (Patient not taking: Reported on 2023) 90 tablet 5         SOCIAL HISTORY:  Social History     Socioeconomic History     Marital status:      Spouse name: Not on file     Number of children: Not on file     Years of education: Not on file     Highest education level: Not on file   Occupational History     Not on file   Tobacco Use     Smoking status: Former     Packs/day: 1.00     Years: 28.00     Pack years: 28.00     Types: Cigarettes     Start date:      Quit date: 1982     Years since quittin.0     Smokeless tobacco: Former   Vaping Use     Vaping Use: Never used   Substance and Sexual Activity     Alcohol use: Yes     Alcohol/week: 5.0 standard drinks of alcohol     Types: 5 Shots of liquor per week     Comment: 4-5 drinks/day, alcohol use disorder     Drug use: No     Sexual activity: Never     Partners: Female   Other Topics Concern     Parent/sibling w/ CABG, MI or  "angioplasty before 65F 55M? Yes   Social History Narrative     Not on file     Social Determinants of Health     Financial Resource Strain: Not on file   Food Insecurity: Not on file   Transportation Needs: Not on file   Physical Activity: Not on file   Stress: Not on file   Social Connections: Not on file   Interpersonal Safety: Not on file   Housing Stability: Not on file       FAMILY HISTORY:  Denies colon/panc/esophageal/other GI CA, no other Nation or other HPS-related [place]. No IBD/celiac, no other AI/liver/thyroid disease.    Family History   Problem Relation Age of Onset     Unknown/Adopted Mother      Unknown/Adopted Father      Heart Disease Sister      Diabetes No family hx of      Coronary Artery Disease No family hx of      Hypertension No family hx of      Hyperlipidemia No family hx of      Cerebrovascular Disease No family hx of      Breast Cancer No family hx of      Colon Cancer No family hx of      Prostate Cancer No family hx of      Other Cancer No family hx of      Depression No family hx of      Anxiety Disorder No family hx of      Mental Illness No family hx of      Substance Abuse No family hx of      Anesthesia Reaction No family hx of      Asthma No family hx of      Osteoporosis No family hx of      Genetic Disorder No family hx of      Thyroid Disease No family hx of      Obesity No family hx of        PHYSICAL EXAMINATION:  Vitals reviewed  /80 (BP Location: Right arm, Patient Position: Sitting, Cuff Size: Adult Regular)   Pulse 104   Ht 1.676 m (5' 6\")   Wt 80.7 kg (178 lb)   BMI 28.73 kg/m      General: Patient appears ill;  Needed assistance of 2 to get to exam table. Ambulates with a walker.  Skin: No visualized rash or lesions on visualized skin. Questionable mild jaundice  HEENT:    EOMI, no erythema, sclera icterus or discharge noted.  Mouth mucosa intact, pink, moist  No cervical or supraclavicular lymphadenopathy. Thyroid gland not enlarged.  Resp: breathing comfortably " without accessory muscle usage, speaking in full sentences, no cough. Lung sounds clear  Card: Regular and rhythmic S1 and S2. No gallop or rub. No murmur.  No LE edema.  Abdomen: Active bowel sounds X 4 quadrants. Soft to palpation. Mild tenderness in epigastrium and LUQ. No guarding or rebound tenderness. Lemon's sign negative. Unable to assess for organomegaly due to larger body habitus.  Neurologic: No apparent tremors, facial movements symmetric. Hard of hearing but able to communicate appropriately  Psych: affect normal, alert and oriented      ASSESSMENT/PLAN:    ICD-10-CM    1. Incontinence of feces with fecal urgency  R15.9 Elastase Fecal    R15.2 Calprotectin Feces     Enteric Bacteria and Virus Panel by KIMBERLY Stool     C. difficile Toxin B PCR with reflex to C. difficile Antigen and Toxins A/B EIA     CT Abdomen Pelvis w/o & w Contrast     Adult GI  Referral - Procedure Only     Elastase Fecal     Calprotectin Feces     Enteric Bacteria and Virus Panel by KIMBERLY Stool     C. difficile Toxin B PCR with reflex to C. difficile Antigen and Toxins A/B EIA      2. Diarrhea, unspecified type  R19.7 Adult GI  Referral - Consult Only     Elastase Fecal     Calprotectin Feces     Enteric Bacteria and Virus Panel by KIMBERLY Stool     C. difficile Toxin B PCR with reflex to C. difficile Antigen and Toxins A/B EIA     CT Abdomen Pelvis w/o & w Contrast     Adult GI  Referral - Procedure Only     bismuth subsalicylate (PEPTO BISMOL) 262 MG/15ML suspension     Elastase Fecal     Calprotectin Feces     Enteric Bacteria and Virus Panel by KIMBERLY Stool     C. difficile Toxin B PCR with reflex to C. difficile Antigen and Toxins A/B EIA      3. ETOH abuse  F10.10 CT Abdomen Pelvis w/o & w Contrast     Adult GI  Referral - Procedure Only      4. Weight loss  R63.4 CT Abdomen Pelvis w/o & w Contrast     Adult GI  Referral - Procedure Only      5. Anorexia  R63.0 CT Abdomen Pelvis w/o & w  Contrast     Adult GI  Referral - Procedure Only         Pleasant 78 year old male with complex medical history, was referred to GI clinic for evaluation of gradual progression of nausea, weight loss, poor appetite, and diarrhea. Over the past 5-6 months, the patient developed fecal urgency and incontinence. He cannot name any triggers, but admits to continuous alcohol abuse.Per his girlfriend's report, the patient appears to become more weak and tires. Reported 4-5 falls in the past few months. Weight loss from 225 to 178 lbs over the past year.  Patient's main concern is diarrhea with fecal incontinence. No melena or hematochezia.  CT scan of abdomen from 2018 showed colonic diverticulosis. Recent MRI of the liver was suggestive for hepatic steatosis and 1.6 cm liver cyst. Noted mild splenomegaly.  Patient had EGD and colonoscopy in 2020, which found tortuous esophagus with questionable paraesophageal hernia, and varices in stomach and duodenum. There was a sessile serrated 7 mm polyp in transverse colon.  Reviewed recent lab work that showed mild chronic anemia and thrombocytopenia. Normal CMO, TSH, and vit B12. Patient has been followed by oncology on chronic ITP.  He is on Promacta 75 mg daily and also on rivaroxaban. According to [employer], there is a risk for diarrhea as a side effect in 9-21 % of patients taking Promacta, and 4-9% for nausea.Patient has been on the medication for some time; moreover, benefits of therapy overweigh risks. Therefore, no changes to medications are recommended.  We reviewed other possible causes of diarrhea. I strongly suspect malabsorption or exocrine pancreatic insuffiencey as etiology of patient's symptoms. Education on health risks and harm from alcohol use was provided.  Recommended the following:  - Abdominal/pelvis CT scan.  - Upper GI endoscopy;  - Stool studies;  - Pepto-bismol or imodium as needed for diarrhea if negative C.diff screening.  - GI soft diet with  higher calorie intake. Small frequent meals.  - Abstinence from alcohol or CD treatment. Patient's girlfriend mentioned that he was able to abstain alcohol for 9 months in the past.    Patient verbalized understanding and appreciation of care provided. Stated that all of the questions were answered to her/his satisfaction.  RTC in 2 months    Thank you for this consultation. It was a pleasure to participate in the care of this patient; please contact us with any further questions.    SCOTTIE Bahena, ERICAP-C  [facility]  Gastroenterology Department  [place], [place]    This note was created with Dragon voice recognition software, and while reviewed for accuracy, inadvertent minor typographic errors may occur. Please contact the provider if you have any questions.       Again, thank you for allowing me to participate in the care of your patient.        Sincerely,        SCOTTIE BAHENA CNP

## 2023-10-13 DIAGNOSIS — D69.3 IDIOPATHIC THROMBOCYTOPENIC PURPURA (H): ICD-10-CM

## 2023-10-13 DIAGNOSIS — D50.0 IRON DEFICIENCY ANEMIA DUE TO CHRONIC BLOOD LOSS: ICD-10-CM

## 2023-10-13 DIAGNOSIS — Z86.718 PERSONAL HISTORY OF VENOUS THROMBOSIS AND EMBOLISM: ICD-10-CM

## 2023-10-13 PROCEDURE — 82653 EL-1 FECAL QUANTITATIVE: CPT | Performed by: NURSE PRACTITIONER

## 2023-10-13 PROCEDURE — 83993 ASSAY FOR CALPROTECTIN FECAL: CPT | Performed by: NURSE PRACTITIONER

## 2023-10-14 ENCOUNTER — HOSPITAL ENCOUNTER (OUTPATIENT)
Dept: CT IMAGING | Facility: CLINIC | Age: 78
Discharge: HOME | End: 2023-10-14
Attending: NURSE PRACTITIONER | Admitting: NURSE PRACTITIONER
Payer: COMMERCIAL

## 2023-10-14 DIAGNOSIS — F10.10 ETOH ABUSE: ICD-10-CM

## 2023-10-14 DIAGNOSIS — R15.2 INCONTINENCE OF FECES WITH FECAL URGENCY: ICD-10-CM

## 2023-10-14 DIAGNOSIS — R15.9 INCONTINENCE OF FECES WITH FECAL URGENCY: ICD-10-CM

## 2023-10-14 DIAGNOSIS — R63.0 ANOREXIA: ICD-10-CM

## 2023-10-14 DIAGNOSIS — R19.7 DIARRHEA, UNSPECIFIED TYPE: ICD-10-CM

## 2023-10-14 DIAGNOSIS — R63.4 WEIGHT LOSS: ICD-10-CM

## 2023-10-14 PROCEDURE — 74177 CT ABD & PELVIS W/CONTRAST: CPT

## 2023-10-14 PROCEDURE — 250N000011 HC RX IP 250 OP 636: Performed by: RADIOLOGY

## 2023-10-14 PROCEDURE — 250N000009 HC RX 250: Performed by: RADIOLOGY

## 2023-10-14 RX ORDER — IOPAMIDOL 755 MG/ML
86 INJECTION, SOLUTION INTRAVASCULAR ONCE
Status: COMPLETED | OUTPATIENT
Start: 2023-10-14 | End: 2023-10-14

## 2023-10-14 RX ORDER — RIVAROXABAN 10 MG/1
TABLET, FILM COATED ORAL
Qty: 30 TABLET | Refills: 0 | Status: SHIPPED | OUTPATIENT
Start: 2023-10-14 | End: 2024-01-22

## 2023-10-14 RX ADMIN — IOPAMIDOL 86 ML: 755 INJECTION, SOLUTION INTRAVENOUS at 15:52

## 2023-10-14 RX ADMIN — SODIUM CHLORIDE 62 ML: 9 INJECTION, SOLUTION INTRAVENOUS at 15:52

## 2023-10-16 LAB

## 2023-10-17 LAB
CALPROTECTIN STL-MCNT: 169 MG/KG (ref 0–49.9)
ELASTASE PANC STL-MCNT: 213 UG/G

## 2023-10-19 ENCOUNTER — TELEPHONE (OUTPATIENT)
Dept: GASTROENTEROLOGY | Facility: CLINIC | Age: 78
End: 2023-10-19
Payer: COMMERCIAL

## 2023-10-19 NOTE — TELEPHONE ENCOUNTER
----- Message from SCOTTIE Bahena CNP sent at 10/19/2023  2:10 PM CDT -----  Please let the patient know that his stool tests were negative for infection including C.diff. No signs of pancreatic insufficiency. Noted elevated calprotectin, which could indicate inflammation of intestine.   Reassess if the patient is taking loperamide or Pepto-Bismol? Any changes in his diarrhea?  Will prescribe a different treatment if no changes in symptoms despite taking medications.  Thank you,  Paulina Borjas CNP, GI      CT results -  Please let Raymon know that he had the following findings on CT scan of abdomen:   - Fatty liver with liver cirrhosis;   - Diverticulosis of the colon;   - A 1.5 cm liver cyst;   - A small hiatal hernia.   - Normal pancreas appearance.   No further interventions are needed on any of the findings except a follow up liver elastography or MRI in 3-6 months.   Thank you,   Paulina Borjas CNP, GI

## 2023-10-25 NOTE — TELEPHONE ENCOUNTER
Spoke with wife, she reports that he is currently having about 3 episodes of diarrhea a day, she is not sure if he is using any medications for the diarrhea.   Wife is wondering if he should have scope completed or not.   Wife is also wondering if he needs to have any diet changes due to the liver and diverticulitis.     Allie Live RN

## 2023-10-30 NOTE — TELEPHONE ENCOUNTER
- I would suggest to increase fiber intake for diverticulosis (slow taper to prevent bloating). Take loperamide as needed for diarrhea. Fiber could help diarrhea as well.   - Abstaining alcohol would be the most effective intervention to prevent further damage to his liver. Avoid acetaminophen. There is no medications he can take for alcohol induced fatty liver.   -Yes, we can proceed with upper GI endoscopy if patient feels he is having severe symptoms (nausea,bloating, acid reflux, poor appetite, abdominal pain). Otherwise, I would suggest to try the listed above med and supplement.   Thank you,   Paulina Borjas, KIMI, GI

## 2023-10-31 ENCOUNTER — TELEPHONE (OUTPATIENT)
Dept: GASTROENTEROLOGY | Facility: CLINIC | Age: 78
End: 2023-10-31
Payer: COMMERCIAL

## 2023-10-31 NOTE — TELEPHONE ENCOUNTER
Call placed, message given below.    Wife reports that he is having several episodes of diarrhea during the day, usually not able to make it to the bathroom.  She will use the Loperamide, however wondering if there is anything else that she can do for the diarrhea.   Wife will call and schedule the scope.     Allie Live RN

## 2023-10-31 NOTE — TELEPHONE ENCOUNTER
"Endoscopy Scheduling Screen    Have you had a positive Covid test in the last 14 days?  No    Are you active on MyChart?   No    What insurance is in the chart?  Other:  [facility]    Ordering/Referring Provider: BOB GLASER    (If ordering provider performs procedure, schedule with ordering provider unless otherwise instructed. )    BMI: Estimated body mass index is 28.73 kg/m  as calculated from the following:    Height as of 10/9/23: 1.676 m (5' 6\").    Weight as of 10/9/23: 80.7 kg (178 lb).     Sedation Ordered  MAC/deep sedation.   BMI<= 45 45 < BMI <= 48 48 < BMI < = 50  BMI > 50   No Restrictions No [facility]  No [facility]  [facility] with exceptions Hospital Only OR Only       Are you taking any prescription medications for pain 3 or more times per week?   No    Do you have a history of malignant hyperthermia or adverse reaction to anesthesia?  No    (Females) Are you currently pregnant?        Have you been diagnosed or told you have pulmonary hypertension?   No    Do you have an LVAD?  No    Have you been told you have moderate to severe sleep apnea?  No    Have you been told you have COPD, asthma, or any other lung disease?  No    Do you have any heart conditions?  No     Have you ever had an organ transplant?   No    Have you ever had or are you awaiting a heart or lung transplant?   No    Have you had a stroke or transient ischemic attack (TIA aka \"mini stroke\" in the last 6 months?   No    Have you been diagnosed with or been told you have cirrhosis of the liver?   Yes (RN Review required for scheduling unless scheduling in Hospital.)    Are you currently on dialysis?   No    Do you need assistance transferring?   Yes (Hospital Only)    BMI: Estimated body mass index is 28.73 kg/m  as calculated from the following:    Height as of 10/9/23: 1.676 m (5' 6\").    Weight as of 10/9/23: 80.7 kg (178 lb).     Is patients BMI > 40 and scheduling location [facility]?  No    Do you take an injectable medication for weight loss " or diabetes (excluding insulin)?  No    Do you take the medication Naltrexone?  No    Do you take blood thinners?  Yes     Are you taking Effient/Prasugrel?  No, you must contact your prescribing provider for direction on holding or bridging with a different medication.       Prep   Are you currently on dialysis or do you have chronic kidney disease?  No    Do you have a diagnosis of diabetes?  No    Do you have a diagnosis of cystic fibrosis (CF)?  No    On a regular basis do you go 3 -5 days between bowel movements?  No    BMI > 40?  No    Preferred Pharmacy:    [facility] [address] [place] [address] [address] [place] [address]  [address] [address]  Phone: [phone] Fax: [phone]    Final Scheduling Details   Colonoscopy prep sent?  Standard MiraLAX    Procedure scheduled  Colonoscopy    Surgeon:  RACHEL     Date of procedure:  12/13     Pre-OP / PAC:   No - Not required for this site.    Location  PH - Per order.    Sedation   MAC/Deep Sedation - Per order.      Patient Reminders:   You will receive a call from a Nurse to review instructions and health history.  This assessment must be completed prior to your procedure.  Failure to complete the Nurse assessment may result in the procedure being cancelled.      On the day of your procedure, please designate an adult(s) who can drive you home stay with you for the next 24 hours. The medicines used in the exam will make you sleepy. You will not be able to drive.      You cannot take public transportation, ride share services, or non-medical taxi service without a responsible caregiver.  Medical transport services are allowed with the requirement that a responsible caregiver will receive you at your destination.  We require that drivers and caregivers are confirmed prior to your procedure.

## 2023-11-06 NOTE — TELEPHONE ENCOUNTER
Paulina Borjas, APRN CNP  You4 days ago     TS  I believe they have loperamide at home, bought OTC. If needed, I can place an order. I would prefer not to use Lomotil or Levsin due to pt's cardiac condition.  Thank you,  Paulina Borjas, CNP

## 2023-11-28 NOTE — PROGRESS NOTES
Gastroenterology CLINIC VISIT, RETURN PATIENT    CC/REFERRING PROVIDER: Waylon Novoa   REASON FOR CONSULTATION: follow up    HPI: 78 year old male presented to GI clinic for follow up. I saw the patient for diarrhea, poor appetite, weakness, and weight loss. Over the past 5-6 months, the patient developed fecal urgency and incontinence. Reported 4-5 falls in the past few months. Weight loss from 225 to 178 lbs over the past year.   Recent MRI of the liver was suggestive for hepatic steatosis and 1.6 cm liver cyst. Noted mild splenomegaly.  Patient had EGD and colonoscopy in 2020, which found tortuous esophagus with questionable paraesophageal hernia, and varices in stomach and duodenum. There was a sessile serrated 7 mm polyp in transverse colon.   Reviewed recent lab work that showed mild chronic anemia and thrombocytopenia. He is on Promacta 75 mg daily for chronic ITP. He is also on rivaroxaban. Patient has been followed by oncology on chronic ITP. History of recurrent iron deficiency anemia felt secondary to chronic occult blood loss from portal hypertensive gastropathy and hemorrhoids. He also has underlying alcoholic  fatty liver disease, not interested in CD treatment or reduction of alcohol use.    Recommended the following:  - Abdominal/pelvis CT scan.  - Upper GI endoscopy;  - Stool studies;  - Pepto-bismol or imodium as needed for diarrhea if negative C.diff screening.  - GI soft diet with higher calorie intake. Small frequent meals.    Patient reported some improvement in his appetite but complains that nothing tastes good. He continues drinking alcohol. Has occasional loose stools, takes pepto-bismol that works. No hematochezia or melena. No nausea or vomiting.  Complains of worsening in weakness and pain in LEs. Patient's wife stated that he does not always remember to take gabapentin. Suffers polyneuropathy.      PREVIOUS ENDOSCOPY:  12/13/23 EGD by Dr. Duran  Findings:       The examined  duodenum was normal. Biopsies were taken with a cold        forceps for histology.        Severe portal hypertensive gastropathy was found in the stomach.        Type 2 isolated gastric varices (IGV2, varices located in the body,        antrum or around the pylorus) with no bleeding were found in the        prepyloric region of the stomach. There were no stigmata of recent        bleeding.        The exam of the stomach was otherwise normal.        The examined esophagus was normal.     11/10/2020 EGD  Findings:       The Z-line was regular and was found 41 cm from the incisors.        Normal mucosa was found in the entire esophagus. This was biopsied with        a cold forceps for histology. Verification of patient identification for        the specimen was done. Estimated blood loss: none. Biopsies performed in        distal and proximal esophagus. No varices identified.        The distal esophagus was mildly tortuous.        There was mild resistance passing the scope across the GEJ at the level        of what appeared to be a paraesophageal hernia        There were polypoid appearing lesions that were not biopsied. Given a        history of gastric varices would only biopsy after ultrasound probe        rules out varix. 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      11/10/20 Colonoscopy  Findings:       The perianal and digital rectal examinations were normal.        The terminal ileum appeared normal.        A 7 mm polyp was found in the transverse colon. The polyp was sessile.        The polyp was removed with a cold snare. Resection and retrieval were        complete. Verification of patient identification for the specimen was        done. Estimated blood loss: none.        Scattered small-mouthed diverticula were found in the sigmoid colon.   FINAL DIAGNOSIS:   A.  Distal Esophagus, Biopsy:   Squamous esophageal mucosa with no intraepithelial eosinophils or other   abnormality   B. Proximal Esophagus, Biopsy:   Squamous esophageal mucosa with no intraepithelial eosinophils or other   abnormality   C. Transverse Colon, Polyp:   Sessile serrated polyp; negative for dysplasia or malignancy      PERTINENT STUDIES Reviewed in EMR  7/25/23 MRI of liver  IMPRESSION:  1. 1.6 cm simple liver cyst with no suspicious enhancing internal  components.  2. Hepatic steatosis.  3. Mild splenomegaly.  PERTINENT STUDIES Reviewed in EMR    ROS: 10pt ROS performed and otherwise negative.    PAST MEDICAL HISTORY:  Past Medical History:   Diagnosis Date    Alcohol abuse since teens 01/15/2015    Still actively drinking    Alcoholic liver damage (H24) 1/10/2014    Gastroesophageal reflux disease with esophagitis 12/6/2016    Gout involving toe, unspecified cause, unspecified chronicity, unspecified laterality 6/2/2016    Heart murmur     heart is positioned farther on left side then typical    Hyperlipidemia 12/17/2015    Hypertension     ITP (idiopathic thrombocytopenic purpura)     Obstructive sleep apnea     does not use CPAP  machine    Pulmonary embolism (H) large RLL w infarctio 4-17 4/18/2017       PREVIOUS ABDOMINAL/GYNECOLOGIC SURGERIES:    Past Surgical History:   Procedure Laterality Date    APPENDECTOMY  1956    BACK SURGERY  1992, 1996    trimmed L4-L5, Fusion L4-L5    BONE MARROW BIOPSY, BONE SPECIMEN, NEEDLE/TROCAR  10/24/2012    Procedure: BIOPSY BONE MARROW;  BONE MARROW BIOPSY WITH ASPIRATE (AREVALO ORDERING);  Surgeon: Terri Hickey MD;  Location: [facility]    COLONOSCOPY N/A 7/31/2019    Procedure: COLONOSCOPY;  Surgeon: Sebastian Garcia MD;  Location: [facility]    ESOPHAGOSCOPY, GASTROSCOPY, DUODENOSCOPY (EGD), COMBINED N/A 2/8/2018    Procedure: COMBINED ESOPHAGOSCOPY, GASTROSCOPY, DUODENOSCOPY (EGD);  EGD;  Surgeon: Terri Crouch MD;  Location: [facility]     ESOPHAGOSCOPY, GASTROSCOPY, DUODENOSCOPY (EGD), COMBINED N/A 11/10/2020    Procedure: ESOPHAGOGASTRODUODENOSCOPY, WITH BIOPSY;  Surgeon: Alonzo Jang DO;  Location:  GI    ESOPHAGOSCOPY, GASTROSCOPY, DUODENOSCOPY (EGD), COMBINED N/A 12/13/2023    Procedure: ESOPHAGOGASTRODUODENOSCOPY, WITH BIOPSY;  Surgeon: Everardo Duran MD;  Location:  GI    FACIAL RECONSTRUCTION SURGERY  1965    jaw fracture    HC TOOTH EXTRACTION W/FORCEP  1990s         PERTINENT MEDICATIONS:  Current Outpatient Medications   Medication Sig Dispense Refill    allopurinol (ZYLOPRIM) 300 MG tablet Take 1 tablet by mouth once daily 90 tablet 0    bismuth subsalicylate (PEPTO BISMOL) 262 MG/15ML suspension Take 15 mLs by mouth every 6 hours as needed for indigestion or diarrhea (as needed for diarrhea. Please collect stool sample first) 354 mL 1    Cholecalciferol (VITAMIN D) 50 MCG (2000 UT) CAPS Take 1 capsule by mouth once daily 90 capsule 0    folic acid (FOLVITE) 1 MG tablet Take 1 tablet by mouth once daily 90 tablet 1    gabapentin (NEURONTIN) 300 MG capsule Take 3 capsules (900 mg) by mouth 3 times daily 360 capsule 5    metoprolol succinate ER (TOPROL XL) 50 MG 24 hr tablet Take 1 tablet by mouth once daily 90 tablet 0    omeprazole (PRILOSEC) 40 MG DR capsule Take 1 capsule by mouth once daily 90 capsule 0    PROMACTA 75 MG tablet TAKE ONE TABLET BY MOUTH ONCE DAILY. TAKE ON AN EMPTY STOMACH (ONE HOUR BEFORE OR TWO HOURS AFTER A MEAL) 30 tablet 11    XARELTO ANTICOAGULANT 10 MG TABS tablet TAKE 1 TABLET BY MOUTH ONCE DAILY WITH SUPPER 30 tablet 0    ASPIRIN NOT PRESCRIBED (INTENTIONAL) Please choose reason not prescribed, below (Patient not taking: Reported on 10/9/2023) 1 each 0    carbidopa-levodopa (SINEMET)  MG tablet Take 1 tablet by mouth 3 times daily (Patient not taking: Reported on 4/20/2023) 90 tablet 5         SOCIAL HISTORY:  Social History     Socioeconomic History    Marital status:      Spouse name: Not  on file    Number of children: Not on file    Years of education: Not on file    Highest education level: Not on file   Occupational History    Not on file   Tobacco Use    Smoking status: Former     Packs/day: 1.00     Years: 28.00     Additional pack years: 0.00     Total pack years: 28.00     Types: Cigarettes     Start date:      Quit date: 1982     Years since quittin.2    Smokeless tobacco: Former   Vaping Use    Vaping Use: Never used   Substance and Sexual Activity    Alcohol use: Yes     Alcohol/week: 5.0 standard drinks of alcohol     Types: 5 Shots of liquor per week     Comment: 4-5 drinks/day, alcohol use disorder    Drug use: No    Sexual activity: Never     Partners: Female   Other Topics Concern    Parent/sibling w/ CABG, MI or angioplasty before 65F 55M? Yes   Social History Narrative    Not on file     Social Determinants of Health     Financial Resource Strain: Not on file   Food Insecurity: Not on file   Transportation Needs: Not on file   Physical Activity: Not on file   Stress: Not on file   Social Connections: Not on file   Interpersonal Safety: Not on file   Housing Stability: Not on file       FAMILY HISTORY:  Denies colon/panc/esophageal/other GI CA, no other Nation or other HPS-related [place]. No IBD/celiac, no other AI/liver/thyroid disease.    Family History   Problem Relation Age of Onset    Unknown/Adopted Mother     Unknown/Adopted Father     Heart Disease Sister     Diabetes No family hx of     Coronary Artery Disease No family hx of     Hypertension No family hx of     Hyperlipidemia No family hx of     Cerebrovascular Disease No family hx of     Breast Cancer No family hx of     Colon Cancer No family hx of     Prostate Cancer No family hx of     Other Cancer No family hx of     Depression No family hx of     Anxiety Disorder No family hx of     Mental Illness No family hx of     Substance Abuse No family hx of     Anesthesia Reaction No family hx of     Asthma No family  "hx of     Osteoporosis No family hx of     Genetic Disorder No family hx of     Thyroid Disease No family hx of     Obesity No family hx of        PHYSICAL EXAMINATION:  Vitals reviewed  /82 (BP Location: Right arm, Patient Position: Sitting, Cuff Size: Adult Regular)   Pulse 76   Ht 1.676 m (5' 6\")   Wt 83.5 kg (184 lb)   BMI 29.70 kg/m      Due to impaired mobility,limited assessment was performed when patient was sitting in a chair.  General: Patient appears well in no acute distress.    Skin: No visualized rash or lesions on visualized skin  Jaundice noted  No cervical or supraclavicular lymphadenopathy. Thyroid gland not enlarged.  Resp: breathing comfortably without accessory muscle usage, speaking in full sentences, no cough. Lung sounds clear  Card: Regular and rhythmic S1 and S2. No gallop or rub. No murmur.  No LE edema.  Abdomen: Active bowel sounds X 4 quadrants. Soft to palpation.  No guarding or rebound tenderness. Lemon's sign negative.  Psych: affect normal, alert and oriented. Hard of hearing, wearing h/aids.      ASSESSMENT/PLAN:    ICD-10-CM    1. Diarrhea, unspecified type  R19.7 Adult GI Clinic Follow-Up Order (Blank)      2. Abdominal bloating  R14.0       3. Alcoholic cirrhosis of liver without ascites (H)  K70.30 Adult GI Clinic Follow-Up Order (Blank)      4. Portal hypertensive gastropathy (H)  K76.6     K31.89       5. Gastric varices without bleeding  I86.4          78 year old male  presented  to GI clinic for follow up on weight loss, anorexia, nausea, and diarrhea with new fecal urgency and incontinence. Patient admits to recent active alcohol abuse.  We discussed findings of CT scan,labs, and endoscopy with the patient and his wife.  CT scan of abdomen revealed fatty liver with liver cirrhosis, colonic diverticulosis,  an 1.5 cm liver cyst,a small hiatal hernia. Normal pancreas appearance.  No further interventions are needed on any of the findings except a follow up liver " elastography or MRI in 3-6 months.  Stool tests were negative for GI infection including C.diff and common enteric pathogens. No signs of pancreatic insufficiency. Noted mild nonspecific elevation of calprotectin, which could indicate inflammation of intestine.   EGD was suggestive for severe portal hypertensive gastropathy and 2 gastric varices without signs of bleeding. Suggested to continue PPI.  We had a long conversation of alcohol impact on GI system, significance of liver cirrhosis and prognosis. Patient never had CD eval/treatment and is not interested in one. He has been a daily drinker for many many years. He was sober for about 9  months at one point before his surgery. Was seen by one of our hepatology provider in the past, Dr. Crouch. I do not believe that we have much to offer the patient if he is not interested in sobriety. Will continue supportive cares and monitoring.    Patient verbalized understanding and appreciation of care provided. Stated that all of the questions were answered to her/his satisfaction.  RTCin 6 months    Thank you for this consultation. It was a pleasure to participate in the care of this patient; please contact us with any further questions.    SCOTTIE Bahena, FNP-C  [facility]  Gastroenterology Department  [place], [place]    This note was created with Dragon voice recognition software, and while reviewed for accuracy, inadvertent minor typographic errors may occur. Please contact the provider if you have any questions.

## 2023-11-30 ENCOUNTER — TELEPHONE (OUTPATIENT)
Dept: ONCOLOGY | Facility: CLINIC | Age: 78
End: 2023-11-30
Payer: COMMERCIAL

## 2023-11-30 NOTE — TELEPHONE ENCOUNTER
Prior Authorization Approval    Medication: PROMACTA 75 MG PO TABS  Authorization Effective Date: 11/30/2023  Authorization Expiration Date: 12/31/2024  Approved Dose/Quantity: 30 per 30 DS  Reference #: [id number]   Insurance Company: [employer] Part D - Phone [phone] Fax [phone]  Expected CoPay: $ 20  CoPay Card Available:      Financial Assistance Needed: no  Which Pharmacy is filling the prescription: [facility] - [place] [address]   Pharmacy Notified:   Patient Notified:           Thank you,    [place]  Oncology Pharmacy [email or web address]  Phone: [phone]  Fax: [phone]

## 2023-12-12 ENCOUNTER — ANESTHESIA EVENT (OUTPATIENT)
Dept: GASTROENTEROLOGY | Facility: CLINIC | Age: 78
End: 2023-12-12
Payer: COMMERCIAL

## 2023-12-12 ASSESSMENT — LIFESTYLE VARIABLES: TOBACCO_USE: 1

## 2023-12-12 NOTE — H&P
[facility] Anesthesia Pre-op History and Physical    Raymon Ace MRN# [id number]   Age: 78 year old YOB: 1945      Date of Surgery: 12/13/2023 [facility]      Date of Exam 12/13/2023 Facility (In hospital)       Home clinic:   Primary care provider: Beatrice Stephens         Chief Complaint and/or Reason for Procedure:   No chief complaint on file.  Diarrhea  EGD. Liver disease. Nml 2020 exam. Hx duodenal varices.       Active problem list:     Patient Active Problem List    Diagnosis Date Noted    ITP (idiopathic thrombocytopenic purpura) since 3-13 back surgery       Priority: High    Memory loss 07/12/2023     Priority: Medium    Primary osteoarthritis of both knees 05/22/2023     Priority: Medium    Major depressive disorder in full remission, unspecified whether recurrent (H24) 12/02/2022     Priority: Medium    Gastric varices 10/27/2020     Priority: Medium    Alcoholic cirrhosis (H) 10/27/2020     Priority: Medium    Preop general physical exam 10/05/2020     Priority: Medium    Chronic anticoagulation 10/05/2020     Priority: Medium    Longitudinal ridging of nail + spooning of middle ones  12/27/2019     Priority: Medium    Cardiomegaly per 2017 CXR  12/27/2019     Priority: Medium    Major depression in complete remission (H) 12/27/2019     Priority: Medium    CKD (chronic kidney disease) stage 3, GFR 30-59 ml/min (H) 06/07/2019     Priority: Medium    Portal hypertension (H) 06/07/2019     Priority: Medium    Impaired fasting glucose 06/07/2019     Priority: Medium    Fatigue, unspecified type 06/07/2019     Priority: Medium    Screening for prostate cancer 09/14/2018     Priority: Medium    Iron deficiency anemia due to chronic blood loss 06/28/2018     Priority: Medium    Pulmonary embolism (H) large RLL w infarctio 4-17 04/18/2017     Priority: Medium     Formatting of this note might be different from the original.  RLL with infarct       Gastroesophageal reflux disease with esophagitis 12/06/2016     Priority: Medium    Need for prophylactic vaccination against Streptococcus pneumoniae (pneumococcus) 08/04/2016     Priority: Medium    Gout involving toe, unspecified cause, unspecified chronicity, unspecified laterality 06/02/2016     Priority: Medium    ACP (advance care planning) 05/16/2016     Priority: Medium     Advance Care Planning 5/16/2016: ACP Review of Chart / Resources Provided:  Reviewed chart for advance care plan.  Raymon Ace has no plan or code status on file however states presence of ACP document. Copy requested. Confirmed code status reflects current choices pending receipt of document/advance care plan review.  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 Provider to review      Change in bowel habits since 1-14: diarrhea-thinks better off benicar 07/10/2014     Priority: Medium    Family history of ischemic heart disease 01/17/2014     Priority: Medium    Family history of diabetes mellitus 01/17/2014     Priority: Medium    Glucose intolerance (impaired glucose tolerance)  HgbA!C = 5.4 01/10/2014     Priority:  Medium    Back pain since 1988 s/po surgery 1992,1996 and 3-13/ Dr Singh  01/10/2014     Priority: Medium     No CSA on file   8-=22-17 no concerns      Alcoholic liver disease (H24) 01/10/2014     Priority: Medium    Diverticulosis of large intestine 01/10/2014     Priority: Medium     Problem list name updated by automated process. Provider to review      Hypotension 03/16/2013     Priority: Medium    Lumbar spinal stenosis 12/14/2012     Priority: Medium    Steroid-induced hyperglycemia 12/14/2012     Priority: Medium     Although glucose normal on 1/30/13 atr 86      Fatty liver/ 1-14 US 12/14/2012     Priority: Medium    Atherosclerosis 12/14/2012     Priority: Medium    Alcohol dependence, continuous (H) 12/14/2012     Priority: Medium    Obstructive sleep apnea syndrome 09/21/2012     Priority: Medium     Does not tolerate CPAP  Problem list name updated by automated process. Provider to review      Restless legs syndrome (RLS) 09/21/2012     Priority: Medium    Polyneuropathy in other diseases classified elsewhere (H24) 09/21/2012     Priority: Medium     RX monitoring program ([place]) reviewed: 8/4/20 recommend provider review    [place] profile:  [email or web address]        Gastroesophageal reflux disease without esophagitis 09/21/2012     Priority: Medium    Preventive measure 01/31/2013     Priority: Low     APRIMA DATA BASE UNDER THE 12/14/12 NOTE  Colonoscopy neg in 5/10; PSA 0.31 in 9/12              Medications (include herbals and vitamins):   Any Plavix use in the last 7 days? No     Current Facility-Administered Medications   Medication    ropivacaine (NAROPIN) injection 3 mL    ropivacaine (NAROPIN) injection 3 mL    triamcinolone (KENALOG-40) injection 40 mg    triamcinolone (KENALOG-40) injection 40 mg     Current Outpatient Medications   Medication Sig    allopurinol (ZYLOPRIM) 300 MG tablet Take 1 tablet by mouth once daily    ASPIRIN NOT PRESCRIBED (INTENTIONAL) Please choose reason  not prescribed, below (Patient not taking: Reported on 10/9/2023)    bismuth subsalicylate (PEPTO BISMOL) 262 MG/15ML suspension Take 15 mLs by mouth every 6 hours as needed for indigestion or diarrhea (as needed for diarrhea. Please collect stool sample first)    carbidopa-levodopa (SINEMET)  MG tablet Take 1 tablet by mouth 3 times daily (Patient not taking: Reported on 4/20/2023)    Cholecalciferol (VITAMIN D) 50 MCG (2000 UT) CAPS Take 1 capsule by mouth once daily    folic acid (FOLVITE) 1 MG tablet Take 1 tablet by mouth once daily    gabapentin (NEURONTIN) 300 MG capsule Take 3 capsules (900 mg) by mouth 3 times daily    metoprolol succinate ER (TOPROL XL) 50 MG 24 hr tablet Take 1 tablet by mouth once daily    omeprazole (PRILOSEC) 40 MG DR capsule Take 1 capsule by mouth once daily    PROMACTA 75 MG tablet TAKE ONE TABLET BY MOUTH ONCE DAILY. TAKE ON AN EMPTY STOMACH (ONE HOUR BEFORE OR TWO HOURS AFTER A MEAL)    XARELTO ANTICOAGULANT 10 MG TABS tablet TAKE 1 TABLET BY MOUTH ONCE DAILY WITH SUPPER             Allergies:      Allergies   Allergen Reactions    Olmesartan Diarrhea     Allergy to Latex? No  Allergy to tape?   No  Intolerances:             Physical Exam:   All vitals have been reviewed  No data found.  No intake/output data recorded.  Lungs:   No increased work of breathing, good air exchange, clear to auscultation bilaterally, no crackles or wheezing     Cardiovascular:   Normal apical impulse, regular rate and rhythm, normal S1 and S2, no S3 or S4, and no murmur noted             Lab / Radiology Results:            Anesthetic risk and/or ASA classification:       Everardo Duran MD

## 2023-12-13 ENCOUNTER — ANESTHESIA (OUTPATIENT)
Dept: GASTROENTEROLOGY | Facility: CLINIC | Age: 78
End: 2023-12-13
Payer: COMMERCIAL

## 2023-12-13 ENCOUNTER — HOSPITAL ENCOUNTER (OUTPATIENT)
Facility: CLINIC | Age: 78
Discharge: HOME | End: 2023-12-13
Attending: INTERNAL MEDICINE | Admitting: INTERNAL MEDICINE
Payer: COMMERCIAL

## 2023-12-13 VITALS — OXYGEN SATURATION: 94 % | DIASTOLIC BLOOD PRESSURE: 77 MMHG | HEART RATE: 59 BPM | SYSTOLIC BLOOD PRESSURE: 136 MMHG

## 2023-12-13 LAB — UPPER GI ENDOSCOPY: NORMAL

## 2023-12-13 PROCEDURE — 250N000009 HC RX 250: Performed by: NURSE ANESTHETIST, CERTIFIED REGISTERED

## 2023-12-13 PROCEDURE — 370N000017 HC ANESTHESIA TECHNICAL FEE, PER MIN: Performed by: INTERNAL MEDICINE

## 2023-12-13 PROCEDURE — 88305 TISSUE EXAM BY PATHOLOGIST: CPT | Mod: TC | Performed by: INTERNAL MEDICINE

## 2023-12-13 PROCEDURE — 258N000003 HC RX IP 258 OP 636: Performed by: NURSE ANESTHETIST, CERTIFIED REGISTERED

## 2023-12-13 PROCEDURE — 43239 EGD BIOPSY SINGLE/MULTIPLE: CPT | Performed by: INTERNAL MEDICINE

## 2023-12-13 PROCEDURE — 250N000011 HC RX IP 250 OP 636: Performed by: NURSE ANESTHETIST, CERTIFIED REGISTERED

## 2023-12-13 PROCEDURE — 88305 TISSUE EXAM BY PATHOLOGIST: CPT | Mod: 26 | Performed by: PATHOLOGY

## 2023-12-13 RX ORDER — LIDOCAINE HYDROCHLORIDE 20 MG/ML
INJECTION, SOLUTION INFILTRATION; PERINEURAL PRN
Status: DISCONTINUED | OUTPATIENT
Start: 2023-12-13 | End: 2023-12-13

## 2023-12-13 RX ORDER — SODIUM CHLORIDE, SODIUM LACTATE, POTASSIUM CHLORIDE, CALCIUM CHLORIDE 600; 310; 30; 20 MG/100ML; MG/100ML; MG/100ML; MG/100ML
INJECTION, SOLUTION INTRAVENOUS CONTINUOUS PRN
Status: DISCONTINUED | OUTPATIENT
Start: 2023-12-13 | End: 2023-12-13

## 2023-12-13 RX ORDER — PROPOFOL 10 MG/ML
INJECTION, EMULSION INTRAVENOUS PRN
Status: DISCONTINUED | OUTPATIENT
Start: 2023-12-13 | End: 2023-12-13

## 2023-12-13 RX ADMIN — LIDOCAINE HYDROCHLORIDE 70 MG: 20 INJECTION, SOLUTION INFILTRATION; PERINEURAL at 11:50

## 2023-12-13 RX ADMIN — PROPOFOL 50 MG: 10 INJECTION, EMULSION INTRAVENOUS at 11:51

## 2023-12-13 RX ADMIN — PROPOFOL 50 MG: 10 INJECTION, EMULSION INTRAVENOUS at 11:54

## 2023-12-13 RX ADMIN — PROPOFOL 100 MG: 10 INJECTION, EMULSION INTRAVENOUS at 11:50

## 2023-12-13 RX ADMIN — SODIUM CHLORIDE, POTASSIUM CHLORIDE, SODIUM LACTATE AND CALCIUM CHLORIDE: 600; 310; 30; 20 INJECTION, SOLUTION INTRAVENOUS at 11:25

## 2023-12-13 ASSESSMENT — ACTIVITIES OF DAILY LIVING (ADL)
ADLS_ACUITY_SCORE: 35
ADLS_ACUITY_SCORE: 35

## 2023-12-13 NOTE — ANESTHESIA POSTPROCEDURE EVALUATION
Patient: Raymon Ace    Procedure: Procedure(s):  ESOPHAGOGASTRODUODENOSCOPY, WITH BIOPSY       Anesthesia Type:  MAC    Note:  Disposition: Outpatient   Postop Pain Control: Uneventful            Sign Out: Well controlled pain   PONV: No   Neuro/Psych: Uneventful            Sign Out: Acceptable/Baseline neuro status   Airway/Respiratory: Uneventful            Sign Out: Acceptable/Baseline resp. status   CV/Hemodynamics: Uneventful            Sign Out: Acceptable CV status   Other NRE: NONE   DID A NON-ROUTINE EVENT OCCUR? No    Event details/Postop Comments:  Pt was happy with anesthesia care.  No complications.  I will follow up with the pt if needed.           Last vitals:  Vitals Value Taken Time   /77 12/13/23 1232   Temp     Pulse 59 12/13/23 1232   Resp     SpO2 94 % 12/13/23 1232   Vitals shown include unfiled device data.    Electronically Signed By: SCOTTIE Yanez CRNA  December 13, 2023  12:42 PM

## 2023-12-13 NOTE — DISCHARGE INSTRUCTIONS
[facility]    Home Care Following Endoscopy          Activity:  You have just undergone an endoscopic procedure usually performed with conscious sedation.  Do not work or operate machinery (including a car) for at least 12 hours.    I encourage you to walk and attempt to pass this air as soon as possible.    Diet:  Return to the diet you were on before your procedure but eat lightly for the first 12-24 hours.  Drink plenty of water.  Resume any regular medications unless otherwise advised by your physician.  Please begin any new medication prescribed as a result of your procedure as directed by your physician.   If you had any biopsy or polyp removed please refrain from aspirin or aspirin products for 2 days.  If on Coumadin please restart as instructed by your physician.   Pain:  You may take Tylenol as needed for pain.  Expected Recovery:  You can expect some mild abdominal fullness and/or discomfort due to the air used to inflate your intestinal tract. It is also normal to have a mild sore throat after upper endoscopy.    Call Your Physician if You Have:  After Upper Endoscopy:  Shoulder, back or chest pain.  Difficulty breathing or swallowing.  Vomiting blood.    Any questions or concerns about your recovery, please call [phone] or after [place] [address] ([phone]) Nurse Advice Line.    Follow-up Care:  You did have biopsy tissue sample(s) removed.  The biopsy tissue sample(s) will be sent to pathology.    You should receive letter in your My Chart from Dr Duran with your results within 1-2 weeks. If you do not participate in My Chart a physical letter will come in the mail in 2-3 weeks.  Please call if you have not received a notification of your results.  If asked to return to clinic please make an appointment 1 week after your procedure.  Call [phone].

## 2023-12-13 NOTE — ANESTHESIA CARE TRANSFER NOTE
Patient: Raymon Ace    Procedure: Procedure(s):  ESOPHAGOGASTRODUODENOSCOPY, WITH BIOPSY       Diagnosis: Anorexia [R63.0]  Weight loss [R63.4]  ETOH abuse [F10.10]  Incontinence of feces with fecal urgency [R15.9, R15.2]  Diarrhea, unspecified type [R19.7]  Diagnosis Additional Information: No value filed.    Anesthesia Type:   MAC     Note:    Oropharynx: oropharynx clear of all foreign objects and spontaneously breathing  Level of Consciousness: drowsy  Oxygen Supplementation: room air    Independent Airway: airway patency satisfactory and stable  Dentition: dentition unchanged  Vital Signs Stable: post-procedure vital signs reviewed and stable  Report to RN Given: handoff report given  Patient transferred to: PACU    Handoff Report: Identifed the Patient, Identified the Reponsible Provider, Reviewed the pertinent medical history, Discussed the surgical course, Reviewed Intra-OP anesthesia mangement and issues during anesthesia, Set expectations for post-procedure period and Allowed opportunity for questions and acknowledgement of understanding      Vitals:  Vitals Value Taken Time   /77 12/13/23 1232   Temp     Pulse 59 12/13/23 1232   Resp     SpO2 94 % 12/13/23 1232   Vitals shown include unfiled device data.    Electronically Signed By: SCOTTIE Yanez CRNA  December 13, 2023  12:40 PM

## 2023-12-13 NOTE — ANESTHESIA PREPROCEDURE EVALUATION
Anesthesia Pre-Procedure Evaluation    Patient: Raymon Ace   MRN: [id number] : 1945        Procedure : Procedure(s):  Esophagoscopy, gastroscopy, duodenoscopy (EGD), combined          Past Medical History:   Diagnosis Date     Alcohol abuse since teens 01/15/2015    Still actively drinking     Alcoholic liver damage (H24) 1/10/2014     Gastroesophageal reflux disease with esophagitis 2016     Gout involving toe, unspecified cause, unspecified chronicity, unspecified laterality 2016     Heart murmur     heart is positioned farther on left side then typical     Hyperlipidemia 2015     Hypertension      ITP (idiopathic thrombocytopenic purpura)      Obstructive sleep apnea     does not use CPAP  machine     Pulmonary embolism (H) large RLL w infarctio -17 2017      Past Surgical History:   Procedure Laterality Date     APPENDECTOMY       BACK SURGERY  ,     trimmed L4-L5, Fusion L4-L5     BONE MARROW BIOPSY, BONE SPECIMEN, NEEDLE/TROCAR  10/24/2012    Procedure: BIOPSY BONE MARROW;  BONE MARROW BIOPSY WITH ASPIRATE (AREVALO ORDERING);  Surgeon: Terri Hickey MD;  Location:  GI     COLONOSCOPY N/A 2019    Procedure: COLONOSCOPY;  Surgeon: Sebastian Garcia MD;  Location:  GI     ESOPHAGOSCOPY, GASTROSCOPY, DUODENOSCOPY (EGD), COMBINED N/A 2018    Procedure: COMBINED ESOPHAGOSCOPY, GASTROSCOPY, DUODENOSCOPY (EGD);  EGD;  Surgeon: Terri Cruoch MD;  Location:  GI     ESOPHAGOSCOPY, GASTROSCOPY, DUODENOSCOPY (EGD), COMBINED N/A 11/10/2020    Procedure: ESOPHAGOGASTRODUODENOSCOPY, WITH BIOPSY;  Surgeon: Alonzo Jang DO;  Location:  GI     FACIAL RECONSTRUCTION SURGERY  1965    jaw fracture     HC TOOTH EXTRACTION W/FORCEP        Allergies   Allergen Reactions     Olmesartan Diarrhea      Social History     Tobacco Use     Smoking status: Former     Packs/day: 1.00     Years: 28.00     Additional pack years: 0.00     Total pack  years: 28.00     Types: Cigarettes     Start date:      Quit date: 1982     Years since quittin.2     Smokeless tobacco: Former   Substance Use Topics     Alcohol use: Yes     Alcohol/week: 5.0 standard drinks of alcohol     Types: 5 Shots of liquor per week     Comment: 4-5 drinks/day, alcohol use disorder      Wt Readings from Last 1 Encounters:   10/09/23 80.7 kg (178 lb)        Anesthesia Evaluation   Pt has had prior anesthetic. Type: General.    No history of anesthetic complications       ROS/MED HX  ENT/Pulmonary: Comment: Hx PE RLL w/ infarction 2017      (+) sleep apnea, moderate, uses CPAP, tobacco use, Past use,     Neurologic: Comment: RLS      (+) peripheral neuropathy,     Cardiovascular: Comment: Cardiomegaly      (+) Dyslipidemia hypertension-----valvular problems/murmurs pt has mitral and tricuspid insuficiency . Previous cardiac testing   Echo: Date: 22 Results:  Interpretation Summary     1. Technically difficult study.  2. Normal left ventricular size and systolic performance with a visually  estimated ejection fraction of 60%.  3. No significant valvular heart disease is identified on this study.  4. Normal right ventricular size and systolic performance.  5. There is mild aortic root enlargement.  ______________________________________________________________________________  Stress Test: Date: Results:    ECG Reviewed: Date: 12/3/22 Results:  Sinus Rhythm   Low voltage in precordial leads.    -Left axis -anterior fascicular block.    - Negative T-waves  -Possible  Anterior  ischemia.     ABNORMAL     Cath:  Date: Results:      METS/Exercise Tolerance:     Hematologic:     (+) anemia,     Musculoskeletal: Comment: S/P L4-5 fusion   Spinal stenosis    (+) arthritis,     GI/Hepatic: Comment: ITP  Portal HTN  Alcoholic cirrhosis      (+) GERD, Symptomatic, Inflammatory bowel disease, liver disease,     Renal/Genitourinary:     (+) renal disease, type: CRI, Pt does not  require dialysis,     Endo:  - neg endo ROS     Psychiatric/Substance Use:     (+) psychiatric history depression alcohol abuse     Infectious Disease:     (+) MRSA (7/10/16 Face),     Malignancy:  - neg malignancy ROS     Other:  - neg other ROS    (+) , H/O Chronic Pain,        Physical Exam    Airway  airway exam normal      Mallampati: II   TM distance: > 3 FB   Neck ROM: full   Mouth opening: > 3 cm    Respiratory Devices and Support         Dental       (+) Modest Abnormalities - crowns, retainers, 1 or 2 missing teeth      Cardiovascular   cardiovascular exam normal       Rhythm and rate: regular and normal     Pulmonary   pulmonary exam normal        breath sounds clear to auscultation           OUTSIDE LABS:  CBC:   Lab Results   Component Value Date    WBC 9.7 09/28/2023    WBC 9.5 07/12/2023    HGB 13.0 (L) 09/28/2023    HGB 13.0 (L) 07/12/2023    HCT 39.4 (L) 09/28/2023    HCT 39.0 (L) 07/12/2023     (L) 09/28/2023     (L) 07/12/2023     BMP:   Lab Results   Component Value Date     09/28/2023     07/12/2023    POTASSIUM 4.5 09/28/2023    POTASSIUM 4.5 07/12/2023    CHLORIDE 107 09/28/2023    CHLORIDE 106 07/12/2023    CO2 28 09/28/2023    CO2 26 07/12/2023    BUN 8.1 09/28/2023    BUN 6.4 (L) 07/12/2023    CR 0.88 09/28/2023    CR 0.85 07/12/2023     (H) 09/28/2023     (H) 07/12/2023     COAGS:   Lab Results   Component Value Date    PTT 21 (L) 09/16/2021    INR 1.08 07/12/2023     POC:   Lab Results   Component Value Date     (H) 09/20/2019     HEPATIC:   Lab Results   Component Value Date    ALBUMIN 3.5 09/28/2023    PROTTOTAL 6.6 09/28/2023    ALT 16 09/28/2023    AST 30 09/28/2023    GGT 86 (H) 12/06/2016    ALKPHOS 94 09/28/2023    BILITOTAL 0.6 09/28/2023     OTHER:   Lab Results   Component Value Date    A1C 4.9 05/12/2021    PHILIPPE 9.3 09/28/2023    MAG 1.7 09/16/2021    LIPASE 110 09/16/2021    TSH 1.77 07/12/2023    CRP <2.9 07/15/2016        Anesthesia Plan    ASA Status:  3   NPO Status:  NPO Appropriate    Anesthesia Type: MAC.     - Reason for MAC: straight local not clinically adequate      Maintenance: TIVA.        Consents    Anesthesia Plan(s) and associated risks, benefits, and realistic alternatives discussed. Questions answered and patient/representative(s) expressed understanding.    - Discussed:     - Discussed with:  Patient    Use of blood products discussed: No .     Postoperative Care            Comments:    Other Comments: The risks and benefits of anesthesia, and the alternatives where applicable, have been discussed with the patient, and they wish to proceed.           SCOTTIE Yanez CRNA    I have reviewed the pertinent notes and labs in the chart from the past 30 days and (re)examined the patient.  Any updates or changes from those notes are reflected in this note.

## 2023-12-13 NOTE — LETTER
December 18, 2023      Raymon Ace  [address] [facility] [address]        Dear ,    We are writing to inform you of your test results.    Your test results fall within the expected range(s) or remain unchanged from previous results.  Please continue with current treatment plan.    Resulted Orders   Surgical Pathology Exam   Result Value Ref Range    Case Report       Surgical Pathology Report                         Case: [id number]                                  Authorizing Provider:  Everardo Duran MD        Collected:           12/13/2023 11:57 AM          Ordering Location:     [facility]          Received:            12/13/2023 12:14 PM                                 [facility]                                                          Pathologist:           Elle Cruz MD PhD                                                      Specimen:    Small Intestine, Duodenum, duodenal biopsy for weight loss and diarrheah                   Final Diagnosis       A. Duodenum, biopsy:  -Small intestinal mucosa with no significant histopathologic abnormalities.  -Normal villous architecture identified and no prominence in intraepithelial lymphocytes seen.  -Negative for luminal organisms.  -Negative for dysplasia or malignancy.        Clinical Information       Procedure:  ESOPHAGOGASTRODUODENOSCOPY, WITH BIOPSY  Pre-op Diagnosis: Anorexia [R63.0]  Weight loss [R63.4]  ETOH abuse [F10.10]  Incontinence of feces with fecal urgency [R15.9, R15.2]  Diarrhea, unspecified type [R19.7]  Post-op Diagnosis: R63.0 - Anorexia [ICD-10-CM]  R63.4 - Weight loss [ICD-10-CM]  F10.10 - ETOH abuse [ICD-10-CM]  R15.9, R15.2 - Incontinence of feces with fecal urgency [ICD-10-CM]  R19.7 - Diarrhea, unspecified type [ICD-10-CM]      Gross Description       A(1). Small Intestine, Duodenum, duodenal biopsy for weight loss and diarrheah:  The specimen is received in formalin, labeled with the patient's  "name, medical record number and other identifying information designated \"duodenal biopsy\". It consists of 3 tan soft tissue fragments, 0.2-0.3 cm.  Entirely submitted in 1 cassette.  (Sharon Doaln Tech)      Microscopic Description       Microscopic examination was performed.      Performing Labs       The technical component of this testing was completed at [facility]      Case Images         If you have any questions or concerns, please call the clinic at the number listed above.       Sincerely,      Everrado Duran MD            "

## 2023-12-14 ENCOUNTER — TELEPHONE (OUTPATIENT)
Dept: FAMILY MEDICINE | Facility: CLINIC | Age: 78
End: 2023-12-14
Payer: COMMERCIAL

## 2023-12-14 NOTE — TELEPHONE ENCOUNTER
Please call the patient and schedule VV of 6 months medication follow up with me, which patient is due at this time, to further take care of the medical conditions and medications.OK to use same day slot / double book near another virtual slot in 2-3 weeks.     Thank you,  Beatrice Stephens MD on 12/14/2023 at 12:03 PM

## 2023-12-14 NOTE — TELEPHONE ENCOUNTER
Called pt to assist with scheduling medication check VV. Appt scheduled for Thursday 12/28/23 at 2:25pm (check-in) for a telephone call with Dr. Stephens.    Jacque Gaspar,  [facility]

## 2023-12-15 ENCOUNTER — OFFICE VISIT (OUTPATIENT)
Dept: GASTROENTEROLOGY | Facility: CLINIC | Age: 78
End: 2023-12-15
Attending: NURSE PRACTITIONER
Payer: COMMERCIAL

## 2023-12-15 VITALS
WEIGHT: 184 LBS | BODY MASS INDEX: 29.57 KG/M2 | DIASTOLIC BLOOD PRESSURE: 82 MMHG | HEIGHT: 66 IN | SYSTOLIC BLOOD PRESSURE: 136 MMHG | HEART RATE: 76 BPM

## 2023-12-15 DIAGNOSIS — K70.30 ALCOHOLIC CIRRHOSIS OF LIVER WITHOUT ASCITES (H): ICD-10-CM

## 2023-12-15 DIAGNOSIS — I86.4 GASTRIC VARICES WITHOUT BLEEDING: ICD-10-CM

## 2023-12-15 DIAGNOSIS — R14.0 ABDOMINAL BLOATING: ICD-10-CM

## 2023-12-15 DIAGNOSIS — R19.7 DIARRHEA, UNSPECIFIED TYPE: Primary | ICD-10-CM

## 2023-12-15 DIAGNOSIS — K31.89 PORTAL HYPERTENSIVE GASTROPATHY (H): ICD-10-CM

## 2023-12-15 DIAGNOSIS — K76.6 PORTAL HYPERTENSIVE GASTROPATHY (H): ICD-10-CM

## 2023-12-15 LAB
PATH REPORT.COMMENTS IMP SPEC: NORMAL
PATH REPORT.COMMENTS IMP SPEC: NORMAL
PATH REPORT.FINAL DX SPEC: NORMAL
PATH REPORT.GROSS SPEC: NORMAL
PATH REPORT.MICROSCOPIC SPEC OTHER STN: NORMAL
PATH REPORT.RELEVANT HX SPEC: NORMAL
PHOTO IMAGE: NORMAL

## 2023-12-15 PROCEDURE — 99214 OFFICE O/P EST MOD 30 MIN: CPT | Performed by: NURSE PRACTITIONER

## 2023-12-15 ASSESSMENT — PAIN SCALES - GENERAL: PAINLEVEL: SEVERE PAIN (6)

## 2023-12-15 NOTE — PATIENT INSTRUCTIONS
It was a pleasure taking care of you today.  I've included a brief summary of our discussion and care plan from today's visit below.  Please review this information with your primary care provider.  ______________________________________________________________________    My recommendations are summarized as follows:    Continue taking omeprazole every day. It protects your stomach.    2. Use Pepto-bismol or loperamide (Imodium) as needed for diarrhea.    3. Please review recommendations on the diet below. Try to eat small frequent meals.    4. Plan to have MRI of the liver in 6 months.    Return to GI Clinic in 6 months to review your progress.    ______________________________________________________________________    What is cirrhosis?  Cirrhosis is a type of liver disease that affects how well the liver works.  The liver is a big organ in the upper right side of the belly. It does many things to help a person stay healthy, including:  ?Regulating levels of most chemicals in the blood  ?Producing bile, which helps break down fats during digestion  ?Removing waste products from the blood  If the liver is damaged or scarred, it might not work properly.  Why do I need a special diet if I have cirrhosis?  If the liver is not working properly, the body might not be able to process nutrients from food normally. Harmful substances can build up in the blood, and the body can store extra fluid in the belly. Problems with nutrition are common in people who have cirrhosis.  No specific diet works for everyone. The best diet for you will give you the nutrition your body needs and help you manage your symptoms. In general, you want to decrease the amount of work the liver has to do and prevent further liver damage.    What can I eat and drink if I have cirrhosis?  It is important to get enough calories and protein in your diet each day. Below are some examples of foods to choose from.  ?Grains - Whole-wheat and whole-grain  breads, tortillas, pastas, and cereals. Brown rice, quinoa, kasha, barley, and popcorn. Cereals like oatmeal, bran flakes, and shredded wheat.  ?Fruits and vegetables - Most fruits and vegetables, whether fresh, frozen, or canned. If canned, low-sodium and salt-free vegetables and fruits without added sugar. Plain frozen vegetables (to avoid added fat and sodium), fruit juice, and vegetable juice.  ?Dairy - Low-fat and fat-free foods like milk, hard cheeses like cheddar and mozzarella, yogurt, kefir, ice cream, sherbet, and soy milk.  ?Meats and proteins - Lean, tender meats, chicken, and fish. Eggs, nut butters, and tofu. Dried beans, lentils, seeds, and nuts (all good sources of protein).  ?Fats and oils - Healthy, unsaturated fats like canola, olive, corn, and sunflower oils. Choose oils more often than solid fats. Other healthy fats are found in avocados, tofu, and soy milk. For other foods, choose fat-free or low-fat products when possible.  Choose low-sodium or salt-free products whenever you can. This is especially important for foods like broths, soups, and sauces.  What foods and drinks should I avoid if I have cirrhosis?  Avoid alcohol as it can cause more liver damage. You might also need to limit the amount of fat or sodium (salt) in your diet. Below are some examples of foods to avoid or limit.  ?Grains to avoid or limit - Doughnuts, croissants, pastries, cookies, biscuits, chips, and buttered popcorn.  ?Fruits and vegetables to avoid or limit - Commercially prepared potatoes and vegetable mixes. Those prepared with cheese, butter, or cream sauces. Tomato sauce and pickles. Salted, canned vegetables and processed fruits with added sugar.  ?Dairy products to avoid or limit - Whole and 2 percent milk, whole-milk yogurt and ice cream, cream, half-and-half, full-fat cheese, cream cheese, full-fat sour cream, and cottage cheese.  ?Meats and proteins to avoid or limit - Undercooked meats, fish, and shellfish.  High-fat meats like ribs, T-bone steak, non-lean ground beef, young, sausage, salami, bologna, corned beef, hot dogs, organ meats, poultry with skin, and fried meats. Canned beans.  ?Fats and oils to avoid or limit - Unhealthy fats like butter and lard. Vegetable, peanut, and coconut oils.  ?Condiments and snacks to avoid or limit - Salted and canned peas, beans, and olives. Salted snack foods like chips, pretzels, or crackers. Ketchup, soy sauce, salad dressing, and steak sauce.  What else should I know?  Other tips to consider include:  ?Eat smaller meals more often, including a snack before bed.  ?Use seasonings instead of salt to flavor food. Try salt-free seasoning, pepper, herbs, spices, vinegar, lemon juice, and lime juice.  ?Check your weight each morning, and write down the number in a notebook. This will tell you if you are building up too much fluid. Weigh yourself in the morning after you have urinated. You can weigh yourself with clothes on or off, but do the same each day. Put your scale on a hard surface, not on carpet. Your doctor will tell you when you should call based on how much weight you gain in a day or over a week. Take your notebook to your doctor on your next visit.  ?Read food labels carefully . They show you how much is in a serving. The amount is given as a percentage of the total amount you need each day. Food labels also tell you how much sodium is in a serving. Reading labels helps you make healthy food choices.  ?Your doctor might suggest that you take a multivitamin each day.     ______________________________________________________________________    Who do I call with any questions after my visit?  Please be in touch if there are any further questions that arise following today's visit.  There are multiple ways to contact your gastroenterology care team.      During business hours, you may reach a Gastroenterology nurse at [phone], option 3.     To schedule or reschedule an  appointment, please call [phone].   To schedule your imaging studies (CT, MRI, ultrasound)  call [phone] (or toll-free # [phone])  To schedule your lab work at [facility], please call [phone]    You can always send a secure message through [employer].  [employer] messages are answered by your nurse or doctor typically within 24 hours.  Please allow extra time on weekends and holidays.      For urgent/emergent questions after business hours, you may reach the on-call GI Fellow by contacting the [facility]  at [phone].    In order for your refill to be processed in a timely fashion, it is your responsibility to ensure you follow the recommendations from your provider regarding your laboratory studies and follow up appointments.       How will I get the results of any tests ordered?    You will receive all of your results.  If you have signed up for [employer], any tests ordered at your visit will be available to you after your physician reviews them.  Typically this takes 1-2 weeks.  If there are urgent results that require a change in your care plan, your physician or nurse will call you to discuss the next steps.   What is [employer]?  [employer] is a secure way for you to access all of your healthcare records from the [facility].  It is a web based computer program, so you can sign on to it from any location.  It also allows you to send secure messages to your care team.  I recommend signing up for [employer] access if you have not already done so and are comfortable with using a computer.    How to I schedule a follow-up visit?  If you did not schedule a follow-up visit today, please call [phone] to schedule a follow-up office visit.      Sincerely,  JESSICA Bahena,  [facility],  Division of Gastroenterology   ([facility])

## 2023-12-15 NOTE — LETTER
12/15/2023         RE: Raymon Ace  [address] [facility] [address]        Dear Colleague,    Thank you for referring your patient, Raymon Ace, to the [facility]. Please see a copy of my visit note below.    Gastroenterology CLINIC VISIT, RETURN PATIENT    CC/REFERRING PROVIDER: Waylon Novoa   REASON FOR CONSULTATION: follow up    HPI: 78 year old male presented to GI clinic for follow up. I saw the patient for diarrhea, poor appetite, weakness, and weight loss. Over the past 5-6 months, the patient developed fecal urgency and incontinence. Reported 4-5 falls in the past few months. Weight loss from 225 to 178 lbs over the past year.   Recent MRI of the liver was suggestive for hepatic steatosis and 1.6 cm liver cyst. Noted mild splenomegaly.  Patient had EGD and colonoscopy in 2020, which found tortuous esophagus with questionable paraesophageal hernia, and varices in stomach and duodenum. There was a sessile serrated 7 mm polyp in transverse colon.   Reviewed recent lab work that showed mild chronic anemia and thrombocytopenia. He is on Promacta 75 mg daily for chronic ITP. He is also on rivaroxaban. Patient has been followed by oncology on chronic ITP. History of recurrent iron deficiency anemia felt secondary to chronic occult blood loss from portal hypertensive gastropathy and hemorrhoids. He also has underlying alcoholic  fatty liver disease, not interested in CD treatment or reduction of alcohol use.    Recommended the following:  - Abdominal/pelvis CT scan.  - Upper GI endoscopy;  - Stool studies;  - Pepto-bismol or imodium as needed for diarrhea if negative C.diff screening.  - GI soft diet with higher calorie intake. Small frequent meals.    Patient reported some improvement in his appetite but complains that nothing tastes good. He continues drinking alcohol. Has occasional loose stools, takes pepto-bismol that works. No hematochezia or melena. No nausea or  vomiting.  Complains of worsening in weakness and pain in LEs. Patient's wife stated that he does not always remember to take gabapentin. Suffers polyneuropathy.      PREVIOUS ENDOSCOPY:  12/13/23 EGD by Dr. Duran  Findings:       The examined duodenum was normal. Biopsies were taken with a cold        forceps for histology.        Severe portal hypertensive gastropathy was found in the stomach.        Type 2 isolated gastric varices (IGV2, varices located in the body,        antrum or around the pylorus) with no bleeding were found in the        prepyloric region of the stomach. There were no stigmata of recent        bleeding.        The exam of the stomach was otherwise normal.        The examined esophagus was normal.     11/10/2020 EGD  Findings:       The Z-line was regular and was found 41 cm from the incisors.        Normal mucosa was found in the entire esophagus. This was biopsied with        a cold forceps for histology. Verification of patient identification for        the specimen was done. Estimated blood loss: none. Biopsies performed in        distal and proximal esophagus. No varices identified.        The distal esophagus was mildly tortuous.        There was mild resistance passing the scope across the GEJ at the level        of what appeared to be a paraesophageal hernia        There were polypoid appearing lesions that were not biopsied. Given a        history of gastric varices would only biopsy after ultrasound probe        rules out varix. 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      11/10/20 Colonoscopy  Findings:       The perianal and digital rectal examinations were normal.        The terminal ileum appeared normal.        A 7 mm polyp was found in the transverse colon. The polyp was sessile.        The polyp was removed with a cold snare.  Resection and retrieval were        complete. Verification of patient identification for the specimen was        done. Estimated blood loss: none.        Scattered small-mouthed diverticula were found in the sigmoid colon.   FINAL DIAGNOSIS:   A. Distal Esophagus, Biopsy:   Squamous esophageal mucosa with no intraepithelial eosinophils or other   abnormality   B. Proximal Esophagus, Biopsy:   Squamous esophageal mucosa with no intraepithelial eosinophils or other   abnormality   C. Transverse Colon, Polyp:   Sessile serrated polyp; negative for dysplasia or malignancy      PERTINENT STUDIES Reviewed in EMR  7/25/23 MRI of liver  IMPRESSION:  1. 1.6 cm simple liver cyst with no suspicious enhancing internal  components.  2. Hepatic steatosis.  3. Mild splenomegaly.  PERTINENT STUDIES Reviewed in EMR    ROS: 10pt ROS performed and otherwise negative.    PAST MEDICAL HISTORY:  Past Medical History:   Diagnosis Date     Alcohol abuse since teens 01/15/2015    Still actively drinking     Alcoholic liver damage (H24) 1/10/2014     Gastroesophageal reflux disease with esophagitis 12/6/2016     Gout involving toe, unspecified cause, unspecified chronicity, unspecified laterality 6/2/2016     Heart murmur     heart is positioned farther on left side then typical     Hyperlipidemia 12/17/2015     Hypertension      ITP (idiopathic thrombocytopenic purpura)      Obstructive sleep apnea     does not use CPAP  machine     Pulmonary embolism (H) large RLL w infarctio 4-17 4/18/2017       PREVIOUS ABDOMINAL/GYNECOLOGIC SURGERIES:    Past Surgical History:   Procedure Laterality Date     APPENDECTOMY  1956     BACK SURGERY  1992, 1996    trimmed L4-L5, Fusion L4-L5     BONE MARROW BIOPSY, BONE SPECIMEN, NEEDLE/TROCAR  10/24/2012    Procedure: BIOPSY BONE MARROW;  BONE MARROW BIOPSY WITH ASPIRATE (AREVALO ORDERING);  Surgeon: Terri Hickey MD;  Location:  GI     COLONOSCOPY N/A 7/31/2019    Procedure: COLONOSCOPY;  Surgeon:  Sebastian Garcia MD;  Location:  GI     ESOPHAGOSCOPY, GASTROSCOPY, DUODENOSCOPY (EGD), COMBINED N/A 2/8/2018    Procedure: COMBINED ESOPHAGOSCOPY, GASTROSCOPY, DUODENOSCOPY (EGD);  EGD;  Surgeon: Terri Crouch MD;  Location:  GI     ESOPHAGOSCOPY, GASTROSCOPY, DUODENOSCOPY (EGD), COMBINED N/A 11/10/2020    Procedure: ESOPHAGOGASTRODUODENOSCOPY, WITH BIOPSY;  Surgeon: Alonzo Jang DO;  Location:  GI     ESOPHAGOSCOPY, GASTROSCOPY, DUODENOSCOPY (EGD), COMBINED N/A 12/13/2023    Procedure: ESOPHAGOGASTRODUODENOSCOPY, WITH BIOPSY;  Surgeon: Everardo Duran MD;  Location:  GI     FACIAL RECONSTRUCTION SURGERY  1965    jaw fracture     HC TOOTH EXTRACTION W/FORCEP  1990s         PERTINENT MEDICATIONS:  Current Outpatient Medications   Medication Sig Dispense Refill     allopurinol (ZYLOPRIM) 300 MG tablet Take 1 tablet by mouth once daily 90 tablet 0     bismuth subsalicylate (PEPTO BISMOL) 262 MG/15ML suspension Take 15 mLs by mouth every 6 hours as needed for indigestion or diarrhea (as needed for diarrhea. Please collect stool sample first) 354 mL 1     Cholecalciferol (VITAMIN D) 50 MCG (2000 UT) CAPS Take 1 capsule by mouth once daily 90 capsule 0     folic acid (FOLVITE) 1 MG tablet Take 1 tablet by mouth once daily 90 tablet 1     gabapentin (NEURONTIN) 300 MG capsule Take 3 capsules (900 mg) by mouth 3 times daily 360 capsule 5     metoprolol succinate ER (TOPROL XL) 50 MG 24 hr tablet Take 1 tablet by mouth once daily 90 tablet 0     omeprazole (PRILOSEC) 40 MG DR capsule Take 1 capsule by mouth once daily 90 capsule 0     PROMACTA 75 MG tablet TAKE ONE TABLET BY MOUTH ONCE DAILY. TAKE ON AN EMPTY STOMACH (ONE HOUR BEFORE OR TWO HOURS AFTER A MEAL) 30 tablet 11     XARELTO ANTICOAGULANT 10 MG TABS tablet TAKE 1 TABLET BY MOUTH ONCE DAILY WITH SUPPER 30 tablet 0     ASPIRIN NOT PRESCRIBED (INTENTIONAL) Please choose reason not prescribed, below (Patient not taking: Reported on  10/9/2023) 1 each 0     carbidopa-levodopa (SINEMET)  MG tablet Take 1 tablet by mouth 3 times daily (Patient not taking: Reported on 2023) 90 tablet 5         SOCIAL HISTORY:  Social History     Socioeconomic History     Marital status:      Spouse name: Not on file     Number of children: Not on file     Years of education: Not on file     Highest education level: Not on file   Occupational History     Not on file   Tobacco Use     Smoking status: Former     Packs/day: 1.00     Years: 28.00     Additional pack years: 0.00     Total pack years: 28.00     Types: Cigarettes     Start date:      Quit date: 1982     Years since quittin.2     Smokeless tobacco: Former   Vaping Use     Vaping Use: Never used   Substance and Sexual Activity     Alcohol use: Yes     Alcohol/week: 5.0 standard drinks of alcohol     Types: 5 Shots of liquor per week     Comment: 4-5 drinks/day, alcohol use disorder     Drug use: No     Sexual activity: Never     Partners: Female   Other Topics Concern     Parent/sibling w/ CABG, MI or angioplasty before 65F 55M? Yes   Social History Narrative     Not on file     Social Determinants of Health     Financial Resource Strain: Not on file   Food Insecurity: Not on file   Transportation Needs: Not on file   Physical Activity: Not on file   Stress: Not on file   Social Connections: Not on file   Interpersonal Safety: Not on file   Housing Stability: Not on file       FAMILY HISTORY:  Denies colon/panc/esophageal/other GI CA, no other Nation or other HPS-related [place]. No IBD/celiac, no other AI/liver/thyroid disease.    Family History   Problem Relation Age of Onset     Unknown/Adopted Mother      Unknown/Adopted Father      Heart Disease Sister      Diabetes No family hx of      Coronary Artery Disease No family hx of      Hypertension No family hx of      Hyperlipidemia No family hx of      Cerebrovascular Disease No family hx of      Breast Cancer No family hx of   "    Colon Cancer No family hx of      Prostate Cancer No family hx of      Other Cancer No family hx of      Depression No family hx of      Anxiety Disorder No family hx of      Mental Illness No family hx of      Substance Abuse No family hx of      Anesthesia Reaction No family hx of      Asthma No family hx of      Osteoporosis No family hx of      Genetic Disorder No family hx of      Thyroid Disease No family hx of      Obesity No family hx of        PHYSICAL EXAMINATION:  Vitals reviewed  /82 (BP Location: Right arm, Patient Position: Sitting, Cuff Size: Adult Regular)   Pulse 76   Ht 1.676 m (5' 6\")   Wt 83.5 kg (184 lb)   BMI 29.70 kg/m      Due to impaired mobility,limited assessment was performed when patient was sitting in a chair.  General: Patient appears well in no acute distress.    Skin: No visualized rash or lesions on visualized skin  Jaundice noted  No cervical or supraclavicular lymphadenopathy. Thyroid gland not enlarged.  Resp: breathing comfortably without accessory muscle usage, speaking in full sentences, no cough. Lung sounds clear  Card: Regular and rhythmic S1 and S2. No gallop or rub. No murmur.  No LE edema.  Abdomen: Active bowel sounds X 4 quadrants. Soft to palpation.  No guarding or rebound tenderness. Lemon's sign negative.  Psych: affect normal, alert and oriented. Hard of hearing, wearing h/aids.      ASSESSMENT/PLAN:    ICD-10-CM    1. Diarrhea, unspecified type  R19.7 Adult GI Clinic Follow-Up Order (Blank)      2. Abdominal bloating  R14.0       3. Alcoholic cirrhosis of liver without ascites (H)  K70.30 Adult GI Clinic Follow-Up Order (Blank)      4. Portal hypertensive gastropathy (H)  K76.6     K31.89       5. Gastric varices without bleeding  I86.4          78 year old male  presented  to GI clinic for follow up on weight loss, anorexia, nausea, and diarrhea with new fecal urgency and incontinence. Patient admits to recent active alcohol abuse.  We discussed " findings of CT scan,labs, and endoscopy with the patient and his wife.  CT scan of abdomen revealed fatty liver with liver cirrhosis, colonic diverticulosis,  an 1.5 cm liver cyst,a small hiatal hernia. Normal pancreas appearance.  No further interventions are needed on any of the findings except a follow up liver elastography or MRI in 3-6 months.  Stool tests were negative for GI infection including C.diff and common enteric pathogens. No signs of pancreatic insufficiency. Noted mild nonspecific elevation of calprotectin, which could indicate inflammation of intestine.   EGD was suggestive for severe portal hypertensive gastropathy and 2 gastric varices without signs of bleeding. Suggested to continue PPI.  We had a long conversation of alcohol impact on GI system, significance of liver cirrhosis and prognosis. Patient never had CD eval/treatment and is not interested in one. He has been a daily drinker for many many years. He was sober for about 9  months at one point before his surgery. Was seen by one of our hepatology provider in the past, Dr. Crouch. I do not believe that we have much to offer the patient if he is not interested in sobriety. Will continue supportive cares and monitoring.    Patient verbalized understanding and appreciation of care provided. Stated that all of the questions were answered to her/his satisfaction.  RTCin 6 months    Thank you for this consultation. It was a pleasure to participate in the care of this patient; please contact us with any further questions.    SCOTTIE Bahena, FNP-C  [facility]  Gastroenterology Department  [place], [place]    This note was created with Dragon voice recognition software, and while reviewed for accuracy, inadvertent minor typographic errors may occur. Please contact the provider if you have any questions.       Again, thank you for allowing me to participate in the care of your patient.        Sincerely,        SCOTTIE BAHENA CNP

## 2023-12-28 ENCOUNTER — VIRTUAL VISIT (OUTPATIENT)
Dept: FAMILY MEDICINE | Facility: CLINIC | Age: 78
End: 2023-12-28
Payer: COMMERCIAL

## 2023-12-28 DIAGNOSIS — R25.8 BRADYKINESIA: ICD-10-CM

## 2023-12-28 DIAGNOSIS — N18.31 STAGE 3A CHRONIC KIDNEY DISEASE (H): ICD-10-CM

## 2023-12-28 DIAGNOSIS — I86.4 GASTRIC VARICES: Primary | ICD-10-CM

## 2023-12-28 DIAGNOSIS — D69.3 IDIOPATHIC THROMBOCYTOPENIC PURPURA (H): ICD-10-CM

## 2023-12-28 DIAGNOSIS — G63 POLYNEUROPATHY IN OTHER DISEASES CLASSIFIED ELSEWHERE (H): ICD-10-CM

## 2023-12-28 DIAGNOSIS — I27.82 CHRONIC PULMONARY EMBOLISM, UNSPECIFIED PULMONARY EMBOLISM TYPE, UNSPECIFIED WHETHER ACUTE COR PULMONALE PRESENT (H): ICD-10-CM

## 2023-12-28 DIAGNOSIS — K70.30 ALCOHOLIC CIRRHOSIS, UNSPECIFIED WHETHER ASCITES PRESENT (H): ICD-10-CM

## 2023-12-28 DIAGNOSIS — F10.20 ALCOHOL DEPENDENCE, CONTINUOUS (H): ICD-10-CM

## 2023-12-28 DIAGNOSIS — Z79.01 CHRONIC ANTICOAGULATION: ICD-10-CM

## 2023-12-28 PROBLEM — Z01.818 PREOP GENERAL PHYSICAL EXAM: Status: RESOLVED | Noted: 2020-10-05 | Resolved: 2023-12-28

## 2023-12-28 PROCEDURE — 99215 OFFICE O/P EST HI 40 MIN: CPT | Mod: 95 | Performed by: INTERNAL MEDICINE

## 2023-12-28 ASSESSMENT — PATIENT HEALTH QUESTIONNAIRE - PHQ9
SUM OF ALL RESPONSES TO PHQ QUESTIONS 1-9: 6
10. IF YOU CHECKED OFF ANY PROBLEMS, HOW DIFFICULT HAVE THESE PROBLEMS MADE IT FOR YOU TO DO YOUR WORK, TAKE CARE OF THINGS AT HOME, OR GET ALONG WITH OTHER PEOPLE: SOMEWHAT DIFFICULT
SUM OF ALL RESPONSES TO PHQ QUESTIONS 1-9: 6

## 2023-12-28 NOTE — PATIENT INSTRUCTIONS
Please call Neurology Clinic whom your saw in 2/2022 for a follow-up visit>>     [facility] [place] [phone]

## 2023-12-28 NOTE — PROGRESS NOTES
Raymon is a 78 year old who is being evaluated via a billable telephone visit.      What phone number would you like to be contacted at? [phone]  How would you like to obtain your AVS? Mail a copy    Distant Location (provider location):  On-site    Assessment and Plan  1. Gastric varices  2. Alcohol dependence, continuous (H)  3. Alcoholic cirrhosis, unspecified whether ascites present (H)  Recent EGD on December 13, 2023, positive for Portal hypertensive gastropathy , Type 2 isolated gastric varices (IGV2, varices located in the body, antrum or around the pylorus),without bleeding.  Reviewed the recent GI note recommending to continue current omeprazole.  Discussed with the patient to completely quit alcohol but patient still endorses that he is currently on 3 drinks / day >> 7 drinks in the past .  All the risks and complications understood given his varices load and also on current anticoagulation which both have severity of risks if he continues to consume alcohol.  ER precautions given.  -Again emphasized an addiction medicine referral which was offered to him in the past but he declined at that time, agreed at this time.  - Adult Mental Health  Referral; Future    4. Polyneuropathy in other diseases classified elsewhere (H24)  Chronic problem, uncontrolled.  Already on maximum dose of gabapentin at this time, to follow-up with neurology for further recommendations.    5. Bradykinesia  Chronic problem, uncontrolled.  Patient supposed to be on Sinemet which he has not been taking with the last refill seen in September 2022.  Discussed on restarting this medication which patient and wife declined at this time as they want to hear back from neurology on this, all the risks and complications understood.  I have given the neurology contact numbers on the after visit summary for them to get this restarted which they understood and agreed with the plan.    6. Chronic pulmonary embolism, unspecified  pulmonary embolism type, unspecified whether acute cor pulmonale present (H)  7. Chronic anticoagulation  Chronic stable condition, continue current oral anticoagulation with Xarelto    8. ITP (idiopathic thrombocytopenic purpura) since 3-13 back surgery   Chronic stable condition, continue current Promacta as managed by heme oncology and standing lab orders as managed by them for his CBC    9. Stage 3a chronic kidney disease (H)  Recent lab work in September 2023 remaining normal, will recheck during his annual physical.       Wife Mirela helping in conversation as patient is very disoriented on the virtual visit and do not want to talk much though he has initially spoken.      Over 40 minutes spent on reviewing patient chart,  face to face encounter, greater than 50% time spent with plan/cordination of care and documentation as above in my A/P.           Please note that this note consists of symbols derived from keyboarding, dictation and/or voice recognition software. As a result, there may be errors in the script that have gone undetected. Please consider this when interpreting information found in this chart.    Patient Instructions   Please call Neurology Clinic whom your saw in 2/2022 for a follow-up visit>>     [facility] [place] [phone]   Return in about 5 months (around 5/22/2024), or if symptoms worsen or fail to improve, for Annual Wellness Exam.    Beatrice Stephens MD  [facility] ANDRIY PRAIRILENNY Ennis is a 78 year old, presenting for the following health issues:  Recheck Medication        12/28/2023     2:18 PM   Additional Questions   Roomed by Yaneth CONTRERAS   Accompanied by Randy - Mirela       History of Present Illness       Reason for visit:  Med check        Medication Followup of various meds   Taking Medication as prescribed: yes  Side Effects:  None  Medication Helping Symptoms:  not applicable       Last seen patient in May 2023 for  annual wellness visit at that time, he is here for follow-up.  Try to make this as a video visit which both patient and  declined and wants this to be a telephone visit.  Will do as requested.    Hx of chronic ITP and a history of unprovoked pulmonary embolism in April 2017 for which he is maintained on long-term anticoagulation, following oncology for iron infusions given the chronic anemia >> Due to chronic occult blood loss from portal hypertensive gastropathy, and hemorrhoids.     Patient supposed to be on Sinemet but has not been taking it.  Discussed on restarting it but patient opting to see the neurologist again before restarting the medication.  Patient only complaint is he is unable to walk due to severe slowness and neuropathy.      Allergies   Allergen Reactions    Olmesartan Diarrhea        Past Medical History:   Diagnosis Date    Alcohol abuse since teens 01/15/2015    Still actively drinking    Alcoholic liver damage (H24) 1/10/2014    Gastroesophageal reflux disease with esophagitis 12/6/2016    Gout involving toe, unspecified cause, unspecified chronicity, unspecified laterality 6/2/2016    Heart murmur     heart is positioned farther on left side then typical    Hyperlipidemia 12/17/2015    Hypertension     ITP (idiopathic thrombocytopenic purpura)     Obstructive sleep apnea     does not use CPAP  machine    Pulmonary embolism (H) large RLL w infarctio 4-17 4/18/2017       Past Surgical History:   Procedure Laterality Date    APPENDECTOMY  1956    BACK SURGERY  1992, 1996    trimmed L4-L5, Fusion L4-L5    BONE MARROW BIOPSY, BONE SPECIMEN, NEEDLE/TROCAR  10/24/2012    Procedure: BIOPSY BONE MARROW;  BONE MARROW BIOPSY WITH ASPIRATE (AREVALO ORDERING);  Surgeon: Terri Hickey MD;  Location:  GI    COLONOSCOPY N/A 7/31/2019    Procedure: COLONOSCOPY;  Surgeon: Sebastian Garcia MD;  Location:  GI    ESOPHAGOSCOPY, GASTROSCOPY, DUODENOSCOPY (EGD), COMBINED N/A 2/8/2018     Procedure: COMBINED ESOPHAGOSCOPY, GASTROSCOPY, DUODENOSCOPY (EGD);  EGD;  Surgeon: Terri Crouch MD;  Location:  GI    ESOPHAGOSCOPY, GASTROSCOPY, DUODENOSCOPY (EGD), COMBINED N/A 11/10/2020    Procedure: ESOPHAGOGASTRODUODENOSCOPY, WITH BIOPSY;  Surgeon: Alonzo Jang DO;  Location:  GI    ESOPHAGOSCOPY, GASTROSCOPY, DUODENOSCOPY (EGD), COMBINED N/A 2023    Procedure: ESOPHAGOGASTRODUODENOSCOPY, WITH BIOPSY;  Surgeon: Everardo Duran MD;  Location:  GI    FACIAL RECONSTRUCTION SURGERY      jaw fracture    HC TOOTH EXTRACTION W/FORCEP         Family History   Problem Relation Age of Onset    Unknown/Adopted Mother     Unknown/Adopted Father     Heart Disease Sister     Diabetes No family hx of     Coronary Artery Disease No family hx of     Hypertension No family hx of     Hyperlipidemia No family hx of     Cerebrovascular Disease No family hx of     Breast Cancer No family hx of     Colon Cancer No family hx of     Prostate Cancer No family hx of     Other Cancer No family hx of     Depression No family hx of     Anxiety Disorder No family hx of     Mental Illness No family hx of     Substance Abuse No family hx of     Anesthesia Reaction No family hx of     Asthma No family hx of     Osteoporosis No family hx of     Genetic Disorder No family hx of     Thyroid Disease No family hx of     Obesity No family hx of        Social History     Tobacco Use    Smoking status: Former     Packs/day: 1.00     Years: 28.00     Additional pack years: 0.00     Total pack years: 28.00     Types: Cigarettes     Start date:      Quit date: 1982     Years since quittin.2    Smokeless tobacco: Former   Substance Use Topics    Alcohol use: Yes     Alcohol/week: 5.0 standard drinks of alcohol     Types: 5 Shots of liquor per week     Comment: 4-5 drinks/day, alcohol use disorder        Current Outpatient Medications   Medication    allopurinol (ZYLOPRIM) 300 MG tablet    bismuth  subsalicylate (PEPTO BISMOL) 262 MG/15ML suspension    Cholecalciferol (VITAMIN D) 50 MCG (2000 UT) CAPS    folic acid (FOLVITE) 1 MG tablet    gabapentin (NEURONTIN) 300 MG capsule    metoprolol succinate ER (TOPROL XL) 50 MG 24 hr tablet    omeprazole (PRILOSEC) 40 MG DR capsule    PROMACTA 75 MG tablet    ASPIRIN NOT PRESCRIBED (INTENTIONAL)    XARELTO ANTICOAGULANT 10 MG TABS tablet     Current Facility-Administered Medications   Medication    ropivacaine (NAROPIN) injection 3 mL    ropivacaine (NAROPIN) injection 3 mL    triamcinolone (KENALOG-40) injection 40 mg    triamcinolone (KENALOG-40) injection 40 mg        Review of Systems   Constitutional, HEENT, cardiovascular, pulmonary, GI, , musculoskeletal, neuro, skin, endocrine and psych systems are negative, except as otherwise noted.      Objective           Vitals:  No vitals were obtained today due to virtual visit.    Physical Exam   healthy, alert, and no distress  PSYCH: Alert and oriented times 3; coherent speech, normal   rate and volume, able to articulate logical thoughts, able   to abstract reason, no tangential thoughts, no hallucinations   or delusions  His affect is normal  RESP: No cough, no audible wheezing, able to talk in full sentences  Remainder of exam unable to be completed due to telephone visits      Phone call duration: 21 minutes

## 2024-01-12 DIAGNOSIS — E55.9 VITAMIN D DEFICIENCY: ICD-10-CM

## 2024-01-12 RX ORDER — MULTIVIT-MIN/IRON/FOLIC ACID/K 18-600-40
CAPSULE ORAL
Qty: 90 CAPSULE | Refills: 2 | Status: SHIPPED | OUTPATIENT
Start: 2024-01-12

## 2024-01-18 ENCOUNTER — ONCOLOGY VISIT (OUTPATIENT)
Dept: ONCOLOGY | Facility: CLINIC | Age: 79
End: 2024-01-18
Attending: INTERNAL MEDICINE
Payer: COMMERCIAL

## 2024-01-18 ENCOUNTER — LAB (OUTPATIENT)
Dept: LAB | Facility: CLINIC | Age: 79
End: 2024-01-18
Payer: COMMERCIAL

## 2024-01-18 VITALS
SYSTOLIC BLOOD PRESSURE: 144 MMHG | RESPIRATION RATE: 18 BRPM | HEART RATE: 110 BPM | TEMPERATURE: 98.4 F | OXYGEN SATURATION: 98 % | BODY MASS INDEX: 30.2 KG/M2 | WEIGHT: 187.1 LBS | DIASTOLIC BLOOD PRESSURE: 90 MMHG

## 2024-01-18 DIAGNOSIS — D50.0 IRON DEFICIENCY ANEMIA DUE TO CHRONIC BLOOD LOSS: Primary | ICD-10-CM

## 2024-01-18 DIAGNOSIS — D69.3 IDIOPATHIC THROMBOCYTOPENIC PURPURA (H): ICD-10-CM

## 2024-01-18 DIAGNOSIS — Z79.01 LONG TERM CURRENT USE OF ANTICOAGULANT THERAPY: ICD-10-CM

## 2024-01-18 DIAGNOSIS — Z86.718 PERSONAL HISTORY OF VENOUS THROMBOSIS AND EMBOLISM: ICD-10-CM

## 2024-01-18 DIAGNOSIS — D50.0 IRON DEFICIENCY ANEMIA DUE TO CHRONIC BLOOD LOSS: ICD-10-CM

## 2024-01-18 LAB
ALBUMIN SERPL BCG-MCNC: 4.2 G/DL (ref 3.5–5.2)
ALP SERPL-CCNC: 82 U/L (ref 40–150)
ALT SERPL W P-5'-P-CCNC: 26 U/L (ref 0–70)
ANION GAP SERPL CALCULATED.3IONS-SCNC: 10 MMOL/L (ref 7–15)
APTT PPP: 32 SECONDS (ref 22–38)
AST SERPL W P-5'-P-CCNC: 44 U/L (ref 0–45)
BASOPHILS # BLD AUTO: 0.1 10E3/UL (ref 0–0.2)
BASOPHILS NFR BLD AUTO: 1 %
BILIRUB SERPL-MCNC: 0.6 MG/DL
BUN SERPL-MCNC: 6.1 MG/DL (ref 8–23)
CALCIUM SERPL-MCNC: 10.2 MG/DL (ref 8.8–10.2)
CHLORIDE SERPL-SCNC: 106 MMOL/L (ref 98–107)
CREAT SERPL-MCNC: 0.85 MG/DL (ref 0.67–1.17)
DEPRECATED HCO3 PLAS-SCNC: 27 MMOL/L (ref 22–29)
EGFRCR SERPLBLD CKD-EPI 2021: 88 ML/MIN/1.73M2
EOSINOPHIL # BLD AUTO: 0.3 10E3/UL (ref 0–0.7)
EOSINOPHIL NFR BLD AUTO: 3 %
ERYTHROCYTE [DISTWIDTH] IN BLOOD BY AUTOMATED COUNT: 17.9 % (ref 10–15)
FERRITIN SERPL-MCNC: 20 NG/ML (ref 31–409)
FIBRINOGEN PPP-MCNC: 371 MG/DL (ref 170–490)
GLUCOSE SERPL-MCNC: 100 MG/DL (ref 70–99)
HCT VFR BLD AUTO: 37.5 % (ref 40–53)
HGB BLD-MCNC: 12 G/DL (ref 13.3–17.7)
IMM GRANULOCYTES # BLD: 0.1 10E3/UL
IMM GRANULOCYTES NFR BLD: 1 %
INR PPP: 1.25 (ref 0.85–1.15)
LYMPHOCYTES # BLD AUTO: 1.7 10E3/UL (ref 0.8–5.3)
LYMPHOCYTES NFR BLD AUTO: 17 %
MCH RBC QN AUTO: 27.8 PG (ref 26.5–33)
MCHC RBC AUTO-ENTMCNC: 32 G/DL (ref 31.5–36.5)
MCV RBC AUTO: 87 FL (ref 78–100)
MONOCYTES # BLD AUTO: 0.9 10E3/UL (ref 0–1.3)
MONOCYTES NFR BLD AUTO: 9 %
NEUTROPHILS # BLD AUTO: 7.3 10E3/UL (ref 1.6–8.3)
NEUTROPHILS NFR BLD AUTO: 69 %
NRBC # BLD AUTO: 0 10E3/UL
NRBC BLD AUTO-RTO: 0 /100
PLATELET # BLD AUTO: 115 10E3/UL (ref 150–450)
POTASSIUM SERPL-SCNC: 4.5 MMOL/L (ref 3.4–5.3)
PROT SERPL-MCNC: 7.6 G/DL (ref 6.4–8.3)
RBC # BLD AUTO: 4.31 10E6/UL (ref 4.4–5.9)
SODIUM SERPL-SCNC: 143 MMOL/L (ref 135–145)
WBC # BLD AUTO: 10.5 10E3/UL (ref 4–11)

## 2024-01-18 PROCEDURE — 99215 OFFICE O/P EST HI 40 MIN: CPT | Performed by: INTERNAL MEDICINE

## 2024-01-18 PROCEDURE — 85730 THROMBOPLASTIN TIME PARTIAL: CPT | Performed by: PATHOLOGY

## 2024-01-18 PROCEDURE — 82728 ASSAY OF FERRITIN: CPT | Performed by: PATHOLOGY

## 2024-01-18 PROCEDURE — 36415 COLL VENOUS BLD VENIPUNCTURE: CPT | Performed by: PATHOLOGY

## 2024-01-18 PROCEDURE — 85610 PROTHROMBIN TIME: CPT | Performed by: PATHOLOGY

## 2024-01-18 PROCEDURE — 85025 COMPLETE CBC W/AUTO DIFF WBC: CPT | Performed by: PATHOLOGY

## 2024-01-18 PROCEDURE — 85384 FIBRINOGEN ACTIVITY: CPT | Performed by: PATHOLOGY

## 2024-01-18 PROCEDURE — 80053 COMPREHEN METABOLIC PANEL: CPT | Performed by: PATHOLOGY

## 2024-01-18 PROCEDURE — G0463 HOSPITAL OUTPT CLINIC VISIT: HCPCS | Performed by: INTERNAL MEDICINE

## 2024-01-18 RX ORDER — DIPHENHYDRAMINE HYDROCHLORIDE 50 MG/ML
50 INJECTION INTRAMUSCULAR; INTRAVENOUS
Status: CANCELLED
Start: 2024-01-18

## 2024-01-18 RX ORDER — METHYLPREDNISOLONE SODIUM SUCCINATE 125 MG/2ML
125 INJECTION, POWDER, LYOPHILIZED, FOR SOLUTION INTRAMUSCULAR; INTRAVENOUS
Status: CANCELLED
Start: 2024-01-18

## 2024-01-18 RX ORDER — HEPARIN SODIUM (PORCINE) LOCK FLUSH IV SOLN 100 UNIT/ML 100 UNIT/ML
5 SOLUTION INTRAVENOUS
Status: CANCELLED | OUTPATIENT
Start: 2024-01-18

## 2024-01-18 RX ORDER — HEPARIN SODIUM,PORCINE 10 UNIT/ML
5-20 VIAL (ML) INTRAVENOUS DAILY PRN
Status: CANCELLED | OUTPATIENT
Start: 2024-01-18

## 2024-01-18 RX ORDER — EPINEPHRINE 1 MG/ML
0.3 INJECTION, SOLUTION INTRAMUSCULAR; SUBCUTANEOUS EVERY 5 MIN PRN
Status: CANCELLED | OUTPATIENT
Start: 2024-01-18

## 2024-01-18 RX ORDER — ALBUTEROL SULFATE 0.83 MG/ML
2.5 SOLUTION RESPIRATORY (INHALATION)
Status: CANCELLED | OUTPATIENT
Start: 2024-01-18

## 2024-01-18 RX ORDER — ALBUTEROL SULFATE 90 UG/1
1-2 AEROSOL, METERED RESPIRATORY (INHALATION)
Status: CANCELLED
Start: 2024-01-18

## 2024-01-18 RX ORDER — MEPERIDINE HYDROCHLORIDE 25 MG/ML
25 INJECTION INTRAMUSCULAR; INTRAVENOUS; SUBCUTANEOUS EVERY 30 MIN PRN
Status: CANCELLED | OUTPATIENT
Start: 2024-01-18

## 2024-01-18 ASSESSMENT — PAIN SCALES - GENERAL: PAINLEVEL: EXTREME PAIN (8)

## 2024-01-18 NOTE — PROGRESS NOTES
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  He has a history of recurrent iron deficiency anemia felt secondary to chronic occult blood loss from portal hypertensive gastropathy and hemorrhoids.  He also has underlying alcoholic liver disease.  He was scheduled today for routine follow-up visit.      He is accompanied today by his wife.  Overall he feels about the same.  Denies any new issues.  No new bleeding symptoms, and no recent sign of blood in his stools.  He last received IV iron in late August / early September 2021.  Prior to that he was treated in January 2020.    Since his last visit here in September 2023, he has been evaluated by GI for his multiple GI symptoms.  See their note from December 2023 for details.  In summary, his longstanding and ongoing alcohol abuse appear to be the primary cause of his GI issues.  He remains uninterested in sobriety.  Reports that he has cut back, currently only consuming 3 drinks per day.      Physical exam:  He appears his usual self.  He is in no acute distress.  He is hard of hearing which is longstanding.  He is afebrile, blood pressure 144/90, pulse 110 and regular, weight has increased over the last 3 months to 187 pounds (up 8 pounds).  He is not pale or jaundiced.  No scleral icterus.  Lungs clear.  RRR S1S2, no murmur. Abdomen is nondistended, soft, and nontender.      Labs:  Serum electrolytes are normal, creatinine 0.85, LFTs are normal.  White count 10.5 with a normal differential, hemoglobin 12.0, MCV 98, platelets 115,000.  Overall his CBC parameters are within his baseline ranges.  Ferritin is 20, decreased from 75 in April 2023.      ASSESSMENT / PLAN:  1.  Chronic ITP -- Raymon's baseline platelet count has been in the 70,000 to 100,000 range over the last several years.  He remains on Promacta 75 mg daily.   He is responsive to pulse dexamethasone and we have used this to increase his platelet count prior to surgeries.  He has not been treated with IVIG, so we do not know how well he responds to that.  At the present time, his platelet count is within his usual baseline.  Given that he is on long-term anticoagulation, the goal is to maintain his platelet count consistently above 50,000.  Note that there may also be a component of thrombocytopenia related to his underlying liver disease.    2. History of unprovoked pulmonary embolism (April 2017) -- He has done well on long-term anticoagulation with rivaroxaban.  He is on the prophylactic dose of 10 mg daily due to a combination of mild intermittent renal insufficiency (not currently an issue) and underlying liver disease.  Will continue this as long as his bleeding risk remains acceptable.      3. Iron deficiency anemia -- Last  IV iron was in late August / early September 2021.   Although he denies any recent signs of GI bleeding, EGD in December 2023 continues to show portal hypertensive gastropathy, as well as 2 non-bleeding varicies.  Today the ferritin is low at 20, hence we will plan on IV iron (Infed).    4. Elevated INR:  Although he has liver cirrhosis, rivaroxaban also increases the INR.     5. GI issues -- He continues to have diarrhea but no abldominal pain. He was seen by gastroenterology and underwent an upper endoscopy in 12/2023 which showed severe portal hypertensive gastropathy and two gastric varices without signs of bleeding.  Assessment was that his GI issues are primarily related to his longstanding and ongoing alcohol use.  This has been discussed many times by different physicians and he has consistently declined referrals for chemical dependency treatment and has expressed no interest in reducing his alcohol use.  We discussed this again today and we urged him to at least minimize his alcohol use.      Follow up in 4 months.     Patient was seen  and plan was discussed with staff .     Kenny Pisano MD  Hem / Onc fellow         HEMATOLOGY STAFF:  Seen with fellow, whose note reflects our joint evaluation, assessment, and plan.    Total time on date of encounter 40 minutes, including review of medical records and labs, clinic visit, and documentation.      Waylon Novoa MD  Professor of Medicine  Division of Hematology, Oncology, and Transplantation  Director, [facility]

## 2024-01-18 NOTE — LETTER
1/18/2024         RE: Raymon Ace  [address] [facility] [address]        Dear Colleague,    Thank you for referring your patient, Raymon Ace, to the [facility]. Please see a copy of my visit note below.    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  He has a history of recurrent iron deficiency anemia felt secondary to chronic occult blood loss from portal hypertensive gastropathy and hemorrhoids.  He also has underlying alcoholic liver disease.  He was scheduled today for routine follow-up visit.      He is accompanied today by his wife, as usual.  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  However, he was felt to be a poor candidate for surgery given his muscle weakness.    He remains on Promacta 75 mg daily for chronic ITP.  He is tolerating the medication without any perceived side effects.  He also remains on prophylactic intensity anticoagulation with rivaroxaban (10 mg daily).  He denies any bruising, epistaxis, dental bleeding, or blood in the urine or stool.     He last received IV iron in late August / early September 2021.  Prior to that he was treated in January 2020.    His main concern today is of diarrhea and poor appetite.  He reports diarrhea has been an ongoing issue for the last 2 months.  He reports that his stools range in frequency from once daily to several episodes daily.  He has had some episodes of fecal incontinence.  He denies any black tarry stools or blood in his stools.  No abdominal pain or fevers.  He has had occasional episodes of nausea but this has not been a consistent problem.  Many of the foods he typically enjoys simply do not taste good to him and thus his oral intake has  decreased.  He has lost about 16 pounds over the last 5 months.  He is taking in 3 Ensure shakes daily without difficulty, however.    There have been no recent changes to his medications.  He has not traveled out of state or consumed untreated water.  He continues to drink alcohol regularly.  Difficult to clarify the exact amount, but probably at least several drinks each day.  This has been very longstanding and he has been disinterested in quitting.  Reports his alcohol intake has remained unchanged over the last several months.    Cut back on drinking ,3 drinks per day       Physical exam:  He looks less well than his usual baseline, but he is in no acute distress.  He is afebrile, blood pressure 120/76, pulse 65 and regular.  Respirations unlabored.  O2 saturation 98% on room air.  Weight is 179 pounds.  This is decreased 16 pounds from April 2023.  Review of his weights over the last 2 years show that his weight has fluctuated up and down quite a bit, but his current weight is at the low end of the range in which it has fluctuated over the last 2 years.    He is not pale or jaundiced.  No scleral icterus.  Lungs are clear.  RRR S1S2, no murmur.  No lower extremity edema.  He has normal active bowel sounds.  His abdomen is nondistended, soft, and nontender.  No palpable hepatosplenomegaly although he was examined in the sitting position due to difficulty transferring to the exam table secondary to his neuropathy and lower extremity weakness.    Labs:  Serum electrolytes are normal, creatinine 0.88, LFTs are also normal.  White count 9.7 with a normal differential, hemoglobin 13.0, MCV 98, platelets 117,000.  Overall his CBC parameters are within his baseline ranges.      ASSESSMENT / PLAN:  1.  Chronic ITP -- Raymon's baseline platelet count has been in the 70,000 to 100,000 range over the last several years.  He remains on Promacta 75 mg daily.  He is responsive to pulse dexamethasone and we have used this to  increase his platelet count prior to surgeries.  He has not been treated with IVIG, so we do not know how well he responds to that.  At the present time, his platelet count is within his usual baseline.  Given that he is on long-term anticoagulation, the goal is to maintain his platelet count consistently above 50,000.  Note that there may also be a component of thrombocytopenia related to his underlying liver disease.    2. History of unprovoked pulmonary embolism (April 2017) -- He has done well on long-term anticoagulation with rivaroxaban.  He is on the prophylactic dose of 10 mg daily due to a combination of mild intermittent renal insufficiency (not currently an issue) and underlying liver disease.     3. Iron deficiency anemia -- This has been attributed to to chronic occult blood loss from portal hypertensive gastropathy, and hemorrhoids.  He denies any new bleeding symptoms or any obvious signs of GI bleeding, and had upper and lower endoscopies in the fall 2020.  Last  IV iron was in late August / early September 2021. Today the ferritin is low at 20, hence we will plan on IV iron, will give InFed one time.     4. Elevated INR : on rivaroxaban and liver cirrhosis     5. GI issues -- He continues to have diarrhea but no abldominal pain. He was seen by gastroenterology and underwent an upper endoscopy in 12/2023 which showed severe portal hypertensive gastropathy and two gastric varices without signs of bleeding.     are related to his longstanding alcohol use.  This has been discussed many times by different physicians and he has consistently declined referrals for chemical dependency treatment and has expressed no interest in reducing his alcohol use.      Follow up in 4 months.     Patient was seen and plan was discussed with staff .   Kenny Pisano MD  Hem onc fellow     Sincerely,        Waylon Novoa MD

## 2024-01-18 NOTE — NURSING NOTE
"Oncology Rooming Note    January 18, 2024 2:27 PM   Raymon Ace is a 78 year old male who presents for:    Chief Complaint   Patient presents with    Oncology Clinic Visit     Iron deficiency anemia due to chronic blood loss      Initial Vitals: BP (!) 144/90   Pulse 110   Temp 98.4  F (36.9  C) (Oral)   Resp 18   Wt 84.9 kg (187 lb 1.6 oz)   SpO2 98%   BMI 30.20 kg/m   Estimated body mass index is 30.2 kg/m  as calculated from the following:    Height as of 12/15/23: 1.676 m (5' 6\").    Weight as of this encounter: 84.9 kg (187 lb 1.6 oz). Body surface area is 1.99 meters squared.  Extreme Pain (8) Comment: Data Unavailable   No LMP for male patient.  Allergies reviewed: Yes  Medications reviewed: Yes    Medications: Medication refills not needed today.  Pharmacy name entered into [employer]:    [id number] - [place], [place] - [address] [facility] - [place], [place] - [address] [place] [address] [facility] - [place], [place] - [facility] [address]    Frailty Screening:   Is the patient here for a new oncology consult visit in cancer care? 2. No      Clinical concerns: None       Petra Drake CMA            "

## 2024-01-21 DIAGNOSIS — K21.9 GASTROESOPHAGEAL REFLUX DISEASE WITHOUT ESOPHAGITIS: ICD-10-CM

## 2024-01-21 DIAGNOSIS — Z86.718 PERSONAL HISTORY OF VENOUS THROMBOSIS AND EMBOLISM: ICD-10-CM

## 2024-01-21 DIAGNOSIS — D50.0 IRON DEFICIENCY ANEMIA DUE TO CHRONIC BLOOD LOSS: ICD-10-CM

## 2024-01-21 DIAGNOSIS — D69.3 IDIOPATHIC THROMBOCYTOPENIC PURPURA (H): ICD-10-CM

## 2024-01-22 RX ORDER — RIVAROXABAN 10 MG/1
TABLET, FILM COATED ORAL
Qty: 30 TABLET | Refills: 4 | Status: SHIPPED | OUTPATIENT
Start: 2024-01-22

## 2024-01-23 RX ORDER — OMEPRAZOLE 40 MG/1
CAPSULE, DELAYED RELEASE ORAL
Qty: 90 CAPSULE | Refills: 0 | Status: SHIPPED | OUTPATIENT
Start: 2024-01-23 | End: 2024-03-01

## 2024-01-24 NOTE — TELEPHONE ENCOUNTER
Called pt, LVM requesting callback to schedule appointment. 1st attempt       Cheyenne Dallas RN on 1/24/2024 at 10:36 AM       0 = understands/communicates without difficulty

## 2024-01-24 NOTE — TELEPHONE ENCOUNTER
Please schedule AWV with me, which patient is due on 5/2/2024, to further take care of the medical conditions and medications.OK to use same day slot / double book near another virtual slot.     Thank you,  Beatrice Stephens MD on 1/23/2024

## 2024-02-01 ENCOUNTER — INFUSION THERAPY VISIT (OUTPATIENT)
Dept: INFUSION THERAPY | Facility: CLINIC | Age: 79
End: 2024-02-01
Attending: INTERNAL MEDICINE
Payer: COMMERCIAL

## 2024-02-01 VITALS
OXYGEN SATURATION: 96 % | DIASTOLIC BLOOD PRESSURE: 82 MMHG | SYSTOLIC BLOOD PRESSURE: 135 MMHG | HEART RATE: 83 BPM | TEMPERATURE: 98.6 F

## 2024-02-01 DIAGNOSIS — D50.0 IRON DEFICIENCY ANEMIA DUE TO CHRONIC BLOOD LOSS: Primary | ICD-10-CM

## 2024-02-01 PROCEDURE — 96365 THER/PROPH/DIAG IV INF INIT: CPT

## 2024-02-01 PROCEDURE — 250N000011 HC RX IP 250 OP 636: Performed by: INTERNAL MEDICINE

## 2024-02-01 PROCEDURE — 96366 THER/PROPH/DIAG IV INF ADDON: CPT

## 2024-02-01 PROCEDURE — 258N000003 HC RX IP 258 OP 636: Performed by: INTERNAL MEDICINE

## 2024-02-01 PROCEDURE — 96376 TX/PRO/DX INJ SAME DRUG ADON: CPT

## 2024-02-01 RX ORDER — EPINEPHRINE 1 MG/ML
0.3 INJECTION, SOLUTION, CONCENTRATE INTRAVENOUS EVERY 5 MIN PRN
Status: CANCELLED | OUTPATIENT
Start: 2024-02-01

## 2024-02-01 RX ORDER — ALBUTEROL SULFATE 0.83 MG/ML
2.5 SOLUTION RESPIRATORY (INHALATION)
Status: CANCELLED | OUTPATIENT
Start: 2024-02-01

## 2024-02-01 RX ORDER — ALBUTEROL SULFATE 90 UG/1
1-2 AEROSOL, METERED RESPIRATORY (INHALATION)
Status: CANCELLED
Start: 2024-02-01

## 2024-02-01 RX ORDER — METHYLPREDNISOLONE SODIUM SUCCINATE 125 MG/2ML
125 INJECTION, POWDER, LYOPHILIZED, FOR SOLUTION INTRAMUSCULAR; INTRAVENOUS
Status: CANCELLED
Start: 2024-02-01

## 2024-02-01 RX ORDER — MEPERIDINE HYDROCHLORIDE 25 MG/ML
25 INJECTION INTRAMUSCULAR; INTRAVENOUS; SUBCUTANEOUS EVERY 30 MIN PRN
Status: CANCELLED | OUTPATIENT
Start: 2024-02-01

## 2024-02-01 RX ORDER — HEPARIN SODIUM,PORCINE 10 UNIT/ML
5-20 VIAL (ML) INTRAVENOUS DAILY PRN
Status: CANCELLED | OUTPATIENT
Start: 2024-02-01

## 2024-02-01 RX ORDER — HEPARIN SODIUM (PORCINE) LOCK FLUSH IV SOLN 100 UNIT/ML 100 UNIT/ML
5 SOLUTION INTRAVENOUS
Status: CANCELLED | OUTPATIENT
Start: 2024-02-01

## 2024-02-01 RX ORDER — DIPHENHYDRAMINE HYDROCHLORIDE 50 MG/ML
50 INJECTION INTRAMUSCULAR; INTRAVENOUS
Status: CANCELLED
Start: 2024-02-01

## 2024-02-01 RX ADMIN — SODIUM CHLORIDE 250 ML: 9 INJECTION, SOLUTION INTRAVENOUS at 08:28

## 2024-02-01 RX ADMIN — SODIUM CHLORIDE 1000 MG: 9 INJECTION, SOLUTION INTRAVENOUS at 10:16

## 2024-02-01 RX ADMIN — SODIUM CHLORIDE 25 MG: 9 INJECTION, SOLUTION INTRAVENOUS at 08:29

## 2024-02-01 NOTE — PROGRESS NOTES
Infusion Nursing Note:  Raymon Ace presents today for Infed.    Patient seen by provider today: No   present during visit today: Not Applicable.    Note: N/A.      Intravenous Access:  Peripheral IV placed.    Treatment Conditions:  Ferritin 20 on 1/18/24.      Post Infusion Assessment:  Patient tolerated infusion without incident.  Patient observed for 60 minutes post Infed test dose and 30 minutes post Infed per protocol.  Blood return noted pre and post infusion.  Site patent and intact, free from redness, edema or discomfort.  No evidence of extravasations.  Access discontinued per protocol.       Discharge Plan:   Patient discharged in stable condition accompanied by: wife, Mirela.  Departure Mode: Ambulatory via walker.      Lizy Casey RN

## 2024-02-28 ENCOUNTER — TELEPHONE (OUTPATIENT)
Dept: FAMILY MEDICINE | Facility: CLINIC | Age: 79
End: 2024-02-28
Payer: COMMERCIAL

## 2024-02-28 NOTE — TELEPHONE ENCOUNTER
Medication Question or Refill    Contacts         Type Contact Phone/Fax    02/28/2024 10:53 AM CST Phone (Incoming) MANUEL ROBERTSON (Emergency Contact) [phone]            What medication are you calling about (include dose and sig)?: All medications -- pts wife did not have names of meds.     Preferred Pharmacy:   [id number] - [place], [place] - [address]  [address]  [facility] [address]  Phone: [phone] Fax: [phone]    [id number] - [place], [place] - [address] [facility]  [address] [facility] [address]  Phone: [phone] Fax: [phone]    [facility] - [place], [place] - [address]  [address] [facility] [address]  Phone: [phone] Fax: [phone]      Controlled Substance Agreement on file:   CSA -- Patient Level:    CSA: None found at the patient level.       Who prescribed the medication?: Beatrice Stephens MD     Do you need a refill? No    When did you use the medication last? 2/27/2024    Patient offered an appointment? No    Do you have any questions or concerns?  Yes: Pts wife is wondering if PCP wants to do med check prior to annual in May.       Okay to leave a detailed message?: Yes at Cell number on file:    Telephone Information:   Mobile [phone]   Mobile Not on file.

## 2024-02-29 NOTE — TELEPHONE ENCOUNTER
OK to double book at 11.30 AM next week for a VV for medication follow up .     Thank you  Beatrice Stephens MD on 2/29/2024

## 2024-02-29 NOTE — TELEPHONE ENCOUNTER
Pt leaves to [place] next week, so helped schedule for appointment for tomorrow 3/1/24 with PCP for Med recheck.     Cheyenne Dallas RN on 2/29/2024 at 11:29 AM

## 2024-03-01 ENCOUNTER — VIRTUAL VISIT (OUTPATIENT)
Dept: FAMILY MEDICINE | Facility: CLINIC | Age: 79
End: 2024-03-01
Payer: COMMERCIAL

## 2024-03-01 DIAGNOSIS — F10.20 ALCOHOL DEPENDENCE, CONTINUOUS (H): ICD-10-CM

## 2024-03-01 DIAGNOSIS — I27.82 CHRONIC PULMONARY EMBOLISM, UNSPECIFIED PULMONARY EMBOLISM TYPE, UNSPECIFIED WHETHER ACUTE COR PULMONALE PRESENT (H): Primary | ICD-10-CM

## 2024-03-01 DIAGNOSIS — N18.31 STAGE 3A CHRONIC KIDNEY DISEASE (H): ICD-10-CM

## 2024-03-01 DIAGNOSIS — G89.29 CHRONIC BILATERAL LOW BACK PAIN WITH SCIATICA, SCIATICA LATERALITY UNSPECIFIED: ICD-10-CM

## 2024-03-01 DIAGNOSIS — F32.5 MAJOR DEPRESSIVE DISORDER IN FULL REMISSION, UNSPECIFIED WHETHER RECURRENT (H): ICD-10-CM

## 2024-03-01 DIAGNOSIS — M54.40 CHRONIC BILATERAL LOW BACK PAIN WITH SCIATICA, SCIATICA LATERALITY UNSPECIFIED: ICD-10-CM

## 2024-03-01 DIAGNOSIS — K76.6 PORTAL HYPERTENSION (H): ICD-10-CM

## 2024-03-01 DIAGNOSIS — G63 POLYNEUROPATHY IN OTHER DISEASES CLASSIFIED ELSEWHERE (H): ICD-10-CM

## 2024-03-01 DIAGNOSIS — K21.9 GASTROESOPHAGEAL REFLUX DISEASE WITHOUT ESOPHAGITIS: ICD-10-CM

## 2024-03-01 PROCEDURE — 99214 OFFICE O/P EST MOD 30 MIN: CPT | Mod: 95 | Performed by: INTERNAL MEDICINE

## 2024-03-01 RX ORDER — OMEPRAZOLE 40 MG/1
CAPSULE, DELAYED RELEASE ORAL
Qty: 90 CAPSULE | Refills: 1 | Status: SHIPPED | OUTPATIENT
Start: 2024-03-01 | End: 2024-06-14

## 2024-03-01 RX ORDER — GABAPENTIN 300 MG/1
900 CAPSULE ORAL 3 TIMES DAILY
Qty: 360 CAPSULE | Refills: 5 | Status: SHIPPED | OUTPATIENT
Start: 2024-03-01

## 2024-03-01 NOTE — PROGRESS NOTES
Raymon is a 78 year old who is being evaluated via a billable telephone visit.      What phone number would you like to be contacted at? [phone]   How would you like to obtain your AVS? [place]  Originating Location (pt. Location): Home  {PROVIDER LOCATION On-site should be selected for visits conducted from your clinic location or adjoining [facility] hospital, academic office, or other nearby [facility] building. Off-site should be selected for all other provider locations, including home:870885}  Distant Location (provider location):  On-site    {PROVIDER CHARTING PREFERENCE:869549}    Subjective   Raymon is a 78 year old, presenting for the following health issues:  Recheck Medication        3/1/2024     9:13 AM   Additional Questions   Roomed by Geovanny MACKEY CMA   Accompanied by spouse     HPI     Hyperlipidemia Follow-Up    Are you regularly taking any medication or supplement to lower your cholesterol?   No  Are you having muscle aches or other side effects that you think could be caused by your cholesterol lowering medication?  N/A    Hypertension Follow-up    Do you check your blood pressure regularly outside of the clinic? Yes   Are you following a low salt diet? Yes, tries too  Are your blood pressures ever more than 140 on the top number (systolic) OR more   than 90 on the bottom number (diastolic), for example 140/90? No    BP Readings from Last 2 Encounters:   02/01/24 135/82   01/18/24 (!) 144/90     Hemoglobin A1C (%)   Date Value   05/12/2021 4.9   08/30/2019 5.3     LDL Cholesterol Calculated (mg/dL)   Date Value   04/20/2023 116 (H)   04/28/2022 114 (H)   05/12/2021 46   06/07/2019 84     Chronic Kidney Disease Follow-up  {Provider  Link to CKD SmartSet :245095}  Do you take any over the counter pain medicine?: No    Medication Followup for: PROMACTA 75 MG tablet   Taking Medication as prescribed: yes  Side Effects:  None  Medication Helping Symptoms:  yes    Medication Followup for: rivaroxaban  ANTICOAGULANT (XARELTO ANTICOAGULANT) 10 MG TABS tablet   Taking Medication as prescribed: yes  Side Effects:  None  Medication Helping Symptoms:  yes    Medication Followup for: gabapentin (NEURONTIN) 300 MG capsule   Taking Medication as prescribed: yes  Side Effects:  None  Medication Helping Symptoms:  yes    {additonal problems for provider to add (Optional):217250}    {ROS Picklists (Optional):054287}      Objective       Vitals:  No vitals were obtained today due to virtual visit.    Physical Exam   General: Alert and no distress //Respiratory: No audible wheeze, cough, or shortness of breath // Psychiatric:  Appropriate affect, tone, and pace of words    {Diagnostic Test Results (Optional):656003}      Phone call duration: *** minutes  Signed Electronically by: Beatrice Stephens MD  {Email feedback regarding this note to [email or web address]   :280030}

## 2024-03-01 NOTE — PROGRESS NOTES
Raymon is a 78 year old who is being evaluated via a billable video visit.      How would you like to obtain your AVS? MyChart  If the video visit is dropped, the invitation should be resent by: Text to cell phone: [phone]  Will anyone else be joining your video visit? No        Assessment and Plan  1. Chronic pulmonary embolism, unspecified pulmonary embolism type, unspecified whether acute cor pulmonale present (H)  Chronic stable, continue current Xarelto.     2. Stage 3a chronic kidney disease (H)  - Albumin Random Urine Quantitative with Creat Ratio; Future    3. Alcohol dependence, continuous (H)  4. Portal hypertension (H)  Ongoing problem, uncontrolled.  Patient did have gastric varices as result of portal hypertension.  Discussed on continuing PPIs and follow-up with gastroenterology.  Patient again encouraged to quit alcohol, which she states is trying to cut down previously 7 drinks per day currently around 3 drinks per day which she continues to consume.     5. Polyneuropathy in other diseases classified elsewhere (H24)  - gabapentin (NEURONTIN) 300 MG capsule; Take 3 capsules (900 mg) by mouth 3 times daily  Dispense: 360 capsule; Refill: 5    6. Chronic bilateral low back pain with sciatica, sciatica laterality unspecified  - gabapentin (NEURONTIN) 300 MG capsule; Take 3 capsules (900 mg) by mouth 3 times daily  Dispense: 360 capsule; Refill: 5    7. Major depressive disorder in full remission, unspecified whether recurrent (H24)  Chronic stable, Gabapentin helping in in both anxiety and depression as well.     8. Gastroesophageal reflux disease without esophagitis  - omeprazole (PRILOSEC) 40 MG DR capsule; TAKE 1 CAPSULE BY MOUTH ONCE DAILY.  Dispense: 90 capsule; Refill: 1         Please note that this note consists of symbols derived from keyboarding, dictation and/or voice recognition software. As a result, there may be errors in the script that have gone undetected. Please consider this when  interpreting information found in this chart.    Patient Instructions   As discussed , please take Gabapentin as prescribed for better control of pain >>   3 Cap in morning - 3 cap in afternoon - 3 cap in the night     All other needed medication refills taken care      Return in about 2 months (around 5/8/2024), or if symptoms worsen or fail to improve, for Annual Wellness Exam.    MD NARENDRA Rocha [facility] ANDRIY Ennis is a 78 year old, presenting for the following health issues:  Recheck Medication      3/1/2024     9:13 AM   Additional Questions   Roomed by Geovanny MACKEY CMA   Accompanied by spouse     HPI     Hyperlipidemia Follow-Up    Are you regularly taking any medication or supplement to lower your cholesterol?   No  Are you having muscle aches or other side effects that you think could be caused by your cholesterol lowering medication?  N/A    Hypertension Follow-up    Do you check your blood pressure regularly outside of the clinic? Yes   Are you following a low salt diet? Yes, tries too  Are your blood pressures ever more than 140 on the top number (systolic) OR more   than 90 on the bottom number (diastolic), for example 140/90? No    BP Readings from Last 2 Encounters:   02/01/24 135/82   01/18/24 (!) 144/90     Hemoglobin A1C (%)   Date Value   05/12/2021 4.9   08/30/2019 5.3     LDL Cholesterol Calculated (mg/dL)   Date Value   04/20/2023 116 (H)   04/28/2022 114 (H)   05/12/2021 46   06/07/2019 84     Chronic Kidney Disease Follow-up  Do you take any over the counter pain medicine?: No    Medication Followup for: PROMACTA 75 MG tablet   Taking Medication as prescribed: yes  Side Effects:  None  Medication Helping Symptoms:  yes    Medication Followup for: rivaroxaban ANTICOAGULANT (XARELTO ANTICOAGULANT) 10 MG TABS tablet   Taking Medication as prescribed: yes  Side Effects:  None  Medication Helping Symptoms:  yes    Medication Followup for: gabapentin  (NEURONTIN) 300 MG capsule   Taking Medication as prescribed: yes  Side Effects:  None  Medication Helping Symptoms:  yes      Last seen pt on 12/28/23 post EGD for gastric varices and GI bleed issues.  Continues to consume alcohol at least 3 drinks every day and states that he is doing better in the past when he used to have 7 drinks per day.  Had iron infusion yesterday as managed by oncology    Patient is leaving to [place] and wants to have medication follow-up at this time.  Recent lab work on January 18, 2024 showing normal CMP, hemoglobin at 12 mildly worsened compared to the past.       Allergies   Allergen Reactions    Olmesartan Diarrhea        Past Medical History:   Diagnosis Date    Alcohol abuse since teens 01/15/2015    Still actively drinking    Alcoholic liver damage (H24) 1/10/2014    Gastroesophageal reflux disease with esophagitis 12/6/2016    Gout involving toe, unspecified cause, unspecified chronicity, unspecified laterality 6/2/2016    Heart murmur     heart is positioned farther on left side then typical    Hyperlipidemia 12/17/2015    Hypertension     ITP (idiopathic thrombocytopenic purpura)     Obstructive sleep apnea     does not use CPAP  machine    Pulmonary embolism (H) large RLL w infarctio 4-17 4/18/2017       Past Surgical History:   Procedure Laterality Date    APPENDECTOMY  1956    BACK SURGERY  1992, 1996    trimmed L4-L5, Fusion L4-L5    BONE MARROW BIOPSY, BONE SPECIMEN, NEEDLE/TROCAR  10/24/2012    Procedure: BIOPSY BONE MARROW;  BONE MARROW BIOPSY WITH ASPIRATE (AREVALO ORDERING);  Surgeon: Terri Hickey MD;  Location:  GI    COLONOSCOPY N/A 7/31/2019    Procedure: COLONOSCOPY;  Surgeon: Sebastian Garcia MD;  Location:  GI    ESOPHAGOSCOPY, GASTROSCOPY, DUODENOSCOPY (EGD), COMBINED N/A 2/8/2018    Procedure: COMBINED ESOPHAGOSCOPY, GASTROSCOPY, DUODENOSCOPY (EGD);  EGD;  Surgeon: Terri Crouch MD;  Location:  GI    ESOPHAGOSCOPY, GASTROSCOPY,  DUODENOSCOPY (EGD), COMBINED N/A 11/10/2020    Procedure: ESOPHAGOGASTRODUODENOSCOPY, WITH BIOPSY;  Surgeon: Alonzo Jang DO;  Location:  GI    ESOPHAGOSCOPY, GASTROSCOPY, DUODENOSCOPY (EGD), COMBINED N/A 2023    Procedure: ESOPHAGOGASTRODUODENOSCOPY, WITH BIOPSY;  Surgeon: Everardo Duran MD;  Location:  GI    FACIAL RECONSTRUCTION SURGERY      jaw fracture    HC TOOTH EXTRACTION W/FORCEP         Family History   Problem Relation Age of Onset    Unknown/Adopted Mother     Unknown/Adopted Father     Heart Disease Sister     Diabetes No family hx of     Coronary Artery Disease No family hx of     Hypertension No family hx of     Hyperlipidemia No family hx of     Cerebrovascular Disease No family hx of     Breast Cancer No family hx of     Colon Cancer No family hx of     Prostate Cancer No family hx of     Other Cancer No family hx of     Depression No family hx of     Anxiety Disorder No family hx of     Mental Illness No family hx of     Substance Abuse No family hx of     Anesthesia Reaction No family hx of     Asthma No family hx of     Osteoporosis No family hx of     Genetic Disorder No family hx of     Thyroid Disease No family hx of     Obesity No family hx of        Social History     Tobacco Use    Smoking status: Former     Packs/day: 1.00     Years: 28.00     Additional pack years: 0.00     Total pack years: 28.00     Types: Cigarettes     Start date:      Quit date: 1982     Years since quittin.4    Smokeless tobacco: Former   Substance Use Topics    Alcohol use: Yes     Alcohol/week: 5.0 standard drinks of alcohol     Types: 5 Shots of liquor per week     Comment: 4-5 drinks/day, alcohol use disorder        Current Outpatient Medications   Medication    allopurinol (ZYLOPRIM) 300 MG tablet    ASPIRIN NOT PRESCRIBED (INTENTIONAL)    bismuth subsalicylate (PEPTO BISMOL) 262 MG/15ML suspension    Cholecalciferol (VITAMIN D) 50 MCG (2000) CAPS    folic acid (FOLVITE)  1 MG tablet    gabapentin (NEURONTIN) 300 MG capsule    metoprolol succinate ER (TOPROL XL) 50 MG 24 hr tablet    omeprazole (PRILOSEC) 40 MG DR capsule    PROMACTA 75 MG tablet    rivaroxaban ANTICOAGULANT (XARELTO ANTICOAGULANT) 10 MG TABS tablet     Current Facility-Administered Medications   Medication    ropivacaine (NAROPIN) injection 3 mL    ropivacaine (NAROPIN) injection 3 mL    triamcinolone (KENALOG-40) injection 40 mg    triamcinolone (KENALOG-40) injection 40 mg          Review of Systems  Constitutional, HEENT, cardiovascular, pulmonary, GI, , musculoskeletal, neuro, skin, endocrine and psych systems are negative, except as otherwise noted.      Objective       Vitals:  No vitals were obtained today due to virtual visit.    Physical Exam   GENERAL: alert and no distress  EYES: Eyes grossly normal to inspection.  No discharge or erythema, or obvious scleral/conjunctival abnormalities.  RESP: No audible wheeze, cough, or visible cyanosis.    SKIN: Visible skin clear. No significant rash, abnormal pigmentation or lesions.  NEURO: Cranial nerves grossly intact.  Mentation and speech appropriate for age.  PSYCH: Appropriate affect, tone, and pace of words      Video-Visit Details    Type of service:  Video Visit   Originating Location (pt. Location): Home    Distant Location (provider location):  On-site  Platform used for Video Visit: Sanju  Signed Electronically by: Beatrice Stephens MD

## 2024-03-01 NOTE — PATIENT INSTRUCTIONS
As discussed , please take Gabapentin as prescribed for better control of pain >>   3 Cap in morning - 3 cap in afternoon - 3 cap in the night     All other needed medication refills taken care

## 2024-03-28 NOTE — TELEPHONE ENCOUNTER
[facility] [phone] / INR 2.5,  therapeutic. (Goal 2.5 ) TTR 12.9 %     Etiology: Evelin said she is taking 7mg every day.     PLAN: continue HER current dose.     Recheck INR one week.    Pt reports:    No sign of unusual bruising or bleeding.  Any missed doses: No   Medications changes: No    Contacted  Evelin by phone  who verbalized understanding and agreement.    WARFARIN Plan per protocol: 7 mg every day                Prescription approved per [facility] Protocol.  Leyla Thompson RN- Triage [employer]

## 2024-05-08 ENCOUNTER — OFFICE VISIT (OUTPATIENT)
Dept: FAMILY MEDICINE | Facility: CLINIC | Age: 79
End: 2024-05-08
Payer: COMMERCIAL

## 2024-05-08 VITALS
TEMPERATURE: 97.5 F | HEIGHT: 65 IN | RESPIRATION RATE: 17 BRPM | WEIGHT: 192.2 LBS | SYSTOLIC BLOOD PRESSURE: 130 MMHG | BODY MASS INDEX: 32.02 KG/M2 | OXYGEN SATURATION: 96 % | HEART RATE: 80 BPM | DIASTOLIC BLOOD PRESSURE: 83 MMHG

## 2024-05-08 DIAGNOSIS — D69.3 IDIOPATHIC THROMBOCYTOPENIC PURPURA (H): ICD-10-CM

## 2024-05-08 DIAGNOSIS — F10.20 ALCOHOL DEPENDENCE, CONTINUOUS (H): ICD-10-CM

## 2024-05-08 DIAGNOSIS — Z12.5 ENCOUNTER FOR PROSTATE CANCER SCREENING: ICD-10-CM

## 2024-05-08 DIAGNOSIS — Z91.81 RISK FOR FALLS: ICD-10-CM

## 2024-05-08 DIAGNOSIS — F10.10 ALCOHOL ABUSE: ICD-10-CM

## 2024-05-08 DIAGNOSIS — I86.4 GASTRIC VARICES: ICD-10-CM

## 2024-05-08 DIAGNOSIS — M48.061 SPINAL STENOSIS OF LUMBAR REGION, UNSPECIFIED WHETHER NEUROGENIC CLAUDICATION PRESENT: ICD-10-CM

## 2024-05-08 DIAGNOSIS — E78.2 MIXED HYPERLIPIDEMIA: ICD-10-CM

## 2024-05-08 DIAGNOSIS — D50.0 IRON DEFICIENCY ANEMIA DUE TO CHRONIC BLOOD LOSS: ICD-10-CM

## 2024-05-08 DIAGNOSIS — G63 POLYNEUROPATHY IN OTHER DISEASES CLASSIFIED ELSEWHERE (H): ICD-10-CM

## 2024-05-08 DIAGNOSIS — K70.9 ALCOHOLIC LIVER DISEASE (H): ICD-10-CM

## 2024-05-08 DIAGNOSIS — F32.5 MAJOR DEPRESSIVE DISORDER IN FULL REMISSION, UNSPECIFIED WHETHER RECURRENT (H): ICD-10-CM

## 2024-05-08 DIAGNOSIS — M10.9 GOUT INVOLVING TOE, UNSPECIFIED CAUSE, UNSPECIFIED CHRONICITY, UNSPECIFIED LATERALITY: ICD-10-CM

## 2024-05-08 DIAGNOSIS — R41.3 MEMORY DEFICITS: ICD-10-CM

## 2024-05-08 DIAGNOSIS — E55.9 VITAMIN D DEFICIENCY: ICD-10-CM

## 2024-05-08 DIAGNOSIS — Z86.718 PERSONAL HISTORY OF VENOUS THROMBOSIS AND EMBOLISM: ICD-10-CM

## 2024-05-08 DIAGNOSIS — Z00.00 ENCOUNTER FOR MEDICARE ANNUAL WELLNESS EXAM: Primary | ICD-10-CM

## 2024-05-08 DIAGNOSIS — K70.9 ALCOHOLIC LIVER DAMAGE (H): ICD-10-CM

## 2024-05-08 DIAGNOSIS — Z23 NEED FOR VACCINATION: ICD-10-CM

## 2024-05-08 DIAGNOSIS — K76.6 PORTAL HYPERTENSION (H): ICD-10-CM

## 2024-05-08 DIAGNOSIS — Z79.01 LONG TERM CURRENT USE OF ANTICOAGULANT THERAPY: ICD-10-CM

## 2024-05-08 DIAGNOSIS — K70.30 ALCOHOLIC CIRRHOSIS, UNSPECIFIED WHETHER ASCITES PRESENT (H): ICD-10-CM

## 2024-05-08 LAB
BASOPHILS # BLD AUTO: 0.1 10E3/UL (ref 0–0.2)
BASOPHILS NFR BLD AUTO: 1 %
EOSINOPHIL # BLD AUTO: 0.2 10E3/UL (ref 0–0.7)
EOSINOPHIL NFR BLD AUTO: 3 %
ERYTHROCYTE [DISTWIDTH] IN BLOOD BY AUTOMATED COUNT: 18.3 % (ref 10–15)
HCT VFR BLD AUTO: 39.2 % (ref 40–53)
HGB BLD-MCNC: 12.4 G/DL (ref 13.3–17.7)
IMM GRANULOCYTES # BLD: 0.1 10E3/UL
IMM GRANULOCYTES NFR BLD: 2 %
LYMPHOCYTES # BLD AUTO: 1.4 10E3/UL (ref 0.8–5.3)
LYMPHOCYTES NFR BLD AUTO: 19 %
MCH RBC QN AUTO: 29.2 PG (ref 26.5–33)
MCHC RBC AUTO-ENTMCNC: 31.6 G/DL (ref 31.5–36.5)
MCV RBC AUTO: 92 FL (ref 78–100)
MONOCYTES # BLD AUTO: 0.7 10E3/UL (ref 0–1.3)
MONOCYTES NFR BLD AUTO: 9 %
NEUTROPHILS # BLD AUTO: 4.9 10E3/UL (ref 1.6–8.3)
NEUTROPHILS NFR BLD AUTO: 66 %
NRBC # BLD AUTO: 0 10E3/UL
NRBC BLD AUTO-RTO: 0 /100
PLATELET # BLD AUTO: 96 10E3/UL (ref 150–450)
RBC # BLD AUTO: 4.25 10E6/UL (ref 4.4–5.9)
WBC # BLD AUTO: 7.3 10E3/UL (ref 4–11)

## 2024-05-08 PROCEDURE — 84425 ASSAY OF VITAMIN B-1: CPT | Mod: 90 | Performed by: INTERNAL MEDICINE

## 2024-05-08 PROCEDURE — 83540 ASSAY OF IRON: CPT | Performed by: INTERNAL MEDICINE

## 2024-05-08 PROCEDURE — 84443 ASSAY THYROID STIM HORMONE: CPT | Performed by: INTERNAL MEDICINE

## 2024-05-08 PROCEDURE — 82306 VITAMIN D 25 HYDROXY: CPT | Performed by: INTERNAL MEDICINE

## 2024-05-08 PROCEDURE — 99000 SPECIMEN HANDLING OFFICE-LAB: CPT | Performed by: INTERNAL MEDICINE

## 2024-05-08 PROCEDURE — 82607 VITAMIN B-12: CPT | Performed by: INTERNAL MEDICINE

## 2024-05-08 PROCEDURE — 80061 LIPID PANEL: CPT | Performed by: INTERNAL MEDICINE

## 2024-05-08 PROCEDURE — 90480 ADMN SARSCOV2 VAC 1/ONLY CMP: CPT | Performed by: INTERNAL MEDICINE

## 2024-05-08 PROCEDURE — 85025 COMPLETE CBC W/AUTO DIFF WBC: CPT | Performed by: INTERNAL MEDICINE

## 2024-05-08 PROCEDURE — 80053 COMPREHEN METABOLIC PANEL: CPT | Performed by: INTERNAL MEDICINE

## 2024-05-08 PROCEDURE — 91320 SARSCV2 VAC 30MCG TRS-SUC IM: CPT | Performed by: INTERNAL MEDICINE

## 2024-05-08 PROCEDURE — 82728 ASSAY OF FERRITIN: CPT | Performed by: INTERNAL MEDICINE

## 2024-05-08 PROCEDURE — 99214 OFFICE O/P EST MOD 30 MIN: CPT | Mod: 25 | Performed by: INTERNAL MEDICINE

## 2024-05-08 PROCEDURE — G0439 PPPS, SUBSEQ VISIT: HCPCS | Performed by: INTERNAL MEDICINE

## 2024-05-08 PROCEDURE — 36415 COLL VENOUS BLD VENIPUNCTURE: CPT | Performed by: INTERNAL MEDICINE

## 2024-05-08 PROCEDURE — G0103 PSA SCREENING: HCPCS | Performed by: INTERNAL MEDICINE

## 2024-05-08 PROCEDURE — 83550 IRON BINDING TEST: CPT | Performed by: INTERNAL MEDICINE

## 2024-05-08 SDOH — HEALTH STABILITY: PHYSICAL HEALTH: ON AVERAGE, HOW MANY DAYS PER WEEK DO YOU ENGAGE IN MODERATE TO STRENUOUS EXERCISE (LIKE A BRISK WALK)?: 0 DAYS

## 2024-05-08 ASSESSMENT — SOCIAL DETERMINANTS OF HEALTH (SDOH): HOW OFTEN DO YOU GET TOGETHER WITH FRIENDS OR RELATIVES?: MORE THAN THREE TIMES A WEEK

## 2024-05-08 ASSESSMENT — PATIENT HEALTH QUESTIONNAIRE - PHQ9
10. IF YOU CHECKED OFF ANY PROBLEMS, HOW DIFFICULT HAVE THESE PROBLEMS MADE IT FOR YOU TO DO YOUR WORK, TAKE CARE OF THINGS AT HOME, OR GET ALONG WITH OTHER PEOPLE: VERY DIFFICULT
SUM OF ALL RESPONSES TO PHQ QUESTIONS 1-9: 17

## 2024-05-08 ASSESSMENT — PAIN SCALES - GENERAL: PAINLEVEL: SEVERE PAIN (6)

## 2024-05-08 NOTE — PATIENT INSTRUCTIONS
"As discussed , will check fasting labs due at this time.     Refills will be taken care.     Please follow up with Hematology and Gastroenterology    ===============================  Preventive Care Advice   This is general advice we often give to help people stay healthy. Your care team may have specific advice just for you. Please talk to your care team about your own preventive care needs.  Lifestyle  Exercise at least 150 minutes each week (30 minutes a day, 5 days a week).  Do muscle strengthening activities 2 days a week. These help control your weight and prevent disease.  No smoking.  Wear sunscreen to prevent skin cancer.  Have your home tested for radon every 2 to 5 years. Radon is a colorless, odorless gas that can harm your lungs. To learn more, go to www.health.state.[place] and search for \"Radon in Homes.\"  Keep guns unloaded and locked up in a safe place like a safe or gun vault, or, use a gun lock and hide the keys. Always lock away bullets separately. To learn more, visit [employer].[place].gov and search for \"safe gun storage.\"  Nutrition  Eat 5 or more servings of fruits and vegetables each day.  Try wheat bread, brown rice and whole grain pasta (instead of white bread, rice, and pasta).  Get enough calcium and vitamin D. Check the label on foods and aim for 100% of the RDA (recommended daily allowance).  Regular exams  Have a dental exam and cleaning every 6 months.  See your health care team every year to talk about:  Any changes in your health.  Any medicines your care team has prescribed.  Preventive care, family planning, and ways to prevent chronic diseases.  Shots (vaccines)   HPV shots (up to age 26), if you've never had them before.  Hepatitis B shots (up to age 59), if you've never had them before.  COVID-19 shot: Get this shot when it's due.  Flu shot: Get a flu shot every year.  Tetanus shot: Get a tetanus shot every 10 years.  Pneumococcal, hepatitis A, and RSV shots: Ask your care team if you need " these based on your risk.  Shingles shot (for age 50 and up).  General health tests  Diabetes screening:  Starting at age 35, Get screened for diabetes at least every 3 years.  If you are younger than age 35, ask your care team if you should be screened for diabetes.  Cholesterol test: At age 39, start having a cholesterol test every 5 years, or more often if advised.  Bone density scan (DEXA): At age 50, ask your care team if you should have this scan for osteoporosis (brittle bones).  Hepatitis C: Get tested at least once in your life.  Abdominal aortic aneurysm screening: Talk to your doctor about having this screening if you:  Have ever smoked; and  Are biologically male; and  Are between the ages of 65 and 75.  STIs (sexually transmitted infections)  Before age 24: Ask your care team if you should be screened for STIs.  After age 24: Get screened for STIs if you're at risk. You are at risk for STIs (including HIV) if:  You are sexually active with more than one person.  You don't use condoms every time.  You or a partner was diagnosed with a sexually transmitted infection.  If you are at risk for HIV, ask about PrEP medicine to prevent HIV.  Get tested for HIV at least once in your life, whether you are at risk for HIV or not.  Cancer screening tests  Cervical cancer screening: If you have a cervix, begin getting regular cervical cancer screening tests at age 21. Most people who have regular screenings with normal results can stop after age 65. Talk about this with your provider.  Breast cancer scan (mammogram): If you've ever had breasts, begin having regular mammograms starting at age 40. This is a scan to check for breast cancer.  Colon cancer screening: It is important to start screening for colon cancer at age 45.  Have a colonoscopy test every 10 years (or more often if you're at risk) Or, ask your provider about stool tests like a FIT test every year or Cologuard test every 3 years.  To learn more about  your testing options, visit: www.[employer]/266650.pdf.  For help making a decision, visit: jesus.abraham/ua43932.  Prostate cancer screening test: If you have a prostate and are age 55 to 69, ask your provider if you would benefit from a yearly prostate cancer screening test.  Lung cancer screening: If you are a current or former smoker age 50 to 80, ask your care team if ongoing lung cancer screenings are right for you.  For informational purposes only. Not to replace the advice of your health care provider. Copyright   2023 [employer]. All rights reserved. Clinically reviewed by the [facility] Transitions Program. Heckyl [id number] - REV 04/24.    Learning About Activities of Daily Living  What are activities of daily living?     Activities of daily living (ADLs) are the basic self-care tasks you do every day. These include eating, bathing, dressing, and moving around.  As you age, and if you have health problems, you may find that it's harder to do some of these tasks. If so, your doctor can suggest ideas that may help.  To measure what kind of help you may need, your doctor will ask how well you are able to do ADLs. Let your doctor know if there are any tasks that you are having trouble doing. This is an important first step to getting help. And when you have the help you need, you can stay as independent as possible.  How will a doctor assess your ADLs?  Asking about ADLs is part of a routine health checkup your doctor will likely do as you age. Your health check might be done in a doctor's office, in your home, or at a hospital. The goal is to find out if you are having any problems that could make it hard to care for yourself or that make it unsafe for you to be on your own.  To measure your ADLs, your doctor will ask how hard it is for you to do routine tasks. Your doctor may also want to know if you have changed the way you do a task because of a health problem. Your doctor may watch how  you:  Walk back and forth.  Keep your balance while you stand or walk.  Move from sitting to standing or from a bed to a chair.  Button or unbutton a shirt or sweater.  Remove and put on your shoes.  It's common to feel a little worried or anxious if you find you can't do all the things you used to be able to do. Talking with your doctor about ADLs is a way to make sure you're as safe as possible and able to care for yourself as well as you can. You may want to bring a caregiver, friend, or family member to your checkup. They can help you talk to your doctor.  Follow-up care is a key part of your treatment and safety. Be sure to make and go to all appointments, and call your doctor if you are having problems. It's also a good idea to know your test results and keep a list of the medicines you take.  Current as of: October 24, 2023               Content Version: 14.0    6007-7675 [employer].   Care instructions adapted under license by your healthcare professional. If you have questions about a medical condition or this instruction, always ask your healthcare professional. [employer] disclaims any warranty or liability for your use of this information.      Preventing Falls: Care Instructions  Injuries and health problems such as trouble walking or poor eyesight can increase your risk of falling. So can some medicines. But there are things you can do to help prevent falls. You can exercise to get stronger. You can also arrange your home to make it safer.    Talk to your doctor about the medicines you take. Ask if any of them increase the risk of falls and whether they can be changed or stopped.   Try to exercise regularly. It can help improve your strength and balance. This can help lower your risk of falling.     Practice fall safety and prevention.    Wear low-heeled shoes that fit well and give your feet good support. Talk to your doctor if you have foot problems that make this  "hard.  Carry a cellphone or wear a medical alert device that you can use to call for help.  Use stepladders instead of chairs to reach high objects. Don't climb if you're at risk for falls. Ask for help, if needed.  Wear the correct eyeglasses, if you need them.    Make your home safer.    Remove rugs, cords, clutter, and furniture from walkways.  Keep your house well lit. Use night-lights in hallways and bathrooms.  Install and use sturdy handrails on stairways.  Wear nonskid footwear, even inside. Don't walk barefoot or in socks without shoes.    Be safe outside.    Use handrails, curb cuts, and ramps whenever possible.  Keep your hands free by using a shoulder bag or backpack.  Try to walk in well-lit areas. Watch out for uneven ground, changes in pavement, and debris.  Be careful in the winter. Walk on the grass or gravel when sidewalks are slippery. Use de-icer on steps and walkways. Add non-slip devices to shoes.    Put grab bars and nonskid mats in your shower or tub and near the toilet. Try to use a shower chair or bath bench when bathing.   Get into a tub or shower by putting in your weaker leg first. Get out with your strong side first. Have a phone or medical alert device in the bathroom with you.   Where can you learn more?  Go to https://www.[employer].net/patiented  Enter G117 in the search box to learn more about \"Preventing Falls: Care Instructions.\"  Current as of: July 17, 2023               Content Version: 14.0    1595-9536 [employer].   Care instructions adapted under license by your healthcare professional. If you have questions about a medical condition or this instruction, always ask your healthcare professional. [employer] disclaims any warranty or liability for your use of this information.      Hearing Loss: Care Instructions  Overview     Hearing loss is a sudden or slow decrease in how well you hear. It can range from slight to profound. Permanent hearing loss " can occur with aging. It also can happen when you are exposed long-term to loud noise. Examples include listening to loud music, riding motorcycles, or being around other loud machines.  Hearing loss can affect your work and home life. It can make you feel lonely or depressed. You may feel that you have lost your independence. But hearing aids and other devices can help you hear better and feel connected to others.  Follow-up care is a key part of your treatment and safety. Be sure to make and go to all appointments, and call your doctor if you are having problems. It's also a good idea to know your test results and keep a list of the medicines you take.  How can you care for yourself at home?  Avoid loud noises whenever possible. This helps keep your hearing from getting worse.  Always wear hearing protection around loud noises.  Wear a hearing aid as directed.  A professional can help you pick a hearing aid that will work best for you.  You can also get hearing aids over the counter for mild to moderate hearing loss.  Have hearing tests as your doctor suggests. They can show whether your hearing has changed. Your hearing aid may need to be adjusted.  Use other devices as needed. These may include:  Telephone amplifiers and hearing aids that can connect to a television, stereo, radio, or microphone.  Devices that use lights or vibrations. These alert you to the doorbell, a ringing telephone, or a baby monitor.  Television closed-captioning. This shows the words at the bottom of the screen. Most new TVs can do this.  TTY (text telephone). This lets you type messages back and forth on the telephone instead of talking or listening. These devices are also called TDD. When messages are typed on the keyboard, they are sent over the phone line to a receiving TTY. The message is shown on a monitor.  Use text messaging, social media, and email if it is hard for you to communicate by telephone.  Try to learn a listening  "technique called speechreading. It is not lipreading. You pay attention to people's gestures, expressions, posture, and tone of voice. These clues can help you understand what a person is saying. Face the person you are talking to, and have them face you. Make sure the lighting is good. You need to see the other person's face clearly.  Think about counseling if you need help to adjust to your hearing loss.  When should you call for help?  Watch closely for changes in your health, and be sure to contact your doctor if:    You think your hearing is getting worse.     You have new symptoms, such as dizziness or nausea.   Where can you learn more?  Go to https://www.[employer].net/patiented  Enter R798 in the search box to learn more about \"Hearing Loss: Care Instructions.\"  Current as of: September 27, 2023               Content Version: 14.0    8739-1937 [employer].   Care instructions adapted under license by your healthcare professional. If you have questions about a medical condition or this instruction, always ask your healthcare professional. [employer] disclaims any warranty or liability for your use of this information.      Learning About Stress  What is stress?     Stress is your body's response to a hard situation. Your body can have a physical, emotional, or mental response. Stress is a fact of life for most people, and it affects everyone differently. What causes stress for you may not be stressful for someone else.  A lot of things can cause stress. You may feel stress when you go on a job interview, take a test, or run a race. This kind of short-term stress is normal and even useful. It can help you if you need to work hard or react quickly. For example, stress can help you finish an important job on time.  Long-term stress is caused by ongoing stressful situations or events. Examples of long-term stress include long-term health problems, ongoing problems at work, or conflicts " in your family. Long-term stress can harm your health.  How does stress affect your health?  When you are stressed, your body responds as though you are in danger. It makes hormones that speed up your heart, make you breathe faster, and give you a burst of energy. This is called the fight-or-flight stress response. If the stress is over quickly, your body goes back to normal and no harm is done.  But if stress happens too often or lasts too long, it can have bad effects. Long-term stress can make you more likely to get sick, and it can make symptoms of some diseases worse. If you tense up when you are stressed, you may develop neck, shoulder, or low back pain. Stress is linked to high blood pressure and heart disease.  Stress also harms your emotional health. It can make you fernandez, tense, or depressed. Your relationships may suffer, and you may not do well at work or school.  What can you do to manage stress?  You can try these things to help manage stress:   Do something active. Exercise or activity can help reduce stress. Walking is a great way to get started. Even everyday activities such as housecleaning or yard work can help.  Try yoga or tyrone chi. These techniques combine exercise and meditation. You may need some training at first to learn them.  Do something you enjoy. For example, listen to music or go to a movie. Practice your hobby or do volunteer work.  Meditate. This can help you relax, because you are not worrying about what happened before or what may happen in the future.  Do guided imagery. Imagine yourself in any setting that helps you feel calm. You can use online videos, books, or a teacher to guide you.  Do breathing exercises. For example:  From a standing position, bend forward from the waist with your knees slightly bent. Let your arms dangle close to the floor.  Breathe in slowly and deeply as you return to a standing position. Roll up slowly and lift your head last.  Hold your breath for  "just a few seconds in the standing position.  Breathe out slowly and bend forward from the waist.  Let your feelings out. Talk, laugh, cry, and express anger when you need to. Talking with supportive friends or family, a counselor, or a claudine leader about your feelings is a healthy way to relieve stress. Avoid discussing your feelings with people who make you feel worse.  Write. It may help to write about things that are bothering you. This helps you find out how much stress you feel and what is causing it. When you know this, you can find better ways to cope.  What can you do to prevent stress?  You might try some of these things to help prevent stress:  Manage your time. This helps you find time to do the things you want and need to do.  Get enough sleep. Your body recovers from the stresses of the day while you are sleeping.  Get support. Your family, friends, and community can make a difference in how you experience stress.  Limit your news feed. Avoid or limit time on social media or news that may make you feel stressed.  Do something active. Exercise or activity can help reduce stress. Walking is a great way to get started.  Where can you learn more?  Go to https://www.[employer].com.net/patiented  Enter N032 in the search box to learn more about \"Learning About Stress.\"  Current as of: October 24, 2023               Content Version: 14.0    2255-1244 [employer].   Care instructions adapted under license by your healthcare professional. If you have questions about a medical condition or this instruction, always ask your healthcare professional. [employer] disclaims any warranty or liability for your use of this information.      Learning About Sleeping Well  What does sleeping well mean?     Sleeping well means getting enough sleep to feel good and stay healthy. How much sleep is enough varies among people.  The number of hours you sleep and how you feel when you wake up are both " important. If you do not feel refreshed, you probably need more sleep. Another sign of not getting enough sleep is feeling tired during the day.  Experts recommend that adults get at least 7 or more hours of sleep per day. Children and older adults need more sleep.  Why is getting enough sleep important?  Getting enough quality sleep is a basic part of good health. When your sleep suffers, your physical health, mood, and your thoughts can suffer too. You may find yourself feeling more grumpy or stressed. Not getting enough sleep also can lead to serious problems, including injury, accidents, anxiety, and depression.  What might cause poor sleeping?  Many things can cause sleep problems, including:  Changes to your sleep schedule.  Stress. Stress can be caused by fear about a single event, such as giving a speech. Or you may have ongoing stress, such as worry about work or school.  Depression, anxiety, and other mental or emotional conditions.  Changes in your sleep habits or surroundings. This includes changes that happen where you sleep, such as noise, light, or sleeping in a different bed. It also includes changes in your sleep pattern, such as having jet lag or working a late shift.  Health problems, such as pain, breathing problems, and restless legs syndrome.  Lack of regular exercise.  Using alcohol, nicotine, or caffeine before bed.  How can you help yourself?  Here are some tips that may help you sleep more soundly and wake up feeling more refreshed.  Your sleeping area   Use your bedroom only for sleeping and sex. A bit of light reading may help you fall asleep. But if it doesn't, do your reading elsewhere in the house. Try not to use your TV, computer, smartphone, or tablet while you are in bed.  Be sure your bed is big enough to stretch out comfortably, especially if you have a sleep partner.  Keep your bedroom quiet, dark, and cool. Use curtains, blinds, or a sleep mask to block out light. To block out  "noise, use earplugs, soothing music, or a \"white noise\" machine.  Your evening and bedtime routine   Create a relaxing bedtime routine. You might want to take a warm shower or bath, or listen to soothing music.  Go to bed at the same time every night. And get up at the same time every morning, even if you feel tired.  What to avoid   Limit caffeine (coffee, tea, caffeinated sodas) during the day, and don't have any for at least 6 hours before bedtime.  Avoid drinking alcohol before bedtime. Alcohol can cause you to wake up more often during the night.  Try not to smoke or use tobacco, especially in the evening. Nicotine can keep you awake.  Limit naps during the day, especially close to bedtime.  Avoid lying in bed awake for too long. If you can't fall asleep or if you wake up in the middle of the night and can't get back to sleep within about 20 minutes, get out of bed and go to another room until you feel sleepy.  Avoid taking medicine right before bed that may keep you awake or make you feel hyper or energized. Your doctor can tell you if your medicine may do this and if you can take it earlier in the day.  If you can't sleep   Imagine yourself in a peaceful, pleasant scene. Focus on the details and feelings of being in a place that is relaxing.  Get up and do a quiet or boring activity until you feel sleepy.  Avoid drinking any liquids before going to bed to help prevent waking up often to use the bathroom.  Where can you learn more?  Go to https://www.[employer].net/patiented  Enter J942 in the search box to learn more about \"Learning About Sleeping Well.\"  Current as of: July 10, 2023               Content Version: 14.0    7036-6940 [employer].   Care instructions adapted under license by your healthcare professional. If you have questions about a medical condition or this instruction, always ask your healthcare professional. [employer] disclaims any warranty or liability for your " use of this information.      Bladder Training: Care Instructions  Your Care Instructions     Bladder training is used to treat urge incontinence and stress incontinence. Urge incontinence means that the need to urinate comes on so fast that you can't get to a toilet in time. Stress incontinence means that you leak urine because of pressure on your bladder. For example, it may happen when you laugh, cough, or lift something heavy.  Bladder training can increase how long you can wait before you have to urinate. It can also help your bladder hold more urine. And it can give you better control over the urge to urinate.  It is important to remember that bladder training takes a few weeks to a few months to make a difference. You may not see results right away, but don't give up.  Follow-up care is a key part of your treatment and safety. Be sure to make and go to all appointments, and call your doctor if you are having problems. It's also a good idea to know your test results and keep a list of the medicines you take.  How can you care for yourself at home?  Work with your doctor to come up with a bladder training program that is right for you. You may use one or more of the following methods.  Delayed urination  In the beginning, try to keep from urinating for 5 minutes after you first feel the need to go.  While you wait, take deep, slow breaths to relax. Kegel exercises can also help you delay the need to go to the bathroom.  After some practice, when you can easily wait 5 minutes to urinate, try to wait 10 minutes before you urinate.  Slowly increase the waiting period until you are able to control when you have to urinate.  Scheduled urination  Empty your bladder when you first wake up in the morning.  Schedule times throughout the day when you will urinate.  Start by going to the bathroom every hour, even if you don't need to go.  Slowly increase the time between trips to the bathroom.  When you have found a  "schedule that works well for you, keep doing it.  If you wake up during the night and have to urinate, do it. Apply your schedule to waking hours only.  Kegel exercises  These tighten and strengthen pelvic muscles, which can help you control the flow of urine. (If doing these exercises causes pain, stop doing them and talk with your doctor.) To do Kegel exercises:  Squeeze your muscles as if you were trying not to pass gas. Or squeeze your muscles as if you were stopping the flow of urine. Your belly, legs, and buttocks shouldn't move.  Hold the squeeze for 3 seconds, then relax for 5 to 10 seconds.  Start with 3 seconds, then add 1 second each week until you are able to squeeze for 10 seconds.  Repeat the exercise 10 times a session. Do 3 to 8 sessions a day.  When should you call for help?  Watch closely for changes in your health, and be sure to contact your doctor if:    Your incontinence is getting worse.     You do not get better as expected.   Where can you learn more?  Go to https://www.[employer].net/patiented  Enter V684 in the search box to learn more about \"Bladder Training: Care Instructions.\"  Current as of: November 15, 2023               Content Version: 14.0    6474-5383 [employer].   Care instructions adapted under license by your healthcare professional. If you have questions about a medical condition or this instruction, always ask your healthcare professional. [employer] disclaims any warranty or liability for your use of this information.      Learning About Depression Screening  What is depression screening?  Depression screening is a way to see if you have depression symptoms. It may be done by a doctor or counselor. It's often part of a routine checkup. That's because your mental health is just as important as your physical health.  Depression is a mental health condition that affects how you feel, think, and act. You may:  Have less energy.  Lose interest in your " "daily activities.  Feel sad and grouchy for a long time.  Depression is very common. It affects people of all ages.  Many things can lead to depression. Some people become depressed after they have a stroke or find out they have a major illness like cancer or heart disease. The death of a loved one or a breakup may lead to depression. It can run in families. Most experts believe that a combination of inherited genes and stressful life events can cause it.  What happens during screening?  You may be asked to fill out a form about your depression symptoms. You and the doctor will discuss your answers. The doctor may ask you more questions to learn more about how you think, act, and feel.  What happens after screening?  If you have symptoms of depression, your doctor will talk to you about your options.  Doctors usually treat depression with medicines or counseling. Often, combining the two works best. Many people don't get help because they think that they'll get over the depression on their own. But people with depression may not get better unless they get treatment.  The cause of depression is not well understood. There may be many factors involved. But if you have depression, it's not your fault.  A serious symptom of depression is thinking about death or suicide. If you or someone you care about talks about this or about feeling hopeless, get help right away.  It's important to know that depression can be treated. Medicine, counseling, and self-care may help.  Where can you learn more?  Go to https://www.[employer].net/patiented  Enter T185 in the search box to learn more about \"Learning About Depression Screening.\"  Current as of: June 24, 2023               Content Version: 14.0    8447-1521 [employer].   Care instructions adapted under license by your healthcare professional. If you have questions about a medical condition or this instruction, always ask your healthcare professional. [employer] " [facility] disclaims any warranty or liability for your use of this information.      Learning About Alcohol Use Disorder  What is alcohol use disorder?  Alcohol use disorder means that a person drinks alcohol even though it causes harm to themselves or others. It can range from mild to severe. The more symptoms of this disorder you have, the more severe it may be. People who have it may find it hard to control their use of alcohol.  People who have this disorder may argue with others about how much they're drinking. Their job may be affected because of drinking. They may drink when it's dangerous or illegal, such as when they drive. They also may have a strong need, or craving, to drink. They may feel like they must drink just to get by. Their drinking may increase their risk of getting hurt or being in a car crash.  Over time, drinking too much alcohol may cause health problems. These may include high blood pressure, liver problems, or problems with digestion.  What are the symptoms?  Maybe you've wondered about your alcohol habits or how to tell if your drinking is becoming a problem.  Here are some of the symptoms of alcohol use disorder. You may have it if you have two or more of the following symptoms:  You drink larger amounts of alcohol than you ever meant to. Or you've been drinking for a longer time than you ever meant to.  You can't cut down or control your use. Or you constantly wish you could cut down.  You spend a lot of time getting or drinking alcohol or recovering from its effects.  You have strong cravings for alcohol.  You can no longer do your main jobs at work, at school, or at home.  You keep drinking alcohol, even though your use hurts your relationships.  You have stopped doing important activities because of your alcohol use.  You drink alcohol in situations where doing so is dangerous.  You keep drinking alcohol even though you know it's causing health problems.  You need more and more  "alcohol to get the same effect, or you get less effect from the same amount over time. This is called tolerance.  You have uncomfortable symptoms when you stop drinking alcohol or use less. This is called withdrawal.  Alcohol use disorder can range from mild to severe. The more symptoms you have, the more severe the disorder may be.  You might not realize that your drinking is a problem. You might not drink large amounts when you drink. Or you might go for days or weeks between drinking episodes. But even if you don't drink very often, your drinking could still be harmful and put you at risk.  How is alcohol use disorder treated?  Getting help is up to you. But you don't have to do it alone. There are many people and kinds of treatments that can help.  Treatment for alcohol use disorder can include:  Group therapy, one or more types of counseling, and alcohol education.  Medicines that help to:  Reduce withdrawal symptoms and help you safely stop drinking.  Reduce cravings for alcohol.  Support groups. These groups include Alcoholics Anonymous and [employer] (Self-Management and Recovery Training).  Some people are able to stop or cut back on drinking with help from a counselor. People who have moderate to severe alcohol use disorder may need medical treatment. They may need to stay in a hospital or treatment center.  You may have a treatment team to help you. This team may include a psychologist or psychiatrist, counselors, doctors, social workers, nurses, and a . A  helps plan and manage your treatment.  Follow-up care is a key part of your treatment and safety. Be sure to make and go to all appointments, and call your doctor if you are having problems. It's also a good idea to know your test results and keep a list of the medicines you take.  Where can you learn more?  Go to https://www.healthwise.net/patiented  Enter H758 in the search box to learn more about \"Learning About Alcohol " "Use Disorder.\"  Current as of: November 15, 2023               Content Version: 14.0    4218-5034 [employer].   Care instructions adapted under license by your healthcare professional. If you have questions about a medical condition or this instruction, always ask your healthcare professional. [employer] disclaims any warranty or liability for your use of this information.      Substance Use Disorder: Care Instructions  Overview     You can improve your life and health by stopping your use of alcohol or drugs. When you don't drink or use drugs, you may feel and sleep better. You may get along better with your family, friends, and coworkers. There are medicines and programs that can help with substance use disorder.  How can you care for yourself at home?  Here are some ways to help you stay sober and prevent relapse.  If you have been given medicine to help keep you sober or reduce your cravings, be sure to take it exactly as prescribed.  Talk to your doctor about programs that can help you stop using drugs or drinking alcohol.  Do not keep alcohol or drugs in your home.  Plan ahead. Think about what you'll say if other people ask you to drink or use drugs. Try not to spend time with people who drink or use drugs.  Use the time and money spent on drinking or drugs to do something that's important to you.  Preventing a relapse  Have a plan to deal with relapse. Learn to recognize changes in your thinking that lead you to drink or use drugs. Get help before you start to drink or use drugs again.  Try to stay away from situations, friends, or places that may lead you to drink or use drugs.  If you feel the need to drink alcohol or use drugs again, seek help right away. Call a trusted friend or family member. Some people get support from organizations such as Narcotics Anonymous or [facility] or from treatment facilities.  If you relapse, get help as soon as you can. Some people make a " plan with another person that outlines what they want that person to do for them if they relapse. The plan usually includes how to handle the relapse and who to notify in case of relapse.  Don't give up. Remember that a relapse doesn't mean that you have failed. Use the experience to learn the triggers that lead you to drink or use drugs. Then quit again. Recovery is a lifelong process. Many people have several relapses before they are able to quit for good.  Follow-up care is a key part of your treatment and safety. Be sure to make and go to all appointments, and call your doctor if you are having problems. It's also a good idea to know your test results and keep a list of the medicines you take.  When should you call for help?   Call 911  anytime you think you may need emergency care. For example, call if you or someone else:    Has overdosed or has withdrawal signs. Be sure to tell the emergency workers that you are or someone else is using or trying to quit using drugs. Overdose or withdrawal signs may include:  Losing consciousness.  Seizure.  Seeing or hearing things that aren't there (hallucinations).     Is thinking or talking about suicide or harming others.   Where to get help 24 hours a day, 7 days a week   If you or someone you know talks about suicide, self-harm, a mental health crisis, a substance use crisis, or any other kind of emotional distress, get help right away. You can:    Call the Suicide and Crisis Lifeline at 982.     Call 8-973-395-TALK (1-802.181.1302).     Text HOME to 560242 to access the Crisis Text Line.   Consider saving these numbers in your phone.  Go to "[email or web address] for more information or to chat online.  Call your doctor now or seek immediate medical care if:    You are having withdrawal symptoms. These may include nausea or vomiting, sweating, shakiness, and anxiety.   Watch closely for changes in your health, and be sure to contact your doctor if:    You have a relapse.      "You need more help or support to stop.   Where can you learn more?  Go to https://www.[employer].net/patiented  Enter H573 in the search box to learn more about \"Substance Use Disorder: Care Instructions.\"  Current as of: November 15, 2023               Content Version: 14.0    6123-1061 [employer].   Care instructions adapted under license by your healthcare professional. If you have questions about a medical condition or this instruction, always ask your healthcare professional. [employer] disclaims any warranty or liability for your use of this information.      "

## 2024-05-08 NOTE — PROGRESS NOTES
Preventive Care Visit  [facility] ANDRIY Stephens MD, Internal Medicine  May 8, 2024        Assessment and Plan  1. Encounter for Medicare annual wellness exam      Last seen patient for virtual visit on March 1, 2024 for follow-up on chronic pulmonary embolism, portal hypertension, polyneuropathy on gabapentin 900 mg 3 times daily.  Patient is here for annual physical.  Last lab work done in January 2024 showing normal CMP, ongoing thrombocytopenia which is remaining stable on current Promacta as managed by hematology    Does have continuing difficulty ambulation and risk factors of chronic ITP and a history of unprovoked pulmonary embolism in April 2017 for which he is maintained on long-term anticoagulation, following oncology for iron infusions given the chronic anemia >> Due to chronic occult blood loss from portal hypertensive gastropathy, gastric varices and hemorrhoids.       - COVID-19 12+ (2023-24) (PFIZER)  - Lipid panel reflex to direct LDL Fasting; Future  - Vitamin B12; Future  - Vitamin B1 whole blood; Future  - Vitamin D deficiency screening; Future  - TSH with free T4 reflex; Future  - Vitamin B12  - Vitamin B1 whole blood  - Vitamin D deficiency screening  - TSH with free T4 reflex    2. Alcoholic liver disease (H24)  3. Alcohol dependence, continuous (H)  4. Alcoholic liver damage (H24)  5. Portal hypertension (H)  6. Alcoholic cirrhosis, unspecified whether ascites present (H)  7. Gastric varices  Ongoing problem, uncontrolled.  Patient still continues to consume alcohol at least 2 drinks every day as he endorses.  Emphasized to quit alcohol completely.  Reviewed recent gastroenterology note for details of the plan, please see above for further information on this.    8. Major depressive disorder in full remission, unspecified whether recurrent (H24)  9. Polyneuropathy in other diseases classified elsewhere (H24)  Chronic stable condition, gabapentin 900 mg 3 times  daily is helping both these conditions.    10. Risk for falls  Ongoing problem, uncontrolled.  Emphasized on using walker while ambulating which patient is currently only on cane.  All the risks and complications understood.  - Vitamin B12; Future  - TSH with free T4 reflex; Future  - Vitamin B12  - TSH with free T4 reflex    11. Spinal stenosis of lumbar region, unspecified whether neurogenic claudication present  Ongoing problem, uncontrolled.  Follow-up with recommendations of [facility] - [facility]    12. Idiopathic thrombocytopenic purpura (H)  13. ITP (idiopathic thrombocytopenic purpura) since 3-13 back surgery   - Comprehensive metabolic panel  - CBC with platelets differential    14. Gout involving toe, unspecified cause, unspecified chronicity, unspecified laterality      15. Vitamin D deficiency  - Vitamin D deficiency screening; Future  - Vitamin D deficiency screening    16. Need for vaccination  - COVID-19 12+ (2023-24) (PFIZER)    17. Mixed hyperlipidemia  - Lipid panel reflex to direct LDL Fasting; Future    18. Alcohol abuse  - Vitamin B1 whole blood; Future  - Vitamin B1 whole blood    19. Memory deficits  - TSH with free T4 reflex; Future  - TSH with free T4 reflex    20. Iron deficiency anemia due to chronic blood loss  - Comprehensive metabolic panel  - CBC with platelets differential  - Ferritin  - Iron and iron binding capacity    21. Personal history of venous thrombosis and embolism  22. Long term current use of anticoagulant therapy  - Comprehensive metabolic panel  - CBC with platelets differential    23. Hyperlipidemia  - Lipid panel reflex to direct LDL Non-fasting    24. Encounter for prostate cancer screening  - PSA, screen; Future  - PSA, screen       Mirela - Wife, answered all the questions.      Please note that this note consists of symbols derived from keyboarding, dictation and/or voice recognition software. As a result, there may be errors in the script  "that have gone undetected. Please consider this when interpreting information found in this chart.    Patient Instructions   As discussed , will check fasting labs due at this time.     Refills will be taken care.     Please follow up with Hematology and Gastroenterology    ===============================  Preventive Care Advice   This is general advice we often give to help people stay healthy. Your care team may have specific advice just for you. Please talk to your care team about your own preventive care needs.  Lifestyle  Exercise at least 150 minutes each week (30 minutes a day, 5 days a week).  Do muscle strengthening activities 2 days a week. These help control your weight and prevent disease.  No smoking.  Wear sunscreen to prevent skin cancer.  Have your home tested for radon every 2 to 5 years. Radon is a colorless, odorless gas that can harm your lungs. To learn more, go to www.[email or web address].us and search for \"Radon in Homes.\"  Keep guns unloaded and locked up in a safe place like a safe or gun vault, or, use a gun lock and hide the keys. Always lock away bullets separately. To learn more, visit [employer].[place].gov and search for \"safe gun storage.\"  Nutrition  Eat 5 or more servings of fruits and vegetables each day.  Try wheat bread, brown rice and whole grain pasta (instead of white bread, rice, and pasta).  Get enough calcium and vitamin D. Check the label on foods and aim for 100% of the RDA (recommended daily allowance).  Regular exams  Have a dental exam and cleaning every 6 months.  See your health care team every year to talk about:  Any changes in your health.  Any medicines your care team has prescribed.  Preventive care, family planning, and ways to prevent chronic diseases.  Shots (vaccines)   HPV shots (up to age 26), if you've never had them before.  Hepatitis B shots (up to age 59), if you've never had them before.  COVID-19 shot: Get this shot when it's due.  Flu shot: Get a flu shot every " year.  Tetanus shot: Get a tetanus shot every 10 years.  Pneumococcal, hepatitis A, and RSV shots: Ask your care team if you need these based on your risk.  Shingles shot (for age 50 and up).  General health tests  Diabetes screening:  Starting at age 35, Get screened for diabetes at least every 3 years.  If you are younger than age 35, ask your care team if you should be screened for diabetes.  Cholesterol test: At age 39, start having a cholesterol test every 5 years, or more often if advised.  Bone density scan (DEXA): At age 50, ask your care team if you should have this scan for osteoporosis (brittle bones).  Hepatitis C: Get tested at least once in your life.  Abdominal aortic aneurysm screening: Talk to your doctor about having this screening if you:  Have ever smoked; and  Are biologically male; and  Are between the ages of 65 and 75.  STIs (sexually transmitted infections)  Before age 24: Ask your care team if you should be screened for STIs.  After age 24: Get screened for STIs if you're at risk. You are at risk for STIs (including HIV) if:  You are sexually active with more than one person.  You don't use condoms every time.  You or a partner was diagnosed with a sexually transmitted infection.  If you are at risk for HIV, ask about PrEP medicine to prevent HIV.  Get tested for HIV at least once in your life, whether you are at risk for HIV or not.  Cancer screening tests  Cervical cancer screening: If you have a cervix, begin getting regular cervical cancer screening tests at age 21. Most people who have regular screenings with normal results can stop after age 65. Talk about this with your provider.  Breast cancer scan (mammogram): If you've ever had breasts, begin having regular mammograms starting at age 40. This is a scan to check for breast cancer.  Colon cancer screening: It is important to start screening for colon cancer at age 45.  Have a colonoscopy test every 10 years (or more often if you're  at risk) Or, ask your provider about stool tests like a FIT test every year or Cologuard test every 3 years.  To learn more about your testing options, visit: www.[employer]/691675.pdf.  For help making a decision, visit: reva/xn62418.  Prostate cancer screening test: If you have a prostate and are age 55 to 69, ask your provider if you would benefit from a yearly prostate cancer screening test.  Lung cancer screening: If you are a current or former smoker age 50 to 80, ask your care team if ongoing lung cancer screenings are right for you.  For informational purposes only. Not to replace the advice of your health care provider. Copyright   2023 [facility]. All rights reserved. Clinically reviewed by the [facility]. [employer] [id number] - REV 04/24.    Learning About Activities of Daily Living  What are activities of daily living?     Activities of daily living (ADLs) are the basic self-care tasks you do every day. These include eating, bathing, dressing, and moving around.  As you age, and if you have health problems, you may find that it's harder to do some of these tasks. If so, your doctor can suggest ideas that may help.  To measure what kind of help you may need, your doctor will ask how well you are able to do ADLs. Let your doctor know if there are any tasks that you are having trouble doing. This is an important first step to getting help. And when you have the help you need, you can stay as independent as possible.  How will a doctor assess your ADLs?  Asking about ADLs is part of a routine health checkup your doctor will likely do as you age. Your health check might be done in a doctor's office, in your home, or at a hospital. The goal is to find out if you are having any problems that could make it hard to care for yourself or that make it unsafe for you to be on your own.  To measure your ADLs, your doctor will ask how hard it is for you to do routine tasks. Your  doctor may also want to know if you have changed the way you do a task because of a health problem. Your doctor may watch how you:  Walk back and forth.  Keep your balance while you stand or walk.  Move from sitting to standing or from a bed to a chair.  Button or unbutton a shirt or sweater.  Remove and put on your shoes.  It's common to feel a little worried or anxious if you find you can't do all the things you used to be able to do. Talking with your doctor about ADLs is a way to make sure you're as safe as possible and able to care for yourself as well as you can. You may want to bring a caregiver, friend, or family member to your checkup. They can help you talk to your doctor.  Follow-up care is a key part of your treatment and safety. Be sure to make and go to all appointments, and call your doctor if you are having problems. It's also a good idea to know your test results and keep a list of the medicines you take.  Current as of: October 24, 2023               Content Version: 14.0    2008-5846 [employer].   Care instructions adapted under license by your healthcare professional. If you have questions about a medical condition or this instruction, always ask your healthcare professional. [employer] disclaims any warranty or liability for your use of this information.      Preventing Falls: Care Instructions  Injuries and health problems such as trouble walking or poor eyesight can increase your risk of falling. So can some medicines. But there are things you can do to help prevent falls. You can exercise to get stronger. You can also arrange your home to make it safer.    Talk to your doctor about the medicines you take. Ask if any of them increase the risk of falls and whether they can be changed or stopped.   Try to exercise regularly. It can help improve your strength and balance. This can help lower your risk of falling.     Practice fall safety and prevention.    Wear  "low-heeled shoes that fit well and give your feet good support. Talk to your doctor if you have foot problems that make this hard.  Carry a cellphone or wear a medical alert device that you can use to call for help.  Use stepladders instead of chairs to reach high objects. Don't climb if you're at risk for falls. Ask for help, if needed.  Wear the correct eyeglasses, if you need them.    Make your home safer.    Remove rugs, cords, clutter, and furniture from walkways.  Keep your house well lit. Use night-lights in hallways and bathrooms.  Install and use sturdy handrails on stairways.  Wear nonskid footwear, even inside. Don't walk barefoot or in socks without shoes.    Be safe outside.    Use handrails, curb cuts, and ramps whenever possible.  Keep your hands free by using a shoulder bag or backpack.  Try to walk in well-lit areas. Watch out for uneven ground, changes in pavement, and debris.  Be careful in the winter. Walk on the grass or gravel when sidewalks are slippery. Use de-icer on steps and walkways. Add non-slip devices to shoes.    Put grab bars and nonskid mats in your shower or tub and near the toilet. Try to use a shower chair or bath bench when bathing.   Get into a tub or shower by putting in your weaker leg first. Get out with your strong side first. Have a phone or medical alert device in the bathroom with you.   Where can you learn more?  Go to https://www.[employer].net/patiented  Enter G117 in the search box to learn more about \"Preventing Falls: Care Instructions.\"  Current as of: July 17, 2023               Content Version: 14.0    2254-2437 [employer].   Care instructions adapted under license by your healthcare professional. If you have questions about a medical condition or this instruction, always ask your healthcare professional. [employer] disclaims any warranty or liability for your use of this information.      Hearing Loss: Care Instructions  Overview   "   Hearing loss is a sudden or slow decrease in how well you hear. It can range from slight to profound. Permanent hearing loss can occur with aging. It also can happen when you are exposed long-term to loud noise. Examples include listening to loud music, riding motorcycles, or being around other loud machines.  Hearing loss can affect your work and home life. It can make you feel lonely or depressed. You may feel that you have lost your independence. But hearing aids and other devices can help you hear better and feel connected to others.  Follow-up care is a key part of your treatment and safety. Be sure to make and go to all appointments, and call your doctor if you are having problems. It's also a good idea to know your test results and keep a list of the medicines you take.  How can you care for yourself at home?  Avoid loud noises whenever possible. This helps keep your hearing from getting worse.  Always wear hearing protection around loud noises.  Wear a hearing aid as directed.  A professional can help you pick a hearing aid that will work best for you.  You can also get hearing aids over the counter for mild to moderate hearing loss.  Have hearing tests as your doctor suggests. They can show whether your hearing has changed. Your hearing aid may need to be adjusted.  Use other devices as needed. These may include:  Telephone amplifiers and hearing aids that can connect to a television, stereo, radio, or microphone.  Devices that use lights or vibrations. These alert you to the doorbell, a ringing telephone, or a baby monitor.  Television closed-captioning. This shows the words at the bottom of the screen. Most new TVs can do this.  TTY (text telephone). This lets you type messages back and forth on the telephone instead of talking or listening. These devices are also called TDD. When messages are typed on the keyboard, they are sent over the phone line to a receiving TTY. The message is shown on a  "monitor.  Use text messaging, social media, and email if it is hard for you to communicate by telephone.  Try to learn a listening technique called speechreading. It is not lipreading. You pay attention to people's gestures, expressions, posture, and tone of voice. These clues can help you understand what a person is saying. Face the person you are talking to, and have them face you. Make sure the lighting is good. You need to see the other person's face clearly.  Think about counseling if you need help to adjust to your hearing loss.  When should you call for help?  Watch closely for changes in your health, and be sure to contact your doctor if:    You think your hearing is getting worse.     You have new symptoms, such as dizziness or nausea.   Where can you learn more?  Go to https://www.[employer].net/patiented  Enter R798 in the search box to learn more about \"Hearing Loss: Care Instructions.\"  Current as of: September 27, 2023               Content Version: 14.0    2744-4352 [employer].   Care instructions adapted under license by your healthcare professional. If you have questions about a medical condition or this instruction, always ask your healthcare professional. [employer] disclaims any warranty or liability for your use of this information.      Learning About Stress  What is stress?     Stress is your body's response to a hard situation. Your body can have a physical, emotional, or mental response. Stress is a fact of life for most people, and it affects everyone differently. What causes stress for you may not be stressful for someone else.  A lot of things can cause stress. You may feel stress when you go on a job interview, take a test, or run a race. This kind of short-term stress is normal and even useful. It can help you if you need to work hard or react quickly. For example, stress can help you finish an important job on time.  Long-term stress is caused by ongoing " stressful situations or events. Examples of long-term stress include long-term health problems, ongoing problems at work, or conflicts in your family. Long-term stress can harm your health.  How does stress affect your health?  When you are stressed, your body responds as though you are in danger. It makes hormones that speed up your heart, make you breathe faster, and give you a burst of energy. This is called the fight-or-flight stress response. If the stress is over quickly, your body goes back to normal and no harm is done.  But if stress happens too often or lasts too long, it can have bad effects. Long-term stress can make you more likely to get sick, and it can make symptoms of some diseases worse. If you tense up when you are stressed, you may develop neck, shoulder, or low back pain. Stress is linked to high blood pressure and heart disease.  Stress also harms your emotional health. It can make you fernandez, tense, or depressed. Your relationships may suffer, and you may not do well at work or school.  What can you do to manage stress?  You can try these things to help manage stress:   Do something active. Exercise or activity can help reduce stress. Walking is a great way to get started. Even everyday activities such as housecleaning or yard work can help.  Try yoga or tyrone chi. These techniques combine exercise and meditation. You may need some training at first to learn them.  Do something you enjoy. For example, listen to music or go to a movie. Practice your hobby or do volunteer work.  Meditate. This can help you relax, because you are not worrying about what happened before or what may happen in the future.  Do guided imagery. Imagine yourself in any setting that helps you feel calm. You can use online videos, books, or a teacher to guide you.  Do breathing exercises. For example:  From a standing position, bend forward from the waist with your knees slightly bent. Let your arms dangle close to the  "floor.  Breathe in slowly and deeply as you return to a standing position. Roll up slowly and lift your head last.  Hold your breath for just a few seconds in the standing position.  Breathe out slowly and bend forward from the waist.  Let your feelings out. Talk, laugh, cry, and express anger when you need to. Talking with supportive friends or family, a counselor, or a claudine leader about your feelings is a healthy way to relieve stress. Avoid discussing your feelings with people who make you feel worse.  Write. It may help to write about things that are bothering you. This helps you find out how much stress you feel and what is causing it. When you know this, you can find better ways to cope.  What can you do to prevent stress?  You might try some of these things to help prevent stress:  Manage your time. This helps you find time to do the things you want and need to do.  Get enough sleep. Your body recovers from the stresses of the day while you are sleeping.  Get support. Your family, friends, and community can make a difference in how you experience stress.  Limit your news feed. Avoid or limit time on social media or news that may make you feel stressed.  Do something active. Exercise or activity can help reduce stress. Walking is a great way to get started.  Where can you learn more?  Go to https://www."[employer].net/patiented  Enter N032 in the search box to learn more about \"Learning About Stress.\"  Current as of: October 24, 2023               Content Version: 14.0    6166-2062 [employer].   Care instructions adapted under license by your healthcare professional. If you have questions about a medical condition or this instruction, always ask your healthcare professional. [employer] disclaims any warranty or liability for your use of this information.      Learning About Sleeping Well  What does sleeping well mean?     Sleeping well means getting enough sleep to feel good and " stay healthy. How much sleep is enough varies among people.  The number of hours you sleep and how you feel when you wake up are both important. If you do not feel refreshed, you probably need more sleep. Another sign of not getting enough sleep is feeling tired during the day.  Experts recommend that adults get at least 7 or more hours of sleep per day. Children and older adults need more sleep.  Why is getting enough sleep important?  Getting enough quality sleep is a basic part of good health. When your sleep suffers, your physical health, mood, and your thoughts can suffer too. You may find yourself feeling more grumpy or stressed. Not getting enough sleep also can lead to serious problems, including injury, accidents, anxiety, and depression.  What might cause poor sleeping?  Many things can cause sleep problems, including:  Changes to your sleep schedule.  Stress. Stress can be caused by fear about a single event, such as giving a speech. Or you may have ongoing stress, such as worry about work or school.  Depression, anxiety, and other mental or emotional conditions.  Changes in your sleep habits or surroundings. This includes changes that happen where you sleep, such as noise, light, or sleeping in a different bed. It also includes changes in your sleep pattern, such as having jet lag or working a late shift.  Health problems, such as pain, breathing problems, and restless legs syndrome.  Lack of regular exercise.  Using alcohol, nicotine, or caffeine before bed.  How can you help yourself?  Here are some tips that may help you sleep more soundly and wake up feeling more refreshed.  Your sleeping area   Use your bedroom only for sleeping and sex. A bit of light reading may help you fall asleep. But if it doesn't, do your reading elsewhere in the house. Try not to use your TV, computer, smartphone, or tablet while you are in bed.  Be sure your bed is big enough to stretch out comfortably, especially if you  "have a sleep partner.  Keep your bedroom quiet, dark, and cool. Use curtains, blinds, or a sleep mask to block out light. To block out noise, use earplugs, soothing music, or a \"white noise\" machine.  Your evening and bedtime routine   Create a relaxing bedtime routine. You might want to take a warm shower or bath, or listen to soothing music.  Go to bed at the same time every night. And get up at the same time every morning, even if you feel tired.  What to avoid   Limit caffeine (coffee, tea, caffeinated sodas) during the day, and don't have any for at least 6 hours before bedtime.  Avoid drinking alcohol before bedtime. Alcohol can cause you to wake up more often during the night.  Try not to smoke or use tobacco, especially in the evening. Nicotine can keep you awake.  Limit naps during the day, especially close to bedtime.  Avoid lying in bed awake for too long. If you can't fall asleep or if you wake up in the middle of the night and can't get back to sleep within about 20 minutes, get out of bed and go to another room until you feel sleepy.  Avoid taking medicine right before bed that may keep you awake or make you feel hyper or energized. Your doctor can tell you if your medicine may do this and if you can take it earlier in the day.  If you can't sleep   Imagine yourself in a peaceful, pleasant scene. Focus on the details and feelings of being in a place that is relaxing.  Get up and do a quiet or boring activity until you feel sleepy.  Avoid drinking any liquids before going to bed to help prevent waking up often to use the bathroom.  Where can you learn more?  Go to https://www.[employer].net/patiented  Enter J942 in the search box to learn more about \"Learning About Sleeping Well.\"  Current as of: July 10, 2023               Content Version: 14.0    8264-0366 Healthwise, Incorporated.   Care instructions adapted under license by your healthcare professional. If you have questions about a medical " condition or this instruction, always ask your healthcare professional. Healthwise, [facility] disclaims any warranty or liability for your use of this information.      Bladder Training: Care Instructions  Your Care Instructions     Bladder training is used to treat urge incontinence and stress incontinence. Urge incontinence means that the need to urinate comes on so fast that you can't get to a toilet in time. Stress incontinence means that you leak urine because of pressure on your bladder. For example, it may happen when you laugh, cough, or lift something heavy.  Bladder training can increase how long you can wait before you have to urinate. It can also help your bladder hold more urine. And it can give you better control over the urge to urinate.  It is important to remember that bladder training takes a few weeks to a few months to make a difference. You may not see results right away, but don't give up.  Follow-up care is a key part of your treatment and safety. Be sure to make and go to all appointments, and call your doctor if you are having problems. It's also a good idea to know your test results and keep a list of the medicines you take.  How can you care for yourself at home?  Work with your doctor to come up with a bladder training program that is right for you. You may use one or more of the following methods.  Delayed urination  In the beginning, try to keep from urinating for 5 minutes after you first feel the need to go.  While you wait, take deep, slow breaths to relax. Kegel exercises can also help you delay the need to go to the bathroom.  After some practice, when you can easily wait 5 minutes to urinate, try to wait 10 minutes before you urinate.  Slowly increase the waiting period until you are able to control when you have to urinate.  Scheduled urination  Empty your bladder when you first wake up in the morning.  Schedule times throughout the day when you will urinate.  Start by going  "to the bathroom every hour, even if you don't need to go.  Slowly increase the time between trips to the bathroom.  When you have found a schedule that works well for you, keep doing it.  If you wake up during the night and have to urinate, do it. Apply your schedule to waking hours only.  Kegel exercises  These tighten and strengthen pelvic muscles, which can help you control the flow of urine. (If doing these exercises causes pain, stop doing them and talk with your doctor.) To do Kegel exercises:  Squeeze your muscles as if you were trying not to pass gas. Or squeeze your muscles as if you were stopping the flow of urine. Your belly, legs, and buttocks shouldn't move.  Hold the squeeze for 3 seconds, then relax for 5 to 10 seconds.  Start with 3 seconds, then add 1 second each week until you are able to squeeze for 10 seconds.  Repeat the exercise 10 times a session. Do 3 to 8 sessions a day.  When should you call for help?  Watch closely for changes in your health, and be sure to contact your doctor if:    Your incontinence is getting worse.     You do not get better as expected.   Where can you learn more?  Go to https://www.[employer].net/patiented  Enter V684 in the search box to learn more about \"Bladder Training: Care Instructions.\"  Current as of: November 15, 2023               Content Version: 14.0    4483-5117 [employer].   Care instructions adapted under license by your healthcare professional. If you have questions about a medical condition or this instruction, always ask your healthcare professional. [employer] disclaims any warranty or liability for your use of this information.      Learning About Depression Screening  What is depression screening?  Depression screening is a way to see if you have depression symptoms. It may be done by a doctor or counselor. It's often part of a routine checkup. That's because your mental health is just as important as your physical " "health.  Depression is a mental health condition that affects how you feel, think, and act. You may:  Have less energy.  Lose interest in your daily activities.  Feel sad and grouchy for a long time.  Depression is very common. It affects people of all ages.  Many things can lead to depression. Some people become depressed after they have a stroke or find out they have a major illness like cancer or heart disease. The death of a loved one or a breakup may lead to depression. It can run in families. Most experts believe that a combination of inherited genes and stressful life events can cause it.  What happens during screening?  You may be asked to fill out a form about your depression symptoms. You and the doctor will discuss your answers. The doctor may ask you more questions to learn more about how you think, act, and feel.  What happens after screening?  If you have symptoms of depression, your doctor will talk to you about your options.  Doctors usually treat depression with medicines or counseling. Often, combining the two works best. Many people don't get help because they think that they'll get over the depression on their own. But people with depression may not get better unless they get treatment.  The cause of depression is not well understood. There may be many factors involved. But if you have depression, it's not your fault.  A serious symptom of depression is thinking about death or suicide. If you or someone you care about talks about this or about feeling hopeless, get help right away.  It's important to know that depression can be treated. Medicine, counseling, and self-care may help.  Where can you learn more?  Go to https://www.[employer].net/patiented  Enter T185 in the search box to learn more about \"Learning About Depression Screening.\"  Current as of: June 24, 2023               Content Version: 14.0    3190-6688 Healthwise, Incorporated.   Care instructions adapted under license by your " healthcare professional. If you have questions about a medical condition or this instruction, always ask your healthcare professional. Healthwise, [facility] disclaims any warranty or liability for your use of this information.      Learning About Alcohol Use Disorder  What is alcohol use disorder?  Alcohol use disorder means that a person drinks alcohol even though it causes harm to themselves or others. It can range from mild to severe. The more symptoms of this disorder you have, the more severe it may be. People who have it may find it hard to control their use of alcohol.  People who have this disorder may argue with others about how much they're drinking. Their job may be affected because of drinking. They may drink when it's dangerous or illegal, such as when they drive. They also may have a strong need, or craving, to drink. They may feel like they must drink just to get by. Their drinking may increase their risk of getting hurt or being in a car crash.  Over time, drinking too much alcohol may cause health problems. These may include high blood pressure, liver problems, or problems with digestion.  What are the symptoms?  Maybe you've wondered about your alcohol habits or how to tell if your drinking is becoming a problem.  Here are some of the symptoms of alcohol use disorder. You may have it if you have two or more of the following symptoms:  You drink larger amounts of alcohol than you ever meant to. Or you've been drinking for a longer time than you ever meant to.  You can't cut down or control your use. Or you constantly wish you could cut down.  You spend a lot of time getting or drinking alcohol or recovering from its effects.  You have strong cravings for alcohol.  You can no longer do your main jobs at work, at school, or at home.  You keep drinking alcohol, even though your use hurts your relationships.  You have stopped doing important activities because of your alcohol use.  You drink alcohol  in situations where doing so is dangerous.  You keep drinking alcohol even though you know it's causing health problems.  You need more and more alcohol to get the same effect, or you get less effect from the same amount over time. This is called tolerance.  You have uncomfortable symptoms when you stop drinking alcohol or use less. This is called withdrawal.  Alcohol use disorder can range from mild to severe. The more symptoms you have, the more severe the disorder may be.  You might not realize that your drinking is a problem. You might not drink large amounts when you drink. Or you might go for days or weeks between drinking episodes. But even if you don't drink very often, your drinking could still be harmful and put you at risk.  How is alcohol use disorder treated?  Getting help is up to you. But you don't have to do it alone. There are many people and kinds of treatments that can help.  Treatment for alcohol use disorder can include:  Group therapy, one or more types of counseling, and alcohol education.  Medicines that help to:  Reduce withdrawal symptoms and help you safely stop drinking.  Reduce cravings for alcohol.  Support groups. These groups include Alcoholics Anonymous and [employer] (Self-Management and Recovery Training).  Some people are able to stop or cut back on drinking with help from a counselor. People who have moderate to severe alcohol use disorder may need medical treatment. They may need to stay in a hospital or treatment center.  You may have a treatment team to help you. This team may include a psychologist or psychiatrist, counselors, doctors, social workers, nurses, and a . A  helps plan and manage your treatment.  Follow-up care is a key part of your treatment and safety. Be sure to make and go to all appointments, and call your doctor if you are having problems. It's also a good idea to know your test results and keep a list of the medicines you  "take.  Where can you learn more?  Go to https://www.[employer].net/patiented  Enter H758 in the search box to learn more about \"Learning About Alcohol Use Disorder.\"  Current as of: November 15, 2023               Content Version: 14.0    4880-7229 [employer].   Care instructions adapted under license by your healthcare professional. If you have questions about a medical condition or this instruction, always ask your healthcare professional. [employer] disclaims any warranty or liability for your use of this information.      Substance Use Disorder: Care Instructions  Overview     You can improve your life and health by stopping your use of alcohol or drugs. When you don't drink or use drugs, you may feel and sleep better. You may get along better with your family, friends, and coworkers. There are medicines and programs that can help with substance use disorder.  How can you care for yourself at home?  Here are some ways to help you stay sober and prevent relapse.  If you have been given medicine to help keep you sober or reduce your cravings, be sure to take it exactly as prescribed.  Talk to your doctor about programs that can help you stop using drugs or drinking alcohol.  Do not keep alcohol or drugs in your home.  Plan ahead. Think about what you'll say if other people ask you to drink or use drugs. Try not to spend time with people who drink or use drugs.  Use the time and money spent on drinking or drugs to do something that's important to you.  Preventing a relapse  Have a plan to deal with relapse. Learn to recognize changes in your thinking that lead you to drink or use drugs. Get help before you start to drink or use drugs again.  Try to stay away from situations, friends, or places that may lead you to drink or use drugs.  If you feel the need to drink alcohol or use drugs again, seek help right away. Call a trusted friend or family member. Some people get support from " organizations such as Narcotics Anonymous or [employer] or from treatment facilities.  If you relapse, get help as soon as you can. Some people make a plan with another person that outlines what they want that person to do for them if they relapse. The plan usually includes how to handle the relapse and who to notify in case of relapse.  Don't give up. Remember that a relapse doesn't mean that you have failed. Use the experience to learn the triggers that lead you to drink or use drugs. Then quit again. Recovery is a lifelong process. Many people have several relapses before they are able to quit for good.  Follow-up care is a key part of your treatment and safety. Be sure to make and go to all appointments, and call your doctor if you are having problems. It's also a good idea to know your test results and keep a list of the medicines you take.  When should you call for help?   Call 911  anytime you think you may need emergency care. For example, call if you or someone else:    Has overdosed or has withdrawal signs. Be sure to tell the emergency workers that you are or someone else is using or trying to quit using drugs. Overdose or withdrawal signs may include:  Losing consciousness.  Seizure.  Seeing or hearing things that aren't there (hallucinations).     Is thinking or talking about suicide or harming others.   Where to get help 24 hours a day, 7 days a week   If you or someone you know talks about suicide, self-harm, a mental health crisis, a substance use crisis, or any other kind of emotional distress, get help right away. You can:    Call the Suicide and Crisis Lifeline at [facility].     Call 9-170-461-TALK (1-681.344.3016).     Text HOME to 401547 to access the Crisis Text Line.   Consider saving these numbers in your phone.  Go to [email or web address] for more information or to chat online.  Call your doctor now or seek immediate medical care if:    You are having withdrawal symptoms. These may include nausea  "or vomiting, sweating, shakiness, and anxiety.   Watch closely for changes in your health, and be sure to contact your doctor if:    You have a relapse.     You need more help or support to stop.   Where can you learn more?  Go to https://www.alive.cn.net/patiented  Enter H573 in the search box to learn more about \"Substance Use Disorder: Care Instructions.\"  Current as of: November 15, 2023               Content Version: 14.0    3771-2638 [employer].   Care instructions adapted under license by your healthcare professional. If you have questions about a medical condition or this instruction, always ask your healthcare professional. [employer] disclaims any warranty or liability for your use of this information.      Return in about 6 months (around 11/8/2024), or if symptoms worsen or fail to improve, for video visit, If symptoms persist, Follow up of last visit.    Beatrice Stephens MD  [facility] ANDRIY Ennis is a 78 year old, presenting for the following:  Physical        5/8/2024     1:52 PM   Additional Questions   Roomed by Rain CHANEY         Health Care Directive  Patient has a Health Care Directive on file  Discussed advance care planning with patient.    HPI        5/8/2024   General Health   How would you rate your overall physical health? (!) POOR   Feel stress (tense, anxious, or unable to sleep) Only a little         5/8/2024   Nutrition   Diet: Regular (no restrictions)         5/8/2024   Exercise   Days per week of moderate/strenous exercise 0 days         5/8/2024   Social Factors   Frequency of gathering with friends or relatives More than three times a week         5/2/2023   Activities of Daily Living- Home Safety   Needs help with the following daily activites NO assistance is needed   Safety concerns in the home None of the above         5/8/2024   Dental   Dentist two times every year? Yes         5/2/2023   Hearing Screening "   Hearing concerns? Difficulty following a conversation in a noisy restaurant or crowded room    Feel that people are mumbling or not speaking clearly    Need to ask people to speak up or repeat themselves            No data to display                   Today's PHQ-9 Score:       2024     1:41 PM   PHQ-9 SCORE   PHQ-9 Total Score MyChart 17 (Moderately severe depression)   PHQ-9 Total Score 17         2024   Substance Use   Alcohol more than 3/day or more than 7/wk Yes     Social History     Tobacco Use    Smoking status: Former     Current packs/day: 0.00     Average packs/day: 1 pack/day for 28.0 years (28.0 ttl pk-yrs)     Types: Cigarettes     Start date:      Quit date: 1982     Years since quittin.6    Smokeless tobacco: Former   Vaping Use    Vaping status: Never Used   Substance Use Topics    Alcohol use: Yes     Alcohol/week: 5.0 standard drinks of alcohol     Types: 5 Shots of liquor per week     Comment: 4-5 drinks/day, alcohol use disorder    Drug use: No       ASCVD Risk   The 10-year ASCVD risk score (Marin WATSON, et al., 2019) is: 36.5%    Values used to calculate the score:      Age: 78 years      Sex: Male      Is Non- : No      Diabetic: No      Tobacco smoker: No      Systolic Blood Pressure: 130 mmHg      Is BP treated: Yes      HDL Cholesterol: 43 mg/dL      Total Cholesterol: 177 mg/dL    Reviewed and updated as needed this visit by Provider                    Past Medical History:   Diagnosis Date    Alcohol abuse since teens 01/15/2015    Still actively drinking    Alcoholic liver damage (H24) 1/10/2014    Gastroesophageal reflux disease with esophagitis 2016    Gout involving toe, unspecified cause, unspecified chronicity, unspecified laterality 2016    Heart murmur     heart is positioned farther on left side then typical    Hyperlipidemia 2015    Hypertension     ITP (idiopathic thrombocytopenic purpura)     Obstructive  sleep apnea     does not use CPAP  machine    Pulmonary embolism (H) large RLL w infarctio 4-17 4/18/2017     Past Surgical History:   Procedure Laterality Date    APPENDECTOMY  1956    BACK SURGERY  1992, 1996    trimmed L4-L5, Fusion L4-L5    BONE MARROW BIOPSY, BONE SPECIMEN, NEEDLE/TROCAR  10/24/2012    Procedure: BIOPSY BONE MARROW;  BONE MARROW BIOPSY WITH ASPIRATE (AREVALO ORDERING);  Surgeon: Terri Hickey MD;  Location:  GI    COLONOSCOPY N/A 7/31/2019    Procedure: COLONOSCOPY;  Surgeon: Sebastian Garcia MD;  Location:  GI    ESOPHAGOSCOPY, GASTROSCOPY, DUODENOSCOPY (EGD), COMBINED N/A 2/8/2018    Procedure: COMBINED ESOPHAGOSCOPY, GASTROSCOPY, DUODENOSCOPY (EGD);  EGD;  Surgeon: Terri Crouch MD;  Location:  GI    ESOPHAGOSCOPY, GASTROSCOPY, DUODENOSCOPY (EGD), COMBINED N/A 11/10/2020    Procedure: ESOPHAGOGASTRODUODENOSCOPY, WITH BIOPSY;  Surgeon: Alonzo Jang DO;  Location:  GI    ESOPHAGOSCOPY, GASTROSCOPY, DUODENOSCOPY (EGD), COMBINED N/A 12/13/2023    Procedure: ESOPHAGOGASTRODUODENOSCOPY, WITH BIOPSY;  Surgeon: Everardo Duran MD;  [facility]: [facility]    FACIAL RECONSTRUCTION SURGERY  1965    jaw fracture    HC TOOTH EXTRACTION W/FORCEP  1990s     Lab work is in process  Labs reviewed in EPIC  BP Readings from Last 3 Encounters:   05/08/24 130/83   02/01/24 135/82   01/18/24 (!) 144/90    Wt Readings from Last 3 Encounters:   05/08/24 87.2 kg (192 lb 3.2 oz)   01/18/24 84.9 kg (187 lb 1.6 oz)   12/15/23 83.5 kg (184 lb)                  Patient Active Problem List   Diagnosis    Obstructive sleep apnea syndrome    Restless legs syndrome (RLS)    Polyneuropathy in other diseases classified elsewhere (H24)    Gastroesophageal reflux disease without esophagitis    Lumbar spinal stenosis    Steroid-induced hyperglycemia    Fatty liver/ 1-14     Atherosclerosis    ITP (idiopathic thrombocytopenic purpura) since 3-13 back surgery     Alcohol dependence, continuous (H)     Preventive measure    Glucose intolerance (impaired glucose tolerance)  HgbA!C = 5.4    Back pain since 1988 s/po surgery 1992,1996 and 3-13/ Dr Singh     Alcoholic liver disease (H24)    Diverticulosis of large intestine    Family history of ischemic heart disease    Family history of diabetes mellitus    Risk for falls    History of colonic polyps    Change in bowel habits since 1-14: diarrhea-thinks better off benicar    ED (erectile dysfunction)    Alcohol abuse since teens     Seborrheic dermatitis Lt temple     Colon polyp in 2010 and 9-15 -->  rept in 2020    Essential hypertension    Hyperlipidemia    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, unspecified laterality, sequela    ACP (advance care planning)    Heel spur, left    Personal history of tobacco use, presenting hazards to health: [age] y/.o@1ppd=22 pk yr hx     Gout involving toe, unspecified cause, unspecified chronicity, unspecified laterality    Gastroesophageal reflux disease with esophagitis    Pulmonary embolism (H) large RLL w infarctio 4-17    Hypotension    Iron deficiency anemia due to chronic blood loss    Screening for prostate cancer    CKD (chronic kidney disease) stage 3, GFR 30-59 ml/min (H)    Portal hypertension (H)    Impaired fasting glucose    Fatigue, unspecified type    Longitudinal ridging of nail + spooning of middle ones     Cardiomegaly per 2017 CXR     Major depression in complete remission (H)    Chronic anticoagulation    Gastric varices    Alcoholic cirrhosis (H)    Major depressive disorder in full remission, unspecified whether recurrent (H24)    Primary osteoarthritis of both knees    Memory loss    Bradykinesia     Past Surgical History:   Procedure Laterality Date    APPENDECTOMY  1956    BACK SURGERY  1992, 1996    trimmed L4-L5, Fusion L4-L5    BONE MARROW BIOPSY, BONE SPECIMEN, NEEDLE/TROCAR  10/24/2012    Procedure: BIOPSY BONE MARROW;  BONE MARROW BIOPSY WITH ASPIRATE (IRINA COURTNEY);  Surgeon:  Terri Hickey MD;  Location:  GI    COLONOSCOPY N/A 2019    Procedure: COLONOSCOPY;  Surgeon: Sebastian Garcia MD;  Location:  GI    ESOPHAGOSCOPY, GASTROSCOPY, DUODENOSCOPY (EGD), COMBINED N/A 2018    Procedure: COMBINED ESOPHAGOSCOPY, GASTROSCOPY, DUODENOSCOPY (EGD);  EGD;  Surgeon: Terri Crouch MD;  Location:  GI    ESOPHAGOSCOPY, GASTROSCOPY, DUODENOSCOPY (EGD), COMBINED N/A 11/10/2020    Procedure: ESOPHAGOGASTRODUODENOSCOPY, WITH BIOPSY;  Surgeon: Alonzo Jang DO;  Location:  GI    ESOPHAGOSCOPY, GASTROSCOPY, DUODENOSCOPY (EGD), COMBINED N/A 2023    Procedure: ESOPHAGOGASTRODUODENOSCOPY, WITH BIOPSY;  Surgeon: Everardo Duran MD;  Location:  GI    FACIAL RECONSTRUCTION SURGERY      jaw fracture    HC TOOTH EXTRACTION W/FORCEP         Social History     Tobacco Use    Smoking status: Former     Current packs/day: 0.00     Average packs/day: 1 pack/day for 28.0 years (28.0 ttl pk-yrs)     Types: Cigarettes     Start date:      Quit date: 1982     Years since quittin.6    Smokeless tobacco: Former   Substance Use Topics    Alcohol use: Yes     Alcohol/week: 5.0 standard drinks of alcohol     Types: 5 Shots of liquor per week     Comment: 4-5 drinks/day, alcohol use disorder     Family History   Problem Relation Age of Onset    Unknown/Adopted Mother     Unknown/Adopted Father     Heart Disease Sister     Diabetes No family hx of     Coronary Artery Disease No family hx of     Hypertension No family hx of     Hyperlipidemia No family hx of     Cerebrovascular Disease No family hx of     Breast Cancer No family hx of     Colon Cancer No family hx of     Prostate Cancer No family hx of     Other Cancer No family hx of     Depression No family hx of     Anxiety Disorder No family hx of     Mental Illness No family hx of     Substance Abuse No family hx of     Anesthesia Reaction No family hx of     Asthma No family hx of     Osteoporosis  No family hx of     Genetic Disorder No family hx of     Thyroid Disease No family hx of     Obesity No family hx of          Current Outpatient Medications   Medication Sig Dispense Refill    allopurinol (ZYLOPRIM) 300 MG tablet Take 1 tablet by mouth once daily 90 tablet 0    ASPIRIN NOT PRESCRIBED (INTENTIONAL) Please choose reason not prescribed, below 1 each 0    bismuth subsalicylate (PEPTO BISMOL) 262 MG/15ML suspension Take 15 mLs by mouth every 6 hours as needed for indigestion or diarrhea (as needed for diarrhea. Please collect stool sample first) 354 mL 1    Cholecalciferol (VITAMIN D) 50 MCG (2000 UT) CAPS Take 1 capsule by mouth once daily 90 capsule 2    folic acid (FOLVITE) 1 MG tablet Take 1 tablet by mouth once daily 90 tablet 1    gabapentin (NEURONTIN) 300 MG capsule Take 3 capsules (900 mg) by mouth 3 times daily 360 capsule 5    metoprolol succinate ER (TOPROL XL) 50 MG 24 hr tablet Take 1 tablet by mouth once daily 90 tablet 0    omeprazole (PRILOSEC) 40 MG DR capsule TAKE 1 CAPSULE BY MOUTH ONCE DAILY. 90 capsule 1    PROMACTA 75 MG tablet TAKE ONE TABLET BY MOUTH ONCE DAILY. TAKE ON AN EMPTY STOMACH (ONE HOUR BEFORE OR TWO HOURS AFTER A MEAL) 30 tablet 11    rivaroxaban ANTICOAGULANT (XARELTO ANTICOAGULANT) 10 MG TABS tablet TAKE 1 TABLET BY MOUTH ONCE DAILY WITH SUPPER 30 tablet 4     Allergies   Allergen Reactions    Olmesartan Diarrhea     Recent Labs   Lab Test 01/18/24  1343 09/28/23  1347 07/12/23  1153 04/20/23  1613 07/26/22  1317 04/28/22  1559 09/16/21  1442 06/23/21  1311 05/12/21  1217 09/20/19  1510 08/30/19  1218 07/15/16  1317 05/16/16  1220   A1C  --   --   --   --   --   --   --   --  4.9  --  5.3  --  5.4   LDL  --   --   --  116*  --  114*  --   --  46  --   --    < >  --    HDL  --   --   --  43  --  49  --   --  48  --   --    < >  --    TRIG  --   --   --  92  --  134  --   --  67  --   --    < >  --    ALT 26 16 26 19   < > 40   < > 24 26   < > 33   < > 65   CR 0.85  0.88 0.85 0.84   < > 0.87   < > 0.82 0.96   < > 0.74   < >  --    GFRESTIMATED 88 87 89 89   < > 89   < > 86 76   < > >90   < >  --    GFRESTBLACK  --   --   --   --   --   --   --  >90 88   < > >90   < >  --    POTASSIUM 4.5 4.5 4.5 4.3   < > 4.1   < > 4.1 4.2   < > 3.9   < >  --    TSH  --   --  1.77  --   --   --   --   --  0.78  --   --    < >  --     < > = values in this interval not displayed.      Current providers sharing in care for this patient include:  Patient Care Team:  Beatrice Stephens MD as PCP - General (Internal Medicine)  Waylon Novoa MD as MD (Hematology)  Edel Zuluaga LPN as LPN (Hematology)  Waylon Novoa MD as Assigned Cancer Care Provider  Beatrice Stephens MD as Assigned PCP  Ida Sequeira PA-C as Physician Assistant (Dermatology)  Maria Watt MD as Referring Physician (Neurology)  Edel Zuluaga LPN as Specialty Care Coordinator (Hematology)  Paulina Borjas APRN CNP as Nurse Practitioner (Gastroenterology)  Paulina Borjas APRN CNP as Assigned Surgical Provider    The following health maintenance items are reviewed in Epic and correct as of today:  Health Maintenance   Topic Date Due    HEPATITIS A IMMUNIZATION (1 of 2 - Risk 2-dose series) 08/19/1964    RSV VACCINE (Pregnancy & 60+) (1 - 1-dose 60+ series) Never done    ZOSTER IMMUNIZATION (3 of 3) 01/27/2023    COVID-19 Vaccine (4 - 2023-24 season) 09/01/2023    MICROALBUMIN  12/02/2023    LIPID  04/20/2024    MEDICARE ANNUAL WELLNESS VISIT  05/02/2024    INFLUENZA VACCINE (Season Ended) 09/01/2024    ANNUAL REVIEW OF HM ORDERS  12/28/2024    BMP  01/18/2025    HEMOGLOBIN  01/18/2025    FALL RISK ASSESSMENT  05/08/2025    PHQ-9  05/08/2025    COLORECTAL CANCER SCREENING  11/10/2025    GLUCOSE  01/18/2027    ADVANCE CARE PLANNING  05/02/2028    DTAP/TDAP/TD IMMUNIZATION (3 - Td or Tdap) 06/07/2029    HEPATITIS C SCREENING  Completed    DEPRESSION ACTION PLAN  Completed    Pneumococcal  "Vaccine: 65+ Years  Completed    URINALYSIS  Completed    HEPATITIS B IMMUNIZATION  Completed    IPV IMMUNIZATION  Aged Out    HPV IMMUNIZATION  Aged Out    MENINGITIS IMMUNIZATION  Aged Out    RSV MONOCLONAL ANTIBODY  Aged Out    URINE DRUG SCREEN  Discontinued         Review of Systems  Constitutional, HEENT, cardiovascular, pulmonary, GI, , musculoskeletal, neuro, skin, endocrine and psych systems are negative, except as otherwise noted.     Objective    Exam  /83   Pulse 80   Temp 97.5  F (36.4  C) (Temporal)   Resp 17   Ht 1.66 m (5' 5.35\")   Wt 87.2 kg (192 lb 3.2 oz)   SpO2 96%   BMI 31.64 kg/m     Estimated body mass index is 31.64 kg/m  as calculated from the following:    Height as of this encounter: 1.66 m (5' 5.35\").    Weight as of this encounter: 87.2 kg (192 lb 3.2 oz).    Physical Exam  GENERAL: alert and no distress  EYES: Eyes grossly normal to inspection, PERRL and conjunctivae and sclerae normal  HENT: ear canals and TM's normal, nose and mouth without ulcers or lesions  NECK: no adenopathy, no asymmetry, masses, or scars  RESP: lungs clear to auscultation - no rales, rhonchi or wheezes  CV: regular rate and rhythm, normal S1 S2, no S3 or S4, no murmur, click or rub, no peripheral edema  ABDOMEN: soft, nontender, no hepatosplenomegaly, no masses and bowel sounds normal  MS: no gross musculoskeletal defects noted, no edema  SKIN: no suspicious lesions or rashes  NEURO: Normal strength and tone, mentation intact and speech normal  Ongoing gait problems with ataxia and difficulty ambulation with cane.  PSYCH: mentation appears normal, affect normal/bright         5/8/2024   Mini Cog   Clock Draw Score 2 Normal   3 Item Recall 2 objects recalled   Mini Cog Total Score 4              Signed Electronically by: Beatrice Stephens MD    Answers submitted by the patient for this visit:  Patient Health Questionnaire (Submitted on 5/8/2024)  If you checked off any problems, how difficult have " these problems made it for you to do your work, take care of things at home, or get along with other people?: Very difficult  PHQ9 TOTAL SCORE: 17

## 2024-05-09 ENCOUNTER — LAB (OUTPATIENT)
Dept: LAB | Facility: CLINIC | Age: 79
End: 2024-05-09
Payer: COMMERCIAL

## 2024-05-09 ENCOUNTER — ONCOLOGY VISIT (OUTPATIENT)
Dept: ONCOLOGY | Facility: CLINIC | Age: 79
End: 2024-05-09
Attending: INTERNAL MEDICINE
Payer: COMMERCIAL

## 2024-05-09 VITALS
RESPIRATION RATE: 16 BRPM | SYSTOLIC BLOOD PRESSURE: 115 MMHG | BODY MASS INDEX: 31.97 KG/M2 | WEIGHT: 194.2 LBS | OXYGEN SATURATION: 96 % | TEMPERATURE: 98.1 F | HEART RATE: 107 BPM | DIASTOLIC BLOOD PRESSURE: 74 MMHG

## 2024-05-09 DIAGNOSIS — N18.31 STAGE 3A CHRONIC KIDNEY DISEASE (H): ICD-10-CM

## 2024-05-09 DIAGNOSIS — E78.5 HYPERLIPIDEMIA: Primary | ICD-10-CM

## 2024-05-09 DIAGNOSIS — D69.3 IDIOPATHIC THROMBOCYTOPENIC PURPURA (H): ICD-10-CM

## 2024-05-09 DIAGNOSIS — D50.0 IRON DEFICIENCY ANEMIA DUE TO CHRONIC BLOOD LOSS: Primary | ICD-10-CM

## 2024-05-09 DIAGNOSIS — Z79.01 LONG TERM CURRENT USE OF ANTICOAGULANT THERAPY: ICD-10-CM

## 2024-05-09 DIAGNOSIS — Z86.718 PERSONAL HISTORY OF VENOUS THROMBOSIS AND EMBOLISM: ICD-10-CM

## 2024-05-09 LAB
ALBUMIN SERPL BCG-MCNC: 4.3 G/DL (ref 3.5–5.2)
ALP SERPL-CCNC: 82 U/L (ref 40–150)
ALT SERPL W P-5'-P-CCNC: 19 U/L (ref 0–70)
ANION GAP SERPL CALCULATED.3IONS-SCNC: 5 MMOL/L (ref 7–15)
AST SERPL W P-5'-P-CCNC: 29 U/L (ref 0–45)
BILIRUB SERPL-MCNC: 0.6 MG/DL
BUN SERPL-MCNC: 11.1 MG/DL (ref 8–23)
CALCIUM SERPL-MCNC: 9.5 MG/DL (ref 8.8–10.2)
CHLORIDE SERPL-SCNC: 107 MMOL/L (ref 98–107)
CHOLEST SERPL-MCNC: 151 MG/DL
CREAT SERPL-MCNC: 0.99 MG/DL (ref 0.67–1.17)
DEPRECATED HCO3 PLAS-SCNC: 32 MMOL/L (ref 22–29)
EGFRCR SERPLBLD CKD-EPI 2021: 77 ML/MIN/1.73M2
FASTING STATUS PATIENT QL REPORTED: NO
FASTING STATUS PATIENT QL REPORTED: NO
FERRITIN SERPL-MCNC: 36 NG/ML (ref 31–409)
GLUCOSE SERPL-MCNC: 99 MG/DL (ref 70–99)
HDLC SERPL-MCNC: 41 MG/DL
IRON BINDING CAPACITY (ROCHE): 629 UG/DL (ref 240–430)
IRON SATN MFR SERPL: 49 % (ref 15–46)
IRON SERPL-MCNC: 306 UG/DL (ref 61–157)
LDLC SERPL CALC-MCNC: 92 MG/DL
NONHDLC SERPL-MCNC: 110 MG/DL
POTASSIUM SERPL-SCNC: 4.3 MMOL/L (ref 3.4–5.3)
PROT SERPL-MCNC: 7.2 G/DL (ref 6.4–8.3)
PSA SERPL DL<=0.01 NG/ML-MCNC: 0.14 NG/ML (ref 0–6.5)
SODIUM SERPL-SCNC: 144 MMOL/L (ref 135–145)
TRIGL SERPL-MCNC: 91 MG/DL
TSH SERPL DL<=0.005 MIU/L-ACNC: 1.35 UIU/ML (ref 0.3–4.2)
VIT B12 SERPL-MCNC: 658 PG/ML (ref 232–1245)
VIT D+METAB SERPL-MCNC: 42 NG/ML (ref 20–50)

## 2024-05-09 PROCEDURE — 99000 SPECIMEN HANDLING OFFICE-LAB: CPT | Performed by: PATHOLOGY

## 2024-05-09 PROCEDURE — 99215 OFFICE O/P EST HI 40 MIN: CPT | Performed by: INTERNAL MEDICINE

## 2024-05-09 PROCEDURE — 82043 UR ALBUMIN QUANTITATIVE: CPT | Performed by: INTERNAL MEDICINE

## 2024-05-09 PROCEDURE — G0463 HOSPITAL OUTPT CLINIC VISIT: HCPCS | Performed by: INTERNAL MEDICINE

## 2024-05-09 ASSESSMENT — PAIN SCALES - GENERAL: PAINLEVEL: MODERATE PAIN (4)

## 2024-05-09 NOTE — PROGRESS NOTES
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  He has a history of recurrent iron deficiency anemia felt secondary to chronic occult blood loss from portal hypertensive gastropathy and hemorrhoids.  He also has underlying alcoholic liver disease.  Today he is here for a routine follow-up visit with his wife.      Reports that he has cut back on alcohol, currently only consuming 3 drinks per day.  He reports that his GI issues have actually improved significantly over the last few months, with resolution of his diarrhea and improved appetite.  He has a follow up with GI in June 2024.     He denies any recent episodes of bleeding.  Since his ferritin levels were low in January he was given an infusion of INFeD in January 2024.  He tolerated well.  But his energy levels continues to be low, and since his last appointment he has had about 3 mechanical falls at home.  His primary care physician ordered lab test including B12 and TSH levels which was been normal.  The falls seem to be primarily related to weakness.  Fortunately, he has not had any bleeding complications.    Physical exam:  He appears his usual self.  He is in no acute distress.  He is hard of hearing which is longstanding.  He is afebrile with a blood pressure of 115/74, heart rate of 107.  He does not appear to be pale or jaundiced.  No scleral icterus.  Lungs are clear.   RRR S1S2, no murmur. Abdomen is nondistended, soft, and nontender.      Labs:  Serum electrolytes are normal, creatinine 0.99, LFTs are normal.  White count 7.3 with a normal differential, hemoglobin 12.4, MCV 92, platelets 96,000.  Overall his CBC parameters are within his baseline ranges.  Ferritin is 36, which is improved from 20 in January 2024.      ASSESSMENT / PLAN:  1.  Chronic ITP -- Raymon's baseline platelet count has been in the  70,000 to 100,000 range over the last several years.  He remains on Promacta 75 mg daily.  He is responsive to pulse dexamethasone and we have used this to increase his platelet count prior to surgeries.  He has not been treated with IVIG, so we do not know how well he responds to that.  At the present time, his platelet count is within his usual baseline.  Given that he is on long-term anticoagulation, the goal is to maintain his platelet count consistently above 50,000.  Note that there may also be a component of thrombocytopenia related to his underlying liver disease.    2. History of unprovoked pulmonary embolism (April 2017) -- He has done well on long-term anticoagulation with rivaroxaban.  He is on the prophylactic dose of 10 mg daily due to a combination of mild intermittent renal insufficiency (not currently an issue) and underlying liver disease.  Will continue this as long as his bleeding risk remains acceptable.      3. Iron deficiency anemia -- He denies any recent signs of GI bleeding, EGD in December 2023 continued to show portal hypertensive gastropathy, as well as 2 non-bleeding varicies.   He received IV iron (Infed) in Jan 2024.    4. Elevated INR:  Although he has liver cirrhosis, rivaroxaban also increases the INR.     Patient was seen and plan was discussed with staff .     RTC in October, sooner with new issues or concerns.      Kenny Pisano MD  Hem / Onc fellow         HEMATOLOGY STAFF:  Seen with fellow, whose note reflects our joint evaluation, assessment, and plan.    Total time on date of encounter 40 minutes, including review of medical records and labs, clinic visit, and documentation.      Waylon Novoa MD  Professor of Medicine  Division of Hematology, Oncology, and Transplantation  Director, [facility]

## 2024-05-09 NOTE — NURSING NOTE
"Oncology Rooming Note    May 9, 2024 1:53 PM   Raymon Ace is a 78 year old male who presents for:    Chief Complaint   Patient presents with    Oncology Clinic Visit     RTN for Anemia     Initial Vitals: /74 (BP Location: Right arm, Patient Position: Sitting, Cuff Size: Adult Regular)   Pulse 107   Temp 98.1  F (36.7  C) (Oral)   Resp 16   Wt 88.1 kg (194 lb 3.2 oz)   SpO2 96%   BMI 31.97 kg/m   Estimated body mass index is 31.97 kg/m  as calculated from the following:    Height as of 5/8/24: 1.66 m (5' 5.35\").    Weight as of this encounter: 88.1 kg (194 lb 3.2 oz). Body surface area is 2.02 meters squared.  Moderate Pain (4) Comment: Data Unavailable   No LMP for male patient.  Allergies reviewed: Yes  Medications reviewed: Yes    Medications: Medication refills not needed today.  Pharmacy name entered into [employer]:    [facility] - [place], [place] - [address] [facility] - [place], [place] - [address] [place] [address] [facility] - [place], [place] - [address]    Frailty Screening:   Is the patient here for a new oncology consult visit in cancer care? 2. No      Clinical concerns:  Pt has a mole on his lower back he would like you to look at      Gabriel Grant              "

## 2024-05-10 LAB
CREAT UR-MCNC: 256 MG/DL
MICROALBUMIN UR-MCNC: 15.1 MG/L
MICROALBUMIN/CREAT UR: 5.9 MG/G CR (ref 0–17)

## 2024-05-13 ENCOUNTER — TELEPHONE (OUTPATIENT)
Dept: FAMILY MEDICINE | Facility: CLINIC | Age: 79
End: 2024-05-13
Payer: COMMERCIAL

## 2024-05-13 LAB — VIT B1 PYROPHOSHATE BLD-SCNC: 207 NMOL/L

## 2024-05-13 NOTE — TELEPHONE ENCOUNTER
----- Message from eBatrice Stephens MD sent at 5/11/2024  9:32 PM CDT -----  All your labs done by me are normal.     Please follow up with Hematology on the work up done for their recommendations.     Thank you  Beatrice Stephens MD on 5/11/2024

## 2024-05-14 NOTE — TELEPHONE ENCOUNTER
Consent to communicate on file for wife, Mirela.  She states that patient is napping at time of this call.  Wife given result message from Dr. Stephens .  Wife verbalizes understanding and agrees with plan. Terri Wheat RN

## 2024-05-22 ENCOUNTER — DOCUMENTATION ONLY (OUTPATIENT)
Dept: OTHER | Facility: CLINIC | Age: 79
End: 2024-05-22
Payer: COMMERCIAL

## 2024-06-14 ENCOUNTER — OFFICE VISIT (OUTPATIENT)
Dept: GASTROENTEROLOGY | Facility: CLINIC | Age: 79
End: 2024-06-14
Attending: NURSE PRACTITIONER
Payer: COMMERCIAL

## 2024-06-14 VITALS
DIASTOLIC BLOOD PRESSURE: 74 MMHG | HEIGHT: 65 IN | WEIGHT: 186.2 LBS | BODY MASS INDEX: 31.02 KG/M2 | TEMPERATURE: 97.4 F | SYSTOLIC BLOOD PRESSURE: 130 MMHG

## 2024-06-14 DIAGNOSIS — R14.0 ABDOMINAL BLOATING: ICD-10-CM

## 2024-06-14 DIAGNOSIS — R63.0 POOR APPETITE: ICD-10-CM

## 2024-06-14 DIAGNOSIS — K21.9 GASTROESOPHAGEAL REFLUX DISEASE WITHOUT ESOPHAGITIS: ICD-10-CM

## 2024-06-14 DIAGNOSIS — I86.4 GASTRIC VARICES WITHOUT BLEEDING: ICD-10-CM

## 2024-06-14 DIAGNOSIS — R20.2 NUMBNESS AND TINGLING OF BOTH LEGS: ICD-10-CM

## 2024-06-14 DIAGNOSIS — K70.30 ALCOHOLIC CIRRHOSIS OF LIVER WITHOUT ASCITES (H): ICD-10-CM

## 2024-06-14 DIAGNOSIS — R19.7 DIARRHEA, UNSPECIFIED TYPE: Primary | ICD-10-CM

## 2024-06-14 DIAGNOSIS — R20.0 NUMBNESS AND TINGLING OF BOTH LEGS: ICD-10-CM

## 2024-06-14 PROCEDURE — 99214 OFFICE O/P EST MOD 30 MIN: CPT | Performed by: NURSE PRACTITIONER

## 2024-06-14 RX ORDER — OMEPRAZOLE 40 MG/1
CAPSULE, DELAYED RELEASE ORAL
Qty: 90 CAPSULE | Refills: 3 | Status: SHIPPED | OUTPATIENT
Start: 2024-06-14

## 2024-06-14 RX ORDER — LANOLIN ALCOHOL/MO/W.PET/CERES
100 CREAM (GRAM) TOPICAL DAILY
Qty: 100 TABLET | Refills: 0 | Status: SHIPPED | OUTPATIENT
Start: 2024-06-14

## 2024-06-14 NOTE — PROGRESS NOTES
[address] [address]  Phone: [phone]    Patient:  Raymon Ace, Date of birth 1945    Referring Provider: Waylon Novoa       Gastroenterology CLINIC VISIT, RETURN PATIENT    REASON FOR CONSULTATION: follow up    HPI: 78 year old male presented to GI clinic for a follow up. The patient was seen for weight loss, anorexia, nausea, and diarrhea with new fecal urgency and incontinence. He is here today with his spouse Mirela, who assists with answering questions.  Patient stated that his main concern is weakness in his lower extremities and inability to stay as active as before.  He complains of tingling, pain, and numbness in his legs.  Has been taking gabapentin for neuropathy, but reported that medication is not really effective. Patient admits to active alcohol use, but stated that he cut back to 3-4 drinks a day.    Regarding his diarrhea, he reported improvement in stool frequency and consistency.  His stool studies were negative for C. difficile and enteric pathogens.  No signs of for exocrine pancreatic insufficiency.  Noted mild nonspecific elevation in calprotectin on his last study.  Patient takes Pepto-Bismol as needed.  His EGD was suggestive for severe portal hypertensive gastropathy with to gastric varices and no signs of bleeding.  There was a small hiatal hernia.  Patient takes 40 mg of omeprazole daily.  Patient reported improvement in his appetite, but his wife does not agree with his statement.  Patient said that he feels hungry, but only can consult small portion meals.  Particularly, he is having troubles with meat consumption but this makes him nauseated.  He denies any emesis.  Denies acid reflux or heartburn.  No problems with dysphagia or odynophagia.  No further weight loss.  Denies any black or bloody stools.  His CT scan of abdomen revealed fatty liver with liver cirrhosis, colonic diverticulosis, and 1.5 cm liver  cyst.  In the past, patient was consulted by Dr. Crouch.  Suggested to continue supportive care and monitoring due to patient's ongoing alcohol use.    PREVIOUS ENDOSCOPY:  12/13/23 EGD by Dr. Duran  Findings:       The examined duodenum was normal. Biopsies were taken with a cold        forceps for histology.        Severe portal hypertensive gastropathy was found in the stomach.        Type 2 isolated gastric varices (IGV2, varices located in the body,        antrum or around the pylorus) with no bleeding were found in the        prepyloric region of the stomach. There were no stigmata of recent        bleeding.        The exam of the stomach was otherwise normal.        The examined esophagus was normal.      11/10/2020 EGD  Findings:       The Z-line was regular and was found 41 cm from the incisors.        Normal mucosa was found in the entire esophagus. This was biopsied with        a cold forceps for histology. Verification of patient identification for        the specimen was done. Estimated blood loss: none. Biopsies performed in        distal and proximal esophagus. No varices identified.        The distal esophagus was mildly tortuous.        There was mild resistance passing the scope across the GEJ at the level        of what appeared to be a paraesophageal hernia        There were polypoid appearing lesions that were not biopsied. Given a        history of gastric varices would only biopsy after ultrasound probe        rules out varix. 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      11/10/20 Colonoscopy  Findings:       The perianal and digital rectal examinations were normal.        The terminal ileum appeared normal.        A 7 mm polyp was found in the transverse colon. The polyp was sessile.        The polyp was removed with a cold snare. Resection and  retrieval were        complete. Verification of patient identification for the specimen was        done. Estimated blood loss: none.        Scattered small-mouthed diverticula were found in the sigmoid colon.   FINAL DIAGNOSIS:   A. Distal Esophagus, Biopsy:   Squamous esophageal mucosa with no intraepithelial eosinophils or other   abnormality   B. Proximal Esophagus, Biopsy:   Squamous esophageal mucosa with no intraepithelial eosinophils or other   abnormality   C. Transverse Colon, Polyp:   Sessile serrated polyp; negative for dysplasia or malignancy      PERTINENT STUDIES Reviewed in EMR  7/25/23 MRI of liver  IMPRESSION:  1. 1.6 cm simple liver cyst with no suspicious enhancing internal  components.  2. Hepatic steatosis.  3. Mild splenomegaly.      ROS: 10pt ROS performed and otherwise negative.    PAST MEDICAL HISTORY:  Past Medical History:   Diagnosis Date    Alcohol abuse since teens 01/15/2015    Still actively drinking    Alcoholic liver damage (H24) 1/10/2014    Gastroesophageal reflux disease with esophagitis 12/6/2016    Gout involving toe, unspecified cause, unspecified chronicity, unspecified laterality 6/2/2016    Heart murmur     heart is positioned farther on left side then typical    Hyperlipidemia 12/17/2015    Hypertension     ITP (idiopathic thrombocytopenic purpura)     Obstructive sleep apnea     does not use CPAP  machine    Pulmonary embolism (H) large RLL w infarctio 4-17 4/18/2017       PREVIOUS ABDOMINAL/GYNECOLOGIC SURGERIES:  Past Surgical History:   Procedure Laterality Date    APPENDECTOMY  1956    BACK SURGERY  1992, 1996    trimmed L4-L5, Fusion L4-L5    BONE MARROW BIOPSY, BONE SPECIMEN, NEEDLE/TROCAR  10/24/2012    Procedure: BIOPSY BONE MARROW;  BONE MARROW BIOPSY WITH ASPIRATE (AERVALO ORDERING);  Surgeon: Terri Hickey MD;  Location:  GI    COLONOSCOPY N/A 7/31/2019    Procedure: COLONOSCOPY;  Surgeon: Sebastian Garcia MD;  Location:  GI    ESOPHAGOSCOPY,  GASTROSCOPY, DUODENOSCOPY (EGD), COMBINED N/A 2/8/2018    Procedure: COMBINED ESOPHAGOSCOPY, GASTROSCOPY, DUODENOSCOPY (EGD);  EGD;  Surgeon: Terri Crouch MD;  Location:  GI    ESOPHAGOSCOPY, GASTROSCOPY, DUODENOSCOPY (EGD), COMBINED N/A 11/10/2020    Procedure: ESOPHAGOGASTRODUODENOSCOPY, WITH BIOPSY;  Surgeon: Alonzo Jang DO;  Location:  GI    ESOPHAGOSCOPY, GASTROSCOPY, DUODENOSCOPY (EGD), COMBINED N/A 12/13/2023    Procedure: ESOPHAGOGASTRODUODENOSCOPY, WITH BIOPSY;  Surgeon: Everardo Duran MD;  Location:  GI    FACIAL RECONSTRUCTION SURGERY  1965    jaw fracture    HC TOOTH EXTRACTION W/FORCEP  1990s         PERTINENT MEDICATIONS:  Current Outpatient Medications   Medication Sig Dispense Refill    allopurinol (ZYLOPRIM) 300 MG tablet Take 1 tablet by mouth once daily 90 tablet 0    bismuth subsalicylate (PEPTO BISMOL) 262 MG/15ML suspension Take 15 mLs by mouth every 6 hours as needed for indigestion or diarrhea (as needed for diarrhea. Please collect stool sample first) 354 mL 1    Cholecalciferol (VITAMIN D) 50 MCG (2000 UT) CAPS Take 1 capsule by mouth once daily 90 capsule 2    folic acid (FOLVITE) 1 MG tablet Take 1 tablet by mouth once daily 90 tablet 1    gabapentin (NEURONTIN) 300 MG capsule Take 3 capsules (900 mg) by mouth 3 times daily 360 capsule 5    metoprolol succinate ER (TOPROL XL) 50 MG 24 hr tablet Take 1 tablet by mouth once daily 90 tablet 0    omeprazole (PRILOSEC) 40 MG DR capsule Take 30-60 min before breakfast 90 capsule 3    PROMACTA 75 MG tablet TAKE ONE TABLET BY MOUTH ONCE DAILY. TAKE ON AN EMPTY STOMACH (ONE HOUR BEFORE OR TWO HOURS AFTER A MEAL) 30 tablet 11    rivaroxaban ANTICOAGULANT (XARELTO ANTICOAGULANT) 10 MG TABS tablet TAKE 1 TABLET BY MOUTH ONCE DAILY WITH SUPPER 30 tablet 4    thiamine (B-1) 100 MG tablet Take 1 tablet (100 mg) by mouth daily 100 tablet 0    ASPIRIN NOT PRESCRIBED (INTENTIONAL) Please choose reason not prescribed, below  (Patient not taking: Reported on 2024) 1 each 0         SOCIAL HISTORY:  Social History     Socioeconomic History    Marital status:      Spouse name: Not on file    Number of children: Not on file    Years of education: Not on file    Highest education level: Not on file   Occupational History    Not on file   Tobacco Use    Smoking status: Former     Current packs/day: 0.00     Average packs/day: 1 pack/day for 28.0 years (28.0 ttl pk-yrs)     Types: Cigarettes     Start date:      Quit date: 1982     Years since quittin.7    Smokeless tobacco: Former   Vaping Use    Vaping status: Never Used   Substance and Sexual Activity    Alcohol use: Yes     Alcohol/week: 5.0 standard drinks of alcohol     Types: 5 Shots of liquor per week     Comment: 4-5 drinks/day, alcohol use disorder    Drug use: No    Sexual activity: Never     Partners: Female   Other Topics Concern    Parent/sibling w/ CABG, MI or angioplasty before 65F 55M? Yes   Social History Narrative    Not on file     Social Determinants of Health     Financial Resource Strain: Low Risk  (2024)    Financial Resource Strain     Within the past 12 months, have you or your family members you live with been unable to get utilities (heat, electricity) when it was really needed?: No   Food Insecurity: Low Risk  (2024)    Food Insecurity     Within the past 12 months, did you worry that your food would run out before you got money to buy more?: No     Within the past 12 months, did the food you bought just not last and you didn t have money to get more?: No   Transportation Needs: Low Risk  (2024)    Transportation Needs     Within the past 12 months, has lack of transportation kept you from medical appointments, getting your medicines, non-medical meetings or appointments, work, or from getting things that you need?: No   Physical Activity: Unknown (2024)    Exercise Vital Sign     Days of Exercise per Week: 0 days     Minutes  of Exercise per Session: Not on file   Stress: No Stress Concern Present (5/8/2024)    [place] [employer] [place] [employer] - Occupational Stress Questionnaire     Feeling of Stress : Only a little   Social Connections: Unknown (5/8/2024)    Social Connection and Isolation Panel [NHANES]     Frequency of Communication with Friends and Family: Not on file     Frequency of Social Gatherings with Friends and Family: More than three times a week     Attends Evangelical Services: Not on file     Active Member of Clubs or Organizations: Not on file     Attends Club or Organization Meetings: Not on file     Marital Status: Not on file   Interpersonal Safety: Not At Risk (5/9/2023)    Received from [facility] and [facility] IP Custom IPV     Do you feel UNSAFE in any of your personal relationships with your family members or any other acquaintances?: No   Housing Stability: Low Risk  (5/8/2024)    Housing Stability     Do you have housing? : Yes     Are you worried about losing your housing?: No       FAMILY HISTORY:  Denies colon/panc/esophageal/other GI CA, no other Nation or other HPS-related [place]. No IBD/celiac, no other AI/liver/thyroid disease.    Family History   Problem Relation Age of Onset    Unknown/Adopted Mother     Unknown/Adopted Father     Heart Disease Sister     Diabetes No family hx of     Coronary Artery Disease No family hx of     Hypertension No family hx of     Hyperlipidemia No family hx of     Cerebrovascular Disease No family hx of     Breast Cancer No family hx of     Colon Cancer No family hx of     Prostate Cancer No family hx of     Other Cancer No family hx of     Depression No family hx of     Anxiety Disorder No family hx of     Mental Illness No family hx of     Substance Abuse No family hx of     Anesthesia Reaction No family hx of     Asthma No family hx of     Osteoporosis No family hx of     Genetic Disorder No family hx of     Thyroid Disease No family hx of   "   Obesity No family hx of        PHYSICAL EXAMINATION:  Vitals reviewed  /74   Temp 97.4  F (36.3  C) (Temporal)   Ht 1.66 m (5' 5.35\")   Wt 84.5 kg (186 lb 3.2 oz)   BMI 30.65 kg/m      General: Patient appears well in no acute distress.  Patient is not able to take on the exam table for the assessment due to weakness.  Was assessed while sitting in the chair.  Skin: No visualized rash or lesions on visualized skin.  Noted superficial scratches on upper extremities.  HEENT:    EOMI, no erythema, sclera icterus or discharge noted.  Patient has hearing aids, appropriate communication.  No cervical or supraclavicular lymphadenopathy. Thyroid gland not enlarged.  Resp: breathing comfortably without accessory muscle usage, speaking in full sentences, no cough. Lung sounds clear  Card: Regular and rhythmic S1 and S2. No gallop or rub. No murmur.  No LE edema.  Abdomen: Appears distended, but patient is sitting.  Active bowel sounds X 4 quadrants. Soft to palpation.  MSK: Appears to have normal range of motion based on visualized movements  Neurologic: No apparent tremors, facial movements symmetric  Psych: affect normal, alert and oriented      ASSESSMENT/PLAN:    ICD-10-CM    1. Diarrhea, unspecified type  R19.7 Adult GI Clinic Follow-Up Order (Blank)      2. Alcoholic cirrhosis of liver without ascites (H)  K70.30 Adult GI Clinic Follow-Up Order (Blank)     MR Liver wo & w Contrast      3. Abdominal bloating  R14.0       4. Gastric varices without bleeding  I86.4 omeprazole (PRILOSEC) 40 MG DR capsule      5. Poor appetite  R63.0 MR Liver wo & w Contrast      6. Numbness and tingling of both legs  R20.0 thiamine (B-1) 100 MG tablet    R20.2       7. Gastroesophageal reflux disease without esophagitis  K21.9 omeprazole (PRILOSEC) 40 MG DR capsule         78 year old male  presented to GI clinic for a follow-up on gradual weight loss, poor appetite, nausea, and diarrhea.  Patient suffers liver cirrhosis.  " Admits to recent active alcohol use, 3-4 drinks a day.  Normal electrolytes and liver function tests on last laboratory test.    -Stable mild iron deficiency anemia with thrombocytopenia. The patient has Hx of unprovoked pulmonary embolism in April 2017; he is maintained on long-term anticoagulation. . Last Hgb was 12.4 and his Plt were 96.  Patient has been followed by hematology.  Received iron infusion approximately 6 months ago.  Has pending future lab work orders.  -It was felt that his anemia is due to GI blood loss.  He was referred to upper GI endoscopy which revealed severe portal hypertensive gastropath and two gastric nonbleeding varices.  Encouraged the patient to continue PPI.  -Patient reported improvement in his diarrhea.  He takes Pepto-Bismol as needed.  CT scan of abdomen revealed fatty liver disease and liver cirrhosis, colonic diverticulosis and 1.5 cm liver cyst.  Patient's stool studies came back negative for enteric pathogens, C. difficile, and exocrine pancreatic insufficiency.  Plan for follow-up MRI of the liver, order placed.  -Patient reports poor oral intake, but said that he feels hungry.  Suggested to consume small portion meals.  Educated to give preferences to his favorite foods.  Again, we had a discussion about alcohol impact on his GI system.  I offered a referral to our hepatology team for a follow-up, but the patient and his wife have declined the referral.  -Regarding his chronic symptoms of peripheral polyneuropathy, I suggested to discuss switching to an alternative medication as patient reported that gabapentin is no longer effective.  Suggested to try vitamin B1 supplement, 100 mg a day.  Strongly advised against NSAIDs use due to high risk for GI bleeding.  Explained that topical diclofenac, capsicin, or lidocaine could be used for discomfort.    Patient verbalized understanding and appreciation of care provided. Stated that all of the questions were answered to her/his  satisfaction.  RTC 4 to 5 months    Thank you for this consultation. It was a pleasure to participate in the care of this patient; please contact us with any further questions.    SCOTTIE Bahena, STEPHEN-C  Division of Gastroenterology  [facility], [place], [place]    This note was created with Dragon voice recognition software, and while reviewed for accuracy, inadvertent minor typographic errors may occur. Please contact the provider if you have any questions.

## 2024-06-14 NOTE — LETTER
6/14/2024      Raymon Ace  [address] [facility] [address]      Dear Colleague,    Thank you for referring your patient, aRymon Ace, to the [facility]. Please see a copy of my visit note below.      [facility]  [address] [facility] [address]  Phone: [phone]    Patient:  Raymon Ace, Date of birth 1945    Referring Provider: Waylon Novoa       Gastroenterology CLINIC VISIT, RETURN PATIENT    REASON FOR CONSULTATION: follow up    HPI: 78 year old male presented to GI clinic for a follow up. The patient was seen for weight loss, anorexia, nausea, and diarrhea with new fecal urgency and incontinence. He is here today with his spouse Mirela, who assists with answering questions.  Patient stated that his main concern is weakness in his lower extremities and inability to stay as active as before.  He complains of tingling, pain, and numbness in his legs.  Has been taking gabapentin for neuropathy, but reported that medication is not really effective. Patient admits to active alcohol use, but stated that he cut back to 3-4 drinks a day.    Regarding his diarrhea, he reported improvement in stool frequency and consistency.  His stool studies were negative for C. difficile and enteric pathogens.  No signs of for exocrine pancreatic insufficiency.  Noted mild nonspecific elevation in calprotectin on his last study.  Patient takes Pepto-Bismol as needed.  His EGD was suggestive for severe portal hypertensive gastropathy with to gastric varices and no signs of bleeding.  There was a small hiatal hernia.  Patient takes 40 mg of omeprazole daily.  Patient reported improvement in his appetite, but his wife does not agree with his statement.  Patient said that he feels hungry, but only can consult small portion meals.  Particularly, he is having troubles with meat consumption but this makes him nauseated.  He denies any emesis.  Denies  acid reflux or heartburn.  No problems with dysphagia or odynophagia.  No further weight loss.  Denies any black or bloody stools.  His CT scan of abdomen revealed fatty liver with liver cirrhosis, colonic diverticulosis, and 1.5 cm liver cyst.  In the past, patient was consulted by Dr. Crouch.  Suggested to continue supportive care and monitoring due to patient's ongoing alcohol use.    PREVIOUS ENDOSCOPY:  12/13/23 EGD by Dr. Duran  Findings:       The examined duodenum was normal. Biopsies were taken with a cold        forceps for histology.        Severe portal hypertensive gastropathy was found in the stomach.        Type 2 isolated gastric varices (IGV2, varices located in the body,        antrum or around the pylorus) with no bleeding were found in the        prepyloric region of the stomach. There were no stigmata of recent        bleeding.        The exam of the stomach was otherwise normal.        The examined esophagus was normal.      11/10/2020 EGD  Findings:       The Z-line was regular and was found 41 cm from the incisors.        Normal mucosa was found in the entire esophagus. This was biopsied with        a cold forceps for histology. Verification of patient identification for        the specimen was done. Estimated blood loss: none. Biopsies performed in        distal and proximal esophagus. No varices identified.        The distal esophagus was mildly tortuous.        There was mild resistance passing the scope across the GEJ at the level        of what appeared to be a paraesophageal hernia        There were polypoid appearing lesions that were not biopsied. Given a        history of gastric varices would only biopsy after ultrasound probe        rules out varix. 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      11/10/20 Colonoscopy  Findings:       The  perianal and digital rectal examinations were normal.        The terminal ileum appeared normal.        A 7 mm polyp was found in the transverse colon. The polyp was sessile.        The polyp was removed with a cold snare. Resection and retrieval were        complete. Verification of patient identification for the specimen was        done. Estimated blood loss: none.        Scattered small-mouthed diverticula were found in the sigmoid colon.   FINAL DIAGNOSIS:   A. Distal Esophagus, Biopsy:   Squamous esophageal mucosa with no intraepithelial eosinophils or other   abnormality   B. Proximal Esophagus, Biopsy:   Squamous esophageal mucosa with no intraepithelial eosinophils or other   abnormality   C. Transverse Colon, Polyp:   Sessile serrated polyp; negative for dysplasia or malignancy      PERTINENT STUDIES Reviewed in EMR  7/25/23 MRI of liver  IMPRESSION:  1. 1.6 cm simple liver cyst with no suspicious enhancing internal  components.  2. Hepatic steatosis.  3. Mild splenomegaly.      ROS: 10pt ROS performed and otherwise negative.    PAST MEDICAL HISTORY:  Past Medical History:   Diagnosis Date     Alcohol abuse since teens 01/15/2015    Still actively drinking     Alcoholic liver damage (H24) 1/10/2014     Gastroesophageal reflux disease with esophagitis 12/6/2016     Gout involving toe, unspecified cause, unspecified chronicity, unspecified laterality 6/2/2016     Heart murmur     heart is positioned farther on left side then typical     Hyperlipidemia 12/17/2015     Hypertension      ITP (idiopathic thrombocytopenic purpura)      Obstructive sleep apnea     does not use CPAP  machine     Pulmonary embolism (H) large RLL w infarctio 4-17 4/18/2017       PREVIOUS ABDOMINAL/GYNECOLOGIC SURGERIES:  Past Surgical History:   Procedure Laterality Date     APPENDECTOMY  1956     BACK SURGERY  1992, 1996    trimmed L4-L5, Fusion L4-L5     BONE MARROW BIOPSY, BONE SPECIMEN, NEEDLE/TROCAR  10/24/2012    Procedure:  BIOPSY BONE MARROW;  BONE MARROW BIOPSY WITH ASPIRATE (AREVALO ORDERING);  Surgeon: Terri Hickey MD;  Location:  GI     COLONOSCOPY N/A 7/31/2019    Procedure: COLONOSCOPY;  Surgeon: Sebastian Garcia MD;  Location:  GI     ESOPHAGOSCOPY, GASTROSCOPY, DUODENOSCOPY (EGD), COMBINED N/A 2/8/2018    Procedure: COMBINED ESOPHAGOSCOPY, GASTROSCOPY, DUODENOSCOPY (EGD);  EGD;  Surgeon: Terri Crouch MD;  Location:  GI     ESOPHAGOSCOPY, GASTROSCOPY, DUODENOSCOPY (EGD), COMBINED N/A 11/10/2020    Procedure: ESOPHAGOGASTRODUODENOSCOPY, WITH BIOPSY;  Surgeon: Alonzo Jang DO;  Location:  GI     ESOPHAGOSCOPY, GASTROSCOPY, DUODENOSCOPY (EGD), COMBINED N/A 12/13/2023    Procedure: ESOPHAGOGASTRODUODENOSCOPY, WITH BIOPSY;  Surgeon: Everardo Duran MD;  Location: [facility]     FACIAL RECONSTRUCTION SURGERY  1965    jaw fracture     HC TOOTH EXTRACTION W/FORCEP  1990s         PERTINENT MEDICATIONS:  Current Outpatient Medications   Medication Sig Dispense Refill     allopurinol (ZYLOPRIM) 300 MG tablet Take 1 tablet by mouth once daily 90 tablet 0     bismuth subsalicylate (PEPTO BISMOL) 262 MG/15ML suspension Take 15 mLs by mouth every 6 hours as needed for indigestion or diarrhea (as needed for diarrhea. Please collect stool sample first) 354 mL 1     Cholecalciferol (VITAMIN D) 50 MCG (2000 UT) CAPS Take 1 capsule by mouth once daily 90 capsule 2     folic acid (FOLVITE) 1 MG tablet Take 1 tablet by mouth once daily 90 tablet 1     gabapentin (NEURONTIN) 300 MG capsule Take 3 capsules (900 mg) by mouth 3 times daily 360 capsule 5     metoprolol succinate ER (TOPROL XL) 50 MG 24 hr tablet Take 1 tablet by mouth once daily 90 tablet 0     omeprazole (PRILOSEC) 40 MG DR capsule Take 30-60 min before breakfast 90 capsule 3     PROMACTA 75 MG tablet TAKE ONE TABLET BY MOUTH ONCE DAILY. TAKE ON AN EMPTY STOMACH (ONE HOUR BEFORE OR TWO HOURS AFTER A MEAL) 30 tablet 11     rivaroxaban ANTICOAGULANT  (XARELTO ANTICOAGULANT) 10 MG TABS tablet TAKE 1 TABLET BY MOUTH ONCE DAILY WITH SUPPER 30 tablet 4     thiamine (B-1) 100 MG tablet Take 1 tablet (100 mg) by mouth daily 100 tablet 0     ASPIRIN NOT PRESCRIBED (INTENTIONAL) Please choose reason not prescribed, below (Patient not taking: Reported on 2024) 1 each 0         SOCIAL HISTORY:  Social History     Socioeconomic History     Marital status:      Spouse name: Not on file     Number of children: Not on file     Years of education: Not on file     Highest education level: Not on file   Occupational History     Not on file   Tobacco Use     Smoking status: Former     Current packs/day: 0.00     Average packs/day: 1 pack/day for 28.0 years (28.0 ttl pk-yrs)     Types: Cigarettes     Start date:      Quit date: 1982     Years since quittin.7     Smokeless tobacco: Former   Vaping Use     Vaping status: Never Used   Substance and Sexual Activity     Alcohol use: Yes     Alcohol/week: 5.0 standard drinks of alcohol     Types: 5 Shots of liquor per week     Comment: 4-5 drinks/day, alcohol use disorder     Drug use: No     Sexual activity: Never     Partners: Female   Other Topics Concern     Parent/sibling w/ CABG, MI or angioplasty before 65F 55M? Yes   Social History Narrative     Not on file     Social Determinants of Health     Financial Resource Strain: Low Risk  (2024)    Financial Resource Strain      Within the past 12 months, have you or your family members you live with been unable to get utilities (heat, electricity) when it was really needed?: No   Food Insecurity: Low Risk  (2024)    Food Insecurity      Within the past 12 months, did you worry that your food would run out before you got money to buy more?: No      Within the past 12 months, did the food you bought just not last and you didn t have money to get more?: No   Transportation Needs: Low Risk  (2024)    Transportation Needs      Within the past 12 months,  has lack of transportation kept you from medical appointments, getting your medicines, non-medical meetings or appointments, work, or from getting things that you need?: No   Physical Activity: Unknown (5/8/2024)    Exercise Vital Sign      Days of Exercise per Week: 0 days      Minutes of Exercise per Session: Not on file   Stress: No Stress Concern Present (5/8/2024)    [employer] - Occupational Stress Questionnaire      Feeling of Stress : Only a little   Social Connections: Unknown (5/8/2024)    Social Connection and Isolation Panel [NHANES]      Frequency of Communication with Friends and Family: Not on file      Frequency of Social Gatherings with Friends and Family: More than three times a week      Attends Jain Services: Not on file      Active Member of Clubs or Organizations: Not on file      Attends Club or Organization Meetings: Not on file      Marital Status: Not on file   Interpersonal Safety: Not At Risk (5/9/2023)    Received from [facility] and [facility]     IP Custom IPV      Do you feel UNSAFE in any of your personal relationships with your family members or any other acquaintances?: No   Housing Stability: Low Risk  (5/8/2024)    Housing Stability      Do you have housing? : Yes      Are you worried about losing your housing?: No       FAMILY HISTORY:  Denies colon/panc/esophageal/other GI CA, no other Nation or other HPS-related [place]. No IBD/celiac, no other AI/liver/thyroid disease.    Family History   Problem Relation Age of Onset     Unknown/Adopted Mother      Unknown/Adopted Father      Heart Disease Sister      Diabetes No family hx of      Coronary Artery Disease No family hx of      Hypertension No family hx of      Hyperlipidemia No family hx of      Cerebrovascular Disease No family hx of      Breast Cancer No family hx of      Colon Cancer No family hx of      Prostate Cancer No family hx of      Other Cancer No family hx of   "    Depression No family hx of      Anxiety Disorder No family hx of      Mental Illness No family hx of      Substance Abuse No family hx of      Anesthesia Reaction No family hx of      Asthma No family hx of      Osteoporosis No family hx of      Genetic Disorder No family hx of      Thyroid Disease No family hx of      Obesity No family hx of        PHYSICAL EXAMINATION:  Vitals reviewed  /74   Temp 97.4  F (36.3  C) (Temporal)   Ht 1.66 m (5' 5.35\")   Wt 84.5 kg (186 lb 3.2 oz)   BMI 30.65 kg/m      General: Patient appears well in no acute distress.  Patient is not able to take on the exam table for the assessment due to weakness.  Was assessed while sitting in the chair.  Skin: No visualized rash or lesions on visualized skin.  Noted superficial scratches on upper extremities.  HEENT:    EOMI, no erythema, sclera icterus or discharge noted.  Patient has hearing aids, appropriate communication.  No cervical or supraclavicular lymphadenopathy. Thyroid gland not enlarged.  Resp: breathing comfortably without accessory muscle usage, speaking in full sentences, no cough. Lung sounds clear  Card: Regular and rhythmic S1 and S2. No gallop or rub. No murmur.  No LE edema.  Abdomen: Appears distended, but patient is sitting.  Active bowel sounds X 4 quadrants. Soft to palpation.  MSK: Appears to have normal range of motion based on visualized movements  Neurologic: No apparent tremors, facial movements symmetric  Psych: affect normal, alert and oriented      ASSESSMENT/PLAN:    ICD-10-CM    1. Diarrhea, unspecified type  R19.7 Adult GI Clinic Follow-Up Order (Blank)      2. Alcoholic cirrhosis of liver without ascites (H)  K70.30 Adult GI Clinic Follow-Up Order (Blank)     MR Liver wo & w Contrast      3. Abdominal bloating  R14.0       4. Gastric varices without bleeding  I86.4 omeprazole (PRILOSEC) 40 MG DR capsule      5. Poor appetite  R63.0 MR Liver wo & w Contrast      6. Numbness and tingling of both " legs  R20.0 thiamine (B-1) 100 MG tablet    R20.2       7. Gastroesophageal reflux disease without esophagitis  K21.9 omeprazole (PRILOSEC) 40 MG DR capsule         78 year old male  presented to GI clinic for a follow-up on gradual weight loss, poor appetite, nausea, and diarrhea.  Patient suffers liver cirrhosis.  Admits to recent active alcohol use, 3-4 drinks a day.  Normal electrolytes and liver function tests on last laboratory test.    -Stable mild iron deficiency anemia with thrombocytopenia. The patient has Hx of unprovoked pulmonary embolism in April 2017; he is maintained on long-term anticoagulation. . Last Hgb was 12.4 and his Plt were 96.  Patient has been followed by hematology.  Received iron infusion approximately 6 months ago.  Has pending future lab work orders.  -It was felt that his anemia is due to GI blood loss.  He was referred to upper GI endoscopy which revealed severe portal hypertensive gastropath and two gastric nonbleeding varices.  Encouraged the patient to continue PPI.  -Patient reported improvement in his diarrhea.  He takes Pepto-Bismol as needed.  CT scan of abdomen revealed fatty liver disease and liver cirrhosis, colonic diverticulosis and 1.5 cm liver cyst.  Patient's stool studies came back negative for enteric pathogens, C. difficile, and exocrine pancreatic insufficiency.  Plan for follow-up MRI of the liver, order placed.  -Patient reports poor oral intake, but said that he feels hungry.  Suggested to consume small portion meals.  Educated to give preferences to his favorite foods.  Again, we had a discussion about alcohol impact on his GI system.  I offered a referral to our hepatology team for a follow-up, but the patient and his wife have declined the referral.  -Regarding his chronic symptoms of peripheral polyneuropathy, I suggested to discuss switching to an alternative medication as patient reported that gabapentin is no longer effective.  Suggested to try vitamin B1  supplement, 100 mg a day.  Strongly advised against NSAIDs use due to high risk for GI bleeding.  Explained that topical diclofenac, capsicin, or lidocaine could be used for discomfort.    Patient verbalized understanding and appreciation of care provided. Stated that all of the questions were answered to her/his satisfaction.  RTC 4 to 5 months    Thank you for this consultation. It was a pleasure to participate in the care of this patient; please contact us with any further questions.    SCOTTIE Bahena, FNP-C  Division of Gastroenterology  [facility], [place], [place]    This note was created with Dragon voice recognition software, and while reviewed for accuracy, inadvertent minor typographic errors may occur. Please contact the provider if you have any questions.       Again, thank you for allowing me to participate in the care of your patient.        Sincerely,        SCOTTIE BAHENA CNP

## 2024-06-14 NOTE — PATIENT INSTRUCTIONS
It was a pleasure taking care of you today.  I've included a brief summary of our discussion and care plan from today's visit below.  Please review this information with your primary care provider.  ______________________________________________________________________    My recommendations are summarized as follows:    Continue omeprazole every morning, taking the pill 30 to 60 minutes before breakfast.    2.  Try vitamin B1 supplement once a day.  Talk to your doctor about an alternative to gabapentin medication for cramping and pain in your lower extremities.    3.  Eat small frequent meals every 3-4 hours.  Increase intake of fiber or start taking a fiber supplement such as Citrucel, Metamucil, Benefiber, or fiberone.    4.  Follow-up with hepatology and monitor platelets and hemoglobin.  We may consider performing upper GI endoscopy if your hemoglobin drops or if you develop symptoms of anemia.    5.  I will order a follow-up liver MRI.  You will be contacted to schedule the test.    Return to GI Clinic in 4-5 months to review your progress.    ______________________________________________________________________  A high-fiber diet is a commonly recommended treatment for digestive problems, such as constipation, diarrhea, diverticulosis, irritable bowel syndrome, and hemorrhoids.   Most dietary fiber is not digested or absorbed, so it stays within the intestine where it modulates digestion of other foods and affects the consistency of stool. There are two types of fiber, each of which is thought to have its own benefits:  ?Soluble fiber consists of a group of substances that is made of carbohydrates and dissolves in water. Examples of foods that contain soluble fiber include fruits, oats, barley, and legumes (peas and beans).  ?Insoluble fiber comes from plant cell walls and does not dissolve in water. Examples of foods that contain insoluble fiber include wheat, rye, and other grains. Insoluble fiber (wheat  bran, and some fruits and vegetables) has been recommended to treat digestive problems such as constipation, hemorrhoids, chronic diarrhea, and fecal incontinence.   ?Dietary fiber is the sum of all soluble and insoluble fiber.Fiber bulks the stool, making it softer and easier to pass. Fiber helps the stool pass regularly, although it is not a laxative.   - Recommended daily dose of fiber is 25-35 gram. It is difficult to consume this amount of fiber from food alone. Therefore, I would suggest to take fiber supplement.  - Please start supplementation with a powdered soluble fiber. When used on a daily basis, this can help regulate the consistency of your stools.   - Metamucil (psyllium) and Citrucel are preferred examples. You can start with 1-2 teaspoons per day, with goal to gradually increase the dose  to 1 tablespoon daily. You can increase up to 1 tablespoon three times daily if needed.   - It is important to stay well-hydrated with use of fiber supplementation and to make sure that the fiber powder is well mixed with water as directed on the label.   - You may experience some bloating with initiation of fiber, which will improve over the first few weeks. We will evaluate the effect  of fiber in 3-6 months.   - Of note, many of the fiber products contain artificial sweeteners, which can cause bloating, gas, and diarrhea in those who may be sensitive to artificial sweeteners. If this is the case, I would recommend trying Metamucil Premium Blend (with Stevia), Metamucil 4-in-1 without Added Sweeteners, or Bellway (sweetened with Monk fruit extract).      ____________________________________________________________________        Who do I call with any questions after my visit?  Please be in touch if there are any further questions that arise following today's visit.  There are multiple ways to contact your gastroenterology care team.      During business hours, you may reach a Gastroenterology nurse at  [phone], option 3.     To schedule or reschedule an appointment, please call [phone].   To schedule your imaging studies (CT, MRI, ultrasound)  call [phone] (or toll-free # [phone])  To schedule your lab work at [facility], please call [phone]    You can always send a secure message through [employer].  [employer] messages are answered by your nurse or doctor typically within 24 hours.  Please allow extra time on weekends and holidays.      For urgent/emergent questions after business hours, you may reach the on-call GI Fellow by contacting the [facility]  at [phone].    In order for your refill to be processed in a timely fashion, it is your responsibility to ensure you follow the recommendations from your provider regarding your laboratory studies and follow up appointments.       How will I get the results of any tests ordered?    You will receive all of your results.  If you have signed up for [email or web address], any tests ordered at your visit will be available to you after your physician reviews them.  Typically this takes 1-2 weeks.  If there are urgent results that require a change in your care plan, your physician or nurse will call you to discuss the next steps.   What is [employer]?  [employer] is a secure way for you to access all of your healthcare records from the [facility].  It is a web based computer program, so you can sign on to it from any location.  It also allows you to send secure messages to your care team.  I recommend signing up for [employer] access if you have not already done so and are comfortable with using a computer.    How to I schedule a follow-up visit?  If you did not schedule a follow-up visit today, please call [phone] to schedule a follow-up office visit.      Sincerely,  JESSICA Bahena,  [facility],  Division of Gastroenterology   ([facility])

## 2024-07-05 ENCOUNTER — HOSPITAL ENCOUNTER (OUTPATIENT)
Dept: MRI IMAGING | Facility: CLINIC | Age: 79
Discharge: HOME | End: 2024-07-05
Attending: NURSE PRACTITIONER | Admitting: NURSE PRACTITIONER
Payer: COMMERCIAL

## 2024-07-05 DIAGNOSIS — R63.0 POOR APPETITE: ICD-10-CM

## 2024-07-05 DIAGNOSIS — K70.30 ALCOHOLIC CIRRHOSIS OF LIVER WITHOUT ASCITES (H): ICD-10-CM

## 2024-07-05 PROCEDURE — 255N000002 HC RX 255 OP 636: Performed by: NURSE PRACTITIONER

## 2024-07-05 PROCEDURE — A9585 GADOBUTROL INJECTION: HCPCS | Performed by: NURSE PRACTITIONER

## 2024-07-05 PROCEDURE — 258N000003 HC RX IP 258 OP 636: Performed by: NURSE PRACTITIONER

## 2024-07-05 PROCEDURE — 74183 MRI ABD W/O CNTR FLWD CNTR: CPT

## 2024-07-05 RX ORDER — GADOBUTROL 604.72 MG/ML
8.5 INJECTION INTRAVENOUS ONCE
Status: COMPLETED | OUTPATIENT
Start: 2024-07-05 | End: 2024-07-05

## 2024-07-05 RX ADMIN — GADOBUTROL 8.5 ML: 604.72 INJECTION INTRAVENOUS at 12:40

## 2024-07-05 RX ADMIN — SODIUM CHLORIDE 50 ML: 9 INJECTION, SOLUTION INTRAVENOUS at 12:51

## 2024-07-08 ENCOUNTER — TELEPHONE (OUTPATIENT)
Dept: GASTROENTEROLOGY | Facility: CLINIC | Age: 79
End: 2024-07-08
Payer: COMMERCIAL

## 2024-07-08 NOTE — TELEPHONE ENCOUNTER
Spoke with wife Mirela, Consent to communicate on file.  Results given, expressed verbal understanding.  Will keep appointment in Oct.     Allie Live RN

## 2024-07-08 NOTE — TELEPHONE ENCOUNTER
----- Message from BOB BORJAS sent at 7/5/2024  3:17 PM CDT -----  Please let the patient know that he has no suspicious lesions on his liver.  Signs of fatty liver are present. Stable liver cyst.   A few benign findings. Possible sludge in the gallbladder. Small hiatal hernia. Colonic diverticulosis.    No interventions needed.    Thank you,  Bob Borjas, CNP, GI

## 2024-07-18 DIAGNOSIS — D69.3 IDIOPATHIC THROMBOCYTOPENIC PURPURA (H): ICD-10-CM

## 2024-07-19 RX ORDER — ELTROMBOPAG OLAMINE 75 MG/1
TABLET, FILM COATED ORAL
Qty: 30 TABLET | Refills: 11 | Status: SHIPPED | OUTPATIENT
Start: 2024-07-19

## 2024-10-03 ENCOUNTER — APPOINTMENT (OUTPATIENT)
Dept: LAB | Facility: CLINIC | Age: 79
End: 2024-10-03
Attending: INTERNAL MEDICINE
Payer: COMMERCIAL

## 2024-10-03 ENCOUNTER — ONCOLOGY VISIT (OUTPATIENT)
Dept: ONCOLOGY | Facility: CLINIC | Age: 79
End: 2024-10-03
Attending: INTERNAL MEDICINE
Payer: COMMERCIAL

## 2024-10-03 VITALS
RESPIRATION RATE: 16 BRPM | HEART RATE: 111 BPM | TEMPERATURE: 98.8 F | BODY MASS INDEX: 30.84 KG/M2 | WEIGHT: 187.3 LBS | DIASTOLIC BLOOD PRESSURE: 79 MMHG | OXYGEN SATURATION: 95 % | SYSTOLIC BLOOD PRESSURE: 133 MMHG

## 2024-10-03 DIAGNOSIS — D69.3 IDIOPATHIC THROMBOCYTOPENIC PURPURA (H): ICD-10-CM

## 2024-10-03 DIAGNOSIS — Z86.718 PERSONAL HISTORY OF VENOUS THROMBOSIS AND EMBOLISM: ICD-10-CM

## 2024-10-03 DIAGNOSIS — D50.0 IRON DEFICIENCY ANEMIA DUE TO CHRONIC BLOOD LOSS: ICD-10-CM

## 2024-10-03 DIAGNOSIS — Z79.01 LONG TERM CURRENT USE OF ANTICOAGULANT THERAPY: ICD-10-CM

## 2024-10-03 LAB
ALBUMIN SERPL BCG-MCNC: 4.1 G/DL (ref 3.5–5.2)
ALP SERPL-CCNC: 81 U/L (ref 40–150)
ALT SERPL W P-5'-P-CCNC: 31 U/L (ref 0–70)
ANION GAP SERPL CALCULATED.3IONS-SCNC: 13 MMOL/L (ref 7–15)
AST SERPL W P-5'-P-CCNC: 53 U/L (ref 0–45)
BASOPHILS # BLD AUTO: 0.1 10E3/UL (ref 0–0.2)
BASOPHILS NFR BLD AUTO: 1 %
BILIRUB SERPL-MCNC: 1.2 MG/DL
BUN SERPL-MCNC: 6.4 MG/DL (ref 8–23)
CALCIUM SERPL-MCNC: 9.6 MG/DL (ref 8.8–10.4)
CHLORIDE SERPL-SCNC: 104 MMOL/L (ref 98–107)
CREAT SERPL-MCNC: 0.86 MG/DL (ref 0.67–1.17)
EGFRCR SERPLBLD CKD-EPI 2021: 88 ML/MIN/1.73M2
EOSINOPHIL # BLD AUTO: 0.1 10E3/UL (ref 0–0.7)
EOSINOPHIL NFR BLD AUTO: 1 %
ERYTHROCYTE [DISTWIDTH] IN BLOOD BY AUTOMATED COUNT: 22.1 % (ref 10–15)
FERRITIN SERPL-MCNC: 61 NG/ML (ref 31–409)
GLUCOSE SERPL-MCNC: 106 MG/DL (ref 70–99)
HCO3 SERPL-SCNC: 24 MMOL/L (ref 22–29)
HCT VFR BLD AUTO: 36.2 % (ref 40–53)
HGB BLD-MCNC: 12.3 G/DL (ref 13.3–17.7)
IMM GRANULOCYTES # BLD: 0.2 10E3/UL
IMM GRANULOCYTES NFR BLD: 2 %
IRON BINDING CAPACITY (ROCHE): 901 UG/DL (ref 240–430)
IRON SATN MFR SERPL: 60 % (ref 15–46)
IRON SERPL-MCNC: 538 UG/DL (ref 61–157)
LYMPHOCYTES # BLD AUTO: 1.6 10E3/UL (ref 0.8–5.3)
LYMPHOCYTES NFR BLD AUTO: 16 %
MCH RBC QN AUTO: 34.3 PG (ref 26.5–33)
MCHC RBC AUTO-ENTMCNC: 34 G/DL (ref 31.5–36.5)
MCV RBC AUTO: 101 FL (ref 78–100)
MONOCYTES # BLD AUTO: 0.8 10E3/UL (ref 0–1.3)
MONOCYTES NFR BLD AUTO: 8 %
NEUTROPHILS # BLD AUTO: 6.8 10E3/UL (ref 1.6–8.3)
NEUTROPHILS NFR BLD AUTO: 71 %
NRBC # BLD AUTO: 0.1 10E3/UL
NRBC BLD AUTO-RTO: 1 /100
PLATELET # BLD AUTO: 91 10E3/UL (ref 150–450)
POTASSIUM SERPL-SCNC: 3.8 MMOL/L (ref 3.4–5.3)
PROT SERPL-MCNC: 7.3 G/DL (ref 6.4–8.3)
RBC # BLD AUTO: 3.59 10E6/UL (ref 4.4–5.9)
SODIUM SERPL-SCNC: 141 MMOL/L (ref 135–145)
WBC # BLD AUTO: 9.5 10E3/UL (ref 4–11)

## 2024-10-03 PROCEDURE — 85004 AUTOMATED DIFF WBC COUNT: CPT | Performed by: INTERNAL MEDICINE

## 2024-10-03 PROCEDURE — 82728 ASSAY OF FERRITIN: CPT | Performed by: INTERNAL MEDICINE

## 2024-10-03 PROCEDURE — 36415 COLL VENOUS BLD VENIPUNCTURE: CPT | Performed by: INTERNAL MEDICINE

## 2024-10-03 PROCEDURE — 99215 OFFICE O/P EST HI 40 MIN: CPT | Mod: GC | Performed by: INTERNAL MEDICINE

## 2024-10-03 PROCEDURE — G0463 HOSPITAL OUTPT CLINIC VISIT: HCPCS | Performed by: INTERNAL MEDICINE

## 2024-10-03 PROCEDURE — 80053 COMPREHEN METABOLIC PANEL: CPT | Performed by: INTERNAL MEDICINE

## 2024-10-03 PROCEDURE — 83550 IRON BINDING TEST: CPT | Performed by: INTERNAL MEDICINE

## 2024-10-03 ASSESSMENT — PAIN SCALES - GENERAL: PAINLEVEL: EXTREME PAIN (8)

## 2024-10-03 NOTE — NURSING NOTE
"Oncology Rooming Note    October 3, 2024 2:01 PM   Raymon Ace is a 79 year old male who presents for:    Chief Complaint   Patient presents with    Blood Draw     Labs drawn from venipuncture by RN in lab     Initial Vitals: /79 (BP Location: Right arm, Patient Position: Sitting, Cuff Size: Adult Regular)   Pulse 111   Temp 98.8  F (37.1  C) (Tympanic)   Resp 16   Wt 85 kg (187 lb 4.8 oz)   SpO2 95%   BMI 30.84 kg/m   Estimated body mass index is 30.84 kg/m  as calculated from the following:    Height as of 6/14/24: 1.66 m (5' 5.35\").    Weight as of this encounter: 85 kg (187 lb 4.8 oz). Body surface area is 1.98 meters squared.  Extreme Pain (8) Comment: Data Unavailable   No LMP for male patient.  Allergies reviewed: Yes  Medications reviewed: Yes    Medications: Medication refills not needed today.  Pharmacy name entered into [facility]:    [id number] - [place], [place] - [address] [id number] - [place], [place] - [address] [place] [address] [facility] - [place], [place] - [facility] [address]    Frailty Screening:   Is the patient here for a new oncology consult visit in cancer care? 2. No      Clinical concerns: none      David Leos LPN              "

## 2024-10-03 NOTE — LETTER
10/3/2024      Raymon Ace  [address] [facility] [address]      Dear Colleague,    Thank you for referring your patient, Raymon Ace, to the [facility]. Please see a copy of my visit note below.    HEMATOLOGY CLINIC VISIT    Raymon is an 79-year-old male with chronic ITP and a history of unprovoked pulmonary embolism in April 2017 for which he is maintained on long-term anticoagulation.  He has a history of recurrent iron deficiency anemia felt secondary to chronic occult blood loss from portal hypertensive gastropathy and hemorrhoids.  He also has underlying alcoholic liver disease.  Today he is here for a routine follow-up visit with his wife.      In January 2024, his ferritin levels were low so he was given an infusion of INFeD, which he tolerated well.      He has had no bleeding episodes recently. He has had some bruises, but nothing significant. He has been tolerating the rivaroxaban and Promacta without difficuty. Raymon notes that he continues to have walking difficulty, due to neuropathy. He uses a walker and he only walks inside his home. He had 3 falls over the past 6 months, mostly due balance issue. He states he is tired all the time and that he is not that active. He still drinks alcohol, about 3 onces of whiskey daily.    He has a plan to f/up with GI in the end of this month, on 10/28/24. His liver MRI from 7/2024 showed fatty liver, but otherwise did not show any suspicious lesions.     Physical exam:  He appears his usual self.  He is in no acute distress.  He is hard of hearing which is longstanding.  He is afebrile with a blood pressure of 133/79, heart rate of 111.  He does not appear to be pale or jaundiced.  No scleral icterus.  Lungs are clear.   RRR S1S2, no murmur. Abdomen is nondistended, soft, and nontender.      Labs:  Serum electrolytes are normal, creatinine 0.86 , LFTs are normal except for AST 53.  White count 9.5 with a normal differential,  hemoglobin 12.3, , platelets 91,000. Overall his CBC parameters are within his baseline ranges.  Ferritin is 61.      ASSESSMENT / PLAN:  1.  Chronic ITP -- Raymon's baseline platelet count has been in the 70,000 to 100,000 range over the last several years.  He remains on Promacta 75 mg daily.  He is responsive to pulse dexamethasone and we have used this to increase his platelet count prior to surgeries.  He has not been treated with IVIG, so we do not know how well he responds to that.  At the present time, his platelet count is within his usual baseline.  Given that he is on long-term anticoagulation, the goal is to maintain his platelet count consistently above 50,000.  Note that there may also be a component of thrombocytopenia related to his underlying liver disease.    Today, the pt states that he has been doing good from his ITP and h/o VTE standpoint. They mentioned he might be considered for a NSG procedure like TENS. His plt has been stable around 70-100s. We also have measures that we can take if the NSG team would like his plt to be higher than that range.     2. History of unprovoked pulmonary embolism (April 2017) -- He has done well on long-term anticoagulation with rivaroxaban.  He is on the prophylactic dose of 10 mg daily due to a combination of mild intermittent renal insufficiency (not currently an issue) and underlying liver disease.  Will continue this as long as his bleeding risk remains acceptable.      3. Iron deficiency anemia -- He denies any recent signs of GI bleeding, EGD in December 2023 continued to show portal hypertensive gastropathy, as well as 2 non-bleeding varicies.   He received IV iron (Infed) on 2/1/2024.    4. Elevated INR:  Although he has liver cirrhosis, rivaroxaban also increases the INR.     Plan)  -cont promacta  -cont rivaroxaban  -if the pt is going to NSG procedure (like TENS) and NGS team has any questions about perioperative mngt of his ITP, VTE and his  plt level, please contact us (Dr. Novoa)  -RTC in 6 months.     The above plan was discussed with Dr. Novoa, who agrees with the assessment and plan.       Lyndon Freire MD  Hem/onc fellow  Division of Hematology, Oncology, and Transplantation  [facility]      HEMATOLOGY STAFF:  Seen with fellow, whose note reflects our joint evaluation, assessment, and plan.    Total time on date of encounter 40 minutes, including review of medical records and labs, clinic visit, and documentation.      Waylon Novoa MD  Professor of Medicine  Division of Hematology, Oncology, and Transplantation  Director, [facility]      Again, thank you for allowing me to participate in the care of your patient.        Sincerely,        Waylon Novoa MD

## 2024-10-03 NOTE — NURSING NOTE
Chief Complaint   Patient presents with    Blood Draw     Labs drawn from venipuncture by RN in lab     Labs collected from venipuncture by RN. Vitals taken. Checked in for appointment(s).     Janet Burton RN

## 2024-10-03 NOTE — PROGRESS NOTES
HEMATOLOGY CLINIC VISIT    Raymon is an 79-year-old male with chronic ITP and a history of unprovoked pulmonary embolism in April 2017 for which he is maintained on long-term anticoagulation.  He has a history of recurrent iron deficiency anemia felt secondary to chronic occult blood loss from portal hypertensive gastropathy and hemorrhoids.  He also has underlying alcoholic liver disease.  Today he is here for a routine follow-up visit with his wife.      In January 2024, his ferritin levels were low so he was given an infusion of INFeD, which he tolerated well.      He has had no bleeding episodes recently. He has had some bruises, but nothing significant. He has been tolerating the rivaroxaban and Promacta without difficuty. Raymon notes that he continues to have walking difficulty, due to neuropathy. He uses a walker and he only walks inside his home. He had 3 falls over the past 6 months, mostly due balance issue. He states he is tired all the time and that he is not that active. He still drinks alcohol, about 3 onces of whiskey daily.    He has a plan to f/up with GI in the end of this month, on 10/28/24. His liver MRI from 7/2024 showed fatty liver, but otherwise did not show any suspicious lesions.     Physical exam:  He appears his usual self.  He is in no acute distress.  He is hard of hearing which is longstanding.  He is afebrile with a blood pressure of 133/79, heart rate of 111.  He does not appear to be pale or jaundiced.  No scleral icterus.  Lungs are clear.   RRR S1S2, no murmur. Abdomen is nondistended, soft, and nontender.      Labs:  Serum electrolytes are normal, creatinine 0.86 , LFTs are normal except for AST 53.  White count 9.5 with a normal differential, hemoglobin 12.3, , platelets 91,000. Overall his CBC parameters are within his baseline ranges.  Ferritin is 61.      ASSESSMENT / PLAN:  1.  Chronic ITP -- Raymon's baseline platelet count has been in the 70,000 to 100,000 range over  the last several years.  He remains on Promacta 75 mg daily.  He is responsive to pulse dexamethasone and we have used this to increase his platelet count prior to surgeries.  He has not been treated with IVIG, so we do not know how well he responds to that.  At the present time, his platelet count is within his usual baseline.  Given that he is on long-term anticoagulation, the goal is to maintain his platelet count consistently above 50,000.  Note that there may also be a component of thrombocytopenia related to his underlying liver disease.    Today, the pt states that he has been doing good from his ITP and h/o VTE standpoint. They mentioned he might be considered for a NSG procedure like TENS. His plt has been stable around 70-100s. We also have measures that we can take if the NSG team would like his plt to be higher than that range.     2. History of unprovoked pulmonary embolism (April 2017) -- He has done well on long-term anticoagulation with rivaroxaban.  He is on the prophylactic dose of 10 mg daily due to a combination of mild intermittent renal insufficiency (not currently an issue) and underlying liver disease.  Will continue this as long as his bleeding risk remains acceptable.      3. Iron deficiency anemia -- He denies any recent signs of GI bleeding, EGD in December 2023 continued to show portal hypertensive gastropathy, as well as 2 non-bleeding varicies.   He received IV iron (Infed) on 2/1/2024.    4. Elevated INR:  Although he has liver cirrhosis, rivaroxaban also increases the INR.     Plan)  -cont promacta  -cont rivaroxaban  -if the pt is going to NSG procedure (like TENS) and NGS team has any questions about perioperative mngt of his ITP, VTE and his plt level, please contact us (Dr. Novoa)  -RTC in 6 months.     The above plan was discussed with Dr. Novoa, who agrees with the assessment and plan.       Lyndon Freire MD  Hem/onc fellow  Division of Hematology, Oncology, and  Transplantation  [facility]      HEMATOLOGY STAFF:  Seen with fellow, whose note reflects our joint evaluation, assessment, and plan.    Total time on date of encounter 40 minutes, including review of medical records and labs, clinic visit, and documentation.      Waylon Novoa MD  Professor of Medicine  Division of Hematology, Oncology, and Transplantation  Director, [facility]

## 2024-10-10 ASSESSMENT — PATIENT HEALTH QUESTIONNAIRE - PHQ9: SUM OF ALL RESPONSES TO PHQ QUESTIONS 1-9: 17

## 2024-10-28 ENCOUNTER — OFFICE VISIT (OUTPATIENT)
Dept: GASTROENTEROLOGY | Facility: CLINIC | Age: 79
End: 2024-10-28
Payer: COMMERCIAL

## 2024-10-28 VITALS
SYSTOLIC BLOOD PRESSURE: 122 MMHG | BODY MASS INDEX: 31.16 KG/M2 | HEIGHT: 65 IN | WEIGHT: 187 LBS | HEART RATE: 88 BPM | DIASTOLIC BLOOD PRESSURE: 84 MMHG

## 2024-10-28 DIAGNOSIS — G63 POLYNEUROPATHY IN OTHER DISEASES CLASSIFIED ELSEWHERE (H): ICD-10-CM

## 2024-10-28 DIAGNOSIS — R15.2 INCONTINENCE OF FECES WITH FECAL URGENCY: ICD-10-CM

## 2024-10-28 DIAGNOSIS — I86.4 GASTRIC VARICES WITHOUT BLEEDING: ICD-10-CM

## 2024-10-28 DIAGNOSIS — K70.30 ALCOHOLIC CIRRHOSIS OF LIVER WITHOUT ASCITES (H): Primary | ICD-10-CM

## 2024-10-28 DIAGNOSIS — E55.9 VITAMIN D DEFICIENCY: ICD-10-CM

## 2024-10-28 DIAGNOSIS — F10.10 ETOH ABUSE: ICD-10-CM

## 2024-10-28 DIAGNOSIS — K21.9 GASTROESOPHAGEAL REFLUX DISEASE WITHOUT ESOPHAGITIS: ICD-10-CM

## 2024-10-28 DIAGNOSIS — R19.7 DIARRHEA, UNSPECIFIED TYPE: ICD-10-CM

## 2024-10-28 DIAGNOSIS — R15.9 INCONTINENCE OF FECES WITH FECAL URGENCY: ICD-10-CM

## 2024-10-28 DIAGNOSIS — R14.0 ABDOMINAL BLOATING: ICD-10-CM

## 2024-10-28 PROCEDURE — 99214 OFFICE O/P EST MOD 30 MIN: CPT | Performed by: NURSE PRACTITIONER

## 2024-10-28 RX ORDER — OMEPRAZOLE 40 MG/1
CAPSULE, DELAYED RELEASE ORAL
Qty: 90 CAPSULE | Refills: 5 | Status: SHIPPED | OUTPATIENT
Start: 2024-10-28

## 2024-10-28 ASSESSMENT — PAIN SCALES - GENERAL: PAINLEVEL_OUTOF10: SEVERE PAIN (7)

## 2024-10-28 NOTE — PROGRESS NOTES
[address] [address]  Phone: [phone]    Patient:  Raymon Ace, Date of birth 1945    Referring Provider: Waylon Novoa       Gastroenterology CLINIC VISIT, RETURN PATIENT    REASON FOR CONSULTATION: follow up    HPI: 79 year old male presented to GI clinic for a follow up. The patient was seen for weight loss, anorexia, nausea, and diarrhea with new fecal urgency and incontinence. Patient suffers liver cirrhosis.The patient has Hx of unprovoked pulmonary embolism in April 2017; he is maintained on long-term anticoagulation.   He is here today with his spouse Mirela, who assists with answering questions.     His stool studies were negative for C. difficile and enteric pathogens.  No signs of for exocrine pancreatic insufficiency.  Noted mild nonspecific elevation in calprotectin on his last study.  Patient reported some improvement in his stool frequency and consistency.  Has been having a few mushy to watery stools a day. Some days, patient does not have bowel movement.  Complains of fecal urgency and incontinence.  He is also incontinent of urine.  They are planning to undergo spinal cord stimulator placement.  Patient takes Pepto-Bismol as needed.  He denies hematochezia or melena.  No nausea or vomiting.  His EGD was suggestive for severe portal hypertensive gastropathy with to gastric varices and no signs of bleeding.  There was a small hiatal hernia.  Patient takes 40 mg of omeprazole daily.  Paatient has been followed by hematology. Last hgb was 12.3.  Patient denies odynophagia or dysphagia.  Denies acid reflux.    His CT scan of abdomen revealed fatty liver with liver cirrhosis, colonic diverticulosis, and 1.5 cm liver cyst. In the past, patient was consulted by Dr. Crouch. Suggested to continue supportive care and monitoring due to patient's ongoing alcohol use.     PREVIOUS ENDOSCOPY:  12/13/23 EGD by Dr. Duran  Findings:        The examined duodenum was normal. Biopsies were taken with a cold        forceps for histology.        Severe portal hypertensive gastropathy was found in the stomach.        Type 2 isolated gastric varices (IGV2, varices located in the body,        antrum or around the pylorus) with no bleeding were found in the        prepyloric region of the stomach. There were no stigmata of recent        bleeding.        The exam of the stomach was otherwise normal.        The examined esophagus was normal.      11/10/2020 EGD  Findings:       The Z-line was regular and was found 41 cm from the incisors.        Normal mucosa was found in the entire esophagus. This was biopsied with        a cold forceps for histology. Verification of patient identification for        the specimen was done. Estimated blood loss: none. Biopsies performed in        distal and proximal esophagus. No varices identified.        The distal esophagus was mildly tortuous.        There was mild resistance passing the scope across the GEJ at the level        of what appeared to be a paraesophageal hernia        There were polypoid appearing lesions that were not biopsied. Given a        history of gastric varices would only biopsy after ultrasound probe        rules out varix. 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      11/10/20 Colonoscopy  Findings:       The perianal and digital rectal examinations were normal.        The terminal ileum appeared normal.        A 7 mm polyp was found in the transverse colon. The polyp was sessile.        The polyp was removed with a cold snare. Resection and retrieval were        complete. Verification of patient identification for the specimen was        done. Estimated blood loss: none.        Scattered small-mouthed diverticula were found in the sigmoid colon.   FINAL  DIAGNOSIS:   A. Distal Esophagus, Biopsy:   Squamous esophageal mucosa with no intraepithelial eosinophils or other   abnormality   B. Proximal Esophagus, Biopsy:   Squamous esophageal mucosa with no intraepithelial eosinophils or other   abnormality   C. Transverse Colon, Polyp:   Sessile serrated polyp; negative for dysplasia or malignancy      PERTINENT STUDIES Reviewed in EMR  7/25/23 MRI of liver  IMPRESSION:  1. 1.6 cm simple liver cyst with no suspicious enhancing internal  components.  2. Hepatic steatosis.  3. Mild splenomegaly.    PERTINENT STUDIES Reviewed in EMR      ROS: 10pt ROS performed and otherwise negative.    PAST MEDICAL HISTORY:  Past Medical History:   Diagnosis Date    Alcohol abuse since teens 01/15/2015    Still actively drinking    Alcoholic liver damage (H) 1/10/2014    Gastroesophageal reflux disease with esophagitis 12/6/2016    Gout involving toe, unspecified cause, unspecified chronicity, unspecified laterality 6/2/2016    Heart murmur     heart is positioned farther on left side then typical    Hyperlipidemia 12/17/2015    Hypertension     ITP (idiopathic thrombocytopenic purpura)     Obstructive sleep apnea     does not use CPAP  machine    Pulmonary embolism (H) large RLL w infarctio 4-17 4/18/2017       PREVIOUS ABDOMINAL/GYNECOLOGIC SURGERIES:  Past Surgical History:   Procedure Laterality Date    APPENDECTOMY  1956    BACK SURGERY  1992, 1996    trimmed L4-L5, Fusion L4-L5    BONE MARROW BIOPSY, BONE SPECIMEN, NEEDLE/TROCAR  10/24/2012    Procedure: BIOPSY BONE MARROW;  BONE MARROW BIOPSY WITH ASPIRATE (AREVALO ORDERING);  Surgeon: Terri Hickey MD;  Location: [facility]    COLONOSCOPY N/A 7/31/2019    Procedure: COLONOSCOPY;  Surgeon: Sebastian Garcia MD;  Location:  GI    ESOPHAGOSCOPY, GASTROSCOPY, DUODENOSCOPY (EGD), COMBINED N/A 2/8/2018    Procedure: COMBINED ESOPHAGOSCOPY, GASTROSCOPY, DUODENOSCOPY (EGD);  EGD;  Surgeon: Terri Crouch MD;  Location:  [facility]    ESOPHAGOSCOPY, GASTROSCOPY, DUODENOSCOPY (EGD), COMBINED N/A 11/10/2020    Procedure: ESOPHAGOGASTRODUODENOSCOPY, WITH BIOPSY;  Surgeon: Alonzo Jang DO;  Location: [facility]    ESOPHAGOSCOPY, GASTROSCOPY, DUODENOSCOPY (EGD), COMBINED N/A 12/13/2023    Procedure: ESOPHAGOGASTRODUODENOSCOPY, WITH BIOPSY;  Surgeon: Everardo Duran MD;  Location:  GI    FACIAL RECONSTRUCTION SURGERY  1965    jaw fracture    HC TOOTH EXTRACTION W/FORCEP  1990s         PERTINENT MEDICATIONS:  Current Outpatient Medications   Medication Sig Dispense Refill    bismuth subsalicylate (PEPTO BISMOL) 262 MG/15ML suspension Take 15 mLs by mouth every 6 hours as needed for indigestion or diarrhea (as needed for diarrhea. Please collect stool sample first) 354 mL 1    Cholecalciferol (VITAMIN D) 50 MCG (2000 UT) CAPS Take 1 capsule by mouth once daily 90 capsule 2    folic acid (FOLVITE) 1 MG tablet Take 1 tablet by mouth once daily 90 tablet 1    gabapentin (NEURONTIN) 300 MG capsule Take 3 capsules (900 mg) by mouth 3 times daily 360 capsule 5    methylcellulose (CITRUCEL) 500 MG TABS tablet Take 1 tablet (500 mg) by mouth 2 times daily. 180 tablet 1    metoprolol succinate ER (TOPROL XL) 50 MG 24 hr tablet Take 1 tablet by mouth once daily 90 tablet 0    omeprazole (PRILOSEC) 40 MG DR capsule Take 30-60 min before breakfast 90 capsule 5    PROMACTA 75 MG tablet TAKE ONE TABLET BY MOUTH ONCE DAILY. TAKE ON AN EMPTY STOMACH (ONE HOUR BEFORE OR TWO HOURS AFTER A MEAL) 30 tablet 11    rivaroxaban ANTICOAGULANT (XARELTO ANTICOAGULANT) 10 MG TABS tablet TAKE 1 TABLET BY MOUTH ONCE DAILY WITH SUPPER 30 tablet 4    thiamine (B-1) 100 MG tablet Take 1 tablet (100 mg) by mouth daily 100 tablet 0    allopurinol (ZYLOPRIM) 300 MG tablet Take 1 tablet by mouth once daily 90 tablet 0    ASPIRIN NOT PRESCRIBED (INTENTIONAL) Please choose reason not prescribed, below (Patient not taking: Reported on 10/28/2024) 1 each 0         SOCIAL  HISTORY:  Social History     Socioeconomic History    Marital status:      Spouse name: Not on file    Number of children: Not on file    Years of education: Not on file    Highest education level: Not on file   Occupational History    Not on file   Tobacco Use    Smoking status: Former     Current packs/day: 0.00     Average packs/day: 1 pack/day for 28.0 years (28.0 ttl pk-yrs)     Types: Cigarettes     Start date:      Quit date: 1982     Years since quittin.1    Smokeless tobacco: Former   Vaping Use    Vaping status: Never Used   Substance and Sexual Activity    Alcohol use: Yes     Alcohol/week: 5.0 standard drinks of alcohol     Types: 5 Shots of liquor per week     Comment: 4-5 drinks/day, alcohol use disorder    Drug use: No    Sexual activity: Never     Partners: Female   Other Topics Concern    Parent/sibling w/ CABG, MI or angioplasty before 65F 55M? Yes   Social History Narrative    Not on file     Social Drivers of Health     Financial Resource Strain: Low Risk  (2024)    Financial Resource Strain     Within the past 12 months, have you or your family members you live with been unable to get utilities (heat, electricity) when it was really needed?: No   Food Insecurity: Low Risk  (2024)    Food Insecurity     Within the past 12 months, did you worry that your food would run out before you got money to buy more?: No     Within the past 12 months, did the food you bought just not last and you didn t have money to get more?: No   Transportation Needs: Low Risk  (2024)    Transportation Needs     Within the past 12 months, has lack of transportation kept you from medical appointments, getting your medicines, non-medical meetings or appointments, work, or from getting things that you need?: No   Physical Activity: Unknown (2024)    Exercise Vital Sign     Days of Exercise per Week: 0 days     Minutes of Exercise per Session: Not on file   Stress: No Stress Concern  Present (5/8/2024)    [employer] - Occupational Stress Questionnaire     Feeling of Stress : Only a little   Social Connections: Unknown (5/8/2024)    Social Connection and Isolation Panel [NHANES]     Frequency of Communication with Friends and Family: Not on file     Frequency of Social Gatherings with Friends and Family: More than three times a week     Attends Caodaism Services: Not on file     Active Member of Clubs or Organizations: Not on file     Attends Club or Organization Meetings: Not on file     Marital Status: Not on file   Interpersonal Safety: Not At Risk (5/9/2023)    Received from [facility] and [facility]    [facility] Custom IPV     Do you feel UNSAFE in any of your personal relationships with your family members or any other acquaintances?: No   Housing Stability: Low Risk  (5/8/2024)    Housing Stability     Do you have housing? : Yes     Are you worried about losing your housing?: No       FAMILY HISTORY:  Denies colon/panc/esophageal/other GI CA, no other Nation or other HPS-related [place]. No IBD/celiac, no other AI/liver/thyroid disease.    Family History   Problem Relation Age of Onset    Unknown/Adopted Mother     Unknown/Adopted Father     Heart Disease Sister     Diabetes No family hx of     Coronary Artery Disease No family hx of     Hypertension No family hx of     Hyperlipidemia No family hx of     Cerebrovascular Disease No family hx of     Breast Cancer No family hx of     Colon Cancer No family hx of     Prostate Cancer No family hx of     Other Cancer No family hx of     Depression No family hx of     Anxiety Disorder No family hx of     Mental Illness No family hx of     Substance Abuse No family hx of     Anesthesia Reaction No family hx of     Asthma No family hx of     Osteoporosis No family hx of     Genetic Disorder No family hx of     Thyroid Disease No family hx of     Obesity No family hx of        PHYSICAL EXAMINATION:  Vitals  "reviewed  /84 (BP Location: Right arm, Patient Position: Sitting, Cuff Size: Adult Large)   Pulse 88   Ht 1.66 m (5' 5.35\")   Wt 84.8 kg (187 lb)   BMI 30.79 kg/m      General: Patient appears well in no acute distress.  Limited assessment as the patient was not able to take an exam table.  Assessment completed with patient sitting in a chair  Skin: No visualized rash or lesions on visualized skin.  Jaundice noticed  HEENT:    EOMI, no erythema or discharge noted.  Mild sclera icterus noted  Mouth mucosa intact, pink, moist  No cervical or supraclavicular lymphadenopathy. Thyroid gland not enlarged.  Resp: breathing comfortably without accessory muscle usage, speaking in full sentences, no cough. Lung sounds clear  Card: Regular and rhythmic S1 and S2. No gallop or rub. No murmur.  No LE edema.    Abdomen: Active bowel sounds X 4 quadrants. Soft to palpation. Tenderness on palpation in the right upper quadrant and left lower quadrant.  No guarding or rebound tenderness. Lemon's sign negative.  MSK: Appears to have normal range of motion based on visualized movements  Neurologic: No apparent tremors, facial movements symmetric  Psych: affect normal, alert and oriented      ASSESSMENT/PLAN:    ICD-10-CM    1. Alcoholic cirrhosis of liver without ascites (H)  K70.30       2. Abdominal bloating  R14.0       3. Gastric varices without bleeding  I86.4 omeprazole (PRILOSEC) 40 MG DR capsule      4. Diarrhea, unspecified type  R19.7 methylcellulose (CITRUCEL) 500 MG TABS tablet      5. ETOH abuse  F10.10       6. Incontinence of feces with fecal urgency  R15.9     R15.2       7. Polyneuropathy in other diseases classified elsewhere (H)  G63       8. Gastroesophageal reflux disease without esophagitis  K21.9 omeprazole (PRILOSEC) 40 MG DR capsule         79 year old male  presented to GI clinic for a follow-up.  Patient was seen for nausea, diarrhea, weight loss, and fecal urgency with incontinence.  Patient " reported stable weight.  His nausea had resolved.  He still has a few loose stools a day, but sometimes, he skips a day of bowel movement.  Stated that his appetite is good.  He does not eat regular meals, but has been snacking throughout the day.  No changes in abdominal bloating.  Patient is still suffering from symptoms of peripheral neuropathy.  Plan for spinal cord stimulator placement.  Will start a fiber supplement was 500 mg Citrucel at supper.  Patient was educated to increase the dose to twice a day if needed.  Continue taking Pepto-Bismol as needed for loose stools.  Patient found to have severe portal hypertensive gastropathy with no gastric varices or signs of bleeding on his last EGD 1 year ago.  He will continue 40 mg omeprazole daily.  Patient follows by hepatology team on liver cirrhosis. Suggested to continue supportive care and monitoring due to patient's ongoing alcohol use.   He denies any red flag symptoms today.    Patient verbalized understanding and appreciation of care provided. Stated that all of the questions were answered to her/his satisfaction.  RTC in 6 months    Thank you for this consultation. It was a pleasure to participate in the care of this patient; please contact us with any further questions.    SCOTTIE Bahena, FNP-C  Division of Gastroenterology  [facility], [place], [place]    This note was created with Dragon voice recognition software, and while reviewed for accuracy, inadvertent minor typographic errors may occur. Please contact the provider if you have any questions.

## 2024-10-28 NOTE — LETTER
10/28/2024      Raymon Ace  [address] [facility] [address]      Dear Colleague,    Thank you for referring your patient, Raymon Ace, to the [facility]. Please see a copy of my visit note below.      [facility]  [address] [facility] [address]  Phone: [phone]    Patient:  Raymon Ace, Date of birth 1945    Referring Provider: Waylon Novoa       Gastroenterology CLINIC VISIT, RETURN PATIENT    REASON FOR CONSULTATION: follow up    HPI: 79 year old male presented to GI clinic for a follow up. The patient was seen for weight loss, anorexia, nausea, and diarrhea with new fecal urgency and incontinence. Patient suffers liver cirrhosis.The patient has Hx of unprovoked pulmonary embolism in April 2017; he is maintained on long-term anticoagulation.   He is here today with his spouse Mirela, who assists with answering questions.     His stool studies were negative for C. difficile and enteric pathogens.  No signs of for exocrine pancreatic insufficiency.  Noted mild nonspecific elevation in calprotectin on his last study.  Patient reported some improvement in his stool frequency and consistency.  Has been having a few mushy to watery stools a day. Some days, patient does not have bowel movement.  Complains of fecal urgency and incontinence.  He is also incontinent of urine.  They are planning to undergo spinal cord stimulator placement.  Patient takes Pepto-Bismol as needed.  He denies hematochezia or melena.  No nausea or vomiting.  His EGD was suggestive for severe portal hypertensive gastropathy with to gastric varices and no signs of bleeding.  There was a small hiatal hernia.  Patient takes 40 mg of omeprazole daily.  Paatient has been followed by hematology. Last hgb was 12.3.  Patient denies odynophagia or dysphagia.  Denies acid reflux.    His CT scan of abdomen revealed fatty liver with liver cirrhosis, colonic  diverticulosis, and 1.5 cm liver cyst. In the past, patient was consulted by Dr. Crouch. Suggested to continue supportive care and monitoring due to patient's ongoing alcohol use.     PREVIOUS ENDOSCOPY:  12/13/23 EGD by Dr. Duran  Findings:       The examined duodenum was normal. Biopsies were taken with a cold        forceps for histology.        Severe portal hypertensive gastropathy was found in the stomach.        Type 2 isolated gastric varices (IGV2, varices located in the body,        antrum or around the pylorus) with no bleeding were found in the        prepyloric region of the stomach. There were no stigmata of recent        bleeding.        The exam of the stomach was otherwise normal.        The examined esophagus was normal.      11/10/2020 EGD  Findings:       The Z-line was regular and was found 41 cm from the incisors.        Normal mucosa was found in the entire esophagus. This was biopsied with        a cold forceps for histology. Verification of patient identification for        the specimen was done. Estimated blood loss: none. Biopsies performed in        distal and proximal esophagus. No varices identified.        The distal esophagus was mildly tortuous.        There was mild resistance passing the scope across the GEJ at the level        of what appeared to be a paraesophageal hernia        There were polypoid appearing lesions that were not biopsied. Given a        history of gastric varices would only biopsy after ultrasound probe        rules out varix. 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      11/10/20 Colonoscopy  Findings:       The perianal and digital rectal examinations were normal.        The terminal ileum appeared normal.        A 7 mm polyp was found in the transverse colon. The polyp was sessile.        The polyp was removed with a  cold snare. Resection and retrieval were        complete. Verification of patient identification for the specimen was        done. Estimated blood loss: none.        Scattered small-mouthed diverticula were found in the sigmoid colon.   FINAL DIAGNOSIS:   A. Distal Esophagus, Biopsy:   Squamous esophageal mucosa with no intraepithelial eosinophils or other   abnormality   B. Proximal Esophagus, Biopsy:   Squamous esophageal mucosa with no intraepithelial eosinophils or other   abnormality   C. Transverse Colon, Polyp:   Sessile serrated polyp; negative for dysplasia or malignancy      PERTINENT STUDIES Reviewed in EMR  7/25/23 MRI of liver  IMPRESSION:  1. 1.6 cm simple liver cyst with no suspicious enhancing internal  components.  2. Hepatic steatosis.  3. Mild splenomegaly.    PERTINENT STUDIES Reviewed in EMR      ROS: 10pt ROS performed and otherwise negative.    PAST MEDICAL HISTORY:  Past Medical History:   Diagnosis Date     Alcohol abuse since teens 01/15/2015    Still actively drinking     Alcoholic liver damage (H) 1/10/2014     Gastroesophageal reflux disease with esophagitis 12/6/2016     Gout involving toe, unspecified cause, unspecified chronicity, unspecified laterality 6/2/2016     Heart murmur     heart is positioned farther on left side then typical     Hyperlipidemia 12/17/2015     Hypertension      ITP (idiopathic thrombocytopenic purpura)      Obstructive sleep apnea     does not use CPAP  machine     Pulmonary embolism (H) large RLL w infarctio 4-17 4/18/2017       PREVIOUS ABDOMINAL/GYNECOLOGIC SURGERIES:  Past Surgical History:   Procedure Laterality Date     APPENDECTOMY  1956     BACK SURGERY  1992, 1996    trimmed L4-L5, Fusion L4-L5     BONE MARROW BIOPSY, BONE SPECIMEN, NEEDLE/TROCAR  10/24/2012    Procedure: BIOPSY BONE MARROW;  BONE MARROW BIOPSY WITH ASPIRATE (AREVALO ORDERING);  Surgeon: Terri Hickey MD;  Location:  GI     COLONOSCOPY N/A 7/31/2019    Procedure: COLONOSCOPY;   Surgeon: Sebastian Garcia MD;  Location:  GI     ESOPHAGOSCOPY, GASTROSCOPY, DUODENOSCOPY (EGD), COMBINED N/A 2/8/2018    Procedure: COMBINED ESOPHAGOSCOPY, GASTROSCOPY, DUODENOSCOPY (EGD);  EGD;  Surgeon: Terri Crouch MD;  Location:  GI     ESOPHAGOSCOPY, GASTROSCOPY, DUODENOSCOPY (EGD), COMBINED N/A 11/10/2020    Procedure: ESOPHAGOGASTRODUODENOSCOPY, WITH BIOPSY;  Surgeon: Alonzo Jang DO;  Location:  GI     ESOPHAGOSCOPY, GASTROSCOPY, DUODENOSCOPY (EGD), COMBINED N/A 12/13/2023    Procedure: ESOPHAGOGASTRODUODENOSCOPY, WITH BIOPSY;  Surgeon: Everardo Duran MD;  Location:  GI     FACIAL RECONSTRUCTION SURGERY  1965    jaw fracture     HC TOOTH EXTRACTION W/FORCEP  1990s         PERTINENT MEDICATIONS:  Current Outpatient Medications   Medication Sig Dispense Refill     bismuth subsalicylate (PEPTO BISMOL) 262 MG/15ML suspension Take 15 mLs by mouth every 6 hours as needed for indigestion or diarrhea (as needed for diarrhea. Please collect stool sample first) 354 mL 1     Cholecalciferol (VITAMIN D) 50 MCG (2000 UT) CAPS Take 1 capsule by mouth once daily 90 capsule 2     folic acid (FOLVITE) 1 MG tablet Take 1 tablet by mouth once daily 90 tablet 1     gabapentin (NEURONTIN) 300 MG capsule Take 3 capsules (900 mg) by mouth 3 times daily 360 capsule 5     methylcellulose (CITRUCEL) 500 MG TABS tablet Take 1 tablet (500 mg) by mouth 2 times daily. 180 tablet 1     metoprolol succinate ER (TOPROL XL) 50 MG 24 hr tablet Take 1 tablet by mouth once daily 90 tablet 0     omeprazole (PRILOSEC) 40 MG DR capsule Take 30-60 min before breakfast 90 capsule 5     PROMACTA 75 MG tablet TAKE ONE TABLET BY MOUTH ONCE DAILY. TAKE ON AN EMPTY STOMACH (ONE HOUR BEFORE OR TWO HOURS AFTER A MEAL) 30 tablet 11     rivaroxaban ANTICOAGULANT (XARELTO ANTICOAGULANT) 10 MG TABS tablet TAKE 1 TABLET BY MOUTH ONCE DAILY WITH SUPPER 30 tablet 4     thiamine (B-1) 100 MG tablet Take 1 tablet (100 mg) by mouth  daily 100 tablet 0     allopurinol (ZYLOPRIM) 300 MG tablet Take 1 tablet by mouth once daily 90 tablet 0     ASPIRIN NOT PRESCRIBED (INTENTIONAL) Please choose reason not prescribed, below (Patient not taking: Reported on 10/28/2024) 1 each 0         SOCIAL HISTORY:  Social History     Socioeconomic History     Marital status:      Spouse name: Not on file     Number of children: Not on file     Years of education: Not on file     Highest education level: Not on file   Occupational History     Not on file   Tobacco Use     Smoking status: Former     Current packs/day: 0.00     Average packs/day: 1 pack/day for 28.0 years (28.0 ttl pk-yrs)     Types: Cigarettes     Start date:      Quit date: 1982     Years since quittin.1     Smokeless tobacco: Former   Vaping Use     Vaping status: Never Used   Substance and Sexual Activity     Alcohol use: Yes     Alcohol/week: 5.0 standard drinks of alcohol     Types: 5 Shots of liquor per week     Comment: 4-5 drinks/day, alcohol use disorder     Drug use: No     Sexual activity: Never     Partners: Female   Other Topics Concern     Parent/sibling w/ CABG, MI or angioplasty before 65F 55M? Yes   Social History Narrative     Not on file     Social Drivers of Health     Financial Resource Strain: Low Risk  (2024)    Financial Resource Strain      Within the past 12 months, have you or your family members you live with been unable to get utilities (heat, electricity) when it was really needed?: No   Food Insecurity: Low Risk  (2024)    Food Insecurity      Within the past 12 months, did you worry that your food would run out before you got money to buy more?: No      Within the past 12 months, did the food you bought just not last and you didn t have money to get more?: No   Transportation Needs: Low Risk  (2024)    Transportation Needs      Within the past 12 months, has lack of transportation kept you from medical appointments, getting your  medicines, non-medical meetings or appointments, work, or from getting things that you need?: No   Physical Activity: Unknown (5/8/2024)    Exercise Vital Sign      Days of Exercise per Week: 0 days      Minutes of Exercise per Session: Not on file   Stress: No Stress Concern Present (5/8/2024)    [employer] - Occupational Stress Questionnaire      Feeling of Stress : Only a little   Social Connections: Unknown (5/8/2024)    Social Connection and Isolation Panel [NHANES]      Frequency of Communication with Friends and Family: Not on file      Frequency of Social Gatherings with Friends and Family: More than three times a week      Attends Sabianism Services: Not on file      Active Member of Clubs or Organizations: Not on file      Attends Club or Organization Meetings: Not on file      Marital Status: Not on file   Interpersonal Safety: Not At Risk (5/9/2023)    Received from [facility] and [facility]    [facility] Custom IPV      Do you feel UNSAFE in any of your personal relationships with your family members or any other acquaintances?: No   Housing Stability: Low Risk  (5/8/2024)    Housing Stability      Do you have housing? : Yes      Are you worried about losing your housing?: No       FAMILY HISTORY:  Denies colon/panc/esophageal/other GI CA, no other Nation or other HPS-related [place]. No IBD/celiac, no other AI/liver/thyroid disease.    Family History   Problem Relation Age of Onset     Unknown/Adopted Mother      Unknown/Adopted Father      Heart Disease Sister      Diabetes No family hx of      Coronary Artery Disease No family hx of      Hypertension No family hx of      Hyperlipidemia No family hx of      Cerebrovascular Disease No family hx of      Breast Cancer No family hx of      Colon Cancer No family hx of      Prostate Cancer No family hx of      Other Cancer No family hx of      Depression No family hx of      Anxiety Disorder No family hx of       "Mental Illness No family hx of      Substance Abuse No family hx of      Anesthesia Reaction No family hx of      Asthma No family hx of      Osteoporosis No family hx of      Genetic Disorder No family hx of      Thyroid Disease No family hx of      Obesity No family hx of        PHYSICAL EXAMINATION:  Vitals reviewed  /84 (BP Location: Right arm, Patient Position: Sitting, Cuff Size: Adult Large)   Pulse 88   Ht 1.66 m (5' 5.35\")   Wt 84.8 kg (187 lb)   BMI 30.79 kg/m      General: Patient appears well in no acute distress.  Limited assessment as the patient was not able to take an exam table.  Assessment completed with patient sitting in a chair  Skin: No visualized rash or lesions on visualized skin.  Jaundice noticed  HEENT:    EOMI, no erythema or discharge noted.  Mild sclera icterus noted  Mouth mucosa intact, pink, moist  No cervical or supraclavicular lymphadenopathy. Thyroid gland not enlarged.  Resp: breathing comfortably without accessory muscle usage, speaking in full sentences, no cough. Lung sounds clear  Card: Regular and rhythmic S1 and S2. No gallop or rub. No murmur.  No LE edema.    Abdomen: Active bowel sounds X 4 quadrants. Soft to palpation. Tenderness on palpation in the right upper quadrant and left lower quadrant.  No guarding or rebound tenderness. Lemon's sign negative.  MSK: Appears to have normal range of motion based on visualized movements  Neurologic: No apparent tremors, facial movements symmetric  Psych: affect normal, alert and oriented      ASSESSMENT/PLAN:    ICD-10-CM    1. Alcoholic cirrhosis of liver without ascites (H)  K70.30       2. Abdominal bloating  R14.0       3. Gastric varices without bleeding  I86.4 omeprazole (PRILOSEC) 40 MG DR capsule      4. Diarrhea, unspecified type  R19.7 methylcellulose (CITRUCEL) 500 MG TABS tablet      5. ETOH abuse  F10.10       6. Incontinence of feces with fecal urgency  R15.9     R15.2       7. Polyneuropathy in other " diseases classified elsewhere (H)  G63       8. Gastroesophageal reflux disease without esophagitis  K21.9 omeprazole (PRILOSEC) 40 MG DR capsule         79 year old male  presented to GI clinic for a follow-up.  Patient was seen for nausea, diarrhea, weight loss, and fecal urgency with incontinence.  Patient reported stable weight.  His nausea had resolved.  He still has a few loose stools a day, but sometimes, he skips a day of bowel movement.  Stated that his appetite is good.  He does not eat regular meals, but has been snacking throughout the day.  No changes in abdominal bloating.  Patient is still suffering from symptoms of peripheral neuropathy.  Plan for spinal cord stimulator placement.  Will start a fiber supplement was 500 mg Citrucel at supper.  Patient was educated to increase the dose to twice a day if needed.  Continue taking Pepto-Bismol as needed for loose stools.  Patient found to have severe portal hypertensive gastropathy with no gastric varices or signs of bleeding on his last EGD 1 year ago.  He will continue 40 mg omeprazole daily.  Patient follows by hepatology team on liver cirrhosis. Suggested to continue supportive care and monitoring due to patient's ongoing alcohol use.   He denies any red flag symptoms today.    Patient verbalized understanding and appreciation of care provided. Stated that all of the questions were answered to her/his satisfaction.  RTC in 6 months    Thank you for this consultation. It was a pleasure to participate in the care of this patient; please contact us with any further questions.    SCOTTIE Bahena, ERICAP-C  Division of Gastroenterology  [facility], [place], [place]    This note was created with Dragon voice recognition software, and while reviewed for accuracy, inadvertent minor typographic errors may occur. Please contact the provider if you have any questions.       Again, thank you for allowing me to participate in the  care of your patient.        Sincerely,        SCOTTIE HERNANDEZ CNP

## 2024-10-28 NOTE — PATIENT INSTRUCTIONS
It was a pleasure taking care of you today.  I've included a brief summary of our discussion and care plan from today's visit below.  Please review this information with your primary care provider.  ______________________________________________________________________    My recommendations are summarized as follows:    1.  Continue omeprazole 40 mg in the morning, 30 to 60 minutes before breakfast and other pills.    2.  Start a fiber supplement, Citrucel 500 mg tablet at supper.  This should help your loose stools.  You can increase the dose to twice a day if needed.    3.  Continue vitamin B1 supplement.    4.  Take Pepto-Bismol as needed for upset stomach and diarrhea.    Return to GI Clinic in 6 months to review your progress.    ______________________________________________________________________               ____________________________________________________________________        Who do I call with any questions after my visit?  Please be in touch if there are any further questions that arise following today's visit.  There are multiple ways to contact your gastroenterology care team.      During business hours, you may reach a Gastroenterology nurse at [phone], option 3.     To schedule or reschedule an appointment, please call [phone].   To schedule your imaging studies (CT, MRI, ultrasound)  call [phone] (or toll-free # [phone])  To schedule your lab work at [facility], please call [phone]    You can always send a secure message through [employer]ix.  [employer]ix messages are answered by your nurse or doctor typically within 24 hours.  Please allow extra time on weekends and holidays.      For urgent/emergent questions after business hours, you may reach the on-call GI Fellow by contacting the [facility]  at [phone].    In order for your refill to be processed in a timely fashion, it is your responsibility to ensure you follow the  recommendations from your provider regarding your laboratory studies and follow up appointments.       How will I get the results of any tests ordered?    You will receive all of your results.  If you have signed up for [facility], any tests ordered at your visit will be available to you after your physician reviews them.  Typically this takes 1-2 weeks.  If there are urgent results that require a change in your care plan, your physician or nurse will call you to discuss the next steps.   What is [employer]?  [employer] is a secure way for you to access all of your healthcare records from the [facility].  It is a web based computer program, so you can sign on to it from any location.  It also allows you to send secure messages to your care team.  I recommend signing up for [employer] access if you have not already done so and are comfortable with using a computer.    How to I schedule a follow-up visit?  If you did not schedule a follow-up visit today, please call [phone] to schedule a follow-up office visit.      Sincerely,  JESSICA Bahena,  [facility],  Division of Gastroenterology   ([facility])

## 2024-10-29 RX ORDER — MULTIVIT-MIN/IRON/FOLIC ACID/K 18-600-40
1 CAPSULE ORAL DAILY
Qty: 90 CAPSULE | Refills: 0 | Status: SHIPPED | OUTPATIENT
Start: 2024-10-29

## 2024-12-03 ENCOUNTER — TELEPHONE (OUTPATIENT)
Dept: ONCOLOGY | Facility: CLINIC | Age: 79
End: 2024-12-03
Payer: COMMERCIAL

## 2024-12-03 NOTE — TELEPHONE ENCOUNTER
PA Initiation    Medication: PROMACTA 75 MG PO TABS  Insurance Company: [employer] Part D - Phone [phone] Fax [phone]  Pharmacy Filling the Rx: [facility] [address] [place] [address]  Filling Pharmacy Phone:    Filling Pharmacy Fax:    Start Date: 12/3/2024      Thank you,    [place]  Oncology Pharmacy Liaison [email or web address]  Phone: [phone]  Fax: [phone]

## 2024-12-03 NOTE — TELEPHONE ENCOUNTER
Prior Authorization Approval    Medication: PROMACTA 75 MG PO TABS  Authorization Effective Date: 12/3/2024  Authorization Expiration Date: 12/31/2025  Approved Dose/Quantity: 90 per 30 DS  Reference #: [id number]   Insurance Company: [employer] Part D - Phone [phone] Fax [phone]  Expected CoPay: $    CoPay Card Available:      Financial Assistance Needed:   Which Pharmacy is filling the prescription: [facility] [address] [place] [address]  Pharmacy Notified:   Patient Notified:         Thank you,    [place]  Oncology Pharmacy Liaison [email or web address]  Phone: [phone]  Fax: [phone]

## 2024-12-23 NOTE — PATIENT INSTRUCTIONS
How to Take Your Medication Before Surgery  Preoperative Medication Instructions   Antiplatelet or Anticoagulation Medication Instructions   - apixaban (Eliquis), edoxaban (Savaysa), rivaroxaban (Xarelto): Bleeding risk is moderate or high for this procedure AND CrCl  (>=) 50 mL/min. DO NOT TAKE 2 days before surgery.     Additional Medication Instructions   - Herbal medications and vitamins: DO NOT TAKE 14 days prior to surgery.   - Beta Blockers (atenolol, metoprolol, propranolol) : Continue taking on the day of surgery.   - pregabalin, gabapentin: Continue without modification.  Okay to take allopurinol, omeprazole and promacta the morning of surgery if these are taken then with a sip of water as well.       Patient Education   Preparing for Your Surgery  For Adults  Getting started  In most cases, a nurse will call to review your health history and instructions. They will give you an arrival time based on your scheduled surgery time. Please be ready to share:  Your doctor's clinic name and phone number  Your medical, surgical, and anesthesia history  A list of allergies and sensitivities  A list of medicines, including herbal treatments and over-the-counter drugs  Whether the patient has a legal guardian (ask how to send us the papers in advance)  Note: You may not receive a call if you were seen at our PAC (Preoperative Assessment Center).  Please tell us if you're pregnant--or if there's any chance you might be pregnant. Some surgeries may injure a fetus (unborn baby), so they require a pregnancy test. Surgeries that are safe for a fetus don't always need a test, and you can choose whether to have one.   Preparing for surgery  Within 10 to 30 days of surgery: Have a pre-op exam (sometimes called an H&P, or History and Physical). This can be done at a clinic or pre-operative center.  If you're having a , you may not need this exam. Talk to your care team.  At your pre-op exam, talk to your care team  about all medicines you take. (This includes CBD oil and any drugs, such as THC, marijuana, and other forms of cannabis.) If you need to stop any medicine before surgery, ask when to start taking it again.  This is for your safety. Many medicines and drugs can make you bleed too much during surgery. Some change how well surgery (anesthesia) drugs work.  Call your insurance company to let them know you're having surgery. (If you don't have insurance, call [phone].)  Call your clinic if there's any change in your health. This includes a scrape or scratch near the surgery site, or any signs of a cold (sore throat, runny nose, cough, rash, fever).  Eating and drinking guidelines  For your safety: Unless your surgeon tells you otherwise, follow the guidelines below.  Eat and drink as normal until 8 hours before you arrive for surgery. After that, no food or milk. You can spit out gum when you arrive.  Drink clear liquids until 2 hours before you arrive. These are liquids you can see through, like water, Gatorade, and Propel Water. They also include plain black coffee and tea (no cream or milk).  No alcohol for 24 hours before you arrive. The night before surgery, stop any drinks that contain THC.  If your care team tells you to take medicine on the morning of surgery, it's okay to take it with a sip of water. No other medicines or drugs are allowed (including CBD oil)--follow your care team's instructions.  If you have questions the day of surgery, call your hospital or surgery center.   Preventing infection  Shower or bathe the night before and the morning of surgery. Follow the instructions your clinic gave you. (If no instructions, use regular soap.)  Don't shave or clip hair near your surgery site. We'll remove the hair if needed.  Don't smoke or vape the morning of surgery. No chewing tobacco for 6 hours before you arrive. A nicotine patch is okay. You may spit out nicotine gum when you arrive.  For some  surgeries, the surgeon will tell you to fully quit smoking and nicotine.  We will make every effort to keep you safe from infection. We will:  Clean our hands often with soap and water (or an alcohol-based hand rub).  Clean the skin at your surgery site with a special soap that kills germs.  Give you a special gown to keep you warm. (Cold raises the risk of infection.)  Wear hair covers, masks, gowns, and gloves during surgery.  Give antibiotic medicine, if prescribed. Not all surgeries need this medicine.  What to bring on the day of surgery  Photo ID and insurance card  Copy of your health care directive, if you have one  Glasses and hearing aids (bring cases)  You can't wear contacts during surgery  Inhaler and eye drops, if you use them (tell us about these when you arrive)  CPAP machine or breathing device, if you use them  A few personal items, if spending the night  If you have . . .  A pacemaker, ICD (cardiac defibrillator), or other implant: Bring the ID card.  An implanted stimulator: Bring the remote control.  A legal guardian: Bring a copy of the certified (court-stamped) guardianship papers.  Please remove any jewelry, including body piercings. Leave jewelry and other valuables at home.  If you're going home the day of surgery  You must have a responsible adult drive you home. They should stay with you overnight as well.  If you don't have someone to stay with you, and you aren't safe to go home alone, we may keep you overnight. Insurance often won't pay for this.  After surgery  If it's hard to control your pain or you need more pain medicine, please call your surgeon's office.  Questions?   If you have any questions for your care team, list them here:    ____________________________________________________________________________________________________________________________________________________________________________________________________________________________________________________________  For informational purposes only. Not to replace the advice of your health care provider. Copyright   2003, 2019 [facility]. All rights reserved. Clinically reviewed by Raymon Capps MD. [employer] 103838 - REV 08/24.

## 2024-12-24 ENCOUNTER — OFFICE VISIT (OUTPATIENT)
Dept: FAMILY MEDICINE | Facility: CLINIC | Age: 79
End: 2024-12-24
Payer: COMMERCIAL

## 2024-12-24 VITALS
RESPIRATION RATE: 18 BRPM | TEMPERATURE: 98.7 F | HEIGHT: 66 IN | BODY MASS INDEX: 30.37 KG/M2 | WEIGHT: 189 LBS | OXYGEN SATURATION: 94 % | DIASTOLIC BLOOD PRESSURE: 72 MMHG | SYSTOLIC BLOOD PRESSURE: 124 MMHG | HEART RATE: 84 BPM

## 2024-12-24 DIAGNOSIS — M54.50 CHRONIC BILATERAL LOW BACK PAIN WITHOUT SCIATICA: ICD-10-CM

## 2024-12-24 DIAGNOSIS — E78.2 MIXED HYPERLIPIDEMIA: ICD-10-CM

## 2024-12-24 DIAGNOSIS — N18.31 STAGE 3A CHRONIC KIDNEY DISEASE (H): ICD-10-CM

## 2024-12-24 DIAGNOSIS — K76.6 PORTAL HYPERTENSION (H): ICD-10-CM

## 2024-12-24 DIAGNOSIS — G47.33 OBSTRUCTIVE SLEEP APNEA SYNDROME: ICD-10-CM

## 2024-12-24 DIAGNOSIS — Z23 ENCOUNTER FOR VACCINATION: ICD-10-CM

## 2024-12-24 DIAGNOSIS — D50.0 IRON DEFICIENCY ANEMIA DUE TO CHRONIC BLOOD LOSS: ICD-10-CM

## 2024-12-24 DIAGNOSIS — G89.29 CHRONIC BILATERAL LOW BACK PAIN WITHOUT SCIATICA: ICD-10-CM

## 2024-12-24 DIAGNOSIS — I10 ESSENTIAL HYPERTENSION: ICD-10-CM

## 2024-12-24 DIAGNOSIS — Z01.818 PREOP GENERAL PHYSICAL EXAM: Primary | ICD-10-CM

## 2024-12-24 PROBLEM — I26.99 PULMONARY EMBOLISM (H): Status: RESOLVED | Noted: 2017-04-18 | Resolved: 2024-12-24

## 2024-12-24 LAB
ERYTHROCYTE [DISTWIDTH] IN BLOOD BY AUTOMATED COUNT: 20.2 % (ref 10–15)
HCT VFR BLD AUTO: 32.3 % (ref 40–53)
HGB BLD-MCNC: 11.1 G/DL (ref 13.3–17.7)
MCH RBC QN AUTO: 35.7 PG (ref 26.5–33)
MCHC RBC AUTO-ENTMCNC: 34.4 G/DL (ref 31.5–36.5)
MCV RBC AUTO: 104 FL (ref 78–100)
PLATELET # BLD AUTO: 101 10E3/UL (ref 150–450)
RBC # BLD AUTO: 3.11 10E6/UL (ref 4.4–5.9)
WBC # BLD AUTO: 5.3 10E3/UL (ref 4–11)

## 2024-12-24 PROCEDURE — 85027 COMPLETE CBC AUTOMATED: CPT | Performed by: NURSE PRACTITIONER

## 2024-12-24 PROCEDURE — 36415 COLL VENOUS BLD VENIPUNCTURE: CPT | Performed by: NURSE PRACTITIONER

## 2024-12-24 PROCEDURE — 99214 OFFICE O/P EST MOD 30 MIN: CPT | Mod: 25 | Performed by: NURSE PRACTITIONER

## 2024-12-24 PROCEDURE — 90480 ADMN SARSCOV2 VAC 1/ONLY CMP: CPT | Performed by: NURSE PRACTITIONER

## 2024-12-24 PROCEDURE — 90662 IIV NO PRSV INCREASED AG IM: CPT | Performed by: NURSE PRACTITIONER

## 2024-12-24 PROCEDURE — 91320 SARSCV2 VAC 30MCG TRS-SUC IM: CPT | Performed by: NURSE PRACTITIONER

## 2024-12-24 PROCEDURE — G0008 ADMIN INFLUENZA VIRUS VAC: HCPCS | Performed by: NURSE PRACTITIONER

## 2024-12-24 ASSESSMENT — PAIN SCALES - GENERAL: PAINLEVEL_OUTOF10: EXTREME PAIN (9)

## 2024-12-24 ASSESSMENT — PATIENT HEALTH QUESTIONNAIRE - PHQ9
SUM OF ALL RESPONSES TO PHQ QUESTIONS 1-9: 16
SUM OF ALL RESPONSES TO PHQ QUESTIONS 1-9: 16
10. IF YOU CHECKED OFF ANY PROBLEMS, HOW DIFFICULT HAVE THESE PROBLEMS MADE IT FOR YOU TO DO YOUR WORK, TAKE CARE OF THINGS AT HOME, OR GET ALONG WITH OTHER PEOPLE: VERY DIFFICULT

## 2024-12-24 NOTE — LETTER
December 26, 2024      Raymon Ace  [address] [facility] [address]        Dear ,    We are writing to inform you of your test results.    Your lab is stable for surgery.     Resulted Orders   CBC with platelets   Result Value Ref Range    WBC Count 5.3 4.0 - 11.0 10e3/uL    RBC Count 3.11 (L) 4.40 - 5.90 10e6/uL    Hemoglobin 11.1 (L) 13.3 - 17.7 g/dL    Hematocrit 32.3 (L) 40.0 - 53.0 %     (H) 78 - 100 fL    MCH 35.7 (H) 26.5 - 33.0 pg    MCHC 34.4 31.5 - 36.5 g/dL    RDW 20.2 (H) 10.0 - 15.0 %    Platelet Count 101 (L) 150 - 450 10e3/uL       If you have any questions or concerns, please call the clinic at the number listed above.       Sincerely,      Caterina Sena NP    Electronically signed

## 2024-12-24 NOTE — PROGRESS NOTES
Preoperative Evaluation  [facility]  [address] [place] [address]  Phone: [phone]  Primary Provider: Beatrice Stephens MD  Pre-op Performing Provider: Caterina Sena NP  Dec 24, 2024         12/24/2024   Surgical Information   What procedure is being done? spinal cord stimulator    Facility or Hospital where procedure/surgery will be performed: [facility]    Who is doing the procedure / surgery? dr escalera    Date of surgery / procedure: 01/09/2025    Time of surgery / procedure: 800    Where do you plan to recover after surgery? at home with family        Proxy-reported     Fax number for surgical facility: [phone]    Assessment & Plan     The proposed surgical procedure is considered INTERMEDIATE risk.    Preop general physical exam  CBC ordered due to history of anemia with xarelto use.  Anemia is stable.  Platelet counts are low but stable.  No other labs or EKG indicated for this procedure.  - CBC with platelets; Future    Chronic bilateral low back pain without sciatica  Stable.    Obstructive sleep apnea syndrome  Does not use CPAP anymore.    Encounter for vaccination  - INFLUENZA HIGH DOSE, TRIVALENT, PF (FLUZONE)  - COVID-19 12+ (PFIZER)    Mixed hyperlipidemia  Stable.    Portal hypertension (H)  Stable.    Essential hypertension  Stable.    Stage 3a chronic kidney disease (H)  Stable.    Iron deficiency anemia due to chronic blood loss  Stable.  - CBC with platelets; Future     - No identified additional risk factors other than previously addressed    Preoperative Medication Instructions    Antiplatelet or Anticoagulation Medication Instructions   - apixaban (Eliquis), edoxaban (Savaysa), rivaroxaban (Xarelto): Bleeding risk is moderate or high for this procedure AND CrCl  (>=) 50 mL/min. DO NOT TAKE 2 days before surgery.     Additional Medication Instructions   - Herbal medications and vitamins: DO NOT TAKE 14 days prior to surgery.    - Beta Blockers (atenolol, metoprolol, propranolol) : Continue taking on the day of surgery.   - pregabalin, gabapentin: Continue without modification.  Okay to take allopurinol, omeprazole and promacta the morning of surgery if these are taken then with a sip of water as well.    Recommendation  Approval given to proceed with proposed procedure, without further diagnostic evaluation.    Marisa Ennis is a 79 year old, presenting for the following:  Pre-Op Exam          12/24/2024     9:26 AM   Additional Questions   Roomed by rmb   Accompanied by spouse         12/24/2024   Forms   Any forms needing to be completed Yes         12/24/2024     9:26 AM   Patient Reported Additional Medications   Patient reports taking the following new medications none     HPI related to upcoming procedure: Having spinal stimulator put inside for back pain.        12/24/2024   Pre-Op Questionnaire   Have you ever had a heart attack or stroke? No    Have you ever had surgery on your heart or blood vessels, such as a stent placement, a coronary artery bypass, or surgery on an artery in your head, neck, heart, or legs? No    Do you have chest pain with activity? No    Do you have a history of heart failure? No    Do you currently have a cold, bronchitis or symptoms of other infection? No    Do you have a cough, shortness of breath, or wheezing? No    Do you or anyone in your family have previous history of blood clots? (!) YES HX of PE, on xarelto    Do you or does anyone in your family have a serious bleeding problem such as prolonged bleeding following surgeries or cuts? No    Have you ever had problems with anemia or been told to take iron pills? No    Have you had any abnormal blood loss such as black, tarry or bloody stools? No    Have you ever had a blood transfusion? No    Are you willing to have a blood transfusion if it is medically needed before, during, or after your surgery? Yes    Have you or any of your relatives ever  had problems with anesthesia? No    Do you have sleep apnea, excessive snoring or daytime drowsiness? No    Do you have any artifical heart valves or other implanted medical devices like a pacemaker, defibrillator, or continuous glucose monitor? No    Do you have artificial joints? No    Are you allergic to latex? No        Proxy-reported     Health Care Directive  Patient does not have a Health Care Directive: Patient states has Advance Directive and will bring in a copy to clinic.    Preoperative Review of    reviewed - controlled substances reflected in medication list.      Status of Chronic Conditions:  ANEMIA - Patient has a recent history of moderate-severe anemia, which has not been symptomatic. Treatment has been none.     HYPERTENSION - Patient has longstanding history of HTN , currently denies any symptoms referable to elevated blood pressure. Specifically denies chest pain, palpitations, dyspnea, orthopnea, PND or peripheral edema. Blood pressure readings have been in normal range. Current medication regimen is as listed below. Patient denies any side effects of medication.     Patient Active Problem List    Diagnosis Date Noted    ITP (idiopathic thrombocytopenic purpura) since 3-13 back surgery       Priority: High    Bradykinesia 12/28/2023     Priority: Medium    Memory loss 07/12/2023     Priority: Medium    Primary osteoarthritis of both knees 05/22/2023     Priority: Medium    Major depressive disorder in full remission, unspecified whether recurrent (H) 12/02/2022     Priority: Medium    Gastric varices 10/27/2020     Priority: Medium    Alcoholic cirrhosis (H) 10/27/2020     Priority: Medium    Chronic anticoagulation 10/05/2020     Priority: Medium    Longitudinal ridging of nail + spooning of middle ones  12/27/2019     Priority: Medium    Cardiomegaly per 2017 CXR  12/27/2019     Priority: Medium    Major depression in complete remission (H) 12/27/2019     Priority: Medium    CKD  (chronic kidney disease) stage 3, GFR 30-59 ml/min (H) 06/07/2019     Priority: Medium    Portal hypertension (H) 06/07/2019     Priority: Medium    Impaired fasting glucose 06/07/2019     Priority: Medium    Fatigue, unspecified type 06/07/2019     Priority: Medium    Screening for prostate cancer 09/14/2018     Priority: Medium    Iron deficiency anemia due to chronic blood loss 06/28/2018     Priority: Medium    Pulmonary embolism (H) large RLL w infarctio 4-17 04/18/2017     Priority: Medium     Formatting of this note might be different from the original.  RLL with infarct      Gastroesophageal reflux disease with esophagitis 12/06/2016     Priority: Medium    Gout involving toe, unspecified cause, unspecified chronicity, unspecified laterality 06/02/2016     Priority: Medium    ACP (advance care planning) 05/16/2016     Priority: Medium     Advance Care Planning 5/16/2016: ACP Review of Chart / Resources Provided:  Reviewed chart for advance care plan.  Raymon Ace has no plan or code status on file however states presence of ACP document. Copy requested. Confirmed code status reflects current choices pending receipt of document/advance care plan review.  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 Provider to review      Change in bowel habits since 1-14: diarrhea-thinks better off benicar 07/10/2014     Priority: Medium    Family history of ischemic heart disease 01/17/2014     Priority: Medium    Family history of diabetes mellitus 01/17/2014     Priority: Medium    Glucose intolerance (impaired glucose tolerance)  HgbA!C = 5.4 01/10/2014     Priority: Medium    Back pain since 1988 s/po surgery 1992,1996 and 3-13/ Dr Singh  01/10/2014     Priority: Medium     No CSA on file   8-=22-17 no concerns      Alcoholic liver disease (H) 01/10/2014     Priority: Medium    Diverticulosis of large intestine 01/10/2014     Priority: Medium     Problem list name updated by automated process. Provider to review      Hypotension 03/16/2013     Priority: Medium    Lumbar spinal stenosis 12/14/2012     Priority: Medium    Steroid-induced hyperglycemia 12/14/2012     Priority: Medium     Although glucose normal on 1/30/13 atr 86      Fatty liver/ 1-14 US 12/14/2012     Priority: Medium    Atherosclerosis 12/14/2012     Priority: Medium    Alcohol dependence, continuous (H) 12/14/2012     Priority: Medium    Obstructive sleep apnea syndrome 09/21/2012     Priority: Medium     Does not tolerate CPAP  Problem list name updated by automated process. Provider to review      Restless legs syndrome (RLS) 09/21/2012     Priority: Medium    Polyneuropathy in other diseases classified elsewhere (H) 09/21/2012     Priority: Medium     RX monitoring program ([place]) reviewed: 8/4/20 recommend provider review    [place] profile:  [email or web address]        Gastroesophageal reflux disease without esophagitis 09/21/2012     Priority: Medium    Preventive measure 01/31/2013     Priority: Low     APRIMA DATA BASE UNDER THE 12/14/12 NOTE  Colonoscopy neg in 5/10; PSA 0.31 in 9/12        Past Medical History:   Diagnosis Date     Alcohol abuse since teens 01/15/2015    Still actively drinking    Alcoholic liver damage (H) 1/10/2014    Gastroesophageal reflux disease with esophagitis 12/6/2016    Gout involving toe, unspecified cause, unspecified chronicity, unspecified laterality 6/2/2016    Heart murmur     heart is positioned farther on left side then typical    Hyperlipidemia 12/17/2015    Hypertension     ITP (idiopathic thrombocytopenic purpura)     Obstructive sleep apnea     does not use CPAP  machine    Pulmonary embolism (H) large RLL w infarctio 4-17 4/18/2017     Past Surgical History:   Procedure Laterality Date    APPENDECTOMY  1956    BACK SURGERY  1992, 1996    trimmed L4-L5, Fusion L4-L5    BONE MARROW BIOPSY, BONE SPECIMEN, NEEDLE/TROCAR  10/24/2012    Procedure: BIOPSY BONE MARROW;  BONE MARROW BIOPSY WITH ASPIRATE (AREVALO ORDERING);  Surgeon: Terri Hickey MD;  Location:  GI    COLONOSCOPY N/A 7/31/2019    Procedure: COLONOSCOPY;  Surgeon: Sebastian Garcia MD;  Location:  GI    ESOPHAGOSCOPY, GASTROSCOPY, DUODENOSCOPY (EGD), COMBINED N/A 2/8/2018    Procedure: COMBINED ESOPHAGOSCOPY, GASTROSCOPY, DUODENOSCOPY (EGD);  EGD;  Surgeon: Terri Crouch MD;  Location:  GI    ESOPHAGOSCOPY, GASTROSCOPY, DUODENOSCOPY (EGD), COMBINED N/A 11/10/2020    Procedure: ESOPHAGOGASTRODUODENOSCOPY, WITH BIOPSY;  Surgeon: Alonzo Jang DO;  Location:  GI    ESOPHAGOSCOPY, GASTROSCOPY, DUODENOSCOPY (EGD), COMBINED N/A 12/13/2023    Procedure: ESOPHAGOGASTRODUODENOSCOPY, WITH BIOPSY;  Surgeon: Everardo Duran MD;  Location:  GI    FACIAL RECONSTRUCTION SURGERY  1965    jaw fracture    HC TOOTH EXTRACTION W/FORCEP  1990s     Current Outpatient Medications   Medication Sig Dispense Refill    allopurinol (ZYLOPRIM) 300 MG tablet Take 1 tablet (300 mg) by mouth daily. 90 tablet 0    bismuth subsalicylate (PEPTO BISMOL) 262 MG/15ML suspension Take 15 mLs by mouth every 6 hours as needed for indigestion or  diarrhea (as needed for diarrhea. Please collect stool sample first) 354 mL 1    Cholecalciferol (VITAMIN D) 50 MCG ( UT) CAPS Take 1 capsule by mouth daily. 90 capsule 0    folic acid (FOLVITE) 1 MG tablet Take 1 tablet by mouth once daily 90 tablet 1    gabapentin (NEURONTIN) 300 MG capsule Take 3 capsules (900 mg) by mouth 3 times daily 360 capsule 5    metoprolol succinate ER (TOPROL XL) 50 MG 24 hr tablet Take 1 tablet (50 mg) by mouth daily. 90 tablet 0    omeprazole (PRILOSEC) 40 MG DR capsule Take 30-60 min before breakfast 90 capsule 5    PROMACTA 75 MG tablet TAKE ONE TABLET BY MOUTH ONCE DAILY. TAKE ON AN EMPTY STOMACH (ONE HOUR BEFORE OR TWO HOURS AFTER A MEAL) 30 tablet 11    rivaroxaban ANTICOAGULANT (XARELTO ANTICOAGULANT) 10 MG TABS tablet TAKE 1 TABLET BY MOUTH ONCE DAILY WITH SUPPER 30 tablet 4    thiamine (B-1) 100 MG tablet Take 1 tablet (100 mg) by mouth daily 100 tablet 0    ASPIRIN NOT PRESCRIBED (INTENTIONAL) Please choose reason not prescribed, below (Patient not taking: Reported on 2024) 1 each 0    methylcellulose (CITRUCEL) 500 MG TABS tablet Take 1 tablet (500 mg) by mouth 2 times daily. (Patient not taking: Reported on 2024) 180 tablet 1       Allergies   Allergen Reactions    Olmesartan Diarrhea        Social History     Tobacco Use    Smoking status: Former     Current packs/day: 0.00     Average packs/day: 1 pack/day for 28.0 years (28.0 ttl pk-yrs)     Types: Cigarettes     Start date:      Quit date: 1982     Years since quittin.2    Smokeless tobacco: Former   Substance Use Topics    Alcohol use: Yes     Alcohol/week: 5.0 standard drinks of alcohol     Types: 5 Shots of liquor per week     Comment: 4-5 drinks/day, alcohol use disorder     Family History   Problem Relation Age of Onset    Unknown/Adopted Mother     Unknown/Adopted Father     Heart Disease Sister     Diabetes No family hx of     Coronary Artery Disease No family hx of     Hypertension  "No family hx of     Hyperlipidemia No family hx of     Cerebrovascular Disease No family hx of     Breast Cancer No family hx of     Colon Cancer No family hx of     Prostate Cancer No family hx of     Other Cancer No family hx of     Depression No family hx of     Anxiety Disorder No family hx of     Mental Illness No family hx of     Substance Abuse No family hx of     Anesthesia Reaction No family hx of     Asthma No family hx of     Osteoporosis No family hx of     Genetic Disorder No family hx of     Thyroid Disease No family hx of     Obesity No family hx of      History   Drug Use No             Review of Systems  CONSTITUTIONAL: NEGATIVE for fever, chills  CONSTITUTIONAL:weight gain of a couple pounds due to inactivity and eating junk food  INTEGUMENTARY/SKIN: NEGATIVE for worrisome rashes, moles or lesions  EYES: NEGATIVE for vision changes or irritation  ENT/MOUTH: hearing loss  RESP:POSITIVE for dyspnea on exertion due to deconditioning since this is worse when the test of the stimulator occurred and he was moving around more  CV: NEGATIVE for chest pain, palpitations or peripheral edema  GI: POSITIVE for diarrhea if more than 2 alcoholic drinks.   male :frequency, hesitancy, and small amounts  MUSCULOSKELETAL:POSITIVE  for back pain that is chronic undergoing spinal stimulator for treatment, numbness and weakness from alcohol and back issues in the lower extremities.  NEURO: POSITIVE for numbness and tingling in the lower extremities due to back issues and alcohol use  ENDOCRINE: NEGATIVE for temperature intolerance, skin/hair changes  HEME/ALLERGY/IMMUNE: POSITIVE  for easy bruising on xarelto  PSYCHIATRIC: NEGATIVE for changes in mood or affect    Objective    /72   Pulse 84   Temp 98.7  F (37.1  C) (Tympanic)   Resp 18   Ht 1.676 m (5' 6\")   Wt 85.7 kg (189 lb)   SpO2 94%   BMI 30.51 kg/m     Estimated body mass index is 30.51 kg/m  as calculated from the following:    Height as of this " "encounter: 1.676 m (5' 6\").    Weight as of this encounter: 85.7 kg (189 lb).  Physical Exam  GENERAL: alert and no distress  EYES: Eyes grossly normal to inspection, PERRL and conjunctivae and sclerae normal  HENT: ear canals and TM's normal, nose and mouth without ulcers or lesions  NECK: no adenopathy, no asymmetry, masses, or scars  RESP: lungs clear to auscultation - no rales, rhonchi or wheezes  CV: regular rate and rhythm, normal S1 S2, no S3 or S4, no murmur, click or rub, no peripheral edema  ABDOMEN: soft, nontender, no hepatosplenomegaly, no masses and bowel sounds normal  MS: decreased strength in bilateral lower extremities  SKIN: no suspicious lesions or rashes  NEURO: Normal strength and tone, mentation intact and speech normal  PSYCH: mentation appears normal, affect normal/bright  LYMPH: normal ant/post cervical, supraclavicular nodes    Recent Labs   Lab Test 10/03/24  1341 05/08/24  1446 01/18/24  1343   HGB 12.3* 12.4* 12.0*   PLT 91* 96* 115*   INR  --   --  1.25*    144 143   POTASSIUM 3.8 4.3 4.5   CR 0.86 0.99 0.85      Component      Latest Ref [facility] 12/24/2024  10:14 AM   WBC      4.0 - 11.0 10e3/uL 5.3    RBC Count      4.40 - 5.90 10e6/uL 3.11 (L)    Hemoglobin      13.3 - 17.7 g/dL 11.1 (L)    Hematocrit      40.0 - 53.0 % 32.3 (L)    MCV      78 - 100 fL 104 (H)    MCH      26.5 - 33.0 pg 35.7 (H)    MCHC      31.5 - 36.5 g/dL 34.4    RDW      10.0 - 15.0 % 20.2 (H)    Platelet Count      150 - 450 10e3/uL 101 (L)       Legend:  (L) Low  (H) High      Diagnostics  Labs pending at this time.  Results will be reviewed when available.   EKG not indicated due to no chest pain or hx of heart arrhythmias.    Revised Cardiac Risk Index (RCRI)  The patient has the following serious cardiovascular risks for perioperative complications:   - No serious cardiac risks = 0 points     RCRI Interpretation: 0 points: Class I (very low risk - 0.4% complication rate)    Signed Electronically by: " Caterina Sena NP  A copy of this evaluation report is provided to the requesting physician.

## 2024-12-28 ENCOUNTER — HOSPITAL ENCOUNTER (OUTPATIENT)
Dept: MRI IMAGING | Facility: CLINIC | Age: 79
Discharge: HOME | End: 2024-12-28
Attending: FAMILY MEDICINE | Admitting: FAMILY MEDICINE
Payer: COMMERCIAL

## 2024-12-28 DIAGNOSIS — G57.93 UNSPECIFIED MONONEUROPATHY OF BILATERAL LOWER LIMBS: ICD-10-CM

## 2024-12-28 DIAGNOSIS — M96.1 POSTLAMINECTOMY SYNDROME, NOT ELSEWHERE CLASSIFIED: ICD-10-CM

## 2024-12-28 PROCEDURE — 72148 MRI LUMBAR SPINE W/O DYE: CPT

## 2024-12-28 PROCEDURE — 72146 MRI CHEST SPINE W/O DYE: CPT

## 2025-01-20 ENCOUNTER — OFFICE VISIT (OUTPATIENT)
Dept: FAMILY MEDICINE | Facility: CLINIC | Age: 80
End: 2025-01-20
Payer: COMMERCIAL

## 2025-01-20 VITALS
HEART RATE: 92 BPM | TEMPERATURE: 98.7 F | SYSTOLIC BLOOD PRESSURE: 124 MMHG | BODY MASS INDEX: 29.73 KG/M2 | HEIGHT: 66 IN | DIASTOLIC BLOOD PRESSURE: 60 MMHG | WEIGHT: 185 LBS | RESPIRATION RATE: 16 BRPM | OXYGEN SATURATION: 96 %

## 2025-01-20 DIAGNOSIS — M10.9 GOUT INVOLVING TOE, UNSPECIFIED CAUSE, UNSPECIFIED CHRONICITY, UNSPECIFIED LATERALITY: Primary | ICD-10-CM

## 2025-01-20 DIAGNOSIS — K76.6 PORTAL HYPERTENSION (H): ICD-10-CM

## 2025-01-20 DIAGNOSIS — N18.31 STAGE 3A CHRONIC KIDNEY DISEASE (H): ICD-10-CM

## 2025-01-20 DIAGNOSIS — F10.20 ALCOHOL DEPENDENCE, CONTINUOUS (H): ICD-10-CM

## 2025-01-20 PROBLEM — Z12.5 SCREENING FOR PROSTATE CANCER: Status: RESOLVED | Noted: 2018-09-14 | Resolved: 2025-01-20

## 2025-01-20 PROBLEM — F32.5 MAJOR DEPRESSIVE DISORDER IN FULL REMISSION, UNSPECIFIED WHETHER RECURRENT: Status: RESOLVED | Noted: 2022-12-02 | Resolved: 2025-01-20

## 2025-01-20 PROCEDURE — 99213 OFFICE O/P EST LOW 20 MIN: CPT | Performed by: NURSE PRACTITIONER

## 2025-01-20 ASSESSMENT — PAIN SCALES - GENERAL: PAINLEVEL_OUTOF10: NO PAIN (0)

## 2025-01-20 ASSESSMENT — PATIENT HEALTH QUESTIONNAIRE - PHQ9
SUM OF ALL RESPONSES TO PHQ QUESTIONS 1-9: 20
SUM OF ALL RESPONSES TO PHQ QUESTIONS 1-9: 20
10. IF YOU CHECKED OFF ANY PROBLEMS, HOW DIFFICULT HAVE THESE PROBLEMS MADE IT FOR YOU TO DO YOUR WORK, TAKE CARE OF THINGS AT HOME, OR GET ALONG WITH OTHER PEOPLE: EXTREMELY DIFFICULT

## 2025-01-20 NOTE — PROGRESS NOTES
Assessment & Plan     Gout involving toe, unspecified cause, unspecified chronicity, unspecified laterality  Uric acid ordered and advised him that with his next hematologic labs that he can get this drawn.  - Uric acid; Future    Portal hypertension (H)  Followed by gastroenterology.    Alcohol dependence, continuous (H)  Discussed end-of-life today due to his alcoholic liver disease and continued alcohol dependence.  I gave him and his wife health care directive to fill out.  And we reviewed a POLST form today.    Addendum 1/21/24: I will need to discuss his POLST with him a little further due to the fact that he wants to attempt CPR but does not want intubation and we will have to have this discussion.  I will refill this out and have patient's stop by to sign in order to have his up-to-date wishes on the chart.    Stage 3a chronic kidney disease (H)  Patient last lab on 1024 showed's stable kidney disease with creatinine of 0.86 and GFR of 88.    Patient was advised that he is due for his second shingles vaccination and RSV vaccination which will be needed to be completed at a pharmacy per Medicare coverage.    See Patient Instructions    Subjective   Raymon is a 79 year old, presenting for the following health issues:  Consult (New patient Establish care )      1/20/2025     4:10 PM   Additional Questions   Roomed by      History of Present Illness       Reason for visit:  Check up   He is taking medications regularly.     Patient presents today for establishing care.  He has history of sleep apnea but does not use his CPAP machine, gout, gastric reflux and varices.  He does have fatty liver and alcoholic liver disease and cirrhosis but continues to drink at least 3 drinks daily.  He also was diagnosed with portal hypertension, essential hypertension, and hyperlipidemia.  He struggles with some restless leg syndrome and back issues which she currently has a spinal stimulator that was placed recently.  He  "also has some stage III chronic kidney disease.  There is history of iron deficiency anemia because of chronic blood loss likely due to his gastritis and gastric varices as well.  He follows with gastroenterology for his alcohol cirrhosis and hematology for the iron deficiency anemia.  Currently he is establishing care with a new provider due to the fact that he was not very impressed with his last provider \"telling him what to do\" versus being part of his care.  Patient is aware that continuing drinking is detrimental to his health.  I did discuss today that it is important to understand what he is looking for for end-of-life.        Review of Systems  CONSTITUTIONAL: NEGATIVE for fever, chills, change in weight  ENT/MOUTH: NEGATIVE for ear, mouth and throat problems  RESP: NEGATIVE for significant cough or SOB  CV: NEGATIVE for chest pain, palpitations or peripheral edema  PSYCHIATRIC: NEGATIVE for changes in mood or affect  ROS otherwise negative      Objective    /60   Pulse 92   Temp 98.7  F (37.1  C) (Tympanic)   Resp 16   Ht 1.676 m (5' 6\")   Wt 83.9 kg (185 lb)   SpO2 96%   BMI 29.86 kg/m    Body mass index is 29.86 kg/m .  Physical Exam   GENERAL: alert and no distress  RESP: lungs clear to auscultation - no rales, rhonchi or wheezes  CV: regular rate and rhythm, normal S1 S2, no S3 or S4, no murmur, click or rub, no peripheral edema  PSYCH: mentation appears normal, affect normal/bright    Signed Electronically by: Caterina Sena NP    "

## 2025-01-20 NOTE — PATIENT INSTRUCTIONS
Make appointment with pharmacy to get RSV and second shingles vaccination to complete vaccinations.    I ordered a uric acid to get when you get your labs next.

## 2025-01-21 ENCOUNTER — DOCUMENTATION ONLY (OUTPATIENT)
Dept: OTHER | Facility: CLINIC | Age: 80
End: 2025-01-21
Payer: COMMERCIAL

## 2025-01-22 DIAGNOSIS — Z86.718 PERSONAL HISTORY OF VENOUS THROMBOSIS AND EMBOLISM: ICD-10-CM

## 2025-01-22 DIAGNOSIS — D50.0 IRON DEFICIENCY ANEMIA DUE TO CHRONIC BLOOD LOSS: ICD-10-CM

## 2025-01-22 DIAGNOSIS — D69.3 IDIOPATHIC THROMBOCYTOPENIC PURPURA (H): ICD-10-CM

## 2025-01-22 RX ORDER — RIVAROXABAN 10 MG/1
TABLET, FILM COATED ORAL
Qty: 30 TABLET | Refills: 5 | Status: SHIPPED | OUTPATIENT
Start: 2025-01-22

## 2025-01-22 NOTE — TELEPHONE ENCOUNTER
Pending Prescriptions:                       Disp   Refills    XARELTO ANTICOAGULANT 10 MG TABS tablet [*30 tab*0            Sig: TAKE 1 TABLET BY MOUTH ONCE DAILY WITH SUPPER    Last prescribing provider:     Last clinic visit date: 10/03/24 w/    Recommendations for requested medication (if none, N/A): Copied from last OV note: -cont rivaroxaban     Any other pertinent information (if none, N/A): N/A    Refilled: Y/N, if NO, why?

## 2025-02-02 ENCOUNTER — TELEPHONE (OUTPATIENT)
Dept: FAMILY MEDICINE | Facility: CLINIC | Age: 80
End: 2025-02-02
Payer: COMMERCIAL

## 2025-02-04 ENCOUNTER — DOCUMENTATION ONLY (OUTPATIENT)
Dept: OTHER | Facility: CLINIC | Age: 80
End: 2025-02-04
Payer: COMMERCIAL

## 2025-02-06 NOTE — PROGRESS NOTES
HEMATOLOGY CLINIC VISIT    NOTE:  Due to the ongoing COVID-19 outbreak, this visit was conducted by telephone, with the patient's approval.    Raymon is a 75-year-old male with chronic ITP and a history of unprovoked pulmonary embolism in April 2017 for which he is maintained on long-term anticoagulation.  He has a history of recurrent iron deficiency anemia felt secondary to chronic occult blood loss from portal hypertensive gastropathy and hemorrhoids.  He also has underlying alcoholic liver disease.  He was scheduled today for routine follow-up visit.  Our last visit was in May 2020.    He and his wife again spent the winter in [place].  He was in the hospital twice over the winter with heart issues.  It sounds as though his medications were adjusted and he is now feeling better in that regard.    He remains on Promacta 75 mg daily for chronic ITP.  He is tolerating the medication without any perceived side effects.  He also remains on prophylactic intensity anticoagulation with rivaroxaban.  He denies any bleeding issues aside from easy bruising which is a longstanding issue and not changed.  He denies any blood in his stools and most recently had upper and lower endoscopies performed in November 2020.    He last received IV iron in January 2020.  He states that he feels tired all the time, and this is his primary complaint again today.  In the past, he has reported feeling better after iron infusion although only temporarily.  Thus, it has not been clear how much he has been symptomatic with regard to iron deficiency.  Iron panel has not been checked in the last year.    Physical exam:  Not done, telephone visit.    Labs:  Most recent labs are from May 12, 2021.  White count 10.4 with a normal differential, hemoglobin 13.2, MCV 94, platelet count 102,000.    Comprehensive metabolic panel showed a creatinine of 0.96, and normal LFTs.  Serum albumin slightly low at 3.2.        ASSESSMENT / PLAN:  1.  Chronic ITP --  She will likely be in the bandage for two more weeks after tomorrow as long as everything is going ok with incision healing.    Raymon's baseline platelet count has been in the 70,000 to 100,000 range over the last 4 years.  He remains on Promacta 75 mg daily.  He is responsive to pulse dexamethasone and we have used this to increase his platelet count prior to surgeries.  At the present time, his platelet count is within his usual baseline.  Given that he is on long-term anticoagulation, the goal is to maintain his platelet count consistently above 50,000.    2. History of unprovoked pulmonary embolism (April 2017) -- He has done well on long-term anticoagulation with rivaroxaban, which he should continue.  He is on the prophylactic dose of 10 mg daily due to a combination of mild intermittent renal insufficiency and underlying liver disease.  We will make no change in this part of his treatment plan today.    3.  Iron deficiency anemia -- This has been attributed to to chronic occult blood loss from portal hypertensive gastropathy, and hemorrhoids.  He denies any new bleeding symptoms or any obvious signs of GI bleeding, and had upper and lower endoscopies in the fall 2020.  Iron panel has not been reassessed in the last year, so we will place orders for those labs to be drawn at his convenience in the next couple of weeks.  I will contact him by telephone once we have results.    We we will plan to see him back in the fall, prior to his anticipated return to [place] for the winter.    Total time 30 minutes, including review of medical records and labs, telephone visit, and documentation.        Waylon Novoa MD  Associate Professor of Medicine  Division of Hematology, Oncology, and Transplantation  Director, [facility]

## 2025-04-02 ENCOUNTER — THERAPY VISIT (OUTPATIENT)
Dept: PHYSICAL THERAPY | Facility: CLINIC | Age: 80
End: 2025-04-02
Payer: COMMERCIAL

## 2025-04-02 DIAGNOSIS — M96.1 POSTLAMINECTOMY SYNDROME OF LUMBAR REGION: Primary | ICD-10-CM

## 2025-04-02 PROCEDURE — 97162 PT EVAL MOD COMPLEX 30 MIN: CPT | Mod: GP | Performed by: PHYSICAL THERAPIST

## 2025-04-02 PROCEDURE — 97110 THERAPEUTIC EXERCISES: CPT | Mod: GP | Performed by: PHYSICAL THERAPIST

## 2025-04-02 NOTE — PROGRESS NOTES
PHYSICAL THERAPY EVALUATION  Type of Visit: Evaluation        Fall Risk Screen:  Fall screen completed by: PT  Have you fallen 2 or more times in the past year?: No  Have you fallen and had an injury in the past year?: No  Timed Up and Go score (seconds): 25  Is patient a fall risk?: No  Fall screen comments: Gait belt and SBA with challenging balance.    Subjective     Pt presented to PT with progressive back pain.  Pt reports walking has become more difficult as time goes on.  Pt currently walking with a 4WW.  Pt has had multiple falls with legs getting tripped up on object around him.  Pt reports back pain comes on with standing and walking.  Pt has less pain with seated position.  Pt notes he can sleep on his L side.  Pt reports he can sleep but wakes frequently to go to the bathroom.  Pt reports pain is always present in his legs down to his toes.  Pt had a spinal cord stimulator placed several months ago and does not feel like he has had much relief.  B reports numbness/tingling into B LE's throughout full legs.           Condition type:     Cause of current episode:        Nature of treatment:     Functional ability:     Documented POC (choose all that apply):     Briefly describe symptoms:     How did the symptoms start:     Average pain/intensity last 24 hours:     Average pain/intensity past week:     Frequency of Symptoms:     Symptom impact on ADLs:     Condition change since eval:     General health reported by patient:         Presenting condition or subjective complaint:    Date of onset: 03/07/25 (Order date)    Relevant medical history: Depression; Hearing problems; Implanted device; Incontinence; Pain at night or rest   Dates & types of surgery: back surgery  pain interupter krystyna dec    Prior diagnostic imaging/testing results: MRI     Prior therapy history for the same diagnosis, illness or injury: Yes not sure    Living Environment  Social support: With a significant other or spouse   Type of  home: Multi-level   Stairs to enter the home: Yes   Is there a railing: Yes     Ramp: No   Stairs inside the home: Yes   Is there a railing: Yes     Help at home: Self Cares (home health aide/personal care attendant, family, etc)  Equipment owned: Straight Cane; Walker; Bedrail; Lift chair     Employment: Not Applicable    Hobbies/Interests: very few    Patient goals for therapy: do more    Pain assessment: Pain present     Objective   LUMBAR SPINE EVALUATION  PAIN: Pt reports symptoms are better at back and upper legs than lower legs, does not quantify.   INTEGUMENTARY (edema, incisions): WNL  POSTURE: Sitting Posture: Rounded shoulders, Forward head, Lordosis increased  GAIT:   Weightbearing Status: WBAT  Assistive Device(s): Walker (four wheeled)  Gait Deviations: Antalgic  BALANCE/PROPRIOCEPTION:  Supervision with ambulation, unable to stand   WEIGHTBEARING ALIGNMENT:  Forward flexed posture.  NON-WEIGHTBEARING ALIGNMENT: Declined supine position.   ROM:  Extension to neutral, flexion to 60% in seated.   PELVIC/SI SCREEN:  Unremarkable.  STRENGTH: R LE globally 3+/5, L 4/5.  MYOTOMES: Weak L hip flexion, R hip flexion, R knee extension, R plantar flexion, and knee flexion.   DTR S: Absent LE reflexes.  CORD SIGNS: WNL  DERMATOMES:  Numbness throughout B LE's.   NEURAL TENSION: Lumbar WNL  FLEXIBILITY:  Tight piriformis, HS, hip flexors. .   FUNCTIONAL TESTS: Double Leg Squat: Anterior knee translation, Knee valgus, Hip internal rotation, and Improper use of glutes/hips  PALPATION:  Tender throughout lumbar paraspinals.     Assessment & Plan   CLINICAL IMPRESSIONS  Medical Diagnosis: Postlaminectomy syndrome of lumbar region.    Treatment Diagnosis: Postlaminectomy syndrome of lumbar region.   Impression/Assessment: Patient is a 79 year old male with low back complaints.  The following significant findings have been identified: Pain, Decreased ROM/flexibility, Decreased joint mobility, Decreased strength,  Impaired gait, Impaired muscle performance, and Decreased activity tolerance. These impairments interfere with their ability to perform self care tasks, recreational activities, household chores, household mobility, and community mobility as compared to previous level of function.     Clinical Decision Making (Complexity):  Clinical Presentation: Evolving/Changing  Clinical Presentation Rationale: based on medical and personal factors listed in PT evaluation  Clinical Decision Making (Complexity): Moderate complexity    PLAN OF CARE  Treatment Interventions:  Interventions: Gait Training, Manual Therapy, Neuromuscular Re-education, Therapeutic Activity, Therapeutic Exercise    Long Term Goals     PT Goal 1  Goal Identifier: HEP  Goal Description: Pt will be independent with HEP in order to improve LE strength.  Target Date: 05/14/25  PT Goal 2  Goal Identifier: Walking/standing  Goal Description: Pt will report ability to be standing/walking for 15 minutes at a time in order to improve tolerance to tasks such as dressing and hygiene.  Target Date: 05/28/25      Frequency of Treatment: 1x/week  Duration of Treatment: 10 weeks    Recommended Referrals to Other Professionals: none.  Education Assessment:   Learner/Method: Patient  Education Comments: HEP    Risks and benefits of evaluation/treatment have been explained.   Patient/Family/caregiver agrees with Plan of Care.     Evaluation Time:     PT Eval, Moderate Complexity Minutes (80513): 25     Signing Clinician: ZHANG Green [facility]                                                                                   OUTPATIENT PHYSICAL THERAPY      PLAN OF TREATMENT FOR OUTPATIENT REHABILITATION   Patient's Last Name, First Name, Raymon Foster YOB: 1945   Provider's Name   [facility]   Medical Record No.  [id number]     Onset Date: 03/07/25 (Order date)  Start of Care  Date: 04/02/25     Medical Diagnosis:  Postlaminectomy syndrome of lumbar region.      PT Treatment Diagnosis:  Postlaminectomy syndrome of lumbar region. Plan of Treatment  Frequency/Duration: 1x/week/ 10 weeks    Certification date from 04/02/25 to 06/11/25         See note for plan of treatment details and functional goals     Jose Grant, PT                         I CERTIFY THE NEED FOR THESE SERVICES FURNISHED UNDER        THIS PLAN OF TREATMENT AND WHILE UNDER MY CARE     (Physician attestation of this document indicates review and certification of the therapy plan).              Referring Provider:  Thien Graff    Initial Assessment  See Epic Evaluation- Start of Care Date: 04/02/25

## 2025-04-08 ENCOUNTER — PATIENT OUTREACH (OUTPATIENT)
Dept: CARE COORDINATION | Facility: CLINIC | Age: 80
End: 2025-04-08
Payer: COMMERCIAL

## 2025-04-22 ENCOUNTER — PATIENT OUTREACH (OUTPATIENT)
Dept: CARE COORDINATION | Facility: CLINIC | Age: 80
End: 2025-04-22
Payer: COMMERCIAL

## 2025-04-23 NOTE — LETTER
5/9/2024         RE: Raymon Ace  [address] [facility] [address]        Dear Colleague,    Thank you for referring your patient, Raymon Ace, to the [facility]. Please see a copy of my visit note below.    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  He has a history of recurrent iron deficiency anemia felt secondary to chronic occult blood loss from portal hypertensive gastropathy and hemorrhoids.  He also has underlying alcoholic liver disease.  Today he is here for a routine follow-up visit with his wife.      Reports that he has cut back on alcohol, currently only consuming 3 drinks per day.  He reports that his GI issues have actually improved significantly over the last few months, with resolution of his diarrhea and improved appetite.  He has a follow up with GI in June 2024.     He denies any recent episodes of bleeding.  Since his ferritin levels were low in January he was given an infusion of INFeD in January 2024.  He tolerated well.  But his energy levels continues to be low, and since his last appointment he has had about 3 mechanical falls at home.  His primary care physician ordered lab test including B12 and TSH levels which was been normal.  The falls seem to be primarily related to weakness.  Fortunately, he has not had any bleeding complications.    Physical exam:  He appears his usual self.  He is in no acute distress.  He is hard of hearing which is longstanding.  He is afebrile with a blood pressure of 115/74, heart rate of 107.  He does not appear to be pale or jaundiced.  No scleral icterus.  Lungs are clear.   RRR S1S2, no murmur. Abdomen is nondistended, soft, and nontender.      Labs:  Serum electrolytes are normal, creatinine 0.99, LFTs are normal.  White count 7.3 with a normal differential,  Regardin F [place]:  Patient has a pulled muscle, right side.  She was pulling a shopping cart, up the stairs,  ----- Message from Jacqueline MIRANDA sent at 2025 10:30 AM CDT -----  Patient Name: Rocio Landa    Specialist or PCP Name: LOY Vasquez    Symptoms: 60 F [place]:  Patient has a pulled muscle, right side.  She was pulling a shopping cart, up the stairs., filled to the capacity.  No other symptoms     Pregnant (females aged 13-60. If Yes, how long?) : no    Call Back # : [phone]    Which State are you currently located in?: [place]    Name of Clinic Site / Acct# : [facility] - [address] - Primary Care    Call arrived during: Work Hours     hemoglobin 12.4, MCV 92, platelets 96,000.  Overall his CBC parameters are within his baseline ranges.  Ferritin is 36, which is improved from 20 in January 2024.      ASSESSMENT / PLAN:  1.  Chronic ITP -- Raymon's baseline platelet count has been in the 70,000 to 100,000 range over the last several years.  He remains on Promacta 75 mg daily.  He is responsive to pulse dexamethasone and we have used this to increase his platelet count prior to surgeries.  He has not been treated with IVIG, so we do not know how well he responds to that.  At the present time, his platelet count is within his usual baseline.  Given that he is on long-term anticoagulation, the goal is to maintain his platelet count consistently above 50,000.  Note that there may also be a component of thrombocytopenia related to his underlying liver disease.    2. History of unprovoked pulmonary embolism (April 2017) -- He has done well on long-term anticoagulation with rivaroxaban.  He is on the prophylactic dose of 10 mg daily due to a combination of mild intermittent renal insufficiency (not currently an issue) and underlying liver disease.  Will continue this as long as his bleeding risk remains acceptable.      3. Iron deficiency anemia -- He denies any recent signs of GI bleeding, EGD in December 2023 continued to show portal hypertensive gastropathy, as well as 2 non-bleeding varicies.   He received IV iron (Infed) in Jan 2024.    4. Elevated INR:  Although he has liver cirrhosis, rivaroxaban also increases the INR.     Patient was seen and plan was discussed with staff .     RTC in October, sooner with new issues or concerns.      Kenny Pisano MD  Hem / Onc fellow         HEMATOLOGY STAFF:  Seen with fellow, whose note reflects our joint evaluation, assessment, and plan.    Total time on date of encounter 40 minutes, including review of medical records and labs, clinic visit, and documentation.      Waylon Novoa MD  Professor of  Medicine  Division of Hematology, Oncology, and Transplantation  Director, [facility]

## 2025-04-28 ENCOUNTER — OFFICE VISIT (OUTPATIENT)
Dept: GASTROENTEROLOGY | Facility: CLINIC | Age: 80
End: 2025-04-28
Attending: NURSE PRACTITIONER
Payer: COMMERCIAL

## 2025-04-28 VITALS
WEIGHT: 180 LBS | DIASTOLIC BLOOD PRESSURE: 66 MMHG | HEART RATE: 96 BPM | HEIGHT: 66 IN | BODY MASS INDEX: 28.93 KG/M2 | SYSTOLIC BLOOD PRESSURE: 122 MMHG

## 2025-04-28 DIAGNOSIS — I86.4 GASTRIC VARICES WITHOUT BLEEDING: ICD-10-CM

## 2025-04-28 DIAGNOSIS — K70.30 ALCOHOLIC CIRRHOSIS OF LIVER WITHOUT ASCITES (H): Primary | ICD-10-CM

## 2025-04-28 DIAGNOSIS — R63.0 POOR APPETITE: ICD-10-CM

## 2025-04-28 DIAGNOSIS — R19.7 DIARRHEA, UNSPECIFIED TYPE: ICD-10-CM

## 2025-04-28 DIAGNOSIS — R11.0 NAUSEA: ICD-10-CM

## 2025-04-28 DIAGNOSIS — F10.10 ETOH ABUSE: ICD-10-CM

## 2025-04-28 PROCEDURE — 3074F SYST BP LT 130 MM HG: CPT | Performed by: NURSE PRACTITIONER

## 2025-04-28 PROCEDURE — 99214 OFFICE O/P EST MOD 30 MIN: CPT | Performed by: NURSE PRACTITIONER

## 2025-04-28 PROCEDURE — 3078F DIAST BP <80 MM HG: CPT | Performed by: NURSE PRACTITIONER

## 2025-04-28 PROCEDURE — 1126F AMNT PAIN NOTED NONE PRSNT: CPT | Performed by: NURSE PRACTITIONER

## 2025-04-28 RX ORDER — METOCLOPRAMIDE 5 MG/1
5 TABLET ORAL 2 TIMES DAILY PRN
Qty: 30 TABLET | Refills: 1 | Status: SHIPPED | OUTPATIENT
Start: 2025-04-28

## 2025-04-28 ASSESSMENT — PAIN SCALES - GENERAL: PAINLEVEL_OUTOF10: NO PAIN (0)

## 2025-04-28 NOTE — PATIENT INSTRUCTIONS
It was a pleasure taking care of you today.  I've included a brief summary of our discussion and care plan from today's visit below.  Please review this information with your primary care provider.  ______________________________________________________________________    My recommendations are summarized as follows:    Continue to take omeprazole 40 mg a day, 30-60 minutes before a meal and other pills.    2. Try metoclopramide (Reglan) as needed for nausea and poor appetite. Do not take more than 2 tablets a day.    3. Use loperamide (Imodium) as needed for diarrhea.    4. Try Ensure or similar nutritional supplement.    Return to GI Clinic in 6 months to review your progress.    ______________________________________________________________________  DIARRHEA  Diarrhea is characterized by frequent (more than three) watery to loose stools in a 24-h period. Diarrhea can be classified as acute or chronic.  Acute diarrhea is usually caused by an infection from a bacteria, virus, or parasite, which may be present in animal and human fecal matter or in contaminated food, milk, or water. Symptoms may persist for 1-2 days with or without serious consequences.  Prolonged diarrhea that lasts for a month or longer is chronic. A number of diseases and conditions can cause diarrhea, including:  Viruses. Viruses that can cause diarrhea include [place] virus (also known as norovirus), enteric adenoviruses, astrovirus, cytomegalovirus and viral hepatitis. Rotavirus is a common cause of acute childhood diarrhea. The virus that causes coronavirus disease 2019 (COVID-19) also has been associated with gastrointestinal symptoms, including nausea, vomiting and diarrhea.  Bacteria and parasites. Exposure to certain bacteria, such as E. coli or parasites through contaminated food or water, leads to diarrhea. When traveling in developing countries, diarrhea caused by bacteria and parasites is often called traveler's diarrhea.  Clostridioides difficile (also known as C. diff) is another type of bacterium that causes diarrhea, and it can occur after a course of antibiotics or during a hospitalization.  Medicines. Many medicines, such as antibiotics, can cause diarrhea. Antibiotics get rid of infections by killing bad bacteria, but they also kill good bacteria. This disturbs the natural balance of bacteria in your intestines, leading to diarrhea or an infection such as C. diff. Other drugs that cause diarrhea are anti-cancer drugs and antacids with magnesium.  Lactose intolerance. Lactose is a sugar found in milk and other dairy products. People who have trouble digesting lactose have diarrhea after eating dairy products. Lactose intolerance can increase with age because levels of the enzyme that helps digest lactose drop as you get older.  Fructose. Fructose is a sugar found naturally in fruits and honey. It's sometimes added as a sweetener to certain beverages. Fructose can lead to diarrhea in people who have trouble digesting it.  Artificial sweeteners. Sorbitol, erythritol and mannitol -- artificial sweeteners are nonabsorbable sugars found in chewing gum and other sugar-free products -- can cause diarrhea in some otherwise healthy people.  Surgery. Partial intestine or gallbladder removal surgeries can sometimes cause diarrhea.  Other digestive disorders. Chronic diarrhea has a number of other causes, such as IBS, Crohn's disease, ulcerative colitis, celiac disease, microscopic colitis, pancreatic insufficiency, and small intestinal bacterial overgrowth (SIBO).    Nutritional therapy for diarrhea is aimed at replacing fluids and electrolytes through consumption of beverages, such as water, broths,Pedialyte, or sports drinks, and eliminating the cause of diarrhea (contaminated foods). Juices and carbonated beverages should be avoided or diluted since they are often hyperosmolar and would otherwise aggravate the diarrhea. Avoid caffeine  and alcohol.    The BRAT diet is an acronym that stands for:  Bananas. Starch in the fruit can help absorb water in your colon, which works to firm up your stool. Plus, it s rich in potassium, a key electrolyte you re losing with diarrhea.  Rice. Select white rice over brown rice in this case, as it s easier on your stomach.  Applesauce. Look to grab unsweetened applesauce to cut down on sugar.  Toast. Try to use the more easily digestible white bread instead of whole grain bread.    Add semisolid and low-fiber foods gradually as your bowel movements return to normal. Try soda crackers, toast, boiled eggs, oatmeal,boiled or baked potatoes, or baked chicken without skin. Don't eat certain foods such as dairy products, fatty foods, some fruits (citrus, pineapples, figs, grapes), raw veggies, onions, high-fiber foods or highly seasoned foods for a few days. Sugary sweet items (including those sweetened with artificial ingredients such as aspartame, erythritol and sorbitol) could also trigger your diarrhea.     If you're an adult, see your doctor if:  Your diarrhea persists beyond two days with no improvement.  You become dehydrated.  You have severe abdominal or rectal pain.  You have bloody or black stools.  You have a fever above 102 F (39 C).     ____________________________________________________________________  Please see below for any additional questions and scheduling guidelines.    Sign up for [facility]: [facility] patient portal serves as a secure platform for accessing your medical records from the [facility]. Additionally, [facility] facilitates easy, timely, and secure messaging with your care team. If you have not signed up, you may do so by using the provided code or calling [phone].    Coordinating your care after your visit:  There are multiple options for scheduling your follow-up care based on your provider's recommendation.    How do I schedule a follow-up clinic appointment:   After  your appointment, you may receive scheduling assistance with the Clinic Coordinators by having a seat in the waiting room and a Clinic Coordinator will call you up to schedule.  Virtual visits or after you leave the clinic:  Your provider has placed a follow-up order in the [facility] portal for scheduling your return appointment. A member of the scheduling team will contact you to schedule.  [facility]: Timely scheduling through [facility] is advised to ensure appointment availability.   Call to schedule: You may schedule your follow-up appointment(s) by calling [phone], option 1.    How do I schedule my endoscopy or colonoscopy procedure:  If a procedure, such as a colonoscopy or upper endoscopy was ordered by your provider, the scheduling team will contact you to schedule this procedure. Or you may choose to call to schedule at   [phone], option 2.  Please allow 20-30 minutes when scheduling a procedure.    How do I get my blood work done? To get your blood work done, you need to schedule a lab appointment at an [facility]. There are multiple ways to schedule:   At the clinic: The Clinic Coordinator you meet after your visit can help you schedule a lab appointment.   [facility] scheduling: [facility] offers online lab scheduling at all [facility] laboratory locations.   Call to schedule: You can call [phone] to schedule your lab appointment.    How do I schedule my imaging study: To schedule imaging studies, such as CT scans, ultrasounds, MRIs, or X-rays, contact Imaging Services at [phone].    How do I schedule a referral to another doctor: If your provider recommended a referral to another specialist(s), the referral order was placed by your provider. You will receive a phone call to schedule this referral, or you may choose to call the number attached to the referral to self-schedule.    For Post-Visit Question(s):  For any inquiries following today's visit:  Please  utilize [facility] messaging and allow 48 hours for reply or contact the Call Center during normal business hours at [phone], option 3.  For Emergent After-hours questions, contact the On-Call GI Fellow through the [facility]  at [phone].  In addition, you may contact your Nurse directly using the provided contact information.    Test Results:  Test results will be accessible via [facility] in compliance with the 21st Century Cures Act. This means that your results will be available to you at the same time as your provider. Often you may see your results before your provider does. Results are reviewed by staff within two weeks with communication follow-up. Results may be released in the patient portal prior to your care team review.    Prescription Refill(s):  Medication prescribed by your provider will be addressed during your visit. For future refills, please coordinate with your pharmacy. If you have not had a recent clinic visit or routine labs, for your safety, your provider may not be able to refill your prescription.     Sincerely,  JESSICA Bahena,  [facility],  Division of Gastroenterology   ([facility])

## 2025-04-28 NOTE — PROGRESS NOTES
[address] [address]  Phone: [phone]    Patient:  Raymon Ace, Date of birth 1945    Referring Provider: Waylon Novoa       Gastroenterology CLINIC VISIT, RETURN PATIENT    REASON FOR CONSULTATION: follow up    HPI: 79 year old male presented to GI clinic for a follow up. The patient has Hx of unprovoked pulmonary embolism in April 2017; he is maintained on long-term anticoagulation. He is here today with his spouse Mirela, who assists with answering questions. The patient was seen for weight loss, anorexia, nausea, and diarrhea with fecal urgency and incontinence. Patient suffers alcoholic liver cirrhosis. Unfortunately, he continues drinking alcohol.     His stool studies were negative for C. difficile and enteric pathogens.  No signs of for exocrine pancreatic insufficiency.  Noted mild nonspecific elevation in calprotectin on his last study. Has been having a few mushy to watery stools a day. Some days, patient does not have bowel movement.  Underwent spinal cord stimulator placement for urinary incontinence and it helped.  Complains of poor appetite, eats small meal once a day. Reported that nothing tastes good. Has some nausea; gagging at times. No emesis. His last EGD (2023) was suggestive for severe portal hypertensive gastropathy with two gastric varices and no signs of bleeding.  There was a small hiatal hernia. Patient denies odynophagia or dysphagia.  Denies acid reflux.  Patient takes 40 mg of omeprazole daily.  Patient has been followed by hematology. Last hgb was 10.7 in Jan.2025.   Wife mentioned that patient appears to be more tired, napping throughout a day, but does not sleep well at night. Seems not to be interested in physical activities. Looks depressed. Patient denies suicidal ideations or plans.    Complains of some lower abdominal discomfort. His CT scan of abdomen revealed fatty liver with liver cirrhosis,  colonic diverticulosis, and 1.5 cm liver cyst. In the past, patient was consulted by Dr. Crouch. Suggested to continue supportive care and monitoring due to patient's ongoing alcohol use.     PREVIOUS ENDOSCOPY:  12/13/23 EGD by Dr. Duran  Findings:       The examined duodenum was normal. Biopsies were taken with a cold        forceps for histology.        Severe portal hypertensive gastropathy was found in the stomach.        Type 2 isolated gastric varices (IGV2, varices located in the body,        antrum or around the pylorus) with no bleeding were found in the        prepyloric region of the stomach. There were no stigmata of recent        bleeding.        The exam of the stomach was otherwise normal.        The examined esophagus was normal.      11/10/2020 EGD  Findings:       The Z-line was regular and was found 41 cm from the incisors.        Normal mucosa was found in the entire esophagus. This was biopsied with        a cold forceps for histology. Verification of patient identification for        the specimen was done. Estimated blood loss: none. Biopsies performed in        distal and proximal esophagus. No varices identified.        The distal esophagus was mildly tortuous.        There was mild resistance passing the scope across the GEJ at the level        of what appeared to be a paraesophageal hernia        There were polypoid appearing lesions that were not biopsied. Given a        history of gastric varices would only biopsy after ultrasound probe        rules out varix. 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      11/10/20 Colonoscopy  Findings:       The perianal and digital rectal examinations were normal.        The terminal ileum appeared normal.        A 7 mm polyp was found in the transverse colon. The polyp was sessile.        The polyp was  removed with a cold snare. Resection and retrieval were        complete. Verification of patient identification for the specimen was        done. Estimated blood loss: none.        Scattered small-mouthed diverticula were found in the sigmoid colon.   FINAL DIAGNOSIS:   A. Distal Esophagus, Biopsy:   Squamous esophageal mucosa with no intraepithelial eosinophils or other   abnormality   B. Proximal Esophagus, Biopsy:   Squamous esophageal mucosa with no intraepithelial eosinophils or other   abnormality   C. Transverse Colon, Polyp:   Sessile serrated polyp; negative for dysplasia or malignancy      PERTINENT STUDIES Reviewed in EMR  7/25/23 MRI of liver  IMPRESSION:  1. 1.6 cm simple liver cyst with no suspicious enhancing internal  components.  2. Hepatic steatosis.  3. Mild splenomegaly.    PERTINENT STUDIES Reviewed in EMR      ROS: 10pt ROS performed and otherwise negative.    PAST MEDICAL HISTORY:  Past Medical History:   Diagnosis Date    Alcohol abuse since teens 01/15/2015    Still actively drinking    Alcoholic liver damage 1/10/2014    Gastroesophageal reflux disease with esophagitis 12/6/2016    Gout involving toe, unspecified cause, unspecified chronicity, unspecified laterality 6/2/2016    Heart murmur     heart is positioned farther on left side then typical    Hyperlipidemia 12/17/2015    Hypertension     ITP (idiopathic thrombocytopenic purpura)     Obstructive sleep apnea     does not use CPAP  machine    Pulmonary embolism (H) large RLL w infarctio 4-17 4/18/2017       PREVIOUS ABDOMINAL/GYNECOLOGIC SURGERIES:  Past Surgical History:   Procedure Laterality Date    APPENDECTOMY  01/01/1956    back stimulator implant      1/2025    BACK SURGERY  1992, 1996    trimmed L4-L5, Fusion L4-L5    BONE MARROW BIOPSY, BONE SPECIMEN, NEEDLE/TROCAR  10/24/2012    Procedure: BIOPSY BONE MARROW;  BONE MARROW BIOPSY WITH ASPIRATE (AREVALO ORDERING);  Surgeon: Terri Hickey MD;  Location:  GI    COLONOSCOPY  N/A 07/31/2019    Procedure: COLONOSCOPY;  Surgeon: Sebastian Garcia MD;  Location:  GI    ESOPHAGOSCOPY, GASTROSCOPY, DUODENOSCOPY (EGD), COMBINED N/A 02/08/2018    Procedure: COMBINED ESOPHAGOSCOPY, GASTROSCOPY, DUODENOSCOPY (EGD);  EGD;  Surgeon: Terri Crouch MD;  Location:  GI    ESOPHAGOSCOPY, GASTROSCOPY, DUODENOSCOPY (EGD), COMBINED N/A 11/10/2020    Procedure: ESOPHAGOGASTRODUODENOSCOPY, WITH BIOPSY;  Surgeon: Alonzo Jang DO;  Location: [facility]    ESOPHAGOSCOPY, GASTROSCOPY, DUODENOSCOPY (EGD), COMBINED N/A 12/13/2023    Procedure: ESOPHAGOGASTRODUODENOSCOPY, WITH BIOPSY;  Surgeon: Everardo Duran MD;  Location:  GI    FACIAL RECONSTRUCTION SURGERY  01/01/1965    jaw fracture    HC TOOTH EXTRACTION W/FORCEP  1990s    TOOTH EXTRACTION      12/24         PERTINENT MEDICATIONS:  Current Outpatient Medications   Medication Sig Dispense Refill    allopurinol (ZYLOPRIM) 300 MG tablet Take 1 tablet (300 mg) by mouth daily. 90 tablet 0    Cholecalciferol (VITAMIN D) 50 MCG (2000 UT) CAPS Take 1 capsule by mouth daily. 90 capsule 0    folic acid (FOLVITE) 1 MG tablet Take 1 tablet by mouth once daily 90 tablet 1    gabapentin (NEURONTIN) 300 MG capsule Take 3 capsules (900 mg) by mouth 3 times daily 360 capsule 5    metoclopramide (REGLAN) 5 MG tablet Take 1 tablet (5 mg) by mouth 2 times daily as needed for vomiting (nausea, reduced appetite). 30 tablet 1    metoprolol succinate ER (TOPROL XL) 50 MG 24 hr tablet Take 1 tablet (50 mg) by mouth daily. 90 tablet 0    omeprazole (PRILOSEC) 40 MG DR capsule Take 30-60 min before breakfast 90 capsule 5    PROMACTA 75 MG tablet TAKE ONE TABLET BY MOUTH ONCE DAILY. TAKE ON AN EMPTY STOMACH (ONE HOUR BEFORE OR TWO HOURS AFTER A MEAL) 30 tablet 11    rivaroxaban ANTICOAGULANT (XARELTO ANTICOAGULANT) 10 MG TABS tablet TAKE 1 TABLET BY MOUTH ONCE DAILY WITH SUPPER 30 tablet 5    thiamine (B-1) 100 MG tablet Take 1 tablet (100 mg) by mouth daily 100  tablet 0         SOCIAL HISTORY:  Social History     Socioeconomic History    Marital status:      Spouse name: Not on file    Number of children: Not on file    Years of education: Not on file    Highest education level: Not on file   Occupational History    Not on file   Tobacco Use    Smoking status: Former     Current packs/day: 0.00     Average packs/day: 1 pack/day for 28.0 years (28.0 ttl pk-yrs)     Types: Cigarettes     Start date:      Quit date: 1982     Years since quittin.6    Smokeless tobacco: Never   Vaping Use    Vaping status: Never Used   Substance and Sexual Activity    Alcohol use: Yes     Alcohol/week: 21.0 standard drinks of alcohol     Types: 21 Shots of liquor per week     Comment: 3 drinks per day    Drug use: No    Sexual activity: Not Currently     Partners: Female   Other Topics Concern    Parent/sibling w/ CABG, MI or angioplasty before 65F 55M? Yes   Social History Narrative    Not on file     Social Drivers of Health     Financial Resource Strain: Low Risk  (2024)    Financial Resource Strain     Within the past 12 months, have you or your family members you live with been unable to get utilities (heat, electricity) when it was really needed?: No   Food Insecurity: Low Risk  (2024)    Food Insecurity     Within the past 12 months, did you worry that your food would run out before you got money to buy more?: No     Within the past 12 months, did the food you bought just not last and you didn t have money to get more?: No   Transportation Needs: Low Risk  (2024)    Transportation Needs     Within the past 12 months, has lack of transportation kept you from medical appointments, getting your medicines, non-medical meetings or appointments, work, or from getting things that you need?: No   Physical Activity: Unknown (2024)    Exercise Vital Sign     Days of Exercise per Week: 0 days     Minutes of Exercise per Session: Not on file   Stress: No Stress  Concern Present (5/8/2024)    [employer] [place] [employer] - Occupational Stress Questionnaire     Feeling of Stress : Only a little   Social Connections: Unknown (5/8/2024)    Social Connection and Isolation Panel [NHANES]     Frequency of Communication with Friends and Family: Not on file     Frequency of Social Gatherings with Friends and Family: More than three times a week     Attends Buddhist Services: Not on file     Active Member of Clubs or Organizations: Not on file     Attends Club or Organization Meetings: Not on file     Marital Status: Not on file   Interpersonal Safety: Low Risk  (1/20/2025)    Interpersonal Safety     Do you feel physically and emotionally safe where you currently live?: Not on file     Within the past 12 months, have you been hit, slapped, kicked or otherwise physically hurt by someone?: Not on file     Within the past 12 months, have you been humiliated or emotionally abused in other ways by your partner or ex-partner?: No   Housing Stability: Low Risk  (5/8/2024)    Housing Stability     Do you have housing? : Yes     Are you worried about losing your housing?: No       FAMILY HISTORY:  Denies colon/panc/esophageal/other GI CA, no other Nation or other HPS-related [place]. No IBD/celiac, no other AI/liver/thyroid disease.    Family History   Problem Relation Age of Onset    Unknown/Adopted Mother     Unknown/Adopted Father     Heart Disease Sister     Diabetes No family hx of     Coronary Artery Disease No family hx of     Hypertension No family hx of     Hyperlipidemia No family hx of     Cerebrovascular Disease No family hx of     Breast Cancer No family hx of     Colon Cancer No family hx of     Prostate Cancer No family hx of     Other Cancer No family hx of     Depression No family hx of     Anxiety Disorder No family hx of     Mental Illness No family hx of     Substance Abuse No family hx of     Anesthesia Reaction No family hx of     Asthma No family hx of      "Osteoporosis No family hx of     Genetic Disorder No family hx of     Thyroid Disease No family hx of     Obesity No family hx of        PHYSICAL EXAMINATION:  Vitals reviewed  /66 (BP Location: Right arm, Patient Position: Sitting, Cuff Size: Adult Regular)   Pulse 96   Ht 1.676 m (5' 6\")   Wt 81.6 kg (180 lb)   BMI 29.05 kg/m      General: Limited assessment, patient stays in a wheelchair.    Skin: No visualized rash or lesions on visualized skin.  Jaundice noticed  HEENT:    EOMI, no erythema or discharge noted.  Mild sclera icterus noted  Mouth mucosa intact, pink, moist  Resp: breathing comfortably without accessory muscle usage, speaking in full sentences, no cough. Lung sounds clear  Card: Regular and rhythmic S1 and S2. No gallop or rub. No murmur.  No LE edema.    Abdomen: Active bowel sounds X 4 quadrants. Soft to palpation.  No pain or discomfort on palpation.  MSK: Appears to have normal range of motion based on visualized movements  Neurologic: No apparent tremors, facial movements symmetric  Psych: affect normal, alert and oriented      ASSESSMENT/PLAN:    ICD-10-CM    1. Alcoholic cirrhosis of liver without ascites (H)  K70.30       2. ETOH abuse  F10.10       3. Diarrhea, unspecified type  R19.7       4. Gastric varices without bleeding  I86.4       5. Poor appetite  R63.0       6. Nausea  R11.0 metoclopramide (REGLAN) 5 MG tablet          79 year old male  presented to GI clinic for a follow-up.  Patient was seen for nausea, diarrhea, weight loss, and fecal urgency with incontinence. Suffers alcoholic liver cirrhosis.         Patient follows by hepatology team on liver cirrhosis. Suggested to continue supportive care and monitoring due to patient's ongoing alcohol use. Patient is still consuming alcohol. Overdue for a follow up with hepatology, but not interested in making an appointment.  Stated that his goal of care is management of his symptoms. Asks,\"how much time I have left?\" Mirela " "(wife) stated that patient want to  a grave stone for him. Patient denies suicidal ideations or plan.  Complains of poor appetite, frequent loose stools, some nausea, fatigue, and weakness and discomfort of lower extremities.Takes gabapentin for peripheral neuropathy.  Does not feel hungry. Stated that \"alcohol is the only thing that keeps me going\", talking about calories that he receives from alcohol. Pt's wife is planning to buy Ensure or similar supplement and give to the patient as a meal replacement.  I suggested to try a small dose of metoclopramide 5 mg 1-2 times a day to reduce nausea and help with gastric emptying. Hold Reglan on days, when he consumes alcohol. Advised against any diet restriction, patient can consume his favorite foods.  Continue omeprazole daily.  Try loperamide for loose stools.  Deferred more detailed screening for depression and management to the pt's PCP.  Patient verbalized understanding and appreciation of care provided. Stated that all of the questions were answered to her/his satisfaction.  RTC in 6 months    Thank you for this consultation. It was a pleasure to participate in the care of this patient; please contact us with any further questions.    SCOTTIE Bahena, FNP-C  Division of Gastroenterology  [facility], [place], [place]    This note was created with Dragon voice recognition software, and while reviewed for accuracy, inadvertent minor typographic errors may occur. Please contact the provider if you have any questions.   "

## 2025-04-30 ENCOUNTER — THERAPY VISIT (OUTPATIENT)
Dept: PHYSICAL THERAPY | Facility: CLINIC | Age: 80
End: 2025-04-30
Payer: COMMERCIAL

## 2025-04-30 DIAGNOSIS — M17.0 PRIMARY OSTEOARTHRITIS OF BOTH KNEES: ICD-10-CM

## 2025-04-30 DIAGNOSIS — M96.1 POSTLAMINECTOMY SYNDROME OF LUMBAR REGION: Primary | ICD-10-CM

## 2025-04-30 PROCEDURE — 97530 THERAPEUTIC ACTIVITIES: CPT | Mod: GP | Performed by: PHYSICAL THERAPIST

## 2025-04-30 PROCEDURE — 97110 THERAPEUTIC EXERCISES: CPT | Mod: GP | Performed by: PHYSICAL THERAPIST

## 2025-05-20 ENCOUNTER — THERAPY VISIT (OUTPATIENT)
Dept: PHYSICAL THERAPY | Facility: CLINIC | Age: 80
End: 2025-05-20
Payer: COMMERCIAL

## 2025-05-20 DIAGNOSIS — M96.1 POSTLAMINECTOMY SYNDROME OF LUMBAR REGION: Primary | ICD-10-CM

## 2025-05-20 PROCEDURE — 97110 THERAPEUTIC EXERCISES: CPT | Mod: GP | Performed by: PHYSICAL THERAPIST

## 2025-05-20 PROCEDURE — 97530 THERAPEUTIC ACTIVITIES: CPT | Mod: GP | Performed by: PHYSICAL THERAPIST

## 2025-05-20 NOTE — PROGRESS NOTES
05/20/25 0500   Appointment Info   Signing clinician's name / credentials Jose Grant, PT, DPT, OCS   Visits Used 3   Medical Diagnosis Postlaminectomy syndrome of lumbar region.   PT Tx Diagnosis Postlaminectomy syndrome of lumbar region.   Progress Note/Certification   Start of Care Date 04/02/25   Onset of illness/injury or Date of Surgery 03/07/25  (Order date)   Therapy Frequency 1x/week   Predicted Duration 10 weeks   Certification date from 04/02/25   Certification date to 06/11/25   Progress Note Due Date 06/11/25   Progress Note Completed Date 04/02/25   [facility] Authorization Information   Condition type Chronic (continuous duration <3 months)   Cause of current episode Unspecified   Nature of treatment Rehabilitative   Documented POC (choose all that apply) Measurable short and long term/discharge treatment goals related to physical and functional deficits.;Frequency of treatment visits and treatment activities to address deficit areas.;Patient agrees to program participation including home program   How did the symptoms start Gradual onset over many years.   General health reported by patient Poor   GOALS   PT Goals 2   PT Goal 1   Goal Identifier HEP   Goal Description Pt will be independent with HEP in order to improve LE strength.   Goal Progress No compliance to EP.   Target Date 05/14/25   PT Goal 2   Goal Identifier Walking/standing   Goal Description Pt will report ability to be standing/walking for 15 minutes at a time in order to improve tolerance to tasks such as dressing and hygiene.   Goal Progress Not compliant with standing/walking progression plan.   Target Date 05/28/25   Subjective Report   Subjective Report Pt reports poor compliance to program.  Reports he is standing and walking less and less because he is too tired.  Pt reports no compliance to exercises.   Treatment Interventions (PT)   Interventions Therapeutic Procedure/Exercise;Therapeutic Activity   Therapeutic  Procedure/Exercise   Therapeutic Procedures: strength, endurance, ROM, flexibility minutes (08504) 10   Ther Proc 1 - Details Performed seated exercises with patient stopping after 5 reps with each despite encouragement and reporting fatigue.   Skilled Intervention Exercise selection and instruction.   Patient Response/Progress Fatigue.   Therapeutic Activity   Therapeutic Activities: dynamic activities to improve functional performance minutes (66475) 13   Ther Act 1 - Details Conversation provided to patient and spouse with patient's lack of compliance.  PT educated patient on how if he does not increase activity level he will get weaker and have less activity tolerance.  Pt nods in understanding but then says he is too tired to do anything.  Pt, PT and spouse in agreement to discontinue PT until a time when patient feels he is ready to be compliant with program.  Educated on following up with MD about concerns with nutrition and reinforced that alcohol sensation would be beneficial to his overall health and likely to his activity tolerance as well.   Skilled Intervention Activity modification   Patient Response/Progress Inconsistent with reporting understanding but asking questions that demonstrate a lack of understanding of needing to increase activity to increase activity tolerance.   Education   Learner/Method Patient   Education Comments HEP   Plan   Home program march, LAQ, ankle pump, hip abd   Updates to plan of care DC   Plan for next session DC   Total Session Time   Timed Code Treatment Minutes 23   Total Treatment Time (sum of timed and untimed services) 23         DISCHARGE  Reason for Discharge: No further expectation of progress.    Equipment Issued: none.    Discharge Plan: Consider following up with primary physician if he feels ready to make lifestyle challenges regarding nutrition and possibly alcohol cessation.  Would not recommend further PT unless patient actively decides to participate more  consistently with HEP.     Referring Provider:  Thien Graff

## 2025-05-27 DIAGNOSIS — G63 POLYNEUROPATHY IN OTHER DISEASES CLASSIFIED ELSEWHERE: ICD-10-CM

## 2025-05-27 DIAGNOSIS — M54.42 CHRONIC BILATERAL LOW BACK PAIN WITH SCIATICA, SCIATICA LATERALITY UNSPECIFIED: ICD-10-CM

## 2025-05-27 DIAGNOSIS — G89.29 CHRONIC BILATERAL LOW BACK PAIN WITH SCIATICA, SCIATICA LATERALITY UNSPECIFIED: ICD-10-CM

## 2025-05-27 DIAGNOSIS — M54.41 CHRONIC BILATERAL LOW BACK PAIN WITH SCIATICA, SCIATICA LATERALITY UNSPECIFIED: ICD-10-CM

## 2025-05-29 RX ORDER — GABAPENTIN 300 MG/1
900 CAPSULE ORAL 3 TIMES DAILY
Qty: 360 CAPSULE | Refills: 0 | OUTPATIENT
Start: 2025-05-29

## 2025-05-29 NOTE — TELEPHONE ENCOUNTER
Please call the patient and schedule Annual physical with me, which patient is due at this time, to further take care of the medical conditions and medications. OK to use same day slot / 8 AM on Tuesdays/Fridays in 1-2 weeks.     ( FYI - Pt doesn't respond [id number] messages - please call instead of sending [id number] message for scheduling )     Thank you,  Beatrice Stephens MD on 5/29/2025

## 2025-05-29 NOTE — TELEPHONE ENCOUNTER
1st call attempt, LVM, deferring for 2 business days for 2nd call attempt   Anand Brady CMA on 5/29/2025 at 1:59 PM

## 2025-06-09 ENCOUNTER — TELEPHONE (OUTPATIENT)
Dept: FAMILY MEDICINE | Facility: CLINIC | Age: 80
End: 2025-06-09
Payer: COMMERCIAL

## 2025-06-09 ENCOUNTER — PATIENT OUTREACH (OUTPATIENT)
Dept: FAMILY MEDICINE | Facility: CLINIC | Age: 80
End: 2025-06-09
Payer: COMMERCIAL

## 2025-06-09 NOTE — TELEPHONE ENCOUNTER
CALLED SPK TO WIFE MANUEL , SHE STATED NOT ABLE TO SCHEDULE ANYTHING DUE TO CONFLICTING DOC APPT CURRENTLY WILL CB AT AT LATER TIME TIME COMPLETE PHQ-9 AND SCHEDULE YUE Brady CMA on 6/9/2025 at 11:29 AM

## 2025-06-09 NOTE — TELEPHONE ENCOUNTER
Patient Quality Outreach    Patient is due for the following:   Depression  -  PHQ-9 needed    Action(s) Taken:   CALLED TO COMPLETE PHQ-9    Type of outreach:    Phone, spoke to patient/parent. Patient/parent will call back to schedule.    Questions for provider review:    None         Anand Brady [facility]  Chart routed to None.

## 2025-08-01 DIAGNOSIS — D69.3 IDIOPATHIC THROMBOCYTOPENIC PURPURA (H): ICD-10-CM

## 2025-08-04 RX ORDER — ELTROMBOPAG 75 MG/1
TABLET, FILM COATED ORAL
Qty: 30 TABLET | Refills: 11 | Status: SHIPPED | OUTPATIENT
Start: 2025-08-04

## 2025-08-05 ENCOUNTER — PATIENT OUTREACH (OUTPATIENT)
Dept: CARE COORDINATION | Facility: CLINIC | Age: 80
End: 2025-08-05
Payer: COMMERCIAL

## 2025-08-07 ENCOUNTER — ONCOLOGY VISIT (OUTPATIENT)
Dept: ONCOLOGY | Facility: CLINIC | Age: 80
End: 2025-08-07
Attending: INTERNAL MEDICINE
Payer: COMMERCIAL

## 2025-08-07 ENCOUNTER — APPOINTMENT (OUTPATIENT)
Dept: LAB | Facility: CLINIC | Age: 80
End: 2025-08-07
Attending: INTERNAL MEDICINE
Payer: COMMERCIAL

## 2025-08-07 VITALS
RESPIRATION RATE: 20 BRPM | HEART RATE: 100 BPM | OXYGEN SATURATION: 97 % | WEIGHT: 180 LBS | BODY MASS INDEX: 29.05 KG/M2 | SYSTOLIC BLOOD PRESSURE: 102 MMHG | TEMPERATURE: 98.3 F | DIASTOLIC BLOOD PRESSURE: 68 MMHG

## 2025-08-07 DIAGNOSIS — Z86.718 PERSONAL HISTORY OF VENOUS THROMBOSIS AND EMBOLISM: ICD-10-CM

## 2025-08-07 DIAGNOSIS — M10.9 GOUT INVOLVING TOE, UNSPECIFIED CAUSE, UNSPECIFIED CHRONICITY, UNSPECIFIED LATERALITY: ICD-10-CM

## 2025-08-07 DIAGNOSIS — Z79.01 LONG TERM CURRENT USE OF ANTICOAGULANT THERAPY: ICD-10-CM

## 2025-08-07 DIAGNOSIS — D50.0 IRON DEFICIENCY ANEMIA DUE TO CHRONIC BLOOD LOSS: ICD-10-CM

## 2025-08-07 DIAGNOSIS — D50.0 IRON DEFICIENCY ANEMIA DUE TO CHRONIC BLOOD LOSS: Primary | ICD-10-CM

## 2025-08-07 DIAGNOSIS — D69.3 IDIOPATHIC THROMBOCYTOPENIC PURPURA (H): ICD-10-CM

## 2025-08-07 LAB
ALBUMIN SERPL BCG-MCNC: 3.6 G/DL (ref 3.5–5.2)
ALP SERPL-CCNC: 61 U/L (ref 40–150)
ALT SERPL W P-5'-P-CCNC: 9 U/L (ref 0–70)
ANION GAP SERPL CALCULATED.3IONS-SCNC: 13 MMOL/L (ref 7–15)
AST SERPL W P-5'-P-CCNC: 27 U/L (ref 0–45)
BASOPHILS # BLD AUTO: 0.1 10E3/UL (ref 0–0.2)
BASOPHILS NFR BLD AUTO: 1 %
BILIRUB SERPL-MCNC: 1.9 MG/DL
BUN SERPL-MCNC: 9.4 MG/DL (ref 8–23)
CALCIUM SERPL-MCNC: 9.3 MG/DL (ref 8.8–10.4)
CHLORIDE SERPL-SCNC: 105 MMOL/L (ref 98–107)
CREAT SERPL-MCNC: 0.95 MG/DL (ref 0.67–1.17)
EGFRCR SERPLBLD CKD-EPI 2021: 81 ML/MIN/1.73M2
EOSINOPHIL # BLD AUTO: 0 10E3/UL (ref 0–0.7)
EOSINOPHIL NFR BLD AUTO: 1 %
ERYTHROCYTE [DISTWIDTH] IN BLOOD BY AUTOMATED COUNT: 28.2 % (ref 10–15)
FERRITIN SERPL-MCNC: 107 NG/ML (ref 31–409)
GLUCOSE SERPL-MCNC: 133 MG/DL (ref 70–99)
HCO3 SERPL-SCNC: 25 MMOL/L (ref 22–29)
HCT VFR BLD AUTO: 28.1 % (ref 40–53)
HGB BLD-MCNC: 9.3 G/DL (ref 13.3–17.7)
IMM GRANULOCYTES # BLD: 0.1 10E3/UL
IMM GRANULOCYTES NFR BLD: 1 %
IRON BINDING CAPACITY (ROCHE): 815 UG/DL (ref 240–430)
IRON SATN MFR SERPL: 77 % (ref 15–46)
IRON SERPL-MCNC: 628 UG/DL (ref 61–157)
LYMPHOCYTES # BLD AUTO: 1 10E3/UL (ref 0.8–5.3)
LYMPHOCYTES NFR BLD AUTO: 16 %
MCH RBC QN AUTO: 33.8 PG (ref 26.5–33)
MCHC RBC AUTO-ENTMCNC: 33.1 G/DL (ref 31.5–36.5)
MCV RBC AUTO: 102 FL (ref 78–100)
MONOCYTES # BLD AUTO: 0.6 10E3/UL (ref 0–1.3)
MONOCYTES NFR BLD AUTO: 10 %
NEUTROPHILS # BLD AUTO: 4.2 10E3/UL (ref 1.6–8.3)
NEUTROPHILS NFR BLD AUTO: 71 %
NRBC # BLD AUTO: 0 10E3/UL
NRBC BLD AUTO-RTO: 0 /100
PLATELET # BLD AUTO: 110 10E3/UL (ref 150–450)
POTASSIUM SERPL-SCNC: 3.3 MMOL/L (ref 3.4–5.3)
PROT SERPL-MCNC: 6.2 G/DL (ref 6.4–8.3)
RBC # BLD AUTO: 2.75 10E6/UL (ref 4.4–5.9)
SODIUM SERPL-SCNC: 143 MMOL/L (ref 135–145)
URATE SERPL-MCNC: 3.1 MG/DL (ref 3.4–7)
WBC # BLD AUTO: 5.9 10E3/UL (ref 4–11)

## 2025-08-07 PROCEDURE — 83550 IRON BINDING TEST: CPT | Performed by: INTERNAL MEDICINE

## 2025-08-07 PROCEDURE — 85004 AUTOMATED DIFF WBC COUNT: CPT | Performed by: INTERNAL MEDICINE

## 2025-08-07 PROCEDURE — G0463 HOSPITAL OUTPT CLINIC VISIT: HCPCS | Performed by: INTERNAL MEDICINE

## 2025-08-07 PROCEDURE — 84550 ASSAY OF BLOOD/URIC ACID: CPT | Performed by: INTERNAL MEDICINE

## 2025-08-07 PROCEDURE — 36415 COLL VENOUS BLD VENIPUNCTURE: CPT | Performed by: INTERNAL MEDICINE

## 2025-08-07 PROCEDURE — 84155 ASSAY OF PROTEIN SERUM: CPT | Performed by: INTERNAL MEDICINE

## 2025-08-07 PROCEDURE — 82728 ASSAY OF FERRITIN: CPT | Performed by: INTERNAL MEDICINE

## 2025-08-07 PROCEDURE — 84238 ASSAY NONENDOCRINE RECEPTOR: CPT | Performed by: INTERNAL MEDICINE

## 2025-08-07 ASSESSMENT — PAIN SCALES - GENERAL: PAINLEVEL_OUTOF10: SEVERE PAIN (8)

## 2025-08-09 LAB — STFR SERPL-MCNC: 4.6 MG/L

## (undated) DEVICE — LUBRICATING JELLY 4.25OZ

## (undated) DEVICE — ENDO FORCEP ENDOJAW BIOPSY 2.8MMX230CM FB-220U

## (undated) DEVICE — TUBING SUCTION 12"X1/4" N612

## (undated) DEVICE — KIT ENDO TURNOVER/PROCEDURE CARRY-ON 101822

## (undated) RX ORDER — BUPIVACAINE HYDROCHLORIDE 5 MG/ML
INJECTION, SOLUTION EPIDURAL; INTRACAUDAL
Status: DISPENSED
Start: 2017-09-29

## (undated) RX ORDER — PROPOFOL 10 MG/ML
INJECTION, EMULSION INTRAVENOUS
Status: DISPENSED
Start: 2020-11-10

## (undated) RX ORDER — TRIAMCINOLONE ACETONIDE 40 MG/ML
INJECTION, SUSPENSION INTRA-ARTICULAR; INTRAMUSCULAR
Status: DISPENSED
Start: 2017-07-27

## (undated) RX ORDER — LIDOCAINE HYDROCHLORIDE 10 MG/ML
INJECTION, SOLUTION EPIDURAL; INFILTRATION; INTRACAUDAL; PERINEURAL
Status: DISPENSED
Start: 2017-09-29

## (undated) RX ORDER — BUPIVACAINE HYDROCHLORIDE 5 MG/ML
INJECTION, SOLUTION EPIDURAL; INTRACAUDAL
Status: DISPENSED
Start: 2017-07-27

## (undated) RX ORDER — TRIAMCINOLONE ACETONIDE 40 MG/ML
INJECTION, SUSPENSION INTRA-ARTICULAR; INTRAMUSCULAR
Status: DISPENSED
Start: 2017-09-29

## (undated) RX ORDER — SIMETHICONE 40MG/0.6ML
SUSPENSION, DROPS(FINAL DOSAGE FORM)(ML) ORAL
Status: DISPENSED
Start: 2020-11-10

## (undated) RX ORDER — BUPIVACAINE HYDROCHLORIDE 2.5 MG/ML
INJECTION, SOLUTION EPIDURAL; INFILTRATION; INTRACAUDAL
Status: DISPENSED
Start: 2021-11-04

## (undated) RX ORDER — SIMETHICONE 20 MG/.3ML
EMULSION ORAL
Status: DISPENSED
Start: 2019-07-31

## (undated) RX ORDER — FENTANYL CITRATE 50 UG/ML
INJECTION, SOLUTION INTRAMUSCULAR; INTRAVENOUS
Status: DISPENSED
Start: 2020-11-10

## (undated) RX ORDER — FENTANYL CITRATE 50 UG/ML
INJECTION, SOLUTION INTRAMUSCULAR; INTRAVENOUS
Status: DISPENSED
Start: 2019-07-31

## (undated) RX ORDER — LIDOCAINE HYDROCHLORIDE 10 MG/ML
INJECTION, SOLUTION EPIDURAL; INFILTRATION; INTRACAUDAL; PERINEURAL
Status: DISPENSED
Start: 2021-11-04

## (undated) RX ORDER — SIMETHICONE 20 MG/.3ML
EMULSION ORAL
Status: DISPENSED
Start: 2018-02-08

## (undated) RX ORDER — LIDOCAINE HYDROCHLORIDE 20 MG/ML
INJECTION, SOLUTION EPIDURAL; INFILTRATION; INTRACAUDAL; PERINEURAL
Status: DISPENSED
Start: 2018-02-08

## (undated) RX ORDER — FENTANYL CITRATE 50 UG/ML
INJECTION, SOLUTION INTRAMUSCULAR; INTRAVENOUS
Status: DISPENSED
Start: 2018-02-08